# Patient Record
Sex: MALE | Race: WHITE | Employment: OTHER | URBAN - METROPOLITAN AREA
[De-identification: names, ages, dates, MRNs, and addresses within clinical notes are randomized per-mention and may not be internally consistent; named-entity substitution may affect disease eponyms.]

---

## 2020-03-30 ENCOUNTER — OFFICE VISIT (OUTPATIENT)
Dept: FAMILY MEDICINE CLINIC | Age: 55
End: 2020-03-30
Payer: MEDICARE

## 2020-03-30 VITALS
BODY MASS INDEX: 33.99 KG/M2 | TEMPERATURE: 97.7 F | OXYGEN SATURATION: 93 % | DIASTOLIC BLOOD PRESSURE: 72 MMHG | WEIGHT: 173.13 LBS | HEART RATE: 58 BPM | HEIGHT: 60 IN | SYSTOLIC BLOOD PRESSURE: 122 MMHG

## 2020-03-30 PROBLEM — I50.9 CHF (CONGESTIVE HEART FAILURE) (HCC): Status: ACTIVE | Noted: 2020-03-30

## 2020-03-30 PROBLEM — J44.9 COPD (CHRONIC OBSTRUCTIVE PULMONARY DISEASE) (HCC): Status: ACTIVE | Noted: 2020-03-30

## 2020-03-30 PROBLEM — E78.49 OTHER HYPERLIPIDEMIA: Status: ACTIVE | Noted: 2020-03-30

## 2020-03-30 PROBLEM — I10 HYPERTENSION: Status: ACTIVE | Noted: 2020-03-30

## 2020-03-30 LAB
A/G RATIO: 1.7 (ref 1.1–2.2)
ALBUMIN SERPL-MCNC: 4 G/DL (ref 3.4–5)
ALP BLD-CCNC: 36 U/L (ref 40–129)
ALT SERPL-CCNC: 21 U/L (ref 10–40)
ANION GAP SERPL CALCULATED.3IONS-SCNC: 14 MMOL/L (ref 3–16)
AST SERPL-CCNC: 14 U/L (ref 15–37)
BASOPHILS ABSOLUTE: 0.1 K/UL (ref 0–0.2)
BASOPHILS RELATIVE PERCENT: 0.4 %
BILIRUB SERPL-MCNC: <0.2 MG/DL (ref 0–1)
BUN BLDV-MCNC: 20 MG/DL (ref 7–20)
CALCIUM SERPL-MCNC: 10.2 MG/DL (ref 8.3–10.6)
CHLORIDE BLD-SCNC: 103 MMOL/L (ref 99–110)
CHOLESTEROL, FASTING: 110 MG/DL (ref 0–199)
CO2: 26 MMOL/L (ref 21–32)
CREAT SERPL-MCNC: 0.7 MG/DL (ref 0.9–1.3)
EOSINOPHILS ABSOLUTE: 0.1 K/UL (ref 0–0.6)
EOSINOPHILS RELATIVE PERCENT: 0.9 %
GFR AFRICAN AMERICAN: >60
GFR NON-AFRICAN AMERICAN: >60
GLOBULIN: 2.4 G/DL
GLUCOSE BLD-MCNC: 100 MG/DL (ref 70–99)
HCT VFR BLD CALC: 37.3 % (ref 40.5–52.5)
HDLC SERPL-MCNC: 50 MG/DL (ref 40–60)
HEMOGLOBIN: 11.8 G/DL (ref 13.5–17.5)
LDL CHOLESTEROL CALCULATED: 43 MG/DL
LYMPHOCYTES ABSOLUTE: 3.7 K/UL (ref 1–5.1)
LYMPHOCYTES RELATIVE PERCENT: 27.6 %
MCH RBC QN AUTO: 27.6 PG (ref 26–34)
MCHC RBC AUTO-ENTMCNC: 31.8 G/DL (ref 31–36)
MCV RBC AUTO: 87 FL (ref 80–100)
MONOCYTES ABSOLUTE: 0.8 K/UL (ref 0–1.3)
MONOCYTES RELATIVE PERCENT: 6.3 %
NEUTROPHILS ABSOLUTE: 8.8 K/UL (ref 1.7–7.7)
NEUTROPHILS RELATIVE PERCENT: 64.8 %
PDW BLD-RTO: 15.9 % (ref 12.4–15.4)
PLATELET # BLD: 428 K/UL (ref 135–450)
PMV BLD AUTO: 8.4 FL (ref 5–10.5)
POTASSIUM SERPL-SCNC: 4.2 MMOL/L (ref 3.5–5.1)
RBC # BLD: 4.28 M/UL (ref 4.2–5.9)
SODIUM BLD-SCNC: 143 MMOL/L (ref 136–145)
TOTAL PROTEIN: 6.4 G/DL (ref 6.4–8.2)
TRIGLYCERIDE, FASTING: 86 MG/DL (ref 0–150)
VLDLC SERPL CALC-MCNC: 17 MG/DL
WBC # BLD: 13.5 K/UL (ref 4–11)

## 2020-03-30 PROCEDURE — 36415 COLL VENOUS BLD VENIPUNCTURE: CPT | Performed by: NURSE PRACTITIONER

## 2020-03-30 PROCEDURE — 99204 OFFICE O/P NEW MOD 45 MIN: CPT | Performed by: NURSE PRACTITIONER

## 2020-03-30 RX ORDER — FENOFIBRIC ACID 135 MG/1
135 CAPSULE, DELAYED RELEASE ORAL DAILY
COMMUNITY
End: 2020-04-06 | Stop reason: SDUPTHER

## 2020-03-30 RX ORDER — CHLORAL HYDRATE 500 MG
1 CAPSULE ORAL DAILY
COMMUNITY
End: 2022-08-02 | Stop reason: ALTCHOICE

## 2020-03-30 RX ORDER — IPRATROPIUM BROMIDE AND ALBUTEROL SULFATE 2.5; .5 MG/3ML; MG/3ML
1 SOLUTION RESPIRATORY (INHALATION)
COMMUNITY
End: 2020-04-24 | Stop reason: SDUPTHER

## 2020-03-30 RX ORDER — BISOPROLOL FUMARATE 10 MG/1
10 TABLET ORAL DAILY
COMMUNITY
End: 2020-04-06 | Stop reason: SDUPTHER

## 2020-03-30 RX ORDER — CLOPIDOGREL BISULFATE 75 MG/1
75 TABLET ORAL DAILY
COMMUNITY
End: 2020-04-06 | Stop reason: SDUPTHER

## 2020-03-30 RX ORDER — ASPIRIN 81 MG/1
81 TABLET ORAL DAILY
Status: ON HOLD | COMMUNITY
End: 2021-09-30 | Stop reason: ALTCHOICE

## 2020-03-30 RX ORDER — GABAPENTIN 800 MG/1
800 TABLET ORAL 3 TIMES DAILY
COMMUNITY
End: 2020-04-06 | Stop reason: SDUPTHER

## 2020-03-30 RX ORDER — UMECLIDINIUM 62.5 UG/1
1 AEROSOL, POWDER ORAL DAILY
COMMUNITY
End: 2020-04-24 | Stop reason: SDUPTHER

## 2020-03-30 RX ORDER — VITAMIN B COMPLEX
1 TABLET ORAL DAILY
COMMUNITY

## 2020-03-30 RX ORDER — PIOGLITAZONE HCL AND METFORMIN HCL 500; 15 MG/1; MG/1
1 TABLET ORAL 2 TIMES DAILY WITH MEALS
COMMUNITY
End: 2020-08-10 | Stop reason: CLARIF

## 2020-03-30 RX ORDER — BUDESONIDE AND FORMOTEROL FUMARATE DIHYDRATE 80; 4.5 UG/1; UG/1
2 AEROSOL RESPIRATORY (INHALATION) 2 TIMES DAILY
COMMUNITY
End: 2020-04-24 | Stop reason: SDUPTHER

## 2020-03-30 RX ORDER — ATORVASTATIN CALCIUM 40 MG/1
40 TABLET, FILM COATED ORAL DAILY
COMMUNITY
End: 2020-04-06 | Stop reason: SDUPTHER

## 2020-03-30 RX ORDER — FUROSEMIDE 20 MG/1
20 TABLET ORAL DAILY
Status: ON HOLD | COMMUNITY
End: 2021-02-11 | Stop reason: SDUPTHER

## 2020-03-30 RX ORDER — LISINOPRIL 20 MG/1
20 TABLET ORAL DAILY
COMMUNITY
End: 2020-04-06 | Stop reason: SDUPTHER

## 2020-03-30 RX ORDER — SIMVASTATIN 20 MG
20 TABLET ORAL NIGHTLY
COMMUNITY
End: 2020-08-10

## 2020-03-30 RX ORDER — CHOLECALCIFEROL (VITAMIN D3) 1250 MCG
1 CAPSULE ORAL
COMMUNITY
End: 2020-04-06 | Stop reason: SDUPTHER

## 2020-03-30 RX ORDER — FLUTICASONE FUROATE AND VILANTEROL TRIFENATATE 100; 25 UG/1; UG/1
1 POWDER RESPIRATORY (INHALATION) DAILY
COMMUNITY
End: 2020-04-24 | Stop reason: SDUPTHER

## 2020-03-30 ASSESSMENT — PATIENT HEALTH QUESTIONNAIRE - PHQ9
SUM OF ALL RESPONSES TO PHQ9 QUESTIONS 1 & 2: 0
SUM OF ALL RESPONSES TO PHQ QUESTIONS 1-9: 0
2. FEELING DOWN, DEPRESSED OR HOPELESS: 0
SUM OF ALL RESPONSES TO PHQ QUESTIONS 1-9: 0
1. LITTLE INTEREST OR PLEASURE IN DOING THINGS: 0

## 2020-03-30 ASSESSMENT — ENCOUNTER SYMPTOMS
ABDOMINAL DISTENTION: 0
SHORTNESS OF BREATH: 1
SORE THROAT: 0
WHEEZING: 0
CHEST TIGHTNESS: 1
DIARRHEA: 0
RHINORRHEA: 0
NAUSEA: 0
COUGH: 0
VOMITING: 0
ABDOMINAL PAIN: 0

## 2020-03-30 NOTE — PATIENT INSTRUCTIONS
forms, many of them available over-the-counter:  ? Nicotine patches  ? Nicotine gum and lozenges  ? Nicotine inhaler  · Ask your doctor about bupropion (Wellbutrin) or varenicline (Chantix), which are prescription medicines. They do not contain nicotine. They help you by reducing withdrawal symptoms, such as stress and anxiety. · Some people find hypnosis, acupuncture, and massage helpful for ending the smoking habit. · Eat a healthy diet and get regular exercise. Having healthy habits will help your body move past its craving for nicotine. · Be prepared to keep trying. Most people are not successful the first few times they try to quit. Do not get mad at yourself if you smoke again. Make a list of things you learned and think about when you want to try again, such as next week, next month, or next year. Where can you learn more? Go to https://Munogenicspekarlieeweb.American Aerogel. org and sign in to your NeurAxon account. Enter G326 in the Verisante Technology box to learn more about \"Stopping Smoking: Care Instructions. \"     If you do not have an account, please click on the \"Sign Up Now\" link. Current as of: July 4, 2019Content Version: 12.4  © 2146-7703 Healthwise, Incorporated. Care instructions adapted under license by [facility] ([facility]). If you have questions about a medical condition or this instruction, always ask your healthcare professional. [place] [address] any warranty or liability for your use of this information.

## 2020-03-30 NOTE — PROGRESS NOTES
CHIEF COMPLAINT  Chief Complaint   Patient presents with    New Patient        HPI   Keya Granado is a 48 y.o. male who presents to the office establishing care with primary care provider. Patient was recently hospitalized for CHF exacerbation and pneumonia a few weeks ago. Patient reports that he seen his previous primary care physician 2 to 3 months ago prior to hospitalization. Patient reports overall feeling well now. Patient reports that he is taking his medications as prescribed. Patient reports that he was previously on hydrochlorothiazide which they changed it to furosemide while in the hospital.  Patient reports he is currently still on Zithromax and taking medications as prescribed with no new or worsening symptoms. Patient denies any dizziness or lightheadedness. Patient reports intermittent chest tightness with shortness of breath however reports that to chronic COPD/emphysema. Patient continues to smoke 1 pack/day. Patient reports approximately 1 year ago he was smoking 2-1/2 packs/day and has decreased. No abdominal pain or discomfort. No nausea, vomiting, or diarrhea. No numbness or tingling in extremities. Patient reports pain in hands and feet from time to time and takes gabapentin. Patient is diabetic but has not had a glucometer and testing supplies in the past few weeks due to recently moving and not able to find it. Patient's wife reports that his blood sugar was 90-1 20s prior to the move. Patient reports colonoscopy within the past year. Patient also reports seeing ophthalmology a few months ago but would like to have a referral due to moving. Patient does wear glasses. No fever. Patient reports chronic cough with nonproductive sputum. No dysuria, hematuria, urinary urgency, frequency, retention. No dark or tarry stools. No other complaints, modifying factors or associated symptoms. Nursing notes reviewed. No past medical history on file.   No past surgical normal. There is no distension. Palpations: Abdomen is soft. Tenderness: There is no abdominal tenderness. There is no guarding. Musculoskeletal: Normal range of motion. General: No swelling or deformity. Skin:     General: Skin is warm and dry. Capillary Refill: Capillary refill takes 2 to 3 seconds. Coloration: Skin is not pale. Findings: No bruising, lesion or rash. Neurological:      General: No focal deficit present. Mental Status: He is alert and oriented to person, place, and time. Gait: Gait normal.      Deep Tendon Reflexes: Reflexes normal.   Psychiatric:         Mood and Affect: Mood normal.         Behavior: Behavior normal.         ASSESSMENT/PLAN:   1. Essential hypertension  Controlled. Patient reports compliancy with medications. Patient denies any episodes of hypo-or hypertension. Patient reports chest tightness and shortness of breath on a daily basis but denies any new or worsening symptoms. Patient reports that he has that always due to COPD. Patient was recently hospitalized for CHF exacerbation and pneumonia. Patient reports that he is feeling well denies any new dyspnea since being discharged from hospital.  Patient instructed on DASH diet as well as being compliant with medications. Smoking cessation was discussed with patient. Patient noncompliant and does not wish to stop smoking.  - CBC Auto Differential  - COMPREHENSIVE METABOLIC PANEL    2. Other hyperlipidemia  Labs pending. Patient reports he is compliant with medications. Dietary control was discussed in detail with patient. Patient verbalizes and acknowledges.  - Lipid, Fasting    3. Chronic systolic congestive heart failure [facility])  Patient was recently hospitalized for acute CHF exacerbation. Patient reports that they changed his hydrochlorothiazide to furosemide and has been taking it as prescribed. Patient denies any urinary changes.   Patient denies any recent weight

## 2020-03-31 LAB
ESTIMATED AVERAGE GLUCOSE: 139.9 MG/DL
HBA1C MFR BLD: 6.5 %

## 2020-03-31 RX ORDER — BLOOD-GLUCOSE METER
1 KIT MISCELLANEOUS DAILY
Qty: 1 KIT | Refills: 0 | Status: SHIPPED | OUTPATIENT
Start: 2020-03-31 | End: 2020-03-31 | Stop reason: SDUPTHER

## 2020-03-31 RX ORDER — BLOOD-GLUCOSE METER
1 KIT MISCELLANEOUS DAILY
Qty: 1 KIT | Refills: 0 | Status: SHIPPED | OUTPATIENT
Start: 2020-03-31

## 2020-03-31 RX ORDER — GLUCOSAMINE HCL/CHONDROITIN SU 500-400 MG
CAPSULE ORAL
Qty: 300 STRIP | Refills: 0 | Status: SHIPPED | OUTPATIENT
Start: 2020-03-31 | End: 2020-03-31 | Stop reason: SDUPTHER

## 2020-03-31 RX ORDER — GLUCOSAMINE HCL/CHONDROITIN SU 500-400 MG
CAPSULE ORAL
Qty: 300 STRIP | Refills: 0 | Status: SHIPPED | OUTPATIENT
Start: 2020-03-31

## 2020-04-06 RX ORDER — LISINOPRIL 20 MG/1
20 TABLET ORAL DAILY
Qty: 30 TABLET | Refills: 1 | Status: SHIPPED | OUTPATIENT
Start: 2020-04-06 | End: 2020-06-24

## 2020-04-06 RX ORDER — PIOGLITAZONEHYDROCHLORIDE 15 MG/1
15 TABLET ORAL DAILY
Qty: 30 TABLET | Refills: 1 | Status: SHIPPED | OUTPATIENT
Start: 2020-04-06 | End: 2021-03-16 | Stop reason: SDUPTHER

## 2020-04-06 RX ORDER — CLOPIDOGREL BISULFATE 75 MG/1
75 TABLET ORAL DAILY
Qty: 30 TABLET | Refills: 1 | Status: ON HOLD | OUTPATIENT
Start: 2020-04-06 | End: 2021-09-17

## 2020-04-06 RX ORDER — FENOFIBRIC ACID 135 MG/1
135 CAPSULE, DELAYED RELEASE ORAL DAILY
Qty: 30 CAPSULE | Refills: 1 | Status: SHIPPED | OUTPATIENT
Start: 2020-04-06 | End: 2020-09-28

## 2020-04-06 RX ORDER — ATORVASTATIN CALCIUM 40 MG/1
40 TABLET, FILM COATED ORAL DAILY
Qty: 30 TABLET | Refills: 1 | Status: SHIPPED | OUTPATIENT
Start: 2020-04-06 | End: 2020-06-22

## 2020-04-06 RX ORDER — BISOPROLOL FUMARATE 10 MG/1
10 TABLET ORAL DAILY
Qty: 30 TABLET | Refills: 1 | Status: ON HOLD | OUTPATIENT
Start: 2020-04-06 | End: 2021-06-09 | Stop reason: SDUPTHER

## 2020-04-06 RX ORDER — CHOLECALCIFEROL (VITAMIN D3) 1250 MCG
1 CAPSULE ORAL
Qty: 2 CAPSULE | Refills: 1 | Status: SHIPPED | OUTPATIENT
Start: 2020-04-06 | End: 2022-05-12 | Stop reason: ALTCHOICE

## 2020-04-06 RX ORDER — GABAPENTIN 800 MG/1
800 TABLET ORAL 4 TIMES DAILY
Qty: 120 TABLET | Refills: 2 | Status: SHIPPED | OUTPATIENT
Start: 2020-04-06 | End: 2020-09-29 | Stop reason: SDUPTHER

## 2020-04-24 ENCOUNTER — TELEPHONE (OUTPATIENT)
Dept: FAMILY MEDICINE CLINIC | Age: 55
End: 2020-04-24

## 2020-04-24 RX ORDER — IPRATROPIUM BROMIDE AND ALBUTEROL SULFATE 2.5; .5 MG/3ML; MG/3ML
1 SOLUTION RESPIRATORY (INHALATION)
Qty: 360 ML | Refills: 1 | Status: SHIPPED | OUTPATIENT
Start: 2020-04-24 | End: 2020-06-24

## 2020-04-24 RX ORDER — FLUTICASONE FUROATE AND VILANTEROL TRIFENATATE 100; 25 UG/1; UG/1
1 POWDER RESPIRATORY (INHALATION) DAILY
Qty: 1 EACH | Refills: 1 | Status: SHIPPED | OUTPATIENT
Start: 2020-04-24 | End: 2020-07-29

## 2020-04-24 RX ORDER — BUDESONIDE AND FORMOTEROL FUMARATE DIHYDRATE 80; 4.5 UG/1; UG/1
2 AEROSOL RESPIRATORY (INHALATION) 2 TIMES DAILY
Qty: 1 INHALER | Refills: 1 | Status: SHIPPED | OUTPATIENT
Start: 2020-04-24 | End: 2020-08-31 | Stop reason: ALTCHOICE

## 2020-04-24 RX ORDER — UMECLIDINIUM 62.5 UG/1
1 AEROSOL, POWDER ORAL DAILY
Qty: 1 EACH | Refills: 1 | Status: SHIPPED | OUTPATIENT
Start: 2020-04-24 | End: 2020-07-29

## 2020-04-24 NOTE — TELEPHONE ENCOUNTER
Patient's wife notified - refill for all inhalers and nebulizer sent in per CNP approval    NOTE: Patient requested ProAir instead of the Ventoline because it worked better and insurance made him try the vontoline first this year.

## 2020-04-24 NOTE — TELEPHONE ENCOUNTER
Inhalers can be refilled however if patient is not feeling any better and recommendations were for patient to be on oxygen he would need to go back to the hospital for reevaluation and treatment. If he wants inhalers called and verbal order okay to reorder those.

## 2020-06-01 RX ORDER — ALBUTEROL SULFATE 90 UG/1
AEROSOL, METERED RESPIRATORY (INHALATION)
Qty: 1 INHALER | Refills: 3 | Status: SHIPPED | OUTPATIENT
Start: 2020-06-01 | End: 2021-08-27 | Stop reason: SDUPTHER

## 2020-06-22 RX ORDER — ATORVASTATIN CALCIUM 40 MG/1
TABLET, FILM COATED ORAL
Qty: 30 TABLET | Refills: 3 | Status: SHIPPED | OUTPATIENT
Start: 2020-06-22 | End: 2020-09-23

## 2020-06-24 RX ORDER — IPRATROPIUM BROMIDE AND ALBUTEROL SULFATE 2.5; .5 MG/3ML; MG/3ML
1 SOLUTION RESPIRATORY (INHALATION)
Qty: 360 ML | Refills: 1 | Status: SHIPPED | OUTPATIENT
Start: 2020-06-24 | End: 2020-10-13

## 2020-06-24 RX ORDER — LISINOPRIL 20 MG/1
TABLET ORAL
Qty: 30 TABLET | Refills: 1 | Status: SHIPPED | OUTPATIENT
Start: 2020-06-24 | End: 2020-07-24

## 2020-07-24 RX ORDER — LISINOPRIL 20 MG/1
TABLET ORAL
Qty: 30 TABLET | Refills: 1 | Status: SHIPPED | OUTPATIENT
Start: 2020-07-24 | End: 2020-08-24

## 2020-07-29 ENCOUNTER — TELEPHONE (OUTPATIENT)
Dept: FAMILY MEDICINE CLINIC | Age: 55
End: 2020-07-29

## 2020-07-29 RX ORDER — UMECLIDINIUM 62.5 UG/1
AEROSOL, POWDER ORAL
Qty: 30 EACH | Refills: 1 | Status: SHIPPED | OUTPATIENT
Start: 2020-07-29 | End: 2020-08-31 | Stop reason: SDUPTHER

## 2020-07-29 RX ORDER — FLUTICASONE FUROATE AND VILANTEROL TRIFENATATE 100; 25 UG/1; UG/1
POWDER RESPIRATORY (INHALATION)
Qty: 1 EACH | Refills: 2 | Status: SHIPPED | OUTPATIENT
Start: 2020-07-29 | End: 2021-06-02 | Stop reason: DRUGHIGH

## 2020-07-29 NOTE — TELEPHONE ENCOUNTER
----- Message from Catarino Number sent at 7/29/2020  9:23 AM EDT -----  Subject: Message to Provider    QUESTIONS  Information for Provider? Patients need a letter stating they need to keep   lights on do to patient being on oxygen. Patients electric bill shot up   when patient was place on it. please advise patients wife on this matter. ---------------------------------------------------------------------------  --------------  Keanu Must INFO  What is the best way for the office to contact you? OK to leave message on   voicemail  Preferred Call Back Phone Number? [phone]  ---------------------------------------------------------------------------  --------------  SCRIPT ANSWERS  Relationship to Patient? Other  Representative Name? Mrs. Sarah Gunn   Is the Representative on the appropriate HIPAA document in Epic?  Yes  Information for Provider? the fax number is [phone] Home energy   assistance program attention Jorge Rodriguez

## 2020-07-31 ENCOUNTER — TELEPHONE (OUTPATIENT)
Dept: FAMILY MEDICINE CLINIC | Age: 55
End: 2020-07-31

## 2020-07-31 NOTE — TELEPHONE ENCOUNTER
The patient's wife called and asked if a letter can be faxed to Home Energy assistance program Chauncy Claude fax # [phone]  The patient has oxygen in the home and  His bill is high since hes been on the oxygen.  Please advise rc

## 2020-08-10 ENCOUNTER — OFFICE VISIT (OUTPATIENT)
Dept: FAMILY MEDICINE CLINIC | Age: 55
End: 2020-08-10
Payer: MEDICARE

## 2020-08-10 VITALS
WEIGHT: 188.38 LBS | TEMPERATURE: 99.1 F | DIASTOLIC BLOOD PRESSURE: 65 MMHG | SYSTOLIC BLOOD PRESSURE: 91 MMHG | OXYGEN SATURATION: 87 % | BODY MASS INDEX: 37.1 KG/M2 | HEART RATE: 65 BPM

## 2020-08-10 LAB
CREATININE URINE POCT: 200
HBA1C MFR BLD: 7.2 %
MICROALBUMIN/CREAT 24H UR: 80 MG/G{CREAT}
MICROALBUMIN/CREAT UR-RTO: ABNORMAL

## 2020-08-10 PROCEDURE — 82044 UR ALBUMIN SEMIQUANTITATIVE: CPT | Performed by: NURSE PRACTITIONER

## 2020-08-10 PROCEDURE — 99214 OFFICE O/P EST MOD 30 MIN: CPT | Performed by: NURSE PRACTITIONER

## 2020-08-10 PROCEDURE — 83036 HEMOGLOBIN GLYCOSYLATED A1C: CPT | Performed by: NURSE PRACTITIONER

## 2020-08-10 ASSESSMENT — ENCOUNTER SYMPTOMS
RHINORRHEA: 0
DIARRHEA: 0
ABDOMINAL DISTENTION: 0
WHEEZING: 0
SHORTNESS OF BREATH: 1
NAUSEA: 0
CHEST TIGHTNESS: 0
COUGH: 0
VOMITING: 0
SORE THROAT: 0
ABDOMINAL PAIN: 0

## 2020-08-10 NOTE — PATIENT INSTRUCTIONS
a time. This is a lot to deal with, but keep at it. You will feel better. Follow-up care is a key part of your treatment and safety. Be sure to make and go to all appointments, and call your doctor if you are having problems. It's also a good idea to know your test results and keep a list of the medicines you take. How can you care for yourself at home? · Ask your family, friends, and coworkers for support. You have a better chance of quitting if you have help and support. · Join a support group, such as Nicotine Anonymous, for people who are trying to quit smoking. · Consider signing up for a smoking cessation program, such as the American Lung Association's Freedom from Smoking program.  · Get text messaging support. Go to the website at www.smokefree. gov to sign up for the  program.  · Set a quit date. Pick your date carefully so that it is not right in the middle of a big deadline or stressful time. Once you quit, do not even take a puff. Get rid of all ashtrays and lighters after your last cigarette. Clean your house and your clothes so that they do not smell of smoke. · Learn how to be a nonsmoker. Think about ways you can avoid those things that make you reach for a cigarette. ? Avoid situations that put you at greatest risk for smoking. For some people, it is hard to have a drink with friends without smoking. For others, they might skip a coffee break with coworkers who smoke. ? Change your daily routine. Take a different route to work or eat a meal in a different place. · Cut down on stress. Calm yourself or release tension by doing an activity you enjoy, such as reading a book, taking a hot bath, or gardening. · Talk to your doctor or pharmacist about nicotine replacement therapy, which replaces the nicotine in your body. You still get nicotine but you do not use tobacco. Nicotine replacement products help you slowly reduce the amount of nicotine you need.  These products come in several forms, many of them available over-the-counter:  ? Nicotine patches  ? Nicotine gum and lozenges  ? Nicotine inhaler  · Ask your doctor about bupropion (Wellbutrin) or varenicline (Chantix), which are prescription medicines. They do not contain nicotine. They help you by reducing withdrawal symptoms, such as stress and anxiety. · Some people find hypnosis, acupuncture, and massage helpful for ending the smoking habit. · Eat a healthy diet and get regular exercise. Having healthy habits will help your body move past its craving for nicotine. · Be prepared to keep trying. Most people are not successful the first few times they try to quit. Do not get mad at yourself if you smoke again. Make a list of things you learned and think about when you want to try again, such as next week, next month, or next year. Where can you learn more? Go to [email or web address] org and sign in to your [employer]share [employer] account. Enter J383 in the Orate box to learn more about \"Stopping Smoking: Care Instructions. \"     If you do not have an account, please click on the \"Sign Up Now\" link. Current as of: March 12, 2020               Content Version: 12.5  © 3905-1078 Healthwise, Incorporated. Care instructions adapted under license by [facility] ([facility]). If you have questions about a medical condition or this instruction, always ask your healthcare professional. [place] [address] any warranty or liability for your use of this information.

## 2020-08-10 NOTE — PROGRESS NOTES
CHIEF COMPLAINT  Chief Complaint   Patient presents with    Check-Up        HPI   Dougie Paredes is a 48 y.o. male who presents to the office check up. Patient denies any recent changes in medications. Patient reports taking medications as prescribed and no missed doses. No episodes of fatigue or unintentional weight lost.  No dizziness or lightheadedness. No chest pain or chest tightness. Patient reports shortness of breath upon exertion. No abdominal pain or discomfort. No nausea, vomiting, or diarrhea. No changes in bowel or bladder habits. Patient reports he wears oxygen at night. Patient does not have a pulmonologist.  Patient smokes 1 pack/day. Patient sees cardiology last appointment was in July. Patient reports that he does check his blood sugars twice a day his wife tracks it for him. Patient denies any knowledge of episodes of hyperglycemia or hypoglycemia. Patient reports that COPD is managed unless weather gets too hot that he does have to use his inhalers more often. Patient's wife concerned because his legs have progressively got weaker and he gets more short of breath and short distances. No fever or chills. No hemoptysis. No other complaints, modifying factors or associated symptoms. Nursing notes reviewed. No past medical history on file. No past surgical history on file.   Family History   Problem Relation Age of Onset    Asthma Mother     Cancer Mother     Hearing Loss Mother     Vision Loss Mother     High Blood Pressure Father     High Cholesterol Father     Vision Loss Father     Asthma Sister     Diabetes Sister     Vision Loss Sister     Asthma Brother     Arthritis Brother     High Blood Pressure Brother      Social History     Socioeconomic History    Marital status:      Spouse name: Not on file    Number of children: Not on file    Years of education: Not on file    Highest education level: Not on file   Occupational History    Not on file Social Needs    Financial resource strain: Not on file    Food insecurity     Worry: Not on file     Inability: Not on file    Transportation needs     Medical: Not on file     Non-medical: Not on file   Tobacco Use    Smoking status: Current Every Day Smoker    Smokeless tobacco: Never Used   Substance and Sexual Activity    Alcohol use:  Yes    Drug use: Never    Sexual activity: Not on file   Lifestyle    Physical activity     Days per week: Not on file     Minutes per session: Not on file    Stress: Not on file   Relationships    Social connections     Talks on phone: Not on file     Gets together: Not on file     Attends Jewish service: Not on file     Active member of club or organization: Not on file     Attends meetings of clubs or organizations: Not on file     Relationship status: Not on file    Intimate partner violence     Fear of current or ex partner: Not on file     Emotionally abused: Not on file     Physically abused: Not on file     Forced sexual activity: Not on file   Other Topics Concern    Not on file   Social History Narrative    Not on file     Current Outpatient Medications   Medication Sig Dispense Refill    BREO ELLIPTA 100-25 MCG/INH AEPB inhaler TAKE 1 PUFF BY MOUTH EVERY DAY 1 each 2    lisinopril (PRINIVIL;ZESTRIL) 20 MG tablet TAKE 1 TABLET BY MOUTH EVERY DAY 30 tablet 1    ipratropium-albuterol (DUONEB) 0.5-2.5 (3) MG/3ML SOLN nebulizer solution TAKE 3 MLS BY NEBULIZATION EVERY 4-6 HOURS AS NEEDED FOR SHORTNESS OF BREATH 360 mL 1    atorvastatin (LIPITOR) 40 MG tablet TAKE 1 TABLET BY MOUTH EVERY DAY 30 tablet 3    albuterol sulfate  (90 Base) MCG/ACT inhaler INHALE 2 PUFFS INTO THE LUNGS 2 TIMES DAILY 1 Inhaler 3    budesonide-formoterol (SYMBICORT) 80-4.5 MCG/ACT AERO Inhale 2 puffs into the lungs 2 times daily 1 Inhaler 1    Albuterol Sulfate, sensor, (PROAIR DIGIHALER) 108 (90 Base) MCG/ACT AEPB Inhale 2 puffs into the lungs 2 times daily Was given congestion, rhinorrhea and sore throat. Eyes: Negative for visual disturbance. Respiratory: Positive for shortness of breath. Negative for cough, chest tightness and wheezing. Cardiovascular: Negative for chest pain and leg swelling. Gastrointestinal: Negative for abdominal distention, abdominal pain, diarrhea, nausea and vomiting. Genitourinary: Negative for dysuria, frequency, hematuria and urgency. Musculoskeletal: Positive for gait problem. Negative for myalgias. Skin: Negative for rash. Neurological: Negative for dizziness, weakness, light-headedness, numbness and headaches. Psychiatric/Behavioral: Negative for self-injury, sleep disturbance and suicidal ideas. PHYSICAL EXAM  BP 91/65   Pulse 65   Temp 99.1 °F (37.3 °C) (Infrared)   Wt 188 lb 6 oz (85.4 kg)   SpO2 (!) 87% Comment: no sx  BMI 37.10 kg/m²   Physical Exam  Vitals signs reviewed. Constitutional:       General: He is not in acute distress. Appearance: Normal appearance. He is well-developed. He is not diaphoretic. HENT:      Head: Normocephalic and atraumatic. Right Ear: Tympanic membrane normal.      Left Ear: Tympanic membrane normal.      Nose: Nose normal.      Mouth/Throat:      Mouth: Mucous membranes are moist.      Pharynx: Oropharynx is clear. Eyes:      General:         Right eye: No discharge. Left eye: No discharge. Pupils: Pupils are equal, round, and reactive to light. Neck:      Musculoskeletal: Normal range of motion and neck supple. Vascular: No JVD. Cardiovascular:      Rate and Rhythm: Normal rate. Pulses: Normal pulses. Pulmonary:      Effort: Pulmonary effort is normal. No respiratory distress. Breath sounds: No stridor. Wheezing present. No rhonchi or rales. Chest:      Chest wall: No tenderness. Abdominal:      General: Bowel sounds are normal. There is no distension. Palpations: Abdomen is soft. Tenderness:  There is no abdominal tenderness. There is no guarding. Musculoskeletal: Normal range of motion. General: No swelling or deformity. Feet:      Comments: Monofilament test negative  Skin:     General: Skin is warm and dry. Capillary Refill: Capillary refill takes 2 to 3 seconds. Coloration: Skin is not pale. Findings: No bruising, lesion or rash. Neurological:      General: No focal deficit present. Mental Status: He is alert and oriented to person, place, and time. Motor: No weakness. Gait: Gait normal.   Psychiatric:         Mood and Affect: Mood normal.         Behavior: Behavior normal.          ASSESSMENT/PLAN:   1. Chronic systolic congestive heart failure (HCC)  Stable. Patient denies any recent weight gain. Patient denies weighing himself on a daily basis. Patient takes Lasix 20 mg daily. No adverse side effects or missed doses. Patient is noncompliant with diet and reports that he does eat a lot of salt on his food. Patient instructed to avoid excessive salt, processed foods and given information on diet restrictions. Patient encouraged to monitor weights daily and call if gaining 3 pounds over 1 to 2 days. Patient does see cardiology Dr. Janet Bergman. Patient had right and left heart cath at [facility] earlier this year prior to visit with myself in March. Patient encouraged to follow-up with cardiology as previously planned in October, sooner for any new or worsening symptoms. Patient verbalized acknowledges. 2. Other hyperlipidemia  Stable. Last lipid panel normal.  Patient compliant with atorvastatin daily. No adverse side effects or missed doses. Patient instructed to monitor dietary intake in decrease saturated fats and lose weight. Patient also takes omega-3. Continue current therapy and management follow-up in 6 months, sooner for new or worsening symptoms. 3. Essential hypertension  Controlled. Patient compliant with lisinopril daily.   Patient denies any episodes of dizziness, lightheadedness, chest pain or chest tightness. Patient encouraged to monitor blood pressure and call for any abnormal readings. Patient verbalized and acknowledges. Follow-up in 6 months, sooner for new or worsening symptoms. 4. Chronic obstructive pulmonary disease, unspecified COPD type ([facility] [address]  Stable. Patient was 87% on room air upon arrival however he did improve to 93% once he sat and rested. Patient reports that he does get short of breath upon exertion but denies any new or worsening symptoms. Patient does use Symbicort inhaler, pro-air, and Brio Ellipta, DuoNeb treatments. Patient reports that he does have to increase in his rescue inhaler when the weather is hot. I did recommend patient seeing pulmonology for further evaluation. Patient's wife reports that he does get more short of breath upon exertion and has to wear oxygen at night. I did recommend further pulmonary function test.  Patient's wife reports that she will call their insurance and verify excepted providers. We will follow-up in 3 to 6 months, sooner for new or worsening symptoms. 5. Type 2 diabetes mellitus without complication, without long-term current use of insulin (HCC)  Uncontrolled. Patient is not compliant with diet. Patient reports that he does check his blood sugar twice a day and his wife keep track of it but he is not sure of what the readings have been. Patient reports that he he has had his eyes examined 3 weeks ago at [facility]. Patient's wife was instructed to have reports sent here. Previous hemoglobin A1c was 6.5. Today's hemoglobin A1c is 7.2. Patient instructed that he must be more dietary compliance and work on losing weight for diabetes control. Patient compliant with Actos and metformin. No adverse side effects or missed doses.   Continue with current therapy and management and will follow-up in 3 to 6 months, sooner for new or worsening symptoms.  - POCT

## 2020-08-12 ENCOUNTER — TELEPHONE (OUTPATIENT)
Dept: FAMILY MEDICINE CLINIC | Age: 55
End: 2020-08-12

## 2020-08-12 NOTE — TELEPHONE ENCOUNTER
Spoke to patient's wife, she said she has already called and requested the records the colonoscopy was done by Dr Haile Corea and the eye exam (not ear) was done at [address] [place] [address] in [facility].   They are supposed to be faxing these to the office

## 2020-08-12 NOTE — TELEPHONE ENCOUNTER
----- Message from Nishi Mathews sent at 8/12/2020  8:55 AM EDT -----  Subject: Message to Provider    QUESTIONS  Information for Provider? Pt 's wife is calling because PCP is requesting   a copy of pt's colonoscopy & ear exam   doctor needs to request them from [facility] at the [facility]". Their   fax number is [phone]. Please advise  ---------------------------------------------------------------------------  --------------  CALL BACK INFO  What is the best way for the office to contact you? OK to leave message on   voicemail  Preferred Call Back Phone Number? [phone]  ---------------------------------------------------------------------------  --------------  SCRIPT ANSWERS  Relationship to Patient? Other  Representative Name? Sheron-wife  Is the Representative on the appropriate HIPAA document in Epic?  Yes

## 2020-08-24 RX ORDER — LISINOPRIL 20 MG/1
TABLET ORAL
Qty: 30 TABLET | Refills: 5 | Status: ON HOLD | OUTPATIENT
Start: 2020-08-24 | End: 2021-06-09 | Stop reason: HOSPADM

## 2020-08-31 RX ORDER — FLUTICASONE FUROATE AND VILANTEROL TRIFENATATE 100; 25 UG/1; UG/1
POWDER RESPIRATORY (INHALATION)
Qty: 1 EACH | Refills: 5 | Status: SHIPPED | OUTPATIENT
Start: 2020-08-31 | End: 2021-03-19

## 2020-08-31 RX ORDER — UMECLIDINIUM 62.5 UG/1
AEROSOL, POWDER ORAL
Qty: 30 EACH | Refills: 5 | Status: SHIPPED | OUTPATIENT
Start: 2020-08-31 | End: 2021-03-19

## 2020-08-31 NOTE — TELEPHONE ENCOUNTER
----- Message from e27 Farhad sent at 8/31/2020  9:57 AM EDT -----  Subject: Message to Provider    QUESTIONS  Information for Provider? Pt's wife is requesting medication refill   pt is almost out but medication is not listed under current medications. Also has updated list of medications for the office  ---------------------------------------------------------------------------  --------------  CALL BACK INFO  What is the best way for the office to contact you? OK to leave message on   voicemail  Preferred Call Back Phone Number? [phone]  ---------------------------------------------------------------------------  --------------  SCRIPT ANSWERS  Relationship to Patient? Other  Representative Name? Wife   Is the Representative on the appropriate HIPAA document in Epic?  Yes

## 2020-09-10 RX ORDER — GLIMEPIRIDE 2 MG/1
2 TABLET ORAL
Qty: 30 TABLET | Refills: 3 | Status: SHIPPED | OUTPATIENT
Start: 2020-09-10 | End: 2020-10-21

## 2020-09-10 NOTE — TELEPHONE ENCOUNTER
Okay please notify patient to start taking glimepiride 2 mg daily. Check blood sugars frequently and call for any abnormal readings. Patient needs to be compliant with dietary intake as well.

## 2020-09-10 NOTE — TELEPHONE ENCOUNTER
Wife said patient cannot take the Jardiance.   She said he tried it in the past and it makes him nauseated and she said it makes his sugar drop too low or sometimes makes it go too high

## 2020-09-10 NOTE — TELEPHONE ENCOUNTER
Cardiologist originally prescribed this. Go ahead and discontinue off patient's home med rec and send prescription to patient's pharmacy for Jardiance 10 mg daily. Patient needs a monitor blood sugars and call for abnormal readings.

## 2020-09-22 RX ORDER — ALBUTEROL SULFATE 90 UG/1
AEROSOL, METERED RESPIRATORY (INHALATION)
Qty: 6.7 INHALER | Refills: 3 | OUTPATIENT
Start: 2020-09-22

## 2020-09-23 RX ORDER — ATORVASTATIN CALCIUM 40 MG/1
TABLET, FILM COATED ORAL
Qty: 90 TABLET | Refills: 1 | Status: SHIPPED | OUTPATIENT
Start: 2020-09-23 | End: 2021-03-26

## 2020-09-28 RX ORDER — FENOFIBRIC ACID 135 MG/1
CAPSULE, DELAYED RELEASE ORAL
Qty: 30 CAPSULE | Refills: 5 | Status: SHIPPED | OUTPATIENT
Start: 2020-09-28 | End: 2021-03-11

## 2020-09-28 NOTE — TELEPHONE ENCOUNTER
E[facility] received a call from:    Name of Caller: Kings Harper    Relationship to patient:Spouse     Best contact number: [phone]    Reason for call: Wife is requesting a refill on the Gabapentin 800mg 4 times a day as needed for diabetic pain. Currently has 10-15 pills left.

## 2020-09-29 RX ORDER — GABAPENTIN 800 MG/1
800 TABLET ORAL 4 TIMES DAILY
Qty: 120 TABLET | Refills: 0 | OUTPATIENT
Start: 2020-09-29 | End: 2020-10-29

## 2020-09-29 RX ORDER — GABAPENTIN 800 MG/1
800 TABLET ORAL 4 TIMES DAILY
Qty: 120 TABLET | Refills: 2 | Status: SHIPPED | OUTPATIENT
Start: 2020-09-29 | End: 2020-12-28

## 2020-09-29 NOTE — TELEPHONE ENCOUNTER
I prescribed gabapentin 800 mg for patient to take 4 times a day for 30 days quantity 120 on 4/6/2020. Patient has since then been having Dr. Dexter Garrido prescribe medications/refills. Dr. Rosa Nam has prescribed the past 5 refills for the patient. Please verify with patient and or spouse if he is still seeing Dr. Rosa Nam if so they will need to send request to her.

## 2020-09-29 NOTE — TELEPHONE ENCOUNTER
Date of last refill of this med was 4-6-20, # of pills given 120 and # of refills given 2. Their next appointment is not scheduled, the last date patient was seen was 8-10-20. Does patient have medication agreement on file? No  Has drug screen been done in last 12 months if needed?  no

## 2020-10-13 RX ORDER — IPRATROPIUM BROMIDE AND ALBUTEROL SULFATE 2.5; .5 MG/3ML; MG/3ML
1 SOLUTION RESPIRATORY (INHALATION)
Qty: 360 ML | Refills: 1 | Status: SHIPPED | OUTPATIENT
Start: 2020-10-13 | End: 2021-08-27 | Stop reason: SDUPTHER

## 2020-10-13 NOTE — TELEPHONE ENCOUNTER
Future appt scheduled 0 scheduled-Return in about 6 months (around 2/10/2021            Last appt 08/10/2020      Last Written 06/24/2020    ipratropium-albuterol (DUONEB) 0.5-2.5 (3) MG/3ML SOLN nebulizer solution  360 mL  1 RF

## 2020-10-21 RX ORDER — GLIMEPIRIDE 2 MG/1
2 TABLET ORAL
Qty: 90 TABLET | Refills: 1 | Status: SHIPPED | OUTPATIENT
Start: 2020-10-21 | End: 2021-03-08

## 2020-10-26 ENCOUNTER — TELEPHONE (OUTPATIENT)
Dept: FAMILY MEDICINE CLINIC | Age: 55
End: 2020-10-26

## 2020-10-26 NOTE — TELEPHONE ENCOUNTER
Patient continues to take 800 mg 4 times a day correct? He has been on the same dose for quite some time with his previous provider. If he is currently taking 800 mg 4 times a day and it is not working I would recommend pain management.

## 2020-10-27 NOTE — TELEPHONE ENCOUNTER
Notified patient if Gabapentin is not helping   He needs to go to pain management. Patient agrees to go. Wife will call back on wed with a pain management doctor.

## 2020-11-04 ENCOUNTER — TELEPHONE (OUTPATIENT)
Dept: FAMILY MEDICINE CLINIC | Age: 55
End: 2020-11-04

## 2020-11-09 ENCOUNTER — HOSPITAL ENCOUNTER (OUTPATIENT)
Age: 55
Discharge: HOME | End: 2020-11-09
Payer: MEDICARE

## 2020-11-09 LAB
ALBUMIN SERPL-MCNC: 4.3 G/DL (ref 3.4–5)
ANION GAP SERPL CALCULATED.3IONS-SCNC: 9 MMOL/L (ref 3–16)
BUN BLDV-MCNC: 28 MG/DL (ref 7–20)
CALCIUM SERPL-MCNC: 10 MG/DL (ref 8.3–10.6)
CHLORIDE BLD-SCNC: 100 MMOL/L (ref 99–110)
CO2: 32 MMOL/L (ref 21–32)
CREAT SERPL-MCNC: 1.1 MG/DL (ref 0.9–1.3)
GFR AFRICAN AMERICAN: >60
GFR NON-AFRICAN AMERICAN: >60
GLUCOSE BLD-MCNC: 108 MG/DL (ref 70–99)
MAGNESIUM: 1.3 MG/DL (ref 1.8–2.4)
PHOSPHORUS: 3.5 MG/DL (ref 2.5–4.9)
POTASSIUM SERPL-SCNC: 4.5 MMOL/L (ref 3.5–5.1)
SODIUM BLD-SCNC: 141 MMOL/L (ref 136–145)

## 2020-11-09 PROCEDURE — 36415 COLL VENOUS BLD VENIPUNCTURE: CPT

## 2020-11-09 PROCEDURE — 83735 ASSAY OF MAGNESIUM: CPT

## 2020-11-09 PROCEDURE — 80069 RENAL FUNCTION PANEL: CPT

## 2020-12-15 ENCOUNTER — TELEPHONE (OUTPATIENT)
Dept: FAMILY MEDICINE CLINIC | Age: 55
End: 2020-12-15

## 2020-12-15 NOTE — TELEPHONE ENCOUNTER
Was this paperwork initially sent to his cardiologist and declined? Has patient had changes in disability/mobility recently? If so what specifically.

## 2020-12-15 NOTE — TELEPHONE ENCOUNTER
Patient states he needs some paper work filled out that he needs a downstairs apt. He is due to see you in feb,2021. He does not have a follow up appt.

## 2020-12-16 NOTE — TELEPHONE ENCOUNTER
The cardiologist that patient sees will be out until April and the other physicians in the practice do not fill out paper work unless it is their patient. His mobility has changed in that he gets very sob climbing stairs. He tried to use a CPAP but unable to tolerate due to being claustrophobic. I got Dr. Kelly Magana notes on patient's last 2 visits. They are on your desk to review. Let me know if they can bring the paper work in to get  A 1st floor apartment. Thank You.

## 2020-12-18 NOTE — TELEPHONE ENCOUNTER
Okay I would recommend patient obtaining his paperwork that states he is on disability then making an appointment with me to discuss this since I have not seen him for his neuropathy and reasons for needing change in living situations.

## 2020-12-28 RX ORDER — GABAPENTIN 800 MG/1
TABLET ORAL
Qty: 120 TABLET | Refills: 0 | Status: SHIPPED | OUTPATIENT
Start: 2020-12-28 | End: 2021-01-26

## 2020-12-28 NOTE — TELEPHONE ENCOUNTER
Bed: ED16A  Expected date: 4/12/19  Expected time: 6:05 PM  Means of arrival:   Comments:  44 F. Hallucinations   Patient's wife called and scheduled appt for him to discuss getting a note to have a downstairs appt. Patient wanted you to know that his disability is for mental reasons.

## 2020-12-28 NOTE — TELEPHONE ENCOUNTER
Date of last refill of this med was 9/29, # of pills given 120 and # of refills given 2. Their next appointment is 1/8, the last date patient was seen was 8/10. Does patient have medication agreement on file? No  Has drug screen been done in last 12 months if needed?  no

## 2021-01-08 ENCOUNTER — VIRTUAL VISIT (OUTPATIENT)
Dept: FAMILY MEDICINE CLINIC | Age: 56
End: 2021-01-08
Payer: MEDICARE

## 2021-01-08 DIAGNOSIS — E11.9 TYPE 2 DIABETES MELLITUS WITHOUT COMPLICATION, WITHOUT LONG-TERM CURRENT USE OF INSULIN (HCC): Primary | ICD-10-CM

## 2021-01-08 DIAGNOSIS — G62.9 NEUROPATHY: ICD-10-CM

## 2021-01-08 DIAGNOSIS — I10 ESSENTIAL HYPERTENSION: ICD-10-CM

## 2021-01-08 DIAGNOSIS — J44.9 CHRONIC OBSTRUCTIVE PULMONARY DISEASE, UNSPECIFIED COPD TYPE (HCC): ICD-10-CM

## 2021-01-08 PROCEDURE — G2012 BRIEF CHECK IN BY MD/QHP: HCPCS | Performed by: NURSE PRACTITIONER

## 2021-01-08 RX ORDER — DULOXETIN HYDROCHLORIDE 20 MG/1
20 CAPSULE, DELAYED RELEASE ORAL 2 TIMES DAILY
Status: ON HOLD | COMMUNITY
End: 2021-09-17

## 2021-01-08 ASSESSMENT — PATIENT HEALTH QUESTIONNAIRE - PHQ9
2. FEELING DOWN, DEPRESSED OR HOPELESS: 0
SUM OF ALL RESPONSES TO PHQ QUESTIONS 1-9: 0
1. LITTLE INTEREST OR PLEASURE IN DOING THINGS: 0
SUM OF ALL RESPONSES TO PHQ9 QUESTIONS 1 & 2: 0
SUM OF ALL RESPONSES TO PHQ QUESTIONS 1-9: 0

## 2021-01-08 NOTE — LETTER
[address]  [place]  Phone: [phone]  Fax: [phone]    LINK Dey CNP        January 8, 2021     Patient: Ene Rodriguez   YOB: 1965   Date of Visit: 1/8/2021       To Whom it May Concern:    Ene Rodriguez was seen in my clinic on 1/8/2021. Ene Rodriguez would benefit from a two bedroom, ground level apartment. Due to his chronic medical conditions. If you have any questions or concerns, please don't hesitate to call.     Sincerely,         LINK Dey CNP

## 2021-01-08 NOTE — PROGRESS NOTES
Elaine Garcia is a 50 y.o. male evaluated via telephone on 1/8/2021unable to to perform video virtual .      Consent:  He and/or health care decision maker is aware that that he may receive a bill for this telephone service, depending on his insurance coverage, and has provided verbal consent to proceed: NA - Consent obtained within past 12 months      Documentation:  I communicated with the patient and/or health care decision maker about obtaining a ground floor apartment due to worsening medical conditions. Patient has history of COPD, hypertension, hyperlipidemia, CHF, kidney disease, neuropathy. Patient reports that worsening neuropathy and COPD has made it difficult for him to ambulate and climb steps. Patient does report wearing oxygen 2 L as needed during the day and continuously at night. Patient does follow up with pulmonologist, nephrologist, and cardiologist on a regular basis. Patient reports taking medications as prescribed with no missed doses. No recent falls or injuries. Patient denies any dizziness or lightheadedness. No chest pain or chest tightness. No abdominal pain or discomfort. Patient reports eating and drinking appropriately with no unusual fatigue or unexplained weight loss. No changes in bowel or bladder habits. Patient lives with his wife who assist with his care. Details of this discussion including any medical advice provided:   1. Type 2 diabetes mellitus without complication without long-term use of insulin  Stable. Last A1c approximately 4 months ago 7.2. Patient reports being compliant with medications. Patient reports that he does have episodes of hyperglycemia and hypoglycemia depending on what he eats. Patient reports checking his blood sugar when he is symptomatic or becomes shaky. Patient currently on Metformin 1000 mg twice a day, glimepiride 2 mg daily, and Actos 15 mg daily.   Recommendations for patient to check blood sugar on a daily basis and call the office with abnormal readings. I did recommend rechecking A1c therefore patient will come and have labs drawn and follow-up with results. Patient's wife reports having his eyes examined 2020 at [place] which he received new glasses. Patient and wife both verbalized and acknowledged with plan of care at this time. Continue with current treatment management. Patient educated on dietary modifications and monitoring. Follow-up in 4 months, sooner for new or worsening symptoms. 2.  COPD  Stable. Managed by pulmonologist Niki Castro. Patient reports that he does wear nasal cannula oxygen 2 L during the day at times but continuously at night. Patient reports that his shortness of breath has worsened upon exertion which makes it difficult for him to climb steps to his apartment. Patient continues to smoke 1-1/2 packs/day of cigarettes. Patient educated on the risks associated with tobacco use and COPD. Patient also educated on use of cigarettes with oxygen. Patient reports using inhalers and breathing treatments as prescribed. I did recommend patient following up with pulmonologist for worsening COPD for reevaluation. Patient and wife both verbalized and acknowledged. 3.  Essential hypertension  Stable. Patient denies any episodes of chest pain or chest tightness. Patient reports shortness of breath upon exertion related to COPD. Patient denies any episodes of dizziness or lightheadedness. Patient is compliant with medications on a daily basis. Patient reports taking lisinopril 20 mg daily. Continue with current treatment management. Patient also follows up with cardiologist on a regular basis. 4.  Neuropathy  Uncontrolled. Patient reports that due to his neuropathy in his feet it makes it difficult for him to climb stairs to his apartment. Patient presents today via telephone encounter with his wife to request ground-floor apartment.   Patient reports that because of climbing the steps and is him unsteady on his feet which causes worsening shortness of breath as well related to COPD. Patient is type II diabetic and reports that blood sugars are stable at times. Patient reports that he is compliant with medications but feels that neuropathy is worsening. Patient does currently see pain management and has establish care with them recently. Patient currently on gabapentin for treatment of neuropathy. Therefore I did recommend patient to obtain ground-floor apartment to help with mobility. Patient and wife both verbalized and acknowledged with plan of care at this time. I affirm this is a Patient Initiated Episode with a Patient who has not had a related appointment within my department in the past 7 days or scheduled within the next 24 hours. Patient identification was verified at the start of the visit: Yes    Total Time: minutes: 5-10 minutes     Mr. Bret Coleman is being evaluated by a Virtual Visit (video visit) encounter to address concerns as mentioned above. A caregiver was present when appropriate. Due to this being a TeleHealth encounter (During NUV-21 public health emergency), evaluation of the following organ systems was limited: Vitals/Constitutional/EENT/Resp/CV/GI//MS/Neuro/Skin/Heme-Lymph-Imm. Pursuant to the emergency declaration under the [facility], [address] [place] [address] authority and the [employer] Act, this Virtual Visit was conducted with patient's (and/or legal guardian's) consent, to reduce the patient's risk of exposure to COVID-19 and provide necessary medical care. The patient (and/or legal guardian) has also been advised to contact this office for worsening conditions or problems, and seek emergency medical treatment and/or call 911 if deemed necessary.     Note: not billable if this call serves to triage the patient into an appointment for the relevant concern      Cain Moore

## 2021-01-26 RX ORDER — GABAPENTIN 800 MG/1
TABLET ORAL
Qty: 120 TABLET | Refills: 0 | Status: SHIPPED | OUTPATIENT
Start: 2021-01-26 | End: 2021-02-23

## 2021-02-07 ENCOUNTER — APPOINTMENT (OUTPATIENT)
Dept: CT IMAGING | Age: 56
DRG: 291 | End: 2021-02-07
Payer: MEDICARE

## 2021-02-07 ENCOUNTER — HOSPITAL ENCOUNTER (INPATIENT)
Age: 56
LOS: 4 days | Discharge: HOME | DRG: 291 | End: 2021-02-11
Attending: EMERGENCY MEDICINE | Admitting: INTERNAL MEDICINE
Payer: MEDICARE

## 2021-02-07 ENCOUNTER — APPOINTMENT (OUTPATIENT)
Dept: GENERAL RADIOLOGY | Age: 56
DRG: 291 | End: 2021-02-07
Payer: MEDICARE

## 2021-02-07 DIAGNOSIS — R09.02 HYPOXIA: ICD-10-CM

## 2021-02-07 DIAGNOSIS — I50.9 ACUTE ON CHRONIC CONGESTIVE HEART FAILURE, UNSPECIFIED HEART FAILURE TYPE (HCC): Primary | ICD-10-CM

## 2021-02-07 DIAGNOSIS — J44.9 CHRONIC OBSTRUCTIVE PULMONARY DISEASE, UNSPECIFIED COPD TYPE (HCC): ICD-10-CM

## 2021-02-07 DIAGNOSIS — J44.1 COPD EXACERBATION (HCC): ICD-10-CM

## 2021-02-07 PROBLEM — J96.22 ACUTE ON CHRONIC RESPIRATORY FAILURE WITH HYPOXIA AND HYPERCAPNIA (HCC): Status: ACTIVE | Noted: 2021-02-07

## 2021-02-07 PROBLEM — J96.21 ACUTE ON CHRONIC RESPIRATORY FAILURE WITH HYPOXIA AND HYPERCAPNIA (HCC): Status: ACTIVE | Noted: 2021-02-07

## 2021-02-07 LAB
A/G RATIO: 1.1 (ref 1.1–2.2)
A/G RATIO: 1.1 (ref 1.1–2.2)
ALBUMIN SERPL-MCNC: 4 G/DL (ref 3.4–5)
ALBUMIN SERPL-MCNC: 4.1 G/DL (ref 3.4–5)
ALP BLD-CCNC: 68 U/L (ref 40–129)
ALP BLD-CCNC: 76 U/L (ref 40–129)
ALT SERPL-CCNC: 30 U/L (ref 10–40)
ALT SERPL-CCNC: 33 U/L (ref 10–40)
ANION GAP SERPL CALCULATED.3IONS-SCNC: 10 MMOL/L (ref 3–16)
ANION GAP SERPL CALCULATED.3IONS-SCNC: 12 MMOL/L (ref 3–16)
AST SERPL-CCNC: 24 U/L (ref 15–37)
AST SERPL-CCNC: 37 U/L (ref 15–37)
BASE EXCESS ARTERIAL: 3 MMOL/L (ref -3–3)
BASE EXCESS ARTERIAL: 5.5 MMOL/L (ref -3–3)
BASE EXCESS VENOUS: 1.4 MMOL/L (ref -3–3)
BASOPHILS ABSOLUTE: 0 K/UL (ref 0–0.2)
BASOPHILS ABSOLUTE: 0.2 K/UL (ref 0–0.2)
BASOPHILS RELATIVE PERCENT: 0.4 %
BASOPHILS RELATIVE PERCENT: 1.5 %
BILIRUB SERPL-MCNC: 0.3 MG/DL (ref 0–1)
BILIRUB SERPL-MCNC: <0.2 MG/DL (ref 0–1)
BUN BLDV-MCNC: 30 MG/DL (ref 7–20)
BUN BLDV-MCNC: 31 MG/DL (ref 7–20)
CALCIUM SERPL-MCNC: 11 MG/DL (ref 8.3–10.6)
CALCIUM SERPL-MCNC: 11 MG/DL (ref 8.3–10.6)
CARBOXYHEMOGLOBIN ARTERIAL: 1.8 % (ref 0–1.5)
CARBOXYHEMOGLOBIN ARTERIAL: 2.2 % (ref 0–1.5)
CARBOXYHEMOGLOBIN: 7.6 % (ref 0–1.5)
CHLORIDE BLD-SCNC: 96 MMOL/L (ref 99–110)
CHLORIDE BLD-SCNC: 98 MMOL/L (ref 99–110)
CO2: 29 MMOL/L (ref 21–32)
CO2: 31 MMOL/L (ref 21–32)
CREAT SERPL-MCNC: 0.9 MG/DL (ref 0.9–1.3)
CREAT SERPL-MCNC: 1.2 MG/DL (ref 0.9–1.3)
EKG ATRIAL RATE: 80 BPM
EKG DIAGNOSIS: NORMAL
EKG P AXIS: 62 DEGREES
EKG P-R INTERVAL: 158 MS
EKG Q-T INTERVAL: 370 MS
EKG QRS DURATION: 90 MS
EKG QTC CALCULATION (BAZETT): 426 MS
EKG R AXIS: 144 DEGREES
EKG T AXIS: 67 DEGREES
EKG VENTRICULAR RATE: 80 BPM
EOSINOPHILS ABSOLUTE: 0.1 K/UL (ref 0–0.6)
EOSINOPHILS ABSOLUTE: 0.1 K/UL (ref 0–0.6)
EOSINOPHILS RELATIVE PERCENT: 0.4 %
EOSINOPHILS RELATIVE PERCENT: 0.5 %
GFR AFRICAN AMERICAN: >60
GFR AFRICAN AMERICAN: >60
GFR NON-AFRICAN AMERICAN: >60
GFR NON-AFRICAN AMERICAN: >60
GLOBULIN: 3.5 G/DL
GLOBULIN: 3.9 G/DL
GLUCOSE BLD-MCNC: 165 MG/DL (ref 70–99)
GLUCOSE BLD-MCNC: 186 MG/DL (ref 70–99)
GLUCOSE BLD-MCNC: 189 MG/DL (ref 70–99)
GLUCOSE BLD-MCNC: 205 MG/DL (ref 70–99)
GLUCOSE BLD-MCNC: 210 MG/DL (ref 70–99)
GLUCOSE BLD-MCNC: 236 MG/DL (ref 70–99)
GLUCOSE BLD-MCNC: 270 MG/DL (ref 70–99)
GLUCOSE BLD-MCNC: 281 MG/DL (ref 70–99)
HCO3 ARTERIAL: 34.4 MMOL/L (ref 21–29)
HCO3 ARTERIAL: 35.7 MMOL/L (ref 21–29)
HCO3 VENOUS: 29.6 MMOL/L (ref 23–29)
HCT VFR BLD CALC: 41.9 % (ref 40.5–52.5)
HCT VFR BLD CALC: 42.5 % (ref 40.5–52.5)
HEMOGLOBIN, ART, EXTENDED: 14 G/DL (ref 13.5–17.5)
HEMOGLOBIN, ART, EXTENDED: 14.2 G/DL (ref 13.5–17.5)
HEMOGLOBIN: 13.3 G/DL (ref 13.5–17.5)
HEMOGLOBIN: 13.4 G/DL (ref 13.5–17.5)
LACTIC ACID, SEPSIS: 0.7 MMOL/L (ref 0.4–1.9)
LYMPHOCYTES ABSOLUTE: 1.2 K/UL (ref 1–5.1)
LYMPHOCYTES ABSOLUTE: 2.4 K/UL (ref 1–5.1)
LYMPHOCYTES RELATIVE PERCENT: 16 %
LYMPHOCYTES RELATIVE PERCENT: 8.9 %
MCH RBC QN AUTO: 27.8 PG (ref 26–34)
MCH RBC QN AUTO: 28 PG (ref 26–34)
MCHC RBC AUTO-ENTMCNC: 31.6 G/DL (ref 31–36)
MCHC RBC AUTO-ENTMCNC: 31.7 G/DL (ref 31–36)
MCV RBC AUTO: 87.7 FL (ref 80–100)
MCV RBC AUTO: 88.6 FL (ref 80–100)
METHEMOGLOBIN ARTERIAL: 0.3 %
METHEMOGLOBIN ARTERIAL: 0.3 %
METHEMOGLOBIN VENOUS: 0.3 %
MONOCYTES ABSOLUTE: 0.3 K/UL (ref 0–1.3)
MONOCYTES ABSOLUTE: 0.8 K/UL (ref 0–1.3)
MONOCYTES RELATIVE PERCENT: 2.4 %
MONOCYTES RELATIVE PERCENT: 5.4 %
NEUTROPHILS ABSOLUTE: 11.4 K/UL (ref 1.7–7.7)
NEUTROPHILS ABSOLUTE: 12.1 K/UL (ref 1.7–7.7)
NEUTROPHILS RELATIVE PERCENT: 76.6 %
NEUTROPHILS RELATIVE PERCENT: 87.9 %
O2 CONTENT ARTERIAL: 18 ML/DL
O2 CONTENT ARTERIAL: 18 ML/DL
O2 CONTENT, VEN: 15 VOL %
O2 SAT, ARTERIAL: 89.1 %
O2 SAT, ARTERIAL: 92.2 %
O2 SAT, VEN: 78 %
O2 THERAPY: ABNORMAL
PCO2 ARTERIAL: 83.2 MMHG (ref 35–45)
PCO2 ARTERIAL: 91.6 MMHG (ref 35–45)
PCO2, VEN: 62.5 MMHG (ref 40–50)
PDW BLD-RTO: 19.2 % (ref 12.4–15.4)
PDW BLD-RTO: 19.4 % (ref 12.4–15.4)
PERFORMED ON: ABNORMAL
PH ARTERIAL: 7.19 (ref 7.35–7.45)
PH ARTERIAL: 7.25 (ref 7.35–7.45)
PH VENOUS: 7.29 (ref 7.35–7.45)
PLATELET # BLD: 302 K/UL (ref 135–450)
PLATELET # BLD: 354 K/UL (ref 135–450)
PLATELET SLIDE REVIEW: ADEQUATE
PMV BLD AUTO: 8.3 FL (ref 5–10.5)
PMV BLD AUTO: 8.7 FL (ref 5–10.5)
PO2 ARTERIAL: 66.9 MMHG (ref 75–108)
PO2 ARTERIAL: 80.1 MMHG (ref 75–108)
PO2, VEN: 47.7 MMHG (ref 25–40)
POTASSIUM REFLEX MAGNESIUM: 4.4 MMOL/L (ref 3.5–5.1)
POTASSIUM REFLEX MAGNESIUM: 5.1 MMOL/L (ref 3.5–5.1)
PRO-BNP: 2518 PG/ML (ref 0–124)
PROCALCITONIN: 0.14 NG/ML (ref 0–0.15)
RAPID INFLUENZA  B AGN: NEGATIVE
RAPID INFLUENZA A AGN: NEGATIVE
RBC # BLD: 4.78 M/UL (ref 4.2–5.9)
RBC # BLD: 4.79 M/UL (ref 4.2–5.9)
SARS-COV-2, NAAT: NOT DETECTED
SLIDE REVIEW: ABNORMAL
SODIUM BLD-SCNC: 137 MMOL/L (ref 136–145)
SODIUM BLD-SCNC: 139 MMOL/L (ref 136–145)
TCO2 ARTERIAL: 37.2 MMOL/L
TCO2 ARTERIAL: 38.3 MMOL/L
TCO2 CALC VENOUS: 32 MMOL/L
TOTAL PROTEIN: 7.5 G/DL (ref 6.4–8.2)
TOTAL PROTEIN: 8 G/DL (ref 6.4–8.2)
TROPONIN: <0.01 NG/ML
TSH REFLEX FT4: 0.7 UIU/ML (ref 0.27–4.2)
WBC # BLD: 13.8 K/UL (ref 4–11)
WBC # BLD: 14.9 K/UL (ref 4–11)

## 2021-02-07 PROCEDURE — 6370000000 HC RX 637 (ALT 250 FOR IP): Performed by: INTERNAL MEDICINE

## 2021-02-07 PROCEDURE — 96365 THER/PROPH/DIAG IV INF INIT: CPT

## 2021-02-07 PROCEDURE — 36415 COLL VENOUS BLD VENIPUNCTURE: CPT

## 2021-02-07 PROCEDURE — 2000000000 HC ICU R&B

## 2021-02-07 PROCEDURE — 87040 BLOOD CULTURE FOR BACTERIA: CPT

## 2021-02-07 PROCEDURE — 84145 PROCALCITONIN (PCT): CPT

## 2021-02-07 PROCEDURE — 6370000000 HC RX 637 (ALT 250 FOR IP): Performed by: EMERGENCY MEDICINE

## 2021-02-07 PROCEDURE — 94660 CPAP INITIATION&MGMT: CPT

## 2021-02-07 PROCEDURE — 84443 ASSAY THYROID STIM HORMONE: CPT

## 2021-02-07 PROCEDURE — 6360000002 HC RX W HCPCS: Performed by: INTERNAL MEDICINE

## 2021-02-07 PROCEDURE — 99291 CRITICAL CARE FIRST HOUR: CPT | Performed by: INTERNAL MEDICINE

## 2021-02-07 PROCEDURE — 85025 COMPLETE CBC W/AUTO DIFF WBC: CPT

## 2021-02-07 PROCEDURE — 96375 TX/PRO/DX INJ NEW DRUG ADDON: CPT

## 2021-02-07 PROCEDURE — 82803 BLOOD GASES ANY COMBINATION: CPT

## 2021-02-07 PROCEDURE — 2700000000 HC OXYGEN THERAPY PER DAY

## 2021-02-07 PROCEDURE — 83036 HEMOGLOBIN GLYCOSYLATED A1C: CPT

## 2021-02-07 PROCEDURE — 2580000003 HC RX 258: Performed by: INTERNAL MEDICINE

## 2021-02-07 PROCEDURE — 93010 ELECTROCARDIOGRAM REPORT: CPT | Performed by: INTERNAL MEDICINE

## 2021-02-07 PROCEDURE — 94640 AIRWAY INHALATION TREATMENT: CPT

## 2021-02-07 PROCEDURE — 36600 WITHDRAWAL OF ARTERIAL BLOOD: CPT

## 2021-02-07 PROCEDURE — 87186 SC STD MICRODIL/AGAR DIL: CPT

## 2021-02-07 PROCEDURE — 2580000003 HC RX 258: Performed by: EMERGENCY MEDICINE

## 2021-02-07 PROCEDURE — 93005 ELECTROCARDIOGRAM TRACING: CPT | Performed by: EMERGENCY MEDICINE

## 2021-02-07 PROCEDURE — 87804 INFLUENZA ASSAY W/OPTIC: CPT

## 2021-02-07 PROCEDURE — 80053 COMPREHEN METABOLIC PANEL: CPT

## 2021-02-07 PROCEDURE — 83880 ASSAY OF NATRIURETIC PEPTIDE: CPT

## 2021-02-07 PROCEDURE — 84484 ASSAY OF TROPONIN QUANT: CPT

## 2021-02-07 PROCEDURE — 99223 1ST HOSP IP/OBS HIGH 75: CPT | Performed by: INTERNAL MEDICINE

## 2021-02-07 PROCEDURE — 71045 X-RAY EXAM CHEST 1 VIEW: CPT

## 2021-02-07 PROCEDURE — 74176 CT ABD & PELVIS W/O CONTRAST: CPT

## 2021-02-07 PROCEDURE — U0002 COVID-19 LAB TEST NON-CDC: HCPCS

## 2021-02-07 PROCEDURE — 83605 ASSAY OF LACTIC ACID: CPT

## 2021-02-07 PROCEDURE — 51702 INSERT TEMP BLADDER CATH: CPT

## 2021-02-07 PROCEDURE — 99285 EMERGENCY DEPT VISIT HI MDM: CPT

## 2021-02-07 PROCEDURE — 6360000002 HC RX W HCPCS: Performed by: EMERGENCY MEDICINE

## 2021-02-07 RX ORDER — SODIUM CHLORIDE 0.9 % (FLUSH) 0.9 %
10 SYRINGE (ML) INJECTION EVERY 12 HOURS SCHEDULED
Status: DISCONTINUED | OUTPATIENT
Start: 2021-02-07 | End: 2021-02-11 | Stop reason: HOSPADM

## 2021-02-07 RX ORDER — DULOXETIN HYDROCHLORIDE 20 MG/1
20 CAPSULE, DELAYED RELEASE ORAL 2 TIMES DAILY
Status: DISCONTINUED | OUTPATIENT
Start: 2021-02-07 | End: 2021-02-11 | Stop reason: HOSPADM

## 2021-02-07 RX ORDER — DEXTROSE MONOHYDRATE 25 G/50ML
12.5 INJECTION, SOLUTION INTRAVENOUS PRN
Status: DISCONTINUED | OUTPATIENT
Start: 2021-02-07 | End: 2021-02-11 | Stop reason: HOSPADM

## 2021-02-07 RX ORDER — BUPRENORPHINE 5 UG/H
1 PATCH TRANSDERMAL WEEKLY
Status: DISCONTINUED | OUTPATIENT
Start: 2021-02-07 | End: 2021-02-11 | Stop reason: HOSPADM

## 2021-02-07 RX ORDER — ACETAMINOPHEN 325 MG/1
650 TABLET ORAL EVERY 6 HOURS PRN
Status: DISCONTINUED | OUTPATIENT
Start: 2021-02-07 | End: 2021-02-11 | Stop reason: HOSPADM

## 2021-02-07 RX ORDER — ONDANSETRON 2 MG/ML
4 INJECTION INTRAMUSCULAR; INTRAVENOUS EVERY 6 HOURS PRN
Status: DISCONTINUED | OUTPATIENT
Start: 2021-02-07 | End: 2021-02-11 | Stop reason: HOSPADM

## 2021-02-07 RX ORDER — FENOFIBRATE 54 MG/1
54 TABLET ORAL DAILY
Refills: 5 | Status: DISCONTINUED | OUTPATIENT
Start: 2021-02-07 | End: 2021-02-11 | Stop reason: HOSPADM

## 2021-02-07 RX ORDER — FUROSEMIDE 10 MG/ML
40 INJECTION INTRAMUSCULAR; INTRAVENOUS DAILY
Status: DISCONTINUED | OUTPATIENT
Start: 2021-02-07 | End: 2021-02-11

## 2021-02-07 RX ORDER — PREDNISONE 20 MG/1
40 TABLET ORAL
Status: DISCONTINUED | OUTPATIENT
Start: 2021-02-09 | End: 2021-02-11 | Stop reason: HOSPADM

## 2021-02-07 RX ORDER — BUDESONIDE AND FORMOTEROL FUMARATE DIHYDRATE 160; 4.5 UG/1; UG/1
2 AEROSOL RESPIRATORY (INHALATION) 2 TIMES DAILY
Refills: 5 | Status: DISCONTINUED | OUTPATIENT
Start: 2021-02-07 | End: 2021-02-11 | Stop reason: HOSPADM

## 2021-02-07 RX ORDER — BUPRENORPHINE 5 UG/H
PATCH TRANSDERMAL
Status: ON HOLD | COMMUNITY
Start: 2021-01-20 | End: 2021-06-09 | Stop reason: HOSPADM

## 2021-02-07 RX ORDER — BUPRENORPHINE 5 UG/H
1 PATCH TRANSDERMAL WEEKLY
Status: DISCONTINUED | OUTPATIENT
Start: 2021-02-12 | End: 2021-02-07 | Stop reason: DRUGHIGH

## 2021-02-07 RX ORDER — MONTELUKAST SODIUM 10 MG/1
10 TABLET ORAL NIGHTLY
Status: DISCONTINUED | OUTPATIENT
Start: 2021-02-07 | End: 2021-02-11 | Stop reason: HOSPADM

## 2021-02-07 RX ORDER — CLOPIDOGREL BISULFATE 75 MG/1
75 TABLET ORAL DAILY
Status: DISCONTINUED | OUTPATIENT
Start: 2021-02-07 | End: 2021-02-11 | Stop reason: HOSPADM

## 2021-02-07 RX ORDER — NICOTINE POLACRILEX 4 MG
15 LOZENGE BUCCAL PRN
Status: DISCONTINUED | OUTPATIENT
Start: 2021-02-07 | End: 2021-02-11 | Stop reason: HOSPADM

## 2021-02-07 RX ORDER — AZITHROMYCIN 250 MG/1
250 TABLET, FILM COATED ORAL DAILY
Status: COMPLETED | OUTPATIENT
Start: 2021-02-08 | End: 2021-02-11

## 2021-02-07 RX ORDER — ASPIRIN 81 MG/1
81 TABLET ORAL DAILY
Status: DISCONTINUED | OUTPATIENT
Start: 2021-02-07 | End: 2021-02-11 | Stop reason: HOSPADM

## 2021-02-07 RX ORDER — MONTELUKAST SODIUM 10 MG/1
TABLET ORAL
Status: ON HOLD | COMMUNITY
Start: 2021-01-11 | End: 2021-09-17

## 2021-02-07 RX ORDER — ERGOCALCIFEROL 1.25 MG/1
50000 CAPSULE ORAL
Status: DISCONTINUED | OUTPATIENT
Start: 2021-02-08 | End: 2021-02-11 | Stop reason: HOSPADM

## 2021-02-07 RX ORDER — ATORVASTATIN CALCIUM 40 MG/1
40 TABLET, FILM COATED ORAL DAILY
Status: DISCONTINUED | OUTPATIENT
Start: 2021-02-07 | End: 2021-02-11 | Stop reason: HOSPADM

## 2021-02-07 RX ORDER — SODIUM CHLORIDE 0.9 % (FLUSH) 0.9 %
10 SYRINGE (ML) INJECTION PRN
Status: DISCONTINUED | OUTPATIENT
Start: 2021-02-07 | End: 2021-02-11 | Stop reason: HOSPADM

## 2021-02-07 RX ORDER — PROMETHAZINE HYDROCHLORIDE 25 MG/1
12.5 TABLET ORAL EVERY 6 HOURS PRN
Status: DISCONTINUED | OUTPATIENT
Start: 2021-02-07 | End: 2021-02-11 | Stop reason: HOSPADM

## 2021-02-07 RX ORDER — POLYETHYLENE GLYCOL 3350 17 G/17G
17 POWDER, FOR SOLUTION ORAL DAILY PRN
Status: DISCONTINUED | OUTPATIENT
Start: 2021-02-07 | End: 2021-02-11 | Stop reason: HOSPADM

## 2021-02-07 RX ORDER — FUROSEMIDE 10 MG/ML
40 INJECTION INTRAMUSCULAR; INTRAVENOUS ONCE
Status: COMPLETED | OUTPATIENT
Start: 2021-02-07 | End: 2021-02-07

## 2021-02-07 RX ORDER — METHYLPREDNISOLONE SODIUM SUCCINATE 125 MG/2ML
60 INJECTION, POWDER, LYOPHILIZED, FOR SOLUTION INTRAMUSCULAR; INTRAVENOUS ONCE
Status: COMPLETED | OUTPATIENT
Start: 2021-02-07 | End: 2021-02-07

## 2021-02-07 RX ORDER — GABAPENTIN 400 MG/1
400 CAPSULE ORAL 3 TIMES DAILY
Status: DISCONTINUED | OUTPATIENT
Start: 2021-02-07 | End: 2021-02-11 | Stop reason: HOSPADM

## 2021-02-07 RX ORDER — ACETAMINOPHEN 650 MG/1
650 SUPPOSITORY RECTAL EVERY 6 HOURS PRN
Status: DISCONTINUED | OUTPATIENT
Start: 2021-02-07 | End: 2021-02-11 | Stop reason: HOSPADM

## 2021-02-07 RX ORDER — BUPRENORPHINE 5 UG/H
1 PATCH TRANSDERMAL WEEKLY
Status: DISCONTINUED | OUTPATIENT
Start: 2021-02-07 | End: 2021-02-07 | Stop reason: DRUGHIGH

## 2021-02-07 RX ORDER — METHYLPREDNISOLONE SODIUM SUCCINATE 40 MG/ML
40 INJECTION, POWDER, LYOPHILIZED, FOR SOLUTION INTRAMUSCULAR; INTRAVENOUS EVERY 12 HOURS
Status: COMPLETED | OUTPATIENT
Start: 2021-02-07 | End: 2021-02-08

## 2021-02-07 RX ORDER — IPRATROPIUM BROMIDE AND ALBUTEROL SULFATE 2.5; .5 MG/3ML; MG/3ML
1 SOLUTION RESPIRATORY (INHALATION) EVERY 4 HOURS
Status: DISCONTINUED | OUTPATIENT
Start: 2021-02-07 | End: 2021-02-08

## 2021-02-07 RX ORDER — NICOTINE 21 MG/24HR
1 PATCH, TRANSDERMAL 24 HOURS TRANSDERMAL DAILY
Status: DISCONTINUED | OUTPATIENT
Start: 2021-02-07 | End: 2021-02-11 | Stop reason: HOSPADM

## 2021-02-07 RX ORDER — ALBUTEROL SULFATE 90 UG/1
2 AEROSOL, METERED RESPIRATORY (INHALATION) EVERY 6 HOURS PRN
Status: DISCONTINUED | OUTPATIENT
Start: 2021-02-07 | End: 2021-02-11 | Stop reason: HOSPADM

## 2021-02-07 RX ORDER — IPRATROPIUM BROMIDE AND ALBUTEROL SULFATE 2.5; .5 MG/3ML; MG/3ML
1 SOLUTION RESPIRATORY (INHALATION)
Status: DISCONTINUED | OUTPATIENT
Start: 2021-02-07 | End: 2021-02-07

## 2021-02-07 RX ORDER — DEXTROSE MONOHYDRATE 50 MG/ML
100 INJECTION, SOLUTION INTRAVENOUS PRN
Status: DISCONTINUED | OUTPATIENT
Start: 2021-02-07 | End: 2021-02-11 | Stop reason: HOSPADM

## 2021-02-07 RX ORDER — GLIMEPIRIDE 2 MG/1
2 TABLET ORAL
Status: DISCONTINUED | OUTPATIENT
Start: 2021-02-07 | End: 2021-02-11 | Stop reason: HOSPADM

## 2021-02-07 RX ORDER — ALBUTEROL SULFATE 90 UG/1
2 AEROSOL, METERED RESPIRATORY (INHALATION)
Status: DISCONTINUED | OUTPATIENT
Start: 2021-02-07 | End: 2021-02-07

## 2021-02-07 RX ADMIN — MONTELUKAST SODIUM 10 MG: 10 TABLET, COATED ORAL at 20:39

## 2021-02-07 RX ADMIN — METHYLPREDNISOLONE SODIUM SUCCINATE 60 MG: 125 INJECTION, POWDER, FOR SOLUTION INTRAMUSCULAR; INTRAVENOUS at 00:43

## 2021-02-07 RX ADMIN — CEFTRIAXONE SODIUM 1000 MG: 1 INJECTION, POWDER, FOR SOLUTION INTRAMUSCULAR; INTRAVENOUS at 01:26

## 2021-02-07 RX ADMIN — GLIMEPIRIDE 2 MG: 2 TABLET ORAL at 09:44

## 2021-02-07 RX ADMIN — GABAPENTIN 400 MG: 400 CAPSULE ORAL at 20:39

## 2021-02-07 RX ADMIN — MUPIROCIN: 20 OINTMENT TOPICAL at 20:39

## 2021-02-07 RX ADMIN — DULOXETINE 20 MG: 20 CAPSULE, DELAYED RELEASE ORAL at 21:49

## 2021-02-07 RX ADMIN — IPRATROPIUM BROMIDE AND ALBUTEROL SULFATE 1 AMPULE: .5; 3 SOLUTION RESPIRATORY (INHALATION) at 19:45

## 2021-02-07 RX ADMIN — GABAPENTIN 400 MG: 400 CAPSULE ORAL at 14:42

## 2021-02-07 RX ADMIN — IPRATROPIUM BROMIDE AND ALBUTEROL SULFATE 1 AMPULE: .5; 3 SOLUTION RESPIRATORY (INHALATION) at 00:43

## 2021-02-07 RX ADMIN — METHYLPREDNISOLONE SODIUM SUCCINATE 40 MG: 40 INJECTION, POWDER, FOR SOLUTION INTRAMUSCULAR; INTRAVENOUS at 09:31

## 2021-02-07 RX ADMIN — ASPIRIN 81 MG: 81 TABLET, COATED ORAL at 09:34

## 2021-02-07 RX ADMIN — GABAPENTIN 400 MG: 400 CAPSULE ORAL at 09:35

## 2021-02-07 RX ADMIN — CLOPIDOGREL BISULFATE 75 MG: 75 TABLET ORAL at 09:35

## 2021-02-07 RX ADMIN — Medication 2 PUFF: at 08:17

## 2021-02-07 RX ADMIN — ENOXAPARIN SODIUM 40 MG: 40 INJECTION SUBCUTANEOUS at 09:30

## 2021-02-07 RX ADMIN — DULOXETINE 20 MG: 20 CAPSULE, DELAYED RELEASE ORAL at 09:35

## 2021-02-07 RX ADMIN — IPRATROPIUM BROMIDE AND ALBUTEROL SULFATE 1 AMPULE: .5; 3 SOLUTION RESPIRATORY (INHALATION) at 22:48

## 2021-02-07 RX ADMIN — IPRATROPIUM BROMIDE AND ALBUTEROL 1 PUFF: 20; 100 SPRAY, METERED RESPIRATORY (INHALATION) at 08:17

## 2021-02-07 RX ADMIN — Medication 10 ML: at 09:28

## 2021-02-07 RX ADMIN — FUROSEMIDE 40 MG: 10 INJECTION, SOLUTION INTRAMUSCULAR; INTRAVENOUS at 12:36

## 2021-02-07 RX ADMIN — INSULIN LISPRO 6 UNITS: 100 INJECTION, SOLUTION INTRAVENOUS; SUBCUTANEOUS at 12:13

## 2021-02-07 RX ADMIN — Medication 2 PUFF: at 19:45

## 2021-02-07 RX ADMIN — IPRATROPIUM BROMIDE AND ALBUTEROL 1 PUFF: 20; 100 SPRAY, METERED RESPIRATORY (INHALATION) at 11:15

## 2021-02-07 RX ADMIN — INSULIN LISPRO 4 UNITS: 100 INJECTION, SOLUTION INTRAVENOUS; SUBCUTANEOUS at 09:15

## 2021-02-07 RX ADMIN — DEXTROSE MONOHYDRATE 500 MG: 50 INJECTION, SOLUTION INTRAVENOUS at 01:26

## 2021-02-07 RX ADMIN — INSULIN LISPRO 4 UNITS: 100 INJECTION, SOLUTION INTRAVENOUS; SUBCUTANEOUS at 18:30

## 2021-02-07 RX ADMIN — METHYLPREDNISOLONE SODIUM SUCCINATE 40 MG: 40 INJECTION, POWDER, FOR SOLUTION INTRAMUSCULAR; INTRAVENOUS at 20:39

## 2021-02-07 RX ADMIN — IPRATROPIUM BROMIDE AND ALBUTEROL 1 PUFF: 20; 100 SPRAY, METERED RESPIRATORY (INHALATION) at 15:19

## 2021-02-07 RX ADMIN — ATORVASTATIN CALCIUM 40 MG: 40 TABLET, FILM COATED ORAL at 09:35

## 2021-02-07 RX ADMIN — Medication 10 ML: at 20:49

## 2021-02-07 RX ADMIN — FUROSEMIDE 40 MG: 10 INJECTION, SOLUTION INTRAMUSCULAR; INTRAVENOUS at 01:26

## 2021-02-07 RX ADMIN — FENOFIBRATE 54 MG: 54 TABLET ORAL at 09:35

## 2021-02-07 ASSESSMENT — ENCOUNTER SYMPTOMS
SHORTNESS OF BREATH: 1
VOMITING: 0
COUGH: 1
SORE THROAT: 0
DIARRHEA: 0
CONSTIPATION: 0
NAUSEA: 0
ABDOMINAL PAIN: 0
BACK PAIN: 0

## 2021-02-07 ASSESSMENT — PAIN SCALES - GENERAL
PAINLEVEL_OUTOF10: 0
PAINLEVEL_OUTOF10: 0

## 2021-02-07 NOTE — ED PROVIDER NOTES
[address] [facility]      Pt Name: Elaine Garcia  MRN: [id number]  [place] 1965  Date of evaluation: 2/7/2021  Provider: Zelda Robles MD    CHIEF COMPLAINT       Chief Complaint   Patient presents with    Shortness of Breath     pt states he was feeling sob and having side pain, ems states pt 80s on RA, 4 L low 80s         HISTORY OF PRESENT ILLNESS   (Location/Symptom, Timing/Onset, Context/Setting, Quality, Duration, Modifying Factors, Severity)  Note limiting factors. Elaine Garcia is a 50 y.o. male who presents to the emergency department     Patient is a 53-year-old male with a past medical history of COPD, CHF, hypertension who presents with shortness of breath. Patient reports that he has been feeling short of breath for the last 2 days. He states that he has oxygen that he wears at nighttime and as needed and has typically worn 3 L but recently bumped it up to 4 L. He reports that this has been helping and he does feel better after using his nebulizer treatments but he will quickly become short of breath again. He reports worsening shortness of breath on exertion and states that just after going to get something to drink by the time he returns to his chair he feels as though his breathing is very labored. He notes chills but denies fever. Reports body aches as well as some left side pain and felt similarly when he had pneumonia in the past so came in for evaluation this evening. Patient reports his typical chronic cough but denies any sputum production. Denies any lower extremity swelling. Patient states that this feels like his COPD more so than heart failure. The history is provided by the patient. Nursing Notes were reviewed. REVIEW OF SYSTEMS    (2-9 systems for level 4, 10 or more for level 5)     Review of Systems   Constitutional: Positive for chills. Negative for fever. HENT: Negative for congestion and sore throat.     Eyes: times daily    FENOFIBRIC ACID (FIBRICOR) 135 MG CPDR CAPSULE    TAKE 1 CAPSULE BY MOUTH EVERY DAY    FLUTICASONE-VILANTEROL (BREO ELLIPTA) 100-25 MCG/INH AEPB INHALER    TAKE 1 PUFF BY MOUTH EVERY DAY    FUROSEMIDE (LASIX) 20 MG TABLET    Take 20 mg by mouth daily    GABAPENTIN (NEURONTIN) 800 MG TABLET    TAKE 1 TABLET 4 TIMES DAILY    GLIMEPIRIDE (AMARYL) 2 MG TABLET    TAKE 1 TABLET BY MOUTH EVERY MORNING (BEFORE BREAKFAST) DISCONTINUE FARXIGA    GLUCOSE MONITORING KIT (FREESTYLE) MONITORING KIT    1 kit by Does not apply route daily Patient wants brand name Patient tests bid  E11.9    IPRATROPIUM-ALBUTEROL (DUONEB) 0.5-2.5 (3) MG/3ML SOLN NEBULIZER SOLUTION    TAKE 3 MLS BY NEBULIZATION EVERY 4-6 HOURS AS NEEDED FOR SHORTNESS OF BREATH    LISINOPRIL (PRINIVIL;ZESTRIL) 20 MG TABLET    TAKE 1 TABLET BY MOUTH EVERY DAY    MAGNESIUM 400 MG CAPS    Take 1 tablet by mouth daily    METFORMIN (GLUCOPHAGE) 1000 MG TABLET    Take 1,000 mg by mouth 2 times daily (with meals)    METFORMIN (GLUCOPHAGE) 1000 MG TABLET    Take 1 tablet by mouth 2 times daily (with meals)    MULTIPLE VITAMINS PO    Take by mouth    OMEGA-3 1000 MG CAPS    Take 1 capsule by mouth daily    PIOGLITAZONE (ACTOS) 15 MG TABLET    Take 1 tablet by mouth daily    UMECLIDINIUM BROMIDE (INCRUSE ELLIPTA) 62.5 MCG/INH AEPB    Take 1 puff by mouth every day       ALLERGIES     Patient has no known allergies.     FAMILY HISTORY       Family History   Problem Relation Age of Onset    Asthma Mother     Cancer Mother     Hearing Loss Mother     Vision Loss Mother     High Blood Pressure Father     High Cholesterol Father     Vision Loss Father     Asthma Sister     Diabetes Sister     Vision Loss Sister     Asthma Brother     Arthritis Brother     High Blood Pressure Brother           SOCIAL HISTORY       Social History     Socioeconomic History    Marital status:      Spouse name: None    Number of children: None    Years of education: None    Highest education level: None   Occupational History    None   Social Needs    Financial resource strain: None    Food insecurity     Worry: None     Inability: None    Transportation needs     Medical: None     Non-medical: None   Tobacco Use    Smoking status: Current Every Day Smoker     Packs/day: 2.00    Smokeless tobacco: Never Used   Substance and Sexual Activity    Alcohol use: Yes    Drug use: Never    Sexual activity: None   Lifestyle    Physical activity     Days per week: None     Minutes per session: None    Stress: None   Relationships    Social connections     Talks on phone: None     Gets together: None     Attends [employer] service: None     Active member of club or organization: None     Attends meetings of clubs or organizations: None     Relationship status: None    Intimate partner violence     Fear of current or ex partner: None     Emotionally abused: None     Physically abused: None     Forced sexual activity: None   Other Topics Concern    None   Social History Narrative    None       SCREENINGS    Nelda Coma Scale  Eye Opening: Spontaneous  Best Verbal Response: Oriented  Best Motor Response: Obeys commands  [place] Coma Scale Score: 15          PHYSICAL EXAM    (up to 7 for level 4, 8 or more for level 5)     ED Triage Vitals   BP Temp Temp src Pulse Resp SpO2 Height Weight   -- -- -- -- -- -- -- --       Physical Exam  Vitals signs and nursing note reviewed. Constitutional:       General: He is not in acute distress. Appearance: Normal appearance. HENT:      Head: Normocephalic and atraumatic. Nose: Nose normal. No congestion. Mouth/Throat:      Mouth: Mucous membranes are moist.   Eyes:      Conjunctiva/sclera: Conjunctivae normal.   Neck:      Musculoskeletal: Normal range of motion and neck supple. Cardiovascular:      Rate and Rhythm: Normal rate and regular rhythm. Pulses: Normal pulses. Heart sounds: Normal heart sounds.  No All other components within normal limits    Narrative:     Performed at:  [facility]. [facility]  [address] [place] [address]. [facility] [place]   Phone [phone]   BLOOD GAS, VENOUS - Abnormal; Notable for the following components:    pH, Jesus 7.293 (*)     pCO2, Jesus 62.5 (*)     pO2, Jesus 47.7 (*)     HCO3, Venous 29.6 (*)     Carboxyhemoglobin 7.6 (*)     All other components within normal limits    Narrative:     Performed at:  [facility]. [facility]  [address] [place] [address]. [place], [facility] [place]   Phone [phone]   CULTURE, BLOOD 1   CULTURE, BLOOD 2   TROPONIN    Narrative:     Performed at:  [facility]. [facility]  [address] [place] [address]. [place] [place]   Phone [phone]   LACTATE, SEPSIS    Narrative:     Performed at:  [facility]. [facility]  [address] [place] [address]. [place] [employer]   Phone [phone]   LACTATE, SEPSIS       All other labs were within normal range or not returned as of this dictation. EMERGENCY DEPARTMENT COURSE and DIFFERENTIAL DIAGNOSIS/MDM:   Vitals:    Vitals:    02/07/21 0018 02/07/21 0033   BP: (!) 101/58    Pulse: 85    Resp: 20    Temp: 98.3 °F (36.8 °C)    TempSrc: Oral    SpO2:  93%   Weight: 170 lb (77.1 kg)    Height: 4' 11\" (1.499 m)        Patient evaluated and previous record reviewed. Patient presents with shortness of breath worse over the last 2 days. Vital signs notable for oxygen saturation in the mid 90s on 4 to 5 L nasal cannula. Physical exam as documented above notable for rhonchi and wheezes throughout bilateral lung fields. Lab work obtained and notable for leukocytosis, lactic within normal limits, elevated BNP, negative troponin. pH 7.29 was slightly elevated PCO2 of 62.5. Chest x-ray concerning for pulmonary venous congestion, interstitial edema, and effusions.   Patient does report chronic cough with chills and feeling like he did when he had pneumonia. With elevated white count we will go ahead and cover him with Rocephin and azithromycin. Feel as though patient symptoms are more likely due to heart failure. On reevaluation after breathing treatment most of the rhonchi is cleared and there are crackles throughout bilateral lungs. IV Lasix is given. Patient persistently requiring 4 to 5 L oxygen. Feels though patient warrants admission at this time for further work-up and management. Patient is amenable with this plan. Patient admitted to hospitalist service. CONSULTS:  IP CONSULT TO HOSPITALIST    PROCEDURES:  Unless otherwise noted below, none     Procedures      FINAL IMPRESSION      1. Acute on chronic congestive heart failure, unspecified heart failure type ([facility] [address]    2. COPD exacerbation ([facility] [address]    3. Hypoxia          DISPOSITION/PLAN   DISPOSITION        PATIENT REFERRED TO:  No follow-up provider specified. DISCHARGE MEDICATIONS:  New Prescriptions    No medications on file     Controlled Substances Monitoring:     No flowsheet data found.     (Please note that portions of this note were completed with a voice recognition program.  Efforts were made to edit the dictations but occasionally words are mis-transcribed.)    Amrit Bautista MD (electronically signed)  Attending Emergency Physician           Angeles Smith MD  02/07/21 [id number]

## 2021-02-07 NOTE — PROGRESS NOTES
Patient admitted to room 317-1 from [facility] ED. Patient oriented to room, call light, bed rails, phone, lights and bathroom. Patient instructed about the schedule of the day including: vital sign frequency, lab draws, possible tests, frequency of MD and staff rounds, daily weights, I &O's and prescribed diet. bed alarm in place, patient aware of placement and reason. Telemetry box in place, patient aware of placement and reason. Bed locked, in lowest position, side rails up 2/4, call light within reach. Recliner Assessment  Patient is able to demonstrate the ability to move from a reclining position to an upright position within the recliner. 4 Eyes Skin Assessment     The patient is being assess for   Admission    I agree that 2 RN's have performed a thorough Head to Toe Skin Assessment on the patient. ALL assessment sites listed below have been assessed. Areas assessed by both nurses:   [x]   Head, Face, and Ears   [x]   Shoulders, Back, and Chest, Abdomen  [x]   Arms, Elbows, and Hands   [x]   Coccyx, Sacrum, and Ischium  [x]   Legs, Feet, and Heels         negative skin assessment     **SHARE this note so that the co-signing nurse is able to place an eSignature**    Co-signer eSignature: Electronically signed by Artie Santiago RN on 2/7/21 at 7:28 AM EST    Does the Patient have Skin Breakdown?   No          Scott Prevention initiated:  No   Wound Care Orders initiated:  No      [facility] nurse consulted for Pressure Injury (Stage 3,4, Unstageable, DTI, NWPT, Complex wounds)and New or Established Ostomies:  No      Primary Nurse eSignature: Electronically signed by Isabell Landon RN on 2/7/21 at 4:15 AM EST

## 2021-02-07 NOTE — PROGRESS NOTES
Spoke with wife on phone with patient. She is aware of situation earlier today and how he currently is doing. She is happy that he is feeling better than when he was at home. Up in bed eating breakfast, no issues noted.

## 2021-02-07 NOTE — PROGRESS NOTES
Patient admitted to room ICU 9 from PCU. Patient oriented to room, call light, bed rails, phone, lights and bathroom. Patient instructed about the schedule of the day including: vital sign frequency, lab draws, possible tests, frequency of MD and staff rounds, daily weights, I &O's and prescribed diet. Bed alarm in place, patient aware of placement and reason. Telemetry in place. Bed locked, in lowest position, side rails up 2/4, call light within reach. Recliner Assessment  Patient is not able to demonstrate the ability to move from a reclining position to an upright position within the recliner due to no recliner in the room. Parish Douglass

## 2021-02-07 NOTE — PROGRESS NOTES
Pulmonology consult called to Dr. Cory Jauregui on call. Spoke with Emily Anglin @[phone].     Ozzie MEZA/MT  02/07/2021

## 2021-02-07 NOTE — H&P
REVIEW OF SYSTEMS:   As noted in the HPI. All other systems reviewed and negative. PHYSICAL EXAM:    /74   Pulse 61   Temp 96.9 °F (36.1 °C) (Oral)   Resp 18   Ht 4' 11\" (1.499 m)   Wt 189 lb 3.2 oz (85.8 kg)   SpO2 92%   BMI 38.21 kg/m²     General appearance: No apparent distress appears stated age and cooperative. HEENT Normal cephalic, atraumatic without obvious deformity. Pupils equal, round, and reactive to light. Extra ocular muscles intact. Conjunctivae/corneas clear. Neck: Supple, No jugular venous distention/bruits. Trachea midline without thyromegaly or adenopathy with full range of motion. Lungs: diminished breath sounds. Heart: Regular rate and rhythm with Normal S1/S2 without murmurs, rubs or gallops, point of maximum impulse non-displaced  Abdomen: Soft, non-tender or non-distended without rigidity or guarding and positive bowel sounds all four quadrants. Extremities: No clubbing, cyanosis, or edema bilaterally. Full range of motion without deformity and normal gait intact. Skin: Skin color, texture, turgor normal.  No rashes or lesions. Neurologic: Alert and oriented X 3, neurovascularly intact with sensory/motor intact upper extremities/lower extremities, bilaterally. Cranial nerves: II-XII intact, grossly non-focal.  Mental status: Alert, oriented, thought content appropriate. Capillary Refill: Acceptable  < 3 seconds  Peripheral Pulses: +3 Easily felt, not easily obliterated with pressure      CBC   Recent Labs     02/07/21 0031 02/07/21 0419   WBC 14.9* 13.8*   HGB 13.3* 13.4*   HCT 41.9 42.5    354      RENAL  Recent Labs     02/07/21 0031 02/07/21 0419    137   K 4.4 5.1   CL 98* 96*   CO2 31 29   BUN 31* 30*   CREATININE 0.9 1.2     LFT'S  Recent Labs     02/07/21 0031 02/07/21 0419   AST 24 37   ALT 30 33   BILITOT 0.3 <0.2   ALKPHOS 68 76     COAG  No results for input(s): INR in the last 72 hours.   CARDIAC ENZYMES  Recent Labs 02/07/21  0031 02/07/21  0419 02/07/21  0855   TROPONINI <0.01 <0.01 <0.01       U/A:  No results found for: NITRITE, COLORU, WBCUA, RBCUA, MUCUS, BACTERIA, CLARITYU, SPECGRAV, LEUKOCYTESUR, BLOODU, GLUCOSEU, AMORPHOUS    ABG    Lab Results   Component Value Date    DUD9TNE 35.7 02/07/2021    BEART 5.5 02/07/2021    Z9TUCHTE 89.1 02/07/2021    PHART 7.251 02/07/2021    OJQ2BVQ 83.2 02/07/2021    PO2ART 66.9 02/07/2021    UTQ2IAA 38.3 02/07/2021           Active Hospital Problems    Diagnosis Date Noted    Acute on chronic respiratory failure with hypoxia and hypercapnia (HCC) [Q96.79, J96.22] 02/07/2021         PHYSICIANS CERTIFICATION:    I certify that Redell Saint is expected to be hospitalized for more than 2 midnights based on the following assessment and plan:      ASSESSMENT/PLAN:      COPD with AE  Symbicort. Soumedrol. Combivent  Singulair   Zithro/Rocephin. Pulm consult. - may need BiPAP      Acute on Chronic Mixed Hypox and Hypercapnic Resp Failure. 12l/min this am.   ABG concerning for CO2 retention. As above. Acute diastolic CHF. ECHO 2019 preserved EF. I suspect acute pulm edema triggered by hypoxia related to COPD. He does not appear grossly fluid overloaded  Lasix IV daily per cardiology recs - monitor renal function. Abd distentinio, constipation. No BM in over 5 days. Chronic on buprenorphine patch. Abd exam - distended, but non tender. Will get CT with PO contrast - start on bowel regimen if no obstruction. No emesis, no nausea. DMII -   ISS. CAD Hx of  PVD Hx of  ASA and plavix. On statin. DVT Prophylaxis: lovenox  Diet: DIET CARB CONTROL; Carb Control: 4 carb choices (60 gms)/meal; No Added Salt (3-4 GM); Daily Fluid Restriction: 1800 ml  Code Status: Full Code      Dispo - PCU. Prasad Gunn MD    Thank you L[facility] for the opportunity to be involved in this patient's care.  If you have any questions or concerns please feel free to contact me at [phone].

## 2021-02-07 NOTE — PROGRESS NOTES
Spoke with Dr. Princess Llanes with update. Plan to keep patient on bipap for a couple of hours. Patient may come off bipap to eat then go back on bipap at bedtime.

## 2021-02-07 NOTE — PROGRESS NOTES
When entering room pt was sleeping. Attempted to awake pt., he opened eyes but when he responded he did not make sense. While flushing periph IV to RAC he tensed up are and would not relax it even after attempted to direct him to do so. Eyes fixed. Pupils equal and reactive. He then closed eyes and was unarousable. Sternal rub done with no effect. Charge nurse made aware. VSS. FSBS 210. Clinical made aware, abg drawn. Simple mask removed, HFNC applied at 12L. Sp02 maintaining at 93%. He has become more alert. Answering some questions appropriately such as age and town he lives. Some confusion still noted. Will continue to monitor.

## 2021-02-07 NOTE — CONSULTS
Patient is being seen at the request of Dr. Rosa M Mckay for a consultation for COPD exacerbation    HISTORY OF PRESENT ILLNESS: This is a 44-year-old male with a history of COPD on 3 L home oxygen, CHF, PVD, CAD and hypertension who presented to the emergency department on 2/7/2021 with a several day history of increased severe shortness of breath, worse with exertion, associated with cough, similar to prior COPD exacerbations and pneumonia. He is feeling better after treatment with steroids and antibiotics in the hospital. He typically wears 3 L at home but required up to 12 L supplemental oxygen here. PAST MEDICAL HISTORY:  Past Medical History:   Diagnosis Date    CHF (congestive heart failure) (HCC)     [facility]     Diabetes mellitus [facility]     Hyperlipidemia     Hypertension      PAST SURGICAL HISTORY:  Past Surgical History:   Procedure Laterality Date    HERNIA REPAIR      TONSILLECTOMY         FAMILY HISTORY:  family history includes Arthritis in his brother; Asthma in his brother, mother, and sister; Cancer in his mother; Diabetes in his sister; Hearing Loss in his mother; High Blood Pressure in his brother and father; High Cholesterol in his father; Vision Loss in his father, mother, and sister. SOCIAL HISTORY:   reports that he has been smoking. He has been smoking about 2.00 packs per day.  He has never used smokeless tobacco.    Scheduled Meds:   nicotine  1 patch Transdermal Daily    [START ON 2/8/2021] cefTRIAXone (ROCEPHIN) IV  1,000 mg Intravenous Q24H    [START ON 2/8/2021] azithromycin  500 mg Intravenous Q24H    sodium chloride flush  10 mL Intravenous 2 times per day    enoxaparin  40 mg Subcutaneous Daily    methylPREDNISolone  40 mg Intravenous Q12H    Followed by   Kei Esparza ON 2/9/2021] predniSONE  40 mg Oral Daily with breakfast    insulin lispro  0-12 Units Subcutaneous TID WC    insulin lispro  0-6 Units Subcutaneous Nightly exposed extremities. Lymph: No cervical LAD. No supraclavicular LAD. M/S: No cyanosis. No joint deformity. No clubbing. Neuro: Awake. Alert. Moves all four extremities. Psych: Oriented x 3. No anxiety. LABS:  CBC:   Recent Labs     02/07/21 0031 02/07/21 0419   WBC 14.9* 13.8*   HGB 13.3* 13.4*   HCT 41.9 42.5   MCV 87.7 88.6    354     BMP:   Recent Labs     02/07/21 0031 02/07/21 0419    137   K 4.4 5.1   CL 98* 96*   CO2 31 29   BUN 31* 30*   CREATININE 0.9 1.2     LIVER PROFILE:   Recent Labs     02/07/21 0031 02/07/21 0419   AST 24 37   ALT 30 33   BILITOT 0.3 <0.2   ALKPHOS 68 76     PT/INR: No results for input(s): PROTIME, INR in the last 72 hours. APTT: No results for input(s): APTT in the last 72 hours. UA:No results for input(s): NITRITE, COLORU, PHUR, LABCAST, WBCUA, RBCUA, MUCUS, TRICHOMONAS, YEAST, BACTERIA, CLARITYU, SPECGRAV, LEUKOCYTESUR, UROBILINOGEN, BILIRUBINUR, BLOODU, GLUCOSEU, AMORPHOUS in the last 72 hours.     Invalid input(s): Garth Oark  Recent Labs     02/07/21  0855 02/07/21  1155   PHART 7.192* 7.251*   XRD5TEL 91.6* 83.2*   PO2ART 80.1 66.9*     Micro:  2/7/2021 SARS-CoV-2 NAAT negative    Imaging:  Chest imaging was reviewed by me and showed   2/7/2021 CXR bilateral interstitial edema, possible left effusion    ASSESSMENT:  · Acute on chronic hypoxemic and hypercapnic respiratory failure  · COPD with exacerbation  · Acute on chronic diastolic CHF  · Chronic pain on buprenorphine patch  · H/O CAD, PVD    PLAN:  · Transfer to ICU for bilevel non-invasive positive pressure ventilation for life threatening respiratory failure   IV solumedrol with plan to convert to oral prednisone with improvement  Inhaled bronchodilators   Ceftriaxone and azithromycin D#1, can be narrowed to p.o. azithromycin  Echo pending, cardiology following  Tobacco cessation    Total critical care time caring for this patient with life threatening, unstable organ failure, including direct patient contact, management of life support systems, review of data including imaging and labs, discussions with other team members and physicians is 31 minutes so far today, excluding procedures.

## 2021-02-07 NOTE — PROGRESS NOTES
RESPIRATORY THERAPY ASSESSMENT    Name:  Keena Villalta  Medical Record Number:  [id number]  Age: 50 y.o. Gender: male  : 1965  Today's Date:  2021  Room:  /0317-01    Assessment     Is the patient being admitted for a COPD or Asthma exacerbation? Yes   (If yes the patient will be seen every 4 hours for the first 24 hours and then reassessed)    Patient Admission Diagnosis      Allergies  No Known Allergies    Minimum Predicted Vital Capacity:     N/a          Actual Vital Capacity:      N/a              Pulmonary History:COPD and CHF/Pulmonary Edema  Home Oxygen Therapy:  3 liters/min via nasal cannula  Home Respiratory Therapy:Albuterol, Albuterol/Ipratropium Bromide HHN, Umeclidinium Bromide and Vilanterol/Fluticasone Furoate   Current Respiratory Therapy:  Combivent Q4, 4L Nasal Cannula           Respiratory Severity Index(RSI)   Patients with orders for inhalation medications, oxygen, or any therapeutic treatment modality will be placed on Respiratory Protocol. They will be assessed with the first treatment and at least every 72 hours thereafter. The following severity scale will be used to determine frequency of treatment intervention. Smoking History: Severe Exacerbation = 4    Social History  Social History     Tobacco Use    Smoking status: Current Every Day Smoker     Packs/day: 2.00    Smokeless tobacco: Never Used   Substance Use Topics    Alcohol use:  Yes    Drug use: Never       Recent Surgical History: None = 0  Past Surgical History  Past Surgical History:   Procedure Laterality Date    HERNIA REPAIR      TONSILLECTOMY         Level of Consciousness: Alert, Oriented, and Cooperative = 0    Level of Activity: Mostly sedentary, minimal walking = 2    Respiratory Pattern: Regular Pattern; RR 8-20 = 0    Breath Sounds: Absent bilaterally and/or with wheezes = 3    Sputum   ,  ,    Cough: Strong, spontaneous, non-productive = 0    Vital Signs   /72   Pulse 77   Temp 98.2 °F (36.8 °C) (Oral)   Resp 20   Ht 4' 11\" (1.499 m)   Wt 170 lb (77.1 kg)   SpO2 92%   BMI 34.34 kg/m²   SPO2 (COPD values may differ): Less than 86% on room air or greater than 92% on FiO2 greater than 50% = 4    Peak Flow (asthma only): not applicable = 0    RSI: 51-60 = Q6H or QID and Q4HPRN for dyspnea        Plan       Goals: medication delivery, mobilize retained secretions, volume expansion and improve oxygenation    Patient/caregiver was educated on the proper method of use for Respiratory Care Devices:  Yes      Level of patient/caregiver understanding able to:   ? Verbalize understanding   ? Demonstrate understanding       ? Teach back        ? Needs reinforcement       ? No available caregiver               ? Other:     Response to education:  Excellent     Is patient being placed on Home Treatment Regimen? No     Does the patient have everything they need prior to discharge? No     Comments: Reassess in 24 hours      Plan of Care: Q4 Combivent X 24 hours, reassess. 4L NC, wean as tolerated with goal to reach home O2 of 3L. Goal to wean medications to home regimen. Electronically signed by Rachael Guardado RCP on 2/7/2021 at 4:27 AM    Respiratory Protocol Guidelines     1. Assessment and treatment by Respiratory Therapy will be initiated for medication and therapeutic interventions upon initiation of aerosolized medication. 2. Physician will be contacted for respiratory rate (RR) greater than 35 breaths per minute. Therapy will be held for heart rate (HR) greater than 140 beats per minute, pending direction from physician. 3. Bronchodilators will be administered via Metered Dose Inhaler (MDI) with spacer when the following criteria are met:  a. Alert and cooperative     b. HR < 140 bpm  c. RR < 30 bpm                d. Can demonstrate a 2-3 second inspiratory hold  4.  Bronchodilators will be administered via Hand Held Nebulizer [facility]) to patients when ANY of the following criteria are met  a. Incognizant or uncooperative          b. Patients treated with HHN at Home        c. Unable to demonstrate proper use of MDI with spacer     d. RR > 30 bpm   5. Bronchodilators will be delivered via Metered Dose Inhaler (MDI), HHN, Aerogen to intubated patients on mechanical ventilation. 6. Inhalation medication orders will be delivered and/or substituted as outlined below. Aerosolized Medications Ordering and Administration Guidelines:    1. All Medications will be ordered by a physician, and their frequency and/or modality will be adjusted as defined by the patients Respiratory Severity Index (RSI) score. 2. If the patient does not have documented COPD, consider discontinuing anticholinergics when RSI is less than 9.  3. If the bronchospasm worsens (increased RSI), then the bronchodilator frequency can be increased to a maximum of every 4 hours. If greater than every 4 hours is required, the physician will be contacted. 4. If the bronchospasm improves, the frequency of the bronchodilator can be decreased, based on the patient's RSI, but not less than home treatment regimen frequency. 5. Bronchodilator(s) will be discontinued if patient has a RSI less than 9 and has received no scheduled or as needed treatment for 72  Hrs. Patients Ordered on a Mucolytic Agent:    1. Must always be administered with a bronchodilator. 2. Discontinue if patient experiences worsened bronchospasm, or secretions have lessened to the point that the patient is able to clear them with a cough. Anti-inflammatory and Combination Medications:    1. If the patient lacks prior history of lung disease, is not using inhaled anti-inflammatory medication at home, and lacks wheezing by examination or by history for at least 24 hours, contact physician for possible discontinuation.

## 2021-02-07 NOTE — FLOWSHEET NOTE
02/07/21 0358   Vital Signs   Temp 98.2 °F (36.8 °C)   Temp Source Oral   Pulse 77   Heart Rate Source Monitor   Resp 20   /72   BP Location Right upper arm   Patient Position Semi fowlers   Level of Consciousness Alert (0)   MEWS Score 1   Patient Currently in Pain No   Oxygen Therapy   SpO2 92 %   Pulse Oximeter Device Mode Continuous   O2 Device Simple Mask   Skin Protection for O2 Device No   O2 Flow Rate (L/min) 4 L/min   Pt. Resting in bed. Call light in reach. Shift assessment completed see flow sheet. Denies any needs at this time. Will continue to monitor.

## 2021-02-07 NOTE — CONSULTS
[facility]  [phone]        Reason for Consultation/Chief Complaint: \"I have been having shortness of breath and feeling sick. \"    History of Present Illness:  Elaine Garcia is a 50 y.o. patient who presented to the hospital with complaints of shortness of breath and feeling sick. He is poor historian  He has chronic obst pulm disease and is active 1.5 ppd smoker for last 40 yrs. According to him has some type of heart disease but does not know exactly what it is. He does not know his cardiologist name. He says he lives in [place] and usually goes to [facility].  He denies chest pain palpitations dizziness. He denies fever chills cough. I have reviewed notes by ED since H&P is pending. Past history of COPD CHF HBP DM Chronic pain   I have been asked to provide consultation regarding further management and testing. Past Medical History:   has a past medical history of CHF (congestive heart failure) ([facility] [address] COPD (chronic obstructive pulmonary disease) ([facility] [address] Diabetes mellitus ([facility] [address] Hyperlipidemia, and Hypertension. Surgical History:   has a past surgical history that includes Tonsillectomy and hernia repair. Social History:   reports that he has been smoking. He has been smoking about 2.00 packs per day. He has never used smokeless tobacco. He reports current alcohol use. He reports that he does not use drugs. Family History:  family history includes Arthritis in his brother; Asthma in his brother, mother, and sister; Cancer in his mother; Diabetes in his sister; Hearing Loss in his mother; High Blood Pressure in his brother and father; High Cholesterol in his father; Vision Loss in his father, mother, and sister. Home Medications:  Were reviewed and are listed in nursing record. and/or listed below  Prior to Admission medications    Medication Sig Start Date End Date Taking?  Authorizing Provider   buprenorphine ([address]) 5 MCG/HR PTWK  1/20/21  Yes bisoprolol (ZEBETA) 10 MG tablet Take 1 tablet by mouth daily 4/6/20  Yes LINK Lr CNP   clopidogrel (PLAVIX) 75 MG tablet Take 1 tablet by mouth daily 4/6/20  Yes LINK Lr CNP   pioglitazone (ACTOS) 15 MG tablet Take 1 tablet by mouth daily 4/6/20  Yes LINK Lr CNP   furosemide (LASIX) 20 MG tablet Take 20 mg by mouth daily   Yes Historical Provider, MD   aspirin 81 MG EC tablet Take 81 mg by mouth daily   Yes Historical Provider, MD   Cyanocobalamin 1000 MCG CAPS Take 1 tablet by mouth daily   Yes Historical Provider, MD   [place]-3 1000 MG CAPS Take 1 capsule by mouth daily   Yes Historical Provider, MD   Coenzyme Q10 (COQ10) 100 MG CAPS Take 1 capsule by mouth daily   Yes Historical Provider, MD   MULTIPLE VITAMINS PO Take by mouth   Yes Historical Provider, MD   blood glucose monitor strips Test 2 times a day & as needed for symptoms of irregular blood glucose. E11.9 3/31/20   LINK Lr CNP   glucose monitoring kit (FREESTYLE) monitoring kit 1 kit by Does not apply route daily Patient wants brand name Patient tests bid  E11.9 3/31/20   LINK Lr CNP   blood glucose monitor kit and supplies Test 2 times a day & as needed for symptoms of irregular blood glucose. 3/30/20   LINK Lr CNP   metFORMIN (GLUCOPHAGE) 1000 MG tablet Take 1,000 mg by mouth 2 times daily (with meals)    Historical Provider, MD   ALBUTEROL IN Inhale 2 puffs into the lungs every 4 hours as needed    Historical Provider, MD        Allergies:  Patient has no known allergies.      Review of Systems:   12 point ROS negative in all areas as listed below except as in [place]  Constitutional, EENT, Cardiovascular, pulmonary, GI, , Musculoskeletal, skin, neurological, hematological, endocrine, Psychiatric    Physical Examination:    Vitals:    02/07/21 0918   BP: 101/74   Pulse: 61   Resp: 16   Temp: 96.9 °F (36.1 °C)   SpO2: 91%    Weight: 189 lb 3.2 oz (85.8 kg)       Obese moderate resp distress  General Appearance:  Alert, cooperative, no distress, appears stated age   Head:  Normocephalic, without obvious abnormality, atraumatic   Eyes:  PERRL, conjunctiva/corneas clear       Nose: Nares normal, no drainage or sinus tenderness   Throat: Lips, mucosa, and tongue normal   Neck:  short neckSupple, symmetrical, trachea midline, no adenopathy, thyroid: not enlarged, symmetric, no tenderness/mass/nodules, no carotid bruit or JVD       Lungs:   Crackles  At bases  to auscultation bilaterally, respirations unlabored   Chest Wall:  No tenderness or deformity   Heart:  Regular rate and rhythm, S1, S2 normal, no murmur, rub or gallop   Abdomen:   firmnon-tender, bowel sounds active all four quadrants,  no masses, no organomegaly    Morbidly obese       Extremities: Extremities normal, atraumatic, no cyanosis or edema   Pulses: 1+ and symmetric   Skin: Skin color, texture, turgor normal, no rashes or lesions   Pysch: Normal mood and affect   Neurologic: Normal gross motor and sensory exam.         Labs  CBC:   Lab Results   Component Value Date    WBC 13.8 02/07/2021    RBC 4.79 02/07/2021    HGB 13.4 02/07/2021    HCT 42.5 02/07/2021    MCV 88.6 02/07/2021    RDW 19.2 02/07/2021     02/07/2021     CMP:    Lab Results   Component Value Date     02/07/2021    K 5.1 02/07/2021    CL 96 02/07/2021    CO2 29 02/07/2021    BUN 30 02/07/2021    CREATININE 1.2 02/07/2021    GFRAA >60 02/07/2021    AGRATIO 1.1 02/07/2021    LABGLOM >60 02/07/2021    GLUCOSE 205 02/07/2021    PROT 8.0 02/07/2021    CALCIUM 11.0 02/07/2021    BILITOT <0.2 02/07/2021    ALKPHOS 76 02/07/2021    AST 37 02/07/2021    ALT 33 02/07/2021     PT/INR:  No results found for: PTINR  Lab Results   Component Value Date    TROPONINI <0.01 02/07/2021     Troponin x2 <0.01  EKG:  I have reviewed EKG with the following interpretation:  Impression:      Final 02/07/2021 12:31 AM 14    Normal sinus rhythmRight axis deviationRight ventricular hypertrophyAbnormal ECGNo previous ECGs availableConfirmed by Delicia Jauregui MD, [address] ([id number]) on 2/7/2021 7:46:48 AM         chest xray 2.7.21   Impression   Perihilar pulmonary vasculature and edema.       Bibasilar airspace disease and effusions greatest on left.       Follow-up to assure resolution may be beneficial.             Assessment  Acute resp failure  Acute congestive heart failure type unspecified pending echo  COPD acute exacerbation    Patient Active Problem List   Diagnosis    Hypertension    Other hyperlipidemia    CHF (congestive heart failure) (HCC)    [facility]    Acute on chronic respiratory failure with hypoxia and hypercapnia ([facility] [address]     DM-2    Plan:    I had the opportunity to review the clinical symptoms and presentation of Anna Mccracken. I recommend that the patient undergo treatment of acute congestive heart failure  Will start lasix   Continue his pulmonary care  BP control  Echo doppler of heart  Check thyroid  Tobacco abstinence reinforced  Fluid and salt restriction   Weigh loss to reduce abdominal obesity. I will address the patient's cardiac risk factors and adjusted pharmacologic treatment as needed. In addition, I have reinforced the need for patient directed risk factor modification. Tobacco use was discussed with the patient and educated on the negative effects. I have asked the patient to not utilize these agents. Thank you for allowing to us to participate in the care or Anna Mccracken. Further evaluation will be based upon the patient's clinical course and testing results. All questions and concerns were addressed to the patient/family. Alternatives to my treatment were discussed. The note was completed using EMR. Every effort was made to ensure accuracy; however, inadvertent computerized transcription errors may be present.

## 2021-02-07 NOTE — PROGRESS NOTES
Wife called to inform this writer that they have previously moved. New address is [facility], [address] He has also had 2 stents placed in R leg. 1 stent placed to L leg. Also, has had 2 caths. Dr Alva Boyer is his primary cardiologist in [facility].  See pulmonologist at [facility].

## 2021-02-07 NOTE — PROGRESS NOTES
Pt A&O x4. States he feels much better. Talking and joking with staff. Denies any pain or discomfort. VSS. Will continue to monitor.

## 2021-02-07 NOTE — PROGRESS NOTES
Called by charge nurse to evaluate pt. Pt is laying in bed with eyes open. Pt no responding to verbal stimuli. Pupils are equal and reactive. Pt is on a simple mask at 6l. Call placed to respiratory and an abg drawn per myself and sent. Resp states when he made first rounds the mask was on 4l. I asked resp to place pt on hi-flow nasal cannula. Pt started talking to us. Conversation was confused as he talked about a shock treatment. Staff and I continued to speak with pt while we waited for gases to result and pt continued to clear. Pt oriented to self, place and able to give me his medical history. Updated Dr Danielle Marley and orders received.  Dilshad Avery BSN, RN

## 2021-02-07 NOTE — PROGRESS NOTES
4 Eyes Skin Assessment     The patient is being assess for   Transfer to New Unit  by Yaniv Pulliam    I agree that 2 RN's have performed a thorough Head to Toe Skin Assessment on the patient. ALL assessment sites listed below have been assessed. Areas assessed by both nurses:   [x]   Head, Face, and Ears   [x]   Shoulders, Back, and Chest, Abdomen  [x]   Arms, Elbows, and Hands   [x]   Coccyx, Sacrum, and Ischium  [x]   Legs, Feet, and Heels        -redness in bilateral groin folds  -dry skin    **SHARE this note so that the co-signing nurse is able to place an eSignature**    Co-signer eSignature: Electronically signed by Vincent Hendricks RN on 2/7/21 at 4:30 PM EST    Does the Patient have Skin Breakdown?   No          Scott Prevention initiated:  No   Wound Care Orders initiated:  No      [facility] nurse consulted for Pressure Injury (Stage 3,4, Unstageable, DTI, NWPT, Complex wounds)and New or Established Ostomies:  No      Primary Nurse eSignature: Electronically signed by Edward Mcknight RN on 2/7/21 at 4:23 PM EST

## 2021-02-08 PROBLEM — J44.1 COPD EXACERBATION (HCC): Status: ACTIVE | Noted: 2020-03-30

## 2021-02-08 LAB
BASE EXCESS ARTERIAL: 6.9 MMOL/L (ref -3–3)
CARBOXYHEMOGLOBIN ARTERIAL: 0.9 % (ref 0–1.5)
ESTIMATED AVERAGE GLUCOSE: 171.4 MG/DL
GLUCOSE BLD-MCNC: 227 MG/DL (ref 70–99)
GLUCOSE BLD-MCNC: 282 MG/DL (ref 70–99)
GLUCOSE BLD-MCNC: 291 MG/DL (ref 70–99)
GLUCOSE BLD-MCNC: 313 MG/DL (ref 70–99)
HBA1C MFR BLD: 7.6 %
HCO3 ARTERIAL: 35.6 MMOL/L (ref 21–29)
HEMOGLOBIN, ART, EXTENDED: 13.8 G/DL (ref 13.5–17.5)
LV EF: 55 %
LVEF MODALITY: NORMAL
METHEMOGLOBIN ARTERIAL: 0.3 %
O2 CONTENT ARTERIAL: 18 ML/DL
O2 SAT, ARTERIAL: 93.8 %
O2 THERAPY: ABNORMAL
PCO2 ARTERIAL: 70.7 MMHG (ref 35–45)
PERFORMED ON: ABNORMAL
PH ARTERIAL: 7.32 (ref 7.35–7.45)
PO2 ARTERIAL: 76.5 MMHG (ref 75–108)
TCO2 ARTERIAL: 37.8 MMOL/L

## 2021-02-08 PROCEDURE — 82803 BLOOD GASES ANY COMBINATION: CPT

## 2021-02-08 PROCEDURE — 6360000002 HC RX W HCPCS: Performed by: INTERNAL MEDICINE

## 2021-02-08 PROCEDURE — 6370000000 HC RX 637 (ALT 250 FOR IP): Performed by: INTERNAL MEDICINE

## 2021-02-08 PROCEDURE — 2580000003 HC RX 258: Performed by: INTERNAL MEDICINE

## 2021-02-08 PROCEDURE — 94640 AIRWAY INHALATION TREATMENT: CPT

## 2021-02-08 PROCEDURE — 94761 N-INVAS EAR/PLS OXIMETRY MLT: CPT

## 2021-02-08 PROCEDURE — 99291 CRITICAL CARE FIRST HOUR: CPT | Performed by: INTERNAL MEDICINE

## 2021-02-08 PROCEDURE — 2000000000 HC ICU R&B

## 2021-02-08 PROCEDURE — 94660 CPAP INITIATION&MGMT: CPT

## 2021-02-08 PROCEDURE — 99233 SBSQ HOSP IP/OBS HIGH 50: CPT | Performed by: INTERNAL MEDICINE

## 2021-02-08 PROCEDURE — 93306 TTE W/DOPPLER COMPLETE: CPT

## 2021-02-08 RX ORDER — SENNA AND DOCUSATE SODIUM 50; 8.6 MG/1; MG/1
2 TABLET, FILM COATED ORAL 2 TIMES DAILY
Status: DISCONTINUED | OUTPATIENT
Start: 2021-02-08 | End: 2021-02-11 | Stop reason: HOSPADM

## 2021-02-08 RX ORDER — INSULIN GLARGINE 100 [IU]/ML
10 INJECTION, SOLUTION SUBCUTANEOUS EVERY MORNING
Status: DISCONTINUED | OUTPATIENT
Start: 2021-02-08 | End: 2021-02-09

## 2021-02-08 RX ORDER — IPRATROPIUM BROMIDE AND ALBUTEROL SULFATE 2.5; .5 MG/3ML; MG/3ML
1 SOLUTION RESPIRATORY (INHALATION)
Status: DISCONTINUED | OUTPATIENT
Start: 2021-02-08 | End: 2021-02-11 | Stop reason: HOSPADM

## 2021-02-08 RX ORDER — POLYETHYLENE GLYCOL 3350 17 G/17G
17 POWDER, FOR SOLUTION ORAL DAILY PRN
Status: DISCONTINUED | OUTPATIENT
Start: 2021-02-08 | End: 2021-02-11 | Stop reason: HOSPADM

## 2021-02-08 RX ADMIN — METHYLPREDNISOLONE SODIUM SUCCINATE 40 MG: 40 INJECTION, POWDER, FOR SOLUTION INTRAMUSCULAR; INTRAVENOUS at 20:04

## 2021-02-08 RX ADMIN — Medication 10 ML: at 20:17

## 2021-02-08 RX ADMIN — ERGOCALCIFEROL 50000 UNITS: 1.25 CAPSULE ORAL at 09:46

## 2021-02-08 RX ADMIN — GABAPENTIN 400 MG: 400 CAPSULE ORAL at 13:36

## 2021-02-08 RX ADMIN — INSULIN LISPRO 6 UNITS: 100 INJECTION, SOLUTION INTRAVENOUS; SUBCUTANEOUS at 17:11

## 2021-02-08 RX ADMIN — FUROSEMIDE 40 MG: 10 INJECTION, SOLUTION INTRAMUSCULAR; INTRAVENOUS at 08:28

## 2021-02-08 RX ADMIN — GABAPENTIN 400 MG: 400 CAPSULE ORAL at 20:03

## 2021-02-08 RX ADMIN — AZITHROMYCIN 250 MG: 250 TABLET, FILM COATED ORAL at 08:26

## 2021-02-08 RX ADMIN — IPRATROPIUM BROMIDE AND ALBUTEROL SULFATE 1 AMPULE: .5; 3 SOLUTION RESPIRATORY (INHALATION) at 07:39

## 2021-02-08 RX ADMIN — Medication 10 ML: at 08:31

## 2021-02-08 RX ADMIN — INSULIN GLARGINE 10 UNITS: 100 INJECTION, SOLUTION SUBCUTANEOUS at 13:37

## 2021-02-08 RX ADMIN — IPRATROPIUM BROMIDE AND ALBUTEROL SULFATE 1 AMPULE: .5; 3 SOLUTION RESPIRATORY (INHALATION) at 19:40

## 2021-02-08 RX ADMIN — IPRATROPIUM BROMIDE AND ALBUTEROL SULFATE 1 AMPULE: .5; 3 SOLUTION RESPIRATORY (INHALATION) at 12:06

## 2021-02-08 RX ADMIN — MUPIROCIN: 20 OINTMENT TOPICAL at 20:04

## 2021-02-08 RX ADMIN — IPRATROPIUM BROMIDE AND ALBUTEROL SULFATE 1 AMPULE: .5; 3 SOLUTION RESPIRATORY (INHALATION) at 15:44

## 2021-02-08 RX ADMIN — MONTELUKAST SODIUM 10 MG: 10 TABLET, COATED ORAL at 20:03

## 2021-02-08 RX ADMIN — IPRATROPIUM BROMIDE AND ALBUTEROL SULFATE 1 AMPULE: .5; 3 SOLUTION RESPIRATORY (INHALATION) at 03:09

## 2021-02-08 RX ADMIN — ASPIRIN 81 MG: 81 TABLET, COATED ORAL at 08:25

## 2021-02-08 RX ADMIN — INSULIN LISPRO 8 UNITS: 100 INJECTION, SOLUTION INTRAVENOUS; SUBCUTANEOUS at 08:43

## 2021-02-08 RX ADMIN — ATORVASTATIN CALCIUM 40 MG: 40 TABLET, FILM COATED ORAL at 08:26

## 2021-02-08 RX ADMIN — ENOXAPARIN SODIUM 40 MG: 40 INJECTION SUBCUTANEOUS at 09:00

## 2021-02-08 RX ADMIN — METHYLPREDNISOLONE SODIUM SUCCINATE 40 MG: 40 INJECTION, POWDER, FOR SOLUTION INTRAMUSCULAR; INTRAVENOUS at 09:46

## 2021-02-08 RX ADMIN — DOCUSATE SODIUM 50 MG AND SENNOSIDES 8.6 MG 2 TABLET: 8.6; 5 TABLET, FILM COATED ORAL at 20:03

## 2021-02-08 RX ADMIN — INSULIN LISPRO 9 UNITS: 100 INJECTION, SOLUTION INTRAVENOUS; SUBCUTANEOUS at 11:51

## 2021-02-08 RX ADMIN — CLOPIDOGREL BISULFATE 75 MG: 75 TABLET ORAL at 08:25

## 2021-02-08 RX ADMIN — GABAPENTIN 400 MG: 400 CAPSULE ORAL at 08:25

## 2021-02-08 RX ADMIN — IPRATROPIUM BROMIDE AND ALBUTEROL SULFATE 1 AMPULE: .5; 3 SOLUTION RESPIRATORY (INHALATION) at 23:06

## 2021-02-08 RX ADMIN — Medication 2 PUFF: at 07:40

## 2021-02-08 RX ADMIN — DULOXETINE 20 MG: 20 CAPSULE, DELAYED RELEASE ORAL at 20:04

## 2021-02-08 RX ADMIN — DOCUSATE SODIUM 50 MG AND SENNOSIDES 8.6 MG 2 TABLET: 8.6; 5 TABLET, FILM COATED ORAL at 13:36

## 2021-02-08 RX ADMIN — FENOFIBRATE 54 MG: 54 TABLET ORAL at 08:27

## 2021-02-08 RX ADMIN — Medication 2 PUFF: at 19:41

## 2021-02-08 RX ADMIN — GLIMEPIRIDE 2 MG: 2 TABLET ORAL at 08:26

## 2021-02-08 RX ADMIN — DULOXETINE 20 MG: 20 CAPSULE, DELAYED RELEASE ORAL at 08:27

## 2021-02-08 ASSESSMENT — PAIN SCALES - GENERAL
PAINLEVEL_OUTOF10: 0
PAINLEVEL_OUTOF10: 0

## 2021-02-08 NOTE — FLOWSHEET NOTE
000     02/07/21 2001   Vitals   Temp 97.8 °F (36.6 °C)   Temp Source Axillary   Pulse 76   Heart Rate Source Monitor   Resp 16   /73   MAP (mmHg) 85   BP Location Left upper arm   BP Method Automatic   Patient Position High fowlers   Level of Consciousness Alert (0)   MEWS Score 1   Patient Currently in Pain No   Cardiac Rhythm NSR   Oxygen Therapy   SpO2 93 %   Pulse Oximeter Device Mode Continuous   Pulse Oximeter Device Location Left;Hand   O2 Device High flow nasal cannula   Skin Assessment Clean, dry, & intact   Skin Protection for O2 Device Yes   Pain Assessment   Pain Level 0   Patient Observation   Observations pt a&o, updating wife via cell phone, independent, oriented to call bell     Pt ABG resulted in a CO2 of 70, BC's came back preliminary positive for GPC  Pt was able to be woken up by RN with minimal stimulation, Had Bipap on all night, minimal leaks, No C/O pain / discomfort, independent, plan to perform ECG in am

## 2021-02-08 NOTE — PROGRESS NOTES
02/07/21 2253   NIV Type   Skin Assessment Clean, dry, & intact   Skin Protection for O2 Device Yes   Equipment Type V60   Mode Bilevel   Mask Type Full face mask   Mask Size Medium   Settings/Measurements   IPAP 12 cmH20   CPAP/EPAP 6 cmH2O   Rate Ordered 16   Resp 19   FiO2  60 %   Vt Exhaled 446 mL   Mask Leak (lpm) 12 lpm   Comfort Level Good   Using Accessory Muscles No   SpO2 95   Breath Sounds   Right Upper Lobe Diminished   Right Middle Lobe Diminished   Right Lower Lobe Diminished   Left Upper Lobe Diminished   Left Lower Lobe Diminished   Alarm Settings   Alarms On Y

## 2021-02-08 NOTE — PROGRESS NOTES
This RN spoke with wife Marco Germain and updated her on the care plan. Addressed all patient and family concerns at this time.      Jeri Stockton RN

## 2021-02-08 NOTE — FLOWSHEET NOTE
02/08/21 1600   Vital Signs   Pulse 72   Resp 20   /89   MAP (mmHg) 96   Oxygen Therapy   SpO2 94 %   O2 Device PAP (positive airway pressure)   Skin Assessment Clean, dry, & intact   Skin Protection for O2 Device Yes   O2 Flow Rate (L/min) 10 L/min   Pt placed back on BiPAP by RT. Pt tolerating well as shown above.      Lisandro Myers RN

## 2021-02-08 NOTE — PROGRESS NOTES
None  Cough: Strong, spontaneous, non-productive = 0    Vital Signs   /67   Pulse 60   Temp 97.8 °F (36.6 °C) (Axillary)   Resp 16   Ht 4' 11\" (1.499 m)   Wt 189 lb 3.2 oz (85.8 kg)   SpO2 95%   BMI 38.21 kg/m²   SPO2 (COPD values may differ): Less than 86% on room air or greater than 92% on FiO2 greater than 50% = 4    Peak Flow (asthma only): not applicable = 0    RSI: 84-83 = Q4WA (every four hours while awake) and Q4hrs PRN        Plan       Goals: medication delivery    Patient/caregiver was educated on the proper method of use for Respiratory Care Devices:  Yes      Level of patient/caregiver understanding able to:   ? Verbalize understanding   ? Demonstrate understanding       ? Teach back        ? Needs reinforcement       ? No available caregiver               ? Other:     Response to education:  Good     Is patient being placed on Home Treatment Regimen? No     Does the patient have everything they need prior to discharge? NA     Comments: Chart reviewed and patient assessed. Plan of Care: Duoneb Q4W/A,  Symbicort BID. Electronically signed by Pablito Haynes RCP on 2/8/2021 at 4:00 AM    Respiratory Protocol Guidelines     1. Assessment and treatment by Respiratory Therapy will be initiated for medication and therapeutic interventions upon initiation of aerosolized medication. 2. Physician will be contacted for respiratory rate (RR) greater than 35 breaths per minute. Therapy will be held for heart rate (HR) greater than 140 beats per minute, pending direction from physician. 3. Bronchodilators will be administered via Metered Dose Inhaler (MDI) with spacer when the following criteria are met:  a. Alert and cooperative     b. HR < 140 bpm  c. RR < 30 bpm                d. Can demonstrate a 2-3 second inspiratory hold  4. Bronchodilators will be administered via Hand Held Nebulizer [facility]) to patients when ANY of the following criteria are met  a.  Incognizant or uncooperative b. Patients treated with HHN at Home        c. Unable to demonstrate proper use of MDI with spacer     d. RR > 30 bpm   5. Bronchodilators will be delivered via Metered Dose Inhaler (MDI), HHN, Aerogen to intubated patients on mechanical ventilation. 6. Inhalation medication orders will be delivered and/or substituted as outlined below. Aerosolized Medications Ordering and Administration Guidelines:    1. All Medications will be ordered by a physician, and their frequency and/or modality will be adjusted as defined by the patients Respiratory Severity Index (RSI) score. 2. If the patient does not have documented COPD, consider discontinuing anticholinergics when RSI is less than 9.  3. If the bronchospasm worsens (increased RSI), then the bronchodilator frequency can be increased to a maximum of every 4 hours. If greater than every 4 hours is required, the physician will be contacted. 4. If the bronchospasm improves, the frequency of the bronchodilator can be decreased, based on the patient's RSI, but not less than home treatment regimen frequency. 5. Bronchodilator(s) will be discontinued if patient has a RSI less than 9 and has received no scheduled or as needed treatment for 72  Hrs. Patients Ordered on a Mucolytic Agent:    1. Must always be administered with a bronchodilator. 2. Discontinue if patient experiences worsened bronchospasm, or secretions have lessened to the point that the patient is able to clear them with a cough. Anti-inflammatory and Combination Medications:    1. If the patient lacks prior history of lung disease, is not using inhaled anti-inflammatory medication at home, and lacks wheezing by examination or by history for at least 24 hours, contact physician for possible discontinuation.

## 2021-02-08 NOTE — PROGRESS NOTES
Pulmonary & Critical Care Medicine ICU Progress Note    CC: COPD    Events of Last 24 hours: Invasive Lines: IV:     MV:  D     / / /FiO2 : 60 %  Recent Labs     02/07/21  1155 02/08/21  0555   PHART 7.251* 7.320*   OTQ1ZWP 83.2* 70.7*   PO2ART 66.9* 76.5       IV:   dextrose         Vitals:  /79   Pulse 61   Temp 97.8 °F (36.6 °C) (Axillary)   Resp 15   Ht 4' 11\" (1.499 m)   Wt 189 lb 3.2 oz (85.8 kg)   SpO2 92%   BMI 38.21 kg/m²   on 10lpm    Intake/Output Summary (Last 24 hours) at 2/8/2021 0836  Last data filed at 2/8/2021 8692  Gross per 24 hour   Intake 670 ml   Output 2100 ml   Net -1430 ml     EXAM:  Constitutional: ill appearing. notIn distress. Eyes: PERRL. No sclera icterus. ENT: Normal Nose. Normal Tongue. Neck:  Trachea is midline. No thyroid tenderness. Respiratory: No accessory muscle usage. decreased breath sounds. No wheezes. No rales. + Rhonchi. Cardiovascular: Normal S1S2. No murmur. No digit cyanosis. No digit clubbing. No LE edema. GI: Non-tender. Non-distended. Normal bowel sounds. No masses. No umbilical hernia. Skin: No rash on the exposed extremities. No Nodules on exposed extremities. Neuro: Alert. Follows commands. Moves all extremities. Psych: Alert. Oriented. No agitation, no anxiety.     Scheduled Meds:   ipratropium-albuterol  1 ampule Inhalation Q4H While awake    nicotine  1 patch Transdermal Daily    sodium chloride flush  10 mL Intravenous 2 times per day    enoxaparin  40 mg Subcutaneous Daily    methylPREDNISolone  40 mg Intravenous Q12H    Followed by   Shivani Quiros ON 2/9/2021] predniSONE  40 mg Oral Daily with breakfast    insulin lispro  0-12 Units Subcutaneous TID     insulin lispro  0-6 Units Subcutaneous Nightly    aspirin  81 mg Oral Daily    atorvastatin  40 mg Oral Daily    vitamin D  50,000 Units Oral Once per day on Mon Thu    clopidogrel  75 mg Oral Daily    DULoxetine  20 mg Oral BID    fenofibrate  54 mg Oral Daily    budesonide-formoterol  2 puff Inhalation BID    gabapentin  400 mg Oral TID    glimepiride  2 mg Oral QAM AC    montelukast  10 mg Oral Nightly    buprenorphine  1 patch Transdermal Weekly    furosemide  40 mg Intravenous Daily    azithromycin  250 mg Oral Daily    mupirocin   Nasal BID     PRN Meds:  albuterol sulfate HFA, glucose, dextrose, glucagon (rDNA), dextrose, sodium chloride flush, promethazine **OR** ondansetron, polyethylene glycol, acetaminophen **OR** acetaminophen    Results:  CBC:   Recent Labs     02/07/21 0031 02/07/21 0419   WBC 14.9* 13.8*   HGB 13.3* 13.4*   HCT 41.9 42.5   MCV 87.7 88.6    354     BMP:   Recent Labs     02/07/21 0031 02/07/21 0419    137   K 4.4 5.1   CL 98* 96*   CO2 31 29   BUN 31* 30*   CREATININE 0.9 1.2     LIVER PROFILE:   Recent Labs     02/07/21 0031 02/07/21 0419   AST 24 37   ALT 30 33   BILITOT 0.3 <0.2   ALKPHOS 68 76     Micro:  2/7/2021 SARS-CoV-2 NAAT negative  1/2 blood cx gram +     Imaging:  Chest imaging was reviewed by me and showed   2/7/2021 CXR bilateral interstitial edema, possible left effusion     ASSESSMENT:  · Acute on chronic hypoxemic and hypercapnic respiratory failure  · COPD with exacerbation (followed in [place])  · Acute on chronic diastolic CHF  · Chronic pain on buprenorphine patch  · H/O CAD, PVD  · Tobacco abuse     PLAN:  · Bilevel non-invasive positive pressure ventilation for life threatening respiratory failure. Ok for short breaks.   · IV solumedrol with plan to convert to oral prednisone with improvement  · Inhaled bronchodilators   · Azithromycin D#2  · Echo pending, lasix, cardiology following  · Tobacco cessation - pt states the patch is not helping and he would like to smoke  · Total critical care time caring for this patient with life threatening, unstable organ failure, including direct patient contact, management of life support systems, review of data including imaging and labs, discussions with other team members and physicians is 31 minutes so far today, excluding procedures.

## 2021-02-08 NOTE — PROGRESS NOTES
02/08/21 0305   NIV Type   Equipment Type v60   Mode Bilevel   Mask Type Full face mask   Mask Size Medium   Settings/Measurements   IPAP 12 cmH20   CPAP/EPAP 6 cmH2O   Rate Ordered 16   Resp 18   FiO2  60 %   Vt Exhaled 630 mL   Minute Volume 11.8 Liters   Mask Leak (lpm) 30 lpm   Comfort Level Good   Using Accessory Muscles No   SpO2 91   Patient Observation   Observations spo2 91% on 60% bipap

## 2021-02-08 NOTE — PROGRESS NOTES
Reassessment complete; see flow sheets. No changes with patient. Pt back on Bipap. SpO2 98%. HR 64. RR 19. Pt tolerating well. Pt okay to be off Bipap again around lunch time per order. Pt stable at this time.     Leslie Isaac RN

## 2021-02-08 NOTE — CARE COORDINATION
Case Management Assessment  Initial Evaluation      Patient Name: Ivy Willett  YOB: 1965  Diagnosis: Acute on chronic respiratory failure with hypoxia and [facility] [J96.21, J96.22]  Date / Time: 2/7/2021 12:14 AM    Admission status/Date:inpt  Chart Reviewed: Yes      Patient Interviewed: Yes   Family Interviewed:  No      Hospitalization in the last 30 days:  No      Health Care Decision Maker :     (CM - must 1st enter selection under Navigator - emergency contact- Health Care Decision Maker Relationship and pick relationship)   Who do you trust or have selected to make healthcare decisions for you      Met with: pt via TC  Interview conducted  (bedside/phone):    Current PCP:   Lasha Leon, APRN - CNP      Financial  Commercial  Precert required for SNF : Y, N          3 night stay required - Y, N    ADLS  Support Systems/Care Needs:    Transportation: family    Meal Preparation: self    Housing  Living Arrangements: home with spouse  Steps: 2-3  Intent for return to present living arrangements: Yes  Identified Issues: no    Home Care Information  Active with [address] : No Agency:(Services)     Passport/Waiver : No  :                      Phone Number:    Passport/Waiver Services: no          Durable Medical Equiptment   DME Provider: [facility]  Equipment:   Walker___Cane___RTS___ BSC___Shower Chair___Hospital Bed___W/C____Other________  02 at __1__Liter(s)---wears(frequency)___hs only____ HHN ___ CPAP___ BiPap___   N/A____      Home O2 Use :  Yes  , hs only  If No for home O2---if presently on O2 during hospitalization:  Yes  if yes CM to follow for potential DC O2 need  Informed of need for care provider to bring portable home O2 tank on day of discharge for nursing to connect prior to leaving:   Not Indicated  Verbalized agreement/Understanding:   Not Indicated    Community Service Affiliation  Dialysis:  No    · Agency:  · Location:  · Dialysis Schedule:  · Phone:   · Fax: Other Community Services: (ex:PT/OT,Mental Health,Wound Clinic, Cardio/Pul [address])    DISCHARGE PLAN: Explained Case Management role/services. Reviewed chart. Pt in ICU requiring BiPAP. Pt states IPTA. Pt active with [facility] for home O2, uses 1 liter hs only at baseline. Following for d/c needs.

## 2021-02-08 NOTE — PROGRESS NOTES
Admit: 2021    Name:  Michelle Anguiano  Room:  [facility]-  MRN:    [id number]    Critical Care Daily Progress Note for 2021     Interval History:     Transferred to ICU for worsening hypoxia and increasing somnolence. Blood gas showed elevated CO2 at 91.6.   Placed on BiPAP    Currently on 10 L   bipap all night and prn day    Scheduled Meds:   ipratropium-albuterol  1 ampule Inhalation Q4H While awake    insulin lispro  0-18 Units Subcutaneous TID WC    insulin lispro  0-9 Units Subcutaneous Nightly    insulin glargine  10 Units Subcutaneous QAM    sennosides-docusate sodium  2 tablet Oral BID    nicotine  1 patch Transdermal Daily    sodium chloride flush  10 mL Intravenous 2 times per day    enoxaparin  40 mg Subcutaneous Daily    methylPREDNISolone  40 mg Intravenous Q12H    Followed by   Javid Zaldivar ON 2021] predniSONE  40 mg Oral Daily with breakfast    aspirin  81 mg Oral Daily    atorvastatin  40 mg Oral Daily    vitamin D  50,000 Units Oral Once per day on u    clopidogrel  75 mg Oral Daily    DULoxetine  20 mg Oral BID    fenofibrate  54 mg Oral Daily    budesonide-formoterol  2 puff Inhalation BID    gabapentin  400 mg Oral TID    glimepiride  2 mg Oral QAM AC    montelukast  10 mg Oral Nightly    buprenorphine  1 patch Transdermal Weekly    furosemide  40 mg Intravenous Daily    azithromycin  250 mg Oral Daily    mupirocin   Nasal BID       Continuous Infusions:   dextrose         PRN Meds:  polyethylene glycol, albuterol sulfate HFA, glucose, dextrose, glucagon (rDNA), dextrose, sodium chloride flush, promethazine **OR** ondansetron, polyethylene glycol, acetaminophen **OR** acetaminophen                  Objective:     Temp  Av.5 °F (36.4 °C)  Min: 96.5 °F (35.8 °C)  Max: 97.9 °F (36.6 °C)  Pulse  Av.1  Min: 57  Max: 76  BP  Min: 88/51  Max: 139/99  SpO2  Av %  Min: 83 %  Max: 97 %  FiO2   Av %  Min: 60 %  Max: 60 %  Patient Vitals for the past 4 hrs: BP Temp Temp src Pulse Resp SpO2   02/08/21 1145 -- -- -- 64 16 97 %   02/08/21 1100 115/74 97.6 °F (36.4 °C) Axillary 67 16 97 %   02/08/21 1000 137/77 -- -- 67 14 96 %         Intake/Output Summary (Last 24 hours) at 2/8/2021 1300  Last data filed at 2/8/2021 1145  Gross per 24 hour   Intake 730 ml   Output 3550 ml   Net -2820 ml       Physical Exam:  General appearance: No apparent distress appears stated age and cooperative. HEENT Normal cephalic, atraumatic without obvious deformity. Pupils equal, round, and reactive to light. Extra ocular muscles intact. Conjunctivae/corneas clear. Neck: Supple, No jugular venous distention/bruits. Trachea midline without thyromegaly or adenopathy with full range of motion. Lungs: diminished breath sounds. Heart: Regular rate and rhythm with Normal S1/S2 without murmurs, rubs or gallops, point of maximum impulse non-displaced  Abdomen: Soft, non-tender or non-distended without rigidity or guarding and positive bowel sounds all four quadrants. Extremities: No clubbing, cyanosis, or edema bilaterally. Full range of motion without deformity and normal gait intact. Skin: Skin color, texture, turgor normal.  No rashes or lesions. Neurologic: Alert and oriented X 3, neurovascularly intact with sensory/motor intact upper extremities/lower extremities, bilaterally. Cranial nerves: II-XII intact, grossly non-focal.  Mental status: Alert, oriented, thought content appropriate. Lab Data:  CBC:   Recent Labs     02/07/21  0031 02/07/21 0419   WBC 14.9* 13.8*   RBC 4.78 4.79   HGB 13.3* 13.4*   HCT 41.9 42.5   MCV 87.7 88.6   RDW 19.4* 19.2*    354     BMP:   Recent Labs     02/07/21  0031 02/07/21 0419    137   K 4.4 5.1   CL 98* 96*   CO2 31 29   BUN 31* 30*   CREATININE 0.9 1.2     BNP: No results for input(s): BNP in the last 72 hours. PT/INR: No results for input(s): PROTIME, INR in the last 72 hours. APTT:No results for input(s):  APTT in the last 72 hours. CARDIAC ENZYMES:   Recent Labs     02/07/21  0031 02/07/21  0419 02/07/21  0855   TROPONINI <0.01 <0.01 <0.01     FASTING LIPID PANEL:  Lab Results   Component Value Date    HDL 50 03/30/2020     LIVER PROFILE:   Recent Labs     02/07/21  0031 02/07/21  0419   AST 24 37   ALT 30 33   BILITOT 0.3 <0.2   ALKPHOS 68 76      CT a/p no contrast   Moderate stool in colon.  No bowel obstruction.  There is diverticulosis.  No   diverticulitis   bibasilar airspace disease, suspicious for pneumonia     CXR   Perihilar pulmonary vasculature and edema. Bibasilar airspace disease and effusions greatest on left. Follow-up to assure resolution may be beneficial.   ECHO   LV systolic function is normal with EF estimated at 55%. No regional wall motion abnormalities. MIldly asynchronous septal motion. There is mild concentric left ventricular hypertrophy. Grade II diastolic dysfunction with elevated LV filling pressure. Mild mitral annular calcification. The left atrium is mildly dilated. Mild mitral regurgitation. Inadequate tricuspid regurgitation jet to estimate systolic artery pressure   (SPAP). Assessment & Plan:     Patient Active Problem List    Diagnosis Date Noted    Coronary artery disease involving native coronary artery of native heart without angina pectoris     Acute on chronic respiratory failure with hypoxia and hypercapnia ([address] 02/07/2021    Hypertension 03/30/2020    Other hyperlipidemia 03/30/2020    CHF (congestive heart failure) ([facility] 03/30/2020    COPD exacerbation ([facility] 03/30/2020     COPD with AE  Symbicort. Soumedrol. Combivent  Singulair   Zithro/Rocephin. Pulm consult. bipap all night, now on 10 L of O2       Acute on Chronic Mixed Hypox and Hypercapnic Resp Failure. 12l/min this am.   ABG concerning for CO2 retention. bipap all night, now on 10 L of O2       Acute diastolic CHF. ECHO 2019 preserved EF.    I suspect acute pulm edema triggered by hypoxia related to COPD. He does not appear grossly fluid overloaded  Lasix IV daily per cardiology recs - monitor renal function. ECHO normal EF,diastolic dysfunction      Abd distentiion, constipation. No BM in over 5 days. Chronic on buprenorphine patch. Abd exam - distended, but non tender. CT - moderate stool  No emesis, no nausea.       DMII - stable. ISS.     CAD Hx of  PVD Hx of  ASA and plavix. On statin.         DVT Prophylaxis: lovenox  Diet: DIET CARB CONTROL; Carb Control: 4 carb choices (60 gms)/meal; No Added Salt (3-4 GM);  Daily Fluid Restriction: 1800 ml  Code Status: Full Code        Dispo - ICU      [facility]

## 2021-02-08 NOTE — PROGRESS NOTES
EOS report to Zaida, [facility].

## 2021-02-08 NOTE — PROGRESS NOTES
Reassessment complete; see flow sheets. Pt currently on BiPAP SpO2 95% RR 15. HR 64. Pt tolerating well. Dinner received at this time. Will take Pt off to eat. Meds given per [facility] at this time.     Juliana Nash RN

## 2021-02-08 NOTE — PROGRESS NOTES
02/07/21 2154   NIV Type   Skin Assessment Clean, dry, & intact   Skin Protection for O2 Device Yes   Equipment Type V60   Mode Bilevel   Mask Type Full face mask   Mask Size Medium   Settings/Measurements   IPAP 12 cmH20   CPAP/EPAP 6 cmH2O   Rate Ordered 16   Resp 20   FiO2  60 %   Vt Exhaled 478 mL   Mask Leak (lpm) 23 lpm   Comfort Level Good   Using Accessory Muscles No   SpO2 98   Alarm Settings   Alarms On Y

## 2021-02-08 NOTE — PROGRESS NOTES
[address] Daily Progress Note      Admit Date:  2/7/2021    Subjective:  Mr. Aylin York is being seen today for f/u CHF undetermined type. Wore Bipap overnight and currently on 10L NC oxygen 92% (home regimen=3L). He feels better today and denies specific complaints. No issues per nursing.  Tele reviewed showing NSR 79bpm    Objective:   /79   Pulse 68   Temp 97.8 °F (36.6 °C) (Axillary)   Resp 20   Ht 4' 11\" (1.499 m)   Wt 189 lb 3.2 oz (85.8 kg)   SpO2 (!) 83%   BMI 38.21 kg/m²       Intake/Output Summary (Last 24 hours) at 2/8/2021 0719  Last data filed at 2/8/2021 8877  Gross per 24 hour   Intake 788 ml   Output 2100 ml   Net -1312 ml       TELEMETRY: Sinus 79bpm    Physical Exam:  General:  Awake, alert, NAD  Skin:  Warm and dry  Neck:  JVD none obvious  Chest:  +bibasilar crackles  Cardiovascular:  RRR S1S2  Abdomen:  Soft NT  Extremities:  Trace bilateral edema    Medications:    ipratropium-albuterol  1 ampule Inhalation Q4H While awake    nicotine  1 patch Transdermal Daily    sodium chloride flush  10 mL Intravenous 2 times per day    enoxaparin  40 mg Subcutaneous Daily    methylPREDNISolone  40 mg Intravenous Q12H    Followed by   Shelly Anthony ON 2/9/2021] predniSONE  40 mg Oral Daily with breakfast    insulin lispro  0-12 Units Subcutaneous TID WC    insulin lispro  0-6 Units Subcutaneous Nightly    aspirin  81 mg Oral Daily    atorvastatin  40 mg Oral Daily    vitamin D  50,000 Units Oral Once per day on Mon Thu    clopidogrel  75 mg Oral Daily    DULoxetine  20 mg Oral BID    fenofibrate  54 mg Oral Daily    budesonide-formoterol  2 puff Inhalation BID    gabapentin  400 mg Oral TID    glimepiride  2 mg Oral QAM AC    montelukast  10 mg Oral Nightly    buprenorphine  1 patch Transdermal Weekly    furosemide  40 mg Intravenous Daily    azithromycin  250 mg Oral Daily    mupirocin   Nasal BID      dextrose       albuterol sulfate HFA, glucose, dextrose, glucagon (rDNA), dextrose, sodium chloride flush, promethazine **OR** ondansetron, polyethylene glycol, acetaminophen **OR** acetaminophen    Lab Data:  CBC:   Recent Labs     02/07/21 0031 02/07/21 0419   WBC 14.9* 13.8*   HGB 13.3* 13.4*   HCT 41.9 42.5   MCV 87.7 88.6    354     BMP:   Recent Labs     02/07/21 0031 02/07/21 0419    137   K 4.4 5.1   CL 98* 96*   CO2 31 29   BUN 31* 30*   CREATININE 0.9 1.2     LIVER PROFILE:   Recent Labs     02/07/21 0031 02/07/21 0419   AST 24 37   ALT 30 33   BILITOT 0.3 <0.2   ALKPHOS 68 76     EKG 2/7/21  Normal sinus rhythmRight axis deviationRight ventricular hypertrophyAbnormal ECGNo previous ECGs availableConfirmed by Marcy Roy MD, [email or web address] ([id number]) on 2/7/2021 7:46:48 AM    CXR 2/7/21  FINDINGS: Cardiomegaly. Perihilar pulmonary vascular congestion. .  Increased interstitial opacities throughout both lungs suggestive of interstitial edema. Bibasilar airspace opacities and small pleural effusions. No pneumothorax. ECHO 11/19/19  LVH, LVDD, Nml EF    Assessment:    1. Acute CHF undetermined type:   -CXR with bibasilar ASD and pleural effusions > left (? PNA) and crackles on exam   -BNP elevated 2518   -continue IV lasix 40mg daily   -UOP 2.1L and net negative 1.3L   -watch renal fcn; today BUN/Cr=30/1.2    -ECHO 11/19/19 EF reported normal; LVH   -ECHO pending today    2. COPD exacerbation:   -On Bipap and higher NC O2 requirements (home regimen=3L NC)   -Abx azithromycin, inhalers, and IV steroids per ICU team    3.  Hx CAD and PVD:   -no specifics   -could not find any available records but mentioned in Dr. Lovely Ashley note ([facility] doc)   -prior saw [facility] Dr. Amparo Be   -continue baby asa qd, plavix 75mg qd, lipitor 40mg qd, tricor 54mg qd, nicoderm patch    Further recs pending ECHO testing and response to diuresis    Patient Active Problem List    Diagnosis Date Noted    Acute on chronic respiratory failure with hypoxia and hypercapnia ([facility] [address] 02/07/2021    Hypertension 03/30/2020    Other hyperlipidemia 03/30/2020    CHF (congestive heart failure) ([facility] 03/30/2020    COPD (chronic obstructive pulmonary disease) ([facility] 03/30/2020       Brandi Arvizu MD 2/8/2021 7:19 AM

## 2021-02-08 NOTE — PROGRESS NOTES
Pt assessment complete; see flow sheets. Echo completed this morning- Pt tolerated well. Dr. Tho Duval in with cardiology to see pt- goal to continue lasix this AM. Meds given per [facility]. Taken off Bipap this morning for breakfast per order. Pt SpO2 94% on 10 L HFNC. HR 77. RR 18. Pt currently resting in bed with the call light within reach. Pt denies any other care needs at this time. Pt stable at this time.     Billy Garcia RN

## 2021-02-08 NOTE — PROGRESS NOTES
02/08/21 0740   NIV Type   Skin Assessment Clean, dry, & intact   Skin Protection for O2 Device Yes   NIV Started/Stopped On   Equipment Type V60   Mode Bilevel   Mask Type Full face mask   Mask Size Medium   Settings/Measurements   IPAP 18 cmH20   CPAP/EPAP 6 cmH2O   Rate Ordered 16   Resp 18   FiO2  60 %   I Time/ I Time % 29 s   Vt Exhaled 559 mL   Minute Volume 9.7 Liters   Mask Leak (lpm) 18 lpm   Comfort Level Good   Using Accessory Muscles No   SpO2 95   Breath Sounds   Right Upper Lobe Diminished   Right Middle Lobe Diminished   Right Lower Lobe Diminished   Left Upper Lobe Diminished   Left Lower Lobe Diminished   Alarm Settings   Alarms On Y   Press Low Alarm 12 cmH2O   High Pressure Alarm 35 cmH2O   Delay Alarm 20 sec(s)   Resp Rate Low Alarm 14   High Respiratory Rate 40 br/min   Oxygen Therapy/Pulse Ox   O2 Therapy Oxygen   O2 Device PAP (positive airway pressure)   SpO2 95 %

## 2021-02-09 LAB
ALBUMIN SERPL-MCNC: 3.9 G/DL (ref 3.4–5)
ANION GAP SERPL CALCULATED.3IONS-SCNC: 7 MMOL/L (ref 3–16)
ANION GAP SERPL CALCULATED.3IONS-SCNC: 8 MMOL/L (ref 3–16)
BASE EXCESS ARTERIAL: 7.5 MMOL/L (ref -3–3)
BUN BLDV-MCNC: 39 MG/DL (ref 7–20)
BUN BLDV-MCNC: 40 MG/DL (ref 7–20)
CALCIUM SERPL-MCNC: 10.3 MG/DL (ref 8.3–10.6)
CALCIUM SERPL-MCNC: 10.4 MG/DL (ref 8.3–10.6)
CARBOXYHEMOGLOBIN ARTERIAL: 0.9 % (ref 0–1.5)
CHLORIDE BLD-SCNC: 96 MMOL/L (ref 99–110)
CHLORIDE BLD-SCNC: 99 MMOL/L (ref 99–110)
CO2: 34 MMOL/L (ref 21–32)
CO2: 38 MMOL/L (ref 21–32)
CREAT SERPL-MCNC: 0.9 MG/DL (ref 0.9–1.3)
CREAT SERPL-MCNC: 1.2 MG/DL (ref 0.9–1.3)
GFR AFRICAN AMERICAN: >60
GFR AFRICAN AMERICAN: >60
GFR NON-AFRICAN AMERICAN: >60
GFR NON-AFRICAN AMERICAN: >60
GLUCOSE BLD-MCNC: 231 MG/DL (ref 70–99)
GLUCOSE BLD-MCNC: 269 MG/DL (ref 70–99)
GLUCOSE BLD-MCNC: 291 MG/DL (ref 70–99)
GLUCOSE BLD-MCNC: 312 MG/DL (ref 70–99)
GLUCOSE BLD-MCNC: 315 MG/DL (ref 70–99)
GLUCOSE BLD-MCNC: 373 MG/DL (ref 70–99)
HCO3 ARTERIAL: 35.4 MMOL/L (ref 21–29)
HCT VFR BLD CALC: 42 % (ref 40.5–52.5)
HEMOGLOBIN, ART, EXTENDED: 14.5 G/DL (ref 13.5–17.5)
HEMOGLOBIN: 13.5 G/DL (ref 13.5–17.5)
MCH RBC QN AUTO: 27.8 PG (ref 26–34)
MCHC RBC AUTO-ENTMCNC: 32.1 G/DL (ref 31–36)
MCV RBC AUTO: 86.7 FL (ref 80–100)
METHEMOGLOBIN ARTERIAL: 0.3 %
O2 CONTENT ARTERIAL: 19 ML/DL
O2 SAT, ARTERIAL: 95.2 %
O2 THERAPY: ABNORMAL
PCO2 ARTERIAL: 63.9 MMHG (ref 35–45)
PDW BLD-RTO: 19.5 % (ref 12.4–15.4)
PERFORMED ON: ABNORMAL
PH ARTERIAL: 7.36 (ref 7.35–7.45)
PHOSPHORUS: 2.3 MG/DL (ref 2.5–4.9)
PLATELET # BLD: 334 K/UL (ref 135–450)
PMV BLD AUTO: 8.4 FL (ref 5–10.5)
PO2 ARTERIAL: 80.8 MMHG (ref 75–108)
POTASSIUM SERPL-SCNC: 4.6 MMOL/L (ref 3.5–5.1)
POTASSIUM SERPL-SCNC: 5.9 MMOL/L (ref 3.5–5.1)
RBC # BLD: 4.84 M/UL (ref 4.2–5.9)
SODIUM BLD-SCNC: 141 MMOL/L (ref 136–145)
SODIUM BLD-SCNC: 141 MMOL/L (ref 136–145)
TCO2 ARTERIAL: 37.3 MMOL/L
WBC # BLD: 11.8 K/UL (ref 4–11)

## 2021-02-09 PROCEDURE — 6360000002 HC RX W HCPCS: Performed by: INTERNAL MEDICINE

## 2021-02-09 PROCEDURE — 6370000000 HC RX 637 (ALT 250 FOR IP): Performed by: INTERNAL MEDICINE

## 2021-02-09 PROCEDURE — 36415 COLL VENOUS BLD VENIPUNCTURE: CPT

## 2021-02-09 PROCEDURE — 82803 BLOOD GASES ANY COMBINATION: CPT

## 2021-02-09 PROCEDURE — 2060000000 HC ICU INTERMEDIATE R&B

## 2021-02-09 PROCEDURE — 97530 THERAPEUTIC ACTIVITIES: CPT

## 2021-02-09 PROCEDURE — 94761 N-INVAS EAR/PLS OXIMETRY MLT: CPT

## 2021-02-09 PROCEDURE — 36600 WITHDRAWAL OF ARTERIAL BLOOD: CPT

## 2021-02-09 PROCEDURE — 99406 BEHAV CHNG SMOKING 3-10 MIN: CPT | Performed by: INTERNAL MEDICINE

## 2021-02-09 PROCEDURE — 85027 COMPLETE CBC AUTOMATED: CPT

## 2021-02-09 PROCEDURE — 97116 GAIT TRAINING THERAPY: CPT

## 2021-02-09 PROCEDURE — 2700000000 HC OXYGEN THERAPY PER DAY

## 2021-02-09 PROCEDURE — 94660 CPAP INITIATION&MGMT: CPT

## 2021-02-09 PROCEDURE — 97161 PT EVAL LOW COMPLEX 20 MIN: CPT

## 2021-02-09 PROCEDURE — 2580000003 HC RX 258: Performed by: INTERNAL MEDICINE

## 2021-02-09 PROCEDURE — 97535 SELF CARE MNGMENT TRAINING: CPT

## 2021-02-09 PROCEDURE — 80069 RENAL FUNCTION PANEL: CPT

## 2021-02-09 PROCEDURE — 99232 SBSQ HOSP IP/OBS MODERATE 35: CPT | Performed by: INTERNAL MEDICINE

## 2021-02-09 PROCEDURE — 97166 OT EVAL MOD COMPLEX 45 MIN: CPT

## 2021-02-09 PROCEDURE — 99233 SBSQ HOSP IP/OBS HIGH 50: CPT | Performed by: INTERNAL MEDICINE

## 2021-02-09 PROCEDURE — 94640 AIRWAY INHALATION TREATMENT: CPT

## 2021-02-09 RX ORDER — SODIUM POLYSTYRENE SULFONATE 15 G/60ML
15 SUSPENSION ORAL; RECTAL ONCE
Status: COMPLETED | OUTPATIENT
Start: 2021-02-09 | End: 2021-02-09

## 2021-02-09 RX ORDER — INSULIN GLARGINE 100 [IU]/ML
15 INJECTION, SOLUTION SUBCUTANEOUS EVERY MORNING
Status: DISCONTINUED | OUTPATIENT
Start: 2021-02-10 | End: 2021-02-11 | Stop reason: HOSPADM

## 2021-02-09 RX ORDER — SENNA AND DOCUSATE SODIUM 50; 8.6 MG/1; MG/1
2 TABLET, FILM COATED ORAL DAILY PRN
Status: DISCONTINUED | OUTPATIENT
Start: 2021-02-09 | End: 2021-02-11 | Stop reason: HOSPADM

## 2021-02-09 RX ORDER — INSULIN GLARGINE 100 [IU]/ML
5 INJECTION, SOLUTION SUBCUTANEOUS ONCE
Status: COMPLETED | OUTPATIENT
Start: 2021-02-09 | End: 2021-02-09

## 2021-02-09 RX ADMIN — IPRATROPIUM BROMIDE AND ALBUTEROL SULFATE 1 AMPULE: .5; 3 SOLUTION RESPIRATORY (INHALATION) at 15:46

## 2021-02-09 RX ADMIN — INSULIN LISPRO 6 UNITS: 100 INJECTION, SOLUTION INTRAVENOUS; SUBCUTANEOUS at 08:53

## 2021-02-09 RX ADMIN — AZITHROMYCIN 250 MG: 250 TABLET, FILM COATED ORAL at 09:04

## 2021-02-09 RX ADMIN — Medication 10 ML: at 21:02

## 2021-02-09 RX ADMIN — ASPIRIN 81 MG: 81 TABLET, COATED ORAL at 09:05

## 2021-02-09 RX ADMIN — DOCUSATE SODIUM 50 MG AND SENNOSIDES 8.6 MG 2 TABLET: 8.6; 5 TABLET, FILM COATED ORAL at 09:05

## 2021-02-09 RX ADMIN — PREDNISONE 40 MG: 20 TABLET ORAL at 09:12

## 2021-02-09 RX ADMIN — IPRATROPIUM BROMIDE AND ALBUTEROL SULFATE 1 AMPULE: .5; 3 SOLUTION RESPIRATORY (INHALATION) at 07:52

## 2021-02-09 RX ADMIN — DULOXETINE 20 MG: 20 CAPSULE, DELAYED RELEASE ORAL at 21:01

## 2021-02-09 RX ADMIN — ATORVASTATIN CALCIUM 40 MG: 40 TABLET, FILM COATED ORAL at 09:05

## 2021-02-09 RX ADMIN — FUROSEMIDE 40 MG: 10 INJECTION, SOLUTION INTRAMUSCULAR; INTRAVENOUS at 09:09

## 2021-02-09 RX ADMIN — IPRATROPIUM BROMIDE AND ALBUTEROL SULFATE 1 AMPULE: .5; 3 SOLUTION RESPIRATORY (INHALATION) at 18:50

## 2021-02-09 RX ADMIN — POLYETHYLENE GLYCOL (3350) 17 G: 17 POWDER, FOR SOLUTION ORAL at 11:01

## 2021-02-09 RX ADMIN — Medication 2 PUFF: at 18:50

## 2021-02-09 RX ADMIN — MUPIROCIN: 20 OINTMENT TOPICAL at 21:01

## 2021-02-09 RX ADMIN — IPRATROPIUM BROMIDE AND ALBUTEROL SULFATE 1 AMPULE: .5; 3 SOLUTION RESPIRATORY (INHALATION) at 11:28

## 2021-02-09 RX ADMIN — GABAPENTIN 400 MG: 400 CAPSULE ORAL at 14:50

## 2021-02-09 RX ADMIN — DOCUSATE SODIUM 50 MG AND SENNOSIDES 8.6 MG 2 TABLET: 8.6; 5 TABLET, FILM COATED ORAL at 21:01

## 2021-02-09 RX ADMIN — CLOPIDOGREL BISULFATE 75 MG: 75 TABLET ORAL at 09:05

## 2021-02-09 RX ADMIN — MONTELUKAST SODIUM 10 MG: 10 TABLET, COATED ORAL at 21:01

## 2021-02-09 RX ADMIN — INSULIN GLARGINE 10 UNITS: 100 INJECTION, SOLUTION SUBCUTANEOUS at 08:53

## 2021-02-09 RX ADMIN — INSULIN LISPRO 9 UNITS: 100 INJECTION, SOLUTION INTRAVENOUS; SUBCUTANEOUS at 11:09

## 2021-02-09 RX ADMIN — Medication 2 PUFF: at 07:52

## 2021-02-09 RX ADMIN — Medication 10 ML: at 09:09

## 2021-02-09 RX ADMIN — ENOXAPARIN SODIUM 40 MG: 40 INJECTION SUBCUTANEOUS at 09:06

## 2021-02-09 RX ADMIN — SODIUM POLYSTYRENE SULFONATE 15 G: 15 SUSPENSION ORAL; RECTAL at 09:08

## 2021-02-09 RX ADMIN — IPRATROPIUM BROMIDE AND ALBUTEROL SULFATE 1 AMPULE: .5; 3 SOLUTION RESPIRATORY (INHALATION) at 22:37

## 2021-02-09 RX ADMIN — GABAPENTIN 400 MG: 400 CAPSULE ORAL at 21:05

## 2021-02-09 RX ADMIN — GABAPENTIN 400 MG: 400 CAPSULE ORAL at 09:05

## 2021-02-09 RX ADMIN — FENOFIBRATE 54 MG: 54 TABLET ORAL at 09:06

## 2021-02-09 RX ADMIN — INSULIN GLARGINE 5 UNITS: 100 INJECTION, SOLUTION SUBCUTANEOUS at 10:10

## 2021-02-09 RX ADMIN — DULOXETINE 20 MG: 20 CAPSULE, DELAYED RELEASE ORAL at 09:07

## 2021-02-09 RX ADMIN — INSULIN LISPRO 12 UNITS: 100 INJECTION, SOLUTION INTRAVENOUS; SUBCUTANEOUS at 16:16

## 2021-02-09 RX ADMIN — GLIMEPIRIDE 2 MG: 2 TABLET ORAL at 09:03

## 2021-02-09 ASSESSMENT — PAIN - FUNCTIONAL ASSESSMENT: PAIN_FUNCTIONAL_ASSESSMENT: 0-10

## 2021-02-09 ASSESSMENT — PAIN SCALES - GENERAL: PAINLEVEL_OUTOF10: 0

## 2021-02-09 NOTE — PROGRESS NOTES
Inpatient Occupational Therapy  Evaluation and Treatment    Unit: ICU  Date:  2/9/2021  Patient Name:    Eneida Napier  Admitting diagnosis:  Acute on chronic respiratory failure with hypoxia and hypercapnia ([facility] [J96.21, J96.22]  Admit Date:  2/7/2021  Precautions/Restrictions/WB Status/ Lines/ Wounds/ Oxygen:   Bed/chair alarm, Lines -IV, Supplemental O2 (4 L) and Reinoso catheter, Telemetry and Continuous pulse oximetry  Treatment Time:  4251-9773  Treatment Number: 1   Timed code treatment minutes  35 minutes   Total Treatment minutes:  45  minutes    Patient Goals for Therapy:  \"  Go home  \"      Discharge Recommendations: Home assist as needed    DME needs for discharge: possible shower chair         Therapy recommendations for staff:   Assist of 1 with use of No AD for all transfers to/from [facility]  to/from chair    History of Present Illness: Per H&P 37-21   51-year-old male with a past medical history of COPD, CHF, hypertension who presents with shortness of breath. Patient reports that he has been feeling short of breath for the last 2 days. He states that he has oxygen that he wears at nighttime and as needed and has typically worn 3 L but recently bumped it up to 4 L. He reports that this has been helping and he does feel better after using his nebulizer treatments but he will quickly become short of breath again. He reports worsening shortness of breath on exertion and states that just after going to get something to drink by the time he returns to his chair he feels as though his breathing is very labored. He notes chills but denies fever. Reports body aches as well as some left side pain and felt similarly when he had pneumonia in the past so came in for evaluation this evening. Patient reports his typical chronic cough but denies any sputum production. Denies any lower extremity swelling. Patient states that this feels like his COPD more so than heart failure.   Home Health S4 Level Recommendation:  Level 1 Standard  AM-PAC Score: 21    Preadmission Environment    Pt. Lives with spouse    Steps: 2-3  Intent for return to present living arrangements: Yes  Pt. Lives with spouse  Home environment:    one story home  Steps to enter first floor: 1 steps to enter, small step  Steps to second floor: N/A  Bathroom: tub/shower unit and standard height commode  Equipment owned: home O2 (2-3L) PRN, pulse ox and recliner     Preadmission Status:  Pt. Able to drive: No - wife usually drives  Pt Fully independent with ADLs: Yes  Pt. Required assistance from family for: Independent PTA  Pt. independent for transfers and gait and walked with No Device  History of falls No  Pain   No  Location:   Rating: NA /10  Pain Medicine Status: No request mad   Cognition    A&O x4   Able to follow 2 step commands    Subjective  Patient lying supine in bed with no family present. Pt agreeable to this OT eval & tx. Upper Extremity ROM:    WFL    Upper Extremity Strength:    WFL    Upper Extremity Sensation    WFL    Upper Extremity Proprioception:  WFL    Coordination and Tone  WFL    Balance  Functional Sitting Balance:  WFL  Functional Standing Balance:Diminished    Bed mobility:    Supine to sit:   Supervision  Sit to supine:   Not Tested  Rolling:    Not Tested  Scooting in sitting:  Supervision  Scooting to head of bed:   Not Tested    Bridging:   Not Tested    Transfers:    Sit to stand:  CGA  Stand to sit:  CGA  Bed to chair:   [facility]  Standard toilet: Not Tested  Bed to [facility]:  Not Tested    Dressing:      UE:    Min A to don gown due to lines   LE:    Min A to don pull up over feet due to lines     Bathing:    UE:  Not Tested  LE:  Not Tested    Eating:   Not Tested    Toileting:  Not Tested    Activity Tolerance   Pt completed therapy session with decrease in Sp02 to 89%  Pt completed therapy session with Dizziness noted with standing; dizziness resolved quickly   Sitting at EOB:  BP: 141/116  93% SpO2  After bed to chair transfer:  SpO2: 92%  Pulse 84 BPM   BP: 131/84  After standing exercise:  SpO2: 89%, increased to 91% after ~1 min  Pulse 96 BPM     Positioning Needs:   Pt up in chair, alarm set, positioned in proper neutral alignment and pressure relief provided. Exercise / Activities Initiated:   N/A    Patient/Family Education:   Role of OT    Assessment of Deficits: Pt seen for Occupational therapy evaluation in acute care setting. Pt demonstrated decreased Activity tolerance, balance, ADLS . Pt functioning below baseline and will likely benefit from skilled occupational therapy services to maximize safety and independence. Goal(s) : To be met in 3 Visits:  1). Bed to toilet/BSC: Supervision    To be met in 5 Visits:  1). Supine to/from Sit:  Independent  2). Upper Body Bathing:   Independent  3). Lower Body Bathing:   CGA  4). Upper Body Dressing:  Independent  5). Lower Body Dressing:  CGA  6). Pt to demonstrate UE exs x 15 reps with minimal cues    Rehabilitation Potential:  Good for goals listed above. Strengths for achieving goals include: Pt cooperative  Barriers to achieving goals include:  Complexity of condition     Plan: To be seen 3-5 x/wk while in acute care setting for therapeutic exercises, bed mobility, transfers, dressing, bathing, family/patient education, ADL/IADL retraining, energy conservation training.      Dilia Castillo OTR/L [id number]          If patient discharges from this facility prior to next visit, this note will serve as the Discharge Summary

## 2021-02-09 NOTE — PROGRESS NOTES
02/08/21 1944   NIV Type   $NIV $Daily Charge   Skin Assessment Clean, dry, & intact   Skin Protection for O2 Device Yes   Equipment Type V60   Mode Bilevel   Mask Type Full face mask   Mask Size Medium   Settings/Measurements   IPAP 16 cmH20   CPAP/EPAP 6 cmH2O   Rate Ordered 16   Resp 26   FiO2  40 %   Vt Exhaled 602 mL   Mask Leak (lpm) 11 lpm   Comfort Level Good   Using Accessory Muscles No   SpO2 94   Breath Sounds   Right Upper Lobe Diminished   Right Middle Lobe Diminished   Right Lower Lobe Diminished   Left Upper Lobe Diminished   Left Lower Lobe Diminished   Patient Observation   Observations SPO2  94%  on 40%  FIO2  BIPAP.     Alarm Settings   Alarms On Y   Oxygen Therapy/Pulse Ox   SpO2 94 %

## 2021-02-09 NOTE — FLOWSHEET NOTE
02/09/21 0900   Vital Signs   Pulse 90   Resp 22   /68   MAP (mmHg) 85   Oxygen Therapy   SpO2 93 %   Pt resting in bed, vitals per doc flow. Assessment complete see doc flow. NSR 79 on ICU bedside monitor. SPO2 94% on 6L O2 via HFNC with CSPO2 monitoring. Pt A&O x 4. PERRL. PIV x 2 WDL. Reinoso secured draining clear yellow urine. Pt repositioned for comfort. Will continue to closely monitor.

## 2021-02-09 NOTE — PROGRESS NOTES
Admit: 2021    Name:  Ahmet Cabello  Room:  [facility]56  MRN:    [id number]    Critical Care Daily Progress Note for 2021     Interval History:     Transferred to ICU for worsening hypoxia and increasing somnolence. Blood gas showed elevated CO2 at 91.6.   Placed on BiPAP    Currently on 10 L   bipap all night and prn day      Now on 3 L of O2  bipap all night and prn day     Scheduled Meds:   [START ON 2/10/2021] insulin glargine  15 Units Subcutaneous QAM    ipratropium-albuterol  1 ampule Inhalation Q4H While awake    insulin lispro  0-18 Units Subcutaneous TID WC    insulin lispro  0-9 Units Subcutaneous Nightly    sennosides-docusate sodium  2 tablet Oral BID    nicotine  1 patch Transdermal Daily    sodium chloride flush  10 mL Intravenous 2 times per day    enoxaparin  40 mg Subcutaneous Daily    predniSONE  40 mg Oral Daily with breakfast    aspirin  81 mg Oral Daily    atorvastatin  40 mg Oral Daily    vitamin D  50,000 Units Oral Once per day on     clopidogrel  75 mg Oral Daily    DULoxetine  20 mg Oral BID    fenofibrate  54 mg Oral Daily    budesonide-formoterol  2 puff Inhalation BID    gabapentin  400 mg Oral TID    glimepiride  2 mg Oral QAM AC    montelukast  10 mg Oral Nightly    buprenorphine  1 patch Transdermal Weekly    furosemide  40 mg Intravenous Daily    azithromycin  250 mg Oral Daily    mupirocin   Nasal BID       Continuous Infusions:   dextrose         PRN Meds:  polyethylene glycol, albuterol sulfate HFA, glucose, dextrose, glucagon (rDNA), dextrose, sodium chloride flush, promethazine **OR** ondansetron, polyethylene glycol, acetaminophen **OR** acetaminophen                  Objective:     Temp  Av.5 °F (36.4 °C)  Min: 96.6 °F (35.9 °C)  Max: 98.3 °F (36.8 °C)  Pulse  Av.5  Min: 56  Max: 91  BP  Min: 95/60  Max: 151/135  SpO2  Av %  Min: 93 %  Max: 100 %  FiO2   Av.5 %  Min: 10 %  Max: 40 %  Patient Vitals for the past 4 hrs:   BP Temp Temp src Pulse Resp SpO2   02/09/21 0900 123/68 -- -- 90 22 93 %   02/09/21 0859 -- -- -- 91 -- --   02/09/21 0800 (!) 141/79 97.4 °F (36.3 °C) Oral 64 19 94 %   02/09/21 0754 -- -- -- -- 15 95 %   02/09/21 0746 -- -- -- -- 12 96 %   02/09/21 0700 116/76 -- -- 59 16 99 %         Intake/Output Summary (Last 24 hours) at 2/9/2021 1012  Last data filed at 2/9/2021 0900  Gross per 24 hour   Intake 670 ml   Output 3300 ml   Net -2630 ml       Physical Exam:  General appearance: No apparent distress appears stated age and cooperative. HEENT Normal cephalic, atraumatic without obvious deformity. Pupils equal, round, and reactive to light. Extra ocular muscles intact. Conjunctivae/corneas clear. Neck: Supple, No jugular venous distention/bruits. Trachea midline without thyromegaly or adenopathy with full range of motion. Lungs: diminished breath sounds. Heart: Regular rate and rhythm with Normal S1/S2 without murmurs, rubs or gallops, point of maximum impulse non-displaced  Abdomen: Soft, non-tender or non-distended without rigidity or guarding and positive bowel sounds all four quadrants. Extremities: No clubbing, cyanosis, or edema bilaterally. Full range of motion without deformity and normal gait intact. Skin: Skin color, texture, turgor normal.  No rashes or lesions. Neurologic: Alert and oriented X 3, neurovascularly intact with sensory/motor intact upper extremities/lower extremities, bilaterally. Cranial nerves: II-XII intact, grossly non-focal.  Mental status: Alert, oriented, thought content appropriate.   Lab Data:  CBC:   Recent Labs     02/07/21  0031 02/07/21  0419 02/09/21  0407   WBC 14.9* 13.8* 11.8*   RBC 4.78 4.79 4.84   HGB 13.3* 13.4* 13.5   HCT 41.9 42.5 42.0   MCV 87.7 88.6 86.7   RDW 19.4* 19.2* 19.5*    354 334     BMP:   Recent Labs     02/07/21  0031 02/07/21  0419 02/09/21  0408    137 141   K 4.4 5.1 5.9*   CL 98* 96* 99   CO2 31 29 34*   PHOS  --   -- 2.3*   BUN 31* 30* 40*   CREATININE 0.9 1.2 0.9     BNP: No results for input(s): BNP in the last 72 hours. PT/INR: No results for input(s): PROTIME, INR in the last 72 hours. APTT:No results for input(s): APTT in the last 72 hours. CARDIAC ENZYMES:   Recent Labs     02/07/21  0031 02/07/21  0419 02/07/21  0855   TROPONINI <0.01 <0.01 <0.01     FASTING LIPID PANEL:  Lab Results   Component Value Date    HDL 50 03/30/2020     LIVER PROFILE:   Recent Labs     02/07/21  0031 02/07/21  0419   AST 24 37   ALT 30 33   BILITOT 0.3 <0.2   ALKPHOS 68 76      CT a/p no contrast   Moderate stool in colon.  No bowel obstruction.  There is diverticulosis.  No   diverticulitis   bibasilar airspace disease, suspicious for pneumonia     CXR   Perihilar pulmonary vasculature and edema. Bibasilar airspace disease and effusions greatest on left. Follow-up to assure resolution may be beneficial.   ECHO   LV systolic function is normal with EF estimated at 55%. No regional wall motion abnormalities. MIldly asynchronous septal motion. There is mild concentric left ventricular hypertrophy. Grade II diastolic dysfunction with elevated LV filling pressure. Mild mitral annular calcification. The left atrium is mildly dilated. Mild mitral regurgitation. Inadequate tricuspid regurgitation jet to estimate systolic artery pressure   (SPAP). Assessment & Plan:     Patient Active Problem List    Diagnosis Date Noted    Coronary artery disease involving native coronary artery of native heart without angina pectoris     Acute on chronic respiratory failure with hypoxia and hypercapnia ([facility] 02/07/2021    Hypertension 03/30/2020    Other hyperlipidemia 03/30/2020    CHF (congestive heart failure) ([facility] 03/30/2020    COPD exacerbation ([facility] 03/30/2020     COPD with AE  Symbicort. Soumedrol. Combivent  Singulair   Zithro/Rocephin. Pulm consult.    bipap all night, now on 3 L of O2       Acute on Chronic Mixed Hypox and Hypercapnic Resp Failure. 12l/min this am.   ABG concerning for CO2 retention. bipap all night, now on 3 L of O2       Acute diastolic CHF. ECHO 2019 preserved EF. I suspect acute pulm edema triggered by hypoxia related to COPD. He does not appear grossly fluid overloaded  Lasix IV daily per cardiology recs - monitor renal function. ECHO normal EF,diastolic dysfunction      Abd distentiion, constipation. No BM in over 5 days. Chronic on buprenorphine patch. Abd exam - distended, but non tender. CT - moderate stool  No emesis, no nausea. miralax daily  Senna daily prn    Hyperkalemia  Kayexalate   Follow K levels       DMII - stable. ISS.     CAD Hx of  PVD Hx of  ASA and plavix. On statin.         DVT Prophylaxis: lovenox  Diet: DIET CARB CONTROL; Carb Control: 4 carb choices (60 gms)/meal; No Added Salt (3-4 GM);  Daily Fluid Restriction: 1800 ml  Code Status: Full Code        Transfer to PCU      Dale Potts

## 2021-02-09 NOTE — PROGRESS NOTES
[place]   Progress Note  Cardiology    CC: sob    HPI: improved but still w/ SOB. No chest pain    Past Medical History   has a past medical history of CHF (congestive heart failure) ([facility] COPD (chronic obstructive pulmonary disease) ([facility] Hyperlipidemia, and Hypertension. Past Surgical History   has a past surgical history that includes Tonsillectomy and hernia repair. Social History   reports that he has been smoking. He has been smoking about 2.00 packs per day. He has never used smokeless tobacco. He reports current alcohol use. He reports that he does not use drugs. Family History  family history includes Arthritis in his brother; Asthma in his brother, mother, and sister; Cancer in his mother; Diabetes in his sister; Hearing Loss in his mother; High Blood Pressure in his brother and father; High Cholesterol in his father; Vision Loss in his father, mother, and sister. Medications  Prior to Admission medications    Medication Sig Start Date End Date Taking?  Authorizing Provider   buprenorphine (BUPRENEX) 5 MCG/HR 2[facility]  1/20/21  Yes Historical Provider, MD   montelukast (SINGULAIR) 10 MG tablet  1/11/21  Yes Historical Provider, MD   gabapentin (NEURONTIN) 800 MG tablet TAKE 1 TABLET 4 TIMES DAILY 1/26/21 2/25/21 Yes LINK Bullock CNP   DULoxetine (CYMBALTA) 20 MG extended release capsule Take 20 mg by mouth 2 times daily   Yes Historical Provider, MD   metFORMIN (GLUCOPHAGE) 1000 MG tablet Take 1 tablet by mouth 2 times daily (with meals) 12/28/20  Yes Artis Simmonds, MD   glimepiride (AMARYL) 2 MG tablet TAKE 1 TABLET BY MOUTH EVERY MORNING (BEFORE BREAKFAST) DISCONTINUE FARXIGA 10/21/20  Yes LINK Bullock CNP   ipratropium-albuterol (DUONEB) 0.5-2.5 (3) MG/3ML SOLN nebulizer solution TAKE 3 MLS BY NEBULIZATION EVERY 4-6 HOURS AS NEEDED FOR SHORTNESS OF BREATH 10/13/20  Yes LINK Bullock CNP   fenofibric acid (FIBRICOR) 135 MG CPDR capsule TAKE 1 CAPSULE BY MOUTH EVERY DAY 9/28/20  Yes LINK Garrison CNP   atorvastatin (LIPITOR) 40 MG tablet TAKE 1 TABLET BY MOUTH EVERY DAY 9/23/20  Yes LINK Garrison CNP   fluticasone-vilanterol (BREO ELLIPTA) 100-25 MCG/INH AEPB inhaler TAKE 1 PUFF BY MOUTH EVERY DAY 8/31/20  Yes LINK Garrison CNP   Umeclidinium Bromide (INCRUSE ELLIPTA) 62.5 MCG/INH AEPB Take 1 puff by mouth every day 8/31/20  Yes LINK Garrison CNP   Albuterol Sulfate, sensor, (PROAIR DIGIHALER) 108 (90 Base) MCG/ACT AEPB Inhale 2 puffs into the lungs 2 times daily Was given 90mcg on 4/24/20 8/31/20  Yes LINK Garrison CNP   lisinopril (PRINIVIL;ZESTRIL) 20 MG tablet TAKE 1 TABLET BY MOUTH EVERY DAY 8/24/20  Yes LINK Garrison CNP   albuterol sulfate  (90 Base) MCG/ACT inhaler INHALE 2 PUFFS INTO THE LUNGS 2 TIMES DAILY 6/1/20  Yes LINK Garrison CNP   Cholecalciferol (VITAMIN D3) 1.25 MG (49144 UT) CAPS Take 1 capsule by mouth Twice a Week 4/6/20  Yes LINK Garrison CNP   Magnesium 400 MG CAPS Take 1 tablet by mouth daily 4/6/20  Yes LINK Garrison CNP   bisoprolol (ZEBETA) 10 MG tablet Take 1 tablet by mouth daily 4/6/20  Yes LINK Garrison CNP   clopidogrel (PLAVIX) 75 MG tablet Take 1 tablet by mouth daily 4/6/20  Yes LINK Garrison CNP   pioglitazone (ACTOS) 15 MG tablet Take 1 tablet by mouth daily 4/6/20  Yes LINK Garrison CNP   furosemide (LASIX) 20 MG tablet Take 20 mg by mouth daily   Yes Historical Provider, MD   aspirin 81 MG EC tablet Take 81 mg by mouth daily   Yes Historical Provider, MD   Cyanocobalamin 1000 MCG CAPS Take 1 tablet by mouth daily   Yes Historical Provider, MD   [place]-3 1000 MG CAPS Take 1 capsule by mouth daily   Yes Historical Provider, MD   Coenzyme Q10 (COQ10) 100 MG CAPS Take 1 capsule by mouth daily   Yes Historical Provider, MD   MULTIPLE VITAMINS PO Take by mouth   Yes Historical Provider, MD   blood glucose monitor Soft, non tender, normal bowel sounds                Extremities: No cyanosis, +edema   Pulses: 2+ and symmetric   Skin: Skin color, texture, turgor normal, no rashes    Pysch: Normal mood and affect   Neurologic: Normal gross motor and sensory exam.      Echo reviewed, EF 55%    Assessment:    Acute on chronic respiratory failure with hypoxia and hypercapnia - still requiring high O2  Coronary artery disease - stable w/o angina  Acute dCHF - improving w/ diuresis. Cr stable. BUN up due to steroids  HLD - labs reviewed.  Well controlled  COPD    Plan  Cont diuresis IV lasix  Monitor labs and weights daily  ASA, statin  Consider add BBlk when COPD stable    Cecily Goyal MD, 2/9/2021 9:34 AM

## 2021-02-09 NOTE — PROGRESS NOTES
Inpatient Physical Therapy Evaluation and Treatment    Unit: ICU  Date:  2/9/2021  Patient Name:    Shanice Mccall  Admitting diagnosis:  Acute on chronic respiratory failure with hypoxia and hypercapnia ([facility] [J96.21, J96.22]  Admit Date:  2/7/2021  Precautions/Restrictions/WB Status/ Lines/ Wounds/ Oxygen: Bed/chair alarm, Lines -IV, Supplemental O2 (4 L) and Reinoso catheter, Telemetry and Continuous pulse oximetry    Treatment Time:  8715-1509  Treatment Number:  1   Timed Code Treatment Minutes: 33 minutes  Total Treatment Minutes:  43  minutes    Patient Goals for Therapy: \" To eventually go back home \"          Discharge Recommendations: Home PRN assist   DME needs for discharge: Needs Met       Therapy recommendation for EMS Transport: can transport by wheelchair    Therapy recommendations for staff:   Assist of 1 with use of gait belt for all transfers and ambulation to/from [facility]  to/from chair    History of Present Illness:   From Dr. Arnaldo Doll MD on 2/7/21:  The patient is a 50 y.o. male w Hx of DMII, PVD s/p prior stenting (x2 R leg, x1 L leg), Hx of CAD, COPD, chronic hypox RF, CHF, who presents with SOB.      Pt is a very poor historian. He reports he feels well this morning - he is on 12l/min O2 via NC. He is on 3-4l/min at baseline.      He reports his wife thought his breathing was worse over the past few days and told him to go to the ED.      He denies chest pain per say - reports intermittent left lateral chest wall pain over the past few days. Not much cough or sputum. No pleurisy.      He is chronically on buprenorphine patch - reports for severe diabetic neuropathy.      His imaging (CXR) is more consistent with CHF.      His ABG is concerning for COPD with decompensated acute hypox and hypercapnic resp failure.         Home Health S4 Level Recommendation:  NA  AM-PAC Mobility Score    AM-PAC Inpatient Mobility Raw Score : 21        Preadmission Environment   Living Arrangements: home with spouse Steps: 2-3  Intent for return to present living arrangements: Yes  Identified Issues: no   Pt. Lives with spouse  Home environment:  one story home  Steps to enter first floor: 1 steps to enter, small step  Steps to second floor: N/A  Bathroom: tub/shower unit and standard height commode  Equipment owned: home O2 (2-3L) PRN, pulse ox and recliner    Preadmission Status:  Pt. Able to drive: No - wife usually drives  Pt Fully independent with ADLs: Yes  Pt. Required assistance from family for: Independent PTA  Pt. independent for transfers and gait and walked with No Device  History of falls No    Pain   No  Location:   Rating: NA /10  Pain Medicine Status: No request made    Cognition    A&O x4   Able to follow 2 step commands    Subjective  Patient lying supine in bed with no family present. Pt agreeable to this PT eval & tx. Upper Extremity ROM/Strength  Please see OT evaluation. Lower Extremity ROM / Strength   AROM WFL: Yes  ROM limitations:     Strength Assessment (measured on a 0-5 scale):  R LE   Quad   5   Ant Tib  5   Hamstring 5   Iliopsoas 5  L LE  Quad   5   Ant Tib  5   Hamstring 5   Iliopsoas 5    Lower Extremity Sensation    WNL    Lower Extremity Proprioception:   WNL    Coordination and Tone  WNL    Balance  Sitting:  Normal; Modified Independent  Comments: Pt maintained balance while sitting at edge of chair to don new gown and underwear. Standing: Good ; Supervision  Comments: Pt able to maintain balance while standing to pull up underwear.     Bed Mobility   Supine to Sit:    Modified Independent  Sit to Supine:   Not Tested  Rolling:   Modified Independent  Scooting in sitting: Modified Independent  Scooting in supine:  Not Tested    Transfer Training     Sit to stand:   Supervision  Stand to sit:   Supervision  Bed to Chair:   SBA with use of gait belt    Gait gait completed as indicated below  Distance:      6 ft  Deviations (firm surface/linoleum):  none  Assistive Device Used: gait belt  Level of Assist:    CGA  Comment: walking distance limited by length of pt's lines and tubes    Stair Training deferred, pt unsafe/ not appropriate to complete stairs at this time  # of Steps:   N/A  Level of Assist:  Not Tested  UE Support:  NA  Assistive Device:  N/A  Pattern:   N/A  Comments:     Activity Tolerance   Pt completed therapy session with Dizziness noted with standing; dizziness resolved quickly   Sitting at EOB:  BP: 141/116  93% SpO2  After bed to chair transfer:  SpO2: 92%  Pulse 84 BPM   BP: 131/84  After standing exercise:  SpO2: 89%, increased to 91% after ~1 min  Pulse 96 BPM    Positioning Needs   Pt up in chair, alarm set, positioned in proper neutral alignment and pressure relief provided. Call light provided and all needs within reach    Exercises Initiated  Rhonda deferred secondary to treatment focus on functional mobility  marching x 10 reps   Standing 2 x 1-2 min    Other  None. Patient/Family Education   Pt educated on role of inpatient PT, POC, importance of continued activity, DC recommendations, safety awareness, pacing activity and calling for assist with mobility. Assessment  Pt seen for Physical Therapy evaluation in acute care setting. Pt demonstrated decreased Activity tolerance as well as decreased independence with Ambulation and Transfers. Recommending Home PRN assist upon discharge as patient functioning close to baseline level    Goals : To be met in 3 visits:  1). Independent with LE Ex x 10 reps    To be met in 6 visits:  1). Supine to/from sit: Independent  2). Sit to/from stand: Independent  3). Bed to chair: Independent  4). Gait: Ambulate 50 ft.  with  Supervision and use of gait belt  5). Tolerate B LE exercises 3 sets of 10-15 reps  6).   Ascend/descend 1 steps with Supervision with use of no handrail and gait belt    Rehabilitation Potential: Good  Strengths for achieving goals include:   Pt motivated, PLOF, Family Support and Pt

## 2021-02-09 NOTE — FLOWSHEET NOTE
02/09/21 1100   Vital Signs   Temp 97.6 °F (36.4 °C)   Temp Source Axillary   Pulse 72   Resp 17   /81   MAP (mmHg) 90   Level of Consciousness Alert (0)   MEWS Score 1   Oxygen Therapy   SpO2 93 %   Pt reassessed, see doc flow. NSR 70 on ICU bedside monitor. SPO2 94% on 3L O2 via HFNC with CSPO2 monitoring. Pt A&O x 4. PERRL. PIV x 2 WDL. Reinoso secured draining clear yellow urine. Pt given prn miralax per MD order see MAR. Pt repositioned for comfort. Will continue to closely monitor.

## 2021-02-09 NOTE — PROGRESS NOTES
Dr. Kandi Albright @ bedside assessing pt for interdisciplinary rounds. All labs lines assessment POC,  No BM & K reviewed. Per MD pt to have 1400 BMP lantus to be increased to 15 units w/ one time dose 5 units, PT/OT, pt to use BIPAP prn during day and HS, ok to transfer to PCU. See new orders.

## 2021-02-09 NOTE — PROGRESS NOTES
Pulmonary & Critical Care Medicine ICU Progress Note    CC: COPD    Events of Last 24 hours:   O2 weaned to 4lpm  On Bipap for for most of the day yesterday    Vitals:  BP (!) 141/79   Pulse 64   Temp 97.4 °F (36.3 °C) (Oral)   Resp 19   Ht 4' 11\" (1.499 m)   Wt 189 lb 3.2 oz (85.8 kg)   SpO2 94%   BMI 38.21 kg/m²   on 4lpm  Constitutional:  No acute distress. Eyes: PERRL. Conjunctivae anicteric. ENT: Normal nose. Normal tongue. Neck:  Trachea is midline. No thyroid tenderness. Respiratory: No accessory muscle usage. Decreased breath sounds. No wheezes. No rales. No Rhonchi. Cardiovascular: Normal S1S2. No digit clubbing. No digit cyanosis. No LE edema. Psychiatric: No anxiety or Agitation. Alert and Oriented to person, place and time.     Scheduled Meds:   ipratropium-albuterol  1 ampule Inhalation Q4H While awake    insulin lispro  0-18 Units Subcutaneous TID WC    insulin lispro  0-9 Units Subcutaneous Nightly    insulin glargine  10 Units Subcutaneous QAM    sennosides-docusate sodium  2 tablet Oral BID    nicotine  1 patch Transdermal Daily    sodium chloride flush  10 mL Intravenous 2 times per day    enoxaparin  40 mg Subcutaneous Daily    predniSONE  40 mg Oral Daily with breakfast    aspirin  81 mg Oral Daily    atorvastatin  40 mg Oral Daily    vitamin D  50,000 Units Oral Once per day on Mon Thu    clopidogrel  75 mg Oral Daily    DULoxetine  20 mg Oral BID    fenofibrate  54 mg Oral Daily    budesonide-formoterol  2 puff Inhalation BID    gabapentin  400 mg Oral TID    glimepiride  2 mg Oral QAM AC    montelukast  10 mg Oral Nightly    buprenorphine  1 patch Transdermal Weekly    furosemide  40 mg Intravenous Daily    azithromycin  250 mg Oral Daily    mupirocin   Nasal BID     PRN Meds:  polyethylene glycol, albuterol sulfate HFA, glucose, dextrose, glucagon (rDNA), dextrose, sodium chloride flush, promethazine **OR** ondansetron, polyethylene glycol, acetaminophen **OR** acetaminophen    Results:  CBC:   Recent Labs     02/07/21  0031 02/07/21 0419 02/09/21  0407   WBC 14.9* 13.8* 11.8*   HGB 13.3* 13.4* 13.5   HCT 41.9 42.5 42.0   MCV 87.7 88.6 86.7    354 334     BMP:   Recent Labs     02/07/21 0031 02/07/21 0419 02/09/21  0408    137 141   K 4.4 5.1 5.9*   CL 98* 96* 99   CO2 31 29 34*   PHOS  --   --  2.3*   BUN 31* 30* 40*   CREATININE 0.9 1.2 0.9     LIVER PROFILE:   Recent Labs     02/07/21 0031 02/07/21 0419   AST 24 37   ALT 30 33   BILITOT 0.3 <0.2   ALKPHOS 68 76     Micro:  2/7/2021 SARS-CoV-2 NAAT negative  1/2 blood cx gram +     Imaging:  Chest imaging was reviewed by me and showed   2/7/2021 CXR bilateral interstitial edema, possible left effusion     ASSESSMENT:  · Acute on chronic hypoxemic and hypercapnic respiratory failure  · COPD with exacerbation (followed in [place])  · Acute on chronic diastolic CHF  · Chronic pain on buprenorphine patch  · H/O CAD, PVD  · Tobacco abuse     PLAN:  · Bilevel non-invasive positive pressure ventilation to PRN and QHS  · Repeat K after kayexalate  · IV solumedrol - change to pred taper tomorrow  · Inhaled bronchodilators   · Azithromycin D#3  · Echo pending, lasix, cardiology following  · We discussed smoking cessation for >3 minutes. We discussed the association of smoking with the patient's current symptoms and the risks of continued use and the options for achieving cessation. Nicotine patch in place. Pt would like to continue smoking.   · Inc Lantus to 15u  · Ok to transfer out of the ICU

## 2021-02-09 NOTE — PROGRESS NOTES
This note also relates to the following rows which could not be included:  SpO2 - Cannot attach notes to unvalidated device data       02/08/21 2310   NIV Type   Equipment Type V60   Mode Bilevel   Mask Type Full face mask   Mask Size Medium   Settings/Measurements   IPAP 16 cmH20   CPAP/EPAP 6 cmH2O   Rate Ordered 16   Resp 16   FiO2  40 %   Vt Exhaled 613 mL   Mask Leak (lpm) 31 lpm   Comfort Level Good   Using Accessory Muscles No   SpO2 96   Breath Sounds   Right Upper Lobe Diminished   Right Middle Lobe Diminished   Right Lower Lobe Diminished   Left Upper Lobe Diminished   Left Lower Lobe Diminished   Patient Observation   Observations SPO2  96%  on 40%  FIO2  BIPAP. Alarm Settings   Alarms On Y        02/08/21 2310   NIV Type   Equipment Type V60   Mode Bilevel   Mask Type Full face mask   Mask Size Medium   Settings/Measurements   IPAP 16 cmH20   CPAP/EPAP 6 cmH2O   Rate Ordered 16   Resp 16   FiO2  40 %   Vt Exhaled 613 mL   Mask Leak (lpm) 31 lpm   Comfort Level Good   Using Accessory Muscles No   SpO2 96   Breath Sounds   Right Upper Lobe Diminished   Right Middle Lobe Diminished   Right Lower Lobe Diminished   Left Upper Lobe Diminished   Left Lower Lobe Diminished   Patient Observation   Observations SPO2  96%  on 40%  FIO2  BIPAP. Alarm Settings   Alarms On Y        02/08/21 2310   NIV Type   Equipment Type V60   Mode Bilevel   Mask Type Full face mask   Mask Size Medium   Settings/Measurements   IPAP 16 cmH20   CPAP/EPAP 6 cmH2O   Rate Ordered 16   Resp 16   FiO2  40 %   Vt Exhaled 613 mL   Mask Leak (lpm) 31 lpm   Comfort Level Good   Using Accessory Muscles No   SpO2 96   Breath Sounds   Right Upper Lobe Diminished   Right Middle Lobe Diminished   Right Lower Lobe Diminished   Left Upper Lobe Diminished   Left Lower Lobe Diminished   Patient Observation   Observations SPO2  96%  on 40%  FIO2  BIPAP.     Alarm Settings   Alarms On Y 02/08/21 2310   NIV Type   Equipment Type V60   Mode Bilevel   Mask Type Full face mask   Mask Size Medium   Settings/Measurements   IPAP 16 cmH20   CPAP/EPAP 6 cmH2O   Rate Ordered 16   Resp 16   FiO2  40 %   Vt Exhaled 613 mL   Mask Leak (lpm) 31 lpm   Comfort Level Good   Using Accessory Muscles No   SpO2 96   Breath Sounds   Right Upper Lobe Diminished   Right Middle Lobe Diminished   Right Lower Lobe Diminished   Left Upper Lobe Diminished   Left Lower Lobe Diminished   Patient Observation   Observations SPO2  96%  on 40%  FIO2  BIPAP. Alarm Settings   Alarms On Y        02/08/21 2310   NIV Type   Equipment Type V60   Mode Bilevel   Mask Type Full face mask   Mask Size Medium   Settings/Measurements   IPAP 16 cmH20   CPAP/EPAP 6 cmH2O   Rate Ordered 16   Resp 16   FiO2  40 %   Vt Exhaled 613 mL   Mask Leak (lpm) 31 lpm   Comfort Level Good   Using Accessory Muscles No   SpO2 96   Breath Sounds   Right Upper Lobe Diminished   Right Middle Lobe Diminished   Right Lower Lobe Diminished   Left Upper Lobe Diminished   Left Lower Lobe Diminished   Patient Observation   Observations SPO2  96%  on 40%  FIO2  BIPAP.     Alarm Settings   Alarms On Y

## 2021-02-09 NOTE — PROGRESS NOTES
Pt sitting up in recliner, call light within reach. Patient is able to demonstrate the ability to move from a reclining position to an upright position within the recliner.

## 2021-02-09 NOTE — FLOWSHEET NOTE
02/08/21 1900   Vital Signs   Pulse 65   Resp 21   /67   MAP (mmHg) 81   Oxygen Therapy   SpO2 99 %   Bedside report and transfer of care given to [facility], [address] [place] [address]. Pt currently resting in bed with the call light within reach. On BiPAP vitals shown above. Pt tolerating well. Pt denies any other care needs at this time. Pt stable on transfer of care.     Ventura Clark RN

## 2021-02-09 NOTE — FLOWSHEET NOTE
Pt was able to be awoken easily this shift, Pt wore Bipap all night tonight and had adequate saturations, Pt did not complain of pain or discomfort, independent, pt did not have a BM this shift but did take sennakot for constipation, Pt NSR, afebrile, no ectopy, Pt has a brief amount of bloody sediment in vasquez container     02/08/21 2000   Vitals   Temp 98.3 °F (36.8 °C)   Temp Source Axillary   Pulse 63   Resp 17   /88   MAP (mmHg) 96   Oxygen Therapy   SpO2 95 %   Skin Assessment Clean, dry, & intact  (Heel and sacrum)   Pain Assessment   RASS Score 0   Pain Level 0   Patient Observation   Observations pt on bipap for night, saturating well       02/09/21 0400   Assessment   Charting Type Shift assessment   Neurological   Neuro (WDL) WDL   Level of Consciousness Alert (0)   HEENT   HEENT (WDL) X  (impaired vision)   Right Eye Impaired vision   Left Eye Impaired vision   Teeth Dentures upper   Respiratory   Respiratory (WDL) X  (pt sob, 80s on RA, 95 on 3L)   Respiratory Pattern Regular   Respiratory Depth Normal   Respiratory Quality/Effort Unlabored   Chest Assessment Chest expansion symmetrical   L Breath Sounds Diminished;Crackles   R Breath Sounds Diminished;Crackles   Breath Sounds   Right Upper Lobe Diminished   Right Middle Lobe Diminished   Right Lower Lobe Diminished   Left Upper Lobe Diminished   Cough/Sputum   Cough Non-productive   Cardiac   Cardiac (WDL) WDL   Cardiac Regularity Regular   Cardiac Rhythm NSR   Rhythm Interpretation   Pulse 63   Cardiac Monitor   Telemetry Monitor On Yes   Telemetry Audible Yes   Telemetry Alarms Set Yes   Telemetry Box Number 41   Gastrointestinal   Abdominal (WDL) X   Abdomen Inspection Rounded;Soft;Distended   Tenderness Soft;Nontender; No guarding   RUQ Bowel Sounds Active   LUQ Bowel Sounds Active   RLQ Bowel Sounds Active   LLQ Bowel Sounds Active   Peripheral Vascular   Peripheral Vascular (WDL) X   Edema Right lower extremity; Left lower extremity   RLE Edema +1;Pitting   LLE Edema +1;Pitting   Skin Color/Condition   Skin Color/Condition (WDL) WDL   Skin Integrity   Skin Integrity (WDL) WDL   Musculoskeletal   Musculoskeletal (WDL) X  (gen weakness r/t SOB)   Genitourinary   Genitourinary (WDL) X  (vasquez)   Flank Tenderness No   Suprapubic Tenderness No   Dysuria ZAC   Anus/Rectum   Anus/Rectum (WDL) WDL   Urethral Catheter Non-latex 16 fr   Placement Date/Time: 02/07/21 1645   Urethral Catheter Timeout: Procedure;Site/Side;Appropriate Equipment; Availability of Implant; Correct Position  Inserted by: Rachel Mccracken RN  Catheter Type: Non-latex  Tube Size (fr): 16 fr  Catheter Balloon Size: 10 ...    Urine Color Yellow   Urine Appearance Sediment  (slight hematuria)   Output (mL) 200 mL   Psychosocial   Psychosocial (WDL) WDL   Pain Assessment   RASS Score 0

## 2021-02-09 NOTE — PROGRESS NOTES
02/09/21 0249   NIV Type   Mode Bilevel   Mask Type Full face mask   Mask Size Medium   Settings/Measurements   IPAP 16 cmH20   CPAP/EPAP 6 cmH2O   Rate Ordered 16   Resp 16   FiO2  40 %   Vt Exhaled 745 mL   Comfort Level Good   SpO2 98

## 2021-02-09 NOTE — PROGRESS NOTES
4 Eyes Skin Assessment     The patient is being assess for   Transfer to New Unit    I agree that 2 RN's have performed a thorough Head to Toe Skin Assessment on the patient. ALL assessment sites listed below have been assessed. Areas assessed by both nurses:   [x]   Head, Face, and Ears   [x]   Shoulders, Back, and Chest, Abdomen  [x]   Arms, Elbows, and Hands   [x]   Coccyx, Sacrum, and Ischium  [x]   Legs, Feet, and Heels        WDL    **SHARE this note so that the co-signing nurse is able to place an eSignature**    Co-signer eSignature: Electronically signed by Billy Garcia RN on 2/9/21 at 4:32 PM EST    Does the Patient have Skin Breakdown?   No          Scott Prevention initiated:  No   Wound Care Orders initiated:  No      [facility] nurse consulted for Pressure Injury (Stage 3,4, Unstageable, DTI, NWPT, Complex wounds)and New or Established Ostomies:  NA      Primary Nurse eSignature: Electronically signed by Elpidio Aguilera RN on 2/9/21 at 3:40 PM EST

## 2021-02-09 NOTE — FLOWSHEET NOTE
02/09/21 1735   Vital Signs   Temp 97.3 °F (36.3 °C)   Temp Source Oral   Pulse 84   Heart Rate Source Monitor   Resp 17   /71   BP Location Left lower arm   Patient Position Semi fowlers   Level of Consciousness Alert (0)   MEWS Score 1   Patient Currently in Pain Denies   Oxygen Therapy   SpO2 94 %   O2 Device High flow nasal cannula   O2 Flow Rate (L/min) 4 L/min   Pt transferred to PCU. Oriented to room and call light system. Patient is not able to demonstrated the ability to move from a reclining position to an upright position within the recliner. however patient is alert, oriented and able to provide informed consent     4eyes assessment completed in ICU prior to transfer. Pt alert and oriented. Pt denies any other care needs at this time. Call light within reach.  Pt stable at this time    Jeri Stockton RN

## 2021-02-10 LAB
BASE EXCESS ARTERIAL: 8.9 MMOL/L (ref -3–3)
CARBOXYHEMOGLOBIN ARTERIAL: 0.8 % (ref 0–1.5)
GLUCOSE BLD-MCNC: 197 MG/DL (ref 70–99)
GLUCOSE BLD-MCNC: 205 MG/DL (ref 70–99)
GLUCOSE BLD-MCNC: 244 MG/DL (ref 70–99)
GLUCOSE BLD-MCNC: 291 MG/DL (ref 70–99)
HCO3 ARTERIAL: 36.1 MMOL/L (ref 21–29)
HEMOGLOBIN, ART, EXTENDED: 14.6 G/DL (ref 13.5–17.5)
METHEMOGLOBIN ARTERIAL: 0.3 %
O2 CONTENT ARTERIAL: 19 ML/DL
O2 SAT, ARTERIAL: 92.4 %
O2 THERAPY: ABNORMAL
PCO2 ARTERIAL: 60 MMHG (ref 35–45)
PERFORMED ON: ABNORMAL
PH ARTERIAL: 7.4 (ref 7.35–7.45)
PO2 ARTERIAL: 65.8 MMHG (ref 75–108)
TCO2 ARTERIAL: 37.9 MMOL/L

## 2021-02-10 PROCEDURE — 6370000000 HC RX 637 (ALT 250 FOR IP): Performed by: INTERNAL MEDICINE

## 2021-02-10 PROCEDURE — 2700000000 HC OXYGEN THERAPY PER DAY

## 2021-02-10 PROCEDURE — 94761 N-INVAS EAR/PLS OXIMETRY MLT: CPT

## 2021-02-10 PROCEDURE — 99233 SBSQ HOSP IP/OBS HIGH 50: CPT | Performed by: INTERNAL MEDICINE

## 2021-02-10 PROCEDURE — 94640 AIRWAY INHALATION TREATMENT: CPT

## 2021-02-10 PROCEDURE — 97116 GAIT TRAINING THERAPY: CPT

## 2021-02-10 PROCEDURE — 2580000003 HC RX 258: Performed by: INTERNAL MEDICINE

## 2021-02-10 PROCEDURE — 99232 SBSQ HOSP IP/OBS MODERATE 35: CPT | Performed by: INTERNAL MEDICINE

## 2021-02-10 PROCEDURE — 6360000002 HC RX W HCPCS: Performed by: INTERNAL MEDICINE

## 2021-02-10 PROCEDURE — 82803 BLOOD GASES ANY COMBINATION: CPT

## 2021-02-10 PROCEDURE — 2060000000 HC ICU INTERMEDIATE R&B

## 2021-02-10 PROCEDURE — 97530 THERAPEUTIC ACTIVITIES: CPT

## 2021-02-10 RX ADMIN — INSULIN GLARGINE 15 UNITS: 100 INJECTION, SOLUTION SUBCUTANEOUS at 08:42

## 2021-02-10 RX ADMIN — MONTELUKAST SODIUM 10 MG: 10 TABLET, COATED ORAL at 19:57

## 2021-02-10 RX ADMIN — DULOXETINE 20 MG: 20 CAPSULE, DELAYED RELEASE ORAL at 19:57

## 2021-02-10 RX ADMIN — GABAPENTIN 400 MG: 400 CAPSULE ORAL at 14:08

## 2021-02-10 RX ADMIN — FUROSEMIDE 40 MG: 10 INJECTION, SOLUTION INTRAMUSCULAR; INTRAVENOUS at 08:35

## 2021-02-10 RX ADMIN — Medication 2 PUFF: at 07:19

## 2021-02-10 RX ADMIN — Medication 10 ML: at 08:36

## 2021-02-10 RX ADMIN — MUPIROCIN: 20 OINTMENT TOPICAL at 19:57

## 2021-02-10 RX ADMIN — AZITHROMYCIN 250 MG: 250 TABLET, FILM COATED ORAL at 08:35

## 2021-02-10 RX ADMIN — IPRATROPIUM BROMIDE AND ALBUTEROL SULFATE 1 AMPULE: .5; 3 SOLUTION RESPIRATORY (INHALATION) at 19:56

## 2021-02-10 RX ADMIN — IPRATROPIUM BROMIDE AND ALBUTEROL SULFATE 1 AMPULE: .5; 3 SOLUTION RESPIRATORY (INHALATION) at 11:27

## 2021-02-10 RX ADMIN — Medication 10 ML: at 19:58

## 2021-02-10 RX ADMIN — IPRATROPIUM BROMIDE AND ALBUTEROL SULFATE 1 AMPULE: .5; 3 SOLUTION RESPIRATORY (INHALATION) at 15:47

## 2021-02-10 RX ADMIN — DULOXETINE 20 MG: 20 CAPSULE, DELAYED RELEASE ORAL at 08:35

## 2021-02-10 RX ADMIN — GABAPENTIN 400 MG: 400 CAPSULE ORAL at 08:35

## 2021-02-10 RX ADMIN — FENOFIBRATE 54 MG: 54 TABLET ORAL at 08:34

## 2021-02-10 RX ADMIN — DOCUSATE SODIUM 50 MG AND SENNOSIDES 8.6 MG 2 TABLET: 8.6; 5 TABLET, FILM COATED ORAL at 08:34

## 2021-02-10 RX ADMIN — ATORVASTATIN CALCIUM 40 MG: 40 TABLET, FILM COATED ORAL at 08:35

## 2021-02-10 RX ADMIN — INSULIN LISPRO 6 UNITS: 100 INJECTION, SOLUTION INTRAVENOUS; SUBCUTANEOUS at 12:05

## 2021-02-10 RX ADMIN — INSULIN LISPRO 6 UNITS: 100 INJECTION, SOLUTION INTRAVENOUS; SUBCUTANEOUS at 17:35

## 2021-02-10 RX ADMIN — Medication 2 PUFF: at 19:56

## 2021-02-10 RX ADMIN — CLOPIDOGREL BISULFATE 75 MG: 75 TABLET ORAL at 08:36

## 2021-02-10 RX ADMIN — ENOXAPARIN SODIUM 40 MG: 40 INJECTION SUBCUTANEOUS at 08:34

## 2021-02-10 RX ADMIN — GABAPENTIN 400 MG: 400 CAPSULE ORAL at 19:57

## 2021-02-10 RX ADMIN — DOCUSATE SODIUM 50 MG AND SENNOSIDES 8.6 MG 2 TABLET: 8.6; 5 TABLET, FILM COATED ORAL at 19:57

## 2021-02-10 RX ADMIN — IPRATROPIUM BROMIDE AND ALBUTEROL SULFATE 1 AMPULE: .5; 3 SOLUTION RESPIRATORY (INHALATION) at 07:19

## 2021-02-10 RX ADMIN — PREDNISONE 40 MG: 20 TABLET ORAL at 08:35

## 2021-02-10 RX ADMIN — GLIMEPIRIDE 2 MG: 2 TABLET ORAL at 08:36

## 2021-02-10 RX ADMIN — INSULIN LISPRO 3 UNITS: 100 INJECTION, SOLUTION INTRAVENOUS; SUBCUTANEOUS at 08:42

## 2021-02-10 RX ADMIN — MUPIROCIN: 20 OINTMENT TOPICAL at 08:41

## 2021-02-10 RX ADMIN — ASPIRIN 81 MG: 81 TABLET, COATED ORAL at 08:35

## 2021-02-10 NOTE — PROGRESS NOTES
Patient resting quietly in bed. No s/s of distress noted. Shift assessment complete, see flow sheet. Currently on 4L O2. Denies needs at this time. Call light in reach. Will monitor.

## 2021-02-10 NOTE — FLOWSHEET NOTE
02/09/21 1916   Vital Signs   Temp 98.7 °F (37.1 °C)   Temp Source Oral   Pulse 92   Heart Rate Source Monitor   Resp 18   BP (!) 91/56  (low b/p)   BP Location Left lower arm   Patient Position Semi fowlers   Level of Consciousness Alert (0)   MEWS Score 2   Oxygen Therapy   SpO2 93 %   O2 Device High flow nasal cannula   O2 Flow Rate (L/min) 4 L/min   Pt. Resting in bed. Call light in reach. Shift assessment completed see flow sheet. Denies any needs at this time. Will continue to monitor.

## 2021-02-10 NOTE — PROGRESS NOTES
[facility] Daily Progress Note      Admit Date:  2/7/2021    Subjective:  Mr. Sally Solis is being seen today for f/u diastolic CHF. He feel \"great\" today and denies any specific complaints.  Tele reviewed showing NSR 85bpm    Objective:   /84   Pulse 82   Temp 97.6 °F (36.4 °C) (Oral)   Resp 18   Ht 4' 11\" (1.499 m)   Wt 181 lb 3.2 oz (82.2 kg)   SpO2 90%   BMI 36.60 kg/m²       Intake/Output Summary (Last 24 hours) at 2/10/2021 6774  Last data filed at 2/10/2021 0559  Gross per 24 hour   Intake 790 ml   Output 2900 ml   Net -2110 ml       TELEMETRY: Sinus 85bpm    Physical Exam:  General:  Awake, alert, NAD  Skin:  Warm and dry  Neck:  JVD none obvious  Chest:  +soft expiratory wheezing and soft crackles bases  Cardiovascular:  RRR S1S2  Abdomen:  Soft NT  Extremities:  No edema    Medications:    insulin glargine  15 Units Subcutaneous QAM    ipratropium-albuterol  1 ampule Inhalation Q4H While awake    insulin lispro  0-18 Units Subcutaneous TID WC    insulin lispro  0-9 Units Subcutaneous Nightly    sennosides-docusate sodium  2 tablet Oral BID    nicotine  1 patch Transdermal Daily    sodium chloride flush  10 mL Intravenous 2 times per day    enoxaparin  40 mg Subcutaneous Daily    predniSONE  40 mg Oral Daily with breakfast    aspirin  81 mg Oral Daily    atorvastatin  40 mg Oral Daily    vitamin D  50,000 Units Oral Once per day on Mon Thu    clopidogrel  75 mg Oral Daily    DULoxetine  20 mg Oral BID    fenofibrate  54 mg Oral Daily    budesonide-formoterol  2 puff Inhalation BID    gabapentin  400 mg Oral TID    glimepiride  2 mg Oral QAM AC    montelukast  10 mg Oral Nightly    buprenorphine  1 patch Transdermal Weekly    furosemide  40 mg Intravenous Daily    azithromycin  250 mg Oral Daily    mupirocin   Nasal BID      dextrose       sennosides-docusate sodium, polyethylene glycol, albuterol sulfate HFA, glucose, dextrose, glucagon (rDNA), dextrose, sodium chloride flush, promethazine **OR** ondansetron, polyethylene glycol, acetaminophen **OR** acetaminophen    Lab Data:  CBC:   Recent Labs     02/09/21  0407   WBC 11.8*   HGB 13.5   HCT 42.0   MCV 86.7        BMP:   Recent Labs     02/09/21  0408 02/09/21  1427    141   K 5.9* 4.6   CL 99 96*   CO2 34* 38*   PHOS 2.3*  --    BUN 40* 39*   CREATININE 0.9 1.2     LIVER PROFILE:   No results for input(s): AST, ALT, LIPASE, BILIDIR, BILITOT, ALKPHOS in the last 72 hours. Invalid input(s): AMYLASE,  ALB  EKG 2/7/21  Normal sinus rhythmRight axis deviationRight ventricular hypertrophyAbnormal ECGNo previous ECGs availableConfirmed by LEIDY HERNANDEZ, [address] ([id number]) on 2/7/2021 7:46:48 AM    CXR 2/7/21  FINDINGS: Cardiomegaly. Perihilar pulmonary vascular congestion. .  Increased interstitial opacities throughout both lungs suggestive of interstitial edema. Bibasilar airspace opacities and small pleural effusions. No pneumothorax. ECHO 11/19/19  LVH, LVDD, Nml EF    ECHO 2/7/21 Summary   LV systolic function is normal with EF estimated at 55%. No regional wall motion abnormalities. MIldly asynchronous septal motion. There is mild concentric left ventricular hypertrophy. Grade II diastolic dysfunction with elevated LV filling pressure. Mild mitral annular calcification. The left atrium is mildly dilated. Mild mitral regurgitation. Inadequate tricuspid regurgitation jet to estimate systolic artery pressure (SPAP). Assessment:    1. Acute diastolic CHF:   -CXR with bibasilar ASD and pleural effusions > left (? PNA) and crackles on exam   -BNP elevated 2518   -UOP 2.9L and net negative 5.6L   -will switch to lasix 40mg po daily starting Thurs 2/11   -watch renal fcn; today BUN/Cr=39/1.2    -ECHO 11/19/19 EF reported normal; LVH    -Note ECHO from 2/7/21 showing EF=55%; mild LVH; grade II DD elevated filling pressures    2.  COPD exacerbation:   -On Bipap and higher NC O2 requirements (home regimen=3L NC)   -Abx azithromycin, inhalers, and IV steroids per ICU team    3. Hx CAD and PVD:   -no specifics   -could not find any available records but mentioned in Dr. Kinza Vazquez note ([facility] cards doc)   -prior saw Lexi Turcios   -continue baby asa qd, plavix 75mg qd, lipitor 40mg qd, tricor 54mg qd, nicoderm patch    OK for d/c home from cardiology when IM and pulm docs feel ready. He sees Dr. Kevin Turcios for cards as OP and I told him to make f/u appt within 2 weeks of d/c. He was on lasix 20mg qd prior and would d/c home on 40mg lasix daily. Signing off. Thanks.      Patient Active Problem List    Diagnosis Date Noted    Coronary artery disease involving native coronary artery of native heart without angina pectoris     Acute on chronic respiratory failure with hypoxia and hypercapnia ([address] 02/07/2021    Hypertension 03/30/2020    Other hyperlipidemia 03/30/2020    CHF (congestive heart failure) ([address] 03/30/2020    COPD exacerbation ([address] 03/30/2020       Shamar Walker MD 2/10/2021 7:23 AM

## 2021-02-10 NOTE — PROGRESS NOTES
Admit: 2021    [facility]  Rosendo Baird  [facility]  MRN:    [id number]     Daily Progress Note for 2/10/2021     Interval History:     Transferred to ICU for worsening hypoxia and increasing somnolence. Blood gas showed elevated CO2 at 91.6.   Placed on BiPAP    Currently on 10 L   bipap all night and prn day      Now on 3 L of O2  bipap all night and prn day     2/10   Now on 4 L of O2    Scheduled Meds:   insulin glargine  15 Units Subcutaneous QAM    ipratropium-albuterol  1 ampule Inhalation Q4H While awake    insulin lispro  0-18 Units Subcutaneous TID WC    insulin lispro  0-9 Units Subcutaneous Nightly    sennosides-docusate sodium  2 tablet Oral BID    nicotine  1 patch Transdermal Daily    sodium chloride flush  10 mL Intravenous 2 times per day    enoxaparin  40 mg Subcutaneous Daily    predniSONE  40 mg Oral Daily with breakfast    aspirin  81 mg Oral Daily    atorvastatin  40 mg Oral Daily    vitamin D  50,000 Units Oral Once per day on     clopidogrel  75 mg Oral Daily    DULoxetine  20 mg Oral BID    fenofibrate  54 mg Oral Daily    budesonide-formoterol  2 puff Inhalation BID    gabapentin  400 mg Oral TID    glimepiride  2 mg Oral QAM AC    montelukast  10 mg Oral Nightly    buprenorphine  1 patch Transdermal Weekly    furosemide  40 mg Intravenous Daily    azithromycin  250 mg Oral Daily    mupirocin   Nasal BID       Continuous Infusions:   dextrose         PRN Meds:  sennosides-docusate sodium, polyethylene glycol, albuterol sulfate HFA, glucose, dextrose, glucagon (rDNA), dextrose, sodium chloride flush, promethazine **OR** ondansetron, polyethylene glycol, acetaminophen **OR** acetaminophen                  Objective:     Temp  Av.7 °F (36.5 °C)  Min: 97.1 °F (36.2 °C)  Max: 98.7 °F (37.1 °C)  Pulse  Av.4  Min: 70  Max: 93  BP  Min: 91/56  Max: 137/84  SpO2  Av.5 %  Min: 90 %  Max: 94 %  Patient Vitals for the past 4 hrs:   BP Temp Temp src Pulse Resp SpO2   02/10/21 1128 -- -- -- -- 20 93 %   02/10/21 0831 128/74 97.1 °F (36.2 °C) Oral 84 18 91 %         Intake/Output Summary (Last 24 hours) at 2/10/2021 1134  Last data filed at 2/10/2021 0979  Gross per 24 hour   Intake 530 ml   Output 1500 ml   Net -970 ml       Physical Exam:  General appearance: No apparent distress appears stated age and cooperative. HEENT Normal cephalic, atraumatic without obvious deformity. Pupils equal, round, and reactive to light. Extra ocular muscles intact. Conjunctivae/corneas clear. Neck: Supple, No jugular venous distention/bruits. Trachea midline without thyromegaly or adenopathy with full range of motion. Lungs: diminished breath sounds. Heart: Regular rate and rhythm with Normal S1/S2 without murmurs, rubs or gallops, point of maximum impulse non-displaced  Abdomen: Soft, non-tender or non-distended without rigidity or guarding and positive bowel sounds all four quadrants. Extremities: No clubbing, cyanosis, or edema bilaterally. Full range of motion without deformity and normal gait intact. Skin: Skin color, texture, turgor normal.  No rashes or lesions. Neurologic: Alert and oriented X 3, neurovascularly intact with sensory/motor intact upper extremities/lower extremities, bilaterally. Cranial nerves: II-XII intact, grossly non-focal.  Mental status: Alert, oriented, thought content appropriate. Lab Data:  CBC:   Recent Labs     02/09/21  0407   WBC 11.8*   RBC 4.84   HGB 13.5   HCT 42.0   MCV 86.7   RDW 19.5*        BMP:   Recent Labs     02/09/21  0408 02/09/21  1427    141   K 5.9* 4.6   CL 99 96*   CO2 34* 38*   PHOS 2.3*  --    BUN 40* 39*   CREATININE 0.9 1.2     BNP: No results for input(s): BNP in the last 72 hours. PT/INR: No results for input(s): PROTIME, INR in the last 72 hours. APTT:No results for input(s): APTT in the last 72 hours.   CARDIAC ENZYMES:   No results for input(s): CKMB, CKMBINDEX, TROPONINI in the last cardiology recs - monitor renal function. ECHO normal EF,diastolic dysfunction   lasix 40 mg daily at dc       Abd distentiion, constipation. No BM in over 5 days. Chronic on buprenorphine patch. Abd exam - distended, but non tender. CT - moderate stool  No emesis, no nausea. miralax daily  Senna daily prn  Had BM    Hyperkalemia  Kayexalate   Follow K levels     Micrococcus and coag negative staph in blood cultures. Likely skin contamination.      DMII - stable. ISS.     CAD Hx of  PVD Hx of  ASA and plavix. On statin.         DVT Prophylaxis: lovenox  Diet: DIET CARB CONTROL; Carb Control: 4 carb choices (60 gms)/meal; No Added Salt (3-4 GM);   Code Status: Full Code     Rufino Whitley

## 2021-02-10 NOTE — PROGRESS NOTES
Pulmonary & Critical Care Medicine ICU Progress Note    CC: COPD    Events of Last 24 hours: feels better. + wheeze. Vitals:  /84   Pulse 82   Temp 97.6 °F (36.4 °C) (Oral)   Resp 18   Ht 4' 11\" (1.499 m)   Wt 181 lb 3.2 oz (82.2 kg)   SpO2 90%   BMI 36.60 kg/m²   on 4lpm  Constitutional:  No acute distress. Eyes: PERRL. Conjunctivae anicteric. ENT: Normal nose. Normal tongue. Neck:  Trachea is midline. No thyroid tenderness. Respiratory: No accessory muscle usage. Decreased breath sounds. + wheezes. + rales. No Rhonchi. Cardiovascular: Normal S1S2. No digit clubbing. No digit cyanosis. No LE edema. Psychiatric: No anxiety or Agitation. Alert and Oriented to person, place and time.     Scheduled Meds:   insulin glargine  15 Units Subcutaneous QAM    ipratropium-albuterol  1 ampule Inhalation Q4H While awake    insulin lispro  0-18 Units Subcutaneous TID WC    insulin lispro  0-9 Units Subcutaneous Nightly    sennosides-docusate sodium  2 tablet Oral BID    nicotine  1 patch Transdermal Daily    sodium chloride flush  10 mL Intravenous 2 times per day    enoxaparin  40 mg Subcutaneous Daily    predniSONE  40 mg Oral Daily with breakfast    aspirin  81 mg Oral Daily    atorvastatin  40 mg Oral Daily    vitamin D  50,000 Units Oral Once per day on Mon Thu    clopidogrel  75 mg Oral Daily    DULoxetine  20 mg Oral BID    fenofibrate  54 mg Oral Daily    budesonide-formoterol  2 puff Inhalation BID    gabapentin  400 mg Oral TID    glimepiride  2 mg Oral QAM AC    montelukast  10 mg Oral Nightly    buprenorphine  1 patch Transdermal Weekly    furosemide  40 mg Intravenous Daily    azithromycin  250 mg Oral Daily    mupirocin   Nasal BID     PRN Meds:  sennosides-docusate sodium, polyethylene glycol, albuterol sulfate HFA, glucose, dextrose, glucagon (rDNA), dextrose, sodium chloride flush, promethazine **OR** ondansetron, polyethylene glycol, acetaminophen **OR** acetaminophen    Results:  CBC:   Recent Labs     02/09/21  0407   WBC 11.8*   HGB 13.5   HCT 42.0   MCV 86.7        BMP:   Recent Labs     02/09/21  0408 02/09/21  1427    141   K 5.9* 4.6   CL 99 96*   CO2 34* 38*   PHOS 2.3*  --    BUN 40* 39*   CREATININE 0.9 1.2     LIVER PROFILE:   No results for input(s): AST, ALT, LIPASE, BILIDIR, BILITOT, ALKPHOS in the last 72 hours. Invalid input(s):   AMYLASE,  ALB  Micro:  2/7/2021 SARS-CoV-2 NAAT negative  1/2 blood cx gram +     Imaging:  Chest imaging was reviewed by me and showed   2/7/2021 CXR bilateral interstitial edema, possible left effusion     ASSESSMENT:  · Acute on chronic hypoxemic and hypercapnic respiratory failure  · COPD with exacerbation (followed in [place])  · Acute on chronic diastolic CHF  · Chronic pain on buprenorphine patch  · H/O CAD, PVD  · Tobacco abuse     PLAN:  · Bilevel non-invasive positive pressure ventilation to PRN and QHS  · Pred taper   · Inhaled bronchodilators   · Azithromycin D#4  · Cardiology following  · Tobacco cessation

## 2021-02-10 NOTE — FLOWSHEET NOTE
Pt asleep at the time,  offered prayer at bedside.  will follow up at a later time.        02/10/21 3460   Encounter Summary   Services provided to: Patient   Referral/Consult From: Rounding   Continue Visiting   (2/10 pt asleep at the time)   Length of Encounter 15 minutes

## 2021-02-10 NOTE — PROGRESS NOTES
Inpatient Physical Therapy Daily Treatment Note    Unit: PCU  Date:  2/10/2021  Patient Name:    Rei Arenas  Admitting diagnosis:  Acute on chronic respiratory failure with hypoxia and hypercapnia ([facility] [address] [J96.21, J96.22]  Admit Date:  2/7/2021  Precautions/Restrictions:  Lines -Supplemental O2 (4L) and Reinoso catheter, Telemetry and Continuous pulse oximetry      Discharge Recommendations: Home PRN assist   DME needs for discharge: Needs Met       Therapy recommendation for EMS Transport: can transport by wheelchair    Therapy recommendations for staff:   Stand by assist with use of No AD for all transfers and ambulation to/from bathroom  within room    History of Present Illness: From Dr. Kevin Pryor MD on 2/7/21:  The patient is a 52 y. o. male w Hx of DMII, PVD s/p prior stenting (x2 R leg, x1 L leg), Hx of CAD, COPD, chronic hypox RF, CHF, who presents with SOB. Pt is a very poor historian. He reports he feels well this morning - he is on 12l/min O2 via NC. He is on 3-4l/min at baseline. He reports his wife thought his breathing was worse over the past few days and told him to go to the ED. He denies chest pain per say - reports intermittent left lateral chest wall pain over the past few days. Not much cough or sputum. No pleurisy. He is chronically on buprenorphine patch - reports for severe diabetic neuropathy. His imaging (CXR) is more consistent with CHF. His ABG is concerning for COPD with decompensated acute hypox and hypercapnic resp failure. Home Health S4 Level Recommendation: NA  AM-PAC Mobility Score   AM-PAC Inpatient Mobility Raw Score : 21       Treatment Time:  13:29-14:01  Treatment number: 2  Timed Code Treatment Minutes: 32 minutes  Total Treatment Minutes:  32  minutes    Cognition    A&O Person, Place and Time   Able to follow 2 step commands    Subjective  Patient lying supine in bed with no family present   Pt agreeable to this PT tx.      Pain   No    Bed Mobility   Supine to Sit:    Independent  Sit to Supine:   Not Tested  Rolling:   Independent  Scooting:   Independent (sitting)    Transfer Training     Sit to stand:   Independent from bed, chair, and low commode  Stand to sit: Independent to low commode and chair (2 trials)  Bed to Chair:   Not Tested with use of N/A    Gait Training gait completed as indicated below  Distance:      25 + 75 ft  Deviations (firm surface/linoleum):  none  Assistive Device Used:    gait belt  Level of Assist:    Supervision  Comment: intermittent cues for O2 line     Stair Training deferred, pt unsafe/not appropriate to complete stairs at this time    Therapeutic Exercise all completed bilaterally unless indicated  Ankle Pumps x 15 reps  LAQ x 15 reps  marching x 15 reps   Sit<>stand with arms crossed x5 reps, SBA    Balance  Sitting:  Good ; Independent  Comments:     Standing: Good ; Supervision  Comments: no sway noted    Patient Education      Role of PT, POC, Discharge recommendations, DC recommendations, safety awareness, transfer techniques, pacing activity, HEP and calling for assist with mobility. Positioning Needs       Pt up in chair, no alarm needed, positioned in proper neutral alignment and pressure relief provided. Call light provided and all needs within reach    Activity Tolerance   Pt completed therapy session with SOB noted with end of ambulation trial.  Supine (at rest) SpO2: 92% 4L  HR: 86 bpm  BP: 118/73  After ambulation SpO2: 91% on 4L  HR: 114 bpm    Other  Pt requested coffee, noted to be on fluid restriction. RN notified. Assessment :  Patient tolerated treatment session very well. Pt was able to ambulate household distances while maintaining SpO2 on 4L. This is increased O2 from pt's baseline of no O2 during the day. Will continue to progress ambulation and wean O2 at next session.    Recommending Home PRN assist upon discharge as patient functioning close to baseline level and would benefit from continued therapy services    Goals (all goals ongoing unless otherwise indicated)  To be met in 3 visits:  1). Independent with LE Ex x 10 reps     To be met in 6 visits:  1). Supine to/from sit: Independent  2). Sit to/from stand: Independent  3). Bed to chair: Independent  4). Gait: Ambulate 50 ft.  with  Supervision and use of gait belt  5). Tolerate B LE exercises 3 sets of 10-15 reps  6). Ascend/descend 1 steps with Supervision with use of no handrail and gait belt    Plan   Continue with plan of care. Signature: Denton Suero, PT, DPT #[id number]      If patient discharges from this facility prior to next visit, this note will serve as the Discharge Summary.

## 2021-02-10 NOTE — PLAN OF CARE
Problem: Falls - Risk of:  Goal: Will remain free from falls  Description: Will remain free from falls  2/9/2021 2355 by Blossom Kitchen RN  Outcome: Ongoing  2/9/2021 1041 by Shawn Sutherland RN  Outcome: Ongoing  Goal: Absence of physical injury  Description: Absence of physical injury  2/9/2021 2355 by Blossom Kitchen RN  Outcome: Ongoing  2/9/2021 1041 by Shawn Sutherland RN  Outcome: Ongoing     Problem: OXYGENATION/RESPIRATORY FUNCTION  Goal: Patient will maintain patent airway  2/9/2021 2355 by Blossom Kitchen RN  Outcome: Ongoing  2/9/2021 1041 by Shawn Sutherland RN  Outcome: Ongoing  Note: SPO2 WDL on 3L O2 via NC  Goal: Patient will achieve/maintain normal respiratory rate/effort  Description: Respiratory rate and effort will be within normal limits for the patient  2/9/2021 2355 by Blossom Kitchen RN  Outcome: Ongoing  2/9/2021 1041 by Shawn Sutherland RN  Outcome: Ongoing  Note: Resp e/e unlabored     Problem: HEMODYNAMIC STATUS  Goal: Patient has stable vital signs and fluid balance  2/9/2021 2355 by Blossom Kitchen RN  Outcome: Ongoing  2/9/2021 1041 by Shawn Sutherland RN  Outcome: Ongoing  Note: LUIS Reinoso strict I&O  Daily labs repeat BMP @ 1400      Problem: FLUID AND ELECTROLYTE IMBALANCE  Goal: Fluid and electrolyte balance are achieved/maintained  2/9/2021 2355 by Blossom Kitchen RN  Outcome: Ongoing  2/9/2021 1041 by Shawn Sutherland RN  Outcome: Ongoing  Note: K 5.9 pt received kayexalate, repeat BMP @ 1400     Problem: ACTIVITY INTOLERANCE/IMPAIRED MOBILITY  Goal: Mobility/activity is maintained at optimum level for patient  2/9/2021 2355 by Blossom Kitchen RN  Outcome: Ongoing  2/9/2021 1041 by Shawn Sutherland RN  Outcome: Ongoing  Note: Q 2 and prn turn  PT/OT consulted

## 2021-02-11 ENCOUNTER — TELEPHONE (OUTPATIENT)
Dept: INTERNAL MEDICINE CLINIC | Age: 56
End: 2021-02-11

## 2021-02-11 VITALS
SYSTOLIC BLOOD PRESSURE: 124 MMHG | TEMPERATURE: 97 F | BODY MASS INDEX: 35.51 KG/M2 | OXYGEN SATURATION: 95 % | HEART RATE: 82 BPM | WEIGHT: 180.9 LBS | RESPIRATION RATE: 18 BRPM | DIASTOLIC BLOOD PRESSURE: 85 MMHG | HEIGHT: 60 IN

## 2021-02-11 LAB
ANION GAP SERPL CALCULATED.3IONS-SCNC: 9 MMOL/L (ref 3–16)
BASE EXCESS ARTERIAL: 6 MMOL/L (ref -3–3)
BUN BLDV-MCNC: 35 MG/DL (ref 7–20)
CALCIUM SERPL-MCNC: 9.7 MG/DL (ref 8.3–10.6)
CARBOXYHEMOGLOBIN ARTERIAL: 1 % (ref 0–1.5)
CHLORIDE BLD-SCNC: 99 MMOL/L (ref 99–110)
CO2: 33 MMOL/L (ref 21–32)
CREAT SERPL-MCNC: 0.9 MG/DL (ref 0.9–1.3)
GFR AFRICAN AMERICAN: >60
GFR NON-AFRICAN AMERICAN: >60
GLUCOSE BLD-MCNC: 131 MG/DL (ref 70–99)
GLUCOSE BLD-MCNC: 160 MG/DL (ref 70–99)
GLUCOSE BLD-MCNC: 219 MG/DL (ref 70–99)
HCO3 ARTERIAL: 33.6 MMOL/L (ref 21–29)
HEMOGLOBIN, ART, EXTENDED: 15 G/DL (ref 13.5–17.5)
METHEMOGLOBIN ARTERIAL: 0.3 %
O2 CONTENT ARTERIAL: 21 ML/DL
O2 SAT, ARTERIAL: 98 %
O2 THERAPY: ABNORMAL
PCO2 ARTERIAL: 61 MMHG (ref 35–45)
PERFORMED ON: ABNORMAL
PERFORMED ON: ABNORMAL
PH ARTERIAL: 7.36 (ref 7.35–7.45)
PO2 ARTERIAL: 116.7 MMHG (ref 75–108)
POTASSIUM SERPL-SCNC: 4.2 MMOL/L (ref 3.5–5.1)
SODIUM BLD-SCNC: 141 MMOL/L (ref 136–145)
TCO2 ARTERIAL: 35.5 MMOL/L

## 2021-02-11 PROCEDURE — 6360000002 HC RX W HCPCS: Performed by: INTERNAL MEDICINE

## 2021-02-11 PROCEDURE — 2580000003 HC RX 258: Performed by: INTERNAL MEDICINE

## 2021-02-11 PROCEDURE — 94640 AIRWAY INHALATION TREATMENT: CPT

## 2021-02-11 PROCEDURE — 6370000000 HC RX 637 (ALT 250 FOR IP): Performed by: INTERNAL MEDICINE

## 2021-02-11 PROCEDURE — 99233 SBSQ HOSP IP/OBS HIGH 50: CPT | Performed by: INTERNAL MEDICINE

## 2021-02-11 PROCEDURE — 99239 HOSP IP/OBS DSCHRG MGMT >30: CPT | Performed by: INTERNAL MEDICINE

## 2021-02-11 PROCEDURE — 82803 BLOOD GASES ANY COMBINATION: CPT

## 2021-02-11 PROCEDURE — 97535 SELF CARE MNGMENT TRAINING: CPT

## 2021-02-11 PROCEDURE — 36415 COLL VENOUS BLD VENIPUNCTURE: CPT

## 2021-02-11 PROCEDURE — 97530 THERAPEUTIC ACTIVITIES: CPT

## 2021-02-11 PROCEDURE — 80048 BASIC METABOLIC PNL TOTAL CA: CPT

## 2021-02-11 RX ORDER — PREDNISONE 20 MG/1
TABLET ORAL
Qty: 9 TABLET | Refills: 0 | Status: SHIPPED | OUTPATIENT
Start: 2021-02-11 | End: 2021-05-24 | Stop reason: ALTCHOICE

## 2021-02-11 RX ORDER — FUROSEMIDE 40 MG/1
40 TABLET ORAL DAILY
Status: DISCONTINUED | OUTPATIENT
Start: 2021-02-11 | End: 2021-02-11 | Stop reason: HOSPADM

## 2021-02-11 RX ORDER — FUROSEMIDE 40 MG/1
40 TABLET ORAL DAILY
Qty: 30 TABLET | Refills: 0 | Status: SHIPPED | OUTPATIENT
Start: 2021-02-11 | End: 2021-05-24 | Stop reason: SDUPTHER

## 2021-02-11 RX ORDER — AZITHROMYCIN 250 MG/1
250 TABLET, FILM COATED ORAL SEE ADMIN INSTRUCTIONS
Qty: 3 TABLET | Refills: 0 | Status: SHIPPED | OUTPATIENT
Start: 2021-02-11 | End: 2021-02-14

## 2021-02-11 RX ADMIN — Medication 10 ML: at 08:30

## 2021-02-11 RX ADMIN — GABAPENTIN 400 MG: 400 CAPSULE ORAL at 08:29

## 2021-02-11 RX ADMIN — PREDNISONE 40 MG: 20 TABLET ORAL at 08:29

## 2021-02-11 RX ADMIN — DOCUSATE SODIUM 50 MG AND SENNOSIDES 8.6 MG 2 TABLET: 8.6; 5 TABLET, FILM COATED ORAL at 08:29

## 2021-02-11 RX ADMIN — DULOXETINE 20 MG: 20 CAPSULE, DELAYED RELEASE ORAL at 08:29

## 2021-02-11 RX ADMIN — ASPIRIN 81 MG: 81 TABLET, COATED ORAL at 08:29

## 2021-02-11 RX ADMIN — CLOPIDOGREL BISULFATE 75 MG: 75 TABLET ORAL at 08:29

## 2021-02-11 RX ADMIN — IPRATROPIUM BROMIDE AND ALBUTEROL SULFATE 1 AMPULE: .5; 3 SOLUTION RESPIRATORY (INHALATION) at 07:21

## 2021-02-11 RX ADMIN — FENOFIBRATE 54 MG: 54 TABLET ORAL at 08:29

## 2021-02-11 RX ADMIN — GLIMEPIRIDE 2 MG: 2 TABLET ORAL at 08:29

## 2021-02-11 RX ADMIN — Medication 2 PUFF: at 07:21

## 2021-02-11 RX ADMIN — INSULIN LISPRO 6 UNITS: 100 INJECTION, SOLUTION INTRAVENOUS; SUBCUTANEOUS at 11:51

## 2021-02-11 RX ADMIN — AZITHROMYCIN 250 MG: 250 TABLET, FILM COATED ORAL at 08:29

## 2021-02-11 RX ADMIN — ENOXAPARIN SODIUM 40 MG: 40 INJECTION SUBCUTANEOUS at 08:29

## 2021-02-11 RX ADMIN — ERGOCALCIFEROL 50000 UNITS: 1.25 CAPSULE ORAL at 08:29

## 2021-02-11 RX ADMIN — IPRATROPIUM BROMIDE AND ALBUTEROL SULFATE 1 AMPULE: .5; 3 SOLUTION RESPIRATORY (INHALATION) at 11:28

## 2021-02-11 RX ADMIN — INSULIN GLARGINE 15 UNITS: 100 INJECTION, SOLUTION SUBCUTANEOUS at 08:28

## 2021-02-11 RX ADMIN — MUPIROCIN: 20 OINTMENT TOPICAL at 08:29

## 2021-02-11 RX ADMIN — IPRATROPIUM BROMIDE AND ALBUTEROL SULFATE 1 AMPULE: .5; 3 SOLUTION RESPIRATORY (INHALATION) at 00:00

## 2021-02-11 RX ADMIN — ATORVASTATIN CALCIUM 40 MG: 40 TABLET, FILM COATED ORAL at 08:29

## 2021-02-11 RX ADMIN — FUROSEMIDE 40 MG: 40 TABLET ORAL at 08:29

## 2021-02-11 NOTE — FLOWSHEET NOTE
02/10/21 1945   Vital Signs   Temp 98 °F (36.7 °C)   Temp Source Oral   Pulse 88   Heart Rate Source Monitor   Resp 18   /75   Level of Consciousness Alert (0)   MEWS Score 1   Oxygen Therapy   SpO2 94 %   O2 Device High flow nasal cannula   O2 Flow Rate (L/min) 4 L/min   Pt. Resting in bed. Call light in reach. Shift assessment completed see flow sheet. Denies any needs at this time. Will continue to monitor.

## 2021-02-11 NOTE — PROGRESS NOTES
Patient educated on discharge instructions as well as new medications use, dosage, administration and possible side effects. Patient verified knowledge. IV removed without difficulty and dry dressing in place. Telemetry monitor removed and returned to [address].  Awaiting ride home

## 2021-02-11 NOTE — CARE COORDINATION
DISCHARGE ORDER  Date/Time 2021 1:53 PM  Completed by: Julio Nascimento, Case Management    Patient Name: Kellen Schaeffer      : 1965  Admitting Diagnosis: Acute on chronic respiratory failure with hypoxia and hypercapnia ([facility] [address] [H26.49, J96.22]      Admit order Date and Status:2021 inpt  (verify MD's last order for status of admission)      Noted discharge order. If applicable PT/OT recommendation at Discharge: Home with assist as needed   DME recommendation by PT/OT:Shower Chair  Confirmed discharge plan: Yes  with whom___pt____________  If pt confirmed DC plan does family need to be contacted by CM No if yes who______  Discharge Plan: Chart reviewed. Met with pt at bedside. Pt is returning home with spouse who will provide / assistance. Pt declines HHC at this time. Pt is active with [employer] for home O2. TC to [employer] and spoke with Azael Mariee who states pt is active for 4L continuous. Pt currently on 3L. Spouse is aware to bring portable tank. No further dc needs voiced or identified. Reviewed chart. Role of discharge planner explained and patient verbalized understanding. Discharge order is noted. Has Home O2 in place on admit:  Yes  Informed of need to bring portable home O2 tank on day of discharge for nursing to connect prior to leaving:   Yes  Verbalized agreement/Understanding:   Yes    Pt is being d/c'd to home today. Pt's O2 sats are 95% on 3L. Discharge timeout done with [facility]. All discharge needs and concerns addressed.

## 2021-02-11 NOTE — PROGRESS NOTES
Pulmonary & Critical Care Medicine ICU Progress Note    CC: COPD    Events of Last 24 hours: feels close to baseline and did not use bipap      Vitals:  /85   Pulse 82   Temp 97 °F (36.1 °C) (Oral)   Resp 18   Ht 4' 11\" (1.499 m)   Wt 180 lb 14.4 oz (82.1 kg)   SpO2 95%   BMI 36.54 kg/m²   on 3lpm  Constitutional:  No acute distress. Eyes: PERRL. Conjunctivae anicteric. ENT: Normal nose. Normal tongue. Neck:  Trachea is midline. No thyroid tenderness. Respiratory: No accessory muscle usage. Decreased breath sounds. + wheezes. + rales. No Rhonchi. Cardiovascular: Normal S1S2. No digit clubbing. No digit cyanosis. No LE edema. Psychiatric: No anxiety or Agitation. Alert and Oriented to person, place and time.     Scheduled Meds:   furosemide  40 mg Oral Daily    insulin glargine  15 Units Subcutaneous QAM    ipratropium-albuterol  1 ampule Inhalation Q4H While awake    insulin lispro  0-18 Units Subcutaneous TID WC    insulin lispro  0-9 Units Subcutaneous Nightly    sennosides-docusate sodium  2 tablet Oral BID    nicotine  1 patch Transdermal Daily    sodium chloride flush  10 mL Intravenous 2 times per day    enoxaparin  40 mg Subcutaneous Daily    predniSONE  40 mg Oral Daily with breakfast    aspirin  81 mg Oral Daily    atorvastatin  40 mg Oral Daily    vitamin D  50,000 Units Oral Once per day on Mon Thu    clopidogrel  75 mg Oral Daily    DULoxetine  20 mg Oral BID    fenofibrate  54 mg Oral Daily    budesonide-formoterol  2 puff Inhalation BID    gabapentin  400 mg Oral TID    glimepiride  2 mg Oral QAM AC    montelukast  10 mg Oral Nightly    buprenorphine  1 patch Transdermal Weekly    mupirocin   Nasal BID     PRN Meds:  sennosides-docusate sodium, polyethylene glycol, albuterol sulfate HFA, glucose, dextrose, glucagon (rDNA), dextrose, sodium chloride flush, promethazine **OR** ondansetron, polyethylene glycol, acetaminophen **OR** acetaminophen    Results:  CBC:   Recent Labs     02/09/21  0407   WBC 11.8*   HGB 13.5   HCT 42.0   MCV 86.7        BMP:   Recent Labs     02/09/21  0408 02/09/21  1427 02/11/21  0431    141 141   K 5.9* 4.6 4.2   CL 99 96* 99   CO2 34* 38* 33*   PHOS 2.3*  --   --    BUN 40* 39* 35*   CREATININE 0.9 1.2 0.9     LIVER PROFILE:   No results for input(s): AST, ALT, LIPASE, BILIDIR, BILITOT, ALKPHOS in the last 72 hours. Invalid input(s):   AMYLASE,  ALB  Micro:  2/7/2021 SARS-CoV-2 NAAT negative  1/2 blood cx gram +     Imaging:  Chest imaging was reviewed by me and showed   2/7/2021 CXR bilateral interstitial edema, possible left effusion     ASSESSMENT:  · Acute on chronic hypoxemic and hypercapnic respiratory failure  · COPD with exacerbation (followed in [place])  · Acute on chronic diastolic CHF  · Chronic pain on buprenorphine patch  · H/O CAD, PVD  · Tobacco abuse     PLAN:  · Bilevel non-invasive positive pressure ventilation to PRN and QHS  · Pred taper   · Inhaled bronchodilators   · Azithromycin D#5  · Cardiology following  · Tobacco cessation  · DC planning

## 2021-02-11 NOTE — PROGRESS NOTES
Mobility:   Supine to Sit:  Not Tested  Sit to Supine:  Not Tested  Rolling:           Not Tested  Scooting:        Independent    Transfer Training:   Sit to stand:   Independent  Stand to sit: Independent  Bed to Chair:  Independent  Bed to [facility]:   Not Tested  Standard toilet:   Not Tested    Activity Tolerance   Pt completed therapy session with no adverse symptoms   SITING EDGE OF BED SPO2 94 HR 78   sPO2 94 hr 105 after stand at the sink for 10 min   ADL Training:   Upper body dressing: Not Tested  Upper body bathing:  Independent standing at the sink   Lower body dressing:  Independent  Lower body bathing:  Not Tested  Toileting:   Not Tested  Grooming/Hygiene:  Independent- using shampoo cap and standing at the sink for 1- mins     Therapeutic Exercise:   N/A    Patient Education:   Energy conservation techniques- pt given written handout for energy conservation and the pt instructed to use a shower seat initially     Positioning Needs:   Pt up in chair, no alarm needed, positioned in proper neutral alignment and pressure relief provided. Family Present:  No    Assessment: pt was IND for sit to stand and to ambulate to the bathroom with managing  the 02 cord safely. The pts Sp02 was above 90% during OT. Pt stood at the sink for 10 mins to perform ADLs with good balance. The pt was instructed in energy conservation and given a written hand out. Pt was recommended to use a shower chair in the shower. GOALS  To be met in 3 Visits:  1). Bed to toilet/BSC: Supervision     To be met in 5 Visits:  1). Supine to/from Sit:             Independent  2). Upper Body Bathing:         Independent- goal met 2-11-21   3). Lower Body Bathing:         CGA  4). Upper Body Dressing:       Independent  5). Lower Body Dressing:       CGA  6).  Pt to demonstrate UE exs x 15 reps with minimal cues      Plan: cont with MURPHY Cuevas OTR/L [id number]      If patient discharges from this facility prior to next visit, this note will serve as the Discharge Summary

## 2021-02-11 NOTE — DISCHARGE SUMMARY
Name:  Awa Foster  Room:  /0413-82  MRN:    [id number]    Discharge Summary      This discharge summary is in conjunction with a complete physical exam done on the day of discharge. Discharging Physician: Dr. Yany Vizcaino: 2/7/2021  Discharge:   2/11/2021     HPI taken from admission H&P:    The patient is a 50 y.o. male w Hx of DMII, PVD s/p prior stenting (x2 R leg, x1 L leg), Hx of CAD, COPD, chronic hypox RF, CHF, who presents with SOB.      Pt is a very poor historian. He reports he feels well this morning - he is on 12l/min O2 via NC. He is on 3-4l/min at baseline.      He reports his wife thought his breathing was worse over the past few days and told him to go to the ED.      He denies chest pain per say - reports intermittent left lateral chest wall pain over the past few days. Not much cough or sputum. No pleurisy.      He is chronically on buprenorphine patch - reports for severe diabetic neuropathy.         His imaging (CXR) is more consistent with CHF.      His ABG is concerning for COPD with decompensated acute hypox and hypercapnic resp failure. Diagnoses this Admission and Hospital Course     COPD with AE  -Solumedrol-> prednisone   -Zithromax to complete 5 days on discharge   -Pulm consulted- appreciate recs.       Acute on Chronic Mixed Hypox and Hypercapnic Resp Failure. Was on 12 L    ABG concerning for CO2 retention. bipap all night, now on 3 L of O2       Acute diastolic CHF. ECHO 2019 preserved EF. I suspect acute pulm edema triggered by hypoxia related to COPD. He does not appear grossly fluid overloaded  Lasix IV daily per cardiology recs - monitored renal function. ECHO normal EF,diastolic dysfunction   lasix 40 mg daily at dc       Abd distentiion, constipation. No BM in over 5 days. Chronic on buprenorphine patch. Abd exam - distended, but non tender. CT - moderate stool  No emesis, no nausea.    miralax daily  Senna daily prn  Had BM before discharge      Hyperkalemia  Kayexalate   Follow K levels      Micrococcus and coag negative staph in blood cultures. Likely skin contamination.      DMII - stable. - home regimen on discharge       CAD Hx of  PVD Hx of  ASA and plavix. On statin.        Procedures (Please Review Full Report for Details)  None     Consults    Cardiology  Pulmonology     Physical Exam at Discharge:    /85   Pulse 82   Temp 97 °F (36.1 °C) (Oral)   Resp 18   Ht 4' 11\" (1.499 m)   Wt 180 lb 14.4 oz (82.1 kg)   SpO2 95%   BMI 36.54 kg/m²     General appearance: No apparent distress appears stated age and cooperative. HEENT Normal cephalic, atraumatic without obvious deformity.  Pupils equal, round, and reactive to light.  Extra ocular muscles intact.  Conjunctivae/corneas clear. Neck: Supple, No jugular venous distention/bruits.  Trachea midline without thyromegaly or adenopathy with full range of motion. Lungs: diminished breath sounds.   Heart: Regular rate and rhythm with Normal S1/S2 without murmurs, rubs or gallops, point of maximum impulse non-displaced  Abdomen: Soft, non-tender or non-distended without rigidity or guarding and positive bowel sounds all four quadrants. Extremities: No clubbing, cyanosis, or edema bilaterally.  Full range of motion without deformity and normal gait intact. Skin: Skin color, texture, turgor normal.  No rashes or lesions. Neurologic: Alert and oriented X 3, neurovascularly intact with sensory/motor intact upper extremities/lower extremities, bilaterally.  Cranial nerves: II-XII intact, grossly non-focal.  Mental status: Alert, oriented, thought content appropriate.     CBC:   Recent Labs     02/09/21  0407   WBC 11.8*   HGB 13.5   HCT 42.0   MCV 86.7        BMP:   Recent Labs     02/09/21  0408 02/09/21  1427 02/11/21  0431    141 141   K 5.9* 4.6 4.2   CL 99 96* 99   CO2 34* 38* 33*   PHOS 2.3*  --   --    BUN 40* 39* 35*   CREATININE 0.9 1.2 0.9     LIVER PROFILE: No results for input(s): AST, ALT, LIPASE, BILIDIR, BILITOT, ALKPHOS in the last 72 hours. Invalid input(s): AMYLASE,  ALB  PT/INR: No results for input(s): PROTIME, INR in the last 72 hours. APTT: No results for input(s): APTT in the last 72 hours. UA:No results for input(s): NITRITE, COLORU, PHUR, LABCAST, WBCUA, RBCUA, MUCUS, TRICHOMONAS, YEAST, BACTERIA, CLARITYU, SPECGRAV, LEUKOCYTESUR, UROBILINOGEN, BILIRUBINUR, BLOODU, GLUCOSEU, AMORPHOUS in the last 72 hours. Invalid input(s): Dorsie Kobs        CULTURES  Rapid flu: negative   Blood: Coag neg staph- suspect contaminant     RADIOLOGY  CT ABDOMEN PELVIS WO CONTRAST Additional Contrast? Oral   Final Result   Moderate stool in colon. No bowel obstruction. There is diverticulosis. No   diverticulitis      Bibasilar airspace disease, suspicious for pneumonia         XR CHEST PORTABLE   Final Result   Perihilar pulmonary vasculature and edema. Bibasilar airspace disease and effusions greatest on left. Follow-up to assure resolution may be beneficial.           Echo 3/5/51   LV systolic function is normal with EF estimated at 55%. No regional wall motion abnormalities. MIldly asynchronous septal motion. There is mild concentric left ventricular hypertrophy. Grade II diastolic dysfunction with elevated LV filling pressure. Mild mitral annular calcification. The left atrium is mildly dilated. Mild mitral regurgitation. Inadequate tricuspid regurgitation jet to estimate systolic artery pressure   (SPAP).     Discharge Medications     Medication List      START taking these medications    azithromycin 250 MG tablet  Commonly known as: ZITHROMAX  Take 1 tablet by mouth See Admin Instructions for 3 days 500mg on day 1 followed by 250mg on days 2 - 5     predniSONE 20 MG tablet  Commonly known as: DELTASONE  1 1/2 qd- 3 days, 1 qd- 3 days, 1/2 qd- 3 days  Notes to patient: Take 1.5 tablet on 2/12, 2/13, 2/14  Take 1 tablet on 2/15, 2/16, 2/17  Take 0.5 tablet on 2/18, 2/19, 2/20        CHANGE how you take these medications    furosemide 40 MG tablet  Commonly known as: LASIX  Take 1 tablet by mouth daily  What changed:   · medication strength  · how much to take     metFORMIN 1000 MG tablet  Commonly known as: GLUCOPHAGE  What changed: Another medication with the same name was removed. Continue taking this medication, and follow the directions you see here. CONTINUE taking these medications    ALBUTEROL IN     * albuterol sulfate  (90 Base) MCG/ACT inhaler  INHALE 2 PUFFS INTO THE LUNGS 2 TIMES DAILY     * ProAir Digihaler 108 (90 Base) MCG/ACT Aepb  Generic drug: Albuterol Sulfate (sensor)  Inhale 2 puffs into the lungs 2 times daily Was given 90mcg on 4/24/20     aspirin 81 MG EC tablet     atorvastatin 40 MG tablet  Commonly known as: LIPITOR  TAKE 1 TABLET BY MOUTH EVERY DAY     bisoprolol 10 MG tablet  Commonly known as: ZEBETA  Take 1 tablet by mouth daily     * blood glucose monitor kit and supplies  Test 2 times a day & as needed for symptoms of irregular blood glucose. * glucose monitoring kit monitoring kit  1 kit by Does not apply route daily Patient wants brand name Patient tests bid  E11.9     blood glucose test strips  Test 2 times a day & as needed for symptoms of irregular blood glucose.  E11.9     Breo Ellipta 100-25 MCG/INH Aepb inhaler  Generic drug: fluticasone-vilanterol  TAKE 1 PUFF BY MOUTH EVERY DAY     buprenorphine 5 MCG/HR Ptwk  Commonly known as: BUPRENEX     clopidogrel 75 MG tablet  Commonly known as: PLAVIX  Take 1 tablet by mouth daily     CoQ10 100 MG Caps     Cyanocobalamin 1000 MCG Caps     DULoxetine 20 MG extended release capsule  Commonly known as: CYMBALTA     fenofibric acid 135 MG Cpdr capsule  Commonly known as: FIBRICOR  TAKE 1 CAPSULE BY MOUTH EVERY DAY     gabapentin 800 MG tablet  Commonly known as: NEURONTIN  TAKE 1 TABLET 4 TIMES DAILY     glimepiride 2 MG tablet  Commonly

## 2021-02-11 NOTE — PROGRESS NOTES
[address] to have prescriptions transferred from [facility] per patient request.

## 2021-02-11 NOTE — PROGRESS NOTES
Patient resting quietly in bed. No s/s of distress noted. Shift assessment complete, see flow sheet. Remains on 4L O2. Denies needs at this time. Call light in reach. Will monitor.

## 2021-02-11 NOTE — PROGRESS NOTES
Admit: 2021    Name:  Oj Daly  Room:  [place] [id number]  MRN:    [id number]     Daily Progress Note for 2021     Interval History:     Transferred to ICU for worsening hypoxia and increasing somnolence. Blood gas showed elevated CO2 at 91.6.   Placed on BiPAP    Currently on 10 L   bipap all night and prn day      Now on 3 L of O2  bipap all night and prn day        Now on 3 L of O2   no bipap last night     Patient wants to go home     Scheduled Meds:   furosemide  40 mg Oral Daily    insulin glargine  15 Units Subcutaneous QAM    ipratropium-albuterol  1 ampule Inhalation Q4H While awake    insulin lispro  0-18 Units Subcutaneous TID WC    insulin lispro  0-9 Units Subcutaneous Nightly    sennosides-docusate sodium  2 tablet Oral BID    nicotine  1 patch Transdermal Daily    sodium chloride flush  10 mL Intravenous 2 times per day    enoxaparin  40 mg Subcutaneous Daily    predniSONE  40 mg Oral Daily with breakfast    aspirin  81 mg Oral Daily    atorvastatin  40 mg Oral Daily    vitamin D  50,000 Units Oral Once per day on u    clopidogrel  75 mg Oral Daily    DULoxetine  20 mg Oral BID    fenofibrate  54 mg Oral Daily    budesonide-formoterol  2 puff Inhalation BID    gabapentin  400 mg Oral TID    glimepiride  2 mg Oral QAM AC    montelukast  10 mg Oral Nightly    buprenorphine  1 patch Transdermal Weekly    mupirocin   Nasal BID       Continuous Infusions:   dextrose         PRN Meds:  sennosides-docusate sodium, polyethylene glycol, albuterol sulfate HFA, glucose, dextrose, glucagon (rDNA), dextrose, sodium chloride flush, promethazine **OR** ondansetron, polyethylene glycol, acetaminophen **OR** acetaminophen                  Objective:     Temp  Av.4 °F (36.3 °C)  Min: 96.7 °F (35.9 °C)  Max: 98 °F (36.7 °C)  Pulse  Av.7  Min: 71  Max: 95  BP  Min: 109/75  Max: 156/80  SpO2  Av.1 %  Min: 94 %  Max: 96 %  Patient Vitals for the past 4 hrs: Pulse Resp SpO2   02/11/21 1128 82 18 95 %         Intake/Output Summary (Last 24 hours) at 2/11/2021 1257  Last data filed at 2/11/2021 1024  Gross per 24 hour   Intake 840 ml   Output 1400 ml   Net -560 ml       Physical Exam:  General appearance: No apparent distress appears stated age and cooperative. HEENT Normal cephalic, atraumatic without obvious deformity. Pupils equal, round, and reactive to light. Extra ocular muscles intact. Conjunctivae/corneas clear. Neck: Supple, No jugular venous distention/bruits. Trachea midline without thyromegaly or adenopathy with full range of motion. Lungs: diminished breath sounds. Heart: Regular rate and rhythm with Normal S1/S2 without murmurs, rubs or gallops, point of maximum impulse non-displaced  Abdomen: Soft, non-tender or non-distended without rigidity or guarding and positive bowel sounds all four quadrants. Extremities: No clubbing, cyanosis, or edema bilaterally. Full range of motion without deformity and normal gait intact. Skin: Skin color, texture, turgor normal.  No rashes or lesions. Neurologic: Alert and oriented X 3, neurovascularly intact with sensory/motor intact upper extremities/lower extremities, bilaterally. Cranial nerves: II-XII intact, grossly non-focal.  Mental status: Alert, oriented, thought content appropriate. Lab Data:  CBC:   Recent Labs     02/09/21  0407   WBC 11.8*   RBC 4.84   HGB 13.5   HCT 42.0   MCV 86.7   RDW 19.5*        BMP:   Recent Labs     02/09/21  0408 02/09/21  1427 02/11/21  0431    141 141   K 5.9* 4.6 4.2   CL 99 96* 99   CO2 34* 38* 33*   PHOS 2.3*  --   --    BUN 40* 39* 35*   CREATININE 0.9 1.2 0.9     BNP: No results for input(s): BNP in the last 72 hours. PT/INR: No results for input(s): PROTIME, INR in the last 72 hours. APTT:No results for input(s): APTT in the last 72 hours. CARDIAC ENZYMES:   No results for input(s): CKMB, CKMBINDEX, TROPONINI in the last 72 hours.     Invalid input(s): CKTOTAL;3  FASTING LIPID PANEL:  Lab Results   Component Value Date    HDL 50 03/30/2020     LIVER PROFILE:   No results for input(s): AST, ALT, ALB, BILIDIR, BILITOT, ALKPHOS in the last 72 hours. CT a/p no contrast   Moderate stool in colon.  No bowel obstruction. Laura [place] is diverticulosis.  No   diverticulitis   bibasilar airspace disease, suspicious for pneumonia     CXR   Perihilar pulmonary vasculature and edema. Bibasilar airspace disease and effusions greatest on left. Follow-up to assure resolution may be beneficial.   ECHO   LV systolic function is normal with EF estimated at 55%. No regional wall motion abnormalities. MIldly asynchronous septal motion. There is mild concentric left ventricular hypertrophy. Grade II diastolic dysfunction with elevated LV filling pressure. Mild mitral annular calcification. The left atrium is mildly dilated. Mild mitral regurgitation. Inadequate tricuspid regurgitation jet to estimate systolic artery pressure   (SPAP). Assessment & Plan:     Patient Active Problem List    Diagnosis Date Noted    Coronary artery disease involving native coronary artery of native heart without angina pectoris     Acute on chronic respiratory failure with hypoxia and hypercapnia ([address] 02/07/2021    Hypertension 03/30/2020    Other hyperlipidemia 03/30/2020    CHF (congestive heart failure) ([facility] [address] 03/30/2020    COPD exacerbation ([facility] [address] 03/30/2020     COPD with AE  Symbicort. Soumedrol. Combivent  Singulair   Zithro/Rocephin. rocephin d/c ed   Pulm consult. bipap all night, now on 3 L of O2       Acute on Chronic Mixed Hypox and Hypercapnic Resp Failure. Was on 12 L   ABG concerning for CO2 retention. bipap all night, now on 3 L of O2       Acute diastolic CHF. ECHO 2019 preserved EF. I suspect acute pulm edema triggered by hypoxia related to COPD.    He does not appear grossly fluid overloaded  Lasix IV daily per cardiology recs - monitor renal function. ECHO normal EF,diastolic dysfunction   lasix 40 mg daily at dc       Abd distentiion, constipation. No BM in over 5 days. Chronic on buprenorphine patch. Abd exam - distended, but non tender. CT - moderate stool  No emesis, no nausea. miralax daily  Senna daily prn  Had BM    Hyperkalemia  Kayexalate   Follow K levels     Micrococcus and coag negative staph in blood cultures. Likely skin contamination.      DMII - stable. ISS.     CAD Hx of  PVD Hx of  ASA and plavix. On statin.         DVT Prophylaxis: lovenox  Diet: DIET CARB CONTROL; Carb Control: 4 carb choices (60 gms)/meal; No Added Salt (3-4 GM);   Code Status: Full Code     Haley Roberson

## 2021-02-12 ENCOUNTER — TELEPHONE (OUTPATIENT)
Dept: FAMILY MEDICINE CLINIC | Age: 56
End: 2021-02-12

## 2021-02-12 LAB
BLOOD CULTURE, ROUTINE: ABNORMAL
BLOOD CULTURE, ROUTINE: ABNORMAL
CULTURE, BLOOD 2: ABNORMAL
CULTURE, BLOOD 2: ABNORMAL
ORGANISM: ABNORMAL

## 2021-02-12 NOTE — TELEPHONE ENCOUNTER
[facility] Trans[facility] Initial Follow Up Call    Outreach made within 2 business days of discharge: Yes    Patient: Johnathan Lynne Patient : 1965   MRN: <[id number]>  Reason for Admission: There are no discharge diagnoses documented for the most recent discharge. Discharge Date: 21       Spoke with: Left voicemail for patient to call office with questions, concerns and to schedule HFU. Discharge department[facility]facility: [facility]    Scheduled appointment with PCP within 7-14 days    Follow Up  No future appointments.     Marcella Dumont

## 2021-02-23 RX ORDER — GABAPENTIN 800 MG/1
TABLET ORAL
Qty: 120 TABLET | Refills: 0 | Status: SHIPPED | OUTPATIENT
Start: 2021-02-23 | End: 2021-03-26

## 2021-03-08 RX ORDER — GLIMEPIRIDE 2 MG/1
TABLET ORAL
Qty: 90 TABLET | Refills: 1 | Status: SHIPPED | OUTPATIENT
Start: 2021-03-08 | End: 2021-08-24

## 2021-03-08 NOTE — TELEPHONE ENCOUNTER
Refilled medication per verbal order from provider.   Future appt is due 05/2021  Last appt 01/08/2021

## 2021-03-11 RX ORDER — FENOFIBRIC ACID 135 MG/1
CAPSULE, DELAYED RELEASE ORAL
Qty: 30 CAPSULE | Refills: 5 | Status: SHIPPED | OUTPATIENT
Start: 2021-03-11 | End: 2021-11-08

## 2021-03-16 RX ORDER — PIOGLITAZONEHYDROCHLORIDE 15 MG/1
15 TABLET ORAL DAILY
Qty: 30 TABLET | Refills: 1 | Status: SHIPPED | OUTPATIENT
Start: 2021-03-16 | End: 2021-05-21

## 2021-03-19 RX ORDER — UMECLIDINIUM 62.5 UG/1
AEROSOL, POWDER ORAL
Qty: 30 EACH | Refills: 3 | Status: SHIPPED | OUTPATIENT
Start: 2021-03-19 | End: 2021-08-27 | Stop reason: SDUPTHER

## 2021-03-19 RX ORDER — FLUTICASONE FUROATE AND VILANTEROL TRIFENATATE 100; 25 UG/1; UG/1
POWDER RESPIRATORY (INHALATION)
Qty: 1 EACH | Refills: 3 | Status: SHIPPED | OUTPATIENT
Start: 2021-03-19 | End: 2021-06-02 | Stop reason: DRUGHIGH

## 2021-03-19 RX ORDER — ALBUTEROL SULFATE 90 UG/1
2 AEROSOL, METERED RESPIRATORY (INHALATION) EVERY 6 HOURS PRN
Qty: 1 INHALER | Refills: 5 | Status: SHIPPED | OUTPATIENT
Start: 2021-03-19 | End: 2021-08-24

## 2021-03-26 RX ORDER — GABAPENTIN 800 MG/1
TABLET ORAL
Qty: 120 TABLET | Refills: 0 | Status: SHIPPED | OUTPATIENT
Start: 2021-03-26 | End: 2021-04-27

## 2021-03-26 RX ORDER — ATORVASTATIN CALCIUM 40 MG/1
TABLET, FILM COATED ORAL
Qty: 90 TABLET | Refills: 1 | Status: ON HOLD | OUTPATIENT
Start: 2021-03-26 | End: 2021-09-29

## 2021-03-26 NOTE — TELEPHONE ENCOUNTER
Date of last refill of this med was 2/23, # of pills given 120 and # of refills given 0. Their next appointment is 5/11, the last date patient was seen was 1/8. Does patient have medication agreement on file? No  Has drug screen been done in last 12 months if needed?  no

## 2021-03-26 NOTE — TELEPHONE ENCOUNTER
Controlled Substance Monitoring:    Acute and Chronic Pain Monitoring:   RX Monitoring 3/26/2021   Periodic Controlled Substance Monitoring No signs of potential drug abuse or diversion identified.

## 2021-04-27 RX ORDER — GABAPENTIN 800 MG/1
TABLET ORAL
Qty: 120 TABLET | Refills: 0 | Status: SHIPPED | OUTPATIENT
Start: 2021-04-27 | End: 2021-05-24 | Stop reason: SDUPTHER

## 2021-04-27 NOTE — TELEPHONE ENCOUNTER
Date of last refill of this med was 03/26/2021, # of pills given 120 and # of refills given 0. Their next appointment is 05/11/2021, the last date patient was seen was 01/08/2021. Does patient have medication agreement on file? No  Has drug screen been done in last 12 months if needed?  no

## 2021-05-11 ENCOUNTER — TELEPHONE (OUTPATIENT)
Dept: FAMILY MEDICINE CLINIC | Age: 56
End: 2021-05-11

## 2021-05-11 NOTE — TELEPHONE ENCOUNTER
Patient no show for appointment on 5/11/21.  Message was left for the patient and spoke with wife regarding no show and she will have him call to reschedule

## 2021-05-18 RX ORDER — PIOGLITAZONEHYDROCHLORIDE 15 MG/1
TABLET ORAL
Qty: 30 TABLET | Refills: 1 | OUTPATIENT
Start: 2021-05-18

## 2021-05-19 RX ORDER — GABAPENTIN 800 MG/1
TABLET ORAL
Qty: 120 TABLET | Refills: 0 | OUTPATIENT
Start: 2021-05-19 | End: 2021-06-18

## 2021-05-21 RX ORDER — PIOGLITAZONEHYDROCHLORIDE 15 MG/1
TABLET ORAL
Qty: 30 TABLET | Refills: 0 | Status: SHIPPED | OUTPATIENT
Start: 2021-05-21 | End: 2021-06-11

## 2021-05-24 ENCOUNTER — OFFICE VISIT (OUTPATIENT)
Dept: FAMILY MEDICINE CLINIC | Age: 56
End: 2021-05-24
Payer: MEDICARE

## 2021-05-24 VITALS
OXYGEN SATURATION: 90 % | DIASTOLIC BLOOD PRESSURE: 68 MMHG | SYSTOLIC BLOOD PRESSURE: 107 MMHG | WEIGHT: 184.38 LBS | BODY MASS INDEX: 37.24 KG/M2 | HEART RATE: 69 BPM | TEMPERATURE: 98 F

## 2021-05-24 DIAGNOSIS — E78.49 OTHER HYPERLIPIDEMIA: ICD-10-CM

## 2021-05-24 DIAGNOSIS — I25.10 CORONARY ARTERY DISEASE INVOLVING NATIVE CORONARY ARTERY OF NATIVE HEART WITHOUT ANGINA PECTORIS: ICD-10-CM

## 2021-05-24 DIAGNOSIS — I10 ESSENTIAL HYPERTENSION: Primary | ICD-10-CM

## 2021-05-24 DIAGNOSIS — Z53.20 COLON CANCER SCREENING DECLINED: ICD-10-CM

## 2021-05-24 DIAGNOSIS — G62.9 NEUROPATHY: ICD-10-CM

## 2021-05-24 DIAGNOSIS — E11.9 TYPE 2 DIABETES MELLITUS WITHOUT COMPLICATION, WITHOUT LONG-TERM CURRENT USE OF INSULIN (HCC): ICD-10-CM

## 2021-05-24 DIAGNOSIS — J44.9 CHRONIC OBSTRUCTIVE PULMONARY DISEASE, UNSPECIFIED COPD TYPE (HCC): ICD-10-CM

## 2021-05-24 DIAGNOSIS — I50.22 CHRONIC SYSTOLIC CONGESTIVE HEART FAILURE (HCC): ICD-10-CM

## 2021-05-24 DIAGNOSIS — Z72.0 TOBACCO USE: ICD-10-CM

## 2021-05-24 PROCEDURE — G8926 SPIRO NO PERF OR DOC: HCPCS | Performed by: NURSE PRACTITIONER

## 2021-05-24 PROCEDURE — G8417 CALC BMI ABV UP PARAM F/U: HCPCS | Performed by: NURSE PRACTITIONER

## 2021-05-24 PROCEDURE — 3023F SPIROM DOC REV: CPT | Performed by: NURSE PRACTITIONER

## 2021-05-24 PROCEDURE — 4004F PT TOBACCO SCREEN RCVD TLK: CPT | Performed by: NURSE PRACTITIONER

## 2021-05-24 PROCEDURE — 99214 OFFICE O/P EST MOD 30 MIN: CPT | Performed by: NURSE PRACTITIONER

## 2021-05-24 PROCEDURE — 2022F DILAT RTA XM EVC RTNOPTHY: CPT | Performed by: NURSE PRACTITIONER

## 2021-05-24 PROCEDURE — 3017F COLORECTAL CA SCREEN DOC REV: CPT | Performed by: NURSE PRACTITIONER

## 2021-05-24 PROCEDURE — 36415 COLL VENOUS BLD VENIPUNCTURE: CPT | Performed by: NURSE PRACTITIONER

## 2021-05-24 PROCEDURE — 3051F HG A1C>EQUAL 7.0%<8.0%: CPT | Performed by: NURSE PRACTITIONER

## 2021-05-24 PROCEDURE — G8427 DOCREV CUR MEDS BY ELIG CLIN: HCPCS | Performed by: NURSE PRACTITIONER

## 2021-05-24 RX ORDER — FUROSEMIDE 40 MG/1
40 TABLET ORAL DAILY
Qty: 30 TABLET | Refills: 3 | Status: ON HOLD | OUTPATIENT
Start: 2021-05-24 | End: 2021-09-30 | Stop reason: ALTCHOICE

## 2021-05-24 RX ORDER — GABAPENTIN 800 MG/1
TABLET ORAL
Qty: 120 TABLET | Refills: 0 | Status: SHIPPED | OUTPATIENT
Start: 2021-05-24 | End: 2021-10-05 | Stop reason: SDUPTHER

## 2021-05-24 RX ORDER — HYDROCODONE BITARTRATE AND ACETAMINOPHEN 5; 325 MG/1; MG/1
1 TABLET ORAL EVERY 8 HOURS PRN
COMMUNITY
Start: 2021-05-11 | End: 2021-10-14 | Stop reason: ALTCHOICE

## 2021-05-24 ASSESSMENT — ENCOUNTER SYMPTOMS
VOMITING: 0
SHORTNESS OF BREATH: 0
WHEEZING: 0
NAUSEA: 0
COUGH: 0
ABDOMINAL PAIN: 0
RHINORRHEA: 0
DIARRHEA: 0
SORE THROAT: 0

## 2021-05-24 NOTE — PATIENT INSTRUCTIONS
Please read the healthy family handout that you were given and share it with your family. Please compare this printed medication list with your medications at home to be sure they are the same. If you have any medications that are different please contact us immediately at [phone]. Also review your allergies that we have listed, these may also include medications that you have not been able to tolerate, make sure everything listed is correct. If you have any allergies that are different please contact us immediately at [phone]. Patient Education        Stopping Smoking: Care Instructions  Your Care Instructions     Cigarette smokers crave the nicotine in cigarettes. Giving it up is much harder than simply changing a habit. Your body has to stop craving the nicotine. It is hard to quit, but you can do it. There are many tools that people use to quit smoking. You may find that combining tools works best for you. There are several steps to quitting. First you get ready to quit. Then you get support to help you. After that, you learn new skills and behaviors to become a nonsmoker. For many people, a necessary step is getting and using medicine. Your doctor will help you set up the plan that best meets your needs. You may want to attend a smoking cessation program to help you quit smoking. When you choose a program, look for one that has proven success. Ask your doctor for ideas. You will greatly increase your chances of success if you take medicine as well as get counseling or join a cessation program.  Some of the changes you feel when you first quit tobacco are uncomfortable. Your body will miss the nicotine at first, and you may feel short-tempered and grumpy. You may have trouble sleeping or concentrating. Medicine can help you deal with these symptoms. You may struggle with changing your smoking habits and rituals. The last step is the tricky one:  Be prepared for the smoking urge to continue for a time. This is a lot to deal with, but keep at it. You will feel better. Follow-up care is a key part of your treatment and safety. Be sure to make and go to all appointments, and call your doctor if you are having problems. It's also a good idea to know your test results and keep a list of the medicines you take. How can you care for yourself at home? · Ask your family, friends, and coworkers for support. You have a better chance of quitting if you have help and support. · Join a support group, such as Nicotine Anonymous, for people who are trying to quit smoking. · Consider signing up for a smoking cessation program, such as the American Lung Association's Freedom from Smoking program.  · Get text messaging support. Go to the website at www.smokefree. gov to sign up for the [facility] program.  · Set a quit date. Pick your date carefully so that it is not right in the middle of a big deadline or stressful time. Once you quit, do not even take a puff. Get rid of all ashtrays and lighters after your last cigarette. Clean your house and your clothes so that they do not smell of smoke. · Learn how to be a nonsmoker. Think about ways you can avoid those things that make you reach for a cigarette. ? Avoid situations that put you at greatest risk for smoking. For some people, it is hard to have a drink with friends without smoking. For others, they might skip a coffee break with coworkers who smoke. ? Change your daily routine. Take a different route to work or eat a meal in a different place. · Cut down on stress. Calm yourself or release tension by doing an activity you enjoy, such as reading a book, taking a hot bath, or gardening. · Talk to your doctor or pharmacist about nicotine replacement therapy, which replaces the nicotine in your body. You still get nicotine but you do not use tobacco. Nicotine replacement products help you slowly reduce the amount of nicotine you need.  These products come in several forms, many of them available over-the-counter:  ? Nicotine patches  ? Nicotine gum and lozenges  ? Nicotine inhaler  · Ask your doctor about bupropion (Wellbutrin) or varenicline (Chantix), which are prescription medicines. They do not contain nicotine. They help you by reducing withdrawal symptoms, such as stress and anxiety. · Some people find hypnosis, acupuncture, and massage helpful for ending the smoking habit. · Eat a healthy diet and get regular exercise. Having healthy habits will help your body move past its craving for nicotine. · Be prepared to keep trying. Most people are not successful the first few times they try to quit. Do not get mad at yourself if you smoke again. Make a list of things you learned and think about when you want to try again, such as next week, next month, or next year. Where can you learn more? Go to https://CereScanpekarlieeweb.AREVS. org and sign in to your [employer] account. Enter U805 in the Globial box to learn more about \"Stopping Smoking: Care Instructions. \"     If you do not have an account, please click on the \"Sign Up Now\" link. Current as of: March 12, 2020               Content Version: 12.8  © 7807-2725 [employer]. Care instructions adapted under license by [employer]. If you have questions about a medical condition or this instruction, always ask your healthcare professional. [address]  any warranty or liability for your use of this information.

## 2021-05-24 NOTE — PROGRESS NOTES
CHIEF COMPLAINT  Chief Complaint   Patient presents with    Check-Up     HTN        HPI   Shey Srivastava is a 50 y.o. male who presents to the office for checkup. Patient reports doing well. Patient denies any unusual fatigue or unexplained weight loss. No episodes of dizziness, lightheadedness, chest pain or shortness of breath. No abdominal pain or discomfort. No nausea, vomiting, diarrhea. No dark or tarry stools. Patient reports that he occasionally checks his blood sugar. Patient reports that he does take his medications as prescribed. Patient follows up with cardiology on a regular basis. Patient reports that he is also started seeing pain management. Patient continues to smoke 1 pack/day. No other complaints, modifying factors or associated symptoms. Nursing notes reviewed. Past Medical History:   Diagnosis Date    CHF (congestive heart failure) (HCC)     [facility] [address]     Diabetes mellitus ([facility] [address]     Hyperlipidemia     Hypertension      Past Surgical History:   Procedure Laterality Date    HERNIA REPAIR      TONSILLECTOMY       Family History   Problem Relation Age of Onset    Asthma Mother     Cancer Mother     Hearing Loss Mother     Vision Loss Mother     High Blood Pressure Father     High Cholesterol Father     Vision Loss Father     Asthma Sister     Diabetes Sister     Vision Loss Sister     Asthma Brother     Arthritis Brother     High Blood Pressure Brother      Social History     Socioeconomic History    Marital status:      Spouse name: Not on file    Number of children: Not on file    Years of education: Not on file    Highest education level: Not on file   Occupational History    Not on file   Tobacco Use    Smoking status: Current Every Day Smoker     Packs/day: 2.00    Smokeless tobacco: Never Used   Vaping Use    Vaping Use: Never used   Substance and Sexual Activity    Alcohol use:  Yes    Drug use: (AMARYL) 2 MG tablet TAKE 1 TABLET EACH MORNING BEFORE BREAKFAST. DISCONTINUE FARXIGA 90 tablet 1    buprenorphine (BUPRENEX) 5 MCG/HR PTWK       montelukast (SINGULAIR) 10 MG tablet       DULoxetine (CYMBALTA) 20 MG extended release capsule Take 20 mg by mouth 2 times daily      ipratropium-albuterol (DUONEB) 0.5-2.5 (3) MG/3ML SOLN nebulizer solution TAKE 3 MLS BY NEBULIZATION EVERY 4-6 HOURS AS NEEDED FOR SHORTNESS OF BREATH 360 mL 1    lisinopril (PRINIVIL;ZESTRIL) 20 MG tablet TAKE 1 TABLET BY MOUTH EVERY DAY 30 tablet 5    albuterol sulfate  (90 Base) MCG/ACT inhaler INHALE 2 PUFFS INTO THE LUNGS 2 TIMES DAILY 1 Inhaler 3    Cholecalciferol (VITAMIN D3) 1.25 MG (96811 UT) CAPS Take 1 capsule by mouth Twice a Week 2 capsule 1    Magnesium 400 MG CAPS Take 1 tablet by mouth daily 30 capsule 1    bisoprolol (ZEBETA) 10 MG tablet Take 1 tablet by mouth daily 30 tablet 1    clopidogrel (PLAVIX) 75 MG tablet Take 1 tablet by mouth daily 30 tablet 1    blood glucose monitor strips Test 2 times a day & as needed for symptoms of irregular blood glucose. E11.9 300 strip 0    glucose monitoring kit (FREESTYLE) monitoring kit 1 kit by Does not apply route daily Patient wants brand name Patient tests bid  E11.9 1 kit 0    blood glucose monitor kit and supplies Test 2 times a day & as needed for symptoms of irregular blood glucose. 1 kit 0    aspirin 81 MG EC tablet Take 81 mg by mouth daily      Cyanocobalamin 1000 MCG CAPS Take 1 tablet by mouth daily      Omega-3 1000 MG CAPS Take 1 capsule by mouth daily      Coenzyme Q10 (COQ10) 100 MG CAPS Take 1 capsule by mouth daily      ALBUTEROL IN Inhale 2 puffs into the lungs every 4 hours as needed      MULTIPLE VITAMINS PO Take by mouth      HYDROcodone-acetaminophen (NORCO) 5-325 MG per tablet        No current facility-administered medications for this visit.      No Known Allergies    REVIEW OF SYSTEMS  Review of Systems   Constitutional: Negative for activity change, appetite change, chills and fever. HENT: Negative for congestion, rhinorrhea and sore throat. Eyes: Negative for visual disturbance. Respiratory: Negative for cough, shortness of breath and wheezing. Cardiovascular: Negative for chest pain and leg swelling. Gastrointestinal: Negative for abdominal pain, diarrhea, nausea and vomiting. Genitourinary: Negative for dysuria, frequency, hematuria and urgency. Musculoskeletal: Positive for arthralgias. Negative for gait problem and myalgias. Skin: Negative for rash. Neurological: Negative for dizziness, weakness, light-headedness, numbness and headaches. Psychiatric/Behavioral: Negative for sleep disturbance. PHYSICAL EXAM  /68   Pulse 69   Temp 98 °F (36.7 °C) (Oral)   Wt 184 lb 6 oz (83.6 kg)   SpO2 90%   BMI 37.24 kg/m²   Physical Exam  Vitals reviewed. Constitutional:       General: He is not in acute distress. Appearance: Normal appearance. He is well-developed. He is not diaphoretic. HENT:      Head: Normocephalic and atraumatic. Right Ear: Tympanic membrane normal.      Left Ear: Tympanic membrane normal.      Nose: Nose normal.      Mouth/Throat:      Mouth: Mucous membranes are moist.      Pharynx: Oropharynx is clear. No oropharyngeal exudate or posterior oropharyngeal erythema. Eyes:      General:         Right eye: No discharge. Left eye: No discharge. Pupils: Pupils are equal, round, and reactive to light. Neck:      Vascular: No JVD. Cardiovascular:      Rate and Rhythm: Normal rate and regular rhythm. Pulses: Normal pulses. Pulmonary:      Effort: Pulmonary effort is normal. No respiratory distress. Breath sounds: No stridor. Wheezing present. No rhonchi or rales. Chest:      Chest wall: No tenderness. Abdominal:      General: Bowel sounds are normal. There is no distension. Palpations: Abdomen is soft. Tenderness:  There is no abdominal tenderness. There is no guarding. Musculoskeletal:         General: No swelling or deformity. Normal range of motion. Cervical back: Normal range of motion and neck supple. Skin:     General: Skin is warm and dry. Capillary Refill: Capillary refill takes less than 2 seconds. Coloration: Skin is not pale. Findings: No bruising, lesion or rash. Neurological:      General: No focal deficit present. Mental Status: He is alert and oriented to person, place, and time. Psychiatric:         Mood and Affect: Mood normal.         Behavior: Behavior normal.        ASSESSMENT/PLAN:   1. Essential hypertension  Stable. Patient compliant with lisinopril 20 mg daily and bisoprolol 10 mg daily. No episodes of dizziness, lightheadedness, chest pain or shortness of breath. With current treatment management follow-up in 6 months, sooner for new or worsening symptoms. - furosemide (LASIX) 40 MG tablet; Take 1 tablet by mouth daily  Dispense: 30 tablet; Refill: 3  - COMPREHENSIVE METABOLIC PANEL  - CBC Auto Differential    2. Other hyperlipidemia  Stable. Patient compliant with Lipitor 40 mg daily. Continue with current treatment management follow-up in 6 months, sooner for new or worsening symptoms. 3. Chronic systolic congestive heart failure (HCC)  Stable. Managed by cardiology. Compliant with Lasix. Patient denies any swelling/edema or worsening shortness of breath. Continue with current treatment management follow-up in 6 months, sooner for new or worsening symptoms. 4. Coronary artery disease involving native coronary artery of native heart without angina pectoris  Stable. Managed by cardiology. Patient follows up with them every 6 months. Last visit was in February. Continue with current treatment management follow-up in 6 months, sooner for new or worsening symptoms.  - Lipid, Fasting    5. Tobacco use  Tobacco cessation discussed in detail with patient.   Patient reports that he has cut back but continues smoke 1 pack/day. Patient verbalized and acknowledges the importance of smoking cessation. 6. Type 2 diabetes mellitus without complication, without long-term current use of insulin ([facility])  Stable. Previous A1c 7.6. Patient denies any episodes of hyperglycemia or hypoglycemia. Patient reports that his wife checks his blood sugar on occasion. Patient currently on Metformin 1000 mg twice daily, glimepiride 2 mg daily, and Actos 15 mg daily. Encouraged to monitor dietary intake, check blood sugars on a daily basis, call and schedule diabetic eye exam.  Patient verbalized and acknowledges follow-up in 4 months, sooner for new or worsening symptoms.  - HEMOGLOBIN A1C  - [facility] - Bib Schroeder MD, Ophthalmology, [facility]    7. Chronic obstructive pulmonary disease, unspecified COPD type ([facility] [address]  Stable. Patient continues to smoke 1 pack/day. Patient counseled on the importance of smoking cessation. Patient does follow-up with pulmonologist.  Patient reports he has cut back on tobacco use from 2-1/2 packs to 1 pack. Patient reports since his COPD exacerbation in February he has been doing better. Continue using prescribed inhalers and nebulizer treatments as prescribed and following up with pulmonologist.  Follow-up in 6 months, sooner for new or worsening symptoms. 8. Neuropathy  Stable. Patient reports that he has been taking the gabapentin as prescribed. Patient reports that his symptoms have improved since seeing pain management and placed on Norco.  Patient reports that he is feeling better. Continue with current treatment management follow-up in 6 months, sooner for new or worsening symptoms.  - gabapentin (NEURONTIN) 800 MG tablet; TAKE 1 TABLET 4 TIMES DAILY  Dispense: 120 tablet; Refill: 0    9. Colon cancer screening declined  Patient aware that he is past due for colonoscopy/colon cancer screening.   Patient denies any abdominal pain, constipation, dark tarry stools or rectal bleeding. Patient declines testing at this time. The note was completed using Dragon voice recognition transcription. Every effort was made to ensure accuracy; however, inadvertent  transcription errors may be present despite my best efforts to edit errors.     LINK Drake - CNP

## 2021-05-25 LAB
A/G RATIO: 1.7 (ref 1.1–2.2)
ALBUMIN SERPL-MCNC: 4.5 G/DL (ref 3.4–5)
ALP BLD-CCNC: 49 U/L (ref 40–129)
ALT SERPL-CCNC: 15 U/L (ref 10–40)
ANION GAP SERPL CALCULATED.3IONS-SCNC: 13 MMOL/L (ref 3–16)
AST SERPL-CCNC: 22 U/L (ref 15–37)
BASOPHILS ABSOLUTE: 0.1 K/UL (ref 0–0.2)
BASOPHILS RELATIVE PERCENT: 0.9 %
BILIRUB SERPL-MCNC: <0.2 MG/DL (ref 0–1)
BUN BLDV-MCNC: 23 MG/DL (ref 7–20)
CALCIUM SERPL-MCNC: 10.5 MG/DL (ref 8.3–10.6)
CHLORIDE BLD-SCNC: 103 MMOL/L (ref 99–110)
CHOLESTEROL, FASTING: 188 MG/DL (ref 0–199)
CO2: 28 MMOL/L (ref 21–32)
CREAT SERPL-MCNC: 0.9 MG/DL (ref 0.9–1.3)
EOSINOPHILS ABSOLUTE: 0.1 K/UL (ref 0–0.6)
EOSINOPHILS RELATIVE PERCENT: 0.8 %
ESTIMATED AVERAGE GLUCOSE: 162.8 MG/DL
GFR AFRICAN AMERICAN: >60
GFR NON-AFRICAN AMERICAN: >60
GLOBULIN: 2.7 G/DL
GLUCOSE BLD-MCNC: 103 MG/DL (ref 70–99)
HBA1C MFR BLD: 7.3 %
HCT VFR BLD CALC: 46.9 % (ref 40.5–52.5)
HDLC SERPL-MCNC: 35 MG/DL (ref 40–60)
HEMOGLOBIN: 15.2 G/DL (ref 13.5–17.5)
LDL CHOLESTEROL CALCULATED: 110 MG/DL
LYMPHOCYTES ABSOLUTE: 2.6 K/UL (ref 1–5.1)
LYMPHOCYTES RELATIVE PERCENT: 30.4 %
MCH RBC QN AUTO: 29.3 PG (ref 26–34)
MCHC RBC AUTO-ENTMCNC: 32.4 G/DL (ref 31–36)
MCV RBC AUTO: 90.5 FL (ref 80–100)
MONOCYTES ABSOLUTE: 0.7 K/UL (ref 0–1.3)
MONOCYTES RELATIVE PERCENT: 8.2 %
NEUTROPHILS ABSOLUTE: 5.1 K/UL (ref 1.7–7.7)
NEUTROPHILS RELATIVE PERCENT: 59.7 %
PDW BLD-RTO: 18.4 % (ref 12.4–15.4)
PLATELET # BLD: 287 K/UL (ref 135–450)
PMV BLD AUTO: 9.3 FL (ref 5–10.5)
POTASSIUM SERPL-SCNC: 5.3 MMOL/L (ref 3.5–5.1)
RBC # BLD: 5.18 M/UL (ref 4.2–5.9)
SODIUM BLD-SCNC: 144 MMOL/L (ref 136–145)
TOTAL PROTEIN: 7.2 G/DL (ref 6.4–8.2)
TRIGLYCERIDE, FASTING: 214 MG/DL (ref 0–150)
VLDLC SERPL CALC-MCNC: 43 MG/DL
WBC # BLD: 8.5 K/UL (ref 4–11)

## 2021-06-01 ENCOUNTER — NURSE ONLY (OUTPATIENT)
Dept: FAMILY MEDICINE CLINIC | Age: 56
End: 2021-06-01
Payer: MEDICARE

## 2021-06-01 DIAGNOSIS — E87.5 SERUM POTASSIUM ELEVATED: Primary | ICD-10-CM

## 2021-06-01 PROCEDURE — 36415 COLL VENOUS BLD VENIPUNCTURE: CPT | Performed by: NURSE PRACTITIONER

## 2021-06-02 LAB
ANION GAP SERPL CALCULATED.3IONS-SCNC: 12 MMOL/L (ref 3–16)
BUN BLDV-MCNC: 27 MG/DL (ref 7–20)
CALCIUM SERPL-MCNC: 9.4 MG/DL (ref 8.3–10.6)
CHLORIDE BLD-SCNC: 99 MMOL/L (ref 99–110)
CO2: 29 MMOL/L (ref 21–32)
CREAT SERPL-MCNC: 1 MG/DL (ref 0.9–1.3)
GFR AFRICAN AMERICAN: >60
GFR NON-AFRICAN AMERICAN: >60
GLUCOSE BLD-MCNC: 104 MG/DL (ref 70–99)
POTASSIUM SERPL-SCNC: 5.5 MMOL/L (ref 3.5–5.1)
SODIUM BLD-SCNC: 140 MMOL/L (ref 136–145)

## 2021-06-02 RX ORDER — FLUTICASONE FUROATE AND VILANTEROL 200; 25 UG/1; UG/1
1 POWDER RESPIRATORY (INHALATION) DAILY
Qty: 1 EACH | Refills: 0
Start: 2021-06-02 | End: 2022-05-06

## 2021-06-03 ENCOUNTER — NURSE ONLY (OUTPATIENT)
Dept: FAMILY MEDICINE CLINIC | Age: 56
End: 2021-06-03
Payer: MEDICARE

## 2021-06-03 DIAGNOSIS — E87.5 HYPERKALEMIA: Primary | ICD-10-CM

## 2021-06-03 PROCEDURE — 36415 COLL VENOUS BLD VENIPUNCTURE: CPT | Performed by: NURSE PRACTITIONER

## 2021-06-03 NOTE — PROGRESS NOTES
Garret Lopez blood out  rt ac space  with 23 gauge Butterfly needle, without incident, applied pressure to draw site and applied bandaid, liam 1 tube.

## 2021-06-04 LAB — POTASSIUM SERPL-SCNC: 5.2 MMOL/L (ref 3.5–5.1)

## 2021-06-07 ENCOUNTER — APPOINTMENT (OUTPATIENT)
Dept: GENERAL RADIOLOGY | Age: 56
DRG: 291 | End: 2021-06-07
Payer: MEDICARE

## 2021-06-07 ENCOUNTER — HOSPITAL ENCOUNTER (INPATIENT)
Age: 56
LOS: 2 days | Discharge: HOME | DRG: 291 | End: 2021-06-09
Attending: EMERGENCY MEDICINE | Admitting: INTERNAL MEDICINE
Payer: MEDICARE

## 2021-06-07 DIAGNOSIS — J96.02 ACUTE RESPIRATORY FAILURE WITH HYPOXIA AND HYPERCAPNIA (HCC): Primary | ICD-10-CM

## 2021-06-07 DIAGNOSIS — J96.01 ACUTE RESPIRATORY FAILURE WITH HYPOXIA AND HYPERCAPNIA (HCC): Primary | ICD-10-CM

## 2021-06-07 DIAGNOSIS — J44.1 COPD EXACERBATION (HCC): ICD-10-CM

## 2021-06-07 DIAGNOSIS — I50.22 CHRONIC SYSTOLIC CONGESTIVE HEART FAILURE (HCC): ICD-10-CM

## 2021-06-07 LAB
A/G RATIO: 1.4 (ref 1.1–2.2)
ALBUMIN SERPL-MCNC: 4.4 G/DL (ref 3.4–5)
ALP BLD-CCNC: 54 U/L (ref 40–129)
ALT SERPL-CCNC: 15 U/L (ref 10–40)
ANION GAP SERPL CALCULATED.3IONS-SCNC: 8 MMOL/L (ref 3–16)
AST SERPL-CCNC: 18 U/L (ref 15–37)
BASE EXCESS VENOUS: 3 MMOL/L (ref -3–3)
BASE EXCESS VENOUS: 3.1 MMOL/L (ref -3–3)
BASOPHILS ABSOLUTE: 0.2 K/UL (ref 0–0.2)
BASOPHILS RELATIVE PERCENT: 1.5 %
BILIRUB SERPL-MCNC: <0.2 MG/DL (ref 0–1)
BUN BLDV-MCNC: 33 MG/DL (ref 7–20)
CALCIUM SERPL-MCNC: 9.9 MG/DL (ref 8.3–10.6)
CARBOXYHEMOGLOBIN: 12.1 % (ref 0–1.5)
CARBOXYHEMOGLOBIN: 4.5 % (ref 0–1.5)
CHLORIDE BLD-SCNC: 105 MMOL/L (ref 99–110)
CO2: 32 MMOL/L (ref 21–32)
CREAT SERPL-MCNC: 1.1 MG/DL (ref 0.9–1.3)
EKG ATRIAL RATE: 74 BPM
EKG DIAGNOSIS: NORMAL
EKG P AXIS: 31 DEGREES
EKG P-R INTERVAL: 158 MS
EKG Q-T INTERVAL: 390 MS
EKG QRS DURATION: 90 MS
EKG QTC CALCULATION (BAZETT): 432 MS
EKG R AXIS: 156 DEGREES
EKG T AXIS: 57 DEGREES
EKG VENTRICULAR RATE: 74 BPM
EOSINOPHILS ABSOLUTE: 0.1 K/UL (ref 0–0.6)
EOSINOPHILS RELATIVE PERCENT: 0.7 %
GFR AFRICAN AMERICAN: >60
GFR NON-AFRICAN AMERICAN: >60
GLOBULIN: 3.1 G/DL
GLUCOSE BLD-MCNC: 107 MG/DL (ref 70–99)
GLUCOSE BLD-MCNC: 117 MG/DL (ref 70–99)
GLUCOSE BLD-MCNC: 178 MG/DL (ref 70–99)
GLUCOSE BLD-MCNC: 184 MG/DL (ref 70–99)
HCO3 VENOUS: 31 MMOL/L (ref 23–29)
HCO3 VENOUS: 32.5 MMOL/L (ref 23–29)
HCT VFR BLD CALC: 42.8 % (ref 40.5–52.5)
HEMOGLOBIN: 13.5 G/DL (ref 13.5–17.5)
INFLUENZA A: NOT DETECTED
INFLUENZA B: NOT DETECTED
LACTIC ACID, SEPSIS: 0.9 MMOL/L (ref 0.4–1.9)
LYMPHOCYTES ABSOLUTE: 2.6 K/UL (ref 1–5.1)
LYMPHOCYTES RELATIVE PERCENT: 22.8 %
MCH RBC QN AUTO: 29.2 PG (ref 26–34)
MCHC RBC AUTO-ENTMCNC: 31.7 G/DL (ref 31–36)
MCV RBC AUTO: 92.2 FL (ref 80–100)
METHEMOGLOBIN VENOUS: 0.3 %
METHEMOGLOBIN VENOUS: 0.3 %
MONOCYTES ABSOLUTE: 0.7 K/UL (ref 0–1.3)
MONOCYTES RELATIVE PERCENT: 5.8 %
NEUTROPHILS ABSOLUTE: 7.9 K/UL (ref 1.7–7.7)
NEUTROPHILS RELATIVE PERCENT: 69.2 %
O2 CONTENT, VEN: 10 VOL %
O2 CONTENT, VEN: 18 VOL %
O2 SAT, VEN: 40 %
O2 SAT, VEN: 87 %
O2 THERAPY: ABNORMAL
O2 THERAPY: ABNORMAL
PCO2, VEN: 61.6 MMHG (ref 40–50)
PCO2, VEN: 74 MMHG (ref 40–50)
PDW BLD-RTO: 18.6 % (ref 12.4–15.4)
PERFORMED ON: ABNORMAL
PH VENOUS: 7.26 (ref 7.35–7.45)
PH VENOUS: 7.32 (ref 7.35–7.45)
PLATELET # BLD: 280 K/UL (ref 135–450)
PMV BLD AUTO: 8 FL (ref 5–10.5)
PO2, VEN: 27.1 MMHG (ref 25–40)
PO2, VEN: 58.3 MMHG (ref 25–40)
POTASSIUM SERPL-SCNC: 5.2 MMOL/L (ref 3.5–5.1)
PRO-BNP: 1847 PG/ML (ref 0–124)
RBC # BLD: 4.64 M/UL (ref 4.2–5.9)
SARS-COV-2 RNA, RT PCR: NOT DETECTED
SARS-COV-2, NAAT: NOT DETECTED
SODIUM BLD-SCNC: 145 MMOL/L (ref 136–145)
TCO2 CALC VENOUS: 33 MMOL/L
TCO2 CALC VENOUS: 35 MMOL/L
TOTAL PROTEIN: 7.5 G/DL (ref 6.4–8.2)
TROPONIN: <0.01 NG/ML
WBC # BLD: 11.5 K/UL (ref 4–11)

## 2021-06-07 PROCEDURE — 71045 X-RAY EXAM CHEST 1 VIEW: CPT

## 2021-06-07 PROCEDURE — 85025 COMPLETE CBC W/AUTO DIFF WBC: CPT

## 2021-06-07 PROCEDURE — 82803 BLOOD GASES ANY COMBINATION: CPT

## 2021-06-07 PROCEDURE — 99285 EMERGENCY DEPT VISIT HI MDM: CPT

## 2021-06-07 PROCEDURE — 84484 ASSAY OF TROPONIN QUANT: CPT

## 2021-06-07 PROCEDURE — 93010 ELECTROCARDIOGRAM REPORT: CPT | Performed by: INTERNAL MEDICINE

## 2021-06-07 PROCEDURE — 80053 COMPREHEN METABOLIC PANEL: CPT

## 2021-06-07 PROCEDURE — 94660 CPAP INITIATION&MGMT: CPT

## 2021-06-07 PROCEDURE — 99291 CRITICAL CARE FIRST HOUR: CPT | Performed by: INTERNAL MEDICINE

## 2021-06-07 PROCEDURE — 6360000002 HC RX W HCPCS: Performed by: PHYSICIAN ASSISTANT

## 2021-06-07 PROCEDURE — 2580000003 HC RX 258: Performed by: PHYSICIAN ASSISTANT

## 2021-06-07 PROCEDURE — 87635 SARS-COV-2 COVID-19 AMP PRB: CPT

## 2021-06-07 PROCEDURE — 94640 AIRWAY INHALATION TREATMENT: CPT

## 2021-06-07 PROCEDURE — 83880 ASSAY OF NATRIURETIC PEPTIDE: CPT

## 2021-06-07 PROCEDURE — 6360000002 HC RX W HCPCS: Performed by: EMERGENCY MEDICINE

## 2021-06-07 PROCEDURE — 87636 SARSCOV2 & INF A&B AMP PRB: CPT

## 2021-06-07 PROCEDURE — 6370000000 HC RX 637 (ALT 250 FOR IP): Performed by: PHYSICIAN ASSISTANT

## 2021-06-07 PROCEDURE — 2000000000 HC ICU R&B

## 2021-06-07 PROCEDURE — 96374 THER/PROPH/DIAG INJ IV PUSH: CPT

## 2021-06-07 PROCEDURE — 99223 1ST HOSP IP/OBS HIGH 75: CPT | Performed by: INTERNAL MEDICINE

## 2021-06-07 PROCEDURE — 83605 ASSAY OF LACTIC ACID: CPT

## 2021-06-07 PROCEDURE — 94761 N-INVAS EAR/PLS OXIMETRY MLT: CPT

## 2021-06-07 PROCEDURE — 93005 ELECTROCARDIOGRAM TRACING: CPT | Performed by: EMERGENCY MEDICINE

## 2021-06-07 PROCEDURE — 96375 TX/PRO/DX INJ NEW DRUG ADDON: CPT

## 2021-06-07 PROCEDURE — 36415 COLL VENOUS BLD VENIPUNCTURE: CPT

## 2021-06-07 PROCEDURE — 6370000000 HC RX 637 (ALT 250 FOR IP): Performed by: EMERGENCY MEDICINE

## 2021-06-07 PROCEDURE — 2700000000 HC OXYGEN THERAPY PER DAY

## 2021-06-07 PROCEDURE — 6370000000 HC RX 637 (ALT 250 FOR IP): Performed by: INTERNAL MEDICINE

## 2021-06-07 PROCEDURE — 83036 HEMOGLOBIN GLYCOSYLATED A1C: CPT

## 2021-06-07 RX ORDER — IPRATROPIUM BROMIDE AND ALBUTEROL SULFATE 2.5; .5 MG/3ML; MG/3ML
1 SOLUTION RESPIRATORY (INHALATION)
Status: DISCONTINUED | OUTPATIENT
Start: 2021-06-07 | End: 2021-06-09 | Stop reason: HOSPADM

## 2021-06-07 RX ORDER — POLYETHYLENE GLYCOL 3350 17 G/17G
17 POWDER, FOR SOLUTION ORAL DAILY PRN
Status: DISCONTINUED | OUTPATIENT
Start: 2021-06-07 | End: 2021-06-09 | Stop reason: HOSPADM

## 2021-06-07 RX ORDER — ONDANSETRON 4 MG/1
4 TABLET, ORALLY DISINTEGRATING ORAL EVERY 8 HOURS PRN
Status: DISCONTINUED | OUTPATIENT
Start: 2021-06-07 | End: 2021-06-09 | Stop reason: HOSPADM

## 2021-06-07 RX ORDER — NICOTINE 21 MG/24HR
1 PATCH, TRANSDERMAL 24 HOURS TRANSDERMAL DAILY
Status: DISCONTINUED | OUTPATIENT
Start: 2021-06-08 | End: 2021-06-09 | Stop reason: HOSPADM

## 2021-06-07 RX ORDER — BUDESONIDE AND FORMOTEROL FUMARATE DIHYDRATE 160; 4.5 UG/1; UG/1
2 AEROSOL RESPIRATORY (INHALATION) 2 TIMES DAILY
Status: DISCONTINUED | OUTPATIENT
Start: 2021-06-07 | End: 2021-06-09 | Stop reason: HOSPADM

## 2021-06-07 RX ORDER — DOXYCYCLINE HYCLATE 100 MG
100 TABLET ORAL EVERY 12 HOURS SCHEDULED
Status: DISCONTINUED | OUTPATIENT
Start: 2021-06-07 | End: 2021-06-09 | Stop reason: HOSPADM

## 2021-06-07 RX ORDER — IPRATROPIUM BROMIDE AND ALBUTEROL SULFATE 2.5; .5 MG/3ML; MG/3ML
1 SOLUTION RESPIRATORY (INHALATION) ONCE
Status: COMPLETED | OUTPATIENT
Start: 2021-06-07 | End: 2021-06-07

## 2021-06-07 RX ORDER — NICOTINE POLACRILEX 4 MG
15 LOZENGE BUCCAL PRN
Status: DISCONTINUED | OUTPATIENT
Start: 2021-06-07 | End: 2021-06-09 | Stop reason: HOSPADM

## 2021-06-07 RX ORDER — METHYLPREDNISOLONE SODIUM SUCCINATE 40 MG/ML
40 INJECTION, POWDER, LYOPHILIZED, FOR SOLUTION INTRAMUSCULAR; INTRAVENOUS EVERY 12 HOURS
Status: DISCONTINUED | OUTPATIENT
Start: 2021-06-07 | End: 2021-06-07

## 2021-06-07 RX ORDER — CLOPIDOGREL BISULFATE 75 MG/1
75 TABLET ORAL DAILY
Status: DISCONTINUED | OUTPATIENT
Start: 2021-06-07 | End: 2021-06-09 | Stop reason: HOSPADM

## 2021-06-07 RX ORDER — ATORVASTATIN CALCIUM 40 MG/1
40 TABLET, FILM COATED ORAL DAILY
Status: DISCONTINUED | OUTPATIENT
Start: 2021-06-07 | End: 2021-06-09 | Stop reason: HOSPADM

## 2021-06-07 RX ORDER — LISINOPRIL 20 MG/1
20 TABLET ORAL DAILY
Status: DISCONTINUED | OUTPATIENT
Start: 2021-06-08 | End: 2021-06-08

## 2021-06-07 RX ORDER — ACETAMINOPHEN 325 MG/1
650 TABLET ORAL EVERY 6 HOURS PRN
Status: DISCONTINUED | OUTPATIENT
Start: 2021-06-07 | End: 2021-06-09 | Stop reason: HOSPADM

## 2021-06-07 RX ORDER — METHYLPREDNISOLONE SODIUM SUCCINATE 40 MG/ML
40 INJECTION, POWDER, LYOPHILIZED, FOR SOLUTION INTRAMUSCULAR; INTRAVENOUS EVERY 12 HOURS
Status: DISCONTINUED | OUTPATIENT
Start: 2021-06-07 | End: 2021-06-08

## 2021-06-07 RX ORDER — PREDNISONE 20 MG/1
40 TABLET ORAL DAILY
Status: DISCONTINUED | OUTPATIENT
Start: 2021-06-09 | End: 2021-06-08

## 2021-06-07 RX ORDER — SODIUM CHLORIDE 0.9 % (FLUSH) 0.9 %
5-40 SYRINGE (ML) INJECTION EVERY 12 HOURS SCHEDULED
Status: DISCONTINUED | OUTPATIENT
Start: 2021-06-07 | End: 2021-06-09 | Stop reason: HOSPADM

## 2021-06-07 RX ORDER — ASPIRIN 81 MG/1
81 TABLET ORAL DAILY
Status: DISCONTINUED | OUTPATIENT
Start: 2021-06-07 | End: 2021-06-09 | Stop reason: HOSPADM

## 2021-06-07 RX ORDER — MONTELUKAST SODIUM 10 MG/1
10 TABLET ORAL NIGHTLY
Status: DISCONTINUED | OUTPATIENT
Start: 2021-06-07 | End: 2021-06-09 | Stop reason: HOSPADM

## 2021-06-07 RX ORDER — FUROSEMIDE 10 MG/ML
20 INJECTION INTRAMUSCULAR; INTRAVENOUS ONCE
Status: COMPLETED | OUTPATIENT
Start: 2021-06-07 | End: 2021-06-07

## 2021-06-07 RX ORDER — SODIUM CHLORIDE 0.9 % (FLUSH) 0.9 %
5-40 SYRINGE (ML) INJECTION PRN
Status: DISCONTINUED | OUTPATIENT
Start: 2021-06-07 | End: 2021-06-09 | Stop reason: HOSPADM

## 2021-06-07 RX ORDER — IPRATROPIUM BROMIDE AND ALBUTEROL SULFATE 2.5; .5 MG/3ML; MG/3ML
1 SOLUTION RESPIRATORY (INHALATION)
Status: DISCONTINUED | OUTPATIENT
Start: 2021-06-07 | End: 2021-06-07

## 2021-06-07 RX ORDER — DULOXETIN HYDROCHLORIDE 20 MG/1
20 CAPSULE, DELAYED RELEASE ORAL 2 TIMES DAILY
Status: DISCONTINUED | OUTPATIENT
Start: 2021-06-07 | End: 2021-06-09 | Stop reason: HOSPADM

## 2021-06-07 RX ORDER — FENOFIBRATE 160 MG/1
160 TABLET ORAL DAILY
Status: DISCONTINUED | OUTPATIENT
Start: 2021-06-07 | End: 2021-06-09 | Stop reason: HOSPADM

## 2021-06-07 RX ORDER — METHYLPREDNISOLONE SODIUM SUCCINATE 125 MG/2ML
125 INJECTION, POWDER, LYOPHILIZED, FOR SOLUTION INTRAMUSCULAR; INTRAVENOUS ONCE
Status: COMPLETED | OUTPATIENT
Start: 2021-06-07 | End: 2021-06-07

## 2021-06-07 RX ORDER — ACETAMINOPHEN 650 MG/1
650 SUPPOSITORY RECTAL EVERY 6 HOURS PRN
Status: DISCONTINUED | OUTPATIENT
Start: 2021-06-07 | End: 2021-06-09 | Stop reason: HOSPADM

## 2021-06-07 RX ORDER — SODIUM CHLORIDE 9 MG/ML
25 INJECTION, SOLUTION INTRAVENOUS PRN
Status: DISCONTINUED | OUTPATIENT
Start: 2021-06-07 | End: 2021-06-09 | Stop reason: HOSPADM

## 2021-06-07 RX ORDER — METOPROLOL SUCCINATE 50 MG/1
100 TABLET, EXTENDED RELEASE ORAL DAILY
Status: DISCONTINUED | OUTPATIENT
Start: 2021-06-08 | End: 2021-06-09 | Stop reason: HOSPADM

## 2021-06-07 RX ORDER — DEXTROSE MONOHYDRATE 25 G/50ML
12.5 INJECTION, SOLUTION INTRAVENOUS PRN
Status: DISCONTINUED | OUTPATIENT
Start: 2021-06-07 | End: 2021-06-09 | Stop reason: HOSPADM

## 2021-06-07 RX ORDER — GABAPENTIN 400 MG/1
800 CAPSULE ORAL 4 TIMES DAILY
Status: DISCONTINUED | OUTPATIENT
Start: 2021-06-07 | End: 2021-06-09 | Stop reason: HOSPADM

## 2021-06-07 RX ORDER — DEXTROSE MONOHYDRATE 50 MG/ML
100 INJECTION, SOLUTION INTRAVENOUS PRN
Status: DISCONTINUED | OUTPATIENT
Start: 2021-06-07 | End: 2021-06-09 | Stop reason: HOSPADM

## 2021-06-07 RX ORDER — ONDANSETRON 2 MG/ML
4 INJECTION INTRAMUSCULAR; INTRAVENOUS EVERY 6 HOURS PRN
Status: DISCONTINUED | OUTPATIENT
Start: 2021-06-07 | End: 2021-06-09 | Stop reason: HOSPADM

## 2021-06-07 RX ORDER — FUROSEMIDE 10 MG/ML
40 INJECTION INTRAMUSCULAR; INTRAVENOUS DAILY
Status: DISCONTINUED | OUTPATIENT
Start: 2021-06-08 | End: 2021-06-09 | Stop reason: HOSPADM

## 2021-06-07 RX ADMIN — FENOFIBRATE 160 MG: 160 TABLET ORAL at 16:48

## 2021-06-07 RX ADMIN — DEXTROSE MONOHYDRATE 500 MG: 50 INJECTION, SOLUTION INTRAVENOUS at 17:05

## 2021-06-07 RX ADMIN — IPRATROPIUM BROMIDE AND ALBUTEROL SULFATE 1 AMPULE: .5; 3 SOLUTION RESPIRATORY (INHALATION) at 22:51

## 2021-06-07 RX ADMIN — GABAPENTIN 800 MG: 400 CAPSULE ORAL at 16:49

## 2021-06-07 RX ADMIN — Medication 2 PUFF: at 18:58

## 2021-06-07 RX ADMIN — ENOXAPARIN SODIUM 40 MG: 40 INJECTION SUBCUTANEOUS at 16:48

## 2021-06-07 RX ADMIN — METHYLPREDNISOLONE SODIUM SUCCINATE 40 MG: 40 INJECTION, POWDER, FOR SOLUTION INTRAMUSCULAR; INTRAVENOUS at 16:49

## 2021-06-07 RX ADMIN — METHYLPREDNISOLONE SODIUM SUCCINATE 125 MG: 125 INJECTION, POWDER, FOR SOLUTION INTRAMUSCULAR; INTRAVENOUS at 12:43

## 2021-06-07 RX ADMIN — INSULIN LISPRO 1 UNITS: 100 INJECTION, SOLUTION INTRAVENOUS; SUBCUTANEOUS at 20:40

## 2021-06-07 RX ADMIN — MONTELUKAST SODIUM 10 MG: 10 TABLET, COATED ORAL at 21:07

## 2021-06-07 RX ADMIN — DOXYCYCLINE HYCLATE 100 MG: 100 TABLET, COATED ORAL at 21:08

## 2021-06-07 RX ADMIN — CLOPIDOGREL BISULFATE 75 MG: 75 TABLET ORAL at 16:48

## 2021-06-07 RX ADMIN — IPRATROPIUM BROMIDE AND ALBUTEROL SULFATE 1 AMPULE: .5; 3 SOLUTION RESPIRATORY (INHALATION) at 18:58

## 2021-06-07 RX ADMIN — GABAPENTIN 800 MG: 400 CAPSULE ORAL at 21:06

## 2021-06-07 RX ADMIN — FUROSEMIDE 20 MG: 10 INJECTION, SOLUTION INTRAMUSCULAR; INTRAVENOUS at 13:20

## 2021-06-07 RX ADMIN — Medication 10 ML: at 21:02

## 2021-06-07 RX ADMIN — DULOXETINE 20 MG: 20 CAPSULE, DELAYED RELEASE ORAL at 21:05

## 2021-06-07 RX ADMIN — IPRATROPIUM BROMIDE AND ALBUTEROL SULFATE 1 AMPULE: .5; 2.5 SOLUTION RESPIRATORY (INHALATION) at 12:43

## 2021-06-07 RX ADMIN — ATORVASTATIN CALCIUM 40 MG: 40 TABLET, FILM COATED ORAL at 16:48

## 2021-06-07 ASSESSMENT — PAIN SCALES - GENERAL
PAINLEVEL_OUTOF10: 0

## 2021-06-07 NOTE — CONSULTS
Patient is being seen at the request of Blake Gill for a consultation for  Acute on chronic respiratory failure with hypoxemia and hypercapnia      HISTORY OF PRESENT ILLNESS: The patient is a 53-year-old man with a past medical history of tobacco abuse, COPD, CHF and diabetes mellitus who presented to [facility] emergency department with complaints of worsening shortness of breath over the last 2 days. Reportedly, when EMS arrived to the patient's residence he was found to be cyanotic with an oxygen saturation in the 60s. He was placed on supplemental oxygen and given inhaled bronchodilators during transport. After arrival in the ER he was found to be hypercapnic and hypoxemic. He was placed on noninvasive positive pressure ventilation and transferred to [facility] ICU for further care. Currently he is feeling somewhat short of breath at rest.  He has a mildly productive cough. He denies any hemoptysis. He smokes approximate 1 pack/day for 40 years. PAST MEDICAL HISTORY:  Past Medical History:   Diagnosis Date    CHF (congestive heart failure) (HCC)     [facility]) [facility] [address]     Diabetes mellitus ([facility] [address]     Hyperlipidemia     Hypertension      PAST SURGICAL HISTORY:  Past Surgical History:   Procedure Laterality Date    HERNIA REPAIR      TONSILLECTOMY         FAMILY HISTORY:  family history includes Arthritis in his brother; Asthma in his brother, mother, and sister; Cancer in his mother; Diabetes in his sister; Hearing Loss in his mother; High Blood Pressure in his brother and father; High Cholesterol in his father; Vision Loss in his father, mother, and sister. SOCIAL HISTORY:   reports that he has been smoking. He has been smoking about 1.00 pack per day. He has never used smokeless tobacco.    Scheduled Meds:    Continuous Infusions:    PRN Meds:      ALLERGIES:  Patient has No Known Allergies.     REVIEW OF SYSTEMS:  Constitutional: Negative for fever  HENT: Negative for sore throat  Eyes: Negative for redness   Respiratory: +  for dyspnea, cough  Cardiovascular: Negative for chest pain  Gastrointestinal: Negative for vomiting, diarrhea   Genitourinary: Negative for hematuria   Musculoskeletal: Negative for arthralgias   Skin: Negative for rash  Neurological: Negative for syncope  Hematological: Negative for adenopathy  Psychiatric/Behavorial: Negative for anxiety    PHYSICAL EXAM:  Blood pressure 101/65, pulse 68, temperature 98.2 °F (36.8 °C), temperature source Oral, resp. rate 18, SpO2 97 %.' on bipap->5L NC  Gen:mild distress. Normocephalic, atraumatic. Eyes: PERRL. No sclera icterus. No conjunctival injection. ENT: No discharge. Pharynx clear. Neck: Trachea midline. No obvious mass. Resp: + accessory muscle use. No crackles. few wheezes. No rhonchi. No dullness on percussion. CV: Regular rate. Regular rhythm. No murmur or rub. No edema. GI: Non-tender. Non-distended. No hernia. Skin: Warm and dry. No nodule on exposed extremities. M/S: No cyanosis. No joint deformity. No clubbing. Neuro: Awake. Alert. Moves all four extremities. Conversant in short sentences. LABS:  CBC:   Recent Labs     06/07/21  1220   WBC 11.5*   HGB 13.5   HCT 42.8   MCV 92.2        BMP:   Recent Labs     06/07/21  1220      K 5.2*      CO2 32   BUN 33*   CREATININE 1.1     LIVER PROFILE:   Recent Labs     06/07/21  1220   AST 18   ALT 15   BILITOT <0.2   ALKPHOS 54     PT/INR: No results for input(s): PROTIME, INR in the last 72 hours. APTT: No results for input(s): APTT in the last 72 hours. UA:No results for input(s): NITRITE, COLORU, PHUR, LABCAST, WBCUA, RBCUA, MUCUS, TRICHOMONAS, YEAST, BACTERIA, CLARITYU, SPECGRAV, LEUKOCYTESUR, UROBILINOGEN, BILIRUBINUR, BLOODU, GLUCOSEU, AMORPHOUS in the last 72 hours. Invalid input(s): KETONESU  No results for input(s): PHART, EMK5CRG, PO2ART in the last 72 hours.     Chest imaging was reviewed by me and showed:    CXR 6/7/21: Bilateral hazy interstitial airspace opacities, indistinct pulmonary vasculature. No pneumothorax. Echo 2/21: Ejection fraction 85%, grade 2 diastolic dysfunction    ProBNP 1847 down from 2518 2/21    ASSESSMENT:  · Acute on chronic respiratory failure with hypoxemia and hypercapnia  · Possible atypical pneumonia-rule out COVID-19  · Tobacco abuse  · Probable COPD with AE    PLAN:  · Non-invasive positive pressure ventilation per my orders. The ventilator was adjusted by me at the bedside for unstable, life threatening respiratory failure. Wean oxygen as tolerated. IV steroids today, with plan to switch to oral prednisone 40 mg daily with improvement, then taper   Inhaled bronchodilators   Antibiotics (doxycycline)  Droplet plus precautions pending covid testing results  · Follow abg/vbg  · Smoking cessation counseling  · ICU care- bactroban, lovenox    Patient is critically ill with acute respiratory failure. We will adjust NIPPV as above. Critical care time spent reviewing labs/films, examining patient, collaborating with other physicians but excluding procedures for life threatening organ failure is 31 minutes.

## 2021-06-07 NOTE — PLAN OF CARE
Problem: Infection:  Goal: Will remain free from infection  Description: Will remain free from infection  Outcome: Ongoing     Problem: Safety:  Goal: Free from accidental physical injury  Description: Free from accidental physical injury  Outcome: Ongoing  Goal: Free from intentional harm  Description: Free from intentional harm  Outcome: Ongoing     Problem: Safety:  Goal: Free from intentional harm  Description: Free from intentional harm  Outcome: Ongoing     Problem: Daily Care:  Goal: Daily care needs are met  Description: Daily care needs are met  Outcome: Ongoing     Problem: Pain:  Goal: Patient's pain/discomfort is manageable  Description: Patient's pain/discomfort is manageable  Outcome: Ongoing     Problem: Skin Integrity:  Goal: Skin integrity will stabilize  Description: Skin integrity will stabilize  Outcome: Ongoing

## 2021-06-07 NOTE — PROGRESS NOTES
06/07/21 1904   NIV Type   $NIV $Daily Charge   NIV Started/Stopped On   Equipment Type v60   Mode Bilevel   Mask Type Full face mask   Mask Size Medium   Settings/Measurements   IPAP 15 cmH20   CPAP/EPAP 5 cmH2O   Rate Ordered 16   Resp 18   FiO2  45 %   Vt Exhaled 829 mL   Minute Volume 13.4 Liters   Mask Leak (lpm) 27 lpm   Comfort Level Good   Using Accessory Muscles No   SpO2 93   Alarm Settings   Alarms On Y   Press Low Alarm 5 cmH2O   High Pressure Alarm 30 cmH2O   Delay Alarm 20 sec(s)   Resp Rate Low Alarm 12   High Respiratory Rate 40 br/min   Oxygen Therapy/Pulse Ox   SpO2 98 %

## 2021-06-07 NOTE — PROGRESS NOTES
06/07/21 1859   Oxygen Therapy/Pulse Ox   O2 Therapy Oxygen   $Oxygen $Daily Charge   O2 Device PAP (positive airway pressure)   FiO2  50 %  (decreased to 45)   Resp 16   SpO2 96 %

## 2021-06-07 NOTE — ED NOTES
Pt report called to EDU [facility] at this time. VS as noted. Questions/concerns addressed at this time. Informed of approx 30 min eta for pickup time for ambulance transport.       Laura Wyman RN  06/07/21 0346

## 2021-06-07 NOTE — PLAN OF CARE
Admit from [facility] to ICU  Acute on chronic resp failure with hypoxia and hypercarbia  COPD AE  Acute diastolic CHF

## 2021-06-07 NOTE — PROGRESS NOTES
RESPIRATORY THERAPY ASSESSMENT    Name:  Bessy Leong  Medical Record Number:  [id number]  Age: 50 y.o. Gender: male  : 1965  Today's Date:  2021  Room:  [address] [place] [address]-    Assessment     Is the patient being admitted for a COPD or Asthma exacerbation? No   (If yes the patient will be seen every 4 hours for the first 24 hours and then reassessed)    Patient Admission Diagnosis      Allergies  No Known Allergies    Minimum Predicted Vital Capacity:               Actual Vital Capacity:                    Pulmonary History:COPD  Home Oxygen Therapy:  2 liters/min via nasal cannula @ night  Home Respiratory Therapy:Albuterol PRN  Current Respiratory Therapy:  Duoneb q4 [place]          Respiratory Severity Index(RSI)   Patients with orders for inhalation medications, oxygen, or any therapeutic treatment modality will be placed on Respiratory Protocol. They will be assessed with the first treatment and at least every 72 hours thereafter. The following severity scale will be used to determine frequency of treatment intervention. Smoking History: Severe Exacerbation = 4    Social History  Social History     Tobacco Use    Smoking status: Current Every Day Smoker     Packs/day: 1.00    Smokeless tobacco: Never Used   Vaping Use    Vaping Use: Never used   Substance Use Topics    Alcohol use:  Yes    Drug use: Never       Recent Surgical History: None = 0  Past Surgical History  Past Surgical History:   Procedure Laterality Date    HERNIA REPAIR      TONSILLECTOMY         Level of Consciousness: Alert, Oriented, and Cooperative = 0    Level of Activity: Walking unassisted = 0    Respiratory Pattern: Regular Pattern; RR 8-20 = 0    Breath Sounds: Diminished unilaterally = 1    Sputum   ,  ,    Cough: Strong, spontaneous, non-productive = 0    Vital Signs   /65   Pulse 67   Temp 97.6 °F (36.4 °C) (Axillary)   Resp 18   SpO2 93%   SPO2 (COPD values may differ): 86-87% on room air or greater than 92% on FiO2 35- 50% = 3    Peak Flow (asthma only): not applicable = 0    RSI: 9-35 = TID (three times daily) and Q4hr PRN for dyspnea        Plan       Goals: medication delivery, mobilize retained secretions, volume expansion and improve oxygenation    Patient/caregiver was educated on the proper method of use for Respiratory Care Devices:  Yes      Level of patient/caregiver understanding able to:   ? Verbalize understanding   ? Demonstrate understanding       ? Teach back        ? Needs reinforcement       ? No available caregiver               ? Other:     Response to education:  Good     Is patient being placed on Home Treatment Regimen? No     Does the patient have everything they need prior to discharge? NA     Comments: Pt. Assessed, chart reviewed    Plan of Care: Duoneb Q4 [place] and Bipap    Electronically signed by Lloyd Wu RCP on 6/7/2021 at 4:25 PM    Respiratory Protocol Guidelines     1. Assessment and treatment by Respiratory Therapy will be initiated for medication and therapeutic interventions upon initiation of aerosolized medication. 2. Physician will be contacted for respiratory rate (RR) greater than 35 breaths per minute. Therapy will be held for heart rate (HR) greater than 140 beats per minute, pending direction from physician. 3. Bronchodilators will be administered via Metered Dose Inhaler (MDI) with spacer when the following criteria are met:  a. Alert and cooperative     b. HR < 140 bpm  c. RR < 30 bpm                d. Can demonstrate a 2-3 second inspiratory hold  4. Bronchodilators will be administered via Hand Held Nebulizer [facility]) to patients when ANY of the following criteria are met  a. Incognizant or uncooperative          b. Patients treated with HHN at Home        c. Unable to demonstrate proper use of MDI with spacer     d. RR > 30 bpm   5.  Bronchodilators will be delivered via Metered Dose Inhaler (MDI), HHN, Aerogen to intubated patients on mechanical ventilation. 6. Inhalation medication orders will be delivered and/or substituted as outlined below. Aerosolized Medications Ordering and Administration Guidelines:    1. All Medications will be ordered by a physician, and their frequency and/or modality will be adjusted as defined by the patients Respiratory Severity Index (RSI) score. 2. If the patient does not have documented COPD, consider discontinuing anticholinergics when RSI is less than 9.  3. If the bronchospasm worsens (increased RSI), then the bronchodilator frequency can be increased to a maximum of every 4 hours. If greater than every 4 hours is required, the physician will be contacted. 4. If the bronchospasm improves, the frequency of the bronchodilator can be decreased, based on the patient's RSI, but not less than home treatment regimen frequency. 5. Bronchodilator(s) will be discontinued if patient has a RSI less than 9 and has received no scheduled or as needed treatment for 72  Hrs. Patients Ordered on a Mucolytic Agent:    1. Must always be administered with a bronchodilator. 2. Discontinue if patient experiences worsened bronchospasm, or secretions have lessened to the point that the patient is able to clear them with a cough. Anti-inflammatory and Combination Medications:    1. If the patient lacks prior history of lung disease, is not using inhaled anti-inflammatory medication at home, and lacks wheezing by examination or by history for at least 24 hours, contact physician for possible discontinuation.

## 2021-06-07 NOTE — ED NOTES
Call received from the patient's wife; updated on patient's diagnosis and admission to [facility]. Denies additional questions at this time.      Maricarmen iJn RN  06/07/21 5464

## 2021-06-07 NOTE — ED PROVIDER NOTES
CHIEF COMPLAINT  Shortness of Breath (Arrived via EMS, SOB worsening over the past 2 days, denies CP. Increased demand of home O2/Neb Txs. Pt was 63% on EMS initial arrival, with cyanosis noted around his mouth, placed on 6lpm and 1 duo neb during transport, sat improved to 95%. )      HISTORY OF PRESENT ILLNESS  Cole Jauregui is a 50 y.o. male with a history of CHF; COPD; diabetes; hypertension who presents to the ED complaining of shortness of breath. Patient had recently been admitted for COPD exacerbation and CHF. He reports his symptoms have been gradually worsening over the past couple of days. This despite taking his home medications using his typical oxygen settings. No other complaints, modifying factors or associated symptoms. I have reviewed the following from the nursing documentation. Past Medical History:   Diagnosis Date    CHF (congestive heart failure) (HCC)     COPD [facility]ronic obstructive pulmonary disease) [facility] [address]     Diabetes mellitus ([facility] [address]     Hyperlipidemia     Hypertension      Past Surgical History:   Procedure Laterality Date    HERNIA REPAIR      TONSILLECTOMY       Family History   Problem Relation Age of Onset    Asthma Mother     Cancer Mother     Hearing Loss Mother     Vision Loss Mother     High Blood Pressure Father     High Cholesterol Father     Vision Loss Father     Asthma Sister     Diabetes Sister     Vision Loss Sister     Asthma Brother     Arthritis Brother     High Blood Pressure Brother      Social History     Socioeconomic History    Marital status:      Spouse name: Not on file    Number of children: Not on file    Years of education: Not on file    Highest education level: Not on file   Occupational History    Not on file   Tobacco Use    Smoking status: Current Every Day Smoker     Packs/day: 1.00    Smokeless tobacco: Never Used   Vaping Use    Vaping Use: Never used   Substance and Sexual Activity    Alcohol use:  Yes    Drug  budesonide-formoterol (SYMBICORT) 160-4.5 MCG/ACT inhaler 2 puff  2 puff Inhalation BID Doristine Patricia, PA-C   2 puff at 06/08/21 9063    gabapentin (NEURONTIN) capsule 800 mg  800 mg Oral 4x Daily Doristine Patricia, PA-C   800 mg at 06/08/21 1612    montelukast (SINGULAIR) tablet 10 mg  10 mg Oral Nightly Doristine Patricia, PA-C   10 mg at 06/07/21 2107    sodium chloride flush 0.9 % injection 5-40 mL  5-40 mL Intravenous 2 times per day Doristine Patricia, PA-C   10 mL at 06/08/21 0804    sodium chloride flush 0.9 % injection 5-40 mL  5-40 mL Intravenous PRN Doristine Patricia, PA-C        0.9 % sodium chloride infusion  25 mL Intravenous PRN Doristine Patricia, PA-C        ondansetron (ZOFRAN-ODT) disintegrating tablet 4 mg  4 mg Oral Q8H PRN Doryumiko Patricia, PA-C        Or    ondansetron (ZOFRAN) injection 4 mg  4 mg Intravenous Q6H PRN Doristine Patricia, PA-C        polyethylene glycol (GLYCOLAX) packet 17 g  17 g Oral Daily PRN Doryumiko Patricia, PA-C        enoxaparin (LOVENOX) injection 40 mg  40 mg Subcutaneous Daily Doristine Patricia, PA-C   40 mg at 06/08/21 5336    acetaminophen (TYLENOL) tablet 650 mg  650 mg Oral Q6H PRN Doryumiko Patricia, PA-C        Or    acetaminophen (TYLENOL) suppository 650 mg  650 mg Rectal Q6H PRN Doryumiko Patricia, PA-C        glucose (GLUTOSE) 40 % oral gel 15 g  15 g Oral PRN Doristine Patricia, PA-C        dextrose 50 % IV solution  12.5 g Intravenous PRN Doristine Patricia, PA-C        glucagon (rDNA) injection 1 mg  1 mg Intramuscular PRN Doristine Patricia, PA-C        dextrose 5 % solution  100 mL/hr Intravenous PRN Doristine Patricia, PA-C        insulin lispro (HUMALOG) injection vial 0-6 Units  0-6 Units Subcutaneous Q4H Doryumiko Patricia, PA-C   1 Units at 06/08/21 1227    azithromycin (ZITHROMAX) 500 mg in D5W 250ml addavial  500 mg Intravenous Q24H Piero Gomez PA-C 250 mL/hr at 06/08/21 1610 500 mg at 06/08/21 1610    furosemide (LASIX) injection 40 mg  40 mg Intravenous Daily Brittni Powers PA-C   40 mg at 06/08/21 0802    mupirocin (BACTROBAN) 2 % nasal ointment   Each Nostril BID Oswaldo Puri MD   Given at 06/08/21 0805    doxycycline hyclate (VIBRA-TABS) tablet 100 mg  100 mg Oral 2 times per day Oswaldo Puri MD   100 mg at 06/08/21 0803    albuterol-ipratropium (COMBIVENT RESPIMAT)  MCG/ACT inhaler 1 puff  1 puff Inhalation Q4H PRN Annetta Chavis MD        ipratropium-albuterol (DUONEB) nebulizer solution 1 ampule  1 ampule Inhalation Q4H WA Annetta Chavis MD   1 ampule at 06/08/21 1534    nicotine ([facility]) 14 MG/24HR 1 patch  1 patch Transdermal Daily Annetta Chavis MD   1 patch at 06/08/21 0803     No Known Allergies    REVIEW OF SYSTEMS  10 systems reviewed, pertinent positives per HPI otherwise noted to be negative. PHYSICAL EXAM  BP (!) 98/54   Pulse 77   Temp 97.8 °F (36.6 °C) (Oral)   Resp 14   Ht 4' 11\" (1.499 m)   Wt 188 lb 7.9 oz (85.5 kg)   SpO2 94%   BMI 38.07 kg/m²   GENERAL APPEARANCE: Awake and alert. Cooperative. No acute distress. HEAD: Normocephalic. Atraumatic. EYES: PERRL. EOM's grossly intact. Test of Skew  ENT: Mucous membranes are moist.   NECK: Supple, trachea midline. HEART: RRR. Normal S1S2, no rubs, gallops, or murmurs noted  LUNGS: Respirations labored with coarse breath sounds and fair air entry;. + scattered wheezes,no  rales, or rhonchi. Speaking comfortably in full sentences. ABDOMEN: Soft. Non-distended. Non-tender. No guarding or rebound. Normal bowel sounds. EXTREMITIES: No peripheral edema. MAEE. No acute deformities. SKIN: Warm and dry. No acute rashes. NEUROLOGICAL: Alert and oriented X 3. CN II-XII intact. No gross facial drooping. Strength 5/5, sensation intact. Normal coordination. No pronator drift. No truncal instability; Gait normal.   PSYCHIATRIC: Normal mood and affect. LABS  I have reviewed all labs for this visit.    Results for orders placed Carboxyhemoglobin 12.1 (H) 0.0 - 1.5 %    MetHgb, Jesus 0.3 <1.5 %    TC02 (Calc), Jesus 35 Not Established mmol/L    O2 Content, Jesus 10 Not Established VOL %    O2 Therapy Unknown    Lactate, Sepsis   Result Value Ref Range    Lactic Acid, Sepsis 0.9 0.4 - 1.9 mmol/L   Troponin   Result Value Ref Range    Troponin <0.01 <0.01 ng/mL   Brain Natriuretic Peptide   Result Value Ref Range    Pro-BNP 1,847 (H) 0 - 124 pg/mL   Basic Metabolic Panel w/ Reflex to MG   Result Value Ref Range    Sodium 142 136 - 145 mmol/L    Potassium reflex Magnesium 5.5 (H) 3.5 - 5.1 mmol/L    Chloride 105 99 - 110 mmol/L    CO2 29 21 - 32 mmol/L    Anion Gap 8 3 - 16    Glucose 151 (H) 70 - 99 mg/dL    BUN 40 (H) 7 - 20 mg/dL    CREATININE 1.0 0.9 - 1.3 mg/dL    GFR Non-African American >60 >60    GFR African American >60 >60    Calcium 9.2 8.3 - 10.6 mg/dL   CBC auto differential   Result Value Ref Range    WBC 11.7 (H) 4.0 - 11.0 K/uL    RBC 4.61 4.20 - 5.90 M/uL    Hemoglobin 13.6 13.5 - 17.5 g/dL    Hematocrit 42.4 40.5 - 52.5 %    MCV 92.0 80.0 - 100.0 fL    MCH 29.5 26.0 - 34.0 pg    MCHC 32.0 31.0 - 36.0 g/dL    RDW 18.7 (H) 12.4 - 15.4 %    Platelets 140 214 - 015 K/uL    MPV 8.4 5.0 - 10.5 fL    Neutrophils % 85.8 %    Lymphocytes % 11.3 %    Monocytes % 2.0 %    Eosinophils % 0.0 %    Basophils % 0.9 %    Neutrophils Absolute 10.1 (H) 1.7 - 7.7 K/uL    Lymphocytes Absolute 1.3 1.0 - 5.1 K/uL    Monocytes Absolute 0.2 0.0 - 1.3 K/uL    Eosinophils Absolute 0.0 0.0 - 0.6 K/uL    Basophils Absolute 0.1 0.0 - 0.2 K/uL   Blood gas, venous   Result Value Ref Range    pH, Jesus 7.320 (L) 7.350 - 7.450    pCO2, Jesus 61.6 (H) 40.0 - 50.0 mmHg    pO2, Jesus 58.3 (H) 25 - 40 mmHg    HCO3, Venous 31.0 (H) 23.0 - 29.0 mmol/L    Base Excess, Jesus 3.1 (H) -3.0 - 3.0 mmol/L    O2 Sat, Jesus 87 Not Established %    Carboxyhemoglobin 4.5 (H) 0.0 - 1.5 %    MetHgb, Jesus 0.3 <1.5 %    TC02 (Calc), Jesus 33 Not Established mmol/L    O2 Content, Jesus 18 Not RADIOLOGIST. XR CHEST PORTABLE   Final Result   Vascular congestion and mild interstitial edema, nearly identical to the   comparison. Rechecks: Physical assessment performed. Symptoms improved with treatment in the ED while on BiPAP    ED COURSE/MDM  Patient seen and evaluated. Old records reviewed. Labs and imaging reviewed and results discussed with patient. Patient was given DuoNeb breathing treatments; lasix ,steroids BiPAP in the ED with good symptomatic relief. Patient was reassessed as noted above. Patient was found to have acute respiratory failure with hypoxia and hypercapnia and was accepted as a transfer to [facility]. Plan of care discussed with patient and he is in in agreement with plan. Patient was given scripts for the following medications. I counseled patient how to take these medications. Current Discharge Medication List          CLINICAL IMPRESSION  1. Acute respiratory failure with hypoxia and hypercapnia (HCC)    2. Chronic systolic congestive heart failure (HCC)        Blood pressure (!) 98/54, pulse 77, temperature 97.8 °F (36.6 °C), temperature source Oral, resp. rate 14, height 4' 11\" (1.499 m), weight 188 lb 7.9 oz (85.5 kg), SpO2 94 %. DISPOSITION  Gino Beckham was transferred in stable condition.        Cely Lehman MD  06/08/21 8599

## 2021-06-07 NOTE — H&P
Hospital Medicine History & Physical      PCP: LIKN Gamez - CNP    Date of Admission: 6/7/2021    Date of Service: Pt seen/examined on 6/7/2021     Chief Complaint:    Chief Complaint   Patient presents with    Shortness of Breath     Arrived via EMS, SOB worsening over the past 2 days, denies CP. Increased demand of home O2/Neb Txs. Pt was 63% on EMS initial arrival, with cyanosis noted around his mouth, placed on 6lpm and 1 duo neb during transport, sat improved to 95%. History Of Present Illness: The patient is a 50 y.o. male with COPD, chronic hypoxic respiratory failure on home oxygen,  CHF, DM type 2, hypertension, and hyperlipidemia who presents to [facility] with c/o shortness of breath. Patient experiencing worsening shortness of breath over the last 2 days. He has required more oxygen at home. He continues to smoke tobacco.  He has not had his Covid vaccine. Upon EMS arrival, patient had circumoral cyanosis. His O2 sats were in the 60s;  he was placed on 6 L O2  ; subsequently placed on BiPAP on arrival to the ER    Labs with BNP 1,847, and mild leukocytosis. ABG with pH 7.2, PCO2 74, carboxyhemoglobin 12.1. Patient placed on BiPAP. Admitted to ICU. Pulmonology consulted. Patient admitted to the ICU on BiPAP. He feels much better now. Awake alert and oriented. Rule out Covid. Keep in Droplet plus precautions. Covid testing pending. Patient complains of a mild cough, not bringing up any sputum. No fevers or chills.    Continues to smoke tobacco-1 pack/day for 40 years    Past Medical History:        Diagnosis Date    CHF (congestive heart failure) (HCC)     [facility] [address]     Diabetes mellitus ([facility] [address]     Hyperlipidemia     Hypertension        Past Surgical History:        Procedure Laterality Date    HERNIA REPAIR      TONSILLECTOMY         Medications Prior to Admission:    Prior to Admission medications sulfate  (90 Base) MCG/ACT inhaler INHALE 2 PUFFS INTO THE LUNGS 2 TIMES DAILY 6/1/20   LINK Garza CNP   Cholecalciferol (VITAMIN D3) 1.25 MG (53944 UT) CAPS Take 1 capsule by mouth Twice a Week 4/6/20   LINK Garza - CNP   Magnesium 400 MG CAPS Take 1 tablet by mouth daily 4/6/20   LINK Garza - CNP   bisoprolol (ZEBETA) 10 MG tablet Take 1 tablet by mouth daily 4/6/20   LINK Garza - CNP   clopidogrel (PLAVIX) 75 MG tablet Take 1 tablet by mouth daily 4/6/20   LINK Garza CNP   blood glucose monitor strips Test 2 times a day & as needed for symptoms of irregular blood glucose. E11.9 3/31/20   LINK Garza CNP   glucose monitoring kit (FREESTYLE) monitoring kit 1 kit by Does not apply route daily Patient wants brand name Patient tests bid  E11.9 3/31/20   LINK Garza CNP   blood glucose monitor kit and supplies Test 2 times a day & as needed for symptoms of irregular blood glucose. 3/30/20   LINK Garza CNP   aspirin 81 MG EC tablet Take 81 mg by mouth daily    Historical Provider, MD   Cyanocobalamin 1000 MCG CAPS Take 1 tablet by mouth daily    Historical Provider, MD   [place]-3 1000 MG CAPS Take 1 capsule by mouth daily    Historical Provider, MD   Coenzyme Q10 (COQ10) 100 MG CAPS Take 1 capsule by mouth daily    Historical Provider, MD   ALBUTEROL IN Inhale 2 puffs into the lungs every 4 hours as needed    Historical Provider, MD   MULTIPLE VITAMINS PO Take by mouth    Historical Provider, MD       Allergies:  Patient has no known allergies. Social History:  The patient currently lives at home    TOBACCO:   reports that he has been smoking. He has been smoking about 1.00 pack per day. He has never used smokeless tobacco.  ETOH:   reports current alcohol use.       Family History:   Positive as follows:        Problem Relation Age of Onset    Asthma Mother     Cancer Mother     Hearing Loss Mother     Vision Loss Mother     High Blood Pressure Father     High Cholesterol Father     Vision Loss Father     Asthma Sister     Diabetes Sister     Vision Loss Sister     Asthma Brother     Arthritis Brother     High Blood Pressure Brother        REVIEW OF SYSTEMS:     Constitutional: Negative for fever   HENT: Negative for sore throat   Eyes: Negative for redness   Respiratory:   + for dyspnea, cough   Cardiovascular: Negative for chest pain   Gastrointestinal: Negative for vomiting, diarrhea   Genitourinary: Negative for hematuria   Musculoskeletal: Negative for arthralgias   Skin: Negative for rash   Neurological: Negative for syncope   Hematological: Negative for adenopathy   Psychiatric/Behavorial: Negative for anxiety    PHYSICAL EXAM:    /65   Pulse 68   Temp 98.2 °F (36.8 °C) (Oral)   Resp 18   SpO2 97%   Gen: No distress. Alert. Patient has BiPAP mask on, he is awake, alert and well-oriented  Eyes: PERRL. No sclera icterus. No conjunctival injection. ENT: No discharge. Pharynx clear. Neck: No JVD. No Carotid Bruit. Trachea midline. Resp: No accessory muscle use. Diminished breath sounds with mild rhonchi . CV: Regular rate. Regular rhythm. No murmur. No rub. No edema. GI: Non-tender. Non-distended. No masses. No organomegaly. Normal bowel sounds. No hernia. Skin: Warm and dry. No nodule on exposed extremities. No rash on exposed extremities. M/S: No cyanosis. No joint deformity. No clubbing. Neuro: Awake. Grossly nonfocal    Psych: Oriented x 3. No anxiety or agitation.      CBC:   Recent Labs     06/07/21  1220   WBC 11.5*   HGB 13.5   HCT 42.8   MCV 92.2        BMP:   Recent Labs     06/07/21  1220      K 5.2*      CO2 32   BUN 33*   CREATININE 1.1     LIVER PROFILE:   Recent Labs     06/07/21  1220   AST 18   ALT 15   BILITOT <0.2   ALKPHOS 54         CARDIAC ENZYMES  Recent Labs     06/07/21  1220   TROPONINI <0.01       ABG    Lab Results   Component Value Date    LQP8QAW 33.6 02/11/2021    BEART 6.0 02/11/2021    F6MKSKFC 98.0 02/11/2021    PHART 7.359 02/11/2021    SXA7WGS 61.0 02/11/2021    PO2ART 116.7 02/11/2021    VKL2CZU 35.5 02/11/2021       CULTURES  COVID/Influenza: pending    EKG:  I have reviewed the EKG with the following interpretation:   N/A    RADIOLOGY  XR CHEST PORTABLE   Final Result   Vascular congestion and mild interstitial edema, nearly identical to the   comparison. Echo 2/07/2021   Summary   LV systolic function is normal with EF estimated at 55%. No regional wall motion abnormalities. MIldly asynchronous septal motion. There is mild concentric left ventricular hypertrophy. Grade II diastolic dysfunction with elevated LV filling pressure. Mild mitral annular calcification. The left atrium is mildly dilated. Mild mitral regurgitation. Inadequate tricuspid regurgitation jet to estimate systolic artery pressure   (SPAP). Active Problems:    Acute respiratory failure with hypoxia and hypercarbia (HCC)  Resolved Problems:    * No resolved hospital problems. *        ASSESSMENT/PLAN:  Acute on chronic hypoxic/hypercapnic respiratory failure  - patient presented with worsening shortness of breath.    - due to COPD exacerbation   - pH 7.2, pCO2 74  - patient placed on BiPAP. Admitted to ICU. Pulmonology consulted. -Rule out Covid, rapid Covid testing has been sent out and currently pending    COPD exacerbation  - Treat with IV Solu-medrol, Zithromax D#1  - pulmonology consulted  - Duedison scheduled. - continue Singulair, Symbicort    Acute on chronic diastolic CHF  Ischemic cardiomyopathy  H/o Systolic CHF, recovered EF.   - IV Lasix 40 mg daily  - daily weights; I&O's. - on Lisinopril. CAD  - continue aspirin, Plavix, fenofibrate, Atorvastatin, Toprol-XL    Leukocytosis   - likely related to above. Monitor CBC    DM type 2  Neuropathy   - monitor glucose. - HgbA1C pending  - SSI coverage. Continue Gabapentin.      Hypertension  - BP borderline  - on Lisinopril, Toprol-XL    Hyperlipidemia  - on Fenofibrate, Atorvastatin    Obesity  - There is no height or weight on file to calculate BMI.   - Recommended weight loss    Tobacco Dependence  -Recommended cessation  - nicotine patch ordered     DVT Prophylaxis: Lovenox  Diet: NPO  Code Status: Full Code         Lavon Gonzalez MD    6/7/2021

## 2021-06-08 LAB
ANION GAP SERPL CALCULATED.3IONS-SCNC: 8 MMOL/L (ref 3–16)
BASOPHILS ABSOLUTE: 0.1 K/UL (ref 0–0.2)
BASOPHILS RELATIVE PERCENT: 0.9 %
BUN BLDV-MCNC: 40 MG/DL (ref 7–20)
CALCIUM SERPL-MCNC: 9.2 MG/DL (ref 8.3–10.6)
CHLORIDE BLD-SCNC: 105 MMOL/L (ref 99–110)
CO2: 29 MMOL/L (ref 21–32)
CREAT SERPL-MCNC: 1 MG/DL (ref 0.9–1.3)
EOSINOPHILS ABSOLUTE: 0 K/UL (ref 0–0.6)
EOSINOPHILS RELATIVE PERCENT: 0 %
GFR AFRICAN AMERICAN: >60
GFR NON-AFRICAN AMERICAN: >60
GLUCOSE BLD-MCNC: 135 MG/DL (ref 70–99)
GLUCOSE BLD-MCNC: 140 MG/DL (ref 70–99)
GLUCOSE BLD-MCNC: 150 MG/DL (ref 70–99)
GLUCOSE BLD-MCNC: 151 MG/DL (ref 70–99)
GLUCOSE BLD-MCNC: 153 MG/DL (ref 70–99)
GLUCOSE BLD-MCNC: 188 MG/DL (ref 70–99)
GLUCOSE BLD-MCNC: 84 MG/DL (ref 70–99)
HCT VFR BLD CALC: 42.4 % (ref 40.5–52.5)
HEMOGLOBIN: 13.6 G/DL (ref 13.5–17.5)
LYMPHOCYTES ABSOLUTE: 1.3 K/UL (ref 1–5.1)
LYMPHOCYTES RELATIVE PERCENT: 11.3 %
MCH RBC QN AUTO: 29.5 PG (ref 26–34)
MCHC RBC AUTO-ENTMCNC: 32 G/DL (ref 31–36)
MCV RBC AUTO: 92 FL (ref 80–100)
MONOCYTES ABSOLUTE: 0.2 K/UL (ref 0–1.3)
MONOCYTES RELATIVE PERCENT: 2 %
NEUTROPHILS ABSOLUTE: 10.1 K/UL (ref 1.7–7.7)
NEUTROPHILS RELATIVE PERCENT: 85.8 %
PDW BLD-RTO: 18.7 % (ref 12.4–15.4)
PERFORMED ON: ABNORMAL
PERFORMED ON: NORMAL
PLATELET # BLD: 274 K/UL (ref 135–450)
PMV BLD AUTO: 8.4 FL (ref 5–10.5)
POTASSIUM REFLEX MAGNESIUM: 5.5 MMOL/L (ref 3.5–5.1)
RBC # BLD: 4.61 M/UL (ref 4.2–5.9)
SODIUM BLD-SCNC: 142 MMOL/L (ref 136–145)
WBC # BLD: 11.7 K/UL (ref 4–11)

## 2021-06-08 PROCEDURE — 6370000000 HC RX 637 (ALT 250 FOR IP): Performed by: INTERNAL MEDICINE

## 2021-06-08 PROCEDURE — 36415 COLL VENOUS BLD VENIPUNCTURE: CPT

## 2021-06-08 PROCEDURE — 2700000000 HC OXYGEN THERAPY PER DAY

## 2021-06-08 PROCEDURE — 2580000003 HC RX 258: Performed by: INTERNAL MEDICINE

## 2021-06-08 PROCEDURE — 80048 BASIC METABOLIC PNL TOTAL CA: CPT

## 2021-06-08 PROCEDURE — 6370000000 HC RX 637 (ALT 250 FOR IP): Performed by: PHYSICIAN ASSISTANT

## 2021-06-08 PROCEDURE — 6360000002 HC RX W HCPCS: Performed by: PHYSICIAN ASSISTANT

## 2021-06-08 PROCEDURE — 94660 CPAP INITIATION&MGMT: CPT

## 2021-06-08 PROCEDURE — 1200000000 HC SEMI PRIVATE

## 2021-06-08 PROCEDURE — 94640 AIRWAY INHALATION TREATMENT: CPT

## 2021-06-08 PROCEDURE — 99233 SBSQ HOSP IP/OBS HIGH 50: CPT | Performed by: INTERNAL MEDICINE

## 2021-06-08 PROCEDURE — 2580000003 HC RX 258: Performed by: PHYSICIAN ASSISTANT

## 2021-06-08 PROCEDURE — 85025 COMPLETE CBC W/AUTO DIFF WBC: CPT

## 2021-06-08 PROCEDURE — 94761 N-INVAS EAR/PLS OXIMETRY MLT: CPT

## 2021-06-08 RX ORDER — 0.9 % SODIUM CHLORIDE 0.9 %
500 INTRAVENOUS SOLUTION INTRAVENOUS ONCE
Status: COMPLETED | OUTPATIENT
Start: 2021-06-08 | End: 2021-06-08

## 2021-06-08 RX ORDER — PREDNISONE 20 MG/1
40 TABLET ORAL DAILY
Status: DISCONTINUED | OUTPATIENT
Start: 2021-06-09 | End: 2021-06-09 | Stop reason: HOSPADM

## 2021-06-08 RX ADMIN — IPRATROPIUM BROMIDE AND ALBUTEROL SULFATE 1 AMPULE: .5; 3 SOLUTION RESPIRATORY (INHALATION) at 18:40

## 2021-06-08 RX ADMIN — DEXTROSE MONOHYDRATE 500 MG: 50 INJECTION, SOLUTION INTRAVENOUS at 16:10

## 2021-06-08 RX ADMIN — FUROSEMIDE 40 MG: 10 INJECTION, SOLUTION INTRAMUSCULAR; INTRAVENOUS at 08:02

## 2021-06-08 RX ADMIN — ENOXAPARIN SODIUM 40 MG: 40 INJECTION SUBCUTANEOUS at 08:03

## 2021-06-08 RX ADMIN — GABAPENTIN 800 MG: 400 CAPSULE ORAL at 08:04

## 2021-06-08 RX ADMIN — ATORVASTATIN CALCIUM 40 MG: 40 TABLET, FILM COATED ORAL at 08:03

## 2021-06-08 RX ADMIN — DOXYCYCLINE HYCLATE 100 MG: 100 TABLET, COATED ORAL at 20:39

## 2021-06-08 RX ADMIN — MUPIROCIN: 20 OINTMENT TOPICAL at 20:40

## 2021-06-08 RX ADMIN — DULOXETINE 20 MG: 20 CAPSULE, DELAYED RELEASE ORAL at 08:31

## 2021-06-08 RX ADMIN — INSULIN LISPRO 1 UNITS: 100 INJECTION, SOLUTION INTRAVENOUS; SUBCUTANEOUS at 00:22

## 2021-06-08 RX ADMIN — Medication 2 PUFF: at 07:18

## 2021-06-08 RX ADMIN — CLOPIDOGREL BISULFATE 75 MG: 75 TABLET ORAL at 08:03

## 2021-06-08 RX ADMIN — INSULIN LISPRO 1 UNITS: 100 INJECTION, SOLUTION INTRAVENOUS; SUBCUTANEOUS at 04:26

## 2021-06-08 RX ADMIN — INSULIN LISPRO 1 UNITS: 100 INJECTION, SOLUTION INTRAVENOUS; SUBCUTANEOUS at 17:18

## 2021-06-08 RX ADMIN — Medication 2 PUFF: at 18:40

## 2021-06-08 RX ADMIN — ASPIRIN 81 MG: 81 TABLET, FILM COATED ORAL at 08:03

## 2021-06-08 RX ADMIN — IPRATROPIUM BROMIDE AND ALBUTEROL SULFATE 1 AMPULE: .5; 3 SOLUTION RESPIRATORY (INHALATION) at 23:07

## 2021-06-08 RX ADMIN — Medication 10 ML: at 20:40

## 2021-06-08 RX ADMIN — Medication 10 ML: at 08:04

## 2021-06-08 RX ADMIN — SODIUM CHLORIDE 500 ML: 9 INJECTION, SOLUTION INTRAVENOUS at 06:21

## 2021-06-08 RX ADMIN — GABAPENTIN 800 MG: 400 CAPSULE ORAL at 20:39

## 2021-06-08 RX ADMIN — IPRATROPIUM BROMIDE AND ALBUTEROL SULFATE 1 AMPULE: .5; 3 SOLUTION RESPIRATORY (INHALATION) at 15:34

## 2021-06-08 RX ADMIN — GABAPENTIN 800 MG: 400 CAPSULE ORAL at 16:12

## 2021-06-08 RX ADMIN — METHYLPREDNISOLONE SODIUM SUCCINATE 40 MG: 40 INJECTION, POWDER, FOR SOLUTION INTRAMUSCULAR; INTRAVENOUS at 05:17

## 2021-06-08 RX ADMIN — IPRATROPIUM BROMIDE AND ALBUTEROL SULFATE 1 AMPULE: .5; 3 SOLUTION RESPIRATORY (INHALATION) at 07:18

## 2021-06-08 RX ADMIN — DULOXETINE 20 MG: 20 CAPSULE, DELAYED RELEASE ORAL at 20:40

## 2021-06-08 RX ADMIN — INSULIN LISPRO 1 UNITS: 100 INJECTION, SOLUTION INTRAVENOUS; SUBCUTANEOUS at 12:27

## 2021-06-08 RX ADMIN — FENOFIBRATE 160 MG: 160 TABLET ORAL at 08:03

## 2021-06-08 RX ADMIN — DOXYCYCLINE HYCLATE 100 MG: 100 TABLET, COATED ORAL at 08:03

## 2021-06-08 RX ADMIN — GABAPENTIN 800 MG: 400 CAPSULE ORAL at 13:19

## 2021-06-08 RX ADMIN — IPRATROPIUM BROMIDE AND ALBUTEROL SULFATE 1 AMPULE: .5; 3 SOLUTION RESPIRATORY (INHALATION) at 10:44

## 2021-06-08 RX ADMIN — MONTELUKAST SODIUM 10 MG: 10 TABLET, COATED ORAL at 20:40

## 2021-06-08 ASSESSMENT — PAIN SCALES - GENERAL
PAINLEVEL_OUTOF10: 0

## 2021-06-08 NOTE — PROGRESS NOTES
B/p 104/59 (74). Heart rate in the 50's. Patient arouses with no c/o. Patient state I'm just tired. Will continue to monitor.

## 2021-06-08 NOTE — PROGRESS NOTES
Shift assessment done and documented. Patient on bipap 45 % oxygen 91 % at present. Lungs are diminished. Patient is very drowsy but arouses with stimulation. Oriented to place,self,but not time. PIV's x-2 flushed with good blood return. Patient incontinent of small amount of urine. Complete bath given,gown changed and top sheet changed. Call light in reach,and bed alarm activated. Will continue to monitor.

## 2021-06-08 NOTE — PROGRESS NOTES
No changes noted in assessment. Patient more awake b/p stable,however heart rate remains in the 50's SB. Patient continues to tolerate bipap. No c/o at this time. Will continue to monitor.

## 2021-06-08 NOTE — PROGRESS NOTES
06/07/21 5773   NIV Type   NIV Started/Stopped On   Equipment Type v60   Mode Bilevel   Mask Type Full face mask   Mask Size Medium   Settings/Measurements   IPAP 15 cmH20   CPAP/EPAP 5 cmH2O   Rate Ordered 16   Resp 18   FiO2  45 %   Vt Exhaled 609 mL   Minute Volume 10.5 Liters   Mask Leak (lpm) 11 lpm   Comfort Level Good   Using Accessory Muscles No   SpO2 92   Oxygen Therapy/Pulse Ox   SpO2 92 %

## 2021-06-08 NOTE — PROGRESS NOTES
Pulmonary Progress Note    CC: Acute on chronic respiratory failure with hypoxemia and hypercapnia      Subjective: Patient wore bipap overnight. He is more alert this am.         Intake/Output Summary (Last 24 hours) at 6/8/2021 0726  Last data filed at 6/8/2021 0163  Gross per 24 hour   Intake 260 ml   Output 1400 ml   Net -1140 ml       Exam:   BP (!) 138/100   Pulse 60   Temp 97.3 °F (36.3 °C) (Axillary)   Resp 17   Ht 4' 11\" (1.499 m)   Wt 188 lb 7.9 oz (85.5 kg)   SpO2 97%   BMI 38.07 kg/m²  on bipap 45%-> 5L  Gen:No distress. Normocephalic, atraumatic. Eyes: PERRL. No sclera icterus. No conjunctival injection. ENT: No discharge. Pharynx clear. Neck: Trachea midline. No obvious mass. Resp: No accessory muscle use. No crackles. few wheezes. No rhonchi. No dullness on percussion. CV: Regular rate. Regular rhythm. No murmur or rub. No edema. GI: Non-tender. Non-distended. No hernia. Skin: Warm and dry. No nodule on exposed extremities. M/S: No cyanosis. No joint deformity. No clubbing. Neuro: Awake. Alert. Moves all four extremities. Conversant in short sentences.       Scheduled Meds:   aspirin  81 mg Oral Daily    atorvastatin  40 mg Oral Daily    metoprolol succinate  100 mg Oral Daily    clopidogrel  75 mg Oral Daily    DULoxetine  20 mg Oral BID    fenofibrate  160 mg Oral Daily    budesonide-formoterol  2 puff Inhalation BID    gabapentin  800 mg Oral 4x Daily    lisinopril  20 mg Oral Daily    montelukast  10 mg Oral Nightly    sodium chloride flush  5-40 mL Intravenous 2 times per day    enoxaparin  40 mg Subcutaneous Daily    insulin lispro  0-6 Units Subcutaneous Q4H    azithromycin  500 mg Intravenous Q24H    methylPREDNISolone  40 mg Intravenous Q12H    Followed by   Kandice Vásquez ON 6/9/2021] predniSONE  40 mg Oral Daily    furosemide  40 mg Intravenous Daily    mupirocin   Each Nostril BID    doxycycline hyclate  100 mg Oral 2 times per day    ipratropium-albuterol  1 ampule Inhalation Q4H WA    nicotine  1 patch Transdermal Daily     Continuous Infusions:   sodium chloride      dextrose       PRN Meds:  sodium chloride flush, sodium chloride, ondansetron **OR** ondansetron, polyethylene glycol, acetaminophen **OR** acetaminophen, glucose, dextrose, glucagon (rDNA), dextrose, albuterol-ipratropium    Labs:  CBC:   Recent Labs     06/07/21  1220 06/08/21  0421   WBC 11.5* 11.7*   HGB 13.5 13.6   HCT 42.8 42.4   MCV 92.2 92.0    274     BMP:   Recent Labs     06/07/21  1220 06/08/21  0421    142   K 5.2* 5.5*    105   CO2 32 29   BUN 33* 40*   CREATININE 1.1 1.0     LIVER PROFILE:   Recent Labs     06/07/21  1220   AST 18   ALT 15   BILITOT <0.2   ALKPHOS 54     PT/INR: No results for input(s): PROTIME, INR in the last 72 hours. APTT: No results for input(s): APTT in the last 72 hours. UA:No results for input(s): NITRITE, COLORU, PHUR, LABCAST, WBCUA, RBCUA, MUCUS, TRICHOMONAS, YEAST, BACTERIA, CLARITYU, SPECGRAV, LEUKOCYTESUR, UROBILINOGEN, BILIRUBINUR, BLOODU, GLUCOSEU, AMORPHOUS in the last 72 hours. Invalid input(s): KETONESU  No results for input(s): PHART, CRC9TOF, PO2ART in the last 72 hours. Cultures:   sars Cov2- neg       Films:  CXR 6/7/21: Bilateral hazy interstitial airspace opacities, indistinct pulmonary vasculature. No pneumothorax.     Echo 2/21: Ejection fraction 01%, grade 2 diastolic dysfunction     ProBNP 1847 down from 2518 2/21     ASSESSMENT:  · Acute on chronic respiratory failure with hypoxemia and hypercapnia  · Possible atypical pneumonia-rule out COVID-19  · Tobacco abuse  · Probable COPD with AE     PLAN:  · Non-invasive positive pressure ventilation prn. Wean oxygen as tolerated.    · switch to oral prednisone 40 mg daily with improvement, then taper   · Inhaled bronchodilators   · Antibiotics (D2 doxycycline)  · Nicotine patch  · Smoking cessation counseling  · ICU care- bactroban, lovenox  · Ok to leave ICU from my perspective.

## 2021-06-08 NOTE — PROGRESS NOTES
Perfect served Dr. Sami Hurtado regarding b/p in the 80's and map > 65. Patient heart rate in the 50's. Patient is arouses with stimulation. Patient states feels better and tolerating bipap.

## 2021-06-08 NOTE — PROGRESS NOTES
IM Progress Note    Admit Date:  6/7/2021     Admitted with acute on chronic respiratory failure COPD exacerbation and CHF. Required BiPAP    Subjective:  Mr. Shira Evangelista is doing much better today ; he is off of BiPAP. oxygen saturation stable on 3 L    Objective:   BP (!) 98/54   Pulse 77   Temp 97.8 °F (36.6 °C) (Oral)   Resp 14   Ht 4' 11\" (1.499 m)   Wt 188 lb 7.9 oz (85.5 kg)   SpO2 94%   BMI 38.07 kg/m²       Intake/Output Summary (Last 24 hours) at 6/8/2021 1656  Last data filed at 6/8/2021 1100  Gross per 24 hour   Intake 768.41 ml   Output 2150 ml   Net -1381.59 ml         Physical Exam:  General:  Awake, alert, NAD  Skin:  Warm and dry  Neck:  JVD absent. Neck supple  Chest:   No wheezes, rales or rhonchi. Diminished breath sounds  Cardiovascular:  RRR ,S1S2 normal  Abdomen:  Soft, non tender, non distended, BS +  Extremities:  No edema. Intact peripheral pulses.  Brisk cap refill, < 2 secs  Neuro: non focal      Medications:   Scheduled Meds:   [START ON 6/9/2021] predniSONE  40 mg Oral Daily    mupirocin   Nasal BID    aspirin  81 mg Oral Daily    atorvastatin  40 mg Oral Daily    metoprolol succinate  100 mg Oral Daily    clopidogrel  75 mg Oral Daily    DULoxetine  20 mg Oral BID    fenofibrate  160 mg Oral Daily    budesonide-formoterol  2 puff Inhalation BID    gabapentin  800 mg Oral 4x Daily    montelukast  10 mg Oral Nightly    sodium chloride flush  5-40 mL Intravenous 2 times per day    enoxaparin  40 mg Subcutaneous Daily    insulin lispro  0-6 Units Subcutaneous Q4H    azithromycin  500 mg Intravenous Q24H    furosemide  40 mg Intravenous Daily    mupirocin   Each Nostril BID    doxycycline hyclate  100 mg Oral 2 times per day    ipratropium-albuterol  1 ampule Inhalation Q4H WA    nicotine  1 patch Transdermal Daily       Continuous Infusions:   sodium chloride      dextrose         Data:  CBC:   Recent Labs     06/07/21  1220 06/08/21  0421   WBC 11.5* 11.7* RBC 4.64 4.61   HGB 13.5 13.6   HCT 42.8 42.4   MCV 92.2 92.0   RDW 18.6* 18.7*    274     BMP:   Recent Labs     06/07/21  1220 06/08/21  0421    142   K 5.2* 5.5*    105   CO2 32 29   BUN 33* 40*   CREATININE 1.1 1.0     BNP: No results for input(s): BNP in the last 72 hours. PT/INR: No results for input(s): PROTIME, INR in the last 72 hours. APTT: No results for input(s): APTT in the last 72 hours. CARDIAC ENZYMES:   Recent Labs     06/07/21  1220   TROPONINI <0.01     FASTING LIPID PANEL:  Lab Results   Component Value Date    HDL 35 (L) 05/24/2021     LIVER PROFILE:   Recent Labs     06/07/21  1220   AST 18   ALT 15   BILITOT <0.2   ALKPHOS 54           CULTURES  COVID/Influenza: Not detected        RADIOLOGY  XR CHEST PORTABLE   Final Result   Vascular congestion and mild interstitial edema, nearly identical to the   comparison.              Echo 2/07/2021   Summary   LV systolic function is normal with EF estimated at 55%.   No regional wall motion abnormalities.  MIldly asynchronous septal motion.   There is mild concentric left ventricular hypertrophy.   Grade II diastolic dysfunction with elevated LV filling pressure.   Mild mitral annular calcification.   The left atrium is mildly dilated.   Mild mitral regurgitation.   Inadequate tricuspid regurgitation jet to estimate systolic artery pressure   (SPAP). Assessment:  Active Problems:    Acute respiratory failure with hypoxia and hypercarbia (HCC)    Acute on chronic systolic CHF (congestive heart failure) (HCC)    Type 2 diabetes mellitus without complication ([facility] [address]  Resolved Problems:    * No resolved hospital problems. *      Plan:    Acute on chronic hypoxic/hypercapnic respiratory failure  - patient presented with worsening shortness of breath.    - due to COPD exacerbation   - pH 7.2, pCO2 74  - patient placed on BiPAP. Admitted to ICU. Pulmonology consulted. -Ruled out Covid. Off of BiPAP today .   O2 sat stable on 3 L    COPD exacerbation  Acute bronchitis/atypical pneumonia-likely gram-positive infection  -Seen by pulmonology   -on IV Solu-medrol -> switch to oral prednisone  -Continue Zithromax D#2; doxycyclineD #2 ;  Duonebs scheduled; Singulair; Symbicort     Acute on chronic diastolic CHF  Ischemic cardiomyopathy  H/o Systolic CHF, recovered EF.   -Continue IV Lasix 40 mg daily  - daily weights; I&O's. - on Lisinopril. CAD  - continue aspirin, Plavix, fenofibrate, Atorvastatin, Toprol-XL     Leukocytosis   - likely related to above. Monitor CBC     DM type 2  Neuropathy   - monitor glucose. - HgbA1C pending  - SSI coverage. Continue Gabapentin.      Hypertension  - BP borderline  - on Lisinopril, Toprol-XL     Hyperlipidemia  - on Fenofibrate, Atorvastatin     Obesity  - There is no height or weight on file to calculate BMI. - Recommended weight loss     Tobacco Dependence  -Recommended cessation  - nicotine patch ordered      DVT Prophylaxis: Lovenox   ADULT DIET;  Regular; 3 carb choices (45 gm/meal)  Code Status: Full Code        Transfer out of ICU today      Ruperto Ramirez MD, MD 6/8/2021 4:56 PM

## 2021-06-08 NOTE — CARE COORDINATION
Case Management Assessment  Initial Evaluation      Patient Name: Cole Jauregui  YOB: 1965  Diagnosis: Acute respiratory failure with hypoxia and hypercarbia ([facility] [J96.01, J96.02]  Date / Time: 6/7/2021 12:16 PM    Admission status/Date:inpt  Chart Reviewed: Yes      Patient Interviewed: Yes   Family Interviewed:  No      Hospitalization in the last 30 days:  No      Health Care Decision Maker :     (CM - must 1st enter selection under Navigator - emergency contact- Health Care Decision Maker Relationship and pick relationship)   Who do you trust or have selected to make healthcare decisions for you      Met with: pt at bedside  Interview conducted  (bedside/phone):    Current PCP:   LINK Melgar - [facility]      Financial  Commercial  Precert required for SNF : Y, N          3 night stay required - Y, N    ADLS  Support Systems/Care Needs: Family Members  Transportation: family    Meal Preparation: spouse    Housing  Living Arrangements: home with spouse  Steps: one  Intent for return to present living arrangements: Yes  Identified Issues: no    Home Care Information  Active with [address] : No Agency:(Services)  Type of Home Care Services: None  Passport/Waiver : No  :                      Phone Number:    Passport/Waiver Services: no          Durable Medical Equiptment   DME Provider: [facility]  Equipment:   Walker___Cane___RTS___ BSC___Shower Chair___Hospital Bed___W/C____Other________  02 at _2___Liter(s)---wears(frequency)___continuous____ HHN ___ CPAP___ BiPap___   N/A____      Home O2 Use :  Yes    If No for home O2---if presently on O2 during hospitalization:  Yes  if yes CM to follow for potential DC O2 need  Informed of need for care provider to bring portable home O2 tank on day of discharge for nursing to connect prior to leaving:   Yes  Verbalized agreement/Understanding:   Yes    Community Service Affiliation  Dialysis:  No    · Agency:  · Location:  · Dialysis Schedule:  · Phone:   · Fax: Other Community Services: (ex:PT/OT,Mental Health,Wound Clinic, Cardio/Pul [address])    DISCHARGE PLAN: Explained Case Management role/services. Reviewed chart. Met with the pt. Pt from home with spouse and plan return. Pt active with [facility] for home O2, uses 2 liters continuous at baseline. Following.

## 2021-06-08 NOTE — PROGRESS NOTES
06/08/21 0241   NIV Type   NIV Started/Stopped On   Equipment Type v60   Mode Bilevel   Mask Type Full face mask   Mask Size Medium   Settings/Measurements   IPAP 15 cmH20   CPAP/EPAP 5 cmH2O   Rate Ordered 16   Resp 18   FiO2  45 %   Vt Exhaled 674 mL   Minute Volume 13.2 Liters   Mask Leak (lpm) 25 lpm   Comfort Level Good   Using Accessory Muscles No   SpO2 92   Oxygen Therapy/Pulse Ox   SpO2 90 %

## 2021-06-09 VITALS
BODY MASS INDEX: 36.28 KG/M2 | HEART RATE: 66 BPM | TEMPERATURE: 98 F | DIASTOLIC BLOOD PRESSURE: 64 MMHG | HEIGHT: 60 IN | WEIGHT: 184.8 LBS | OXYGEN SATURATION: 92 % | SYSTOLIC BLOOD PRESSURE: 102 MMHG | RESPIRATION RATE: 16 BRPM

## 2021-06-09 LAB
ANION GAP SERPL CALCULATED.3IONS-SCNC: 7 MMOL/L (ref 3–16)
BASOPHILS ABSOLUTE: 0.1 K/UL (ref 0–0.2)
BASOPHILS RELATIVE PERCENT: 0.4 %
BUN BLDV-MCNC: 43 MG/DL (ref 7–20)
CALCIUM SERPL-MCNC: 9.9 MG/DL (ref 8.3–10.6)
CHLORIDE BLD-SCNC: 96 MMOL/L (ref 99–110)
CO2: 32 MMOL/L (ref 21–32)
CREAT SERPL-MCNC: 1 MG/DL (ref 0.9–1.3)
EOSINOPHILS ABSOLUTE: 0 K/UL (ref 0–0.6)
EOSINOPHILS RELATIVE PERCENT: 0.2 %
GFR AFRICAN AMERICAN: >60
GFR NON-AFRICAN AMERICAN: >60
GLUCOSE BLD-MCNC: 116 MG/DL (ref 70–99)
GLUCOSE BLD-MCNC: 136 MG/DL (ref 70–99)
GLUCOSE BLD-MCNC: 143 MG/DL (ref 70–99)
GLUCOSE BLD-MCNC: 162 MG/DL (ref 70–99)
GLUCOSE BLD-MCNC: 181 MG/DL (ref 70–99)
HCT VFR BLD CALC: 44.8 % (ref 40.5–52.5)
HEMOGLOBIN: 14.6 G/DL (ref 13.5–17.5)
LYMPHOCYTES ABSOLUTE: 1.2 K/UL (ref 1–5.1)
LYMPHOCYTES RELATIVE PERCENT: 7.8 %
MCH RBC QN AUTO: 29.5 PG (ref 26–34)
MCHC RBC AUTO-ENTMCNC: 32.5 G/DL (ref 31–36)
MCV RBC AUTO: 90.9 FL (ref 80–100)
MONOCYTES ABSOLUTE: 0.4 K/UL (ref 0–1.3)
MONOCYTES RELATIVE PERCENT: 2.8 %
NEUTROPHILS ABSOLUTE: 13.2 K/UL (ref 1.7–7.7)
NEUTROPHILS RELATIVE PERCENT: 88.8 %
PDW BLD-RTO: 18.3 % (ref 12.4–15.4)
PERFORMED ON: ABNORMAL
PLATELET # BLD: 292 K/UL (ref 135–450)
PMV BLD AUTO: 7.9 FL (ref 5–10.5)
POTASSIUM REFLEX MAGNESIUM: 5.2 MMOL/L (ref 3.5–5.1)
RBC # BLD: 4.93 M/UL (ref 4.2–5.9)
SODIUM BLD-SCNC: 135 MMOL/L (ref 136–145)
WBC # BLD: 14.9 K/UL (ref 4–11)

## 2021-06-09 PROCEDURE — 36415 COLL VENOUS BLD VENIPUNCTURE: CPT

## 2021-06-09 PROCEDURE — 6360000002 HC RX W HCPCS: Performed by: INTERNAL MEDICINE

## 2021-06-09 PROCEDURE — 80048 BASIC METABOLIC PNL TOTAL CA: CPT

## 2021-06-09 PROCEDURE — 2580000003 HC RX 258: Performed by: PHYSICIAN ASSISTANT

## 2021-06-09 PROCEDURE — 6370000000 HC RX 637 (ALT 250 FOR IP): Performed by: INTERNAL MEDICINE

## 2021-06-09 PROCEDURE — 99232 SBSQ HOSP IP/OBS MODERATE 35: CPT | Performed by: INTERNAL MEDICINE

## 2021-06-09 PROCEDURE — 85025 COMPLETE CBC W/AUTO DIFF WBC: CPT

## 2021-06-09 PROCEDURE — 99238 HOSP IP/OBS DSCHRG MGMT 30/<: CPT | Performed by: INTERNAL MEDICINE

## 2021-06-09 PROCEDURE — 2580000003 HC RX 258: Performed by: INTERNAL MEDICINE

## 2021-06-09 PROCEDURE — 94640 AIRWAY INHALATION TREATMENT: CPT

## 2021-06-09 PROCEDURE — 94761 N-INVAS EAR/PLS OXIMETRY MLT: CPT

## 2021-06-09 PROCEDURE — 2700000000 HC OXYGEN THERAPY PER DAY

## 2021-06-09 RX ORDER — BISOPROLOL FUMARATE 5 MG/1
5 TABLET ORAL DAILY
Qty: 30 TABLET | Refills: 0 | Status: SHIPPED | OUTPATIENT
Start: 2021-06-09 | End: 2021-06-28

## 2021-06-09 RX ORDER — PREDNISONE 10 MG/1
TABLET ORAL
Qty: 30 TABLET | Refills: 0 | Status: ON HOLD | OUTPATIENT
Start: 2021-06-09 | End: 2021-07-13 | Stop reason: HOSPADM

## 2021-06-09 RX ORDER — DOXYCYCLINE HYCLATE 100 MG
100 TABLET ORAL EVERY 12 HOURS SCHEDULED
Qty: 8 TABLET | Refills: 0 | Status: SHIPPED | OUTPATIENT
Start: 2021-06-09 | End: 2021-06-13

## 2021-06-09 RX ORDER — 0.9 % SODIUM CHLORIDE 0.9 %
500 INTRAVENOUS SOLUTION INTRAVENOUS ONCE
Status: COMPLETED | OUTPATIENT
Start: 2021-06-09 | End: 2021-06-09

## 2021-06-09 RX ORDER — AZITHROMYCIN 250 MG/1
500 TABLET, FILM COATED ORAL DAILY
Qty: 4 TABLET | Refills: 0 | Status: SHIPPED | OUTPATIENT
Start: 2021-06-09 | End: 2021-06-09 | Stop reason: HOSPADM

## 2021-06-09 RX ADMIN — ENOXAPARIN SODIUM 40 MG: 40 INJECTION SUBCUTANEOUS at 08:43

## 2021-06-09 RX ADMIN — IPRATROPIUM BROMIDE AND ALBUTEROL SULFATE 1 AMPULE: .5; 3 SOLUTION RESPIRATORY (INHALATION) at 16:01

## 2021-06-09 RX ADMIN — ATORVASTATIN CALCIUM 40 MG: 40 TABLET, FILM COATED ORAL at 08:44

## 2021-06-09 RX ADMIN — Medication 10 ML: at 08:51

## 2021-06-09 RX ADMIN — FENOFIBRATE 160 MG: 160 TABLET ORAL at 08:42

## 2021-06-09 RX ADMIN — ASPIRIN 81 MG: 81 TABLET, FILM COATED ORAL at 08:43

## 2021-06-09 RX ADMIN — PREDNISONE 40 MG: 20 TABLET ORAL at 08:43

## 2021-06-09 RX ADMIN — DULOXETINE 20 MG: 20 CAPSULE, DELAYED RELEASE ORAL at 08:43

## 2021-06-09 RX ADMIN — Medication 2 PUFF: at 08:12

## 2021-06-09 RX ADMIN — INSULIN LISPRO 1 UNITS: 100 INJECTION, SOLUTION INTRAVENOUS; SUBCUTANEOUS at 12:00

## 2021-06-09 RX ADMIN — IPRATROPIUM BROMIDE AND ALBUTEROL SULFATE 1 AMPULE: .5; 3 SOLUTION RESPIRATORY (INHALATION) at 12:10

## 2021-06-09 RX ADMIN — SODIUM CHLORIDE 500 ML: 9 INJECTION, SOLUTION INTRAVENOUS at 14:27

## 2021-06-09 RX ADMIN — MUPIROCIN: 20 OINTMENT TOPICAL at 08:48

## 2021-06-09 RX ADMIN — CLOPIDOGREL BISULFATE 75 MG: 75 TABLET ORAL at 08:43

## 2021-06-09 RX ADMIN — FUROSEMIDE 40 MG: 10 INJECTION, SOLUTION INTRAMUSCULAR; INTRAVENOUS at 08:42

## 2021-06-09 RX ADMIN — GABAPENTIN 800 MG: 400 CAPSULE ORAL at 11:59

## 2021-06-09 RX ADMIN — METOPROLOL SUCCINATE 100 MG: 50 TABLET, EXTENDED RELEASE ORAL at 08:42

## 2021-06-09 RX ADMIN — DOXYCYCLINE HYCLATE 100 MG: 100 TABLET, COATED ORAL at 08:43

## 2021-06-09 RX ADMIN — IPRATROPIUM BROMIDE AND ALBUTEROL SULFATE 1 AMPULE: .5; 3 SOLUTION RESPIRATORY (INHALATION) at 08:12

## 2021-06-09 RX ADMIN — GABAPENTIN 800 MG: 400 CAPSULE ORAL at 08:42

## 2021-06-09 ASSESSMENT — PAIN SCALES - GENERAL
PAINLEVEL_OUTOF10: 0

## 2021-06-09 NOTE — PROGRESS NOTES
Sp02 89 on 2.5L of O2 at rest.   Sp02 86 on 2.5L of O2 with exertion. Sp02 92 on 3.5L of 02 per NC with ambulation.

## 2021-06-09 NOTE — PROGRESS NOTES
 dextrose       PRN Meds:  sodium chloride flush, sodium chloride, ondansetron **OR** ondansetron, polyethylene glycol, acetaminophen **OR** acetaminophen, glucose, dextrose, glucagon (rDNA), dextrose, albuterol-ipratropium    Labs:  CBC:   Recent Labs     06/07/21  1220 06/08/21  0421   WBC 11.5* 11.7*   HGB 13.5 13.6   HCT 42.8 42.4   MCV 92.2 92.0    274     BMP:   Recent Labs     06/07/21  1220 06/08/21  0421    142   K 5.2* 5.5*    105   CO2 32 29   BUN 33* 40*   CREATININE 1.1 1.0     LIVER PROFILE:   Recent Labs     06/07/21  1220   AST 18   ALT 15   BILITOT <0.2   ALKPHOS 54     PT/INR: No results for input(s): PROTIME, INR in the last 72 hours. APTT: No results for input(s): APTT in the last 72 hours. UA:No results for input(s): NITRITE, COLORU, PHUR, LABCAST, WBCUA, RBCUA, MUCUS, TRICHOMONAS, YEAST, BACTERIA, CLARITYU, SPECGRAV, LEUKOCYTESUR, UROBILINOGEN, BILIRUBINUR, BLOODU, GLUCOSEU, AMORPHOUS in the last 72 hours. Invalid input(s): KETONESU  No results for input(s): PHART, YID8LAP, PO2ART in the last 72 hours. Cultures:   sars Cov2- neg       Films:  CXR 6/7/21: Bilateral hazy interstitial airspace opacities, indistinct pulmonary vasculature. No pneumothorax.     Echo 2/21: Ejection fraction 49%, grade 2 diastolic dysfunction     ProBNP 1847 down from 2518 2/21     ASSESSMENT:  · Acute on chronic respiratory failure with hypoxemia and hypercapnia  · Possible atypical pneumonia  · Tobacco abuse  · Probable COPD with AE     PLAN:  ·  Wean oxygen as tolerated. · oral prednisone 40 mg daily, then taper   · Inhaled bronchodilators   · Antibiotics (D3 doxycycline)  · Nicotine patch  · Smoking cessation counseling  · D/c ok from my perspective if patient does ok with ambulation.

## 2021-06-09 NOTE — DISCHARGE INSTR - COC
Continuity of Care Form    Patient Name: Unruly Bruno   :  1965  MRN:  [id number]    Admit date:  2021  Discharge date:  2021    Code Status Order: Full Code   Advance Directives:     Admitting Physician:  Maxine Esquivel MD  PCP: LINK Chen CNP    Discharging Nurse: Good Padilla Unit/Room#: 0224/0224-01  Discharging Unit Phone Number: [phone]    Emergency Contact:   Extended Emergency Contact Information  Primary Emergency Contact: Chante Motta [facility] Phone: [phone]  Relation: Spouse    Past Surgical History:  Past Surgical History:   Procedure Laterality Date    HERNIA REPAIR      TONSILLECTOMY         Immunization History: There is no immunization history on file for this patient.     Active Problems:  Patient Active Problem List   Diagnosis Code    Hypertension I10    Other hyperlipidemia E78.49    CHF (congestive heart failure) ([facility]) I50.9    COPD exacerbation ([facility]) J44.1    Acute on chronic respiratory failure with hypoxia and hypercapnia ([facility]) J96.21, J96.22    Coronary artery disease involving native coronary artery of native heart without angina pectoris I25.10    Acute respiratory failure with hypoxia and hypercapnia ([facility]) J96.01, J96.02    Acute on chronic systolic CHF (congestive heart failure) ([facility]) I50.23    Type 2 diabetes mellitus without complication ([facility]) A17.9    Tobacco use Z72.0       Isolation/Infection:   Isolation          No Isolation        Patient Infection Status     Infection Onset Added Last Indicated Last Indicated By Review Planned Expiration Resolved Resolved By    None active    Resolved    COVID-19 Rule Out 21 COVID-19 & Influenza Combo (Ordered)   21 Rule-Out Test Resulted    COVID-19 Rule Out 21 COVID-19, Rapid (Ordered)   21 Rule-Out Test Resulted    COVID-19 Rule Out 21 COVID-19 (Ordered)   21 Rule-Out Test Resulted Nurse Assessment:  Last Vital Signs: /64   Pulse 66   Temp 98 °F (36.7 °C) (Oral)   Resp 16   Ht 4' 11\" (1.499 m)   Wt 184 lb 12.8 oz (83.8 kg)   SpO2 92%   BMI 37.33 kg/m²     Last documented pain score (0-10 scale): Pain Level: 0  Last Weight:   Wt Readings from Last 1 Encounters:   06/09/21 184 lb 12.8 oz (83.8 kg)     Mental Status:  oriented and alert    IV Access:  - None    Nursing Mobility/ADLs:  Walking   Independent  Transfer  Independent  Bathing  Independent  Dressing  Independent  Toileting  Independent  Feeding  Independent  Med Admin  Independent  Med Delivery   whole    Wound Care Documentation and Therapy:        Elimination:  Continence:   · Bowel: Yes  · Bladder: Yes  Urinary Catheter: None   Colostomy/Ileostomy/Ileal Conduit: No       Date of Last BM: 6/9/2021    Intake/Output Summary (Last 24 hours) at 6/9/2021 1659  Last data filed at 6/9/2021 1617  Gross per 24 hour   Intake 200 ml   Output 2075 ml   Net -1875 ml     I/O last 3 completed shifts: In: 200 [P.O.:200]  Out: 7691 [Urine:1575]    Safety Concerns:     None    Impairments/Disabilities:      None    Nutrition Therapy:  Current Nutrition Therapy:   - Oral Diet:  General    Routes of Feeding: Oral  Liquids: Thin Liquids  Daily Fluid Restriction: no  Last Modified Barium Swallow with Video (Video Swallowing Test): not done    Treatments at the Time of Hospital Discharge:   Respiratory Treatments: inhalers  Oxygen Therapy:  is on oxygen at 2.5 L/min per nasal cannula. Ventilator:    - No ventilator support    Rehab Therapies:   Weight Bearing Status/Restrictions: No weight bearing restirctions  Other Medical Equipment (for information only, NOT a DME order):     Other Treatments:     Patient's personal belongings (please select all that are sent with patient):  None    RN SIGNATURE:  Electronically signed by Antoine Hinson RN on 6/9/21 at 5:23 PM EDT    CASE MANAGEMENT/SOCIAL WORK SECTION    Inpatient Status Date:

## 2021-06-09 NOTE — CARE COORDINATION
INTERDISCIPLINARY PLAN OF CARE CONFERENCE    Date/Time: 6/9/2021 4:08 PM  Completed by: Devin Naranjo RN, Case Management      Patient Name:  Susan Mejia  YOB: 1965  Admitting Diagnosis: Acute respiratory failure with hypoxia and hypercarbia ([facility] [address] [J96.01, J96.02]     Admit Date/Time:  6/7/2021 12:16 PM    Chart reviewed. Interdisciplinary team contacted or reviewed plan related to patient progress and discharge plans. Disciplines included Case Management, Nursing, and Dietitian. Current Status: Stable  PT/OT recommendation for discharge plan of care: N/A    Expected D/C Disposition:  Home  Confirmed plan with patient  Yes   Met with: patient at bedside  Discharge Plan Comments:  Reviewed chart and met with pt at bedside. Plan remains for home . Will follow for poss HHC at dc. Noted pt is dc to home. No dc needs identified. Home O2 in place on admit: Yes  Pt informed of need to bring portable home O2 tank on day of discharge for nursing to connect prior to leaving:  Yes  Verbalized agreement/Understanding:   Yes

## 2021-06-09 NOTE — FLOWSHEET NOTE
06/08/21 2015   Vital Signs   Temp 98.5 °F (36.9 °C)   Temp Source Oral   Pulse 82   Heart Rate Source Monitor   Resp 18   /64   BP Location Left upper arm   Patient Position Sitting   Level of Consciousness Alert (0)   MEWS Score 2   Patient Currently in Pain Denies   Pain Assessment   Pain Assessment 0-10   Pain Level 0   Patient's Stated Pain Goal No pain   Oxygen Therapy   SpO2 92 %   Pulse Oximeter Device Mode Intermittent   Pulse Oximeter Device Location Finger   O2 Device Nasal cannula   O2 Flow Rate (L/min) 3 L/min   Transfer assessment completed see flow sheet. Vitals per above. Patient awake, alert and oriented x 4 sitting on side of bed. No pain reported at time of assessment. Requested fresh water and a snack. Blood sugar 84. Night medication given per MAR. Bed in lowest position for patient safety. Urinal at bedside. Bed alarm on. Call light in reach.

## 2021-06-10 ENCOUNTER — FOLLOWUP TELEPHONE ENCOUNTER (OUTPATIENT)
Dept: MEDSURG UNIT | Age: 56
End: 2021-06-10

## 2021-06-10 ENCOUNTER — TELEPHONE (OUTPATIENT)
Dept: FAMILY MEDICINE CLINIC | Age: 56
End: 2021-06-10

## 2021-06-10 NOTE — TELEPHONE ENCOUNTER
[facility] Trans[facility] Initial Follow Up Call    Outreach made within 2 business days of discharge: Yes    Patient: Reinaldo Rosa Patient : 1965   MRN: <[id number]>  Reason for Admission: There are no discharge diagnoses documented for the most recent discharge.   Discharge Date: 21       Spoke with: Reddy has reached out for follow up call and patient also has HFU appointment scheduled for PCP     Discharge department/facility: [place]    Scheduled appointment with PCP within 7-14 days    Follow Up  Future Appointments   Date Time Provider Soumya Renteria   2021  2:40 PM LINK Su - CNP [place] [address]

## 2021-06-11 LAB
ESTIMATED AVERAGE GLUCOSE: 157.1 MG/DL
HBA1C MFR BLD: 7.1 %

## 2021-06-11 RX ORDER — PIOGLITAZONEHYDROCHLORIDE 15 MG/1
TABLET ORAL
Qty: 30 TABLET | Refills: 0 | Status: SHIPPED | OUTPATIENT
Start: 2021-06-11 | End: 2021-06-28

## 2021-06-11 NOTE — TELEPHONE ENCOUNTER
Future appt scheduled 06/16/2021                Last appt 05/24/2021      Last Written 05/21/2021    pioglitazone (ACTOS) 15 MG tablet  #30  0 RF

## 2021-06-14 NOTE — DISCHARGE SUMMARY
Name:  Regulo Sheikh  Room:  0950/6584-22  MRN:    [id number]    Discharge Summary      This discharge summary is in conjunction with a complete physical exam done on the day of discharge. Discharging Physician: Dr. Steva Riedel: 6/7/2021  Discharge:  6/9/2021    HPI taken from admission H&P:      The patient is a 50 y.o. male with COPD, chronic hypoxic respiratory failure on home oxygen,  CHF, DM type 2, hypertension, and hyperlipidemia who presents to [facility] with c/o shortness of breath. Patient experiencing worsening shortness of breath over the last 2 days. He has required more oxygen at home. He continues to smoke tobacco.  He has not had his Covid vaccine. Upon EMS arrival, patient had circumoral cyanosis. His O2 sats were in the 60s;  he was placed on 6 L O2  ; subsequently placed on BiPAP on arrival to the ER     Labs with BNP 1,847, and mild leukocytosis. ABG with pH 7.2, PCO2 74, carboxyhemoglobin 12.1. Patient placed on BiPAP. Admitted to ICU. Pulmonology consulted.       Patient admitted to the ICU on BiPAP. He feels much better now. Awake alert and oriented. Rule out Covid. Keep in Droplet plus precautions. Covid testing pending.     Patient complains of a mild cough, not bringing up any sputum. No fevers or chills. Continues to smoke tobacco-1 pack/day for 40 years    Diagnoses this Admission and Hospital Course     Acute on chronic hypoxic/hypercapnic respiratory failure  - patient presented with worsening shortness of breath.    - due to COPD exacerbation   - pH 7.2, pCO2 74  - patient placed on BiPAP.  Admitted to ICU.  Pulmonology consulted-->off of BiPAP and transferred to   -Ruled out Covid.   - O2 sat stable on 3 L (home O2)    COPD exacerbation  Acute bronchitis/atypical pneumonia-likely gram-positive infection  -Seen by pulmonology   -on IV Solu-medrol -> switch to oral prednisone  -Continue Zithromax D#3; doxycyclineD #3 ;  Duonebs scheduled; Singulair; Symbicort  - D/c home on steroids and Abx      Acute on chronic diastolic CHF  Ischemic cardiomyopathy  H/o Systolic CHF, recovered EF.   -Continue IV Lasix 40 mg daily  - daily weights; I&O's, net fluid deficit: 2.6L  - on Lisinopril but held for hyperkalemia-->D/c lisinopril on discharge      Hyperkalemia  - K: 5.2 > 5.5  - EKG: with peaked T waves on admission   - Has been treated with albuterol for repsiratory reasons and Lasix for CHF  - Takes Lisinopril at home, HOLD-->discontinue on discharge     CAD  - continue aspirin, Plavix, fenofibrate, Atorvastatin, Toprol-XL     Leukocytosis   - likely related to above  - Stable      DM type 2  Neuropathy   - monitor glucose. - HgbA1C pending  - SSI coverage. Continue Gabapentin.      Hypertension  - No with hypotension, likely 2/2 over diuresis-->IVF bolus and BP improved   - BP borderline  - on Lisinopril, Toprol-XL  - Holding ACE with hyperkalemia  - Stop ACE on discharge and cut BB dose in half      Hyperlipidemia  - on Fenofibrate, Atorvastatin     Obesity  - There is no height or weight on file to calculate BMI. - Recommended weight loss     Tobacco Dependence  -Recommended cessation  - nicotine patch ordered     Procedures (Please Review Full Report for Details)  None     Consults    Pulmonology     Physical Exam at Discharge:    /64   Pulse 66   Temp 98 °F (36.7 °C) (Oral)   Resp 16   Ht 4' 11\" (1.499 m)   Wt 184 lb 12.8 oz (83.8 kg)   SpO2 92%   BMI 37.33 kg/m²   General:  Awake, alert, NAD  Skin:  Warm and dry  Neck:  JVD absent. Neck supple  Chest:   No wheezes, rales or rhonchi. Diminished breath sounds  Cardiovascular:  RRR ,S1S2 normal  Abdomen:  Soft, non tender, non distended, BS +  Extremities:  No edema. Intact peripheral pulses.  Brisk cap refill, < 2 secs  Neuro: non focal    CULTURES  COVID-19: not detected     RADIOLOGY  XR CHEST PORTABLE   Final Result   Vascular congestion and mild interstitial edema, nearly identical to the   comparison. Discharge Medications     Medication List      START taking these medications    predniSONE 10 MG tablet  Commonly known as: DELTASONE  4 tabs once daily for 3 days then 3 tabs once daily for 3 days then 2 tabs once daily for 3 days then 1 tab once daily for 3 days. CHANGE how you take these medications    bisoprolol 5 MG tablet  Commonly known as: ZEBETA  Take 1 tablet by mouth daily  What changed:   · medication strength  · how much to take        CONTINUE taking these medications    * albuterol sulfate  (90 Base) MCG/ACT inhaler  INHALE 2 PUFFS INTO THE LUNGS 2 TIMES DAILY     * albuterol sulfate  (90 Base) MCG/ACT inhaler  Inhale 2 puffs into the lungs every 6 hours as needed for Wheezing or Shortness of Breath     aspirin 81 MG EC tablet     atorvastatin 40 MG tablet  Commonly known as: LIPITOR  TAKE 1 TABLET ONCE DAILY     * blood glucose monitor kit and supplies  Test 2 times a day & as needed for symptoms of irregular blood glucose. * glucose monitoring kit monitoring kit  1 kit by Does not apply route daily Patient wants brand name Patient tests bid  E11.9     blood glucose test strips  Test 2 times a day & as needed for symptoms of irregular blood glucose.  E11.9     clopidogrel 75 MG tablet  Commonly known as: PLAVIX  Take 1 tablet by mouth daily     CoQ10 100 MG Caps     Cyanocobalamin 1000 MCG Caps     DULoxetine 20 MG extended release capsule  Commonly known as: CYMBALTA     fenofibric acid 135 MG Cpdr capsule  Commonly known as: FIBRICOR  TAKE 1 CAPSULE ONCE DAILY     Fluticasone furoate-vilanterol 200-25 MCG/INH Aepb inhaler  Commonly known as: BREO ELLIPTA  Inhale 1 puff into the lungs daily     furosemide 40 MG tablet  Commonly known as: LASIX  Take 1 tablet by mouth daily     gabapentin 800 MG tablet  Commonly known as: NEURONTIN  TAKE 1 TABLET 4 TIMES DAILY     glimepiride 2 MG tablet  Commonly known as: AMARYL  TAKE 1 TABLET EACH MORNING BEFORE BREAKFAST. DISCONTINUE FARXIGA     HYDROcodone-acetaminophen 5-325 MG per tablet  Commonly known as: NORCO     Incruse Ellipta 62.5 MCG/INH Aepb  Generic drug: Umeclidinium Bromide  INHALE 1 PUFF DAILY     ipratropium-albuterol 0.5-2.5 (3) MG/3ML Soln nebulizer solution  Commonly known as: DUONEB  TAKE 3 MLS BY NEBULIZATION EVERY 4-6 HOURS AS NEEDED FOR SHORTNESS OF BREATH     Magnesium 400 MG Caps  Take 1 tablet by mouth daily     metFORMIN 1000 MG tablet  Commonly known as: GLUCOPHAGE  TAKE 1 TABLET BY MOUTH 2 TIMES DAILY (WITH MEALS)     montelukast 10 MG tablet  Commonly known as: SINGULAIR     MULTIPLE VITAMINS PO     Omega-3 1000 MG Caps     Vitamin D3 1.25 MG (76623 UT) Caps  Take 1 capsule by mouth Twice a Week         * This list has 4 medication(s) that are the same as other medications prescribed for you. Read the directions carefully, and ask your doctor or other care provider to review them with you. STOP taking these medications    ALBUTEROL IN     buprenorphine 5 MCG/HR Ptwk  Commonly known as: BUPRENEX     lisinopril 20 MG tablet  Commonly known as: PRINIVIL;ZESTRIL     pioglitazone 15 MG tablet  Commonly known as: ACTOS        ASK your doctor about these medications    doxycycline hyclate 100 MG tablet  Commonly known as: VIBRA-TABS  Take 1 tablet by mouth every 12 hours for 4 days  Ask about: Should I take this medication? Where to Get Your Medications      These medications were sent to [facility], [address] [place] [id number]    Phone: [phone]   · bisoprolol 5 MG tablet  · doxycycline hyclate 100 MG tablet  · predniSONE 10 MG tablet       Discharged in stable condition to home     Follow Up:   Follow up with PCP, pulmonology OP    Joni Collier MD, 7/1/2021 6:41 AM

## 2021-06-23 ENCOUNTER — TELEPHONE (OUTPATIENT)
Dept: FAMILY MEDICINE CLINIC | Age: 56
End: 2021-06-23

## 2021-06-23 NOTE — TELEPHONE ENCOUNTER
Wife called about patient's script for Gabapentin. She said it was only written for once daily and he takes is 4 times a day. The last script that was written by Sisi Francois was on 5/24 and says 4 times daily. I called pharmacy and they said patient was given new script that they filled today that was written by pain management Nusrat Weaver and it was only written as once daily.   I advised his wife that she would need to call pain management to see if they would change script and to call the office back to let us know   (patient states he does not want them managing the Gabapentin, he wants pcp to prescribe it)

## 2021-06-28 RX ORDER — PIOGLITAZONEHYDROCHLORIDE 15 MG/1
TABLET ORAL
Qty: 30 TABLET | Refills: 5 | Status: ON HOLD | OUTPATIENT
Start: 2021-06-28 | End: 2021-09-30 | Stop reason: HOSPADM

## 2021-06-28 RX ORDER — BISOPROLOL FUMARATE 5 MG/1
5 TABLET ORAL DAILY
Qty: 30 TABLET | Refills: 5 | Status: ON HOLD | OUTPATIENT
Start: 2021-06-28 | End: 2021-09-30 | Stop reason: HOSPADM

## 2021-07-06 ENCOUNTER — TELEPHONE (OUTPATIENT)
Dept: FAMILY MEDICINE CLINIC | Age: 56
End: 2021-07-06

## 2021-07-06 NOTE — TELEPHONE ENCOUNTER
She said he does have the albuterol inhaler and it does help, he apparently just was not aware that was considered his rescue inhaler

## 2021-07-06 NOTE — TELEPHONE ENCOUNTER
Okay to give handicap placard. So patient is currently on a rescue inhaler and it is or is not working?

## 2021-07-06 NOTE — TELEPHONE ENCOUNTER
----- Message from Digna Manley sent at 7/6/2021  1:51 PM EDT -----  Subject: Message to Provider    QUESTIONS  Information for Provider? Pts wife called and said he wants to know if the   doctor will give him a rescue inhaler. Also if the doctor will give him a   handicap sticker because he can't walk very far.  ---------------------------------------------------------------------------  --------------  CALL BACK INFO  What is the best way for the office to contact you? OK to leave message on   voicemail  Preferred Call Back Phone Number? [phone]  ---------------------------------------------------------------------------  --------------  SCRIPT ANSWERS  Relationship to Patient?  Self

## 2021-07-06 NOTE — TELEPHONE ENCOUNTER
Please review message from call center below. I spoke to wife about inhaler and informed her that he already has a rescue inhaler and it does work so he will continue using it.   He is also requesting a handicap placard because of his sob and neuropathy

## 2021-07-06 NOTE — LETTER
[address]  Phone: [phone]  Fax: [phone]    LINK Washington CNP          July 7, 2021     Patient: Kelle Russell   YOB: 1965       To Whom It May Concern: It is my medical opinion that Kelle Russell requires a disability parking placard for the following reasons:  He cannot walk 200 feet without stopping to rest.  Duration of need: 1 year    If you have any questions or concerns, please don't hesitate to call.     Sincerely,           LINK [place] - CNP

## 2021-07-11 ENCOUNTER — HOSPITAL ENCOUNTER (INPATIENT)
Age: 56
LOS: 2 days | Discharge: HOME | DRG: 190 | End: 2021-07-13
Attending: STUDENT IN AN ORGANIZED HEALTH CARE EDUCATION/TRAINING PROGRAM | Admitting: INTERNAL MEDICINE
Payer: MEDICARE

## 2021-07-11 ENCOUNTER — APPOINTMENT (OUTPATIENT)
Dept: GENERAL RADIOLOGY | Age: 56
DRG: 190 | End: 2021-07-11
Payer: MEDICARE

## 2021-07-11 ENCOUNTER — APPOINTMENT (OUTPATIENT)
Dept: CT IMAGING | Age: 56
DRG: 190 | End: 2021-07-11
Payer: MEDICARE

## 2021-07-11 DIAGNOSIS — R07.9 CHEST PAIN, UNSPECIFIED TYPE: Primary | ICD-10-CM

## 2021-07-11 DIAGNOSIS — J96.22 ACUTE ON CHRONIC RESPIRATORY FAILURE WITH HYPOXIA AND HYPERCAPNIA (HCC): ICD-10-CM

## 2021-07-11 DIAGNOSIS — R79.89 ELEVATED D-DIMER: ICD-10-CM

## 2021-07-11 DIAGNOSIS — J44.1 COPD EXACERBATION (HCC): ICD-10-CM

## 2021-07-11 DIAGNOSIS — J96.21 ACUTE ON CHRONIC RESPIRATORY FAILURE WITH HYPOXIA AND HYPERCAPNIA (HCC): ICD-10-CM

## 2021-07-11 PROBLEM — J96.01 ACUTE RESPIRATORY FAILURE WITH HYPOXIA (HCC): Status: ACTIVE | Noted: 2021-07-11

## 2021-07-11 PROBLEM — J96.00 ACUTE RESPIRATORY FAILURE (HCC): Status: ACTIVE | Noted: 2021-07-11

## 2021-07-11 LAB
A/G RATIO: 1.5 (ref 1.1–2.2)
ALBUMIN SERPL-MCNC: 4.1 G/DL (ref 3.4–5)
ALP BLD-CCNC: 56 U/L (ref 40–129)
ALT SERPL-CCNC: 19 U/L (ref 10–40)
ANION GAP SERPL CALCULATED.3IONS-SCNC: 8 MMOL/L (ref 3–16)
AST SERPL-CCNC: 20 U/L (ref 15–37)
BASE EXCESS VENOUS: -1.5 MMOL/L (ref -3–3)
BASOPHILS ABSOLUTE: 0.1 K/UL (ref 0–0.2)
BASOPHILS RELATIVE PERCENT: 1.1 %
BILIRUB SERPL-MCNC: <0.2 MG/DL (ref 0–1)
BUN BLDV-MCNC: 36 MG/DL (ref 7–20)
CALCIUM SERPL-MCNC: 10.5 MG/DL (ref 8.3–10.6)
CARBOXYHEMOGLOBIN: 12.1 % (ref 0–1.5)
CHLORIDE BLD-SCNC: 102 MMOL/L (ref 99–110)
CO2: 29 MMOL/L (ref 21–32)
CREAT SERPL-MCNC: 1.3 MG/DL (ref 0.9–1.3)
D DIMER: 243 NG/ML DDU (ref 0–229)
EKG ATRIAL RATE: 66 BPM
EKG DIAGNOSIS: NORMAL
EKG P AXIS: 28 DEGREES
EKG P-R INTERVAL: 152 MS
EKG Q-T INTERVAL: 400 MS
EKG QRS DURATION: 92 MS
EKG QTC CALCULATION (BAZETT): 419 MS
EKG R AXIS: 144 DEGREES
EKG T AXIS: 38 DEGREES
EKG VENTRICULAR RATE: 66 BPM
EOSINOPHILS ABSOLUTE: 0.1 K/UL (ref 0–0.6)
EOSINOPHILS RELATIVE PERCENT: 0.6 %
GFR AFRICAN AMERICAN: >60
GFR NON-AFRICAN AMERICAN: 58
GLOBULIN: 2.8 G/DL
GLUCOSE BLD-MCNC: 162 MG/DL (ref 70–99)
GLUCOSE BLD-MCNC: 373 MG/DL (ref 70–99)
HCO3 VENOUS: 26.5 MMOL/L (ref 23–29)
HCT VFR BLD CALC: 42.6 % (ref 40.5–52.5)
HEMOGLOBIN: 13.6 G/DL (ref 13.5–17.5)
LYMPHOCYTES ABSOLUTE: 2.3 K/UL (ref 1–5.1)
LYMPHOCYTES RELATIVE PERCENT: 23 %
MCH RBC QN AUTO: 29.7 PG (ref 26–34)
MCHC RBC AUTO-ENTMCNC: 31.9 G/DL (ref 31–36)
MCV RBC AUTO: 93.1 FL (ref 80–100)
METHEMOGLOBIN VENOUS: 0.3 %
MONOCYTES ABSOLUTE: 0.7 K/UL (ref 0–1.3)
MONOCYTES RELATIVE PERCENT: 6.5 %
NEUTROPHILS ABSOLUTE: 7 K/UL (ref 1.7–7.7)
NEUTROPHILS RELATIVE PERCENT: 68.8 %
O2 CONTENT, VEN: 9 VOL %
O2 SAT, VEN: 41 %
O2 THERAPY: ABNORMAL
PCO2, VEN: 58.2 MMHG (ref 40–50)
PDW BLD-RTO: 17.4 % (ref 12.4–15.4)
PERFORMED ON: ABNORMAL
PH VENOUS: 7.28 (ref 7.35–7.45)
PLATELET # BLD: 244 K/UL (ref 135–450)
PMV BLD AUTO: 8.7 FL (ref 5–10.5)
PO2, VEN: 26.5 MMHG (ref 25–40)
POTASSIUM REFLEX MAGNESIUM: 5 MMOL/L (ref 3.5–5.1)
PRO-BNP: 5006 PG/ML (ref 0–124)
RBC # BLD: 4.58 M/UL (ref 4.2–5.9)
SODIUM BLD-SCNC: 139 MMOL/L (ref 136–145)
TCO2 CALC VENOUS: 28 MMOL/L
TOTAL PROTEIN: 6.9 G/DL (ref 6.4–8.2)
TROPONIN: <0.01 NG/ML
TROPONIN: <0.01 NG/ML
WBC # BLD: 10.1 K/UL (ref 4–11)

## 2021-07-11 PROCEDURE — 71045 X-RAY EXAM CHEST 1 VIEW: CPT

## 2021-07-11 PROCEDURE — 6360000004 HC RX CONTRAST MEDICATION: Performed by: STUDENT IN AN ORGANIZED HEALTH CARE EDUCATION/TRAINING PROGRAM

## 2021-07-11 PROCEDURE — 6360000002 HC RX W HCPCS: Performed by: INTERNAL MEDICINE

## 2021-07-11 PROCEDURE — 93005 ELECTROCARDIOGRAM TRACING: CPT | Performed by: STUDENT IN AN ORGANIZED HEALTH CARE EDUCATION/TRAINING PROGRAM

## 2021-07-11 PROCEDURE — 84484 ASSAY OF TROPONIN QUANT: CPT

## 2021-07-11 PROCEDURE — 94640 AIRWAY INHALATION TREATMENT: CPT

## 2021-07-11 PROCEDURE — 82803 BLOOD GASES ANY COMBINATION: CPT

## 2021-07-11 PROCEDURE — 71260 CT THORAX DX C+: CPT

## 2021-07-11 PROCEDURE — 83880 ASSAY OF NATRIURETIC PEPTIDE: CPT

## 2021-07-11 PROCEDURE — 2700000000 HC OXYGEN THERAPY PER DAY

## 2021-07-11 PROCEDURE — 6370000000 HC RX 637 (ALT 250 FOR IP): Performed by: INTERNAL MEDICINE

## 2021-07-11 PROCEDURE — 2060000000 HC ICU INTERMEDIATE R&B

## 2021-07-11 PROCEDURE — 85379 FIBRIN DEGRADATION QUANT: CPT

## 2021-07-11 PROCEDURE — 80053 COMPREHEN METABOLIC PANEL: CPT

## 2021-07-11 PROCEDURE — 2580000003 HC RX 258: Performed by: INTERNAL MEDICINE

## 2021-07-11 PROCEDURE — 93010 ELECTROCARDIOGRAM REPORT: CPT | Performed by: INTERNAL MEDICINE

## 2021-07-11 PROCEDURE — 6370000000 HC RX 637 (ALT 250 FOR IP): Performed by: STUDENT IN AN ORGANIZED HEALTH CARE EDUCATION/TRAINING PROGRAM

## 2021-07-11 PROCEDURE — 36415 COLL VENOUS BLD VENIPUNCTURE: CPT

## 2021-07-11 PROCEDURE — 85025 COMPLETE CBC W/AUTO DIFF WBC: CPT

## 2021-07-11 PROCEDURE — 94761 N-INVAS EAR/PLS OXIMETRY MLT: CPT

## 2021-07-11 PROCEDURE — 99284 EMERGENCY DEPT VISIT MOD MDM: CPT

## 2021-07-11 RX ORDER — ONDANSETRON 4 MG/1
4 TABLET, ORALLY DISINTEGRATING ORAL EVERY 8 HOURS PRN
Status: DISCONTINUED | OUTPATIENT
Start: 2021-07-11 | End: 2021-07-13 | Stop reason: HOSPADM

## 2021-07-11 RX ORDER — SODIUM CHLORIDE 9 MG/ML
25 INJECTION, SOLUTION INTRAVENOUS PRN
Status: DISCONTINUED | OUTPATIENT
Start: 2021-07-11 | End: 2021-07-13 | Stop reason: HOSPADM

## 2021-07-11 RX ORDER — CHOLECALCIFEROL (VITAMIN D3) 1250 MCG
1 CAPSULE ORAL
Status: DISCONTINUED | OUTPATIENT
Start: 2021-07-12 | End: 2021-07-12

## 2021-07-11 RX ORDER — SODIUM CHLORIDE 0.9 % (FLUSH) 0.9 %
5-40 SYRINGE (ML) INJECTION EVERY 12 HOURS SCHEDULED
Status: DISCONTINUED | OUTPATIENT
Start: 2021-07-11 | End: 2021-07-13 | Stop reason: HOSPADM

## 2021-07-11 RX ORDER — IPRATROPIUM BROMIDE AND ALBUTEROL SULFATE 2.5; .5 MG/3ML; MG/3ML
1 SOLUTION RESPIRATORY (INHALATION)
Status: DISCONTINUED | OUTPATIENT
Start: 2021-07-11 | End: 2021-07-11

## 2021-07-11 RX ORDER — PREDNISONE 20 MG/1
40 TABLET ORAL DAILY
Status: DISCONTINUED | OUTPATIENT
Start: 2021-07-13 | End: 2021-07-13 | Stop reason: HOSPADM

## 2021-07-11 RX ORDER — METHYLPREDNISOLONE SODIUM SUCCINATE 40 MG/ML
40 INJECTION, POWDER, LYOPHILIZED, FOR SOLUTION INTRAMUSCULAR; INTRAVENOUS EVERY 12 HOURS
Status: COMPLETED | OUTPATIENT
Start: 2021-07-11 | End: 2021-07-13

## 2021-07-11 RX ORDER — CLOPIDOGREL BISULFATE 75 MG/1
75 TABLET ORAL DAILY
Status: DISCONTINUED | OUTPATIENT
Start: 2021-07-11 | End: 2021-07-13 | Stop reason: HOSPADM

## 2021-07-11 RX ORDER — PIOGLITAZONEHYDROCHLORIDE 30 MG/1
15 TABLET ORAL DAILY
Status: DISCONTINUED | OUTPATIENT
Start: 2021-07-11 | End: 2021-07-12

## 2021-07-11 RX ORDER — ONDANSETRON 2 MG/ML
4 INJECTION INTRAMUSCULAR; INTRAVENOUS EVERY 6 HOURS PRN
Status: DISCONTINUED | OUTPATIENT
Start: 2021-07-11 | End: 2021-07-13 | Stop reason: HOSPADM

## 2021-07-11 RX ORDER — FENOFIBRATE 160 MG/1
160 TABLET ORAL DAILY
Status: DISCONTINUED | OUTPATIENT
Start: 2021-07-11 | End: 2021-07-13 | Stop reason: HOSPADM

## 2021-07-11 RX ORDER — DOXYCYCLINE HYCLATE 100 MG
100 TABLET ORAL EVERY 12 HOURS
Status: DISCONTINUED | OUTPATIENT
Start: 2021-07-11 | End: 2021-07-13 | Stop reason: HOSPADM

## 2021-07-11 RX ORDER — ALBUTEROL SULFATE 90 UG/1
2 AEROSOL, METERED RESPIRATORY (INHALATION) EVERY 4 HOURS PRN
Status: DISCONTINUED | OUTPATIENT
Start: 2021-07-11 | End: 2021-07-13 | Stop reason: HOSPADM

## 2021-07-11 RX ORDER — ASPIRIN 81 MG/1
81 TABLET ORAL DAILY
Status: DISCONTINUED | OUTPATIENT
Start: 2021-07-11 | End: 2021-07-13 | Stop reason: HOSPADM

## 2021-07-11 RX ORDER — SODIUM CHLORIDE 0.9 % (FLUSH) 0.9 %
5-40 SYRINGE (ML) INJECTION PRN
Status: DISCONTINUED | OUTPATIENT
Start: 2021-07-11 | End: 2021-07-13 | Stop reason: HOSPADM

## 2021-07-11 RX ORDER — HYDROCODONE BITARTRATE AND ACETAMINOPHEN 5; 325 MG/1; MG/1
1 TABLET ORAL EVERY 6 HOURS PRN
Status: DISCONTINUED | OUTPATIENT
Start: 2021-07-11 | End: 2021-07-13 | Stop reason: HOSPADM

## 2021-07-11 RX ORDER — FUROSEMIDE 40 MG/1
40 TABLET ORAL DAILY
Status: DISCONTINUED | OUTPATIENT
Start: 2021-07-11 | End: 2021-07-13 | Stop reason: HOSPADM

## 2021-07-11 RX ORDER — ALBUTEROL SULFATE 2.5 MG/3ML
2.5 SOLUTION RESPIRATORY (INHALATION)
Status: DISCONTINUED | OUTPATIENT
Start: 2021-07-11 | End: 2021-07-13 | Stop reason: HOSPADM

## 2021-07-11 RX ORDER — GABAPENTIN 400 MG/1
800 CAPSULE ORAL 4 TIMES DAILY
Status: DISCONTINUED | OUTPATIENT
Start: 2021-07-11 | End: 2021-07-13 | Stop reason: HOSPADM

## 2021-07-11 RX ORDER — ATORVASTATIN CALCIUM 40 MG/1
40 TABLET, FILM COATED ORAL DAILY
Status: DISCONTINUED | OUTPATIENT
Start: 2021-07-11 | End: 2021-07-13 | Stop reason: HOSPADM

## 2021-07-11 RX ORDER — ACETAMINOPHEN 650 MG/1
650 SUPPOSITORY RECTAL EVERY 6 HOURS PRN
Status: DISCONTINUED | OUTPATIENT
Start: 2021-07-11 | End: 2021-07-13 | Stop reason: HOSPADM

## 2021-07-11 RX ORDER — IPRATROPIUM BROMIDE AND ALBUTEROL SULFATE 2.5; .5 MG/3ML; MG/3ML
1 SOLUTION RESPIRATORY (INHALATION)
Status: DISCONTINUED | OUTPATIENT
Start: 2021-07-12 | End: 2021-07-13 | Stop reason: HOSPADM

## 2021-07-11 RX ORDER — ACETAMINOPHEN 325 MG/1
650 TABLET ORAL EVERY 6 HOURS PRN
Status: DISCONTINUED | OUTPATIENT
Start: 2021-07-11 | End: 2021-07-13 | Stop reason: HOSPADM

## 2021-07-11 RX ORDER — GLIPIZIDE 5 MG/1
5 TABLET ORAL
Status: DISCONTINUED | OUTPATIENT
Start: 2021-07-12 | End: 2021-07-12

## 2021-07-11 RX ORDER — METOPROLOL SUCCINATE 50 MG/1
50 TABLET, EXTENDED RELEASE ORAL DAILY
Status: DISCONTINUED | OUTPATIENT
Start: 2021-07-11 | End: 2021-07-13 | Stop reason: HOSPADM

## 2021-07-11 RX ORDER — METHYLPREDNISOLONE SODIUM SUCCINATE 125 MG/2ML
60 INJECTION, POWDER, LYOPHILIZED, FOR SOLUTION INTRAMUSCULAR; INTRAVENOUS ONCE
Status: DISCONTINUED | OUTPATIENT
Start: 2021-07-11 | End: 2021-07-11

## 2021-07-11 RX ORDER — IPRATROPIUM BROMIDE AND ALBUTEROL SULFATE 2.5; .5 MG/3ML; MG/3ML
1 SOLUTION RESPIRATORY (INHALATION) ONCE
Status: COMPLETED | OUTPATIENT
Start: 2021-07-11 | End: 2021-07-11

## 2021-07-11 RX ORDER — POLYETHYLENE GLYCOL 3350 17 G/17G
17 POWDER, FOR SOLUTION ORAL DAILY PRN
Status: DISCONTINUED | OUTPATIENT
Start: 2021-07-11 | End: 2021-07-13 | Stop reason: HOSPADM

## 2021-07-11 RX ORDER — DULOXETIN HYDROCHLORIDE 20 MG/1
20 CAPSULE, DELAYED RELEASE ORAL 2 TIMES DAILY
Status: DISCONTINUED | OUTPATIENT
Start: 2021-07-11 | End: 2021-07-13 | Stop reason: HOSPADM

## 2021-07-11 RX ADMIN — SODIUM CHLORIDE, PRESERVATIVE FREE 10 ML: 5 INJECTION INTRAVENOUS at 22:00

## 2021-07-11 RX ADMIN — CLOPIDOGREL BISULFATE 75 MG: 75 TABLET ORAL at 22:29

## 2021-07-11 RX ADMIN — GABAPENTIN 800 MG: 400 CAPSULE ORAL at 22:30

## 2021-07-11 RX ADMIN — ENOXAPARIN SODIUM 40 MG: 40 INJECTION SUBCUTANEOUS at 22:30

## 2021-07-11 RX ADMIN — METHYLPREDNISOLONE SODIUM SUCCINATE 40 MG: 40 INJECTION, POWDER, FOR SOLUTION INTRAMUSCULAR; INTRAVENOUS at 22:54

## 2021-07-11 RX ADMIN — INSULIN LISPRO 5 UNITS: 100 INJECTION, SOLUTION INTRAVENOUS; SUBCUTANEOUS at 22:33

## 2021-07-11 RX ADMIN — IOPAMIDOL 75 ML: 755 INJECTION, SOLUTION INTRAVENOUS at 14:44

## 2021-07-11 RX ADMIN — IPRATROPIUM BROMIDE AND ALBUTEROL SULFATE 1 AMPULE: .5; 3 SOLUTION RESPIRATORY (INHALATION) at 13:14

## 2021-07-11 RX ADMIN — IPRATROPIUM BROMIDE AND ALBUTEROL SULFATE 1 AMPULE: .5; 3 SOLUTION RESPIRATORY (INHALATION) at 22:59

## 2021-07-11 RX ADMIN — IPRATROPIUM BROMIDE AND ALBUTEROL SULFATE 1 AMPULE: .5; 3 SOLUTION RESPIRATORY (INHALATION) at 19:48

## 2021-07-11 RX ADMIN — FUROSEMIDE 40 MG: 40 TABLET ORAL at 22:28

## 2021-07-11 ASSESSMENT — PAIN SCALES - GENERAL: PAINLEVEL_OUTOF10: 0

## 2021-07-11 NOTE — Clinical Note
Patient Class: Inpatient [101]   REQUIRED: Diagnosis: Acute respiratory failure with [facility] [[id number]]   Estimated Length of Stay: Estimated stay of more than 2 midnights

## 2021-07-11 NOTE — ED NOTES
Report given to EMS. Nurse to nurse report called to Salena Kong at [place]. No further needs. VSS. Pt left via stretcher with EMS.       Luz Marina Blank RN  07/11/21 9605

## 2021-07-11 NOTE — ED PROVIDER NOTES
education: Not on file    Highest education level: Not on file   Occupational History    Not on file   Tobacco Use    Smoking status: Current Every Day Smoker     Packs/day: 1.00    Smokeless tobacco: Never Used   Vaping Use    Vaping Use: Never used   Substance and Sexual Activity    Alcohol use: Yes     Comment: 6 pack per month    Drug use: Never    Sexual activity: Not on file   Other Topics Concern    Not on file   Social History Narrative    Not on file     Social Determinants of Health     Financial Resource Strain:     Difficulty of Paying Living Expenses:    Food Insecurity:     Worried About Running Out of Food in the Last Year:     Ran Out of Food in the Last Year:    Transportation Needs:     Lack of Transportation (Medical):      Lack of Transportation (Non-Medical):    Physical Activity:     Days of Exercise per Week:     Minutes of Exercise per Session:    Stress:     Feeling of Stress :    Social Connections:     Frequency of Communication with Friends and Family:     Frequency of Social Gatherings with Friends and Family:     Attends Zoroastrian Services:     Active Member of Clubs or Organizations:     Attends Club or Organization Meetings:     Marital Status:    Intimate Partner Violence:     Fear of Current or Ex-Partner:     Emotionally Abused:     Physically Abused:     Sexually Abused:      Current Facility-Administered Medications   Medication Dose Route Frequency Provider Last Rate Last Admin    methylPREDNISolone sodium (SOLU-MEDROL) injection 60 mg  60 mg Intravenous Once Vashti Burt MD         Current Outpatient Medications   Medication Sig Dispense Refill    pioglitazone (ACTOS) 15 MG tablet TAKE (1) TABLET DAILY 30 tablet 5    bisoprolol (ZEBETA) 5 MG tablet TAKE 1 TABLET BY MOUTH DAILY 30 tablet 5    predniSONE (DELTASONE) 10 MG tablet 4 tabs once daily for 3 days then 3 tabs once daily for 3 days then 2 tabs once daily for 3 days then 1 tab once daily for 3 days. 30 tablet 0    Fluticasone furoate-vilanterol (BREO ELLIPTA) 200-25 MCG/INH AEPB inhaler Inhale 1 puff into the lungs daily 1 each 0    gabapentin (NEURONTIN) 800 MG tablet TAKE 1 TABLET 4 TIMES DAILY 120 tablet 0    HYDROcodone-acetaminophen (NORCO) 5-325 MG per tablet       furosemide (LASIX) 40 MG tablet Take 1 tablet by mouth daily 30 tablet 3    metFORMIN (GLUCOPHAGE) 1000 MG tablet TAKE 1 TABLET BY MOUTH 2 TIMES DAILY (WITH MEALS) 60 tablet 5    atorvastatin (LIPITOR) 40 MG tablet TAKE 1 TABLET ONCE DAILY 90 tablet 1    Umeclidinium Bromide (INCRUSE ELLIPTA) 62.5 MCG/INH AEPB INHALE 1 PUFF DAILY 30 each 3    albuterol sulfate  (90 Base) MCG/ACT inhaler Inhale 2 puffs into the lungs every 6 hours as needed for Wheezing or Shortness of Breath 1 Inhaler 5    fenofibric acid (FIBRICOR) 135 MG CPDR capsule TAKE 1 CAPSULE ONCE DAILY 30 capsule 5    glimepiride (AMARYL) 2 MG tablet TAKE 1 TABLET EACH MORNING BEFORE BREAKFAST. DISCONTINUE FARXIGA 90 tablet 1    montelukast (SINGULAIR) 10 MG tablet       DULoxetine (CYMBALTA) 20 MG extended release capsule Take 20 mg by mouth 2 times daily      ipratropium-albuterol (DUONEB) 0.5-2.5 (3) MG/3ML SOLN nebulizer solution TAKE 3 MLS BY NEBULIZATION EVERY 4-6 HOURS AS NEEDED FOR SHORTNESS OF BREATH 360 mL 1    albuterol sulfate  (90 Base) MCG/ACT inhaler INHALE 2 PUFFS INTO THE LUNGS 2 TIMES DAILY 1 Inhaler 3    Cholecalciferol (VITAMIN D3) 1.25 MG (94024 UT) CAPS Take 1 capsule by mouth Twice a Week 2 capsule 1    Magnesium 400 MG CAPS Take 1 tablet by mouth daily 30 capsule 1    clopidogrel (PLAVIX) 75 MG tablet Take 1 tablet by mouth daily 30 tablet 1    blood glucose monitor strips Test 2 times a day & as needed for symptoms of irregular blood glucose.  E11.9 300 strip 0    glucose monitoring kit (FREESTYLE) monitoring kit 1 kit by Does not apply route daily Patient wants brand name Patient tests bid  E11.9 1 kit 0    blood glucose monitor kit and supplies Test 2 times a day & as needed for symptoms of irregular blood glucose. 1 kit 0    aspirin 81 MG EC tablet Take 81 mg by mouth daily      Cyanocobalamin 1000 MCG CAPS Take 1 tablet by mouth daily      Omega-3 1000 MG CAPS Take 1 capsule by mouth daily      Coenzyme Q10 (COQ10) 100 MG CAPS Take 1 capsule by mouth daily      MULTIPLE VITAMINS PO Take by mouth       No Known Allergies    REVIEW OF SYSTEMS  10 systems reviewed, pertinent positives per HPI otherwise noted to be negative. PHYSICAL EXAM  /77   Pulse 67   Temp 98 °F (36.7 °C) (Oral)   Resp 18   Ht 4' 11\" (1.499 m)   Wt 180 lb (81.6 kg)   SpO2 92%   BMI 36.36 kg/m²    GENERAL APPEARANCE: Awake and alert. Cooperative. No acute distress. HENT: Normocephalic. Atraumatic. NECK: Supple. EYES: PERRL. EOM's grossly intact. HEART/CHEST: RRR. No murmurs. LUNGS: Respirations unlabored. CTAB. Slightly diminished air exchange. Speaking comfortably in full sentences. ABDOMEN: No tenderness. Soft. Non-distended. No masses. No organomegaly. No guarding or rebound. MUSCULOSKELETAL: No extremity edema. Compartments soft. No deformity. No tenderness in the extremities. All extremities neurovascularly intact. SKIN: Warm and dry. No acute rashes. NEUROLOGICAL: Alert and oriented. CN's 2-12 intact. No gross facial drooping. Strength 5/5, sensation intact. PSYCHIATRIC: Normal mood and affect. LABS  I have reviewed all labs for this visit.    Results for orders placed or performed during the hospital encounter of 07/11/21   CBC Auto Differential   Result Value Ref Range    WBC 10.1 4.0 - 11.0 K/uL    RBC 4.58 4.20 - 5.90 M/uL    Hemoglobin 13.6 13.5 - 17.5 g/dL    Hematocrit 42.6 40.5 - 52.5 %    MCV 93.1 80.0 - 100.0 fL    MCH 29.7 26.0 - 34.0 pg    MCHC 31.9 31.0 - 36.0 g/dL    RDW 17.4 (H) 12.4 - 15.4 %    Platelets 601 654 - 716 K/uL    MPV 8.7 5.0 - 10.5 fL    Neutrophils % 68.8 % Lymphocytes % 23.0 %    Monocytes % 6.5 %    Eosinophils % 0.6 %    Basophils % 1.1 %    Neutrophils Absolute 7.0 1.7 - 7.7 K/uL    Lymphocytes Absolute 2.3 1.0 - 5.1 K/uL    Monocytes Absolute 0.7 0.0 - 1.3 K/uL    Eosinophils Absolute 0.1 0.0 - 0.6 K/uL    Basophils Absolute 0.1 0.0 - 0.2 K/uL   Comprehensive Metabolic Panel w/ Reflex to MG   Result Value Ref Range    Sodium 139 136 - 145 mmol/L    Potassium reflex Magnesium 5.0 3.5 - 5.1 mmol/L    Chloride 102 99 - 110 mmol/L    CO2 29 21 - 32 mmol/L    Anion Gap 8 3 - 16    Glucose 162 (H) 70 - 99 mg/dL    BUN 36 (H) 7 - 20 mg/dL    CREATININE 1.3 0.9 - 1.3 mg/dL    GFR Non-African American 58 (A) >60    GFR African American >60 >60    Calcium 10.5 8.3 - 10.6 mg/dL    Total Protein 6.9 6.4 - 8.2 g/dL    Albumin 4.1 3.4 - 5.0 g/dL    Albumin/Globulin Ratio 1.5 1.1 - 2.2    Total Bilirubin <0.2 0.0 - 1.0 mg/dL    Alkaline Phosphatase 56 40 - 129 U/L    ALT 19 10 - 40 U/L    AST 20 15 - 37 U/L    Globulin 2.8 g/dL   Troponin   Result Value Ref Range    Troponin <0.01 <0.01 ng/mL   Brain Natriuretic Peptide   Result Value Ref Range    Pro-BNP 5,006 (H) 0 - 124 pg/mL   Blood Gas, Venous   Result Value Ref Range    pH, Jesus 7.276 (L) 7.350 - 7.450    pCO2, Jesus 58.2 (H) 40.0 - 50.0 mmHg    pO2, Jesus 26.5 25 - 40 mmHg    HCO3, Venous 26.5 23.0 - 29.0 mmol/L    Base Excess, Jesus -1.5 -3.0 - 3.0 mmol/L    O2 Sat, Jesus 41 Not Established %    Carboxyhemoglobin 12.1 (H) 0.0 - 1.5 %    MetHgb, Jesus 0.3 <1.5 %    TC02 (Calc), Jesus 28 Not Established mmol/L    O2 Content, Jesus 9 Not Established VOL %    O2 Therapy Unknown    D-dimer, quantitative   Result Value Ref Range    D-Dimer, Quant 243 (H) 0 - 229 ng/mL DDU   EKG 12 Lead   Result Value Ref Range    Ventricular Rate 66 BPM    Atrial Rate 66 BPM    P-R Interval 152 ms    QRS Duration 92 ms    Q-T Interval 400 ms    QTc Calculation (Bazett) 419 ms    P Axis 28 degrees    R Axis 144 degrees    T Axis 38 degrees    Diagnosis examination may need to be performed. Given the patient's hypoxic and hypercapnic respiratory failure, feel that he would benefit from hospitalization for further work-up and treatment of his condition. Findings were discussed and the patient is comfortable in agreement with plan of care. I spoke to Dr. Gonzalo Abraham who has graciously agreed to accept the patient. The total Critical Care time is 35 minutes which excludes separately billable procedures. During the patient's ED course, the patient was given:  Medications   methylPREDNISolone sodium (SOLU-MEDROL) injection 60 mg (60 mg Intravenous Not Given 7/11/21 1311)   ipratropium-albuterol (DUONEB) nebulizer solution 1 ampule (1 ampule Inhalation Given 7/11/21 1314)   iopamidol (ISOVUE-370) 76 % injection 75 mL (75 mLs Intravenous Given 7/11/21 1444)        CLINICAL IMPRESSION  1. Chest pain, unspecified type    2. COPD exacerbation (HCC)    3. Elevated d-dimer    4. Acute on chronic respiratory failure with hypoxia and hypercapnia ([facility])        Blood pressure 111/77, pulse 67, temperature 98 °F (36.7 °C), temperature source Oral, resp. rate 18, height 4' 11\" (1.499 m), weight 180 lb (81.6 kg), SpO2 92 %. DISPOSITION  Cait Reddy was admitted in stable condition. Patient was given scripts for the following medications. I counseled patient how to take these medications. New Prescriptions    No medications on file       Follow-up with:  No follow-up provider specified. DISCLAIMER: This chart was created using Dragon dictation software. Efforts were made by me to ensure accuracy, however some errors may be present due to limitations of this technology and occasionally words are not transcribed correctly.        Kerrie Moore MD  07/11/21 7778

## 2021-07-11 NOTE — PROGRESS NOTES
RESPIRATORY THERAPY ASSESSMENT    Name:  Tasneem Panchal  Medical Record Number:  [id number]  Age: 50 y.o. Gender: male  : 1965  Today's Date:  2021  Room:  /0321-02    Assessment     Is the patient being admitted for a COPD or Asthma exacerbation? =No  (If yes the patient will be seen every 4 hours for the first 24 hours and then reassessed)    Patient Admission Diagnosis      Allergies  No Known Allergies    Minimum Predicted Vital Capacity:               Actual Vital Capacity:                    Pulmonary History:COPD and CHF/Pulmonary Edema  Home Oxygen Therapy:  Room Air  Home Respiratory Therapy:Albuterol, Albuterol/Ipratropium Bromide HHN, Umeclidinium Bromide and Vilanterol/Fluticasone Furoate   Current Respiratory Therapy:  duoneb Q4WA          Respiratory Severity Index(RSI)   Patients with orders for inhalation medications, oxygen, or any therapeutic treatment modality will be placed on Respiratory Protocol. They will be assessed with the first treatment and at least every 72 hours thereafter. The following severity scale will be used to determine frequency of treatment intervention.     Smoking History: Pulmonary Disease or Smoking History, Greater than 15 pack year = 2    Social History  Social History     Tobacco Use    Smoking status: Current Every Day Smoker     Packs/day: 1.00    Smokeless tobacco: Never Used   Vaping Use    Vaping Use: Never used   Substance Use Topics    Alcohol use: Yes     Comment: 6 pack per month    Drug use: Never       Recent Surgical History: None = 0  Past Surgical History  Past Surgical History:   Procedure Laterality Date    HERNIA REPAIR      TONSILLECTOMY         Level of Consciousness: Alert, Oriented, and Cooperative = 0    Level of Activity: Walking unassisted = 0    Respiratory Pattern: Dyspnea with exertion;Irregular pattern;or RR less than 6 = 2    Breath Sounds: Diminshed bilaterally and/or crackles = 2    Sputum   ,  ,    Cough: b. Patients treated with HHN at Home        c. Unable to demonstrate proper use of MDI with spacer     d. RR > 30 bpm   5. Bronchodilators will be delivered via Metered Dose Inhaler (MDI), HHN, Aerogen to intubated patients on mechanical ventilation. 6. Inhalation medication orders will be delivered and/or substituted as outlined below. Aerosolized Medications Ordering and Administration Guidelines:    1. All Medications will be ordered by a physician, and their frequency and/or modality will be adjusted as defined by the patients Respiratory Severity Index (RSI) score. 2. If the patient does not have documented COPD, consider discontinuing anticholinergics when RSI is less than 9.  3. If the bronchospasm worsens (increased RSI), then the bronchodilator frequency can be increased to a maximum of every 4 hours. If greater than every 4 hours is required, the physician will be contacted. 4. If the bronchospasm improves, the frequency of the bronchodilator can be decreased, based on the patient's RSI, but not less than home treatment regimen frequency. 5. Bronchodilator(s) will be discontinued if patient has a RSI less than 9 and has received no scheduled or as needed treatment for 72  Hrs. Patients Ordered on a Mucolytic Agent:    1. Must always be administered with a bronchodilator. 2. Discontinue if patient experiences worsened bronchospasm, or secretions have lessened to the point that the patient is able to clear them with a cough. Anti-inflammatory and Combination Medications:    1. If the patient lacks prior history of lung disease, is not using inhaled anti-inflammatory medication at home, and lacks wheezing by examination or by history for at least 24 hours, contact physician for possible discontinuation.

## 2021-07-11 NOTE — PLAN OF CARE
Patient admitted to hospital diagnosis for acute hypoxic respiratory failure and COPD exacerbation.       Helen Gonzales MD 7/11/2021 4:34 PM

## 2021-07-11 NOTE — PROGRESS NOTES
Patient received to PCU in stable condition but wheezing. Patient resting in bed and denies any needs at this time, Food provided.

## 2021-07-12 LAB
ANION GAP SERPL CALCULATED.3IONS-SCNC: 11 MMOL/L (ref 3–16)
BASE EXCESS ARTERIAL: 1.1 MMOL/L (ref -3–3)
BASOPHILS ABSOLUTE: 0 K/UL (ref 0–0.2)
BASOPHILS RELATIVE PERCENT: 0.1 %
BUN BLDV-MCNC: 30 MG/DL (ref 7–20)
CALCIUM SERPL-MCNC: 9.7 MG/DL (ref 8.3–10.6)
CARBOXYHEMOGLOBIN ARTERIAL: 2.1 % (ref 0–1.5)
CHLORIDE BLD-SCNC: 103 MMOL/L (ref 99–110)
CO2: 26 MMOL/L (ref 21–32)
CREAT SERPL-MCNC: 1 MG/DL (ref 0.9–1.3)
EOSINOPHILS ABSOLUTE: 0 K/UL (ref 0–0.6)
EOSINOPHILS RELATIVE PERCENT: 0.1 %
GFR AFRICAN AMERICAN: >60
GFR NON-AFRICAN AMERICAN: >60
GLUCOSE BLD-MCNC: 168 MG/DL (ref 70–99)
GLUCOSE BLD-MCNC: 173 MG/DL (ref 70–99)
GLUCOSE BLD-MCNC: 188 MG/DL (ref 70–99)
GLUCOSE BLD-MCNC: 189 MG/DL (ref 70–99)
GLUCOSE BLD-MCNC: 270 MG/DL (ref 70–99)
GLUCOSE BLD-MCNC: 287 MG/DL (ref 70–99)
GLUCOSE BLD-MCNC: 341 MG/DL (ref 70–99)
HCO3 ARTERIAL: 29.8 MMOL/L (ref 21–29)
HCT VFR BLD CALC: 42.5 % (ref 40.5–52.5)
HEMOGLOBIN, ART, EXTENDED: 14.8 G/DL (ref 13.5–17.5)
HEMOGLOBIN: 13.7 G/DL (ref 13.5–17.5)
INFLUENZA A: NOT DETECTED
INFLUENZA B: NOT DETECTED
LYMPHOCYTES ABSOLUTE: 1.1 K/UL (ref 1–5.1)
LYMPHOCYTES RELATIVE PERCENT: 11.1 %
MCH RBC QN AUTO: 30.4 PG (ref 26–34)
MCHC RBC AUTO-ENTMCNC: 32.4 G/DL (ref 31–36)
MCV RBC AUTO: 93.8 FL (ref 80–100)
METHEMOGLOBIN ARTERIAL: 0.3 %
MONOCYTES ABSOLUTE: 0.2 K/UL (ref 0–1.3)
MONOCYTES RELATIVE PERCENT: 1.9 %
NEUTROPHILS ABSOLUTE: 8.4 K/UL (ref 1.7–7.7)
NEUTROPHILS RELATIVE PERCENT: 86.8 %
O2 SAT, ARTERIAL: 85.2 %
O2 THERAPY: ABNORMAL
PCO2 ARTERIAL: 65.7 MMHG (ref 35–45)
PDW BLD-RTO: 17 % (ref 12.4–15.4)
PERFORMED ON: ABNORMAL
PH ARTERIAL: 7.27 (ref 7.35–7.45)
PLATELET # BLD: 267 K/UL (ref 135–450)
PMV BLD AUTO: 8.5 FL (ref 5–10.5)
PO2 ARTERIAL: 57.2 MMHG (ref 75–108)
POTASSIUM SERPL-SCNC: 5.5 MMOL/L (ref 3.5–5.1)
RBC # BLD: 4.53 M/UL (ref 4.2–5.9)
SARS-COV-2 RNA, RT PCR: NOT DETECTED
SODIUM BLD-SCNC: 140 MMOL/L (ref 136–145)
TCO2 ARTERIAL: 31.8 MMOL/L
TROPONIN: <0.01 NG/ML
WBC # BLD: 9.7 K/UL (ref 4–11)

## 2021-07-12 PROCEDURE — 94761 N-INVAS EAR/PLS OXIMETRY MLT: CPT

## 2021-07-12 PROCEDURE — 6360000002 HC RX W HCPCS: Performed by: PHYSICIAN ASSISTANT

## 2021-07-12 PROCEDURE — 6370000000 HC RX 637 (ALT 250 FOR IP): Performed by: INTERNAL MEDICINE

## 2021-07-12 PROCEDURE — 84132 ASSAY OF SERUM POTASSIUM: CPT

## 2021-07-12 PROCEDURE — 2060000000 HC ICU INTERMEDIATE R&B

## 2021-07-12 PROCEDURE — 82803 BLOOD GASES ANY COMBINATION: CPT

## 2021-07-12 PROCEDURE — 2700000000 HC OXYGEN THERAPY PER DAY

## 2021-07-12 PROCEDURE — 6360000002 HC RX W HCPCS: Performed by: INTERNAL MEDICINE

## 2021-07-12 PROCEDURE — 2580000003 HC RX 258: Performed by: INTERNAL MEDICINE

## 2021-07-12 PROCEDURE — 99223 1ST HOSP IP/OBS HIGH 75: CPT | Performed by: INTERNAL MEDICINE

## 2021-07-12 PROCEDURE — 87636 SARSCOV2 & INF A&B AMP PRB: CPT

## 2021-07-12 PROCEDURE — 84484 ASSAY OF TROPONIN QUANT: CPT

## 2021-07-12 PROCEDURE — 36415 COLL VENOUS BLD VENIPUNCTURE: CPT

## 2021-07-12 PROCEDURE — 80048 BASIC METABOLIC PNL TOTAL CA: CPT

## 2021-07-12 PROCEDURE — 94640 AIRWAY INHALATION TREATMENT: CPT

## 2021-07-12 PROCEDURE — 85025 COMPLETE CBC W/AUTO DIFF WBC: CPT

## 2021-07-12 RX ORDER — FUROSEMIDE 10 MG/ML
40 INJECTION INTRAMUSCULAR; INTRAVENOUS ONCE
Status: COMPLETED | OUTPATIENT
Start: 2021-07-12 | End: 2021-07-12

## 2021-07-12 RX ORDER — ERGOCALCIFEROL 1.25 MG/1
50000 CAPSULE ORAL WEEKLY
Status: DISCONTINUED | OUTPATIENT
Start: 2021-07-13 | End: 2021-07-13 | Stop reason: HOSPADM

## 2021-07-12 RX ADMIN — GABAPENTIN 800 MG: 400 CAPSULE ORAL at 20:43

## 2021-07-12 RX ADMIN — METHYLPREDNISOLONE SODIUM SUCCINATE 40 MG: 40 INJECTION, POWDER, FOR SOLUTION INTRAMUSCULAR; INTRAVENOUS at 20:43

## 2021-07-12 RX ADMIN — ATORVASTATIN CALCIUM 40 MG: 40 TABLET, FILM COATED ORAL at 07:51

## 2021-07-12 RX ADMIN — IPRATROPIUM BROMIDE AND ALBUTEROL SULFATE 1 AMPULE: .5; 3 SOLUTION RESPIRATORY (INHALATION) at 07:59

## 2021-07-12 RX ADMIN — METOPROLOL SUCCINATE 50 MG: 50 TABLET, EXTENDED RELEASE ORAL at 07:53

## 2021-07-12 RX ADMIN — GABAPENTIN 800 MG: 400 CAPSULE ORAL at 07:52

## 2021-07-12 RX ADMIN — DULOXETINE 20 MG: 20 CAPSULE, DELAYED RELEASE ORAL at 07:51

## 2021-07-12 RX ADMIN — FUROSEMIDE 40 MG: 10 INJECTION, SOLUTION INTRAVENOUS at 10:03

## 2021-07-12 RX ADMIN — SODIUM CHLORIDE, PRESERVATIVE FREE 10 ML: 5 INJECTION INTRAVENOUS at 20:43

## 2021-07-12 RX ADMIN — DOXYCYCLINE HYCLATE 100 MG: 100 TABLET, COATED ORAL at 20:43

## 2021-07-12 RX ADMIN — PIOGLITAZONE 15 MG: 30 TABLET ORAL at 07:52

## 2021-07-12 RX ADMIN — INSULIN LISPRO 3 UNITS: 100 INJECTION, SOLUTION INTRAVENOUS; SUBCUTANEOUS at 20:44

## 2021-07-12 RX ADMIN — FUROSEMIDE 40 MG: 40 TABLET ORAL at 07:51

## 2021-07-12 RX ADMIN — METHYLPREDNISOLONE SODIUM SUCCINATE 40 MG: 40 INJECTION, POWDER, FOR SOLUTION INTRAMUSCULAR; INTRAVENOUS at 07:51

## 2021-07-12 RX ADMIN — IPRATROPIUM BROMIDE AND ALBUTEROL SULFATE 1 AMPULE: .5; 3 SOLUTION RESPIRATORY (INHALATION) at 23:21

## 2021-07-12 RX ADMIN — CLOPIDOGREL BISULFATE 75 MG: 75 TABLET ORAL at 07:52

## 2021-07-12 RX ADMIN — DULOXETINE 20 MG: 20 CAPSULE, DELAYED RELEASE ORAL at 20:43

## 2021-07-12 RX ADMIN — SODIUM CHLORIDE, PRESERVATIVE FREE 10 ML: 5 INJECTION INTRAVENOUS at 09:13

## 2021-07-12 RX ADMIN — DOXYCYCLINE HYCLATE 100 MG: 100 TABLET, COATED ORAL at 07:52

## 2021-07-12 RX ADMIN — GLIPIZIDE 5 MG: 5 TABLET ORAL at 07:51

## 2021-07-12 RX ADMIN — METFORMIN HYDROCHLORIDE 1000 MG: 500 TABLET ORAL at 07:52

## 2021-07-12 RX ADMIN — IPRATROPIUM BROMIDE AND ALBUTEROL SULFATE 1 AMPULE: .5; 3 SOLUTION RESPIRATORY (INHALATION) at 15:29

## 2021-07-12 RX ADMIN — ENOXAPARIN SODIUM 40 MG: 40 INJECTION SUBCUTANEOUS at 07:52

## 2021-07-12 RX ADMIN — FENOFIBRATE 160 MG: 160 TABLET ORAL at 07:52

## 2021-07-12 RX ADMIN — GABAPENTIN 800 MG: 400 CAPSULE ORAL at 13:34

## 2021-07-12 RX ADMIN — GABAPENTIN 800 MG: 400 CAPSULE ORAL at 16:26

## 2021-07-12 RX ADMIN — IPRATROPIUM BROMIDE AND ALBUTEROL SULFATE 1 AMPULE: .5; 3 SOLUTION RESPIRATORY (INHALATION) at 20:03

## 2021-07-12 NOTE — CARE COORDINATION
Case Management Assessment  Initial Evaluation      Patient Name: Flynn Reaves  YOB: 1965  Diagnosis: Acute respiratory failure with hypoxia ([facility] [J96.01]  Acute respiratory failure ([facility] [J96.00]  Date / Time: 7/11/2021 12:09 PM    Admission status/Date:inpt  Chart Reviewed: Yes      Patient Interviewed: Yes   Family Interviewed:  No      Hospitalization in the last 30 days:  No      Health Care Decision Maker :   Primary Decision Maker: Kyle Rider - Spouse - [phone]    (CM - must 1st enter selection under Navigator - emergency contact- Health Care Decision Maker Relationship and pick relationship)   Who do you trust or have selected to make healthcare decisions for you      Met with: pt  Interview conducted  (bedside/phone):    Current PCP:   LINK Dunn CNP      Financial  Commercial  Precert required for SNF : Y, N          3 night stay required - Y, [employer]  Support Systems/Care Needs: Family Members  Transportation: family    Meal Preparation: spouse    Housing  Living Arrangements: home with spouse  Steps: one  Intent for return to present living arrangements: Yes  Identified Issues: no    Home Care Information  Active with [address] : No Agency:(Services)     Passport/Waiver : No  :                      Phone Number:    Passport/Waiver Services: no          Durable Medical Equiptment   DME Provider: Carlyn  Equipment:   Walker___Cane___RTS___ BSC___Shower Chair___Hospital Bed___W/C____Other________  02 at _2_Liter(s)---wears(frequency)___cont____ HHN ___ CPAP___ BiPap___   N/A____      Home O2 Use :  Yes    If No for home O2---if presently on O2 during hospitalization:  Yes  if yes CM to follow for potential DC O2 need  Informed of need for care provider to bring portable home O2 tank on day of discharge for nursing to connect prior to leaving:   Yes  Verbalized agreement/Understanding:   Yes    Community Service Affiliation  Dialysis:  No

## 2021-07-12 NOTE — CONSULTS
Patient is being seen at the request of Dr. Yareli Huang for a consultation for Acute on chronic respiratory failure with hypoxemia and hypercapnia      HISTORY OF PRESENT ILLNESS: The patient is a 79-year-old man with a past medical history of COPD, ongoing tobacco abuse, CHF and diabetes who presented to [facility] with 2 days of worsening shortness of breath and cough. Patient states he had been in his usual state of health smoking approximately 1 pack/day. Approximately 2 days ago he began to have increasing cough and shortness of breath. He had difficulty performing his usual activities of daily living. Upon arrival to the ER he was found to have increased hypoxemia. At baseline he wears 2 to 3 L of supplemental oxygen at home. He has been requiring 6 L of submental oxygen in the hospital.  Early this morning he was noted to be somewhat more lethargic and an arterial blood gas was obtained. He had slightly worsening hypercarbia and respiratory acidosis. He declined use of noninvasive positive pressure ventilation. A COVID-19 and influenza test was negative. PAST MEDICAL HISTORY:  Past Medical History:   Diagnosis Date    CHF (congestive heart failure) (HCC)     [facility] disease[facility]     Diabetes mellitus ([facility]     Hyperlipidemia     Hypertension      PAST SURGICAL HISTORY:  Past Surgical History:   Procedure Laterality Date    HERNIA REPAIR      TONSILLECTOMY         FAMILY HISTORY:  family history includes Arthritis in his brother; Asthma in his brother, mother, and sister; Cancer in his mother; Diabetes in his sister; Hearing Loss in his mother; High Blood Pressure in his brother and father; High Cholesterol in his father; Vision Loss in his father, mother, and sister. SOCIAL HISTORY:   reports that he has been smoking. He has been smoking about 1.00 pack per day.  He has never used smokeless tobacco.    Scheduled Meds:   aspirin  81 mg Oral Daily  atorvastatin  40 mg Oral Daily    metoprolol succinate  50 mg Oral Daily    Vitamin D3  1 capsule Oral Once per day on Mon Thu    clopidogrel  75 mg Oral Daily    DULoxetine  20 mg Oral BID    fenofibrate  160 mg Oral Daily    furosemide  40 mg Oral Daily    gabapentin  800 mg Oral 4x Daily    glipiZIDE  5 mg Oral QAM AC    metFORMIN  1,000 mg Oral BID WC    pioglitazone  15 mg Oral Daily    sodium chloride flush  5-40 mL Intravenous 2 times per day    enoxaparin  40 mg Subcutaneous Daily    insulin lispro  0-12 Units Subcutaneous TID WC    insulin lispro  0-6 Units Subcutaneous Nightly    methylPREDNISolone  40 mg Intravenous Q12H    Followed by   Hans Henderson ON 7/13/2021] predniSONE  40 mg Oral Daily    doxycycline hyclate  100 mg Oral Q12H    ipratropium-albuterol  1 ampule Inhalation Q4H While awake     Continuous Infusions:   sodium chloride       PRN Meds:  albuterol sulfate HFA, HYDROcodone-acetaminophen, sodium chloride flush, sodium chloride, ondansetron **OR** ondansetron, polyethylene glycol, acetaminophen **OR** acetaminophen, albuterol    ALLERGIES:  Patient has No Known Allergies. REVIEW OF SYSTEMS:  Constitutional: Negative for fever  HENT: Negative for sore throat  Eyes: Negative for redness   Respiratory: + for dyspnea, + cough  Cardiovascular: Negative for chest pain  Gastrointestinal: Negative for vomiting, diarrhea   Genitourinary: Negative for hematuria   Musculoskeletal: Negative for arthralgias   Skin: Negative for rash  Neurological: Negative for syncope  Hematological: Negative for adenopathy  Psychiatric/Behavorial: Negative for anxiety    PHYSICAL EXAM:  Blood pressure 110/70, pulse 67, temperature 98 °F (36.7 °C), temperature source Oral, resp. rate 18, height 4' 11\" (1.499 m), weight 180 lb (81.6 kg), SpO2 93 %.' on 6L NC  Gen: Mild distress. Increased BMI. Eyes: PERRL. No sclera icterus. No conjunctival injection. ENT: No discharge. Pharynx clear.    Neck: Trachea midline. No obvious mass. Resp: No accessory muscle use. No crackles. Diffuse bilateral wheezes. No rhonchi. No dullness on percussion. CV: Regular rate. Regular rhythm. No murmur or rub. No edema. Peripheral pulses are 2+. Capillary refill is less than 3 seconds. GI: Non-tender. Non-distended. No hernia. Skin: Warm and dry. No nodule on exposed extremities. M/S: No cyanosis. No joint deformity. No clubbing. Neuro: Awake. Alert. Moves all four extremities. Psych: Oriented x 3. No anxiety. LABS:  CBC:   Recent Labs     07/11/21  1218 07/12/21  0611   WBC 10.1 9.7   HGB 13.6 13.7   HCT 42.6 42.5   MCV 93.1 93.8    267     BMP:   Recent Labs     07/11/21  1218      K 5.0      CO2 29   BUN 36*   CREATININE 1.3     LIVER PROFILE:   Recent Labs     07/11/21  1218   AST 20   ALT 19   BILITOT <0.2   ALKPHOS 56     PT/INR: No results for input(s): PROTIME, INR in the last 72 hours. APTT: No results for input(s): APTT in the last 72 hours. UA:No results for input(s): NITRITE, COLORU, PHUR, LABCAST, WBCUA, RBCUA, MUCUS, TRICHOMONAS, YEAST, BACTERIA, CLARITYU, SPECGRAV, LEUKOCYTESUR, UROBILINOGEN, BILIRUBINUR, BLOODU, GLUCOSEU, AMORPHOUS in the last 72 hours. Invalid input(s): Dalton Ortega  Recent Labs     07/12/21  0630   PHART 7.274*   CDR2TNH 65.7*   PO2ART 57.2*       Chest imaging was reviewed by me and showed :    CTA 7/11: Pulmonary Arteries: Pulmonary arteries are adequately opacified for  evaluation.  No evidence of intraluminal filling defect to suggest pulmonary  embolism.  Main pulmonary artery is normal in caliber.     Mediastinum: Multiple mildly enlarged lymph nodes throughout the mediastinum.   Mild left hilar adenopathy.  Subcarinal and bilateral hilar calcified  granulomas.  Mild cardiomegaly.  Mild prominence of the bilateral epicardial  fat pads.  Normal caliber thoracic aorta with mild atherosclerosis along the  arch.     Lungs/pleura: Bilateral mid to lower lung field atelectasis most pronounced  at the bases more pronounced on the right.  No other focal consolidation or  pulmonary edema.  No evidence of pleural effusion or pneumothorax. ASSESSMENT:   Acute on chronic respiratory failure with hypoxemia and hypercapnia- baseline O2 use is 2-3L   · COPD with AE  · Bibasilar ASD on chest CT - favor atelectasis over pneumonia  · Mildly enlarged mediastinal LAD- radiology recommends consider repeat CT in 3-6 months    PLAN:   IV steroids today, with plan to switch to oral prednisone 40 mg daily with improvement, then taper   Inhaled bronchodilators   Antibiotics (doxycycline )  Supplemental oxygen to maintain SaO2 >92%; wean as tolerated  Non-invasive positive pressure ventilation, if needed and patient willing  Counseled regarding smoking cessation. Consider outpatient CT f/u with PCP for mediastinal LAD. Thank you for the consult.

## 2021-07-12 NOTE — PROGRESS NOTES
Pt was very hard to wake up and once woke up he was unsure were he was and had some confusion but within a few minutes was able to re-acclimate and answer all the question asked; vss. Pt remains on HFNC 6L and sating 98%.

## 2021-07-12 NOTE — PROGRESS NOTES
Report from [facility] [address] and care transferred. Pt. Very upset and not wanting care at this time. Sitting on side of the bed with high flow NC and telemetry monitor on.

## 2021-07-12 NOTE — PROGRESS NOTES
Lab present in pt room; RN to room to check on pt; pt was showing signs of confusion but alert. Pt was not able to answer question and did become somewhat verbally aggressive d/t this RN to get a blood gas. Team members were able to help this RN get the ABG safely. Pt continued to be upset using profanity. Dr. Keyla Graham updated.

## 2021-07-12 NOTE — PROGRESS NOTES
4 Eyes Skin Assessment     The patient is being assess for   Admission    I agree that 2 RN's have performed a thorough Head to Toe Skin Assessment on the patient. ALL assessment sites listed below have been assessed. Areas assessed for pressure by both nurses:   [x]   Head, Face, and Ears   [x]   Shoulders, Back, and Chest, Abdomen  [x]   Arms, Elbows, and Hands   [x]   Coccyx, Sacrum, and Ischium  [x]   Legs, Feet, and Heels        Skin Assessed Under all Medical Devices by both nurses:  O2 device tubing                      **SHARE this note so that the co-signing nurse is able to place an eSignature**    Co-signer eSignature: Electronically signed by Belkis Pastrana RN on 7/12/21 at 12:16 AM EDT    Does the Patient have Skin Breakdown related to pressure?   No            Scott Prevention initiated:  Yes   Wound Care Orders initiated:  No      WOC nurse consulted for Pressure Injury (Stage 3,4, Unstageable, DTI, NWPT, Complex wounds)and New or Established Ostomies:  No      Primary Nurse eSignature: Electronically signed by Yoni Lopez RN on 7/12/21 at 12:15 AM EDT

## 2021-07-12 NOTE — ACP (ADVANCE CARE PLANNING)
Advance Care Planning   Healthcare Decision Maker:    Primary Decision Maker: Hilton Abdul - [facility] - [phone]    Click here to complete Healthcare Decision Makers including selection of the Healthcare Decision Maker Relationship (ie \"Primary\").

## 2021-07-12 NOTE — H&P
Hospital Medicine History & Physical      PCP: Cheryle Rear, APRN - CNP    Date of Admission: 7/11/2021    Date of Service: Pt seen/examined on 7/12/2021    Chief Complaint:    Chief Complaint   Patient presents with    Chest Pain     x3 days. midsternal pain that radiates under his L arm    Shortness of Breath     x3 dyas. wears 2-3 L at home. Currently on 4L. History Of Present Illness: The patient is a 50 y.o. male with CHF, COPD, DM, HTN, HLD who presented to [facility] ED with complaint of CP and SOB. Patient reports that he woke up yesterday and felt more SOB than his baseline. He started with a non-productive cough and chest pain with coughing only. He denies any fevers or chills. Does not believe he has been wheezing more than normal. He has not noticed increased swelling or weight gain at home. Reports this is how he felt last admission with COPD and CHF AE. He completed his course of steroids and reports compliance with other medications at home as well. He is still smoking ~ 1 1/2 ppd. He denies any active CP and reports he is feeling well this AM.     Past Medical History:        Diagnosis Date    [facility]est[facility] failure) [facility]     COPD [facility]chronic obstructive pulmonary disease) [facility]     Diabetes mellitus ([facility]     Hyperlipidemia     Hypertension        Past Surgical History:        Procedure Laterality Date    HERNIA REPAIR      TONSILLECTOMY         Medications Prior to Admission:    Prior to Admission medications    Medication Sig Start Date End Date Taking? Authorizing Provider   pioglitazone (ACTOS) 15 MG tablet TAKE (1) TABLET DAILY 6/28/21   Cheryle Rear, APRN - CNP   bisoprolol (ZEBETA) 5 MG tablet TAKE 1 TABLET BY MOUTH DAILY 6/28/21 7/28/21  Cheryle Rear, APRN - CNP   predniSONE (DELTASONE) 10 MG tablet 4 tabs once daily for 3 days then 3 tabs once daily for 3 days then 2 tabs once daily for 3 days then 1 tab once daily for 3 days.  6/9/21   MANUEL Rucker   Fluticasone furoate-vilanterol (BREO ELLIPTA) 200-25 MCG/INH AEPB inhaler Inhale 1 puff into the lungs daily 6/2/21   LINK Jimenez CNP   gabapentin (NEURONTIN) 800 MG tablet TAKE 1 TABLET 4 TIMES DAILY 5/24/21 6/24/21  LINK Jimenez CNP   HYDROcodone-acetaminophen ([address]) 5-325 MG per tablet  5/11/21   Historical Provider, MD   furosemide (LASIX) 40 MG tablet Take 1 tablet by mouth daily 5/24/21   LINK Jimenez CNP   metFORMIN (GLUCOPHAGE) 1000 MG tablet TAKE 1 TABLET BY MOUTH 2 TIMES DAILY (WITH MEALS) 5/4/21   LINK Jimenez CNP   atorvastatin (LIPITOR) 40 MG tablet TAKE 1 TABLET ONCE DAILY 3/26/21   LINK Jimenez CNP   Umeclidinium Bromide (INCRUSE ELLIPTA) 62.5 MCG/INH AEPB INHALE 1 PUFF DAILY 3/19/21   LINK Jimenez CNP   albuterol sulfate  (90 Base) MCG/ACT inhaler Inhale 2 puffs into the lungs every 6 hours as needed for Wheezing or Shortness of Breath 3/19/21   LINK Jimenez CNP   fenofibric acid (FIBRICOR) 135 MG CPDR capsule TAKE 1 CAPSULE ONCE DAILY 3/11/21   LINK Jimenez CNP   glimepiride (AMARYL) 2 MG tablet TAKE 1 TABLET EACH MORNING BEFORE BREAKFAST.  DISCONTINUE FARXIGA 3/8/21   LINK Jimenez CNP   montelukast (SINGULAIR) 10 MG tablet  1/11/21   Historical Provider, MD   DULoxetine (CYMBALTA) 20 MG extended release capsule Take 20 mg by mouth 2 times daily    Historical Provider, MD   ipratropium-albuterol (DUONEB) 0.5-2.5 (3) MG/3ML SOLN nebulizer solution TAKE 3 MLS BY NEBULIZATION EVERY 4-6 HOURS AS NEEDED FOR SHORTNESS OF BREATH 10/13/20   LINK Jimenez CNP   albuterol sulfate  (90 Base) MCG/ACT inhaler INHALE 2 PUFFS INTO THE LUNGS 2 TIMES DAILY 6/1/20   LINK Jimenez CNP   Cholecalciferol (VITAMIN D3) 1.25 MG (36477 UT) CAPS Take 1 capsule by mouth Twice a Week 4/6/20   LINK Jimenez CNP   Magnesium 400 MG CAPS Take 1 tablet by mouth daily 4/6/20   LINK Jimenez CNP   clopidogrel (PLAVIX) 75 MG tablet Take 1 tablet by mouth daily 4/6/20   LINK Colunga CNP   blood glucose monitor strips Test 2 times a day & as needed for symptoms of irregular blood glucose. E11.9 3/31/20   LINK Colunga CNP   glucose monitoring kit (FREESTYLE) monitoring kit 1 kit by Does not apply route daily Patient wants brand name Patient tests bid  E11.9 3/31/20   LINK Colunga CNP   blood glucose monitor kit and supplies Test 2 times a day & as needed for symptoms of irregular blood glucose. 3/30/20   LINK Colunga CNP   aspirin 81 MG EC tablet Take 81 mg by mouth daily    Historical Provider, MD   Cyanocobalamin 1000 MCG CAPS Take 1 tablet by mouth daily    Historical Provider, MD   [place]-3 1000 MG CAPS Take 1 capsule by mouth daily    Historical Provider, MD   Coenzyme Q10 (COQ10) 100 MG CAPS Take 1 capsule by mouth daily    Historical Provider, MD   MULTIPLE VITAMINS PO Take by mouth    Historical Provider, MD       Allergies:  Patient has no known allergies. Social History:  The patient currently lives at home     TOBACCO:   reports that he has been smoking. He has been smoking about 1.00 pack per day. He has never used smokeless tobacco.  ETOH:   reports current alcohol use.       Family History:   Positive as follows:        Problem Relation Age of Onset    Asthma Mother     Cancer Mother     Hearing Loss Mother     Vision Loss Mother     High Blood Pressure Father     High Cholesterol Father     Vision Loss Father     Asthma Sister     Diabetes Sister     Vision Loss Sister     Asthma Brother     Arthritis Brother     High Blood Pressure Brother        REVIEW OF SYSTEMS:     Constitutional: Negative for fever   HENT: Negative for sore throat   Eyes: Negative for redness   Respiratory: + cough, + SOB  Cardiovascular: + chest pain with coughing   Gastrointestinal: Negative for vomiting, diarrhea   Genitourinary: Negative for hematuria   Musculoskeletal: Negative for arthralgias   Skin: Negative for rash   Neurological: Negative for syncope   Hematological: Negative for adenopathy   Psychiatric/Behavorial: Negative for anxiety    PHYSICAL EXAM:    /70   Pulse 67   Temp 98 °F (36.7 °C) (Oral)   Resp 18   Ht 4' 11\" (1.499 m)   Wt 180 lb (81.6 kg)   SpO2 93%   BMI 36.36 kg/m²     Gen: Obese male, No distress. Alert. Eyes: No conjunctival injection. ENT: No discharge. Pharynx clear. Neck:  Trachea midline. Resp: No accessory muscle use. + crackles. No wheezes. No rhonchi. On 6L NC O2  CV: Regular rate. Regular rhythm. No murmur. No rub. No edema. Capillary Refill: Brisk,< 3 seconds   Peripheral Pulses: +2 palpable, equal bilaterally   GI: Non-tender. Non-distended. . Normal bowel sounds. Skin: Warm and dry. M/S: No cyanosis. No joint deformity. No clubbing. Neuro: Awake. Grossly nonfocal    Psych: Oriented x 3. No anxiety or agitation. CBC:   Recent Labs     07/11/21  1218 07/12/21  0611   WBC 10.1 9.7   HGB 13.6 13.7   HCT 42.6 42.5   MCV 93.1 93.8    267     BMP:   Recent Labs     07/11/21  1218      K 5.0      CO2 29   BUN 36*   CREATININE 1.3     LIVER PROFILE:   Recent Labs     07/11/21  1218   AST 20   ALT 19   BILITOT <0.2   ALKPHOS 56     CARDIAC ENZYMES  Recent Labs     07/11/21  1218 07/11/21  1941 07/12/21  0131   TROPONINI <0.01 <0.01 <0.01     CULTURES  COVID-19: not detected   Influenza: Not detected   Resp Cx: ordered     EKG:  I have reviewed the EKG with the following interpretation:   Normal sinus rhythm  Right axis deviation  Incomplete right bundle branch block  Abnormal ECG    RADIOLOGY  CT CHEST PULMONARY EMBOLISM W CONTRAST   Final Result   1. No evidence of pulmonary embolism. 2. Bilateral mid to lower lung field atelectasis most pronounced at the bases   more pronounced on the right.    3. Multiple mildly enlarged lymph nodes throughout the mediastinum and left   hilar adenopathy which may be reactive in nature though neoplastic etiology   not definitively excluded. Consider follow-up examination in 3-6 months to   reassess. 4. Mild cardiomegaly. XR CHEST PORTABLE   Final Result   CHF with mild interstitial pulmonary edema. Pertinent previous results reviewed   Echo 2/7/21  Summary   LV systolic function is normal with EF estimated at 55%. No regional wall motion abnormalities. MIldly asynchronous septal motion. There is mild concentric left ventricular hypertrophy. Grade II diastolic dysfunction with elevated LV filling pressure. Mild mitral annular calcification. The left atrium is mildly dilated. Mild mitral regurgitation. Inadequate tricuspid regurgitation jet to estimate systolic artery pressure      I Lisa Sierra MD have [facility] and personally interviewed and examined patient, reviewed the data (labs and imaging) and after discussion with my PA formulated the plan. Agree with note with the following edits. HPI:     Patient is a 63-year-old white male who comes emergency room with complaints of having shortness of breath and chest pain for the last 2 or 3 days. Patient is a poor historian. Patient has a past medical history of nonobstructive CAD, COPD, chronic respiratory failure, continued tobacco abuse-smokes 1.5 packs of cigarettes a day, diabetes, hypertension, hyperlipidemia who came to the ER with complaints of shortness of breath and some sharp nonradiating chest pain under the left chest for 2 to 3 days. Had a cough but no hemoptysis. No fever or chills. The pain is constant. Troponin and EKG in the ED was normal.  D-dimer was slightly elevated. CTPA was done. It showed no evidence of PE. There is some interstitial edema noted. Was read as CHF versus pneumonia. This morning the patient feels much improved. Breathing is better. Patient's BNP was elevated. Patient is admitted to hospital.  There is no chest pain.   Serial troponins have been negative. There is no fever. I reviewed the patient's Past Medical History, Past Surgical History, Medications, and Allergies. Physical exam:    /70   Pulse 67   Temp 98 °F (36.7 °C) (Oral)   Resp 18   Ht 4' 11\" (1.499 m)   Wt 180 lb (81.6 kg)   SpO2 93%   BMI 36.36 kg/m²     Gen: No distress. Alert. Eyes: PERRL. No sclera icterus. No conjunctival injection. ENT: No discharge. Pharynx clear. Neck: Trachea midline. Normal thyroid. Resp: No accessory muscle use. + crackles. Mild wheezes. No rhonchi. No dullness on percussion. CV: Regular rate. Regular rhythm. No murmur or rub. No edema. GI: Non-tender. Non-distended. No masses. No organomegaly. Normal bowel sounds. No hernia. ASSESSMENT/PLAN:  Acute on chronic hypoxic/hypercapnic respiratory failure  - patient presented with worsening shortness of breath  - 2/2 COPD exacerbation and mild CHF AE  - pH 7.2, pCO2 65.7  - Pulmonology consulted  - Ruled out Covid  - O2  6LNC, normally on 3L NC O2 at home   - Wean supplemental O2 as tolerated     COPD AE  -consult pulmonology   -on IV Solu-medrol, doxycycline   - Duonebs scheduled     Mild Acute on Chronic diastolic CHF  Ischemic cardiomyopathy  H/o Systolic CHF, recovered EF.   - Takes 40 mg PO Lasix daily   - Last Echo in Feb with EF 55% and grade II DD  - Strict I&O's, daily weights   - CXR with pulmonary edema but CT without significant findings, BNP elevated at 5K  - Net fluid +0.2L  - Does have crackles in lungs, Will trial IV Lasix 40 mg x 1 and monitor      CAD  - continue aspirin, Plavix, fenofibrate, Atorvastatin, Toprol-XL  - EKG without acute concerning changes     DM type 2  Neuropathy   - monitor glucose. - HgbA1C June 7 7.1   - Suspect hyperglycemia with steroids   - SSI coverage.  Continue Gabapentin.  - Holding home oral Rx      Hypertension  - BP is stable   - Continue home medications      Hyperlipidemia  - on Fenofibrate, Atorvastatin    Abnormal CT Chest   - LAD present, will need repeat scans in 3-6 months for monitoring     Morbid Obesity  - Body mass index is 36.36 kg/m². - Complicating assessment and treatment. Placing patient at risk for multiple co-morbidities as well as early death and contributing to the patient's presentation.   - Counseled on weight loss. DVT Prophylaxis: Lovenox   Diet: ADULT DIET;  Regular; 5 carb choices (75 gm/meal)  Code Status: Full Code    Jose Henderson PA-C 7/12/2021         Carl Villegas MD 7/12/2021 10:14 AM

## 2021-07-13 VITALS
WEIGHT: 190.5 LBS | RESPIRATION RATE: 18 BRPM | HEART RATE: 76 BPM | SYSTOLIC BLOOD PRESSURE: 111 MMHG | HEIGHT: 60 IN | TEMPERATURE: 97.5 F | BODY MASS INDEX: 37.4 KG/M2 | OXYGEN SATURATION: 92 % | DIASTOLIC BLOOD PRESSURE: 66 MMHG

## 2021-07-13 LAB
ANION GAP SERPL CALCULATED.3IONS-SCNC: 5 MMOL/L (ref 3–16)
BUN BLDV-MCNC: 50 MG/DL (ref 7–20)
CALCIUM SERPL-MCNC: 9.8 MG/DL (ref 8.3–10.6)
CHLORIDE BLD-SCNC: 102 MMOL/L (ref 99–110)
CO2: 32 MMOL/L (ref 21–32)
CREAT SERPL-MCNC: 1 MG/DL (ref 0.9–1.3)
GFR AFRICAN AMERICAN: >60
GFR NON-AFRICAN AMERICAN: >60
GLUCOSE BLD-MCNC: 220 MG/DL (ref 70–99)
GLUCOSE BLD-MCNC: 235 MG/DL (ref 70–99)
GLUCOSE BLD-MCNC: 272 MG/DL (ref 70–99)
GLUCOSE BLD-MCNC: 322 MG/DL (ref 70–99)
PERFORMED ON: ABNORMAL
POTASSIUM REFLEX MAGNESIUM: 5.9 MMOL/L (ref 3.5–5.1)
SODIUM BLD-SCNC: 139 MMOL/L (ref 136–145)

## 2021-07-13 PROCEDURE — 80048 BASIC METABOLIC PNL TOTAL CA: CPT

## 2021-07-13 PROCEDURE — 6360000002 HC RX W HCPCS: Performed by: PHYSICIAN ASSISTANT

## 2021-07-13 PROCEDURE — 2580000003 HC RX 258: Performed by: INTERNAL MEDICINE

## 2021-07-13 PROCEDURE — 6370000000 HC RX 637 (ALT 250 FOR IP): Performed by: INTERNAL MEDICINE

## 2021-07-13 PROCEDURE — 99238 HOSP IP/OBS DSCHRG MGMT 30/<: CPT | Performed by: INTERNAL MEDICINE

## 2021-07-13 PROCEDURE — 36415 COLL VENOUS BLD VENIPUNCTURE: CPT

## 2021-07-13 PROCEDURE — 6360000002 HC RX W HCPCS: Performed by: INTERNAL MEDICINE

## 2021-07-13 PROCEDURE — 94640 AIRWAY INHALATION TREATMENT: CPT

## 2021-07-13 PROCEDURE — 99232 SBSQ HOSP IP/OBS MODERATE 35: CPT | Performed by: INTERNAL MEDICINE

## 2021-07-13 PROCEDURE — 84132 ASSAY OF SERUM POTASSIUM: CPT

## 2021-07-13 RX ORDER — FUROSEMIDE 10 MG/ML
40 INJECTION INTRAMUSCULAR; INTRAVENOUS 2 TIMES DAILY
Status: DISCONTINUED | OUTPATIENT
Start: 2021-07-13 | End: 2021-07-13

## 2021-07-13 RX ORDER — FUROSEMIDE 10 MG/ML
40 INJECTION INTRAMUSCULAR; INTRAVENOUS ONCE
Status: COMPLETED | OUTPATIENT
Start: 2021-07-13 | End: 2021-07-13

## 2021-07-13 RX ORDER — PREDNISONE 10 MG/1
TABLET ORAL
Qty: 30 TABLET | Refills: 0 | Status: SHIPPED | OUTPATIENT
Start: 2021-07-13 | End: 2021-08-27 | Stop reason: ALTCHOICE

## 2021-07-13 RX ORDER — DOXYCYCLINE HYCLATE 100 MG
100 TABLET ORAL EVERY 12 HOURS
Qty: 10 TABLET | Refills: 0 | Status: SHIPPED | OUTPATIENT
Start: 2021-07-13 | End: 2021-07-18

## 2021-07-13 RX ADMIN — ASPIRIN 81 MG: 81 TABLET, COATED ORAL at 08:54

## 2021-07-13 RX ADMIN — CLOPIDOGREL BISULFATE 75 MG: 75 TABLET ORAL at 08:54

## 2021-07-13 RX ADMIN — GABAPENTIN 800 MG: 400 CAPSULE ORAL at 16:32

## 2021-07-13 RX ADMIN — METOPROLOL SUCCINATE 50 MG: 50 TABLET, EXTENDED RELEASE ORAL at 08:55

## 2021-07-13 RX ADMIN — IPRATROPIUM BROMIDE AND ALBUTEROL SULFATE 1 AMPULE: .5; 3 SOLUTION RESPIRATORY (INHALATION) at 15:37

## 2021-07-13 RX ADMIN — GABAPENTIN 800 MG: 400 CAPSULE ORAL at 08:54

## 2021-07-13 RX ADMIN — ERGOCALCIFEROL 50000 UNITS: 1.25 CAPSULE ORAL at 08:54

## 2021-07-13 RX ADMIN — FUROSEMIDE 40 MG: 10 INJECTION, SOLUTION INTRAVENOUS at 08:54

## 2021-07-13 RX ADMIN — ENOXAPARIN SODIUM 40 MG: 40 INJECTION SUBCUTANEOUS at 08:54

## 2021-07-13 RX ADMIN — DULOXETINE 20 MG: 20 CAPSULE, DELAYED RELEASE ORAL at 08:54

## 2021-07-13 RX ADMIN — IPRATROPIUM BROMIDE AND ALBUTEROL SULFATE 1 AMPULE: .5; 3 SOLUTION RESPIRATORY (INHALATION) at 07:35

## 2021-07-13 RX ADMIN — ATORVASTATIN CALCIUM 40 MG: 40 TABLET, FILM COATED ORAL at 08:54

## 2021-07-13 RX ADMIN — IPRATROPIUM BROMIDE AND ALBUTEROL SULFATE 1 AMPULE: .5; 3 SOLUTION RESPIRATORY (INHALATION) at 11:12

## 2021-07-13 RX ADMIN — SODIUM CHLORIDE, PRESERVATIVE FREE 10 ML: 5 INJECTION INTRAVENOUS at 08:58

## 2021-07-13 RX ADMIN — FENOFIBRATE 160 MG: 160 TABLET ORAL at 08:54

## 2021-07-13 RX ADMIN — DOXYCYCLINE HYCLATE 100 MG: 100 TABLET, COATED ORAL at 06:58

## 2021-07-13 RX ADMIN — METHYLPREDNISOLONE SODIUM SUCCINATE 40 MG: 40 INJECTION, POWDER, FOR SOLUTION INTRAMUSCULAR; INTRAVENOUS at 06:58

## 2021-07-13 RX ADMIN — GABAPENTIN 800 MG: 400 CAPSULE ORAL at 13:32

## 2021-07-13 NOTE — PROGRESS NOTES
Patient educated on discharge instructions as well as new medications use, dosage, administration and possible side effects. Patient verified knowledge. IV removed without difficulty and dry dressing in place. Telemetry monitor removed and returned to [facility] [place]. Pt left facility in stable condition to Home with all of their personal belongings.

## 2021-07-13 NOTE — PROGRESS NOTES
Shift assessment completed, see flow sheet. Pt alert and oriented x4. No c/o pain and appears to be in no distress. In bed with call light in reach.

## 2021-07-13 NOTE — PROGRESS NOTES
Pulmonary Progress Note    CC: Acute on chronic respiratory failure with hypoxemia and hypercapnia      Subjective:   Patient feels better and wants to go home. Intake/Output Summary (Last 24 hours) at 7/13/2021 0757  Last data filed at 7/13/2021 0748  Gross per 24 hour   Intake 1140 ml   Output 1000 ml   Net 140 ml       Exam:   /65   Pulse 62   Temp 97 °F (36.1 °C) (Oral)   Resp 16   Ht 4' 11\" (1.499 m)   Wt 190 lb 8 oz (86.4 kg)   SpO2 95%   BMI 38.48 kg/m²  on 5-> 3L NC  Gen: Mild distress. Increased BMI. Eyes: PERRL. No sclera icterus. No conjunctival injection. ENT: No discharge. Pharynx clear. Neck: Trachea midline. No obvious mass. Resp: No accessory muscle use. No crackles. Few scattered wheezes. No rhonchi. No dullness on percussion. CV: Regular rate. Regular rhythm. No murmur or rub. No edema. GI: Non-tender. Non-distended. No hernia. Skin: Warm and dry. No nodule on exposed extremities. M/S: No cyanosis. No joint deformity. No clubbing. Neuro: Awake. Alert. Moves all four extremities. Psych: Oriented x 3. No anxiety.      Scheduled Meds:   furosemide  40 mg Intravenous BID    vitamin D  50,000 Units Oral Weekly    aspirin  81 mg Oral Daily    atorvastatin  40 mg Oral Daily    metoprolol succinate  50 mg Oral Daily    clopidogrel  75 mg Oral Daily    DULoxetine  20 mg Oral BID    fenofibrate  160 mg Oral Daily    [Held by provider] furosemide  40 mg Oral Daily    gabapentin  800 mg Oral 4x Daily    sodium chloride flush  5-40 mL Intravenous 2 times per day    enoxaparin  40 mg Subcutaneous Daily    insulin lispro  0-12 Units Subcutaneous TID WC    insulin lispro  0-6 Units Subcutaneous Nightly    predniSONE  40 mg Oral Daily    doxycycline hyclate  100 mg Oral Q12H    ipratropium-albuterol  1 ampule Inhalation Q4H While awake     Continuous Infusions:   sodium chloride       PRN Meds:  albuterol sulfate HFA, HYDROcodone-acetaminophen, sodium chloride favor atelectasis over pneumonia  · Mildly enlarged mediastinal LAD- radiology recommends consider repeat CT in 3-6 months     PLAN:  ·  Switch to oral prednisone 40 mg daily, then taper   · Inhaled bronchodilators   · Antibiotics (D2 doxycycline )  · Supplemental oxygen to maintain SaO2 >92%; wean as tolerated  · Counseled regarding smoking cessation. · Consider outpatient CT f/u with PCP for mediastinal LAD.

## 2021-07-13 NOTE — CARE COORDINATION
DISCHARGE ORDER  Date/Time 2021 4:29 PM  Completed by: Zeina Armendariz RN, Case Management    Patient Name: Blake Sandifer      : 1965  Admitting Diagnosis: Acute respiratory failure with hypoxia ([address] [J96.01]  Acute respiratory failure ([address] [J96.00]      Admit order Date and Status:inpt  (verify MD's last order for status of admission)      Noted discharge order. Discharge Plan: Reviewed chart. Pt declines hhc at this time. Pt active with [facility] for home O2. Portable tank provided to the pt. No other d/c needs voiced or identified. Reviewed chart. Role of discharge planner explained and patient verbalized understanding. Discharge order is noted. Has Home O2 in place on admit:  Yes  Informed of need to bring portable home O2 tank on day of discharge for nursing to connect prior to leaving:   Yes  Verbalized agreement/Understanding:   Yes  Pt is being d/c'd to home today. Pt's O2 sats are 92% on 3 liters. Discharge timeout done with [facility]. All discharge needs and concerns addressed.

## 2021-07-13 NOTE — FLOWSHEET NOTE
07/13/21 0838   Vital Signs   Temp 97.5 °F (36.4 °C)   Temp Source Oral   Pulse 76   Heart Rate Source Monitor   Resp 18   /66   BP Location Right upper arm   Level of Consciousness Alert (0)   MEWS Score 1   Patient Currently in Pain Denies   Oxygen Therapy   SpO2 95 %   Pulse Oximeter Device Mode Continuous   Pulse Oximeter Device Location Left;Finger   O2 Device High flow nasal cannula   O2 Flow Rate (L/min) 4 L/min     AM assessment complete. Pt a&o x4. Denies any pain. States he feels like his breathing has improved since admitting to hospital. Call light and bedside table within reach. Will continue to monitor.

## 2021-07-13 NOTE — DISCHARGE INSTR - COC
Continuity of Care Form    Patient Name: Ramy Rosen   :  1965  MRN:  [id number]    Admit date:  2021  Discharge date:  2021    Code Status Order: Full Code   Advance Directives:     Admitting Physician:  Arnav Parish MD  PCP: Jermaine Ash APRN - CNP    Discharging Nurse: Ayo Giordano Unit/Room#: /1079-50  Discharging Unit Phone Number: [phone]    Emergency Contact:   Extended Emergency Contact Information  Primary Emergency Contact: Declan Guerrero  Address: [address] [place] [address]  [address] Phone: [phone]  Relation: Spouse    Past Surgical History:  Past Surgical History:   Procedure Laterality Date    HERNIA REPAIR      TONSILLECTOMY         Immunization History: There is no immunization history on file for this patient.     Active Problems:  Patient Active Problem List   Diagnosis Code    Hypertension I10    Other hyperlipidemia E78.49    CHF (congestive heart failure) ([facility]) I50.9    COPD exacerbation ([facility]) J44.1    Acute on chronic respiratory failure with hypoxia and hypercapnia ([facility]) J96.21, J96.22    Coronary artery disease involving native coronary artery of native heart without angina pectoris I25.10    Acute respiratory failure with hypoxia and hypercapnia ([facility]) J96.01, J96.02    Acute on chronic systolic CHF (congestive heart failure) ([facility]) I50.23    Type 2 diabetes mellitus without complication ([facility]) L72.6    Tobacco use Z72.0    Acute respiratory failure with hypoxia ([facility]) J96.01    Acute respiratory failure ([facility]) J96.00    Chest pain R07.9    Elevated d-dimer R79.89       Isolation/Infection:   Isolation          No Isolation        Patient Infection Status     Infection Onset Added Last Indicated Last Indicated By Review Planned Expiration Resolved Resolved By    None active    Resolved    COVID-19 Rule Out 21 COVID-19 & Influenza Combo (Ordered)   07/12/21 Rule-Out Test Resulted    COVID-19 Rule Out 06/07/21 06/07/21 06/07/21 COVID-19 & Influenza Combo (Ordered)   06/07/21 Rule-Out Test Resulted    COVID-19 Rule Out 06/07/21 06/07/21 06/07/21 COVID-19, Rapid (Ordered)   06/07/21 Rule-Out Test Resulted    COVID-19 Rule Out 02/07/21 02/07/21 02/07/21 COVID-19 (Ordered)   02/07/21 Rule-Out Test Resulted          Nurse Assessment:  Last Vital Signs: /66   Pulse 76   Temp 97.5 °F (36.4 °C) (Oral)   Resp 18   Ht 4' 11\" (1.499 m)   Wt 190 lb 8 oz (86.4 kg)   SpO2 92%   BMI 38.48 kg/m²     Last documented pain score (0-10 scale): Pain Level: 0  Last Weight:   Wt Readings from Last 1 Encounters:   07/13/21 190 lb 8 oz (86.4 kg)     Mental Status:  oriented, alert, coherent and logical    IV Access:  - None    Nursing Mobility/ADLs:  Walking   Independent  Transfer  Independent  Bathing  Independent  Dressing  Independent  Toileting  Independent  Feeding  Independent  Med Admin  Assisted  Med Delivery   whole    Wound Care Documentation and Therapy:        Elimination:  Continence:   · Bowel: Yes  · Bladder: Yes  Urinary Catheter: None   Colostomy/Ileostomy/Ileal Conduit: No       Date of Last BM: 7/13/2021    Intake/Output Summary (Last 24 hours) at 7/13/2021 1613  Last data filed at 7/13/2021 1544  Gross per 24 hour   Intake 1740 ml   Output 1120 ml   Net 620 ml     I/O last 3 completed shifts: In: 5809 [P.O.:1740]  Out: 1000 [Urine:1000]    Safety Concerns:     None    Impairments/Disabilities:      Vision    Nutrition Therapy:  Current Nutrition Therapy:   - Oral Diet:  Carb Control 5 carbs/meal (2000kcals/day)    Routes of Feeding: Oral  Liquids: Thin Liquids  Daily Fluid Restriction: no  Last Modified Barium Swallow with Video (Video Swallowing Test): not done    Treatments at the Time of Hospital Discharge:   Respiratory Treatments: as ordered  Oxygen Therapy:  is on oxygen at 3 L/min per nasal cannula.   Ventilator:    - BiPAP ,   only when sleeping    Rehab Therapies:   Weight Bearing Status/Restrictions: No weight bearing restirctions  Other Medical Equipment (for information only, NOT a DME order): Other Treatments: as ordered    Patient's personal belongings (please select all that are sent with patient):  Glasses    RN SIGNATURE:  Electronically signed by Eliel Johnston RN on 7/13/21 at [address] [facility] PM EDT    CASE MANAGEMENT/SOCIAL WORK SECTION    Inpatient Status Date: ***    Readmission Risk Assessment Score:  Readmission Risk              Risk of Unplanned Readmission:  22           Discharging to Facility/ Agency   · Name:   · Address:  · Phone:  · Fax:    Dialysis Facility (if applicable)   · Name:  · Address:  · Dialysis Schedule:  · Phone:  · Fax:    / signature: {Esignature:[id number]}    PHYSICIAN SECTION    Prognosis: {Prognosis:1871971618}    Condition at Discharge: [facility] Patient Condition:354800181}    Rehab Potential (if transferring to Rehab): {Prognosis:0600433853}    Recommended Labs or Other Treatments After Discharge: ***    Physician Certification: I certify the above information and transfer of Manda Mckeonr  is necessary for the continuing treatment of the diagnosis listed and that he requires {Admit to Appropriate Level of Care:26145} for {GREATER/LESS:520807622} 30 days.      Update Admission H&P: {CHP DME Changes in BIPJT:049378798}    PHYSICIAN SIGNATURE:  {Esignature:930007689}

## 2021-07-14 NOTE — DISCHARGE SUMMARY
Name:  Clara Alberto  Room:  /3369-69  MRN:    [id number]    Discharge Summary      This discharge summary is in conjunction with a complete physical exam done on the day of discharge. Attending Physician:  [facility], MD     Discharging Physician: [facility], MD       Admit: 7/11/2021  Discharge:  7/13/2021    Diagnoses this Admission    Principal Problem:    Acute on chronic respiratory failure with hypoxia and hypercapnia (HCC)  Active Problems:    Hypertension    Other hyperlipidemia    COPD exacerbation ([address]    Coronary artery disease involving native coronary artery of native heart without angina pectoris    Type 2 diabetes mellitus without complication (HCC)    Tobacco use    Acute respiratory failure with hypoxia (HCC)    Acute respiratory failure (HCC)    Chest pain    Elevated d-dimer  Resolved Problems:    * No resolved hospital problems. *          Procedures (Please Review Full Report for Details)  None    Consults    Pulmonologist    HPI:      Patient is a 81-year-old white male who comes emergency room with complaints of having shortness of breath and chest pain for the last 2 or 3 days. Patient is a poor historian. Patient has a past medical history of nonobstructive CAD, COPD, chronic respiratory failure, continued tobacco abuse-smokes 1.5 packs of cigarettes a day, diabetes, hypertension, hyperlipidemia who came to the ER with complaints of shortness of breath and some sharp nonradiating chest pain under the left chest for 2 to 3 days. Had a cough but no hemoptysis. No fever or chills. The pain is constant. Troponin and EKG in the ED was normal.  D-dimer was slightly elevated. CTPA was done. It showed no evidence of PE. There is some interstitial edema noted. Was read as CHF versus pneumonia.     This morning the patient feels much improved. Breathing is better.     Patient's BNP was elevated. Patient is admitted to hospital.  There is no chest pain. Serial troponins have been negative. There is no fever.       Physical Exam at Discharge:  /66   Pulse 76   Temp 97.5 °F (36.4 °C) (Oral)   Resp 18   Ht 4' 11\" (1.499 m)   Wt 190 lb 8 oz (86.4 kg)   SpO2 92%   BMI 38.48 kg/m²   Gen: Obese male, No distress. Alert. Eyes: No conjunctival injection. ENT: No discharge. Pharynx clear. Neck:  Trachea midline. Resp: No accessory muscle use. + crackles. No wheezes. No rhonchi. On 6L NC O2  CV: Regular rate. Regular rhythm. No murmur. No rub. No edema. Capillary Refill: Brisk,< 3 seconds   Peripheral Pulses: +2 palpable, equal bilaterally   GI: Non-tender. Non-distended. . Normal bowel sounds. Skin: Warm and dry. M/S: No cyanosis. No joint deformity. No clubbing. Neuro: Awake. Grossly nonfocal    Psych: Oriented x 3. No anxiety or agitation. Hospital Course    Acute on chronic hypoxic/hypercapnic respiratory failure  - patient presented with worsening shortness of breath  - 2/2 COPD exacerbation and mild CHF AE  - pH 7.2, pCO2 65.7  - Pulmonology consulted  - Ruled out Covid  - O2  6LNC, normally on 3L NC O2 at home   - Wean supplemental O2 as tolerated -Down to 4 L at discharge. Feeling much better. COPD AE  -consult pulmonology   -on IV Solu-medrol, doxycycline   - Duonebs scheduled  -Discharged on p.o. prednisone and doxy.     Mild Acute on Chronic diastolic CHF  Ischemic cardiomyopathy  H/o Systolic CHF, recovered EF.   - Takes 40 mg PO Lasix daily   - Last Echo in Feb with EF 55% and grade II DD  - Strict I&O's, daily weights   - CXR with pulmonary edema but CT without significant findings, BNP elevated at 5K  - Net fluid +0.2L  - Does have crackles in lungs, Will trial IV Lasix 40 mg x 1 and monitor   -Much improved. Resume 40 mg of Lasix at discharge.     CAD  - continue aspirin, Plavix, fenofibrate, Atorvastatin, Toprol-XL  - EKG without acute concerning changes      DM type 2  Neuropathy   - monitor glucose.    - HgbA1C June 7 7.1   - Suspect hyperglycemia with steroids   - SSI coverage. Continue Gabapentin.  - Holding home oral Rx -resume home medication at discharge.     Hypertension  - BP is stable   - Continue home medications      Hyperlipidemia  - on Fenofibrate, Atorvastatin     Abnormal CT Chest   - LAD present, will need repeat scans in 3-6 months for monitoring      Morbid Obesity  - Body mass index is 36.36 kg/m². - Complicating assessment and treatment. Placing patient at risk for multiple co-morbidities as well as early death and contributing to the patient's presentation.   - Counseled on weight loss. CBC:   Recent Labs     21  0611   WBC 9.7   HGB 13.7   HCT 42.5   MCV 93.8        BMP:   Recent Labs     21  0127 21  0611 21  0800 21  0913   NA  --  140 139  --    K 5.5*  --   --  5.9*   CL  --  103 102  --    CO2  --  26 32  --    BUN  --  30* 50*  --    CREATININE  --  1.0 1.0  --      Results for Chapincito Saldañagar (MRN [id number]) as of 2021 15:45   Ref.  Range 2021 12:18 2021 06:30   Hemoglobin, Art, Extended Latest Ref Range: 13.5 - 17.5 g/dL  14.8   pH, Arterial Latest Ref Range: 7.350 - 7.450   7.274 (L)   pCO2, Arterial Latest Ref Range: 35.0 - 45.0 mmHg  65.7 (H)   pO2, Arterial Latest Ref Range: 75.0 - 108.0 mmHg  57.2 (L)   HCO3, Arterial Latest Ref Range: 21.0 - 29.0 mmol/L  29.8 (H)   TCO2 (calc), Art Latest Ref Range: Not Established mmol/L  31.8   Base Excess, Arterial Latest Ref Range: -3.0 - 3.0 mmol/L  1.1   O2 Sat, Arterial Latest Ref Range: >92 %  85.2 (L)   Methemoglobin, Arterial Latest Ref Range: <1.5 %  0.3   Carboxyhgb, Arterial Latest Ref Range: 0.0 - 1.5 %  2.1 (H)   pH, Jesus Latest Ref Range: 7.350 - 7.450  7.276 (L)    pCO2, Jesus Latest Ref Range: 40.0 - 50.0 mmHg 58.2 (H)    pO2, Jesus Latest Ref Range: 25 - 40 mmHg 26.5    HCO3, Venous Latest Ref Range: 23.0 - 29.0 mmol/L 26.5    TC02 (Calc), Jesus Latest Ref Range: Not Established mmol/L 28 Base Excess, Jesus Latest Ref Range: -3.0 - 3.0 mmol/L -1.5    O2 Content, Jesus Latest Ref Range: Not Established VOL % 9    MetHgb, Jesus Latest Ref Range: <1.5 % 0.3    O2 Sat, Jesus Latest Ref Range: Not Established % 41    Results for Tonya Chen (MRN [id number]) as of 7/14/2021 15:45   Ref. Range 7/12/2021 02:37   INFLUENZA A Latest Ref Range: NOT DETECTED  NOT DETECTED   INFLUENZA B Latest Ref Range: NOT DETECTED  NOT DETECTED   SARS-CoV-2 RNA, RT PCR Latest Ref Range: NOT DETECTED  NOT DETECTED       CT CHEST PULMONARY EMBOLISM W CONTRAST   Final Result   1. No evidence of pulmonary embolism. 2. Bilateral mid to lower lung field atelectasis most pronounced at the bases   more pronounced on the right. 3. Multiple mildly enlarged lymph nodes throughout the mediastinum and left   hilar adenopathy which may be reactive in nature though neoplastic etiology   not definitively excluded. Consider follow-up examination in 3-6 months to   reassess. 4. Mild cardiomegaly. XR CHEST PORTABLE   Final Result   CHF with mild interstitial pulmonary edema. Pertinent previous results reviewed   Echo 2/7/21  Summary   LV systolic function is normal with EF estimated at 55%.   No regional wall motion abnormalities.    MIldly asynchronous septal motion.   There is mild concentric left ventricular hypertrophy.   Grade II diastolic dysfunction with elevated LV filling pressure.   Mild mitral annular calcification.   The left atrium is mildly dilated.   Mild mitral regurgitation.   Inadequate tricuspid regurgitation jet to estimate systolic artery pressure  Discharge Medications     Medication List      START taking these medications    doxycycline hyclate 100 MG tablet  Commonly known as: VIBRA-TABS  Take 1 tablet by mouth every 12 hours for 5 days        CHANGE how you take these medications    predniSONE 10 MG tablet  Commonly known as: DELTASONE  4 tabs for 3 days 3 tabs for 3 days 2 tabs for 3 days 1 tabs for 3 days  What changed: additional instructions        CONTINUE taking these medications    * albuterol sulfate  (90 Base) MCG/ACT inhaler  INHALE 2 PUFFS INTO THE LUNGS 2 TIMES DAILY     * albuterol sulfate  (90 Base) MCG/ACT inhaler  Inhale 2 puffs into the lungs every 6 hours as needed for Wheezing or Shortness of Breath     aspirin 81 MG EC tablet     atorvastatin 40 MG tablet  Commonly known as: LIPITOR  TAKE 1 TABLET ONCE DAILY     bisoprolol 5 MG tablet  Commonly known as: ZEBETA  TAKE 1 TABLET BY MOUTH DAILY     * blood glucose monitor kit and supplies  Test 2 times a day & as needed for symptoms of irregular blood glucose. * glucose monitoring kit  1 kit by Does not apply route daily Patient wants brand name Patient tests bid  E11.9     blood glucose test strips  Test 2 times a day & as needed for symptoms of irregular blood glucose. E11.9     clopidogrel 75 MG tablet  Commonly known as: PLAVIX  Take 1 tablet by mouth daily     CoQ10 100 MG Caps     Cyanocobalamin 1000 MCG Caps     DULoxetine 20 MG extended release capsule  Commonly known as: CYMBALTA     fenofibric acid 135 MG Cpdr capsule  Commonly known as: FIBRICOR  TAKE 1 CAPSULE ONCE DAILY     Fluticasone furoate-vilanterol 200-25 MCG/INH Aepb inhaler  Commonly known as: BREO ELLIPTA  Inhale 1 puff into the lungs daily     furosemide 40 MG tablet  Commonly known as: LASIX  Take 1 tablet by mouth daily     gabapentin 800 MG tablet  Commonly known as: NEURONTIN  TAKE 1 TABLET 4 TIMES DAILY     glimepiride 2 MG tablet  Commonly known as: AMARYL  TAKE 1 TABLET EACH MORNING BEFORE BREAKFAST.  DISCONTINUE FARXIGA     HYDROcodone-acetaminophen 5-325 MG per tablet  Commonly known as: NORCO     Incruse Ellipta 62.5 MCG/INH Aepb  Generic drug: Umeclidinium Bromide  INHALE 1 PUFF DAILY     ipratropium-albuterol 0.5-2.5 (3) MG/3ML Soln nebulizer solution  Commonly known as: DUONEB  TAKE 3 MLS BY NEBULIZATION EVERY 4-6 HOURS AS NEEDED FOR

## 2021-07-15 ENCOUNTER — TELEPHONE (OUTPATIENT)
Dept: FAMILY MEDICINE CLINIC | Age: 56
End: 2021-07-15

## 2021-07-15 NOTE — TELEPHONE ENCOUNTER
[facility] Transitions Initial Follow Up Call    Outreach made within 2 business days of discharge: Yes    Patient: Tasneem Panchal Patient : 1965   MRN: <[id number]>  Reason for Admission: There are no discharge diagnoses documented for the most recent discharge. Discharge Date: 21       Spoke with: Left voicemail for patient to call office with questions, concerns and to schedule HFU. Discharge department/facility: [facility]    Scheduled appointment with PCP within 7-14 days    Follow Up  No future appointments.     Geraldine Villasenor, [address] [facility] Nusrat Banda

## 2021-08-10 NOTE — TELEPHONE ENCOUNTER
Future appt scheduled 0 appt scheduled- Return in about 6 months (around 11/24/2021),                Last appt 05/24/2021      Last Written 08/24/2020    lisinopril (PRINIVIL;ZESTRIL) 20 MG tablet  #30  5 RF       Discontinued by: MANUEL Olivier on 6/9/2021 16:25   Reason: Stop Taking at Discharge       [phone] [facility] Phone) --Called and spoke to pt who stated he is still taking this medication, and his wife is going to call back to schedule the follow up appt.

## 2021-08-11 RX ORDER — LISINOPRIL 20 MG/1
TABLET ORAL
Qty: 30 TABLET | Refills: 3 | OUTPATIENT
Start: 2021-08-11

## 2021-08-24 RX ORDER — ALBUTEROL SULFATE 90 UG/1
2 AEROSOL, METERED RESPIRATORY (INHALATION) EVERY 6 HOURS PRN
Qty: 8.5 G | Refills: 5 | Status: SHIPPED | OUTPATIENT
Start: 2021-08-24 | End: 2022-03-08

## 2021-08-24 RX ORDER — GLIMEPIRIDE 2 MG/1
TABLET ORAL
Qty: 90 TABLET | Refills: 1 | Status: ON HOLD | OUTPATIENT
Start: 2021-08-24 | End: 2021-09-30 | Stop reason: HOSPADM

## 2021-08-24 RX ORDER — LISINOPRIL 20 MG/1
TABLET ORAL
Qty: 30 TABLET | Refills: 3 | Status: ON HOLD | OUTPATIENT
Start: 2021-08-24 | End: 2021-09-30 | Stop reason: HOSPADM

## 2021-08-27 ENCOUNTER — OFFICE VISIT (OUTPATIENT)
Dept: FAMILY MEDICINE CLINIC | Age: 56
End: 2021-08-27
Payer: MEDICARE

## 2021-08-27 VITALS
BODY MASS INDEX: 38.63 KG/M2 | OXYGEN SATURATION: 87 % | WEIGHT: 191.25 LBS | SYSTOLIC BLOOD PRESSURE: 118 MMHG | HEART RATE: 67 BPM | DIASTOLIC BLOOD PRESSURE: 65 MMHG | TEMPERATURE: 98.1 F

## 2021-08-27 DIAGNOSIS — I10 ESSENTIAL HYPERTENSION: ICD-10-CM

## 2021-08-27 DIAGNOSIS — E78.49 OTHER HYPERLIPIDEMIA: ICD-10-CM

## 2021-08-27 DIAGNOSIS — Z72.0 TOBACCO USE: ICD-10-CM

## 2021-08-27 DIAGNOSIS — I50.22 CHRONIC SYSTOLIC CONGESTIVE HEART FAILURE (HCC): ICD-10-CM

## 2021-08-27 DIAGNOSIS — E11.9 TYPE 2 DIABETES MELLITUS WITHOUT COMPLICATION, WITH LONG-TERM CURRENT USE OF INSULIN (HCC): Primary | ICD-10-CM

## 2021-08-27 DIAGNOSIS — J96.21 ACUTE ON CHRONIC RESPIRATORY FAILURE WITH HYPOXIA AND HYPERCAPNIA (HCC): ICD-10-CM

## 2021-08-27 DIAGNOSIS — Z79.4 TYPE 2 DIABETES MELLITUS WITHOUT COMPLICATION, WITH LONG-TERM CURRENT USE OF INSULIN (HCC): Primary | ICD-10-CM

## 2021-08-27 DIAGNOSIS — J96.22 ACUTE ON CHRONIC RESPIRATORY FAILURE WITH HYPOXIA AND HYPERCAPNIA (HCC): ICD-10-CM

## 2021-08-27 DIAGNOSIS — Z12.11 COLON CANCER SCREENING: ICD-10-CM

## 2021-08-27 PROBLEM — J96.02 ACUTE RESPIRATORY FAILURE WITH HYPOXIA AND HYPERCAPNIA (HCC): Status: RESOLVED | Noted: 2021-06-07 | Resolved: 2021-08-27

## 2021-08-27 PROBLEM — J96.01 ACUTE RESPIRATORY FAILURE WITH HYPOXIA (HCC): Status: RESOLVED | Noted: 2021-07-11 | Resolved: 2021-08-27

## 2021-08-27 PROBLEM — J96.01 ACUTE RESPIRATORY FAILURE WITH HYPOXIA AND HYPERCAPNIA (HCC): Status: RESOLVED | Noted: 2021-06-07 | Resolved: 2021-08-27

## 2021-08-27 PROBLEM — J96.00 ACUTE RESPIRATORY FAILURE (HCC): Status: RESOLVED | Noted: 2021-07-11 | Resolved: 2021-08-27

## 2021-08-27 LAB
CREATININE URINE POCT: 100
MICROALBUMIN/CREAT 24H UR: 80 MG/G{CREAT}
MICROALBUMIN/CREAT UR-RTO: ABNORMAL

## 2021-08-27 PROCEDURE — 82044 UR ALBUMIN SEMIQUANTITATIVE: CPT | Performed by: NURSE PRACTITIONER

## 2021-08-27 PROCEDURE — 4004F PT TOBACCO SCREEN RCVD TLK: CPT | Performed by: NURSE PRACTITIONER

## 2021-08-27 PROCEDURE — G8427 DOCREV CUR MEDS BY ELIG CLIN: HCPCS | Performed by: NURSE PRACTITIONER

## 2021-08-27 PROCEDURE — 3051F HG A1C>EQUAL 7.0%<8.0%: CPT | Performed by: NURSE PRACTITIONER

## 2021-08-27 PROCEDURE — 36415 COLL VENOUS BLD VENIPUNCTURE: CPT | Performed by: NURSE PRACTITIONER

## 2021-08-27 PROCEDURE — 3017F COLORECTAL CA SCREEN DOC REV: CPT | Performed by: NURSE PRACTITIONER

## 2021-08-27 PROCEDURE — 2022F DILAT RTA XM EVC RTNOPTHY: CPT | Performed by: NURSE PRACTITIONER

## 2021-08-27 PROCEDURE — G8417 CALC BMI ABV UP PARAM F/U: HCPCS | Performed by: NURSE PRACTITIONER

## 2021-08-27 PROCEDURE — 99214 OFFICE O/P EST MOD 30 MIN: CPT | Performed by: NURSE PRACTITIONER

## 2021-08-27 RX ORDER — IPRATROPIUM BROMIDE AND ALBUTEROL SULFATE 2.5; .5 MG/3ML; MG/3ML
1 SOLUTION RESPIRATORY (INHALATION)
Qty: 360 ML | Refills: 1 | Status: SHIPPED | OUTPATIENT
Start: 2021-08-27 | End: 2022-08-02 | Stop reason: SDUPTHER

## 2021-08-27 RX ORDER — UMECLIDINIUM 62.5 UG/1
AEROSOL, POWDER ORAL
Qty: 30 EACH | Refills: 3 | Status: SHIPPED | OUTPATIENT
Start: 2021-08-27 | End: 2022-01-03

## 2021-08-27 ASSESSMENT — ENCOUNTER SYMPTOMS
ABDOMINAL PAIN: 0
COUGH: 0
VOMITING: 0
NAUSEA: 0
DIARRHEA: 0
SORE THROAT: 0
WHEEZING: 0
SHORTNESS OF BREATH: 0
RHINORRHEA: 0

## 2021-08-27 NOTE — PATIENT INSTRUCTIONS
Please read the healthy family handout that you were given and share it with your family. Please compare this printed medication list with your medications at home to be sure they are the same. If you have any medications that are different please contact us immediately at [phone]. Also review your allergies that we have listed, these may also include medications that you have not been able to tolerate, make sure everything listed is correct. If you have any allergies that are different please contact us immediately at [phone].

## 2021-08-27 NOTE — PROGRESS NOTES
CHIEF COMPLAINT  Chief Complaint   Patient presents with    Check-Up     HTN        HPI   Kallie Nieto is a 50 y.o. male who presents to the office for checkup. Patient reports doing well. No recent exacerbations of shortness of breath or chest tightness or pain. Patient reports that he does have oxygen at home that he will wear if needed. No abdominal pain or discomfort. No nausea, vomiting, diarrhea. No dark or tarry stools. Patient reports checking his blood sugar 2-3 times a day. Patient reports taking his medications as prescribed. Patient continues to smoke 1 pack/day. No other complaints, modifying factors or associated symptoms. Nursing notes reviewed.    Past Medical History:   Diagnosis Date    Acute on chronic systolic CHF [facility]congestive heart failure) ([facility])     Acute respiratory failure ([facility] 7/11/2021    Acute respiratory failure with hypoxia ([facility] 7/11/2021    Acute respiratory failure with hypoxia and hypercapnia ([facility]) 6/7/2021    CHF [facility]congestive heart failure) ([facility]     COPD (chronic obstructive pulmonary disease) ([facility])     Diabetes mellitus ([facility]     Hyperlipidemia     Hypertension      Past Surgical History:   Procedure Laterality Date    HERNIA REPAIR      TONSILLECTOMY       Family History   Problem Relation Age of Onset    Asthma Mother     Cancer Mother     Hearing Loss Mother     Vision Loss Mother     High Blood Pressure Father     High Cholesterol Father     Vision Loss Father     Asthma Sister     Diabetes Sister     Vision Loss Sister     Asthma Brother     Arthritis Brother     High Blood Pressure Brother      Social History     Socioeconomic History    Marital status:      Spouse name: Not on file    Number of children: Not on file    Years of education: Not on file    Highest education level: Not on file   Occupational History    Not on file   Tobacco Use    Smoking status: Current Every Day Smoker     Packs/day: 1.00    Smokeless tobacco: Never Used   Vaping Use    Vaping Use: Never used   Substance and Sexual Activity    Alcohol use: Yes     Comment: 6 pack per month    Drug use: Never    Sexual activity: Not on file   Other Topics Concern    Not on file   Social History Narrative    Not on file     Social Determinants of Health     Financial Resource Strain:     Difficulty of Paying Living Expenses:    Food Insecurity:     Worried About Running Out of Food in the Last Year:     [address] in the Last Year:    Transportation Needs:     Lack of Transportation (Medical):  Lack of Transportation (Non-Medical):    Physical Activity:     Days of Exercise per Week:     Minutes of Exercise per Session:    Stress:     Feeling of Stress :    Social Connections:     Frequency of Communication with Friends and Family:     Frequency of Social Gatherings with Friends and Family:     Attends Druze Services:     Active Member of Clubs or Organizations:     Attends Club or Organization Meetings:     Marital Status:    Intimate Partner Violence:     Fear of Current or Ex-Partner:     Emotionally Abused:     Physically Abused:     Sexually Abused:      Current Outpatient Medications   Medication Sig Dispense Refill    glimepiride (AMARYL) 2 MG tablet TAKE 1 TABLET EACH MORNING BEFORE BREAKFAST.  DISCONTINUE FARXIGA 90 tablet 1    lisinopril (PRINIVIL;ZESTRIL) 20 MG tablet TAKE 1 TABLET ONCE DAILY 30 tablet 3    albuterol sulfate  (90 Base) MCG/ACT inhaler INHALE 2 PUFFS INTO THE LUNGS EVERY 6 HOURS AS NEEDED FOR WHEEZING OR SHORTNESS OF BREATH 8.5 g 5    pioglitazone (ACTOS) 15 MG tablet TAKE (1) TABLET DAILY 30 tablet 5    Fluticasone furoate-vilanterol (BREO ELLIPTA) 200-25 MCG/INH AEPB inhaler Inhale 1 puff into the lungs daily 1 each 0    HYDROcodone-acetaminophen (NORCO) 5-325 MG per tablet       furosemide (LASIX) 40 MG tablet Take 1 tablet by mouth daily 30 tablet 3    metFORMIN (GLUCOPHAGE) 1000 MG tablet TAKE 1 TABLET BY MOUTH 2 TIMES DAILY (WITH MEALS) 60 tablet 5    atorvastatin (LIPITOR) 40 MG tablet TAKE 1 TABLET ONCE DAILY 90 tablet 1    Umeclidinium Bromide (INCRUSE ELLIPTA) 62.5 MCG/INH AEPB INHALE 1 PUFF DAILY 30 each 3    fenofibric acid (FIBRICOR) 135 MG CPDR capsule TAKE 1 CAPSULE ONCE DAILY 30 capsule 5    montelukast (SINGULAIR) 10 MG tablet       DULoxetine (CYMBALTA) 20 MG extended release capsule Take 20 mg by mouth 2 times daily      ipratropium-albuterol (DUONEB) 0.5-2.5 (3) MG/3ML SOLN nebulizer solution TAKE 3 MLS BY NEBULIZATION EVERY 4-6 HOURS AS NEEDED FOR SHORTNESS OF BREATH 360 mL 1    albuterol sulfate  (90 Base) MCG/ACT inhaler INHALE 2 PUFFS INTO THE LUNGS 2 TIMES DAILY 1 Inhaler 3    Cholecalciferol (VITAMIN D3) 1.25 MG (51003 UT) CAPS Take 1 capsule by mouth Twice a Week 2 capsule 1    Magnesium 400 MG CAPS Take 1 tablet by mouth daily 30 capsule 1    clopidogrel (PLAVIX) 75 MG tablet Take 1 tablet by mouth daily 30 tablet 1    blood glucose monitor strips Test 2 times a day & as needed for symptoms of irregular blood glucose. E11.9 300 strip 0    glucose monitoring kit (FREESTYLE) monitoring kit 1 kit by Does not apply route daily Patient wants brand name Patient tests bid  E11.9 1 kit 0    blood glucose monitor kit and supplies Test 2 times a day & as needed for symptoms of irregular blood glucose. 1 kit 0    aspirin 81 MG EC tablet Take 81 mg by mouth daily      Cyanocobalamin 1000 MCG CAPS Take 1 tablet by mouth daily      Omega-3 1000 MG CAPS Take 1 capsule by mouth daily      Coenzyme Q10 (COQ10) 100 MG CAPS Take 1 capsule by mouth daily      MULTIPLE VITAMINS PO Take by mouth      bisoprolol (ZEBETA) 5 MG tablet TAKE 1 TABLET BY MOUTH DAILY 30 tablet 5    gabapentin (NEURONTIN) 800 MG tablet TAKE 1 TABLET 4 TIMES DAILY 120 tablet 0     No current facility-administered medications for this visit.      No Known Allergies    REVIEW OF SYSTEMS  Review of Systems   Constitutional: Negative for activity change, appetite change, chills and fever. HENT: Negative for congestion, rhinorrhea and sore throat. Eyes: Negative for visual disturbance. Respiratory: Negative for cough, shortness of breath and wheezing. Cardiovascular: Negative for chest pain and leg swelling. Gastrointestinal: Negative for abdominal pain, diarrhea, nausea and vomiting. Genitourinary: Negative for dysuria, frequency, hematuria and urgency. Musculoskeletal: Positive for arthralgias. Negative for gait problem and myalgias. Skin: Negative for rash. Neurological: Negative for dizziness, weakness, light-headedness, numbness and headaches. Psychiatric/Behavioral: Negative for sleep disturbance. PHYSICAL EXAM  /65   Pulse 67   Temp 98.1 °F (36.7 °C) (Oral)   Wt 191 lb 4 oz (86.8 kg)   SpO2 (!) 87%   BMI 38.63 kg/m²   Physical Exam  Vitals reviewed. Constitutional:       General: He is not in acute distress. Appearance: Normal appearance. He is well-developed. He is not diaphoretic. HENT:      Head: Normocephalic and atraumatic. Right Ear: Tympanic membrane normal.      Left Ear: Tympanic membrane normal.      Nose: Nose normal.      Mouth/Throat:      Mouth: Mucous membranes are moist.      Pharynx: Oropharynx is clear. No oropharyngeal exudate or posterior oropharyngeal erythema. Eyes:      General:         Right eye: No discharge. Left eye: No discharge. Pupils: Pupils are equal, round, and reactive to light. Neck:      Vascular: No JVD. Cardiovascular:      Rate and Rhythm: Normal rate and regular rhythm. Pulses: Normal pulses. Pulmonary:      Effort: Pulmonary effort is normal. No respiratory distress. Breath sounds: No stridor. Wheezing present. No rhonchi or rales. Chest:      Chest wall: No tenderness. Abdominal:      General: Bowel sounds are normal. There is no distension. Palpations: Abdomen is soft. Tenderness: There is no abdominal tenderness. There is no guarding. Musculoskeletal:         General: No swelling or deformity. Normal range of motion. Cervical back: Normal range of motion and neck supple. Feet:      Comments: Monofilament test negative. Skin:     General: Skin is warm and dry. Capillary Refill: Capillary refill takes less than 2 seconds. Coloration: Skin is not pale. Findings: No bruising, lesion or rash. Neurological:      General: No focal deficit present. Mental Status: He is alert and oriented to person, place, and time. Psychiatric:         Mood and Affect: Mood normal.         Behavior: Behavior normal.        ASSESSMENT/PLAN:   1. Type 2 diabetes mellitus without complication, with long-term current use of insulin (HCC)  Stable. Previous A1c 7.1. Patient reports that he does check his blood sugar 2-3 times a day but left the readings at home. Patient denies any episodes of hyperglycemia or hypoglycemia. Patient currently on Metformin 1000 mg twice daily, glimepiride 2 mg daily, and Actos 15 mg daily. Encouraged to monitor dietary intake, check blood sugars on a daily basis. Patient reports having eye exam performed at [facility]. Will attempt to obtain records. Patient verbalized and acknowledges follow-up in 4 months, sooner for new or worsening symptoms.  - HM DIABETES FOOT EXAM  - POCT microalbumin  - COMPREHENSIVE METABOLIC PANEL    2. Chronic systolic congestive heart failure (HCC)  Stable. Managed by cardiology. Compliant with Lasix. Patient denies any swelling/edema or worsening shortness of breath. Continue with current treatment management follow-up in 6 months, sooner for new or worsening symptoms. 3. Other hyperlipidemia  Stable. Patient compliant with Lipitor 40 mg and tricor 135mg daily. Continue with current treatment management follow-up in 6 months, sooner for new or worsening symptoms.     4. Essential hypertension  Stable. Patient compliant with lisinopril 20 mg daily and bisoprolol 5 mg daily. No episodes of dizziness, lightheadedness, chest pain or shortness of breath. With current treatment management follow-up in 6 months, sooner for new or worsening symptoms. 5. Tobacco use  Smoking cessation discussed in detail with patient. Patient declines. Patient aware of risks associated with tobacco use. 6. Acute on chronic respiratory failure with hypoxia and hypercapnia (HCC)  Stable. No new or worsening shortness of breath upon exertion. Patient continues smoke daily. Refill on Incruse Ellipta and DuoNeb's sent to patient's pharmacy. Patient does have home oxygen to use as needed. Patient is not wearing it at today's visit. No acute distress noted. Continue with current treatment management following up with pulmonology as recommended. 7. Colon cancer screening  Preventative screening discussed in detail with patient. Patient declines colonoscopy therefore fit test orders were placed and patient given kit. Patient given instructions on returning testing kit for evaluation.  - POCT Fecal Immunochemical Test (FIT); Future         The note was completed using Dragon voice recognition transcription. Every effort was made to ensure accuracy; however, inadvertent  transcription errors may be present despite my best efforts to edit errors.     Maci Doss, LINK - CNP

## 2021-08-28 LAB
A/G RATIO: 1.6 (ref 1.1–2.2)
ALBUMIN SERPL-MCNC: 4.2 G/DL (ref 3.4–5)
ALP BLD-CCNC: 45 U/L (ref 40–129)
ALT SERPL-CCNC: 22 U/L (ref 10–40)
ANION GAP SERPL CALCULATED.3IONS-SCNC: 14 MMOL/L (ref 3–16)
AST SERPL-CCNC: 19 U/L (ref 15–37)
BILIRUB SERPL-MCNC: <0.2 MG/DL (ref 0–1)
BUN BLDV-MCNC: 34 MG/DL (ref 7–20)
CALCIUM SERPL-MCNC: 10.3 MG/DL (ref 8.3–10.6)
CHLORIDE BLD-SCNC: 103 MMOL/L (ref 99–110)
CO2: 24 MMOL/L (ref 21–32)
CREAT SERPL-MCNC: 1.2 MG/DL (ref 0.9–1.3)
GFR AFRICAN AMERICAN: >60
GFR NON-AFRICAN AMERICAN: >60
GLOBULIN: 2.7 G/DL
GLUCOSE BLD-MCNC: 119 MG/DL (ref 70–99)
POTASSIUM SERPL-SCNC: 5.9 MMOL/L (ref 3.5–5.1)
SODIUM BLD-SCNC: 141 MMOL/L (ref 136–145)
TOTAL PROTEIN: 6.9 G/DL (ref 6.4–8.2)

## 2021-09-16 ENCOUNTER — APPOINTMENT (OUTPATIENT)
Dept: GENERAL RADIOLOGY | Age: 56
End: 2021-09-16
Payer: MEDICARE

## 2021-09-16 ENCOUNTER — HOSPITAL ENCOUNTER (EMERGENCY)
Age: 56
Discharge: ANOTHER HOSPITAL | End: 2021-09-16
Attending: STUDENT IN AN ORGANIZED HEALTH CARE EDUCATION/TRAINING PROGRAM
Payer: MEDICARE

## 2021-09-16 ENCOUNTER — APPOINTMENT (OUTPATIENT)
Dept: CT IMAGING | Age: 56
End: 2021-09-16
Payer: MEDICARE

## 2021-09-16 ENCOUNTER — HOSPITAL ENCOUNTER (INPATIENT)
Age: 56
LOS: 15 days | Discharge: HOME | DRG: 189 | End: 2021-10-01
Attending: INTERNAL MEDICINE | Admitting: INTERNAL MEDICINE
Payer: MEDICARE

## 2021-09-16 VITALS
HEART RATE: 83 BPM | HEIGHT: 60 IN | SYSTOLIC BLOOD PRESSURE: 108 MMHG | WEIGHT: 190 LBS | RESPIRATION RATE: 16 BRPM | DIASTOLIC BLOOD PRESSURE: 56 MMHG | OXYGEN SATURATION: 93 % | BODY MASS INDEX: 37.3 KG/M2 | TEMPERATURE: 98.1 F

## 2021-09-16 DIAGNOSIS — R06.89 DYSPNEA AND RESPIRATORY ABNORMALITIES: Primary | ICD-10-CM

## 2021-09-16 DIAGNOSIS — R09.02 HYPOXIA: ICD-10-CM

## 2021-09-16 DIAGNOSIS — R06.00 DYSPNEA AND RESPIRATORY ABNORMALITIES: Primary | ICD-10-CM

## 2021-09-16 LAB
A/G RATIO: 1.2 (ref 1.1–2.2)
ALBUMIN SERPL-MCNC: 4 G/DL (ref 3.4–5)
ALP BLD-CCNC: 58 U/L (ref 40–129)
ALT SERPL-CCNC: 19 U/L (ref 10–40)
ANION GAP SERPL CALCULATED.3IONS-SCNC: 12 MMOL/L (ref 3–16)
AST SERPL-CCNC: 20 U/L (ref 15–37)
BASOPHILS ABSOLUTE: 0.1 K/UL (ref 0–0.2)
BASOPHILS RELATIVE PERCENT: 1.1 %
BILIRUB SERPL-MCNC: 0.3 MG/DL (ref 0–1)
BUN BLDV-MCNC: 23 MG/DL (ref 7–20)
CALCIUM SERPL-MCNC: 9.9 MG/DL (ref 8.3–10.6)
CHLORIDE BLD-SCNC: 101 MMOL/L (ref 99–110)
CO2: 33 MMOL/L (ref 21–32)
CREAT SERPL-MCNC: 0.7 MG/DL (ref 0.9–1.3)
D DIMER: 292 NG/ML DDU (ref 0–229)
EOSINOPHILS ABSOLUTE: 0.1 K/UL (ref 0–0.6)
EOSINOPHILS RELATIVE PERCENT: 1.1 %
GFR AFRICAN AMERICAN: >60
GFR NON-AFRICAN AMERICAN: >60
GLOBULIN: 3.4 G/DL
GLUCOSE BLD-MCNC: 155 MG/DL (ref 70–99)
HCT VFR BLD CALC: 48.5 % (ref 40.5–52.5)
HEMOGLOBIN: 15.2 G/DL (ref 13.5–17.5)
INR BLD: 1.13 (ref 0.88–1.12)
LACTIC ACID: 0.7 MMOL/L (ref 0.4–2)
LYMPHOCYTES ABSOLUTE: 1.5 K/UL (ref 1–5.1)
LYMPHOCYTES RELATIVE PERCENT: 16.9 %
MCH RBC QN AUTO: 29.2 PG (ref 26–34)
MCHC RBC AUTO-ENTMCNC: 31.4 G/DL (ref 31–36)
MCV RBC AUTO: 93 FL (ref 80–100)
MONOCYTES ABSOLUTE: 0.5 K/UL (ref 0–1.3)
MONOCYTES RELATIVE PERCENT: 5.4 %
NEUTROPHILS ABSOLUTE: 6.8 K/UL (ref 1.7–7.7)
NEUTROPHILS RELATIVE PERCENT: 75.5 %
PDW BLD-RTO: 17.7 % (ref 12.4–15.4)
PLATELET # BLD: 288 K/UL (ref 135–450)
PMV BLD AUTO: 8.1 FL (ref 5–10.5)
POTASSIUM SERPL-SCNC: 4.5 MMOL/L (ref 3.5–5.1)
PRO-BNP: 3129 PG/ML (ref 0–124)
PROTHROMBIN TIME: 12.9 SEC (ref 9.9–12.7)
RBC # BLD: 5.22 M/UL (ref 4.2–5.9)
SODIUM BLD-SCNC: 146 MMOL/L (ref 136–145)
TOTAL PROTEIN: 7.4 G/DL (ref 6.4–8.2)
TROPONIN: <0.01 NG/ML
WBC # BLD: 9.1 K/UL (ref 4–11)

## 2021-09-16 PROCEDURE — 96374 THER/PROPH/DIAG INJ IV PUSH: CPT

## 2021-09-16 PROCEDURE — 96375 TX/PRO/DX INJ NEW DRUG ADDON: CPT

## 2021-09-16 PROCEDURE — 85379 FIBRIN DEGRADATION QUANT: CPT

## 2021-09-16 PROCEDURE — 93005 ELECTROCARDIOGRAM TRACING: CPT | Performed by: EMERGENCY MEDICINE

## 2021-09-16 PROCEDURE — 84484 ASSAY OF TROPONIN QUANT: CPT

## 2021-09-16 PROCEDURE — 87040 BLOOD CULTURE FOR BACTERIA: CPT

## 2021-09-16 PROCEDURE — 36415 COLL VENOUS BLD VENIPUNCTURE: CPT

## 2021-09-16 PROCEDURE — 80053 COMPREHEN METABOLIC PANEL: CPT

## 2021-09-16 PROCEDURE — 2060000000 HC ICU INTERMEDIATE R&B

## 2021-09-16 PROCEDURE — 85025 COMPLETE CBC W/AUTO DIFF WBC: CPT

## 2021-09-16 PROCEDURE — 83880 ASSAY OF NATRIURETIC PEPTIDE: CPT

## 2021-09-16 PROCEDURE — 83605 ASSAY OF LACTIC ACID: CPT

## 2021-09-16 PROCEDURE — U0003 INFECTIOUS AGENT DETECTION BY NUCLEIC ACID (DNA OR RNA); SEVERE ACUTE RESPIRATORY SYNDROME CORONAVIRUS 2 (SARS-COV-2) (CORONAVIRUS DISEASE [COVID-19]), AMPLIFIED PROBE TECHNIQUE, MAKING USE OF HIGH THROUGHPUT TECHNOLOGIES AS DESCRIBED BY CMS-2020-01-R: HCPCS

## 2021-09-16 PROCEDURE — 6370000000 HC RX 637 (ALT 250 FOR IP): Performed by: STUDENT IN AN ORGANIZED HEALTH CARE EDUCATION/TRAINING PROGRAM

## 2021-09-16 PROCEDURE — 71045 X-RAY EXAM CHEST 1 VIEW: CPT

## 2021-09-16 PROCEDURE — U0005 INFEC AGEN DETEC AMPLI PROBE: HCPCS

## 2021-09-16 PROCEDURE — 6360000002 HC RX W HCPCS: Performed by: STUDENT IN AN ORGANIZED HEALTH CARE EDUCATION/TRAINING PROGRAM

## 2021-09-16 PROCEDURE — 85610 PROTHROMBIN TIME: CPT

## 2021-09-16 PROCEDURE — 99285 EMERGENCY DEPT VISIT HI MDM: CPT

## 2021-09-16 RX ORDER — MONTELUKAST SODIUM 10 MG/1
10 TABLET ORAL NIGHTLY
Status: DISCONTINUED | OUTPATIENT
Start: 2021-09-17 | End: 2021-10-01 | Stop reason: HOSPADM

## 2021-09-16 RX ORDER — BUDESONIDE AND FORMOTEROL FUMARATE DIHYDRATE 80; 4.5 UG/1; UG/1
2 AEROSOL RESPIRATORY (INHALATION) 2 TIMES DAILY
Status: DISCONTINUED | OUTPATIENT
Start: 2021-09-17 | End: 2021-10-01 | Stop reason: HOSPADM

## 2021-09-16 RX ORDER — ATORVASTATIN CALCIUM 40 MG/1
40 TABLET, FILM COATED ORAL DAILY
Status: DISCONTINUED | OUTPATIENT
Start: 2021-09-17 | End: 2021-09-17

## 2021-09-16 RX ORDER — ACETAMINOPHEN 325 MG/1
650 TABLET ORAL EVERY 6 HOURS PRN
Status: DISCONTINUED | OUTPATIENT
Start: 2021-09-16 | End: 2021-10-01 | Stop reason: HOSPADM

## 2021-09-16 RX ORDER — DEXAMETHASONE SODIUM PHOSPHATE 4 MG/ML
10 INJECTION, SOLUTION INTRA-ARTICULAR; INTRALESIONAL; INTRAMUSCULAR; INTRAVENOUS; SOFT TISSUE ONCE
Status: COMPLETED | OUTPATIENT
Start: 2021-09-16 | End: 2021-09-17

## 2021-09-16 RX ORDER — SODIUM CHLORIDE 9 MG/ML
25 INJECTION, SOLUTION INTRAVENOUS PRN
Status: DISCONTINUED | OUTPATIENT
Start: 2021-09-16 | End: 2021-10-01 | Stop reason: HOSPADM

## 2021-09-16 RX ORDER — ONDANSETRON 2 MG/ML
4 INJECTION INTRAMUSCULAR; INTRAVENOUS EVERY 6 HOURS PRN
Status: DISCONTINUED | OUTPATIENT
Start: 2021-09-16 | End: 2021-10-01 | Stop reason: HOSPADM

## 2021-09-16 RX ORDER — DEXTROSE MONOHYDRATE 50 MG/ML
100 INJECTION, SOLUTION INTRAVENOUS PRN
Status: DISCONTINUED | OUTPATIENT
Start: 2021-09-16 | End: 2021-10-01 | Stop reason: HOSPADM

## 2021-09-16 RX ORDER — INSULIN LISPRO 100 [IU]/ML
0-3 INJECTION, SOLUTION INTRAVENOUS; SUBCUTANEOUS NIGHTLY
Status: DISCONTINUED | OUTPATIENT
Start: 2021-09-17 | End: 2021-09-17

## 2021-09-16 RX ORDER — FUROSEMIDE 10 MG/ML
40 INJECTION INTRAMUSCULAR; INTRAVENOUS ONCE
Status: COMPLETED | OUTPATIENT
Start: 2021-09-16 | End: 2021-09-16

## 2021-09-16 RX ORDER — POLYETHYLENE GLYCOL 3350 17 G/17G
17 POWDER, FOR SOLUTION ORAL DAILY PRN
Status: DISCONTINUED | OUTPATIENT
Start: 2021-09-16 | End: 2021-10-01 | Stop reason: HOSPADM

## 2021-09-16 RX ORDER — DULOXETIN HYDROCHLORIDE 20 MG/1
20 CAPSULE, DELAYED RELEASE ORAL 2 TIMES DAILY
Status: DISCONTINUED | OUTPATIENT
Start: 2021-09-17 | End: 2021-10-01 | Stop reason: HOSPADM

## 2021-09-16 RX ORDER — INSULIN LISPRO 100 [IU]/ML
0-6 INJECTION, SOLUTION INTRAVENOUS; SUBCUTANEOUS
Status: DISCONTINUED | OUTPATIENT
Start: 2021-09-17 | End: 2021-09-17

## 2021-09-16 RX ORDER — DEXTROSE MONOHYDRATE 25 G/50ML
12.5 INJECTION, SOLUTION INTRAVENOUS PRN
Status: DISCONTINUED | OUTPATIENT
Start: 2021-09-16 | End: 2021-10-01 | Stop reason: HOSPADM

## 2021-09-16 RX ORDER — SODIUM CHLORIDE 0.9 % (FLUSH) 0.9 %
5-40 SYRINGE (ML) INJECTION PRN
Status: DISCONTINUED | OUTPATIENT
Start: 2021-09-16 | End: 2021-10-01 | Stop reason: HOSPADM

## 2021-09-16 RX ORDER — ASPIRIN 81 MG/1
81 TABLET ORAL DAILY
Status: DISCONTINUED | OUTPATIENT
Start: 2021-09-17 | End: 2021-10-01 | Stop reason: HOSPADM

## 2021-09-16 RX ORDER — DEXAMETHASONE SODIUM PHOSPHATE 10 MG/ML
10 INJECTION, SOLUTION INTRAMUSCULAR; INTRAVENOUS ONCE
Status: COMPLETED | OUTPATIENT
Start: 2021-09-16 | End: 2021-09-16

## 2021-09-16 RX ORDER — ACETAMINOPHEN 650 MG/1
650 SUPPOSITORY RECTAL EVERY 6 HOURS PRN
Status: DISCONTINUED | OUTPATIENT
Start: 2021-09-16 | End: 2021-10-01 | Stop reason: HOSPADM

## 2021-09-16 RX ORDER — SODIUM CHLORIDE 0.9 % (FLUSH) 0.9 %
5-40 SYRINGE (ML) INJECTION EVERY 12 HOURS SCHEDULED
Status: DISCONTINUED | OUTPATIENT
Start: 2021-09-17 | End: 2021-10-01 | Stop reason: HOSPADM

## 2021-09-16 RX ORDER — IPRATROPIUM BROMIDE AND ALBUTEROL SULFATE 2.5; .5 MG/3ML; MG/3ML
3 SOLUTION RESPIRATORY (INHALATION) ONCE
Status: COMPLETED | OUTPATIENT
Start: 2021-09-16 | End: 2021-09-16

## 2021-09-16 RX ORDER — IPRATROPIUM BROMIDE AND ALBUTEROL SULFATE 2.5; .5 MG/3ML; MG/3ML
1 SOLUTION RESPIRATORY (INHALATION)
Status: DISCONTINUED | OUTPATIENT
Start: 2021-09-17 | End: 2021-09-17

## 2021-09-16 RX ORDER — ONDANSETRON 4 MG/1
4 TABLET, ORALLY DISINTEGRATING ORAL EVERY 8 HOURS PRN
Status: DISCONTINUED | OUTPATIENT
Start: 2021-09-16 | End: 2021-10-01 | Stop reason: HOSPADM

## 2021-09-16 RX ORDER — NICOTINE POLACRILEX 4 MG
15 LOZENGE BUCCAL PRN
Status: DISCONTINUED | OUTPATIENT
Start: 2021-09-16 | End: 2021-10-01 | Stop reason: HOSPADM

## 2021-09-16 RX ORDER — CLOPIDOGREL BISULFATE 75 MG/1
75 TABLET ORAL DAILY
Status: DISCONTINUED | OUTPATIENT
Start: 2021-09-17 | End: 2021-09-17

## 2021-09-16 RX ADMIN — FUROSEMIDE 40 MG: 10 INJECTION, SOLUTION INTRAMUSCULAR; INTRAVENOUS at 19:30

## 2021-09-16 RX ADMIN — DEXAMETHASONE SODIUM PHOSPHATE 10 MG: 10 INJECTION, SOLUTION INTRAMUSCULAR; INTRAVENOUS at 19:30

## 2021-09-16 RX ADMIN — IPRATROPIUM BROMIDE AND ALBUTEROL SULFATE 3 AMPULE: .5; 3 SOLUTION RESPIRATORY (INHALATION) at 19:30

## 2021-09-16 ASSESSMENT — PAIN SCALES - GENERAL: PAINLEVEL_OUTOF10: 0

## 2021-09-16 NOTE — ED PROVIDER NOTES
Primary Care Physician: LINK Bowens CNP   Attending Physician: No att. providers found     History   Chief Complaint   Patient presents with    Shortness of Breath     C/o's worsening SOB x approx 2 wks        HPI   Norris Carver is a 50 y.o. male with history of heart failure, COPD on 2 L of oxygen at home, diabetes, hypertension, hyperlipidemia who presents this evening complaining of shortness of breath that started 2 weeks ago and has gotten worse forcing him to seek care this evening. Stated that he was hoping that his symptoms were improved and he got worse. Per reports his oxygen sats were in the 76s. Patient was placed on a nonrebreather and is now improved. Denies any headaches visual change change in the color of his stool. No dysuria or abdominal pain.     Past Medical History:   Diagnosis Date    Acute respiratory failure ([facility] 07/11/2021    [facility]est[facility]     combined    COPD (chronic obstructive pulmonary disease) ([facility])     Diabetes mellitus ([facility]     Hyperlipidemia     Hypertension     Oxygen dependent         Past Surgical History:   Procedure Laterality Date    HERNIA REPAIR      TONSILLECTOMY          Family History   Problem Relation Age of Onset    Asthma Mother     Cancer Mother     Hearing Loss Mother     Vision Loss Mother     High Blood Pressure Father     High Cholesterol Father     Vision Loss Father     Asthma Sister     Diabetes Sister     Vision Loss Sister     Asthma Brother     Arthritis Brother     High Blood Pressure Brother         Social History     Socioeconomic History    Marital status:      Spouse name: None    Number of children: None    Years of education: None    Highest education level: None   Occupational History    None   Tobacco Use    Smoking status: Current Every Day Smoker     Packs/day: 1.00    Smokeless tobacco: Never Used   Vaping Use    Vaping Use: Never used   Substance and Sexual Activity  Alcohol use: Yes     Comment: 6 pack per month    Drug use: Never    Sexual activity: None   Other Topics Concern    None   Social History Narrative    None     Social Determinants of Health     Financial Resource Strain:     Difficulty of Paying Living Expenses:    Food Insecurity:     Worried About Running Out of Food in the Last Year:     [address] in the Last Year:    Transportation Needs:     Lack of Transportation (Medical):  Lack of Transportation (Non-Medical):    Physical Activity:     Days of Exercise per Week:     Minutes of Exercise per Session:    Stress:     Feeling of Stress :    Social Connections:     Frequency of Communication with Friends and Family:     Frequency of Social Gatherings with Friends and Family:     Attends Mandaen Services:     Active Member of Clubs or Organizations:     Attends Club or Organization Meetings:     Marital Status:    Intimate Partner Violence:     Fear of Current or Ex-Partner:     Emotionally Abused:     Physically Abused:     Sexually Abused:         Review of Systems   10 total systems reviewed and found to be negative unless otherwise noted in HPI     Physical Exam   BP (!) 108/56   Pulse 83   Temp 98.1 °F (36.7 °C) (Oral)   Resp 16   Ht 4' 11.5\" (1.511 m)   Wt 190 lb (86.2 kg)   SpO2 93%   BMI 37.73 kg/m²      CONSTITUTIONAL: ill appearing, in no acute distress   HEAD: atraumatic, normocephalic   EYES: PERRL, No injection, discharge or scleral icterus. ENT: Moist mucous membranes. NECK: Normal ROM, NO LAD   CARDIOVASCULAR: Regular rate and rhythm. No murmurs or gallop. PULMONARY/CHEST: In respiratory distress requiring oxygen. ABDOMEN: Soft, Non-distended and non-tender, without guarding or rebound. SKIN: Acyanotic, warm, dry   MUSCULOSKELETAL: No swelling, tenderness or deformity   NEUROLOGICAL: Awake and oriented x 3. Pulses intact. Grossly nonfocal   Nursing note and vitals reviewed.      ED Course & Medical Decision Making   Medications   furosemide (LASIX) injection 40 mg (40 mg IntraVENous Given 9/16/21 1930)   ipratropium-albuterol (DUONEB) nebulizer solution 3 ampule (3 ampules Inhalation Given 9/16/21 1930)   dexamethasone (PF) (DECADRON) injection 10 mg (10 mg IntraVENous Given 9/16/21 1930)      Labs Reviewed   CBC WITH AUTO DIFFERENTIAL - Abnormal; Notable for the following components:       Result Value    RDW 17.7 (*)     All other components within normal limits    Narrative:     Performed at:  [facility]. [facility]  [address] [place] [address]. [address]   Phone [phone]   COMPREHENSIVE METABOLIC PANEL - Abnormal; Notable for the following components:    Sodium 146 (*)     CO2 33 (*)     Glucose 155 (*)     BUN 23 (*)     CREATININE 0.7 (*)     All other components within normal limits    Narrative:     Performed at:  [facility]. [facility]  [address] [place] [address]. [address]   Phone ( - Abnormal; Notable for the following components:    Protime 12.9 (*)     INR 1.13 (*)     All other components within normal limits    Narrative:     Performed at:  [facility]. [facility]  [address] [place] [address]. [place] [address] [address]    Phone [phone] - Abnormal; Notable for the following components:    Pro-BNP 3,129 (*)     All other components within normal limits    Narrative:     Performed at:  [facility]. [facility]  [address] [place] [address]. [facility] [employer]    Phone [phone]   D-DIMER, QUANTITATIVE - Abnormal; Notable for the following components:    D-Dimer, Quant 292 (*)     All other components within normal limits    Narrative:     Performed at:  [facility]. [facility]  [address] [place] [address].  [facility] [place]   Phone [phone]   CULTURE, BLOOD 1   CULTURE, BLOOD 2   TROPONIN    Narrative:     Performed at:  [facility]. [facility]  [address] [facility],  [facility] [address]. [facility], [address]   Phone [phone]   LACTIC ACID, PLASMA    Narrative:     Performed at:  [facility]. [facility]  [address] [facility],  [facility] [address]. [facility], [address]   Phone [phone]    Narrative:     Performed at:  [address] [place] [address] Laboratory  [address] [place], [place] [address]   Phone [phone]   COVID-19      XR CHEST PORTABLE   Final Result   Stable cardiomegaly and mild central pulmonary congestion which is more   prominent. Hazy bibasilar atelectasis or small infiltrates and associated small right   pleural effusion which is more prominent. XR CHEST PORTABLE    Result Date: 9/16/2021  EXAMINATION: ONE XRAY VIEW OF THE CHEST 9/16/2021 6:01 pm COMPARISON: 07/11/2021 HISTORY: ORDERING SYSTEM PROVIDED HISTORY: PAIN TECHNOLOGIST PROVIDED HISTORY: Reason for exam:->PAIN Reason for Exam: sob x 2 weeks Acuity: Acute Type of Exam: Initial FINDINGS: The heart is mildly enlarged and unchanged. The pulmonary vessels are engorged centrally and more prominent. There are some hazy bibasilar opacities with a small right pleural effusion which is more prominent. Stable cardiomegaly and mild central pulmonary congestion which is more prominent. Hazy bibasilar atelectasis or small infiltrates and associated small right pleural effusion which is more prominent. EKG INTERPRETATION:  EKG by my preliminary interpretation shows sinus rhythm with rate of 74, normal axis, normal intervals, with no ST changes indicative of ischemia at this time.     PROCEDURES:   Procedures    ASSESSMENT AND PLAN:  Ramy Rosen is a 50 y.o. male with history of heart failure, COPD on 2 L of oxygen at home, diabetes, hypertension, hyperlipidemia who presents this evening complaining of shortness of breath that started 2 weeks ago and has gotten worse forcing him to seek care this evening. Stated that he was hoping that his symptoms were improved and he got worse. Per reports his oxygen sats were in the 76s. Patient was placed on a nonrebreather and is now improved. On exam patient was in respiratory distress requiring oxygen. However he was nontoxic. I did obtain labs significant for elevated proBNP but the rest of the exam labs unremarkable. His D-dimer was mildly elevated if age-adjusted was normal however the hospital recommended a CT PE which was obtained and showed no PE. Nonetheless because patient was hypoxic he was given breathing treatments steroids and Lasix 40 findings/A. fib congestive heart failure. He was transferred to [facility] for evaluation and treatment under the medicine service. ClINICAL IMPRESSION:  1. Dyspnea and respiratory abnormalities    2. Hypoxia        DISPOSITION    Admit to the hospitalist at [facility]  . -Findings and recommendations explained to patient. He expressed understanding and agreed with the plan.   Ada Conti MD (electronically signed)  9/17/2021  _________________________________________________________________________________________  Amount and/or Complexity of Data Reviewed:  Clinical lab tests: ordered and reviewed   Tests in the radiology section of CPT®: ordered and reviewed   Tests in the medicine section of CPT®: ordered and reviewed   Decide to obtain previous medical records or to obtain history from someone other than the patient  Obtain history from someone other than the patient no  Review and summarize past medical records:yes  I looked up the patient in our electronic medical record:yes  Discuss the patient with other providers:yes  Independent visualization of images, tracings, or specimens:yes  Risk of Complications, Morbidity, and/or Mortality:Moderate  Presenting problems: moderate Diagnostic procedures: moderate yes Management options: moderate     Critical Care Attestation   Total critical care time: 35 minutes minimum   Critical care time does not include separately billable procedures and treating other patients. Critical care was necessary to treat or prevent imminent or life-threatening deterioration of the following conditions: Heart failure and COPD exacerbation with with hypoxemia. Patient provided with supplemental oxygen and treated urgently in the ED with steroids and breathing treatments. Case discussed with consultants.       _________________________________________________________________________________________  This record is transcribed utilizing voice recognition technology. There are inherent limitations in this technology. In addition, there may be limitations in editing of this report. If there are any discrepancies, please contact me directly.         Teresa Paz MD  09/17/21 2153       Luis Goyal MD  09/17/21 2158

## 2021-09-16 NOTE — ED NOTES
Ayesha Francisco from the transfer center is checking the availability of a PCU bed at [facility] and will be paging the hospitalist.      Sunni Listen  09/16/21 1951

## 2021-09-16 NOTE — ED NOTES
Patient came in by squad on oxygen via non re breather mask at 15 lpm. RN attempted nasal cannula at 6 lpm and the patient SpO2 dropped to 77%. Patent placed on venturi mask at 50% oxygen saturation increased to 84%. Patient placed on simple mask at 10 and was able to maintain 92-94% on 2 lpm.  Patient dropped during portable chest x ray to 84%. Patient required the oxygen to be increased to 15 lpm.  Once SpO2 was back up to 97% I was able to reduce the oxygen to 5 lpm, where patient is maintaining 92%.       Royce Mittal  09/16/21 Amber Mittal  09/16/21 0027

## 2021-09-17 ENCOUNTER — APPOINTMENT (OUTPATIENT)
Dept: CT IMAGING | Age: 56
DRG: 189 | End: 2021-09-17
Attending: INTERNAL MEDICINE
Payer: MEDICARE

## 2021-09-17 ENCOUNTER — TELEPHONE (OUTPATIENT)
Dept: FAMILY MEDICINE CLINIC | Age: 56
End: 2021-09-17

## 2021-09-17 LAB
ALBUMIN SERPL-MCNC: 4.1 G/DL (ref 3.4–5)
ALP BLD-CCNC: 59 U/L (ref 40–129)
ALT SERPL-CCNC: 17 U/L (ref 10–40)
AMMONIA: 45 UMOL/L (ref 16–60)
ANION GAP SERPL CALCULATED.3IONS-SCNC: 12 MMOL/L (ref 3–16)
AST SERPL-CCNC: 17 U/L (ref 15–37)
BASE EXCESS ARTERIAL: 8.6 MMOL/L (ref -3–3)
BASE EXCESS ARTERIAL: 9 (ref -3–3)
BASOPHILS ABSOLUTE: 0 K/UL (ref 0–0.2)
BASOPHILS RELATIVE PERCENT: 0.2 %
BILIRUB SERPL-MCNC: 0.3 MG/DL (ref 0–1)
BILIRUBIN DIRECT: <0.2 MG/DL (ref 0–0.3)
BILIRUBIN URINE: NEGATIVE
BILIRUBIN, INDIRECT: NORMAL MG/DL (ref 0–1)
BLOOD, URINE: NEGATIVE
BUN BLDV-MCNC: 22 MG/DL (ref 7–20)
C-REACTIVE PROTEIN: 16.2 MG/L (ref 0–5.1)
CALCIUM SERPL-MCNC: 10 MG/DL (ref 8.3–10.6)
CARBOXYHEMOGLOBIN ARTERIAL: 2.4 % (ref 0–1.5)
CHLORIDE BLD-SCNC: 101 MMOL/L (ref 99–110)
CHOLESTEROL, TOTAL: 163 MG/DL (ref 0–199)
CLARITY: CLEAR
CO2: 32 MMOL/L (ref 21–32)
COLOR: YELLOW
CREAT SERPL-MCNC: 0.8 MG/DL (ref 0.9–1.3)
EKG ATRIAL RATE: 74 BPM
EKG DIAGNOSIS: NORMAL
EKG P AXIS: 31 DEGREES
EKG P-R INTERVAL: 156 MS
EKG Q-T INTERVAL: 408 MS
EKG QRS DURATION: 88 MS
EKG QTC CALCULATION (BAZETT): 452 MS
EKG R AXIS: 176 DEGREES
EKG T AXIS: 53 DEGREES
EKG VENTRICULAR RATE: 74 BPM
EOSINOPHILS ABSOLUTE: 0 K/UL (ref 0–0.6)
EOSINOPHILS RELATIVE PERCENT: 0.1 %
EPITHELIAL CELLS, UA: NORMAL /HPF (ref 0–5)
ETHANOL: NORMAL MG/DL (ref 0–0.08)
FERRITIN: 40.3 NG/ML (ref 30–400)
GFR AFRICAN AMERICAN: >60
GFR NON-AFRICAN AMERICAN: >60
GLUCOSE BLD-MCNC: 168 MG/DL (ref 70–99)
GLUCOSE BLD-MCNC: 169 MG/DL (ref 70–99)
GLUCOSE BLD-MCNC: 177 MG/DL (ref 70–99)
GLUCOSE BLD-MCNC: 184 MG/DL (ref 70–99)
GLUCOSE BLD-MCNC: 193 MG/DL (ref 70–99)
GLUCOSE BLD-MCNC: 194 MG/DL (ref 70–99)
GLUCOSE BLD-MCNC: 220 MG/DL (ref 70–99)
GLUCOSE BLD-MCNC: 222 MG/DL (ref 70–99)
GLUCOSE URINE: NEGATIVE MG/DL
HCO3 ARTERIAL: 33.1 MMOL/L (ref 21–29)
HCO3 ARTERIAL: 37 MMOL/L (ref 21–29)
HCT VFR BLD CALC: 46.6 % (ref 40.5–52.5)
HDLC SERPL-MCNC: 31 MG/DL (ref 40–60)
HEMOGLOBIN, ART, EXTENDED: 15.1 G/DL
HEMOGLOBIN: 15.4 G/DL (ref 13.5–17.5)
KETONES, URINE: ABNORMAL MG/DL
LACTATE: 0.75 MMOL/L (ref 0.4–2)
LDL CHOLESTEROL CALCULATED: 104 MG/DL
LEUKOCYTE ESTERASE, URINE: NEGATIVE
LYMPHOCYTES ABSOLUTE: 0.6 K/UL (ref 1–5.1)
LYMPHOCYTES RELATIVE PERCENT: 6.9 %
MAGNESIUM: 1.4 MG/DL (ref 1.8–2.4)
MCH RBC QN AUTO: 30 PG (ref 26–34)
MCHC RBC AUTO-ENTMCNC: 33 G/DL (ref 31–36)
MCV RBC AUTO: 90.9 FL (ref 80–100)
METHEMOGLOBIN ARTERIAL: 0.1 % (ref 0–1.4)
MICROSCOPIC EXAMINATION: YES
MONOCYTES ABSOLUTE: 0 K/UL (ref 0–1.3)
MONOCYTES RELATIVE PERCENT: 0.6 %
NEUTROPHILS ABSOLUTE: 7.8 K/UL (ref 1.7–7.7)
NEUTROPHILS RELATIVE PERCENT: 92.2 %
NITRITE, URINE: NEGATIVE
O2 SAT, ARTERIAL: 91 % (ref 93–100)
O2 SAT, ARTERIAL: 93 % (ref 93–100)
PCO2 ARTERIAL: 51.2 MM HG (ref 35–45)
PCO2 ARTERIAL: 67.9 MMHG (ref 35–45)
PDW BLD-RTO: 17.3 % (ref 12.4–15.4)
PERFORMED ON: ABNORMAL
PH ARTERIAL: 7.35 (ref 7.35–7.45)
PH ARTERIAL: 7.42 (ref 7.35–7.45)
PH UA: 7 (ref 5–8)
PLATELET # BLD: 299 K/UL (ref 135–450)
PMV BLD AUTO: 8.6 FL (ref 5–10.5)
PO2 ARTERIAL: 65.4 MMHG (ref 75–108)
PO2 ARTERIAL: 66 MM HG (ref 75–108)
POC SAMPLE TYPE: ABNORMAL
POTASSIUM SERPL-SCNC: 4.7 MMOL/L (ref 3.5–5.1)
PROCALCITONIN: 0.09 NG/ML (ref 0–0.15)
PROTEIN UA: 30 MG/DL
RBC # BLD: 5.12 M/UL (ref 4.2–5.9)
RBC UA: NORMAL /HPF (ref 0–4)
SARS-COV-2: NOT DETECTED
SODIUM BLD-SCNC: 145 MMOL/L (ref 136–145)
SPECIFIC GRAVITY UA: 1.01 (ref 1–1.03)
TCO2 ARTERIAL: 35 MMOL/L
TCO2 ARTERIAL: 39 MMOL/L
TOTAL PROTEIN: 8.2 G/DL (ref 6.4–8.2)
TRIGL SERPL-MCNC: 141 MG/DL (ref 0–150)
TROPONIN: <0.01 NG/ML
TSH SERPL DL<=0.05 MIU/L-ACNC: 0.67 UIU/ML (ref 0.27–4.2)
URINE REFLEX TO CULTURE: ABNORMAL
URINE TYPE: ABNORMAL
UROBILINOGEN, URINE: 0.2 E.U./DL
VLDLC SERPL CALC-MCNC: 28 MG/DL
WBC # BLD: 8.5 K/UL (ref 4–11)
WBC UA: NORMAL /HPF (ref 0–5)

## 2021-09-17 PROCEDURE — 94660 CPAP INITIATION&MGMT: CPT

## 2021-09-17 PROCEDURE — 6360000004 HC RX CONTRAST MEDICATION: Performed by: STUDENT IN AN ORGANIZED HEALTH CARE EDUCATION/TRAINING PROGRAM

## 2021-09-17 PROCEDURE — 80076 HEPATIC FUNCTION PANEL: CPT

## 2021-09-17 PROCEDURE — 82140 ASSAY OF AMMONIA: CPT

## 2021-09-17 PROCEDURE — 6370000000 HC RX 637 (ALT 250 FOR IP): Performed by: STUDENT IN AN ORGANIZED HEALTH CARE EDUCATION/TRAINING PROGRAM

## 2021-09-17 PROCEDURE — 6370000000 HC RX 637 (ALT 250 FOR IP): Performed by: INTERNAL MEDICINE

## 2021-09-17 PROCEDURE — 70450 CT HEAD/BRAIN W/O DYE: CPT

## 2021-09-17 PROCEDURE — 80048 BASIC METABOLIC PNL TOTAL CA: CPT

## 2021-09-17 PROCEDURE — 6360000002 HC RX W HCPCS

## 2021-09-17 PROCEDURE — 84145 PROCALCITONIN (PCT): CPT

## 2021-09-17 PROCEDURE — 36415 COLL VENOUS BLD VENIPUNCTURE: CPT

## 2021-09-17 PROCEDURE — 82077 ASSAY SPEC XCP UR&BREATH IA: CPT

## 2021-09-17 PROCEDURE — 83735 ASSAY OF MAGNESIUM: CPT

## 2021-09-17 PROCEDURE — 86140 C-REACTIVE PROTEIN: CPT

## 2021-09-17 PROCEDURE — 84484 ASSAY OF TROPONIN QUANT: CPT

## 2021-09-17 PROCEDURE — 2580000003 HC RX 258

## 2021-09-17 PROCEDURE — 2060000000 HC ICU INTERMEDIATE R&B

## 2021-09-17 PROCEDURE — 2500000003 HC RX 250 WO HCPCS: Performed by: STUDENT IN AN ORGANIZED HEALTH CARE EDUCATION/TRAINING PROGRAM

## 2021-09-17 PROCEDURE — 2700000000 HC OXYGEN THERAPY PER DAY

## 2021-09-17 PROCEDURE — 94640 AIRWAY INHALATION TREATMENT: CPT

## 2021-09-17 PROCEDURE — 36600 WITHDRAWAL OF ARTERIAL BLOOD: CPT

## 2021-09-17 PROCEDURE — 94761 N-INVAS EAR/PLS OXIMETRY MLT: CPT

## 2021-09-17 PROCEDURE — 80061 LIPID PANEL: CPT

## 2021-09-17 PROCEDURE — 82803 BLOOD GASES ANY COMBINATION: CPT

## 2021-09-17 PROCEDURE — 82947 ASSAY GLUCOSE BLOOD QUANT: CPT

## 2021-09-17 PROCEDURE — 82728 ASSAY OF FERRITIN: CPT

## 2021-09-17 PROCEDURE — 0202U NFCT DS 22 TRGT SARS-COV-2: CPT

## 2021-09-17 PROCEDURE — 81001 URINALYSIS AUTO W/SCOPE: CPT

## 2021-09-17 PROCEDURE — 6360000002 HC RX W HCPCS: Performed by: STUDENT IN AN ORGANIZED HEALTH CARE EDUCATION/TRAINING PROGRAM

## 2021-09-17 PROCEDURE — 2580000003 HC RX 258: Performed by: STUDENT IN AN ORGANIZED HEALTH CARE EDUCATION/TRAINING PROGRAM

## 2021-09-17 PROCEDURE — 93010 ELECTROCARDIOGRAM REPORT: CPT | Performed by: INTERNAL MEDICINE

## 2021-09-17 PROCEDURE — 85025 COMPLETE CBC W/AUTO DIFF WBC: CPT

## 2021-09-17 PROCEDURE — 71260 CT THORAX DX C+: CPT

## 2021-09-17 PROCEDURE — 84443 ASSAY THYROID STIM HORMONE: CPT

## 2021-09-17 PROCEDURE — 70498 CT ANGIOGRAPHY NECK: CPT

## 2021-09-17 PROCEDURE — 83605 ASSAY OF LACTIC ACID: CPT

## 2021-09-17 RX ORDER — INSULIN LISPRO 100 [IU]/ML
0-12 INJECTION, SOLUTION INTRAVENOUS; SUBCUTANEOUS
Status: DISCONTINUED | OUTPATIENT
Start: 2021-09-17 | End: 2021-09-21

## 2021-09-17 RX ORDER — ASPIRIN 81 MG/1
81 TABLET, CHEWABLE ORAL DAILY
Status: DISCONTINUED | OUTPATIENT
Start: 2021-09-17 | End: 2021-09-17 | Stop reason: SDUPTHER

## 2021-09-17 RX ORDER — INSULIN LISPRO 100 [IU]/ML
0-6 INJECTION, SOLUTION INTRAVENOUS; SUBCUTANEOUS NIGHTLY
Status: DISCONTINUED | OUTPATIENT
Start: 2021-09-17 | End: 2021-09-21

## 2021-09-17 RX ORDER — NICOTINE 21 MG/24HR
1 PATCH, TRANSDERMAL 24 HOURS TRANSDERMAL DAILY
Status: DISCONTINUED | OUTPATIENT
Start: 2021-09-17 | End: 2021-10-01 | Stop reason: HOSPADM

## 2021-09-17 RX ORDER — LISINOPRIL 20 MG/1
20 TABLET ORAL DAILY
Status: DISCONTINUED | OUTPATIENT
Start: 2021-09-17 | End: 2021-10-01

## 2021-09-17 RX ORDER — MAGNESIUM SULFATE IN WATER 40 MG/ML
4000 INJECTION, SOLUTION INTRAVENOUS ONCE
Status: COMPLETED | OUTPATIENT
Start: 2021-09-17 | End: 2021-09-17

## 2021-09-17 RX ORDER — FUROSEMIDE 10 MG/ML
40 INJECTION INTRAMUSCULAR; INTRAVENOUS 2 TIMES DAILY
Status: DISCONTINUED | OUTPATIENT
Start: 2021-09-17 | End: 2021-09-18

## 2021-09-17 RX ORDER — CLOPIDOGREL BISULFATE 75 MG/1
300 TABLET ORAL ONCE
Status: COMPLETED | OUTPATIENT
Start: 2021-09-17 | End: 2021-09-17

## 2021-09-17 RX ORDER — PREDNISONE 20 MG/1
40 TABLET ORAL DAILY
Status: DISCONTINUED | OUTPATIENT
Start: 2021-09-17 | End: 2021-09-21

## 2021-09-17 RX ORDER — GABAPENTIN 400 MG/1
800 CAPSULE ORAL 4 TIMES DAILY
Status: DISCONTINUED | OUTPATIENT
Start: 2021-09-17 | End: 2021-10-01 | Stop reason: HOSPADM

## 2021-09-17 RX ORDER — IPRATROPIUM BROMIDE AND ALBUTEROL SULFATE 2.5; .5 MG/3ML; MG/3ML
1 SOLUTION RESPIRATORY (INHALATION) EVERY 4 HOURS PRN
Status: DISCONTINUED | OUTPATIENT
Start: 2021-09-17 | End: 2021-09-17

## 2021-09-17 RX ORDER — GABAPENTIN 800 MG/1
800 TABLET ORAL DAILY
Status: DISCONTINUED | OUTPATIENT
Start: 2021-09-17 | End: 2021-09-17

## 2021-09-17 RX ORDER — ATORVASTATIN CALCIUM 80 MG/1
80 TABLET, FILM COATED ORAL DAILY
Status: DISCONTINUED | OUTPATIENT
Start: 2021-09-18 | End: 2021-10-01 | Stop reason: HOSPADM

## 2021-09-17 RX ORDER — ATORVASTATIN CALCIUM 40 MG/1
40 TABLET, FILM COATED ORAL ONCE
Status: COMPLETED | OUTPATIENT
Start: 2021-09-17 | End: 2021-09-17

## 2021-09-17 RX ADMIN — INSULIN LISPRO 4 UNITS: 100 INJECTION, SOLUTION INTRAVENOUS; SUBCUTANEOUS at 14:29

## 2021-09-17 RX ADMIN — ATORVASTATIN CALCIUM 40 MG: 40 TABLET, FILM COATED ORAL at 11:05

## 2021-09-17 RX ADMIN — ENOXAPARIN SODIUM 40 MG: 40 INJECTION SUBCUTANEOUS at 11:04

## 2021-09-17 RX ADMIN — GABAPENTIN 800 MG: 400 CAPSULE ORAL at 14:27

## 2021-09-17 RX ADMIN — THIAMINE HYDROCHLORIDE: 100 INJECTION, SOLUTION INTRAMUSCULAR; INTRAVENOUS at 05:33

## 2021-09-17 RX ADMIN — FUROSEMIDE 40 MG: 10 INJECTION, SOLUTION INTRAMUSCULAR; INTRAVENOUS at 11:04

## 2021-09-17 RX ADMIN — DEXAMETHASONE SODIUM PHOSPHATE 10 MG: 4 INJECTION, SOLUTION INTRAMUSCULAR; INTRAVENOUS at 00:58

## 2021-09-17 RX ADMIN — MONTELUKAST 10 MG: 10 TABLET, FILM COATED ORAL at 21:28

## 2021-09-17 RX ADMIN — IOPAMIDOL 80 ML: 755 INJECTION, SOLUTION INTRAVENOUS at 02:31

## 2021-09-17 RX ADMIN — MAGNESIUM SULFATE HEPTAHYDRATE 4000 MG: 4 INJECTION, SOLUTION INTRAVENOUS at 06:37

## 2021-09-17 RX ADMIN — ATORVASTATIN CALCIUM 40 MG: 40 TABLET, FILM COATED ORAL at 21:28

## 2021-09-17 RX ADMIN — Medication 10 ML: at 08:05

## 2021-09-17 RX ADMIN — LISINOPRIL 20 MG: 20 TABLET ORAL at 11:06

## 2021-09-17 RX ADMIN — IOPAMIDOL 80 ML: 755 INJECTION, SOLUTION INTRAVENOUS at 19:17

## 2021-09-17 RX ADMIN — CLOPIDOGREL BISULFATE 300 MG: 75 TABLET ORAL at 21:27

## 2021-09-17 RX ADMIN — Medication 10 ML: at 21:33

## 2021-09-17 RX ADMIN — SODIUM CHLORIDE 25 ML: 9 INJECTION, SOLUTION INTRAVENOUS at 06:36

## 2021-09-17 RX ADMIN — ASPIRIN 81 MG: 81 TABLET, COATED ORAL at 11:04

## 2021-09-17 RX ADMIN — DULOXETINE 20 MG: 20 CAPSULE, DELAYED RELEASE ORAL at 21:28

## 2021-09-17 RX ADMIN — FUROSEMIDE 5 MG/HR: 10 INJECTION, SOLUTION INTRAMUSCULAR; INTRAVENOUS at 02:58

## 2021-09-17 RX ADMIN — IPRATROPIUM BROMIDE AND ALBUTEROL 1 PUFF: 20; 100 SPRAY, METERED RESPIRATORY (INHALATION) at 04:59

## 2021-09-17 RX ADMIN — DULOXETINE 20 MG: 20 CAPSULE, DELAYED RELEASE ORAL at 11:06

## 2021-09-17 RX ADMIN — INSULIN LISPRO 1 UNITS: 100 INJECTION, SOLUTION INTRAVENOUS; SUBCUTANEOUS at 21:33

## 2021-09-17 RX ADMIN — PREDNISONE 40 MG: 20 TABLET ORAL at 11:05

## 2021-09-17 RX ADMIN — INSULIN LISPRO 2 UNITS: 100 INJECTION, SOLUTION INTRAVENOUS; SUBCUTANEOUS at 09:52

## 2021-09-17 RX ADMIN — INSULIN LISPRO 1 UNITS: 100 INJECTION, SOLUTION INTRAVENOUS; SUBCUTANEOUS at 02:58

## 2021-09-17 ASSESSMENT — ENCOUNTER SYMPTOMS
SORE THROAT: 0
COUGH: 0
WHEEZING: 1
ABDOMINAL PAIN: 0
APNEA: 0
STRIDOR: 0
TROUBLE SWALLOWING: 0
SHORTNESS OF BREATH: 1
NAUSEA: 0
ABDOMINAL DISTENTION: 0
CONSTIPATION: 0
DIARRHEA: 0
CHEST TIGHTNESS: 0
COLOR CHANGE: 0
BLOOD IN STOOL: 0
VOMITING: 0

## 2021-09-17 ASSESSMENT — PAIN SCALES - GENERAL
PAINLEVEL_OUTOF10: 0

## 2021-09-17 NOTE — PROGRESS NOTES
Spoke with patient's wife, Pooja Caldwellchinyere Bryan. Updated on patient status and plan of care. Lakeshia states the patient has not drank alcohol in approx 2 months, says patient is usually confused and disoriented for 5-10 minutes when he first wakes up, after that period he is back to baseline of alert and oriented.  Wife has to work from [address] ([employer] and would like day shift updates after 3pm.

## 2021-09-17 NOTE — TELEPHONE ENCOUNTER
Dr. Damaris Freeman from [facility] requesting to talk to you, she said you may need to leave her message as she doesn't get good reception in the hospital [phone]    She is wanting know about his plavix you had been prescribing and she sees a note from cardiologist in Feb to stop Plavix and she wanted to know if there is a problem if she stops Plavix or is there any other reason he should be on it? Also she wanted to know is he usually confused or can he hold conversations and answer questions about his medical care.

## 2021-09-17 NOTE — RT PROTOCOL NOTE
RT Inhaler-Nebulizer Bronchodilator Protocol Note    There is a bronchodilator order in the chart from a provider indicating to follow the RT Bronchodilator Protocol and there is an Initiate RT Bronchodilator Protocol order as well (see protocol at bottom of note). The findings from the last RT Protocol Assessment were as follows:  Smoking: >15 Pack years  Surgical Status: No surgery  Xray: Multiple lobe infiltrates/atelectasis/pleural effusion/edema  Respiratory Pattern: RR 12-20  Mental Status: Alert and Oriented  Breath Sounds: Diminished and/or crackles  Cough: Strong, spontaneous, non-productive  Activity Level: Mostly sedentary, minimal walking  Oxygen Requirement: 41% or 6LNC - 60%  Indication for Bronchodilator Therapy: Decreased or absent breath sounds, On home bronchodilators, Reversable obstructive defect on PFTs responsive to bronchodilators  Bronchodilator Assessment Score: 6    Aerosolized bronchodilator medication orders have been revised according to the RT Bronchodilator Protocol. RT Inhaler-Nebulizer Bronchodilator Protocol:    Respiratory Therapist to perform RT Therapy Protocol Assessment then follow the protocol. No Indications - adjust the frequency to every 6 hours PRN wheezing or bronchospasm, if no treatments needed after 48 hours then discontinue using Per Protocol order mode. If indication present, adjust the RT bronchodilator orders based on the Bronchodilator Assessment Score as follows:    0-6 - enter or revise RT bronchodilator order to Albuterol Inhaler order with frequency of every 2 hours PRN for wheezing or increased work of breathing using Per Protocol order mode. If Albuterol Inhaler not tolerated or not effective, then discontinue the Albuterol Inhaler order and enter Albuterol Nebulizer order with same frequency and PRN reasons. Repeat RT Therapy Protocol Assessment as needed.     7-10 - discontinue any other Inpatient aerosolized bronchodilator medication

## 2021-09-17 NOTE — TELEPHONE ENCOUNTER
Attempted to call back. Message left. If physician returns call okay to discontinue Plavix and notify them that he is forgetful and does have difficulty remembering his medications and care in general occasionally.

## 2021-09-17 NOTE — PLAN OF CARE
Problem: Falls - Risk of:  Goal: Will remain free from falls  Description: Will remain free from falls  Outcome: Ongoing   No falls this shift, bed in lowest position, bed alarm on, non skid socks on, call light within reach, hourly checks, safety maintained, will continue to monitor. Problem: Gas Exchange - Impaired  Goal: Absence of hypoxia  Outcome: Ongoing   Patient remains on 10L NC and bipap at night, oxygen saturations remain in the 90s.    Problem: Isolation Precautions - Risk of Spread of Infection  Goal: Prevent transmission of infection  Outcome: Ongoing   Continues in isolation due to covid test pending

## 2021-09-17 NOTE — H&P
Internal Medicine  PGY 1  History & Physical      CC: SOB    History Obtained From:  patient, electronic medical record    HISTORY OF PRESENT ILLNESS:  Alex Bermeo is a 50 y.o. male with history of HFpEF (2/2021 ECHO showed grade 2 diastolic dysfunction, 74% EF, LV hypertrophy), COPD on 2 L of oxygen at home, diabetes, hypertension, hyperlipidemia who presents with two weeks of worsening SOB. He reports his home oxygen saturations was in the 70's. He is not vaccinated for COVID and he reports that he was likely exposed to [employer] before his SOB began. At that time he also experiences 2-3 days of generalized body aches that have since resolved. Denies fevers and chills. Patient has been hospitalized previously for COPD and CHF exacerbations, and he reports this SOB feels more like a CHF exacerbation to him. He reports feeling slightly fluid overloaded though does not know his dry weight. He repeatedly takes off his HFNC in the room and desats down to the 60's, but quickly comes back up on 10L. Even with a pulse ox at 60 he denies SOB    Upon admission, sodium 146, glucose 155, Pro-BNP 3129 (5000 on 7/2021 and 1847 in 6/2021), negative troponin, D-dimer 292. Chest XR showed a R pleural effusion and hazy bibasilar atelectasis vs. Small infiltrates. It showed stable cardiomegaly and mild pulmonary congestion. He reports complete resolution of SOB after receiving IV lasix dose, however continues to desat without supplemental oxygen.     Past Medical History:        Diagnosis Date    Acute on chronic systolic CHF (congestive heart failure) (HCC)     Acute respiratory failure ([facility] [address] 7/11/2021    Acute respiratory failure with hypoxia ([facility] [address] 7/11/2021    Acute respiratory failure with hypoxia and hypercapnia ([facility]) 6/7/2021    CHF (congestive heart failure) (HCC)     COPD (chronic obstructive pulmonary disease) (HCC)     Diabetes mellitus (HCC)     Hyperlipidemia     Hypertension    ·     Past Surgical History: Procedure Laterality Date    HERNIA REPAIR      TONSILLECTOMY     ·     Medications Priorto Admission:    · Medications Prior to Admission: Umeclidinium Bromide (INCRUSE ELLIPTA) 62.5 MCG/INH AEPB, INHALE 1 PUFF DAILY  · ipratropium-albuterol (DUONEB) 0.5-2.5 (3) MG/3ML SOLN nebulizer solution, Take 3 mLs by nebulization every 4-6 hours as needed for Shortness of Breath  · glimepiride (AMARYL) 2 MG tablet, TAKE 1 TABLET EACH MORNING BEFORE BREAKFAST. DISCONTINUE FARXIGA  · lisinopril (PRINIVIL;ZESTRIL) 20 MG tablet, TAKE 1 TABLET ONCE DAILY  · albuterol sulfate  (90 Base) MCG/ACT inhaler, INHALE 2 PUFFS INTO THE LUNGS EVERY 6 HOURS AS NEEDED FOR WHEEZING OR SHORTNESS OF BREATH  · pioglitazone (ACTOS) 15 MG tablet, TAKE (1) TABLET DAILY  · bisoprolol (ZEBETA) 5 MG tablet, TAKE 1 TABLET BY MOUTH DAILY  · Fluticasone furoate-vilanterol (BREO ELLIPTA) 200-25 MCG/INH AEPB inhaler, Inhale 1 puff into the lungs daily  · gabapentin (NEURONTIN) 800 MG tablet, TAKE 1 TABLET 4 TIMES DAILY  · HYDROcodone-acetaminophen (NORCO) 5-325 MG per tablet,   · furosemide (LASIX) 40 MG tablet, Take 1 tablet by mouth daily  · metFORMIN (GLUCOPHAGE) 1000 MG tablet, TAKE 1 TABLET BY MOUTH 2 TIMES DAILY (WITH MEALS)  · atorvastatin (LIPITOR) 40 MG tablet, TAKE 1 TABLET ONCE DAILY  · fenofibric acid (FIBRICOR) 135 MG CPDR capsule, TAKE 1 CAPSULE ONCE DAILY  · montelukast (SINGULAIR) 10 MG tablet,   · DULoxetine (CYMBALTA) 20 MG extended release capsule, Take 20 mg by mouth 2 times daily  · Cholecalciferol (VITAMIN D3) 1.25 MG (11942 UT) CAPS, Take 1 capsule by mouth Twice a Week  · Magnesium 400 MG CAPS, Take 1 tablet by mouth daily  · clopidogrel (PLAVIX) 75 MG tablet, Take 1 tablet by mouth daily  · blood glucose monitor strips, Test 2 times a day & as needed for symptoms of irregular blood glucose.  E11.9  · glucose monitoring kit (FREESTYLE) monitoring kit, 1 kit by Does not apply route daily Patient wants brand name Patient tests bid  E11.9  · blood glucose monitor kit and supplies, Test 2 times a day & as needed for symptoms of irregular blood glucose. · aspirin 81 MG EC tablet, Take 81 mg by mouth daily  · Cyanocobalamin 1000 MCG CAPS, Take 1 tablet by mouth daily  · Omega-3 1000 MG CAPS, Take 1 capsule by mouth daily  · Coenzyme Q10 (COQ10) 100 MG CAPS, Take 1 capsule by mouth daily  · MULTIPLE VITAMINS PO, Take by mouth    Allergies:  Patient has no known allergies. Social History:   · TOBACCO:   reports that he has been smoking. He has been smoking about 1.00 pack per day. He has never used smokeless tobacco. Cut back from 2.5 packs a day to 1 pack per day since May 2021. Started smoking when he was 10years old  · ETOH:   reports current alcohol use. Less than one drink a day  · DRUGS : No reported drug use  · Patient currently lives alone  ·   Family History:       Problem Relation Age of Onset    Asthma Mother     Cancer Mother     Hearing Loss Mother     Vision Loss Mother     High Blood Pressure Father     High Cholesterol Father     Vision Loss Father     Asthma Sister     Diabetes Sister     Vision Loss Sister     Asthma Brother     Arthritis Brother     High Blood Pressure Brother    ·     Review of Systems   Constitutional: Positive for activity change and fatigue. Negative for chills, diaphoresis and fever. HENT: Negative for sneezing, sore throat and trouble swallowing. Eyes: Negative for visual disturbance. Respiratory: Positive for shortness of breath and wheezing. Negative for apnea, cough, chest tightness and stridor. Cardiovascular: Negative for chest pain, palpitations and leg swelling. Gastrointestinal: Negative for abdominal distention, abdominal pain, blood in stool, constipation, diarrhea, nausea and vomiting. Genitourinary: Negative for difficulty urinating. Musculoskeletal: Negative for joint swelling. Skin: Negative for color change and rash.    Neurological: Negative for dizziness, syncope, weakness, light-headedness, numbness and headaches. Psychiatric/Behavioral: Negative for agitation, behavioral problems and confusion. Endorses generalized body aches for 2-3 days at onset of SOB, but not present on admission    ROS: A 10 point review of systems was conducted, significant findings as noted in HPI. Physical Exam  Constitutional:       General: He is not in acute distress. Appearance: He is obese. HENT:      Head: Normocephalic. Right Ear: External ear normal.      Left Ear: External ear normal.      Nose: Nose normal.      Mouth/Throat:      Mouth: Mucous membranes are moist.      Pharynx: Oropharynx is clear. No oropharyngeal exudate or posterior oropharyngeal erythema. Eyes:      Conjunctiva/sclera: Conjunctivae normal.      Pupils: Pupils are equal, round, and reactive to light. Cardiovascular:      Rate and Rhythm: Normal rate and regular rhythm. Pulses: Normal pulses. Heart sounds: Normal heart sounds. No murmur heard. No gallop. Pulmonary:      Effort: Pulmonary effort is normal. No respiratory distress. Breath sounds: No stridor. Wheezing and rales present. No rhonchi. Chest:      Chest wall: No tenderness. Abdominal:      General: Bowel sounds are normal. There is no distension. Palpations: Abdomen is soft. Tenderness: There is no abdominal tenderness. There is no guarding or rebound. Musculoskeletal:      Right lower leg: Edema present. Left lower leg: Edema present. Comments: Mild 1+ pitting edema in b/l LE   Skin:     General: Skin is warm. Coloration: Skin is not jaundiced. Findings: No bruising or lesion. Neurological:      Mental Status: He is alert and oriented to person, place, and time. Cranial Nerves: No cranial nerve deficit. Motor: No weakness.    Psychiatric:      Comments: Patient has a positive mood       Physical exam:       Vitals:    09/16/21 2214   Pulse: 86   Resp: 17   Temp: 97.1 °F (36.2 °C)   SpO2: 92%       DATA:    Labs:  CBC:   Recent Labs     09/16/21  1745   WBC 9.1   HGB 15.2   HCT 48.5          BMP:   Recent Labs     09/16/21  1745   *   K 4.5      CO2 33*   BUN 23*   CREATININE 0.7*   GLUCOSE 155*     LFT's:   Recent Labs     09/16/21  1745   AST 20   ALT 19   BILITOT 0.3   ALKPHOS 58     Troponin:   Recent Labs     09/16/21  1745   TROPONINI <0.01     BNP:No results for input(s): BNP in the last 72 hours. ABGs: No results for input(s): PHART, QPP5OSO, PO2ART in the last 72 hours. INR:   Recent Labs     09/16/21 1745   INR 1.13*       U/A:No results for input(s): NITRITE, COLORU, PHUR, LABCAST, WBCUA, RBCUA, MUCUS, TRICHOMONAS, YEAST, BACTERIA, CLARITYU, SPECGRAV, LEUKOCYTESUR, UROBILINOGEN, BILIRUBINUR, BLOODU, GLUCOSEU, AMORPHOUS in the last 72 hours. Invalid input(s): Marcell Sewell    No orders to display           ASSESSMENT AND PLAN:  Chris Gottlieb is a 50 y.o. male with history of HFpEF (2/2021 ECHO showed grade 2 diastolic dysfunction, 67% EF, LV hypertrophy), COPD on 2 L of oxygen at home, diabetes, hypertension, hyperlipidemia who presents with two weeks of worsening SOB. Acute on Chronic Congestive Heart Failure with preserved EF  BNP elevated to 3129, trop normal, mild b/l LE edema, and crackles in lung bases. Prior echo in 2/2021 showed grade 2 diastolic dysfunction, 41% EF, LV hypertrophy. Prior hospitalizations for CHF exacerbations. Reportedly compliant with home Lasix. Says SOB resolved with dose of IV Lasix upon admission.  - Started lasix gtt, monitor BP and BMP  - CTPA ordered  - PT/OT  - Continue home aspirin and Plavix  - Requiring 10L HFNC up from 2L NC at home  - Ordered lipid panel, magnesium, BNP (every third day)  - Consult to HF nurse    Acute Hypoxic Respiratory Failure  Requiring 10L HFNC up from 2L NC at home. Repeatedly takes off his supplemental oxygen and desats down to 60's but continues to deny SOB.   - Ordered an ABG to assess if patient is hypercapnic  - Patient may need BiPAP if very hypercapnic,   - COVID 19 tests pending  - Ferritin and CRP pending  - 10L HFNC, supplement and wean as needed  - Urinalysis to rule out source of possible sepsis given hypotension (however SBP of 100's appears to be patient's baseline)    COPD Exacerbation  Patient has some wheezing on PE and increased oxygen requirements, although he does not report increased productive cough. - Continue home inhalers and Singulair  - Received 10 mg Decadron in outside ED, gave 10 mg Decadron upon arrival. If COVID + can du five fay course of 20 mg followed by a five day course of 10 mg. If patient's illness is determined to be more likely due to a COPD exacerbation can start azithromycin and prednisone course    T2DM  On oral medications at home, no home insulin  - Started MDSSI with hypoglycemia protocol and POC glucose    HLD  - Continue home Lipitor    Tobacco Dependence  Been smoking since he was [de-identified] years old. Reports on average smoking 2.5 packs a day until a few months ago when he decreased to 1.5  - Started nicotine patch    Will discuss with attending physician Dr. Chet Lucero.     Code Status: Full code  FEN: Cardiac diet  PPX: Lovenox  DISPO: Kassidy Malone MD  9/16/2021,  10:58 PM

## 2021-09-17 NOTE — ED NOTES
Dr. Zeina Mark is speaking with Dr. Solange Alvarez from [place].       Stella Rutledge  09/16/21 2005

## 2021-09-17 NOTE — PROGRESS NOTES
Entered patients room, patient in bed asleep. Woke patient to obtain vitals and assessment, patient disoriented. Sat up in the bed, attempted to get out of the bed, took oxygen off. Redirected patient after several attempts, patient laid back down in bed. Unable to tell me his name or where he was, repeating \" what's wrong\" when asked any questions. Oxygen 91% on 10L NC, difficulty obtaining BP d/t fidgeting. Charge nurse obtain manual /60. Rapid called at 0400 d/t AMS.

## 2021-09-17 NOTE — DISCHARGE SUMMARY
INTERNAL MEDICINE DEPARTMENT AT [address] [place] [address]  DISCHARGE SUMMARY    Patient ID: Sergio Minor                                             Discharge Date: 9/30/2021   Patient's PCP: [facility] - [facility]                                          Discharge Physician: Alonzo Ellison MD   Admit Date: 9/16/2021   Admitting Physician: Neo Goel MD    PROBLEMS DURING HOSPITALIZATION:  Present on Admission:   COPD exacerbation ([address]   Organizing pneumonia ([address]   Severe hypoxemia   Tobacco use   CAD in native artery   Mixed hyperlipidemia      DISCHARGE DIAGNOSES:  Acute on chronic hypercapnic hypoxic respiratory failure  CHF  COPD  DM2  HTN  HLD  TIA      HPI: Caryle Breath a 50 y. o. male with history of HFpEF (2/2021 ECHO showed grade 2 diastolic dysfunction, 50% EF, LV hypertrophy), COPD on 2 L of oxygen at home, diabetes, hypertension, hyperlipidemia who presents with two weeks of worsening SOB. He reports his home oxygen saturations was in the 70's. He is not vaccinated for COVID and he reports that he was likely exposed to [employer] before his SOB began. At that time he also experiences 2-3 days of generalized body aches that have since resolved. Denies fevers and chills. Patient has been hospitalized previously for COPD and CHF exacerbations, and he reports this SOB feels more like a CHF exacerbation to him. He reports feeling slightly fluid overloaded though does not know his dry weight. He repeatedly takes off his HFNC in the room and desats down to the 60's, but quickly comes back up on 10L. Even with a pulse ox at 60 he denies SOB     Upon admission, sodium 146, glucose 155, Pro-BNP 3129 -> 77938 (5000 on 7/2021 and 1847 in 6/2021), negative troponin, D-dimer 292. Chest XR showed a R pleural effusion and hazy bibasilar atelectasis vs. Small infiltrates. It showed stable cardiomegaly and mild pulmonary congestion.       The following issues were addressed during hospitalization:    Acute on chronic hypercapnic hypoxic respiratory failure  Had increasing oxygen requirements and had to go up to 40L on vapo therm. Was given appropriate treatment for COPD, CHF exacerbation but had little improvement. Pt started on Solumedrol 125mg x 3 days. Autoimmune markers were unremarkable. He was on lasix drip for 5 days. Also continued on prednisone. Got weaned from 40 L Vapotherm to 3L Nasal cannula which is home baseline. Repeated Chest CT revealed almost complete resolution of the airspace disease in the lung bases with minimal residual consolidation in the right lower lobe and post infectious scarring in the left lung base. COPD  Pt received Decadron x 2 days, Symbicort and Singulair. Oxygen requirements kept on increasing. Was on high dose Solumedrol x 3 days then Prednisone that was tapered. CHF  He had diuresis with limited improvement. Physical exam not significant for being volume overloaded. Pro-BNP on admission 3129 improved to 160. TIA  On Day 1 of hospitalization, rapid response was called due to pt being found confused with NIHSS 4. Code stroke was called. CT head with no evidence of large vessel occlusion. Started on ASA and plavix. Diabetes type 2  Pt had increasing glucose requirements during hospital stay likely due to corticosteroids. Maintained on HDSSI, nightly lantus and lispro. LILLIAN  Creatinine increased to 1.1 from 0.8. Lisinopril was held. Wit PO fluid intake, pt's creatinine improving    On the date of discharge, the patient reported feeling stable. The patient was found to not be in any acute distress, with vital signs within normal limits, and no new abnormalities on physical examination. Further, the patient expressed appropriate understanding of, and agreement with, the discharge recommendations, medications, and plan.     Physical Exam:  BP 98/76   Pulse 103   Temp 98.1 °F (36.7 °C) (Oral)   Resp 20   Ht 4' 11\" (1.499 m)   Wt 173 lb 6.4 oz (78.7 kg)   SpO2 92% BMI 35.02 kg/m²   Gen: Male adult in nil acute respiratory distress. AOx4  CV: Heart sounds normal. RRR. No murmurs  Pulm: Air entry equal bilaterally. No crepes/ wheezing  Abd: Soft, no distension. No tenderness. No organomegaly. Neuro: Pt alert and oriented x 4. No acute deficits. CN grossly intact. Consults: Neurology, Pulmonology, Cardiology  Significant Diagnostic Studies:  chest x-ray, CT head, CTPA, CT Chest, MRI, EKG, ECHO  Treatments: respiratory therapy: O2  Disposition: home  Discharged Condition: Stable  Follow Up: Primary Care Physician in one week    DISCHARGE MEDICATION:     Medication List      START taking these medications    glipiZIDE 5 MG tablet  Commonly known as: GLUCOTROL  Take 1 tablet by mouth 2 times daily     insulin glargine 100 UNIT/ML injection pen  Commonly known as: LANTUS;BASAGLAR  Inject 28 Units into the skin 2 times daily     [employer] Pen Satanta 29G 29G X 12MM Misc  Generic drug: Insulin Pen Needle  1 each by Does not apply route daily     nicotine 14 MG/24HR  Commonly known as: NICODERM CQ  Place 1 patch onto the skin daily  Start taking on: October 1, 2021     pantoprazole 40 MG tablet  Commonly known as: PROTONIX  Take 1 tablet by mouth every morning (before breakfast)  Start taking on: October 1, 2021     predniSONE 10 MG tablet  Commonly known as: DELTASONE  Take 3 tablets by mouth daily for 5 days, THEN 2 tablets daily for 5 days, THEN 1 tablet daily for 5 days. Take 40 mg by mouth for 3 days, then 30 mg for 3 days, then 20 mg for 3 days then 10 mg..  Start taking on: September 30, 2021        CHANGE how you take these medications    atorvastatin 40 MG tablet  Commonly known as: LIPITOR  TAKE (1) TABLET DAILY  What changed: See the new instructions.         CONTINUE taking these medications    albuterol sulfate  (90 Base) MCG/ACT inhaler  INHALE 2 PUFFS INTO THE LUNGS EVERY 6 HOURS AS NEEDED FOR WHEEZING OR SHORTNESS OF BREATH     aspirin 81 MG EC tablet     * blood glucose monitor kit and supplies  Test 2 times a day & as needed for symptoms of irregular blood glucose. * glucose monitoring kit  1 kit by Does not apply route daily Patient wants brand name Patient tests bid  E11.9     blood glucose test strips  Test 2 times a day & as needed for symptoms of irregular blood glucose. E11.9     CoQ10 100 MG Caps     Cyanocobalamin 1000 MCG Caps     fenofibric acid 135 MG Cpdr capsule  Commonly known as: FIBRICOR  TAKE 1 CAPSULE ONCE DAILY     Fluticasone furoate-vilanterol 200-25 MCG/INH Aepb inhaler  Commonly known as: BREO ELLIPTA  Inhale 1 puff into the lungs daily     furosemide 40 MG tablet  Commonly known as: LASIX  Take 1 tablet by mouth daily     gabapentin 800 MG tablet  Commonly known as: NEURONTIN  TAKE 1 TABLET 4 TIMES DAILY     HYDROcodone-acetaminophen 5-325 MG per tablet  Commonly known as: NORCO     Incruse Ellipta 62.5 MCG/INH Aepb  Generic drug: Umeclidinium Bromide  INHALE 1 PUFF DAILY     ipratropium-albuterol 0.5-2.5 (3) MG/3ML Soln nebulizer solution  Commonly known as: DUONEB  Take 3 mLs by nebulization every 4-6 hours as needed for Shortness of Breath     Magnesium 400 MG Caps  Take 1 tablet by mouth daily     metFORMIN 1000 MG tablet  Commonly known as: GLUCOPHAGE  TAKE 1 TABLET BY MOUTH 2 TIMES DAILY (WITH MEALS)     MULTIPLE VITAMINS PO     Omega-3 1000 MG Caps     Vitamin D3 1.25 MG (73696 UT) Caps  Take 1 capsule by mouth Twice a Week         * This list has 2 medication(s) that are the same as other medications prescribed for you. Read the directions carefully, and ask your doctor or other care provider to review them with you.             STOP taking these medications    bisoprolol 5 MG tablet  Commonly known as: ZEBETA     glimepiride 2 MG tablet  Commonly known as: AMARYL     lisinopril 20 MG tablet  Commonly known as: PRINIVIL;ZESTRIL     pioglitazone 15 MG tablet  Commonly known as: ACTOS           Where to Get Your Medications      [facility]  [facility] [address]    Phone: [phone]   · atorvastatin 40 MG tablet  · glipiZIDE 5 MG tablet  · insulin glargine 100 UNIT/ML injection pen  · [employer] Pen Vincent 29G 29G X 12MM Misc  · nicotine 14 MG/24HR  · pantoprazole 40 MG tablet  · predniSONE 10 MG tablet       Activity: activity as tolerated  Diet: diabetic diet, Cardiac diet  Wound Care: none needed  Discharge instructions:  1. Please keep follow up with Pulmonologist, Dr Hugo Krause in 2 weeks. 2. Please take prednisone as prescribed. 3. Please follow up with your primary care physician 1 week after discharge. Time Spent on discharge is more than 30 minutes    Signed:  Myla Mcelroy MD   Internal Medicine Resident  9/30/2021    Patient seen and examined, labs and imaging reviewed, agree with assessment and plan as outlined above. patients condition continues to improve doing well, asked patient to monitor side effects of medications which i've discussed at length, recurrence of symptoms or new symptoms including but not limited to chest pain, shortness of breath, nausea, vomiting, fevers or chills and seek immediate medical attention or call 911. Greater than 30 minutes spent on case on day of discharge. Again today I offered covid 19 vaccine he states he will think about it I discussed he has a high risk of death as a result of richard covid 19, risks benefits explained at length. Discussed case with nurse, discussed compliance with bipap at length with patient. He states he will use it.      Maryellen Jackson MD FACP

## 2021-09-17 NOTE — ED NOTES
Patient's SpO2 began to drop while on hand held breathing treatment. Upon entry to the room noted patient was texting on his phone and his nebulizer was laying in his lap empty. While off of oxygen during this event patient SpO2 dropped to 67%. Nebulizer restarted via mask nebulizer. SpO2 increased to 90%. RN notified.       Mik De Anda  09/16/21 2005

## 2021-09-17 NOTE — CONSULTS
Clinical Pharmacy Progress Note  Medication History     Admit Date: 9/16/2021    Asked to verify home medications by Dr. Amisha Nava. List of of current medications patient is taking is complete. Home Medication list in EPIC updated to reflect changes noted below. Source of information: Pharmacy fill history    Changes made to medication list:   Medications removed (no longer taking):  · Clopidogrel (last filled 12/28/20)  · Duloxetine (last filled 3/17/21 30 day)  · Montelukast  (last filled 1/11/21 30 day)    Medications added:   · NA    Medication doses / instructions adjusted:   · Hydrocodone-acetaminophen adjusted to 1 tab Q8h prn    Unable to confirm:  · If patient is taking lisinopril. Picked up from pharmacy on 8/24/21 for a 30 day supply but notes in care everywhere state that patient was told to discontinue and was still taking  · Aspirin, Vitamin D, CoQ10, cyanocobalamin, magnesium, mulitvitamin. Or Omega 3 capsules. There was no fill history on record with [facility] or fills at his pharmacy. Other notes:   · Was unable to get ahold of patient throughout the day and unable to go in room due to [facility] rule out status   · Tried to call wife at [phone] but it said number has been disconnected     Thanks for consulting pharmacy! Please call with questions [address]   Pharmacy Resident   9/17/2021 4:08 PM

## 2021-09-17 NOTE — PROGRESS NOTES
Progress Note    Admit Date: 9/16/2021  Day: 1  Diet: ADULT DIET; Regular; Low Sodium (2 gm); 1800 ml    CC: Chest pain, shortness of breath    Interval history: No acute events. Patient resting comfortably on BiPAP this morning. Transition to high flow. Appears mildly confused, but is able to say we are in a hospital, year, and full name. He is somewhat unclear about why he came in saying that he had some chest pain, and when prompted confirms shortness of breath that has been increasing for the last week or so. No fevers or chills. He says he is adherent to his home medications, oxygen, and CPAP. He says he is \"able to walk a good distance and climb a lot of stairs\" but appears quite deconditioned. HPI: Serina Kelley a 50 y. o. male with history of HFpEF (2/2021 ECHO showed grade 2 diastolic dysfunction, 22% EF, LV hypertrophy), COPD on 2 L of oxygen at home, diabetes, hypertension, hyperlipidemia who presents with two weeks of worsening SOB. He reports his home oxygen saturations was in the 70's. He is not vaccinated for COVID and he reports that he was likely exposed to [employer] before his SOB began. At that time he also experiences 2-3 days of generalized body aches that have since resolved. Denies fevers and chills. Patient has been hospitalized previously for COPD and CHF exacerbations, and he reports this SOB feels more like a CHF exacerbation to him. He reports feeling slightly fluid overloaded though does not know his dry weight. He repeatedly takes off his HFNC in the room and desats down to the 60's, but quickly comes back up on 10L. Even with a pulse ox at 60 he denies SOB     Upon admission, sodium 146, glucose 155, Pro-BNP 3129 (5000 on 7/2021 and 1847 in 6/2021), negative troponin, D-dimer 292. Chest XR showed a R pleural effusion and hazy bibasilar atelectasis vs. Small infiltrates. It showed stable cardiomegaly and mild pulmonary congestion.  He reports complete resolution of SOB after receiving IV lasix dose, however continues to desat without supplemental oxygen.     Medications:     Scheduled Meds:   nicotine  1 patch TransDERmal Daily    insulin lispro  0-12 Units SubCUTAneous TID WC    insulin lispro  0-6 Units SubCUTAneous Nightly    folic acid, thiamine, multi-vitamin with vitamin K infusion   IntraVENous Daily    magnesium sulfate  4,000 mg IntraVENous Once    enoxaparin  40 mg SubCUTAneous BID    furosemide  40 mg IntraVENous BID    lisinopril  20 mg Oral Daily    gabapentin  800 mg Oral 4x Daily    aspirin  81 mg Oral Daily    atorvastatin  40 mg Oral Daily    DULoxetine  20 mg Oral BID    budesonide-formoterol  2 puff Inhalation BID    montelukast  10 mg Oral Nightly    sodium chloride flush  5-40 mL IntraVENous 2 times per day     Continuous Infusions:   dextrose      sodium chloride 25 mL (09/17/21 0636)     PRN Meds:ipratropium-albuterol, glucose, dextrose, glucagon (rDNA), dextrose, sodium chloride flush, sodium chloride, ondansetron **OR** ondansetron, polyethylene glycol, acetaminophen **OR** acetaminophen    Objective:   Vitals:   T-max:  Patient Vitals for the past 8 hrs:   BP Temp Temp src Pulse Resp SpO2   09/17/21 0826 138/63 98 °F (36.7 °C) Oral 69 20 91 %   09/17/21 0739 -- -- -- -- 22 --   09/17/21 0703 -- -- -- 85 -- --   09/17/21 0500 -- -- -- -- 19 --   09/17/21 0420 130/60 98.1 °F (36.7 °C) Oral 80 19 90 %   09/17/21 0400 -- -- -- 83 -- --   09/17/21 0300 -- -- -- -- -- 92 %       Intake/Output Summary (Last 24 hours) at 9/17/2021 0990  Last data filed at 9/17/2021 2807  Gross per 24 hour   Intake 211.98 ml   Output 300 ml   Net -88.02 ml       Review of Systems  Gen: Denies fevers or chills  CV: No chest pain, palpitations  Pulm: Says his shortness of breath has improved since admission, is still on 10 L high flow sats in 90s  Abd: Denies abdominal pain, nausea, vomiting  Neuro: Denies headache, paresthesias    Physical Exam  Gen: Lying in bed, appears comfortable cooperative with exam.  CV: Regular rate rhythm, could not appreciate murmur. Exam limited by body habitus and ambient noise  Pulm: Wheezes present on bilateral lung fields. No crackles. Breath sounds mildly delayed diminished in bases, but not overtly so. Abd: Soft nontender bowel sounds present  Neuro: CN II through XII grossly intact, moving all 4 extremities, no focal deficits. LABS:    CBC:   Recent Labs     09/16/21 1745 09/17/21 0427   WBC 9.1 8.5   HGB 15.2 15.4   HCT 48.5 46.6    299   MCV 93.0 90.9     Renal:    Recent Labs     09/16/21 1745 09/17/21 0427   * 145   K 4.5 4.7    101   CO2 33* 32   BUN 23* 22*   CREATININE 0.7* 0.8*   GLUCOSE 155* 184*   CALCIUM 9.9 10.0   MG  --  1.40*   ANIONGAP 12 12     Hepatic:   Recent Labs     09/16/21 1745 09/17/21 0427   AST 20 17   ALT 19 17   BILITOT 0.3 0.3   BILIDIR  --  <0.2   PROT 7.4 8.2   LABALBU 4.0 4.1   ALKPHOS 58 59     Troponin:   Recent Labs     09/16/21 1745 09/17/21  0017   TROPONINI <0.01 <0.01     BNP: No results for input(s): BNP in the last 72 hours. Lipids: No results for input(s): CHOL, HDL in the last 72 hours. Invalid input(s): LDLCALCU, TRIGLYCERIDE  ABGs:    Recent Labs     09/17/21  0044   PHART 7.347*   OWB1KKQ 67.9*   PO2ART 65.4*   TBG3VHP 37*   BEART 8.6*   [id number] 91*   [id number] 39       INR:   Recent Labs     09/16/21 1745   INR 1.13*     Lactate: No results for input(s): LACTATE in the last 72 hours. Cultures:  -----------------------------------------------------------------  RAD:   CT CHEST PULMONARY EMBOLISM W CONTRAST   Final Result   1. No definite pulmonary embolus identified. 2.  Mild atherosclerotic changes of the aorta. No aortic aneurysm or    dissection. 3.  Patchy densities in the lower lobes and right middle lobe may    represent atelectasis versus infectious/inflammatory process. 4.  Dependent atelectasis bilaterally. Atelectasis in the lingula.    5.  Trace right pleural effusion. 6.  Nonspecific similar mildly prominent mediastinal and hilar lymph    nodes. Assessment/Plan:     #Acute hypoxic and hypercapnic respiratory failure  Is usually on 2 L at home. He is requiring 10 L high flow nasal cannula. ABG 7.34/67.9/65.4/37. CT with trace pleural effusion, and bilateral lower lobe and right middle lobe atelectasis versus infectious/inflammatory process. Suspect this is more likely to be viral considering pro-Noble of 0.09. Patient not vaccinated, reports exposures.  -Follow-up Covid test  -Viral respiratory panel if Covid negative  - Pulm consult if O2 requirements don't improve   - continue home Symbicort  -Continue home Singulair  -DuoNebs as needed    #COPD-low suspicion for exacerbation  Patient without cough, but with significant wheezing on exam across all lung fields. ABG hypercapnic but this is consistent at his baseline PCO2 60 previous labs. -Patient received 2 doses Decadron 10  -Prednisone 40 to finish 5 days total steroids. #CHF/EF 55% in February-low suspicion for exacerbation  Patient not overtly volume overloaded, could not appreciate JVD but this is complicated by body habitus. No pitting edema on lower extremities, no pleural effusion on imaging, consistent with exam.  -Stop Lasix gtt. -Transition to Lasix 40 IV twice daily     #CAD/PVD  Patient on long-term DAPT. Per cardiologist note Dr. Ariane Melendez in February, this was supposed to be stopped. However appears to be continued by outpatient PCP. Pharmacy will follow up with med rec.  -Stop Plavix  -Continue aspirin    #Diabetes type 2-A1c 7.1 in June  Sugars have been elevated on admission is 184 today.    -MDSSI     #HTN  - continue home meds    #HLD  - Continue home meds    #Neuropathy  - continue home gabapentin    #Smoking cessation  Patient with significant smoking history 2.5 PPD since 10years old  - education and patch    Code Status: Full code  FEN: Low-sodium diet  PPX: Lovenox  DISPO: pending    Jeannette Eastman MD, PGY-1  09/17/21  9:45 AM    This patient will be staffed and discussed with Dr. Liana Pineda MD.

## 2021-09-17 NOTE — CONSULTS
Nutrition Note     RD did not conduct direct, in-person nutrition evaluation in efforts to reduce exposure and use of PPE for high risk persons, PUI persons, patients who have tested positive for Covid-19 or those awaiting respiratory panel results. EMR was screened for nutrition risk factors, as defined per nutrition standards of care. RD consult for CHF edu; pt currently in covid r/o. Direct edu will be held pending negative covid test results. RD to follow per Nutrition SOC. The patient will still be monitored for a change in status and re-entery into nutrition care at a later date. Consult dietitian if nutrition interventions essential to patient care is needed.      Torrey Palomares RD, LD:    [place]: [phone]  Office:  [phone]

## 2021-09-17 NOTE — PROGRESS NOTES
Pt alert and oriented x4. Unable to  BP with machine, manual BP obtained 104/58. Nasal cannula placed on patient put on 8L with sats in the 80s turned up to 10L with oxygen sats in the 90s. Currently 93%.

## 2021-09-18 LAB
ANION GAP SERPL CALCULATED.3IONS-SCNC: 8 MMOL/L (ref 3–16)
BASOPHILS ABSOLUTE: 0 K/UL (ref 0–0.2)
BASOPHILS RELATIVE PERCENT: 0.1 %
BUN BLDV-MCNC: 24 MG/DL (ref 7–20)
CALCIUM SERPL-MCNC: 9.8 MG/DL (ref 8.3–10.6)
CHLORIDE BLD-SCNC: 103 MMOL/L (ref 99–110)
CO2: 35 MMOL/L (ref 21–32)
CREAT SERPL-MCNC: 0.7 MG/DL (ref 0.9–1.3)
EOSINOPHILS ABSOLUTE: 0 K/UL (ref 0–0.6)
EOSINOPHILS RELATIVE PERCENT: 0.2 %
FOLATE: 19.48 NG/ML (ref 4.78–24.2)
GFR AFRICAN AMERICAN: >60
GFR NON-AFRICAN AMERICAN: >60
GLUCOSE BLD-MCNC: 112 MG/DL (ref 70–99)
GLUCOSE BLD-MCNC: 173 MG/DL (ref 70–99)
GLUCOSE BLD-MCNC: 174 MG/DL (ref 70–99)
GLUCOSE BLD-MCNC: 237 MG/DL (ref 70–99)
GLUCOSE BLD-MCNC: 264 MG/DL (ref 70–99)
HCT VFR BLD CALC: 46.8 % (ref 40.5–52.5)
HEMOGLOBIN: 15 G/DL (ref 13.5–17.5)
LV EF: 58 %
LVEF MODALITY: NORMAL
LYMPHOCYTES ABSOLUTE: 1.6 K/UL (ref 1–5.1)
LYMPHOCYTES RELATIVE PERCENT: 15 %
MAGNESIUM: 2.1 MG/DL (ref 1.8–2.4)
MCH RBC QN AUTO: 29.3 PG (ref 26–34)
MCHC RBC AUTO-ENTMCNC: 32 G/DL (ref 31–36)
MCV RBC AUTO: 91.7 FL (ref 80–100)
MONOCYTES ABSOLUTE: 0.9 K/UL (ref 0–1.3)
MONOCYTES RELATIVE PERCENT: 8.1 %
NEUTROPHILS ABSOLUTE: 8 K/UL (ref 1.7–7.7)
NEUTROPHILS RELATIVE PERCENT: 76.6 %
PDW BLD-RTO: 18 % (ref 12.4–15.4)
PERFORMED ON: ABNORMAL
PLATELET # BLD: 316 K/UL (ref 135–450)
PMV BLD AUTO: 8.6 FL (ref 5–10.5)
POTASSIUM SERPL-SCNC: 4.2 MMOL/L (ref 3.5–5.1)
RBC # BLD: 5.1 M/UL (ref 4.2–5.9)
REPORT: NORMAL
RESPIRATORY PANEL PCR: NORMAL
SODIUM BLD-SCNC: 146 MMOL/L (ref 136–145)
VITAMIN B-12: 523 PG/ML (ref 211–911)
WBC # BLD: 10.5 K/UL (ref 4–11)

## 2021-09-18 PROCEDURE — 82746 ASSAY OF FOLIC ACID SERUM: CPT

## 2021-09-18 PROCEDURE — 83735 ASSAY OF MAGNESIUM: CPT

## 2021-09-18 PROCEDURE — 6370000000 HC RX 637 (ALT 250 FOR IP): Performed by: NURSE PRACTITIONER

## 2021-09-18 PROCEDURE — 2580000003 HC RX 258: Performed by: STUDENT IN AN ORGANIZED HEALTH CARE EDUCATION/TRAINING PROGRAM

## 2021-09-18 PROCEDURE — 6370000000 HC RX 637 (ALT 250 FOR IP): Performed by: STUDENT IN AN ORGANIZED HEALTH CARE EDUCATION/TRAINING PROGRAM

## 2021-09-18 PROCEDURE — 82607 VITAMIN B-12: CPT

## 2021-09-18 PROCEDURE — C8929 TTE W OR WO FOL WCON,DOPPLER: HCPCS

## 2021-09-18 PROCEDURE — 2700000000 HC OXYGEN THERAPY PER DAY

## 2021-09-18 PROCEDURE — 80048 BASIC METABOLIC PNL TOTAL CA: CPT

## 2021-09-18 PROCEDURE — 2060000000 HC ICU INTERMEDIATE R&B

## 2021-09-18 PROCEDURE — 85025 COMPLETE CBC W/AUTO DIFF WBC: CPT

## 2021-09-18 PROCEDURE — 6370000000 HC RX 637 (ALT 250 FOR IP): Performed by: INTERNAL MEDICINE

## 2021-09-18 PROCEDURE — 36415 COLL VENOUS BLD VENIPUNCTURE: CPT

## 2021-09-18 PROCEDURE — 84425 ASSAY OF VITAMIN B-1: CPT

## 2021-09-18 PROCEDURE — 6360000004 HC RX CONTRAST MEDICATION: Performed by: NURSE PRACTITIONER

## 2021-09-18 PROCEDURE — APPNB45 APP NON BILLABLE 31-45 MINUTES: Performed by: NURSE PRACTITIONER

## 2021-09-18 PROCEDURE — 87449 NOS EACH ORGANISM AG IA: CPT

## 2021-09-18 PROCEDURE — 94761 N-INVAS EAR/PLS OXIMETRY MLT: CPT

## 2021-09-18 PROCEDURE — 94640 AIRWAY INHALATION TREATMENT: CPT

## 2021-09-18 RX ORDER — CLOPIDOGREL BISULFATE 75 MG/1
75 TABLET ORAL DAILY
Status: DISCONTINUED | OUTPATIENT
Start: 2021-09-18 | End: 2021-10-01 | Stop reason: HOSPADM

## 2021-09-18 RX ORDER — FUROSEMIDE 40 MG/1
40 TABLET ORAL DAILY
Status: DISCONTINUED | OUTPATIENT
Start: 2021-09-18 | End: 2021-09-21

## 2021-09-18 RX ADMIN — GABAPENTIN 800 MG: 400 CAPSULE ORAL at 18:08

## 2021-09-18 RX ADMIN — PREDNISONE 40 MG: 20 TABLET ORAL at 10:37

## 2021-09-18 RX ADMIN — GABAPENTIN 800 MG: 400 CAPSULE ORAL at 13:39

## 2021-09-18 RX ADMIN — GABAPENTIN 800 MG: 400 CAPSULE ORAL at 23:51

## 2021-09-18 RX ADMIN — DULOXETINE 20 MG: 20 CAPSULE, DELAYED RELEASE ORAL at 20:50

## 2021-09-18 RX ADMIN — IPRATROPIUM BROMIDE AND ALBUTEROL 1 PUFF: 20; 100 SPRAY, METERED RESPIRATORY (INHALATION) at 20:16

## 2021-09-18 RX ADMIN — MONTELUKAST 10 MG: 10 TABLET, FILM COATED ORAL at 20:50

## 2021-09-18 RX ADMIN — BUDESONIDE AND FORMOTEROL FUMARATE DIHYDRATE 2 PUFF: 80; 4.5 AEROSOL RESPIRATORY (INHALATION) at 07:50

## 2021-09-18 RX ADMIN — INSULIN LISPRO 2 UNITS: 100 INJECTION, SOLUTION INTRAVENOUS; SUBCUTANEOUS at 13:11

## 2021-09-18 RX ADMIN — INSULIN LISPRO 6 UNITS: 100 INJECTION, SOLUTION INTRAVENOUS; SUBCUTANEOUS at 18:08

## 2021-09-18 RX ADMIN — CLOPIDOGREL BISULFATE 75 MG: 75 TABLET ORAL at 11:16

## 2021-09-18 RX ADMIN — DULOXETINE 20 MG: 20 CAPSULE, DELAYED RELEASE ORAL at 10:37

## 2021-09-18 RX ADMIN — FUROSEMIDE 40 MG: 40 TABLET ORAL at 10:38

## 2021-09-18 RX ADMIN — PERFLUTREN 1.65 MG: 6.52 INJECTION, SUSPENSION INTRAVENOUS at 12:00

## 2021-09-18 RX ADMIN — ATORVASTATIN CALCIUM 80 MG: 80 TABLET, FILM COATED ORAL at 10:37

## 2021-09-18 RX ADMIN — Medication 10 ML: at 20:50

## 2021-09-18 RX ADMIN — INSULIN LISPRO 2 UNITS: 100 INJECTION, SOLUTION INTRAVENOUS; SUBCUTANEOUS at 20:53

## 2021-09-18 RX ADMIN — GABAPENTIN 800 MG: 400 CAPSULE ORAL at 10:37

## 2021-09-18 RX ADMIN — ASPIRIN 81 MG: 81 TABLET, COATED ORAL at 10:38

## 2021-09-18 RX ADMIN — BUDESONIDE AND FORMOTEROL FUMARATE DIHYDRATE 2 PUFF: 80; 4.5 AEROSOL RESPIRATORY (INHALATION) at 20:16

## 2021-09-18 ASSESSMENT — PAIN SCALES - GENERAL
PAINLEVEL_OUTOF10: 0

## 2021-09-18 NOTE — SIGNIFICANT EVENT
Rapid response called 18:40 as pt was found confused not responding to questions. His last well known was 17:30. Pt was awake, but with apparent expressive aphasia. He wasn't able to answer most questions. BP was found in the 160's, HR 80's, sating 90% on 10L (unchanged from earlier this AM). Pt was noted to have NIHSS 4. Code stroke called 18:41. CT head/CTA head/neck W/Wo contrast ordered STAT. Called stroke team immediately. Pt was taken to CT. On return to his room, his speech was noted to be improved as he was more conversing. Arranged video call with stroke team neurologist who performed full neurological assessment with the help of ICU team. Pt was found to have no acute hemorrhage on CT head but had evidence of vertebral occlusion in the CTA which doesn't explain his transient deficits. See stroke team note for more details. Per their evaluation, tpA risks outweigh benefits and pt symptoms have almost resolved. Recommended neurology consult (ordered), neuro checks q1h until 10pm (end of tpa window) and then neuro checks q2h after that, dual antiplatelets loading dose Plavix 300 and . Pt received ASA 81 earlier today, so Plavix loading dose ordered in addition to another dose of ASA 81. Given pt history of COPD, tremors noted on exam, and confusion reported earlier, ABG was obtained to rule out hypercapnia which showed pH 7.418/pCO2 51.2/pO2 66. Discussed plan with EDU.      Jordyn Sandoval MD  PGY-3

## 2021-09-18 NOTE — CONSULTS
inhaler, Inhale 1 puff into the lungs daily  gabapentin (NEURONTIN) 800 MG tablet, TAKE 1 TABLET 4 TIMES DAILY  HYDROcodone-acetaminophen (NORCO) 5-325 MG per tablet, Take 1 tablet by mouth every 8 hours as needed for Pain. furosemide (LASIX) 40 MG tablet, Take 1 tablet by mouth daily  metFORMIN (GLUCOPHAGE) 1000 MG tablet, TAKE 1 TABLET BY MOUTH 2 TIMES DAILY (WITH MEALS)  atorvastatin (LIPITOR) 40 MG tablet, TAKE 1 TABLET ONCE DAILY  fenofibric acid (FIBRICOR) 135 MG CPDR capsule, TAKE 1 CAPSULE ONCE DAILY  lisinopril (PRINIVIL;ZESTRIL) 20 MG tablet, TAKE 1 TABLET ONCE DAILY  [DISCONTINUED] montelukast (SINGULAIR) 10 MG tablet,   [DISCONTINUED] DULoxetine (CYMBALTA) 20 MG extended release capsule, Take 20 mg by mouth 2 times daily  Cholecalciferol (VITAMIN D3) 1.25 MG (62168 UT) CAPS, Take 1 capsule by mouth Twice a Week  Magnesium 400 MG CAPS, Take 1 tablet by mouth daily  [DISCONTINUED] clopidogrel (PLAVIX) 75 MG tablet, Take 1 tablet by mouth daily  blood glucose monitor strips, Test 2 times a day & as needed for symptoms of irregular blood glucose. E11.9  glucose monitoring kit (FREESTYLE) monitoring kit, 1 kit by Does not apply route daily Patient wants brand name Patient tests bid  E11.9  blood glucose monitor kit and supplies, Test 2 times a day & as needed for symptoms of irregular blood glucose.   aspirin 81 MG EC tablet, Take 81 mg by mouth daily  Cyanocobalamin 1000 MCG CAPS, Take 1 tablet by mouth daily  Omega-3 1000 MG CAPS, Take 1 capsule by mouth daily  Coenzyme Q10 (COQ10) 100 MG CAPS, Take 1 capsule by mouth daily  MULTIPLE VITAMINS PO, Take by mouth    CURRENT MEDICATIONS:    Current Facility-Administered Medications:     nicotine (NICODERM CQ) 14 MG/24HR 1 patch, 1 patch, TransDERmal, Daily, Natasha Plata MD, 1 patch at 09/17/21 1104    insulin lispro (1 Unit Dial) 0-12 Units, 0-12 Units, SubCUTAneous, TID WC, Benton Kellogg MD, 4 Units at 09/17/21 1429    insulin lispro (1 Unit Dial) 0-6 Units, 0-6 Units, SubCUTAneous, Nightly, Jaqueline Johnson MD, 1 Units at 09/17/21 2133    [Held by provider] enoxaparin (LOVENOX) injection 40 mg, 40 mg, SubCUTAneous, BID, Lilia Varma MD, 40 mg at 09/17/21 1104    [Held by provider] furosemide (LASIX) injection 40 mg, 40 mg, IntraVENous, BID, Lilia Varma MD, 40 mg at 09/17/21 1104    [Held by provider] lisinopril (PRINIVIL;ZESTRIL) tablet 20 mg, 20 mg, Oral, Daily, Lilia Varma MD, 20 mg at 09/17/21 1106    gabapentin (NEURONTIN) capsule 800 mg, 800 mg, Oral, 4x Daily, Lilia Varma MD, 800 mg at 09/17/21 1427    predniSONE (DELTASONE) tablet 40 mg, 40 mg, Oral, Daily, Lilia Varma MD, 40 mg at 09/17/21 1105    albuterol-ipratropium (COMBIVENT RESPIMAT)  MCG/ACT inhaler 1 puff, 1 puff, Inhalation, Q4H PRN, Andre Gama MD    atorvastatin (LIPITOR) tablet 80 mg, 80 mg, Oral, Daily, Fouzia Abreu MD    perflutren lipid microspheres (DEFINITY) injection 1.65 mg, 1.5 mL, IntraVENous, ONCE PRN, Fouzia Abreu MD    aspirin EC tablet 81 mg, 81 mg, Oral, Daily, Lee Colón MD, 81 mg at 09/17/21 1104    DULoxetine (CYMBALTA) extended release capsule 20 mg, 20 mg, Oral, BID, Lee Colón MD, 20 mg at 09/17/21 2128    glucose (GLUTOSE) 40 % oral gel 15 g, 15 g, Oral, PRN, Lee Colón MD    dextrose 50 % IV solution, 12.5 g, IntraVENous, PRN, Lee Colón MD    glucagon (rDNA) injection 1 mg, 1 mg, IntraMUSCular, PRN, Lee Colón MD    dextrose 5 % solution, 100 mL/hr, IntraVENous, PRN, Lee Colón MD    budesonide-formoterol (SYMBICORT) 80-4.5 MCG/ACT inhaler 2 puff, 2 puff, Inhalation, BID, Lee Colón MD    montelukast (SINGULAIR) tablet 10 mg, 10 mg, Oral, Nightly, Lee Colón MD, 10 mg at 09/17/21 2128    sodium chloride flush 0.9 % injection 5-40 mL, 5-40 mL, IntraVENous, 2 times per day, Lee Colón MD, 10 mL at 09/17/21 2133    sodium chloride flush 0.9 % injection 5-40 mL, 5-40 mL, IntraVENous, PRN, Lee Colón MD    0.9 % sodium chloride infusion, 25 mL, IntraVENous, PRN, Harley Hastings MD, Stopped at 09/17/21 0948    ondansetron (ZOFRAN-ODT) disintegrating tablet 4 mg, 4 mg, Oral, Q8H PRN **OR** ondansetron (ZOFRAN) injection 4 mg, 4 mg, IntraVENous, Q6H PRN, Harley Hastings MD    polyethylene glycol [facility]) packet 17 g, 17 g, Oral, Daily PRN, Harley Hastings MD    acetaminophen (TYLENOL) tablet 650 mg, 650 mg, Oral, Q6H PRN **OR** acetaminophen (TYLENOL) suppository 650 mg, 650 mg, Rectal, Q6H PRN, Harley Hastings MD      ROS:   Constitutional- No weight loss or fevers   Eyes- No diplopia. No photophobia. Ears/nose/throat- No dysphagia. No Dysarthria   Cardiovascular- No palpitations. No chest pain   Respiratory- No dyspnea. No Cough +shortness of breath  Gastrointestinal- No Abdominal pain. No Vomiting. Genitourinary- No incontinence. No urinary retention   Musculoskeletal- No myalgia. No arthralgia   Skin- No rash. No easy bruising. Psychiatric- No depression. No anxiety   Endocrine- No diabetes. No thyroid issues. Hematologic- No bleeding difficulty. No fatigue   Neurologic- No weakness. No Headache. Constitutional  /70   Pulse 68   Temp 97.8 °F (36.6 °C) (Oral)   Resp 20   Ht 4' 11\" (1.499 m)   Wt 188 lb 7.9 oz (85.5 kg)   SpO2 98%   BMI 38.07 kg/m²     General Alert, no distress, well-nourished  Cardiovascular: Rate regular.  No murmurs  Respiratory: Shortness of breath     Neurologic  Mental status:   orientation to person, place, time, situation   Attention intact as able to attend well to the exam     Language fluent in conversation   Comprehension intact; follows simple commands    Cranial nerves:   CN2: Visual Fields full w/o extinction on confrontational testing  CN 3,4,6: pupils equal and reactive to light, extraocular muscles intact,  CN5: facial sensation symmetric   CN7:face symmetric bilaterally   CN8: hearing symmetric to voice  CN9: palate elevated symmetrically  CN11: trap full strength on shoulder shrug  CN12: tongue midline with protrusion  Motor Exam:   R  L    Deltoid 5 5   Biceps 5 5   Triceps 5 5   Interossei 5 5      R  L    Hip flexion  5 5   Knee flexion  5 5   Knee extension  5 5   Ankle dorsiflexion  5 5   Ankle plantar flexion  5 5       Deep tendon reflexes:    R  L    Biceps  2 2   Brachioradialis  2 2   Patellar  2 2   Toes down down     Sensory: light touch intact and symmetric in all 4 extremities. Gait: deferred due to shortness of breath      Images:  CT head wo contrast:   No acute intracranial abnormality . CTA head and neck:   1. Atherosclerotic disease at bilateral carotid bifurcations. Exact percentage of stenosis by NASCET criteria is difficult to comment upon due to artifacts. 2. 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 Moderately attenuated mid vertebral artery from upper C7  to C4 5 level. Distal to its left vertebral artery is patent without significant stenosis. Labs:    Lipid Panel:  Results for Diamante Barrera (MRN [id number]) as of 9/18/2021 07:20   Ref. Range 9/17/2021 04:27   Cholesterol, Total Latest Ref Range: 0 - 199 mg/dL 163   HDL Cholesterol Latest Ref Range: 40 - 60 mg/dL 31 (L)   LDL Calculated Latest Ref Range: <100 mg/dL 104 (H)   Triglycerides Latest Ref Range: 0 - 150 mg/dL 141   VLDL Cholesterol Calculated Latest Ref Range: Not Established mg/dL 28     Hemoglobin A1c, 6/7/2021: 7.1    Assessment: Mr. Keri Anthony is a 50 y.o. male with history of HFpEF (2/2021 ECHO showed grade 2 diastolic dysfunction, 50% EF, LV hypertrophy), COPD on 2 L of oxygen at home, diabetes, hypertension, hyperlipidemia who presents with two weeks of worsening SOB. Transient episode of expressive aphasia consistent with TIA. Although he doesn't endorse being aphasic overnight, he was assessed by nursing and the resident staff and I do believe his episode was due to more than just shortness of breath. ABCD2 score: 2.    Per the validation study, 0-3 points: Low Risk  2-Day Stroke Risk: 1.0%  7-Day Stroke Risk: 1.2%  90-Day Stroke Risk: 3.1%    Given his TIA localizes to a large vessel, would complete the stroke workup and modify his stroke risk factors. His CTA showed an occluded left vertebral artery, which is likely non-contributory, but he does have a lot of atherosclerotic disease throughout.        Plan:  -Obtain MRI of the brain wo contrast to eval for stroke  -Echocardiogram with bubble  -Nursing bedside swallow before PO   -ASA 81mg PO daily, Plavix 75mg PO daily (already received load of 300mg x1) for 21 days followed by monotherapy with plavix  -Atorvastatin 80mg PO QHS  -SCDs for DVT prophylaxis  -PT/OT: eval and treat, PMR consult if rehab appropriate   -ST: eval and treat and dysphagia screen  -Q4H neuro checks  -Q4H vital signs  -Recheck hemoglobin A1C, diabetic education consult  -Telemetry         LINK Gunn-CNP  Neurology & Neurocritical Care   Neurology Line: [phone]  Perfect[employer]erve: [facility] NPs

## 2021-09-18 NOTE — PROGRESS NOTES
Reviewed MRI questionnaire with pt's wife Lakeshia. Update also provided.  All questions answered at this time

## 2021-09-18 NOTE — PLAN OF CARE
Telemedicine Consult Note   Stroke Team                Patient Name: Reggie Chappell (22 y.o. male)  MRN: [id number]  : 1965  Admission Date: 2021   Current Date: 21    STROKE TIMELINE     Stroke team contact: 18:54  Symptom discovery: 18:50  History obtained from: Dr. Charlotte Clarke and patient    TPA bolus time: NA  DTN (minutes): NA    Groin puncture time: NA  ED arrival to groin puncture time: NA      Chief Complaint     Word finding difficulty    History of Present Illness   Reggie Chappell is a 50 y.o. male presenting with word-finding difficulty. Briefly, pt has been admitted for acute on chronic hypercapnic hypoxic respiratory failure and is currently on supplemental O2. Pt was LKW at 17:30. At around 18:50, pt was found to have difficulty speaking. Initially had difficulty getting any words out, and later had difficulty finding words. He had not other focal weakness, and no reported vertigo symptomatology. Upon returning from CT, speech was starting to improve. On my evaluation via telestroke, pt reported that he felt that his speech had returned to baseline. He endorses no symptoms at the time of my evaluation over telestroke.        Past Medical History:   Diagnosis Date    Acute respiratory failure ([facility] [address] 2021    [facility]est[facility])     combined    COPD (chronic obstructive pulmonary disease) (HCC)     Diabetes mellitus ([facility] [address]     Hyperlipidemia     Hypertension     Oxygen dependent       Past Surgical History:   Procedure Laterality Date    HERNIA REPAIR      TONSILLECTOMY       Social History     Socioeconomic History    Marital status:      Spouse name: Not on file    Number of children: Not on file    Years of education: Not on file    Highest education level: Not on file   Occupational History    Not on file   Tobacco Use    Smoking status: Current Every Day Smoker     Packs/day: 1.00    Smokeless tobacco: Never Used   Vaping Use    Vaping Use: Never used   Substance and Sexual Activity    Alcohol use: Yes     Comment: 6 pack per month    Drug use: Never    Sexual activity: Not on file   Other Topics Concern    Not on file   Social History Narrative    Not on file     Social Determinants of Health     Financial Resource Strain:     Difficulty of Paying Living Expenses:    Food Insecurity:     Worried About Running Out of Food in the Last Year:     [address] in the Last Year:    Transportation Needs:     Lack of Transportation (Medical):  Lack of Transportation (Non-Medical):    Physical Activity:     Days of Exercise per Week:     Minutes of Exercise per Session:    Stress:     Feeling of Stress :    Social Connections:     Frequency of Communication with Friends and Family:     Frequency of Social Gatherings with Friends and Family:     Attends [employer] Services:     Active Member of Clubs or Organizations:     Attends Club or Organization Meetings:     Marital Status:    Intimate Partner Violence:     Fear of Current or Ex-Partner:     Emotionally Abused:     Physically Abused:     Sexually Abused:      Family History   Problem Relation Age of Onset    Asthma Mother     Cancer Mother     Hearing Loss Mother     Vision Loss Mother     High Blood Pressure Father     High Cholesterol Father     Vision Loss Father     Asthma Sister     Diabetes Sister     Vision Loss Sister     Asthma Brother     Arthritis Brother     High Blood Pressure Brother      No current facility-administered medications on file prior to encounter.      Current Outpatient Medications on File Prior to Encounter   Medication Sig Dispense Refill    Umeclidinium Bromide (INCRUSE ELLIPTA) 62.5 MCG/INH AEPB INHALE 1 PUFF DAILY 30 each 3    ipratropium-albuterol (DUONEB) 0.5-2.5 (3) MG/3ML SOLN nebulizer solution Take 3 mLs by nebulization every 4-6 hours as needed for Shortness of Breath 360 mL 1    glimepiride (AMARYL) 2 MG tablet TAKE 1 TABLET EACH MORNING BEFORE BREAKFAST. DISCONTINUE FARXIGA 90 tablet 1    albuterol sulfate  (90 Base) MCG/ACT inhaler INHALE 2 PUFFS INTO THE LUNGS EVERY 6 HOURS AS NEEDED FOR WHEEZING OR SHORTNESS OF BREATH 8.5 g 5    pioglitazone (ACTOS) 15 MG tablet TAKE (1) TABLET DAILY 30 tablet 5    bisoprolol (ZEBETA) 5 MG tablet TAKE 1 TABLET BY MOUTH DAILY 30 tablet 5    Fluticasone furoate-vilanterol (BREO ELLIPTA) 200-25 MCG/INH AEPB inhaler Inhale 1 puff into the lungs daily 1 each 0    gabapentin (NEURONTIN) 800 MG tablet TAKE 1 TABLET 4 TIMES DAILY 120 tablet 0    HYDROcodone-acetaminophen (NORCO) 5-325 MG per tablet Take 1 tablet by mouth every 8 hours as needed for Pain.  furosemide (LASIX) 40 MG tablet Take 1 tablet by mouth daily 30 tablet 3    metFORMIN (GLUCOPHAGE) 1000 MG tablet TAKE 1 TABLET BY MOUTH 2 TIMES DAILY (WITH MEALS) 60 tablet 5    atorvastatin (LIPITOR) 40 MG tablet TAKE 1 TABLET ONCE DAILY 90 tablet 1    fenofibric acid (FIBRICOR) 135 MG CPDR capsule TAKE 1 CAPSULE ONCE DAILY 30 capsule 5    lisinopril (PRINIVIL;ZESTRIL) 20 MG tablet TAKE 1 TABLET ONCE DAILY 30 tablet 3    Cholecalciferol (VITAMIN D3) 1.25 MG (32092 UT) CAPS Take 1 capsule by mouth Twice a Week 2 capsule 1    Magnesium 400 MG CAPS Take 1 tablet by mouth daily 30 capsule 1    blood glucose monitor strips Test 2 times a day & as needed for symptoms of irregular blood glucose. E11.9 300 strip 0    glucose monitoring kit (FREESTYLE) monitoring kit 1 kit by Does not apply route daily Patient wants brand name Patient tests bid  E11.9 1 kit 0    blood glucose monitor kit and supplies Test 2 times a day & as needed for symptoms of irregular blood glucose.  1 kit 0    aspirin 81 MG EC tablet Take 81 mg by mouth daily      Cyanocobalamin 1000 MCG CAPS Take 1 tablet by mouth daily      Omega-3 1000 MG CAPS Take 1 capsule by mouth daily      Coenzyme Q10 (COQ10) 100 MG CAPS Take 1 capsule by mouth daily      MULTIPLE VITAMINS PO Take by mouth           Physical Exam     Vitals:    21 1722   BP:    Pulse:    Resp: 20   Temp:    SpO2: 93%              NIH Stroke Scale    Time Performed: 19:50      1a  Level of consciousness: 0 - alert; keenly responsive   1b. LOC questions:  0 - answers both questions correctly   1c. LOC commands: 0=Performs both tasks correctly   2. Best Gaze: 0=normal   3. Visual: 0=No visual loss   4. Facial Palsy: 0=Normal symmetric movement   5a. Motor left arm: 0=No drift, limb holds 90 (or 45) degrees for full 10 seconds   5b. Motor right arm: 0=No drift, limb holds 90 (or 45) degrees for full 10 seconds   6a. motor left le=No drift, limb holds 90 (or 45) degrees for full 10 seconds   6b  Motor right le=No drift, limb holds 90 (or 45) degrees for full 10 seconds   7. Limb Ataxia: 0=Absent   8. Sensory: 0=Normal; no sensory loss   9. Best Language:  0 - no aphasia, normal   10. Dysarthria: 0=Normal   11. Extinction and Inattention: 0=No abnormality         Total:   0     Other exam findings of note: did not have his reading glasses, but was able to appropriately describe objects within the range of his baseline uncorrected visual acuity. His speech was fluent. Labs:  CBC:   Recent Labs     21   WBC 8.5   HGB 15.4        BMP:    Recent Labs     21      K 4.7      CO2 32   BUN 22*   CREATININE 0.8*   GLUCOSE 184*     Ca/Mg/Phos:   Recent Labs     21   CALCIUM 10.0   MG 1.40*     Troponin:   Recent Labs     21  0017   TROPONINI <0.01     BNP: No results for input(s): BNP in the last 72 hours. Lipids:   Recent Labs     21   CHOL 163   TRIG 141   HDL 31*   LDLCALC 104*   LABVLDL 28     ABGs:   Recent Labs     21  0044   PHART 7.347*   PO2ART 65.4*   XFN8PVK 67.9*     PT/INR:   Recent Labs     21  1745   PROTIME 12.9*   INR 1.13*     APTT: No results for input(s):  APTT in the last 72 hours. HgA1C: Invalid input(s): HGBA1C    Radiology (my read):   CT HEAD WO CONTRAST    Result Date: 9/17/2021  No acute intracranial abnormality . CTA HEAD NECK W CONTRAST    Result Date: 9/17/2021  Study limited by motion artifact and poor contrast bolus. 1. Atherosclerotic disease at bilateral carotid bifurcations. Exact percentage of stenosis by NASCET criteria is difficult to comment upon due to artifacts. 2. 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 Moderately attenuated mid vertebral artery from upper C7  to C4 5 level. Distal to its left vertebral artery is patent without significant stenosis. PROCEDURE: CT ANGIOGRAPHY HEAD WITH/WITHOUT CONTRAST INDICATION: aphasia, ams COMPARISON: none TECHNIQUE: Axial CT imaging obtained through the head prior to and following administration of IV contrast. Axial images, multiplanar reformatted images, and maximum intensity projection images were reviewed for CT angiographic technique. IV contrast: mL Omnipaque 350. FINDINGS: ANTERIOR CIRCULATION: The intracranial internal carotid arteries, anterior cerebral arteries, and middle cerebral arteries are patent. There are calcified plaques with mild to moderate narrowing of bilateral ICA cavernous/supraclinoid segments. . No evidence for aneurysm or arteriovenous malformation. POSTERIOR CIRCULATION: The bilateral vertebral arteries, basilar artery and posterior cerebral arteries are patent. There are calcified and noncalcified plaques with mild narrowing of left vertebral artery intradural segment. Right vertebral intradural segment is severely attenuated, likely hyperplastic. No evidence for aneurysm or arteriovenous malformation. INTRACRANIAL VENOUS SYSTEM: No evidence for intracranial venous thrombosis. IMPRESSION: No evidence of intracranial large vessel occlusion. 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  Calcified plaques with mild to moderate narrowing of bilateral ICA cavernous/supraclinoid segments. Assessment/Recommendations/Plan   Reggie Chappell is a 50 y.o. with speech changes. DDx for presentation includes TIA, given description of symptoms as well as current resolution. At this point, I felt that the risks of tPA outweighed any potential benefit of thrombolysis, given that his symptoms have resolved and he has no deficits on NIHSS testing. I discussed this with the patient, and he agrees. It would be reasonable to consider dual antiplatelet therapy, with an aspirin and plavix load, given his vascular risk factors and vascular disease seen on CTA, as well as frequent neuro checks. Regarding the CTA, vertebral occlusion noted, with reconstituted flow. At this point, does not have symptoms in this vascular distribution, and with reconstituted flow, is not a candidate for EVT at this time. tPA given: No  If DTN >60 minutes, reason: NA    EVT:No  Transfer: No     Patient enrolled in clinical trials: No  Clinical trial NA  Please do not change any trial related orders without speaking with the research coordinator first.  The number to contact is [phone]. Additional Recommendations:   -NPO until passing bedside dysphagia screen or formal swallow  -Local Neurology consult  -PT/OT/ST     For a change in neuro exam or questions, call the  Stroke Team at [phone].      MD Shailesh   Stroke Team  09/17/21 8:26 PM    Telemedicine Time: 21 minutes

## 2021-09-18 NOTE — PROGRESS NOTES
Progress Note    Admit Date: 9/16/2021  Day: 2  Diet: ADULT DIET; Regular; Low Sodium (2 gm); 1800 ml    CC: Chest pain, shortness of breath    Interval history:   Had rapid response called overnight for concern of expressive aphasia. Code stroke was also called. CT head, CTA showed no occlusion of large vessels but dose have occluded left vertebral artery at its origin. Neurology was then consulted. The patient is back to baseline this morning. Reports that he did not recall the details of what happened but denies any acute complaints today. No significant expressive aphasia noted during exam. Denies any headache, CP, SOB, N/V, vision changes, weakness, or sensation changes. Answers questions appropriately. HPI: Marly Villar a 50 y. o. male with history of HFpEF (2/2021 ECHO showed grade 2 diastolic dysfunction, 88% EF, LV hypertrophy), COPD on 2 L of oxygen at home, diabetes, hypertension, hyperlipidemia who presents with two weeks of worsening SOB. He reports his home oxygen saturations was in the 70's. He is not vaccinated for COVID and he reports that he was likely exposed to [employer] before his SOB began. At that time he also experiences 2-3 days of generalized body aches that have since resolved. Denies fevers and chills. Patient has been hospitalized previously for COPD and CHF exacerbations, and he reports this SOB feels more like a CHF exacerbation to him. He reports feeling slightly fluid overloaded though does not know his dry weight. He repeatedly takes off his HFNC in the room and desats down to the 60's, but quickly comes back up on 10L. Even with a pulse ox at 60 he denies SOB.    Upon admission, sodium 146, glucose 155, Pro-BNP 3129 (5000 on 7/2021 and 1847 in 6/2021), negative troponin, D-dimer 292. Chest XR showed a R pleural effusion and hazy bibasilar atelectasis vs. Small infiltrates. It showed stable cardiomegaly and mild pulmonary congestion.  He reports complete resolution of SOB after receiving IV lasix dose, however continues to desat without supplemental oxygen.     Medications:     Scheduled Meds:   furosemide  40 mg Oral Daily    nicotine  1 patch TransDERmal Daily    insulin lispro  0-12 Units SubCUTAneous TID WC    insulin lispro  0-6 Units SubCUTAneous Nightly    [Held by provider] enoxaparin  40 mg SubCUTAneous BID    [Held by provider] lisinopril  20 mg Oral Daily    gabapentin  800 mg Oral 4x Daily    predniSONE  40 mg Oral Daily    atorvastatin  80 mg Oral Daily    aspirin  81 mg Oral Daily    DULoxetine  20 mg Oral BID    budesonide-formoterol  2 puff Inhalation BID    montelukast  10 mg Oral Nightly    sodium chloride flush  5-40 mL IntraVENous 2 times per day     Continuous Infusions:   dextrose      sodium chloride Stopped (09/17/21 0948)     PRN Meds:perflutren lipid microspheres, albuterol-ipratropium, glucose, dextrose, glucagon (rDNA), dextrose, sodium chloride flush, sodium chloride, ondansetron **OR** ondansetron, polyethylene glycol, acetaminophen **OR** acetaminophen    Objective:   Vitals:   T-max:  Patient Vitals for the past 8 hrs:   BP Temp Temp src Pulse Resp SpO2 Weight   09/18/21 0758 121/64 -- Oral 64 18 92 % --   09/18/21 0751 -- -- -- -- 20 92 % --   09/18/21 0634 -- -- -- -- -- -- 188 lb 7.9 oz (85.5 kg)   09/18/21 0551 -- -- -- -- -- 98 % --   09/18/21 0525 123/70 97.8 °F (36.6 °C) Oral -- 20 92 % --   09/18/21 0512 -- -- -- -- 22 94 % --   09/18/21 0200 -- -- -- -- 24 94 % --   09/18/21 0036 -- -- -- -- 25 92 % --       Intake/Output Summary (Last 24 hours) at 9/18/2021 0831  Last data filed at 9/17/2021 2344  Gross per 24 hour   Intake --   Output 1650 ml   Net -1650 ml       Review of Systems  Gen: Denies fevers or chills  CV: No chest pain, palpitations  Pulm: Says his shortness of breath has improved since admission, is still on 10 L high flow sats in 90s  Abd: Denies abdominal pain, nausea, vomiting  Neuro: Denies headache, paresthesias    Physical Exam  Gen: Obese, slow but answers questions appropriately. Lying in bed, appears comfortable cooperative with exam.  CV: Regular rate rhythm, could not appreciate murmur. Exam limited by body habitus and ambient noise  Pulm:Expiratory wheezes present on bilateral lung fields. No crackles. Breath sounds mildly delayed diminished in bases, but not overtly so. Abd: Soft nontender bowel sounds present  Neuro: CN II through XII grossly intact, moving all 4 extremities, no focal deficits. LABS:    CBC:   Recent Labs     09/16/21 1745 09/17/21 0427 09/18/21 0519   WBC 9.1 8.5 10.5   HGB 15.2 15.4 15.0   HCT 48.5 46.6 46.8    299 316   MCV 93.0 90.9 91.7     Renal:    Recent Labs     09/16/21 1745 09/17/21 0427 09/18/21 0519   * 145 146*   K 4.5 4.7 4.2    101 103   CO2 33* 32 35*   BUN 23* 22* 24*   CREATININE 0.7* 0.8* 0.7*   GLUCOSE 155* 184* 173*   CALCIUM 9.9 10.0 9.8   MG  --  1.40* 2.10   ANIONGAP 12 12 8     Hepatic:   Recent Labs     09/16/21 1745 09/17/21 0427   AST 20 17   ALT 19 17   BILITOT 0.3 0.3   BILIDIR  --  <0.2   PROT 7.4 8.2   LABALBU 4.0 4.1   ALKPHOS 58 59     Troponin:   Recent Labs     09/16/21 1745 09/17/21  0017   TROPONINI <0.01 <0.01     BNP: No results for input(s): BNP in the last 72 hours. Lipids:   Recent Labs     09/17/21 0427   CHOL 163   HDL 31*     ABGs:    Recent Labs     09/17/21  0044 09/17/21 2034   PHART 7.347* 7.418   HDF1TZF 67.9* 51.2*   PO2ART 65.4* 66.0*   CZQ2SMO 37* 33.1*   BEART 8.6* 9*   E4JNBMOM 91* 93   SWB3SWX 39 35       INR:   Recent Labs     09/16/21 1745   INR 1.13*     Lactate:   Recent Labs     09/17/21 2034   LACTATE 0.75     Cultures:  -----------------------------------------------------------------  RAD:   CTA HEAD NECK W CONTRAST   Final Result      Study limited by motion artifact and poor contrast bolus. 1. Atherosclerotic disease at bilateral carotid bifurcations. Exact percentage of stenosis by NASCET criteria is difficult to comment upon due to artifacts. 2. 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 Moderately attenuated mid vertebral artery from upper C7  to C4 5 level. Distal to its left vertebral artery is patent without significant stenosis. PROCEDURE: CT ANGIOGRAPHY HEAD WITH/WITHOUT CONTRAST      INDICATION: aphasia, ams      COMPARISON: none      TECHNIQUE: Axial CT imaging obtained through the head prior to and following administration of IV contrast. Axial images, multiplanar reformatted images, and maximum intensity projection images were reviewed for CT angiographic technique. IV contrast: mL Omnipaque 350. FINDINGS:      ANTERIOR CIRCULATION: The intracranial internal carotid arteries, anterior cerebral arteries, and middle cerebral arteries are patent. There are calcified plaques with mild to moderate narrowing of bilateral ICA cavernous/supraclinoid segments. . No    evidence for aneurysm or arteriovenous malformation. POSTERIOR CIRCULATION: The bilateral vertebral arteries, basilar artery and posterior cerebral arteries are patent. There are calcified and noncalcified plaques with mild narrowing of left vertebral artery intradural segment. Right vertebral intradural    segment is severely attenuated, likely hyperplastic. No evidence for aneurysm or arteriovenous malformation. INTRACRANIAL VENOUS SYSTEM: No evidence for intracranial venous thrombosis. IMPRESSION:      No evidence of intracranial large vessel occlusion. 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 Calcified plaques with mild to moderate narrowing of bilateral ICA cavernous/supraclinoid segments. CT HEAD WO CONTRAST   Final Result      No acute intracranial abnormality . CT CHEST PULMONARY EMBOLISM W CONTRAST   Final Result   1.   No definite pulmonary embolus identified. 2.  Mild atherosclerotic changes of the aorta. No aortic aneurysm or    dissection. 3.  Patchy densities in the lower lobes and right middle lobe may    represent atelectasis versus infectious/inflammatory process. 4.  Dependent atelectasis bilaterally. Atelectasis in the lingula. 5.  Trace right pleural effusion. 6.  Nonspecific similar mildly prominent mediastinal and hilar lymph    nodes. MRI BRAIN WO CONTRAST    (Results Pending)       Assessment/Plan:     #TIA r/o  Brief episode of expressive aphasia. Rapid response called 9/17. CT head, CTA head and neck no evidence of intracranial large vessel occlusion. L vertebral artery occluded. - Neurology on board, appreciate recommendation   - Will follow up on MRI brain, Echo  - Continue ASA/Plavix   - Check folate, Vitamin B12, and B1    #Acute hypoxic and hypercapnic respiratory failure  Is usually on 2 L at home. He is requiring 10 L high flow nasal cannula. ABG 7.34/67.9/65.4/37. CT with trace pleural effusion, and bilateral lower lobe and right middle lobe atelectasis versus infectious/inflammatory process. Suspect this is more likely to be viral considering pro-Noble of 0.09. Patient not vaccinated, reports exposures. On 2 L of oxygen at baseline. CHF exacerbation vs COPD exacerbation. Suspected due to viral. Unclear if patient is compliant with his medications and inhalers. - COVID 19 was negative, removed isolation.   - Viral respiratory panel was not revealing.   - continue home Symbicort  - Continue home Singulair  - DuoNebs as needed  - Intermittent neb Q4H   - Still on 10 L of oxygen, wean down for O2 Sat > 88% given history of COPD    #COPD-low suspicion for exacerbation  Patient without cough, but with significant wheezing on exam across all lung fields. ABG hypercapnic but this is consistent at his baseline PCO2 60 previous labs.   - Does have some wheezing today, nebs treatment added   - Patient received 2 doses Decadron 10  - Will continue with Prednisone 40 to finish 7 days total steroids. #CHF/EF 55% in February-low suspicion for exacerbation  Patient not overtly volume overloaded, could not appreciate JVD but this is complicated by body habitus. No pitting edema on lower extremities, no pleural effusion on imaging, consistent with exam.  - Was briefly on Lasix gtt, transitioned to Lasix 49 IV BID  - UOP over past 24 hours 1650 mL  - Lasix IV was temporarily held during rapid response  - Restart patient back to his home Lasix 40 mg daily     #CAD/PVD  Patient on long-term DAPT. Per cardiologist note Dr. Antoine Lozano in February, this was supposed to be stopped. However appears to be continued by outpatient PCP. Pharmacy will follow up with med rec. - Plavix was initially stopped due to home medication reconciliation. However, restarted 9/17 overnight due to concern of TIA   -ASA     #Diabetes type 2-A1c 7.1 in June  Sugars have been elevated on admission is 184 today. -MDSSI     #HTN  - continue home meds    #HLD  - Continue home meds    #Neuropathy  - continue home gabapentin    #Smoking cessation  Patient with significant smoking history 2.5 PPD since 10years old  - education and patch    Code Status: Full code  FEN: ADULT DIET; Regular;  Low Sodium (2 gm); 1800 ml  PPX: Lovenox  DISPO: pending    Bridget rKueger MD, PGY-3  09/18/21  8:31 AM    This patient will be staffed and discussed with Dr. Sarah Hazel MD.

## 2021-09-19 ENCOUNTER — APPOINTMENT (OUTPATIENT)
Dept: CT IMAGING | Age: 56
DRG: 189 | End: 2021-09-19
Attending: INTERNAL MEDICINE
Payer: MEDICARE

## 2021-09-19 ENCOUNTER — APPOINTMENT (OUTPATIENT)
Dept: MRI IMAGING | Age: 56
DRG: 189 | End: 2021-09-19
Attending: INTERNAL MEDICINE
Payer: MEDICARE

## 2021-09-19 LAB
ANION GAP SERPL CALCULATED.3IONS-SCNC: 7 MMOL/L (ref 3–16)
BASOPHILS ABSOLUTE: 0.1 K/UL (ref 0–0.2)
BASOPHILS RELATIVE PERCENT: 0.5 %
BUN BLDV-MCNC: 31 MG/DL (ref 7–20)
CALCIUM SERPL-MCNC: 9.9 MG/DL (ref 8.3–10.6)
CHLORIDE BLD-SCNC: 102 MMOL/L (ref 99–110)
CO2: 35 MMOL/L (ref 21–32)
CREAT SERPL-MCNC: 0.9 MG/DL (ref 0.9–1.3)
EOSINOPHILS ABSOLUTE: 0 K/UL (ref 0–0.6)
EOSINOPHILS RELATIVE PERCENT: 0.2 %
GFR AFRICAN AMERICAN: >60
GFR NON-AFRICAN AMERICAN: >60
GLUCOSE BLD-MCNC: 123 MG/DL (ref 70–99)
GLUCOSE BLD-MCNC: 141 MG/DL (ref 70–99)
GLUCOSE BLD-MCNC: 260 MG/DL (ref 70–99)
GLUCOSE BLD-MCNC: 282 MG/DL (ref 70–99)
GLUCOSE BLD-MCNC: 340 MG/DL (ref 70–99)
HCT VFR BLD CALC: 47.1 % (ref 40.5–52.5)
HEMOGLOBIN: 15 G/DL (ref 13.5–17.5)
LYMPHOCYTES ABSOLUTE: 2 K/UL (ref 1–5.1)
LYMPHOCYTES RELATIVE PERCENT: 20 %
MAGNESIUM: 1.8 MG/DL (ref 1.8–2.4)
MCH RBC QN AUTO: 29.4 PG (ref 26–34)
MCHC RBC AUTO-ENTMCNC: 31.8 G/DL (ref 31–36)
MCV RBC AUTO: 92.3 FL (ref 80–100)
MONOCYTES ABSOLUTE: 0.7 K/UL (ref 0–1.3)
MONOCYTES RELATIVE PERCENT: 6.9 %
NEUTROPHILS ABSOLUTE: 7.1 K/UL (ref 1.7–7.7)
NEUTROPHILS RELATIVE PERCENT: 72.4 %
PDW BLD-RTO: 18.1 % (ref 12.4–15.4)
PERFORMED ON: ABNORMAL
PLATELET # BLD: 287 K/UL (ref 135–450)
PMV BLD AUTO: 8.3 FL (ref 5–10.5)
POTASSIUM SERPL-SCNC: 4.6 MMOL/L (ref 3.5–5.1)
PRO-BNP: 371 PG/ML (ref 0–124)
RBC # BLD: 5.1 M/UL (ref 4.2–5.9)
SODIUM BLD-SCNC: 144 MMOL/L (ref 136–145)
WBC # BLD: 9.8 K/UL (ref 4–11)

## 2021-09-19 PROCEDURE — 6370000000 HC RX 637 (ALT 250 FOR IP): Performed by: NURSE PRACTITIONER

## 2021-09-19 PROCEDURE — 2580000003 HC RX 258: Performed by: STUDENT IN AN ORGANIZED HEALTH CARE EDUCATION/TRAINING PROGRAM

## 2021-09-19 PROCEDURE — 6370000000 HC RX 637 (ALT 250 FOR IP): Performed by: STUDENT IN AN ORGANIZED HEALTH CARE EDUCATION/TRAINING PROGRAM

## 2021-09-19 PROCEDURE — 2060000000 HC ICU INTERMEDIATE R&B

## 2021-09-19 PROCEDURE — 94640 AIRWAY INHALATION TREATMENT: CPT

## 2021-09-19 PROCEDURE — 80048 BASIC METABOLIC PNL TOTAL CA: CPT

## 2021-09-19 PROCEDURE — 6360000002 HC RX W HCPCS: Performed by: INTERNAL MEDICINE

## 2021-09-19 PROCEDURE — 83880 ASSAY OF NATRIURETIC PEPTIDE: CPT

## 2021-09-19 PROCEDURE — 36415 COLL VENOUS BLD VENIPUNCTURE: CPT

## 2021-09-19 PROCEDURE — 2700000000 HC OXYGEN THERAPY PER DAY

## 2021-09-19 PROCEDURE — 6370000000 HC RX 637 (ALT 250 FOR IP): Performed by: INTERNAL MEDICINE

## 2021-09-19 PROCEDURE — 70551 MRI BRAIN STEM W/O DYE: CPT

## 2021-09-19 PROCEDURE — 83735 ASSAY OF MAGNESIUM: CPT

## 2021-09-19 PROCEDURE — 85025 COMPLETE CBC W/AUTO DIFF WBC: CPT

## 2021-09-19 PROCEDURE — 99223 1ST HOSP IP/OBS HIGH 75: CPT | Performed by: INTERNAL MEDICINE

## 2021-09-19 PROCEDURE — 94761 N-INVAS EAR/PLS OXIMETRY MLT: CPT

## 2021-09-19 PROCEDURE — 2580000003 HC RX 258: Performed by: INTERNAL MEDICINE

## 2021-09-19 PROCEDURE — 71250 CT THORAX DX C-: CPT

## 2021-09-19 RX ORDER — TRAZODONE HYDROCHLORIDE 50 MG/1
25 TABLET ORAL ONCE
Status: COMPLETED | OUTPATIENT
Start: 2021-09-19 | End: 2021-09-19

## 2021-09-19 RX ADMIN — MONTELUKAST 10 MG: 10 TABLET, FILM COATED ORAL at 22:34

## 2021-09-19 RX ADMIN — CLOPIDOGREL BISULFATE 75 MG: 75 TABLET ORAL at 08:58

## 2021-09-19 RX ADMIN — Medication 10 ML: at 22:34

## 2021-09-19 RX ADMIN — GABAPENTIN 800 MG: 400 CAPSULE ORAL at 11:46

## 2021-09-19 RX ADMIN — TRAZODONE HYDROCHLORIDE 25 MG: 50 TABLET ORAL at 22:35

## 2021-09-19 RX ADMIN — SALINE NASAL SPRAY 1 SPRAY: 1.5 SOLUTION NASAL at 17:17

## 2021-09-19 RX ADMIN — ASPIRIN 81 MG: 81 TABLET, COATED ORAL at 09:03

## 2021-09-19 RX ADMIN — BUDESONIDE AND FORMOTEROL FUMARATE DIHYDRATE 2 PUFF: 80; 4.5 AEROSOL RESPIRATORY (INHALATION) at 22:50

## 2021-09-19 RX ADMIN — INSULIN LISPRO 3 UNITS: 100 INJECTION, SOLUTION INTRAVENOUS; SUBCUTANEOUS at 22:37

## 2021-09-19 RX ADMIN — GABAPENTIN 800 MG: 400 CAPSULE ORAL at 22:35

## 2021-09-19 RX ADMIN — DULOXETINE 20 MG: 20 CAPSULE, DELAYED RELEASE ORAL at 22:35

## 2021-09-19 RX ADMIN — GABAPENTIN 800 MG: 400 CAPSULE ORAL at 08:57

## 2021-09-19 RX ADMIN — BUDESONIDE AND FORMOTEROL FUMARATE DIHYDRATE 2 PUFF: 80; 4.5 AEROSOL RESPIRATORY (INHALATION) at 07:55

## 2021-09-19 RX ADMIN — FUROSEMIDE 40 MG: 40 TABLET ORAL at 08:59

## 2021-09-19 RX ADMIN — INSULIN LISPRO 6 UNITS: 100 INJECTION, SOLUTION INTRAVENOUS; SUBCUTANEOUS at 11:46

## 2021-09-19 RX ADMIN — GABAPENTIN 800 MG: 400 CAPSULE ORAL at 16:09

## 2021-09-19 RX ADMIN — INSULIN LISPRO 8 UNITS: 100 INJECTION, SOLUTION INTRAVENOUS; SUBCUTANEOUS at 17:14

## 2021-09-19 RX ADMIN — PIPERACILLIN AND TAZOBACTAM 3375 MG: 3; .375 INJECTION, POWDER, LYOPHILIZED, FOR SOLUTION INTRAVENOUS at 16:13

## 2021-09-19 RX ADMIN — DULOXETINE 20 MG: 20 CAPSULE, DELAYED RELEASE ORAL at 08:58

## 2021-09-19 RX ADMIN — Medication 10 ML: at 08:59

## 2021-09-19 RX ADMIN — PREDNISONE 40 MG: 20 TABLET ORAL at 08:58

## 2021-09-19 RX ADMIN — ATORVASTATIN CALCIUM 80 MG: 80 TABLET, FILM COATED ORAL at 08:58

## 2021-09-19 RX ADMIN — PIPERACILLIN AND TAZOBACTAM 3375 MG: 3; .375 INJECTION, POWDER, LYOPHILIZED, FOR SOLUTION INTRAVENOUS at 09:03

## 2021-09-19 RX ADMIN — SODIUM CHLORIDE 25 ML: 9 INJECTION, SOLUTION INTRAVENOUS at 09:02

## 2021-09-19 ASSESSMENT — PAIN SCALES - GENERAL
PAINLEVEL_OUTOF10: 0

## 2021-09-19 NOTE — CONSULTS
weakness  NEUROLOGICAL:  negative for headaches, slurred speech, unilateral weakness  PSYCHIATRIC/BEHAVIORAL: negative for hallucinations, behavioral problems, confusion and agitation. Objective:   PHYSICAL EXAM:      VITALS:  /70   Pulse 68   Temp 98.1 °F (36.7 °C) (Oral)   Resp 20 Comment: Simultaneous filing. User may not have seen previous data. Ht 4' 11\" (1.499 m)   Wt 197 lb 8.5 oz (89.6 kg)   SpO2 92%   BMI 39.90 kg/m²   24HR INTAKE/OUTPUT:      Intake/Output Summary (Last 24 hours) at 2021 1826  Last data filed at 2021 1542  Gross per 24 hour   Intake 100 ml   Output 1100 ml   Net -1000 ml     CURRENT PULSE OXIMETRY:  SpO2: 92 %  24HR PULSE OXIMETRY RANGE:  SpO2  Av.8 %  Min: 85 %  Max: 94 % on 15 L    CONSTITUTIONAL:  awake, alert, cooperative, no distress at rest but increased work of breathing when he sits up. Obese and appears older than stated age  NECK:  Supple, symmetrical, trachea midline, no adenopathy, thyroid symmetric, not enlarged and no tenderness, skin normal  LUNGS: Mild increased work of breathing with bibasilar crackles. Trace accessory muscle use  CARDIOVASCULAR:  normal S1 and S2, no edema and no JVD  ABDOMEN:  normal bowel sounds, non-distended and no masses palpated, and no tenderness to palpation. No hepatospleenomegaly  LYMPHADENOPATHY:  no axillary or supraclavicular adenopathy. No cervical adnenopathy  PSYCHIATRIC: Oriented to person place and time. No obvious depression or anxiety. MUSCULOSKELETAL: No obvious misalignment or effusion of the joints. No clubbing, cyanosis of the digits. SKIN:  normal skin color, texture, turgor and no redness, warmth, or swelling.  No palpable nodules    DATA:    Old records have been reviewed  CBC with Differential:    Lab Results   Component Value Date    WBC 9.8 2021    RBC 5.10 2021    HGB 15.0 2021    HCT 47.1 2021     2021    MCV 92.3 2021    MCH 29.4 2021 MCHC 31.8 09/19/2021    RDW 18.1 09/19/2021    LYMPHOPCT 20.0 09/19/2021    MONOPCT 6.9 09/19/2021    BASOPCT 0.5 09/19/2021    MONOSABS 0.7 09/19/2021    LYMPHSABS 2.0 09/19/2021    EOSABS 0.0 09/19/2021    BASOSABS 0.1 09/19/2021     BMP:    Lab Results   Component Value Date     09/19/2021    K 4.6 09/19/2021    K 5.9 07/13/2021     09/19/2021    CO2 35 09/19/2021    BUN 31 09/19/2021    CREATININE 0.9 09/19/2021    CALCIUM 9.9 09/19/2021    GFRAA >60 09/19/2021    LABGLOM >60 09/19/2021    GLUCOSE 141 09/19/2021     Hepatic Function Panel:    Lab Results   Component Value Date    ALKPHOS 59 09/17/2021    ALT 17 09/17/2021    AST 17 09/17/2021    PROT 8.2 09/17/2021    BILITOT 0.3 09/17/2021    BILIDIR <0.2 09/17/2021    IBILI see below 09/17/2021     ABG:    Lab Results   Component Value Date    PKK9GQZ 33.1 09/17/2021    BEART 9 09/17/2021    Q4PPHQCB 93 09/17/2021    PHART 7.418 09/17/2021    BTC1HEU 51.2 09/17/2021    PO2ART 66.0 09/17/2021    EDM8XNS 35 09/17/2021       Cultures:   Blood Culture:    Sputum Culture:        Radiology Review:  All pertinent images / reports were reviewed as a part of this visit. imaging reveals the following:  MRI BRAIN WO CONTRAST   Final Result      1. No acute intracranial abnormality. No evidence of acute infarct. 2. Minimal nonspecific white matter signal abnormality on FLAIR imaging suggesting minimal chronic small vessel ischemic disease and/or minimal age-related degenerative change. CTA HEAD NECK W CONTRAST   Final Result      Study limited by motion artifact and poor contrast bolus. 1. Atherosclerotic disease at bilateral carotid bifurcations. Exact percentage of stenosis by NASCET criteria is difficult to comment upon due to artifacts. 2. 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 Moderately attenuated mid vertebral artery from upper C7  to C4 5 level.  Distal to its left vertebral artery is patent without significant stenosis. PROCEDURE: CT ANGIOGRAPHY HEAD WITH/WITHOUT CONTRAST      INDICATION: aphasia, ams      COMPARISON: none      TECHNIQUE: Axial CT imaging obtained through the head prior to and following administration of IV contrast. Axial images, multiplanar reformatted images, and maximum intensity projection images were reviewed for CT angiographic technique. IV contrast: mL Omnipaque 350. FINDINGS:      ANTERIOR CIRCULATION: The intracranial internal carotid arteries, anterior cerebral arteries, and middle cerebral arteries are patent. There are calcified plaques with mild to moderate narrowing of bilateral ICA cavernous/supraclinoid segments. . No    evidence for aneurysm or arteriovenous malformation. POSTERIOR CIRCULATION: The bilateral vertebral arteries, basilar artery and posterior cerebral arteries are patent. There are calcified and noncalcified plaques with mild narrowing of left vertebral artery intradural segment. Right vertebral intradural    segment is severely attenuated, likely hyperplastic. No evidence for aneurysm or arteriovenous malformation. INTRACRANIAL VENOUS SYSTEM: No evidence for intracranial venous thrombosis. IMPRESSION:      No evidence of intracranial large vessel occlusion. 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 Calcified plaques with mild to moderate narrowing of bilateral ICA cavernous/supraclinoid segments. CT HEAD WO CONTRAST   Final Result      No acute intracranial abnormality . CT CHEST PULMONARY EMBOLISM W CONTRAST   Final Result   1. No definite pulmonary embolus identified. 2.  Mild atherosclerotic changes of the aorta. No aortic aneurysm or    dissection. 3.  Patchy densities in the lower lobes and right middle lobe may    represent atelectasis versus infectious/inflammatory process. 4.  Dependent atelectasis bilaterally. Atelectasis in the lingula. 5.  Trace right pleural effusion. 6.  Nonspecific similar mildly prominent mediastinal and hilar lymph    nodes. Other diagnostic test:       Assessment/Plan   50 y.o. male with acute on chronic hypoxemic respiratory failure. Patient is on appropriate therapy for a COPD exacerbation, however he does not have any wheezing and he denies feeling short of breath unless he moves. His exam seems more consistent with either a viral infection or interstitial lung disease as he has crackles at the bases. Volume overload also would seem like a possibility except that his echo was not consistent with that and he has no peripheral edema. In reviewing previous CT scans he has similar consolidation on CT now as he did in July of this year but the consolidated areas appear to have progressed. He had an abdominal CT scan that has similar consolidative patterns in February. Is unclear if this is all atelectasis related to his body habitus or if there is some underlying inflammatory lung problem. I think it would be worthwhile to get repeat imaging with him in the prone and supine positions to see if these areas improve with the change in position or if they are persistent and progressive issues. I will have a better idea of the differential diagnosis when I see the different images.          Danielle Sutton MD

## 2021-09-19 NOTE — PROGRESS NOTES
4 Eyes Admission Assessment      I agree as the admission nurse that 2 RN's have performed a thorough Head to Toe Skin Assessment on the patient. ALL assessment sites listed below have been assessed on admission.                   Areas assessed by both nurses:   [x]? Head, Face, and Ears   [x]? Shoulders, Back, and Chest  [x]? Arms, Elbows, and Hands   [x]? Coccyx, Sacrum, and Ischium  [x]? Legs, Feet, and Heels                        Does the Patient have Skin Breakdown? Yes        Scott Prevention initiated:  No   Wound Care Orders initiated:  No      [facility] nurse consulted for Pressure Injury (Stage 3,4, Unstageable, DTI, NWPT, and Complex wounds) or Scott score 18 or lower: No       Excoriation noted to groin area.      EDU Montoya RN

## 2021-09-19 NOTE — PROGRESS NOTES
Progress Note    Admit Date: 9/16/2021  Day: 3  Diet: ADULT DIET; Regular; Low Sodium (2 gm); 1800 ml    CC: Chest pain, shortness of breath    Interval history:   No acute events overnight. This morning he reports his shortness of breath is at baseline. However he is requiring 15 L of oxygen up from 10. He is alert and oriented, mentation appears to be at baseline. Denies headaches, vision changes. BNP has improved from 3000's is down to 371. HPI: Emelyn Gan a 50 y. o. male with history of HFpEF (2/2021 ECHO showed grade 2 diastolic dysfunction, 12% EF, LV hypertrophy), COPD on 2 L of oxygen at home, diabetes, hypertension, hyperlipidemia who presents with two weeks of worsening SOB. He reports his home oxygen saturations was in the 70's. He is not vaccinated for COVID and he reports that he was likely exposed to [employer] before his SOB began. At that time he also experiences 2-3 days of generalized body aches that have since resolved. Denies fevers and chills. Patient has been hospitalized previously for COPD and CHF exacerbations, and he reports this SOB feels more like a CHF exacerbation to him. He reports feeling slightly fluid overloaded though does not know his dry weight. He repeatedly takes off his HFNC in the room and desats down to the 60's, but quickly comes back up on 10L. Even with a pulse ox at 60 he denies SOB.    Upon admission, sodium 146, glucose 155, Pro-BNP 3129 (5000 on 7/2021 and 1847 in 6/2021), negative troponin, D-dimer 292. Chest XR showed a R pleural effusion and hazy bibasilar atelectasis vs. Small infiltrates. It showed stable cardiomegaly and mild pulmonary congestion. He reports complete resolution of SOB after receiving IV lasix dose, however continues to desat without supplemental oxygen.     Medications:     Scheduled Meds:   furosemide  40 mg Oral Daily    clopidogrel  75 mg Oral Daily    nicotine  1 patch TransDERmal Daily    insulin lispro  0-12 Units SubCUTAneous 8. 5 10.5 9.8   HGB 15.4 15.0 15.0   HCT 46.6 46.8 47.1    316 287   MCV 90.9 91.7 92.3     Renal:    Recent Labs     09/17/21 0427 09/18/21  0519 09/19/21 0429    146* 144   K 4.7 4.2 4.6    103 102   CO2 32 35* 35*   BUN 22* 24* 31*   CREATININE 0.8* 0.7* 0.9   GLUCOSE 184* 173* 141*   CALCIUM 10.0 9.8 9.9   MG 1.40* 2.10 1.80   ANIONGAP 12 8 7     Hepatic:   Recent Labs     09/16/21 1745 09/17/21 0427   AST 20 17   ALT 19 17   BILITOT 0.3 0.3   BILIDIR  --  <0.2   PROT 7.4 8.2   LABALBU 4.0 4.1   ALKPHOS 58 59     Troponin:   Recent Labs     09/16/21 1745 09/17/21  0017   TROPONINI <0.01 <0.01     BNP: No results for input(s): BNP in the last 72 hours. Lipids:   Recent Labs     09/17/21 0427   CHOL 163   HDL 31*     ABGs:    Recent Labs     09/17/21  0044 09/17/21 2034   PHART 7.347* 7.418   [id number] 67.9* 51.2*   PO2ART 65.4* 66.0*   [id number] 37* 33.1*   BEART 8.6* 9*   [id number] 91* 93   [id number] 39 35       INR:   Recent Labs     09/16/21 1745   INR 1.13*     Lactate:   Recent Labs     09/17/21 2034   LACTATE 0.75     Cultures:  -----------------------------------------------------------------  RAD:   CTA HEAD NECK W CONTRAST   Final Result      Study limited by motion artifact and poor contrast bolus. 1. Atherosclerotic disease at bilateral carotid bifurcations. Exact percentage of stenosis by NASCET criteria is difficult to comment upon due to artifacts. 2. 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 Moderately attenuated mid vertebral artery from upper C7  to C4 5 level. Distal to its left vertebral artery is patent without significant stenosis.             PROCEDURE: CT ANGIOGRAPHY HEAD WITH/WITHOUT CONTRAST      INDICATION: aphasia, ams      COMPARISON: none      TECHNIQUE: Axial CT imaging obtained through the head prior to and following administration of IV contrast. Axial images, multiplanar reformatted images, and maximum intensity projection images were reviewed for CT angiographic technique. IV contrast: mL Omnipaque 350. FINDINGS:      ANTERIOR CIRCULATION: The intracranial internal carotid arteries, anterior cerebral arteries, and middle cerebral arteries are patent. There are calcified plaques with mild to moderate narrowing of bilateral ICA cavernous/supraclinoid segments. . No    evidence for aneurysm or arteriovenous malformation. POSTERIOR CIRCULATION: The bilateral vertebral arteries, basilar artery and posterior cerebral arteries are patent. There are calcified and noncalcified plaques with mild narrowing of left vertebral artery intradural segment. Right vertebral intradural    segment is severely attenuated, likely hyperplastic. No evidence for aneurysm or arteriovenous malformation. INTRACRANIAL VENOUS SYSTEM: No evidence for intracranial venous thrombosis. IMPRESSION:      No evidence of intracranial large vessel occlusion. 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 Calcified plaques with mild to moderate narrowing of bilateral ICA cavernous/supraclinoid segments. CT HEAD WO CONTRAST   Final Result      No acute intracranial abnormality . CT CHEST PULMONARY EMBOLISM W CONTRAST   Final Result   1. No definite pulmonary embolus identified. 2.  Mild atherosclerotic changes of the aorta. No aortic aneurysm or    dissection. 3.  Patchy densities in the lower lobes and right middle lobe may    represent atelectasis versus infectious/inflammatory process. 4.  Dependent atelectasis bilaterally. Atelectasis in the lingula. 5.  Trace right pleural effusion. 6.  Nonspecific similar mildly prominent mediastinal and hilar lymph    nodes. MRI BRAIN WO CONTRAST    (Results Pending)       Assessment/Plan:     #TIA r/o   Brief episode of expressive aphasia. Rapid response called 9/17.    CT head, CTA head and neck no evidence of intracranial large vessel occlusion. L vertebral artery occluded. - Neurology on board, appreciate recommendation. Echo normal.  - Will follow up on MRI brain  - Continue ASA, Plavix  - Check folate, Vitamin B12, and B1  -Pulmonology consult placed  - zosyn    #Acute hypoxic and hypercapnic respiratory failure  Is usually on 2 L at home. He is requiring*15 L high flow nasal cannula. ABG 7.34/67.9/65.4/37 on admit. CT with trace pleural effusion, and bilateral lower lobe and right middle lobe atelectasis versus infectious/inflammatory process. Suspect this is more likely to be viral considering pro-Noble of 0.09. Patient not vaccinated, reports exposures. CHF exacerbation vs COPD exacerbation. Viral respiratory panel was not revealing.   - continue home Symbicort  - Continue home Singulair  - DuoNebs as needed  - Intermittent neb Q4H   - Still on 15 L of high flow oxygen, wean down for O2 Sat > 88% given history of COPD    #COPD-low suspicion for exacerbation  Patient without cough, but with significant wheezing on exam across all lung fields. ABG hypercapnic but this is consistent at his baseline PCO2 60 previous labs. - Patient received 2 doses Decadron 10  - Will continue with Prednisone 40 to finish 7 days total steroids. (9/17-)    #CHF/EF 55% in February-low suspicion for exacerbation  Patient not overtly volume overloaded, could not appreciate JVD but this is complicated by body habitus. No pitting edema on lower extremities, no pleural effusion on imaging, consistent with exam.  - UOP over past 24 hours 1650 mL  - Lasix IV was temporarily held during rapid response  - Restart patient back to his home Lasix 40 mg daily     #CAD/PVD  Patient on long-term DAPT. Per cardiologist note Dr. Anna Sullivan in February, this was supposed to be stopped. - Plavix was initially stopped due to home medication reconciliation.  However, restarted 9/17 overnight due to concern of TIA   - ASA     #Diabetes type 2-A1c 7.1 in June  Sugars have been elevated on admission is 184 today. -MDSSI     #HTN  - continue home meds    #HLD  - Continue home meds    #Neuropathy  - continue home gabapentin    #Smoking cessation  Patient with significant smoking history 2.5 PPD since 10years old  - education and patch    Code Status: Full code  FEN: ADULT DIET; Regular;  Low Sodium (2 gm); 1800 ml  PPX: Lovenox  DISPO: pending    Varinder Mcgee MD, PGY-1  09/19/21  7:48 AM    This patient will be staffed and discussed with Dr. Sandy Villalta MD.

## 2021-09-19 NOTE — PROGRESS NOTES
Pt arrive to room 5523 at this time, pt is awake in bed and denies any needs at this time, vitals are stable, I will continue to follow throughout the shift.   Molly Atkins RN

## 2021-09-20 LAB
ANION GAP SERPL CALCULATED.3IONS-SCNC: 10 MMOL/L (ref 3–16)
BASOPHILS ABSOLUTE: 0 K/UL (ref 0–0.2)
BASOPHILS RELATIVE PERCENT: 0.5 %
BUN BLDV-MCNC: 25 MG/DL (ref 7–20)
C-REACTIVE PROTEIN: 7.1 MG/L (ref 0–5.1)
CALCIUM SERPL-MCNC: 9.8 MG/DL (ref 8.3–10.6)
CHLORIDE BLD-SCNC: 99 MMOL/L (ref 99–110)
CO2: 33 MMOL/L (ref 21–32)
CREAT SERPL-MCNC: 0.8 MG/DL (ref 0.9–1.3)
EOSINOPHILS ABSOLUTE: 0 K/UL (ref 0–0.6)
EOSINOPHILS RELATIVE PERCENT: 0.5 %
GFR AFRICAN AMERICAN: >60
GFR NON-AFRICAN AMERICAN: >60
GLUCOSE BLD-MCNC: 129 MG/DL (ref 70–99)
GLUCOSE BLD-MCNC: 149 MG/DL (ref 70–99)
GLUCOSE BLD-MCNC: 151 MG/DL (ref 70–99)
GLUCOSE BLD-MCNC: 170 MG/DL (ref 70–99)
GLUCOSE BLD-MCNC: 202 MG/DL (ref 70–99)
GLUCOSE BLD-MCNC: 248 MG/DL (ref 70–99)
GLUCOSE BLD-MCNC: 283 MG/DL (ref 70–99)
HCT VFR BLD CALC: 48.6 % (ref 40.5–52.5)
HEMOGLOBIN: 15.5 G/DL (ref 13.5–17.5)
L. PNEUMOPHILA SEROGP 1 UR AG: NORMAL
LYMPHOCYTES ABSOLUTE: 1.9 K/UL (ref 1–5.1)
LYMPHOCYTES RELATIVE PERCENT: 19.4 %
MAGNESIUM: 1.6 MG/DL (ref 1.8–2.4)
MCH RBC QN AUTO: 29.2 PG (ref 26–34)
MCHC RBC AUTO-ENTMCNC: 32 G/DL (ref 31–36)
MCV RBC AUTO: 91.4 FL (ref 80–100)
MONOCYTES ABSOLUTE: 0.8 K/UL (ref 0–1.3)
MONOCYTES RELATIVE PERCENT: 8.2 %
NEUTROPHILS ABSOLUTE: 6.9 K/UL (ref 1.7–7.7)
NEUTROPHILS RELATIVE PERCENT: 71.4 %
PDW BLD-RTO: 17.2 % (ref 12.4–15.4)
PERFORMED ON: ABNORMAL
PLATELET # BLD: 276 K/UL (ref 135–450)
PMV BLD AUTO: 8.2 FL (ref 5–10.5)
POTASSIUM SERPL-SCNC: 4 MMOL/L (ref 3.5–5.1)
PROCALCITONIN: 0.06 NG/ML (ref 0–0.15)
RBC # BLD: 5.31 M/UL (ref 4.2–5.9)
SEDIMENTATION RATE, ERYTHROCYTE: 22 MM/HR (ref 0–20)
SODIUM BLD-SCNC: 142 MMOL/L (ref 136–145)
STREP PNEUMONIAE ANTIGEN, URINE: NORMAL
WBC # BLD: 9.6 K/UL (ref 4–11)

## 2021-09-20 PROCEDURE — 2580000003 HC RX 258: Performed by: INTERNAL MEDICINE

## 2021-09-20 PROCEDURE — 84145 PROCALCITONIN (PCT): CPT

## 2021-09-20 PROCEDURE — 6360000002 HC RX W HCPCS: Performed by: STUDENT IN AN ORGANIZED HEALTH CARE EDUCATION/TRAINING PROGRAM

## 2021-09-20 PROCEDURE — 6370000000 HC RX 637 (ALT 250 FOR IP): Performed by: STUDENT IN AN ORGANIZED HEALTH CARE EDUCATION/TRAINING PROGRAM

## 2021-09-20 PROCEDURE — 85652 RBC SED RATE AUTOMATED: CPT

## 2021-09-20 PROCEDURE — 86225 DNA ANTIBODY NATIVE: CPT

## 2021-09-20 PROCEDURE — 83735 ASSAY OF MAGNESIUM: CPT

## 2021-09-20 PROCEDURE — 80048 BASIC METABOLIC PNL TOTAL CA: CPT

## 2021-09-20 PROCEDURE — 86140 C-REACTIVE PROTEIN: CPT

## 2021-09-20 PROCEDURE — 2700000000 HC OXYGEN THERAPY PER DAY

## 2021-09-20 PROCEDURE — 99232 SBSQ HOSP IP/OBS MODERATE 35: CPT | Performed by: NURSE PRACTITIONER

## 2021-09-20 PROCEDURE — 6360000002 HC RX W HCPCS: Performed by: INTERNAL MEDICINE

## 2021-09-20 PROCEDURE — 94761 N-INVAS EAR/PLS OXIMETRY MLT: CPT

## 2021-09-20 PROCEDURE — 94640 AIRWAY INHALATION TREATMENT: CPT

## 2021-09-20 PROCEDURE — 99233 SBSQ HOSP IP/OBS HIGH 50: CPT | Performed by: INTERNAL MEDICINE

## 2021-09-20 PROCEDURE — 6370000000 HC RX 637 (ALT 250 FOR IP): Performed by: NURSE PRACTITIONER

## 2021-09-20 PROCEDURE — 36415 COLL VENOUS BLD VENIPUNCTURE: CPT

## 2021-09-20 PROCEDURE — 2580000003 HC RX 258: Performed by: STUDENT IN AN ORGANIZED HEALTH CARE EDUCATION/TRAINING PROGRAM

## 2021-09-20 PROCEDURE — 85025 COMPLETE CBC W/AUTO DIFF WBC: CPT

## 2021-09-20 PROCEDURE — 86038 ANTINUCLEAR ANTIBODIES: CPT

## 2021-09-20 PROCEDURE — 2060000000 HC ICU INTERMEDIATE R&B

## 2021-09-20 RX ADMIN — INSULIN LISPRO 2 UNITS: 100 INJECTION, SOLUTION INTRAVENOUS; SUBCUTANEOUS at 20:49

## 2021-09-20 RX ADMIN — INSULIN LISPRO 6 UNITS: 100 INJECTION, SOLUTION INTRAVENOUS; SUBCUTANEOUS at 17:01

## 2021-09-20 RX ADMIN — PREDNISONE 40 MG: 20 TABLET ORAL at 09:03

## 2021-09-20 RX ADMIN — BUDESONIDE AND FORMOTEROL FUMARATE DIHYDRATE 2 PUFF: 80; 4.5 AEROSOL RESPIRATORY (INHALATION) at 10:22

## 2021-09-20 RX ADMIN — GABAPENTIN 800 MG: 400 CAPSULE ORAL at 17:08

## 2021-09-20 RX ADMIN — DULOXETINE 20 MG: 20 CAPSULE, DELAYED RELEASE ORAL at 09:03

## 2021-09-20 RX ADMIN — DULOXETINE 20 MG: 20 CAPSULE, DELAYED RELEASE ORAL at 20:48

## 2021-09-20 RX ADMIN — FUROSEMIDE 40 MG: 40 TABLET ORAL at 09:06

## 2021-09-20 RX ADMIN — BUDESONIDE AND FORMOTEROL FUMARATE DIHYDRATE 2 PUFF: 80; 4.5 AEROSOL RESPIRATORY (INHALATION) at 21:27

## 2021-09-20 RX ADMIN — SODIUM CHLORIDE 25 ML: 9 INJECTION, SOLUTION INTRAVENOUS at 09:07

## 2021-09-20 RX ADMIN — GABAPENTIN 800 MG: 400 CAPSULE ORAL at 09:02

## 2021-09-20 RX ADMIN — Medication 10 ML: at 09:05

## 2021-09-20 RX ADMIN — GABAPENTIN 800 MG: 400 CAPSULE ORAL at 20:48

## 2021-09-20 RX ADMIN — ENOXAPARIN SODIUM 40 MG: 40 INJECTION SUBCUTANEOUS at 20:48

## 2021-09-20 RX ADMIN — CLOPIDOGREL BISULFATE 75 MG: 75 TABLET ORAL at 09:03

## 2021-09-20 RX ADMIN — INSULIN LISPRO 4 UNITS: 100 INJECTION, SOLUTION INTRAVENOUS; SUBCUTANEOUS at 12:45

## 2021-09-20 RX ADMIN — GABAPENTIN 800 MG: 400 CAPSULE ORAL at 14:29

## 2021-09-20 RX ADMIN — ASPIRIN 81 MG: 81 TABLET, COATED ORAL at 09:03

## 2021-09-20 RX ADMIN — PIPERACILLIN AND TAZOBACTAM 3375 MG: 3; .375 INJECTION, POWDER, LYOPHILIZED, FOR SOLUTION INTRAVENOUS at 09:15

## 2021-09-20 RX ADMIN — ATORVASTATIN CALCIUM 80 MG: 80 TABLET, FILM COATED ORAL at 09:03

## 2021-09-20 RX ADMIN — PIPERACILLIN AND TAZOBACTAM 3375 MG: 3; .375 INJECTION, POWDER, LYOPHILIZED, FOR SOLUTION INTRAVENOUS at 00:20

## 2021-09-20 RX ADMIN — MONTELUKAST 10 MG: 10 TABLET, FILM COATED ORAL at 20:48

## 2021-09-20 ASSESSMENT — PAIN SCALES - GENERAL
PAINLEVEL_OUTOF10: 0
PAINLEVEL_OUTOF10: 0

## 2021-09-20 NOTE — PROGRESS NOTES
Pulmonary Followup Note    Indication for visit: Acute hypoxemia    Subjective:  Patient on 15 L of high flow nasal cannula with SPO2 92%. Denies any SOB. Reports he feels good. Respiratory panel PCR: Negative, Legionella: Not detected. Blood Cx: NGTD   Procalcitonin low at 0.06. Afebrile     furosemide  40 mg Oral Daily    clopidogrel  75 mg Oral Daily    nicotine  1 patch TransDERmal Daily    insulin lispro  0-12 Units SubCUTAneous TID WC    insulin lispro  0-6 Units SubCUTAneous Nightly    enoxaparin  40 mg SubCUTAneous BID    lisinopril  20 mg Oral Daily    gabapentin  800 mg Oral 4x Daily    predniSONE  40 mg Oral Daily    atorvastatin  80 mg Oral Daily    aspirin  81 mg Oral Daily    DULoxetine  20 mg Oral BID    budesonide-formoterol  2 puff Inhalation BID    montelukast  10 mg Oral Nightly    sodium chloride flush  5-40 mL IntraVENous 2 times per day       Continuous Infusions:   dextrose      sodium chloride 25 mL (09/20/21 0907)       ROS:    PHYSICAL EXAMINATION:  /60   Pulse 73   Temp 97.5 °F (36.4 °C) (Oral)   Resp 20   Ht 4' 11\" (1.499 m)   Wt 189 lb 6 oz (85.9 kg)   SpO2 92%   BMI 38.25 kg/m²     Gen: Elderly male, obese, in high fowlers position in nil acute distress. On 15L o2 via HFNC  No accessory resp muscles  Eyes: PERRL, EOMI, normal conjunctivae  Ears, Nose, Mouth and Throat: Hearing is normal. Oropharynx is normal  Neck: No lymphadenopathy  Respiratory: + mild Diffuse wheezing bilaterally. Right lung base rales. No rhonchi  CV: RRR without M/R/R  Abd: +BS, soft, NT/ND  Musculoskeletal: No cyanosis, clubbing, or edema.   Skin: No rashes, ulcers, or subcutaneous nodules  Psychiatric: Alert and oriented to time place and person    DATA  CBC:   Recent Labs     09/18/21  0519 09/19/21  0429 09/20/21  0604   WBC 10.5 9.8 9.6   HGB 15.0 15.0 15.5   HCT 46.8 47.1 48.6   MCV 91.7 92.3 91.4    287 276     BMP:   Recent Labs 09/18/21  0519 09/19/21  0429 09/20/21  0604   * 144 142   K 4.2 4.6 4.0    102 99   CO2 35* 35* 33*   BUN 24* 31* 25*   CREATININE 0.7* 0.9 0.8*     Recent Labs     09/17/21 2034   PHART 7.418   [id number] 51.2*   PO2ART 66.0*     LIVER PROFILE: No results for input(s): AST, ALT, LIPASE, BILIDIR, BILITOT, ALKPHOS in the last 72 hours. Invalid input(s): AMYLASE,  ALB    Radiology Review:  Pertinent images / reports were reviewed as a part of this visit. CT chest High res:   Impression      Slow progression of bilateral lower lobe alveolar consolidation with air bronchograms raises concern for chronic process such as interstitial pneumonia, eosinophilic pneumonia, and organizing pneumonia. Neoplastic etiology is considered much less likely. ASSESSMENT/PLAN:  Mariam Littlejohn is a 50 y.o. male with shortness of breath    Acute on chronic hypoxemic hypercapnic respiratory failure  Patient has a history of COPD and is usually on 2 L oxygen at home. Now requiring 15 L high flow nasal cannula. SPO2: 95%  Covid PCR negative. Respiratory panel PCR, Legionella: Not detected  Procalcitonin low at 0.06. CXR on admission: Hazy bibasilar atelectasis or small infiltrates and associated small right pleural effusion which is more prominent. ABG on admission: pH: 7.41/pCO2: 51.2/pO2: 66 - Pt usually hypercapnic per review of past ABGs  -Continue supplemental oxygen and wean per tolerated with oxygen goal of 88% and above. COPD  There is mild wheezing per physical exam.    -DuoNebs as needed  -Symbicort inhaler twice daily  - Prednisone daily     Rule out Pneumonia  High res CT chest revealing Slow progression of bilateral lower lobe alveolar consolidation with air bronchograms raises concern for chronic process such as interstitial pneumonia,  Despite increase in oxygen requirements beyond baseline of 2L, there is a low suspicion for PNA as pt is not febrile, no new cough, no productive sputum.  No leukocytosis. Covid PCR negative. Respiratory panel PCR, Legionella: Not detected and Blood cultures negative  Procalcitonin low at 0.06. HFpEF  EF: 55-60% 0/0366 (Grade 2 diastolic dysfunction)  Pt does not appear fluid overloaded. Nil pedal edema noted. Unable to appreciate JVD. No rales on physical exam.   Wt: 189 lbs which  is 8lbs less than yesterday of 197 lbs however still 2 + from admission weight. Query if this is due to adequate diuresis with lasix over past days.   - Lasix 40mg PO daily   Creatinine stable: 0.8    This patient was staffed with MD Mitzy Alanis MD  Internal medicine resident  PGY 1  11:05 AM    Patient seen, examined and discussed with the resident and I agree with the assessment and plan. Patient still requiring 15 L high flow and still denies any shortness of breath. Still has crackles at the bases and has not improved with diuresis. I did the prone and supine CT scan him he does indeed have significant scarring in the bases of his lungs. This could explain some of his oxygen requirements at home and progressive symptoms, however I do not think it fully explains why he now needs to be on 15 L of oxygen when he was reportedly satting well on just a couple prior. I agree with the differential and the radiology read of possible chronic eosinophilic pneumoniia, organizing pneumonia and less likely malignancy. Cryptogenic organizing pneumonia, hypersensitivity pneumonitis or mixed connective tissue disease related pulmonary scarring are on the differential as well. However the treatment for these would be steroids which she is already on. The difference may be dose related. We discussed doing a bronchoscopy or percutaneous biopsy. He is not enthusiastic about that idea and he is also on Plavix so I would not be able to do it in any timely manner.   I am going to send off autoimmune studies such as ABDI and SSA and SSB antibodies as well as general inflammatory markers to assess for mixed connective tissue disease or inflammation. They may be blunted by steroids so when he is off steroids they may need to be repeated. If he does not improve over the coming days we will have to discuss planning for bronchoscopy with biopsies after the Plavix is held or give a larger bolus of steroids for several days to see if his hypoxia improves.     Bridgett Durham MD

## 2021-09-20 NOTE — CARE COORDINATION
Cm following. From home, hx COPD has home O2 with Carlyn; Plan to dc home with wife. Currently on 15L hiflo. Will continue to follow.

## 2021-09-20 NOTE — PLAN OF CARE
Problem: Falls - Risk of:  Goal: Will remain free from falls  Description: Will remain free from falls  9/20/2021 1201 by Lyndsay Green RN  Outcome: Ongoing     Problem: Falls - Risk of:  Goal: Absence of physical injury  Description: Absence of physical injury  9/20/2021 1201 by Lyndsay Green RN  Outcome: Ongoing     Problem: Airway Clearance - Ineffective  Goal: Achieve or maintain patent airway  9/20/2021 1201 by Lyndsay Green RN  Outcome: Ongoing     Problem: Gas Exchange - Impaired  Goal: Absence of hypoxia  9/20/2021 1201 by Lyndsay Green RN  Outcome: Ongoing     Problem: Gas Exchange - Impaired:  Goal: Levels of oxygenation will improve  Description: Levels of oxygenation will improve  9/20/2021 1201 by Lyndsay Green RN  Outcome: Ongoing

## 2021-09-20 NOTE — PROGRESS NOTES
Neurology Progress Note    Interval History:    No significant issues overnight. Past Medical History:   Diagnosis Date    Acute respiratory failure ([facility] 07/11/2021    CHF [facility]congestive heart failure) ([facility])     combined    COPD (chronic obstructive pulmonary disease) ([facility])     Diabetes mellitus ([facility]     Hyperlipidemia     Hypertension     Oxygen dependent      Past Surgical History:   Procedure Laterality Date    HERNIA REPAIR      TONSILLECTOMY         HOME MEDICATIONS:  Medications Prior to Admission: Umeclidinium Bromide (INCRUSE ELLIPTA) 62.5 MCG/INH AEPB, INHALE 1 PUFF DAILY  ipratropium-albuterol (DUONEB) 0.5-2.5 (3) MG/3ML SOLN nebulizer solution, Take 3 mLs by nebulization every 4-6 hours as needed for Shortness of Breath  glimepiride (AMARYL) 2 MG tablet, TAKE 1 TABLET EACH MORNING BEFORE BREAKFAST. DISCONTINUE FARXIGA  albuterol sulfate  (90 Base) MCG/ACT inhaler, INHALE 2 PUFFS INTO THE LUNGS EVERY 6 HOURS AS NEEDED FOR WHEEZING OR SHORTNESS OF BREATH  pioglitazone (ACTOS) 15 MG tablet, TAKE (1) TABLET DAILY  bisoprolol (ZEBETA) 5 MG tablet, TAKE 1 TABLET BY MOUTH DAILY  Fluticasone furoate-vilanterol (BREO ELLIPTA) 200-25 MCG/INH AEPB inhaler, Inhale 1 puff into the lungs daily  gabapentin (NEURONTIN) 800 MG tablet, TAKE 1 TABLET 4 TIMES DAILY  HYDROcodone-acetaminophen (NORCO) 5-325 MG per tablet, Take 1 tablet by mouth every 8 hours as needed for Pain.    furosemide (LASIX) 40 MG tablet, Take 1 tablet by mouth daily  metFORMIN (GLUCOPHAGE) 1000 MG tablet, TAKE 1 TABLET BY MOUTH 2 TIMES DAILY (WITH MEALS)  atorvastatin (LIPITOR) 40 MG tablet, TAKE 1 TABLET ONCE DAILY  fenofibric acid (FIBRICOR) 135 MG CPDR capsule, TAKE 1 CAPSULE ONCE DAILY  lisinopril (PRINIVIL;ZESTRIL) 20 MG tablet, TAKE 1 TABLET ONCE DAILY  [DISCONTINUED] montelukast (SINGULAIR) 10 MG tablet,   [DISCONTINUED] DULoxetine (CYMBALTA) 20 MG extended release capsule, Take 20 mg by mouth 2 times daily  Cholecalciferol (VITAMIN D3) 1.25 MG (01347 UT) CAPS, Take 1 capsule by mouth Twice a Week  Magnesium 400 MG CAPS, Take 1 tablet by mouth daily  [DISCONTINUED] clopidogrel (PLAVIX) 75 MG tablet, Take 1 tablet by mouth daily  blood glucose monitor strips, Test 2 times a day & as needed for symptoms of irregular blood glucose. E11.9  glucose monitoring kit (FREESTYLE) monitoring kit, 1 kit by Does not apply route daily Patient wants brand name Patient tests bid  E11.9  blood glucose monitor kit and supplies, Test 2 times a day & as needed for symptoms of irregular blood glucose.   aspirin 81 MG EC tablet, Take 81 mg by mouth daily  Cyanocobalamin 1000 MCG CAPS, Take 1 tablet by mouth daily  Omega-3 1000 MG CAPS, Take 1 capsule by mouth daily  Coenzyme Q10 (COQ10) 100 MG CAPS, Take 1 capsule by mouth daily  MULTIPLE VITAMINS PO, Take by mouth    CURRENT MEDICATIONS:    Current Facility-Administered Medications:     piperacillin-tazobactam (ZOSYN) 3,375 mg in dextrose 5 % 50 mL IVPB extended infusion (mini-bag), 3,375 mg, IntraVENous, Q8H, Yordy Grove MD, Last Rate: 12.5 mL/hr at 09/20/21 0915, 3,375 mg at 09/20/21 0915    sodium chloride (OCEAN, BABY AYR) 0.65 % nasal spray 1 spray, 1 spray, Each Nostril, PRN, Yordy Grove MD, 1 spray at 09/19/21 1717    perflutren lipid microspheres (DEFINITY) injection 1.65 mg, 1.5 mL, IntraVENous, ONCE PRN, LINK Tsai - CNP    furosemide (LASIX) tablet 40 mg, 40 mg, Oral, Daily, Magnolia Claire MD, 40 mg at 09/20/21 0906    clopidogrel (PLAVIX) tablet 75 mg, 75 mg, Oral, Daily, LINK Tsai CNP, 75 mg at 09/20/21 0903    nicotine (NICODERM CQ) 14 MG/24HR 1 patch, 1 patch, TransDERmal, Daily, Lakeisha Marrero MD, 1 patch at 09/20/21 0916    insulin lispro (1 Unit Dial) 0-12 Units, 0-12 Units, SubCUTAneous, TID WC, Chip Dyer MD, 8 Units at 09/19/21 1714    insulin lispro (1 Unit Dial) 0-6 Units, 0-6 Units, SubCUTAneous, Nightly, Chip Dyer MD, 3 Units at 09/19/21 2237    [Held by provider] enoxaparin (LOVENOX) injection 40 mg, 40 mg, SubCUTAneous, BID, Mikal Gagnon MD, 40 mg at 09/17/21 1104    [Held by provider] lisinopril (PRINIVIL;ZESTRIL) tablet 20 mg, 20 mg, Oral, Daily, Mikal Gagnon MD, 20 mg at 09/17/21 1106    gabapentin (NEURONTIN) capsule 800 mg, 800 mg, Oral, 4x Daily, Mikal Gagnon MD, 800 mg at 09/20/21 0902    predniSONE (DELTASONE) tablet 40 mg, 40 mg, Oral, Daily, Mikal Gagnon MD, 40 mg at 09/20/21 0903    albuterol-ipratropium (COMBIVENT RESPIMAT)  MCG/ACT inhaler 1 puff, 1 puff, Inhalation, Q4H PRN, Lianne Painting MD, 1 puff at 09/18/21 2016    atorvastatin (LIPITOR) tablet 80 mg, 80 mg, Oral, Daily, Hans Ogden MD, 80 mg at 09/20/21 0903    aspirin EC tablet 81 mg, 81 mg, Oral, Daily, Roz Brian MD, 81 mg at 09/20/21 0903    DULoxetine (CYMBALTA) extended release capsule 20 mg, 20 mg, Oral, BID, Roz Brian MD, 20 mg at 09/20/21 0903    glucose (GLUTOSE) 40 % oral gel 15 g, 15 g, Oral, PRN, Roz Brian MD    dextrose 50 % IV solution, 12.5 g, IntraVENous, PRN, Roz Brian MD    glucagon (rDNA) injection 1 mg, 1 mg, IntraMUSCular, PRN, Roz Brian MD    dextrose 5 % solution, 100 mL/hr, IntraVENous, PRN, Roz Brian MD    budesonide-formoterol (SYMBICORT) 80-4.5 MCG/ACT inhaler 2 puff, 2 puff, Inhalation, BID, Roz Brian MD, 2 puff at 09/19/21 2250    montelukast (SINGULAIR) tablet 10 mg, 10 mg, Oral, Nightly, Roz Brian MD, 10 mg at 09/19/21 2234    sodium chloride flush 0.9 % injection 5-40 mL, 5-40 mL, IntraVENous, 2 times per day, Roz Brian MD, 10 mL at 09/20/21 0905    sodium chloride flush 0.9 % injection 5-40 mL, 5-40 mL, IntraVENous, PRN, Roz Brian MD    0.9 % sodium chloride infusion, 25 mL, IntraVENous, PRN, Roz Brian MD, Last Rate: 100 mL/hr at 09/20/21 0907, 25 mL at 09/20/21 0907    ondansetron (ZOFRAN-ODT) disintegrating tablet 4 mg, 4 mg, Oral, Q8H PRN **OR** ondansetron (ZOFRAN) injection 4 mg, 4 mg, IntraVENous, Q6H PRN, Carlos Mace MD    polyethylene glycol [facility]) packet 17 g, 17 g, Oral, Daily PRN, Carlos Mace MD    acetaminophen (TYLENOL) tablet 650 mg, 650 mg, Oral, Q6H PRN **OR** acetaminophen (TYLENOL) suppository 650 mg, 650 mg, Rectal, Q6H PRN, Carlos Mace MD      Constitutional  /60   Pulse 73   Temp 97.5 °F (36.4 °C) (Oral)   Resp 20   Ht 4' 11\" (1.499 m)   Wt 189 lb 6 oz (85.9 kg)   SpO2 93%   BMI 38.25 kg/m²     General Alert, no distress, well-nourished  Cardiovascular: Rate regular. No murmurs  Respiratory: Shortness of breath     Neurologic  Mental status:   orientation to person, place, time, situation   Attention intact as able to attend well to the exam     Language fluent in conversation   Comprehension intact; follows simple commands    Cranial nerves:   CN2: Visual Fields full w/o extinction on confrontational testing  CN 3,4,6: pupils equal and reactive to light, extraocular muscles intact,  CN5: facial sensation symmetric   CN7:face symmetric bilaterally   CN8: hearing symmetric to voice  CN9: palate elevated symmetrically  CN11: trap full strength on shoulder shrug  CN12: tongue midline with protrusion  Motor Exam:   R  L    Deltoid 5 5   Biceps 5 5   Triceps 5 5   Interossei 5 5      R  L    Hip flexion  5 5   Knee flexion  5 5   Knee extension  5 5   Ankle dorsiflexion  5 5   Ankle plantar flexion  5 5       Deep tendon reflexes:    R  L    Biceps  2 2   Brachioradialis  2 2   Patellar  2 2   Toes down down     Sensory: light touch intact and symmetric in all 4 extremities. Gait: deferred due to shortness of breath      Images:  MRI brain wo contrast:  1. No acute intracranial abnormality. No evidence of acute infarct. 2. Minimal nonspecific white matter signal abnormality on FLAIR imaging suggesting minimal chronic small vessel ischemic disease and/or minimal age-related degenerative change.        CT head wo contrast:   No acute

## 2021-09-20 NOTE — PROGRESS NOTES
Patient A&Ox4. VSS. O2 saturation 87-91% on 15 L high flow NC, patient tolerating well, medical resident and respiratory notified. Patient denies pain or nausea, tolerating diet well. Patient voiding adequately per urinal. Patient instructed to call out for needs. Fall precautions in place.

## 2021-09-20 NOTE — PROGRESS NOTES
Pt refuses BIPAP; he states \"I don't use it at home and I don't need it. \" I attempted to explain the importance/benefits of using BIPAP but he continues to refuse.

## 2021-09-20 NOTE — PROGRESS NOTES
Progress Note    Admit Date: 9/16/2021  Day: 4  Diet: ADULT DIET; Regular; Low Sodium (2 gm); 1800 ml    CC: Chest pain, shortness of breath    Interval history:   No acute events overnight. This morning patient says his shortness of breath is improved but he is still requiring 15 L of high flow nasal cannula to maintain O2 sats. Mentation is at baseline. Very pleasant. He is not having any headaches or vision changes. Legionella and strep pneumo negative    HPI: Edgar Herrera a 50 y. o. male with history of HFpEF (2/2021 ECHO showed grade 2 diastolic dysfunction, 91% EF, LV hypertrophy), COPD on 2 L of oxygen at home, diabetes, hypertension, hyperlipidemia who presents with two weeks of worsening SOB. He reports his home oxygen saturations was in the 70's. He is not vaccinated for COVID and he reports that he was likely exposed to [employer] before his SOB began. At that time he also experiences 2-3 days of generalized body aches that have since resolved. Denies fevers and chills. Patient has been hospitalized previously for COPD and CHF exacerbations, and he reports this SOB feels more like a CHF exacerbation to him. He reports feeling slightly fluid overloaded though does not know his dry weight. He repeatedly takes off his HFNC in the room and desats down to the 60's, but quickly comes back up on 10L. Even with a pulse ox at 60 he denies SOB.    Upon admission, sodium 146, glucose 155, Pro-BNP 3129 (5000 on 7/2021 and 1847 in 6/2021), negative troponin, D-dimer 292. Chest XR showed a R pleural effusion and hazy bibasilar atelectasis vs. Small infiltrates. It showed stable cardiomegaly and mild pulmonary congestion. He reports complete resolution of SOB after receiving IV lasix dose, however continues to desat without supplemental oxygen.     Medications:     Scheduled Meds:   piperacillin-tazobactam  3,375 mg IntraVENous Q8H    furosemide  40 mg Oral Daily    clopidogrel  75 mg Oral Daily    nicotine  1 patch TransDERmal Daily    insulin lispro  0-12 Units SubCUTAneous TID WC    insulin lispro  0-6 Units SubCUTAneous Nightly    [Held by provider] enoxaparin  40 mg SubCUTAneous BID    [Held by provider] lisinopril  20 mg Oral Daily    gabapentin  800 mg Oral 4x Daily    predniSONE  40 mg Oral Daily    atorvastatin  80 mg Oral Daily    aspirin  81 mg Oral Daily    DULoxetine  20 mg Oral BID    budesonide-formoterol  2 puff Inhalation BID    montelukast  10 mg Oral Nightly    sodium chloride flush  5-40 mL IntraVENous 2 times per day     Continuous Infusions:   dextrose      sodium chloride 25 mL (09/20/21 0907)     PRN Meds:sodium chloride, albuterol-ipratropium, glucose, dextrose, glucagon (rDNA), dextrose, sodium chloride flush, sodium chloride, ondansetron **OR** ondansetron, polyethylene glycol, acetaminophen **OR** acetaminophen    Objective:   Vitals:   T-max:  Patient Vitals for the past 8 hrs:   BP Temp Temp src Pulse Resp SpO2 Weight   09/20/21 0906 129/60 -- -- -- -- -- --   09/20/21 0710 (!) 117/55 97.5 °F (36.4 °C) Oral 73 20 -- --   09/20/21 0654 -- -- -- -- 20 93 % --   09/20/21 0549 -- -- -- -- -- -- 189 lb 6 oz (85.9 kg)   09/20/21 0543 (!) 141/66 97.5 °F (36.4 °C) Oral 63 20 94 % --   09/20/21 0304 -- -- -- -- 20 93 % --       Intake/Output Summary (Last 24 hours) at 9/20/2021 1006  Last data filed at 9/20/2021 9561  Gross per 24 hour   Intake --   Output 1575 ml   Net -1575 ml       Review of Systems  Gen: Denies fevers or chills  CV: No chest pain, palpitations  Pulm: On 15 L high flow, says SOB is improved  Abd: Denies abdominal pain, nausea, vomiting  Neuro: Denies headache, paresthesias    Physical Exam  Gen: Obese, answering questions appropriately this morning  Lying in bed, appears comfortable cooperative with exam.  CV: Regular rate rhythm, could not appreciate murmur.   Exam limited by body habitus and ambient noise  Pulm: wheeze present, could hear breath sounds bilaterally  Abd: Soft nontender bowel sounds present  Neuro: CN II through XII grossly intact, moving all 4 extremities, no focal deficits. LABS:    CBC:   Recent Labs     09/18/21  0519 09/19/21 0429 09/20/21  0604   WBC 10.5 9.8 9.6   HGB 15.0 15.0 15.5   HCT 46.8 47.1 48.6    287 276   MCV 91.7 92.3 91.4     Renal:    Recent Labs     09/18/21 0519 09/19/21 0429 09/20/21  0604   * 144 142   K 4.2 4.6 4.0    102 99   CO2 35* 35* 33*   BUN 24* 31* 25*   CREATININE 0.7* 0.9 0.8*   GLUCOSE 173* 141* 151*   CALCIUM 9.8 9.9 9.8   MG 2.10 1.80 1.60*   ANIONGAP 8 7 10     Hepatic:   No results for input(s): AST, ALT, BILITOT, BILIDIR, PROT, LABALBU, ALKPHOS in the last 72 hours. Troponin:   No results for input(s): TROPONINI in the last 72 hours. BNP: No results for input(s): BNP in the last 72 hours. Lipids:   No results for input(s): CHOL, HDL in the last 72 hours. Invalid input(s): LDLCALCU, TRIGLYCERIDE  ABGs:    Recent Labs     09/17/21 2034   PHART 7.418   [id number] 51.2*   PO2ART 66.0*   WGF8XLF 33.1*   BEART 9*   [id number] 93   [id number] 35       INR:   No results for input(s): INR in the last 72 hours. Lactate:   Recent Labs     09/17/21 2034   LACTATE 0.75     Cultures:  -----------------------------------------------------------------  RAD:   CT CHEST HIGH RESOLUTION   Final Result      Slow progression of bilateral lower lobe alveolar consolidation with air bronchograms raises concern for chronic process such as interstitial pneumonia, eosinophilic pneumonia, and organizing pneumonia. Neoplastic etiology is considered much less likely. MRI BRAIN WO CONTRAST   Final Result      1. No acute intracranial abnormality. No evidence of acute infarct. 2. Minimal nonspecific white matter signal abnormality on FLAIR imaging suggesting minimal chronic small vessel ischemic disease and/or minimal age-related degenerative change.          CTA HEAD NECK W CONTRAST   Final Result Study limited by motion artifact and poor contrast bolus. 1. Atherosclerotic disease at bilateral carotid bifurcations. Exact percentage of stenosis by NASCET criteria is difficult to comment upon due to artifacts. 2. 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 Moderately attenuated mid vertebral artery from upper C7  to C4 5 level. Distal to its left vertebral artery is patent without significant stenosis. PROCEDURE: CT ANGIOGRAPHY HEAD WITH/WITHOUT CONTRAST      INDICATION: aphasia, ams      COMPARISON: none      TECHNIQUE: Axial CT imaging obtained through the head prior to and following administration of IV contrast. Axial images, multiplanar reformatted images, and maximum intensity projection images were reviewed for CT angiographic technique. IV contrast: mL Omnipaque 350. FINDINGS:      ANTERIOR CIRCULATION: The intracranial internal carotid arteries, anterior cerebral arteries, and middle cerebral arteries are patent. There are calcified plaques with mild to moderate narrowing of bilateral ICA cavernous/supraclinoid segments. . No    evidence for aneurysm or arteriovenous malformation. POSTERIOR CIRCULATION: The bilateral vertebral arteries, basilar artery and posterior cerebral arteries are patent. There are calcified and noncalcified plaques with mild narrowing of left vertebral artery intradural segment. Right vertebral intradural    segment is severely attenuated, likely hyperplastic. No evidence for aneurysm or arteriovenous malformation. INTRACRANIAL VENOUS SYSTEM: No evidence for intracranial venous thrombosis. IMPRESSION:      No evidence of intracranial large vessel occlusion. 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 Calcified plaques with mild to moderate narrowing of bilateral ICA cavernous/supraclinoid segments.       CT HEAD WO CONTRAST Final Result      No acute intracranial abnormality . CT CHEST PULMONARY EMBOLISM W CONTRAST   Final Result   1. No definite pulmonary embolus identified. 2.  Mild atherosclerotic changes of the aorta. No aortic aneurysm or    dissection. 3.  Patchy densities in the lower lobes and right middle lobe may    represent atelectasis versus infectious/inflammatory process. 4.  Dependent atelectasis bilaterally. Atelectasis in the lingula. 5.  Trace right pleural effusion. 6.  Nonspecific similar mildly prominent mediastinal and hilar lymph    nodes. Assessment/Plan:     #TIA r/o   Brief episode of expressive aphasia. Rapid response called 9/17. CT head, CTA head and neck no evidence of intracranial large vessel occlusion. L vertebral artery occluded. Echo normal.  MRI brain without acute process, small vessel ischemic disease present. Folate normal, B12 normal  - Neurology on board, appreciate recommendation.    - Continue ASA, Plavix  - Check thiamine  -Pulmonology consult placed  - zosyn day 2 (9/19-) - concern for aspiration     #Acute hypoxic and hypercapnic respiratory failure  Is usually on 2 L at home. He is requiring 15 L high flow nasal cannula. ABG 7.34/67.9/65.4/37 on admit. CT with trace pleural effusion, and bilateral lower lobe and right middle lobe atelectasis versus infectious/inflammatory process. Suspect this is more likely to be viral considering pro-Noble of 0.09. Covid negative. CHF exacerbation vs COPD exacerbation. Viral respiratory panel was not revealing. Patient still refusing BiPAP at night. - continue home Symbicort  - Continue home Singulair  - DuoNebs as needed  - Intermittent neb Q4H   - Still on 15 L of high flow oxygen, wean down for O2 Sat > 88% given history of COPD  -Pulmonology consulted appreciate recs suspect element of ILD. Repeat imaging obtained.     #COPD-low suspicion for exacerbation  Patient without cough, but with significant wheezing on exam across all lung fields. ABG hypercapnic but this is consistent at his baseline PCO2 60 previous labs. - Patient received 2 doses Decadron 10  - Will continue with Prednisone 40 to finish 7 days total steroids. (9/17-)    #CHF/EF 55% in February-low suspicion for exacerbation  Patient not overtly volume overloaded, could not appreciate JVD but this is complicated by body habitus. No pitting edema on lower extremities, no pleural effusion on imaging, consistent with exam.  - UOP over past 24 hours 1650 mL  - Lasix IV was temporarily held during rapid response  - Restart patient back to his home Lasix 40 mg daily     #CAD/PVD  Patient on long-term DAPT. Per cardiologist note Dr. Estiven Mosher in February, this was supposed to be stopped. - Plavix was initially stopped due to home medication reconciliation. However, restarted 9/17 overnight due to concern of TIA   - ASA     #Diabetes type 2-A1c 7.1 in June  Sugars have been elevated on admission. -MDSSI     #HTN  - continue home meds    #HLD  - Continue home meds    #Neuropathy  - continue home gabapentin    #Smoking cessation  Patient with significant smoking history 2.5 PPD since 10years old  - education and patch    Code Status: Full code  FEN: ADULT DIET; Regular; Low Sodium (2 gm); 1800 ml  PPX: Lovenox  DISPO: pending    Dawson Quarles MD, PGY-1  09/20/21  10:06 AM    This patient will be staffed and discussed with Dr. Latesha Urbano MD.    Patient seen and examined, labs and imaging studies reviewed, agree with assessment and plan as outlined above. Continue with current care and plan. Discussed case with patients nurse, discussed case with care team, discussed plan. Lungs are largely clear. Discussed with wife on facetime.      Latesha Urbano MD [id number]

## 2021-09-20 NOTE — PLAN OF CARE
Problem: Falls - Risk of:  Goal: Will remain free from falls  Description: Will remain free from falls  Outcome: Ongoing     Problem: Airway Clearance - Ineffective  Goal: Achieve or maintain patent airway  Outcome: Ongoing     Problem: Gas Exchange - Impaired  Goal: Absence of hypoxia  Outcome: Ongoing  Goal: Promote optimal lung function  Outcome: Ongoing     Problem: Breathing Pattern - Ineffective  Goal: Ability to achieve and maintain a regular respiratory rate  Outcome: Ongoing

## 2021-09-21 ENCOUNTER — TELEPHONE (OUTPATIENT)
Dept: FAMILY MEDICINE CLINIC | Age: 56
End: 2021-09-21

## 2021-09-21 LAB
ANION GAP SERPL CALCULATED.3IONS-SCNC: 10 MMOL/L (ref 3–16)
ANTI-DSDNA IGG: 2 IU/ML (ref 0–9)
ANTI-NUCLEAR ANTIBODY (ANA): NEGATIVE
BASOPHILS ABSOLUTE: 0 K/UL (ref 0–0.2)
BASOPHILS RELATIVE PERCENT: 0.4 %
BLOOD CULTURE, ROUTINE: NORMAL
BUN BLDV-MCNC: 24 MG/DL (ref 7–20)
CALCIUM SERPL-MCNC: 10 MG/DL (ref 8.3–10.6)
CHLORIDE BLD-SCNC: 96 MMOL/L (ref 99–110)
CO2: 33 MMOL/L (ref 21–32)
CREAT SERPL-MCNC: 0.7 MG/DL (ref 0.9–1.3)
CULTURE, BLOOD 2: NORMAL
EOSINOPHILS ABSOLUTE: 0 K/UL (ref 0–0.6)
EOSINOPHILS RELATIVE PERCENT: 0.5 %
GFR AFRICAN AMERICAN: >60
GFR NON-AFRICAN AMERICAN: >60
GLUCOSE BLD-MCNC: 133 MG/DL (ref 70–99)
GLUCOSE BLD-MCNC: 158 MG/DL (ref 70–99)
GLUCOSE BLD-MCNC: 179 MG/DL (ref 70–99)
GLUCOSE BLD-MCNC: 384 MG/DL (ref 70–99)
HCT VFR BLD CALC: 49.9 % (ref 40.5–52.5)
HEMOGLOBIN: 16.1 G/DL (ref 13.5–17.5)
LYMPHOCYTES ABSOLUTE: 2 K/UL (ref 1–5.1)
LYMPHOCYTES RELATIVE PERCENT: 21.5 %
MAGNESIUM: 1.6 MG/DL (ref 1.8–2.4)
MCH RBC QN AUTO: 29.1 PG (ref 26–34)
MCHC RBC AUTO-ENTMCNC: 32.3 G/DL (ref 31–36)
MCV RBC AUTO: 90.3 FL (ref 80–100)
MONOCYTES ABSOLUTE: 0.6 K/UL (ref 0–1.3)
MONOCYTES RELATIVE PERCENT: 6.4 %
NEUTROPHILS ABSOLUTE: 6.5 K/UL (ref 1.7–7.7)
NEUTROPHILS RELATIVE PERCENT: 71.2 %
PDW BLD-RTO: 17.5 % (ref 12.4–15.4)
PERFORMED ON: ABNORMAL
PLATELET # BLD: 299 K/UL (ref 135–450)
PMV BLD AUTO: 8.4 FL (ref 5–10.5)
POTASSIUM SERPL-SCNC: 4.1 MMOL/L (ref 3.5–5.1)
RBC # BLD: 5.53 M/UL (ref 4.2–5.9)
SODIUM BLD-SCNC: 139 MMOL/L (ref 136–145)
WBC # BLD: 9.1 K/UL (ref 4–11)

## 2021-09-21 PROCEDURE — 94640 AIRWAY INHALATION TREATMENT: CPT

## 2021-09-21 PROCEDURE — 83036 HEMOGLOBIN GLYCOSYLATED A1C: CPT

## 2021-09-21 PROCEDURE — 6370000000 HC RX 637 (ALT 250 FOR IP): Performed by: NURSE PRACTITIONER

## 2021-09-21 PROCEDURE — 6360000002 HC RX W HCPCS: Performed by: STUDENT IN AN ORGANIZED HEALTH CARE EDUCATION/TRAINING PROGRAM

## 2021-09-21 PROCEDURE — 2700000000 HC OXYGEN THERAPY PER DAY

## 2021-09-21 PROCEDURE — 6370000000 HC RX 637 (ALT 250 FOR IP): Performed by: STUDENT IN AN ORGANIZED HEALTH CARE EDUCATION/TRAINING PROGRAM

## 2021-09-21 PROCEDURE — 80048 BASIC METABOLIC PNL TOTAL CA: CPT

## 2021-09-21 PROCEDURE — 94761 N-INVAS EAR/PLS OXIMETRY MLT: CPT

## 2021-09-21 PROCEDURE — 2580000003 HC RX 258: Performed by: STUDENT IN AN ORGANIZED HEALTH CARE EDUCATION/TRAINING PROGRAM

## 2021-09-21 PROCEDURE — 36415 COLL VENOUS BLD VENIPUNCTURE: CPT

## 2021-09-21 PROCEDURE — 85025 COMPLETE CBC W/AUTO DIFF WBC: CPT

## 2021-09-21 PROCEDURE — 6360000002 HC RX W HCPCS: Performed by: INTERNAL MEDICINE

## 2021-09-21 PROCEDURE — 99233 SBSQ HOSP IP/OBS HIGH 50: CPT | Performed by: INTERNAL MEDICINE

## 2021-09-21 PROCEDURE — 2060000000 HC ICU INTERMEDIATE R&B

## 2021-09-21 PROCEDURE — 83735 ASSAY OF MAGNESIUM: CPT

## 2021-09-21 RX ORDER — FUROSEMIDE 10 MG/ML
40 INJECTION INTRAMUSCULAR; INTRAVENOUS ONCE
Status: COMPLETED | OUTPATIENT
Start: 2021-09-21 | End: 2021-09-21

## 2021-09-21 RX ORDER — METHYLPREDNISOLONE SODIUM SUCCINATE 125 MG/2ML
125 INJECTION, POWDER, LYOPHILIZED, FOR SOLUTION INTRAMUSCULAR; INTRAVENOUS EVERY 6 HOURS
Status: COMPLETED | OUTPATIENT
Start: 2021-09-21 | End: 2021-09-24

## 2021-09-21 RX ORDER — INSULIN LISPRO 100 [IU]/ML
0-9 INJECTION, SOLUTION INTRAVENOUS; SUBCUTANEOUS NIGHTLY
Status: DISCONTINUED | OUTPATIENT
Start: 2021-09-21 | End: 2021-10-01 | Stop reason: HOSPADM

## 2021-09-21 RX ORDER — INSULIN LISPRO 100 [IU]/ML
0-18 INJECTION, SOLUTION INTRAVENOUS; SUBCUTANEOUS
Status: DISCONTINUED | OUTPATIENT
Start: 2021-09-21 | End: 2021-10-01 | Stop reason: HOSPADM

## 2021-09-21 RX ADMIN — BUDESONIDE AND FORMOTEROL FUMARATE DIHYDRATE 2 PUFF: 80; 4.5 AEROSOL RESPIRATORY (INHALATION) at 09:10

## 2021-09-21 RX ADMIN — ASPIRIN 81 MG: 81 TABLET, COATED ORAL at 09:18

## 2021-09-21 RX ADMIN — ATORVASTATIN CALCIUM 80 MG: 80 TABLET, FILM COATED ORAL at 09:18

## 2021-09-21 RX ADMIN — LISINOPRIL 20 MG: 20 TABLET ORAL at 09:18

## 2021-09-21 RX ADMIN — INSULIN LISPRO 7 UNITS: 100 INJECTION, SOLUTION INTRAVENOUS; SUBCUTANEOUS at 21:25

## 2021-09-21 RX ADMIN — GABAPENTIN 800 MG: 400 CAPSULE ORAL at 12:08

## 2021-09-21 RX ADMIN — GABAPENTIN 800 MG: 400 CAPSULE ORAL at 09:18

## 2021-09-21 RX ADMIN — DULOXETINE 20 MG: 20 CAPSULE, DELAYED RELEASE ORAL at 21:26

## 2021-09-21 RX ADMIN — PREDNISONE 40 MG: 20 TABLET ORAL at 09:17

## 2021-09-21 RX ADMIN — METHYLPREDNISOLONE SODIUM SUCCINATE 125 MG: 125 INJECTION, POWDER, FOR SOLUTION INTRAMUSCULAR; INTRAVENOUS at 21:25

## 2021-09-21 RX ADMIN — CLOPIDOGREL BISULFATE 75 MG: 75 TABLET ORAL at 09:18

## 2021-09-21 RX ADMIN — DULOXETINE 20 MG: 20 CAPSULE, DELAYED RELEASE ORAL at 09:19

## 2021-09-21 RX ADMIN — Medication 10 ML: at 21:26

## 2021-09-21 RX ADMIN — ENOXAPARIN SODIUM 40 MG: 40 INJECTION SUBCUTANEOUS at 21:26

## 2021-09-21 RX ADMIN — GABAPENTIN 800 MG: 400 CAPSULE ORAL at 21:26

## 2021-09-21 RX ADMIN — ENOXAPARIN SODIUM 40 MG: 40 INJECTION SUBCUTANEOUS at 09:18

## 2021-09-21 RX ADMIN — GABAPENTIN 800 MG: 400 CAPSULE ORAL at 17:11

## 2021-09-21 RX ADMIN — MONTELUKAST 10 MG: 10 TABLET, FILM COATED ORAL at 21:26

## 2021-09-21 RX ADMIN — BUDESONIDE AND FORMOTEROL FUMARATE DIHYDRATE 2 PUFF: 80; 4.5 AEROSOL RESPIRATORY (INHALATION) at 20:53

## 2021-09-21 RX ADMIN — Medication 10 ML: at 09:19

## 2021-09-21 RX ADMIN — FUROSEMIDE 40 MG: 10 INJECTION, SOLUTION INTRAMUSCULAR; INTRAVENOUS at 09:33

## 2021-09-21 RX ADMIN — INSULIN LISPRO 3 UNITS: 100 INJECTION, SOLUTION INTRAVENOUS; SUBCUTANEOUS at 12:09

## 2021-09-21 RX ADMIN — METHYLPREDNISOLONE SODIUM SUCCINATE 125 MG: 125 INJECTION, POWDER, FOR SOLUTION INTRAMUSCULAR; INTRAVENOUS at 17:10

## 2021-09-21 ASSESSMENT — ENCOUNTER SYMPTOMS
ABDOMINAL PAIN: 0
CHEST TIGHTNESS: 0
STRIDOR: 0
SHORTNESS OF BREATH: 1
COUGH: 1
WHEEZING: 0
CHOKING: 0

## 2021-09-21 ASSESSMENT — PAIN SCALES - GENERAL
PAINLEVEL_OUTOF10: 0
PAINLEVEL_OUTOF10: 0

## 2021-09-21 NOTE — PROGRESS NOTES
Progress Note    Admit Date: 2021  Day: 5  Diet: ADULT DIET; Regular; Low Sodium (2 gm); 1800 ml    CC: Chest pain + SOB    Interval history:    Pt seen at bedside. Nil acute overnight events reported. Still on 15L HFNC with SpO2: 89-92% though he denies any shortness of breath. Denies any Chest pain or palpitations. ESR: 22  Autoimmune panel done; ABDI: negative and Anti: dsDNA Ig      HPI:     Denece Myah a 50 y. o. male with history of HFpEF (2021 ECHO showed grade 2 diastolic dysfunction, 86% EF, LV hypertrophy), COPD on 2 L of oxygen at home, diabetes, hypertension, hyperlipidemia who presents with two weeks of worsening SOB. He reports his home oxygen saturations was in the 70's. He is not vaccinated for COVID and he reports that he was likely exposed to [employer] before his SOB began. At that time he also experiences 2-3 days of generalized body aches that have since resolved. Denies fevers and chills. Patient has been hospitalized previously for COPD and CHF exacerbations, and he reports this SOB feels more like a CHF exacerbation to him. He reports feeling slightly fluid overloaded though does not know his dry weight. He repeatedly takes off his HFNC in the room and desats down to the 60's, but quickly comes back up on 10L. Even with a pulse ox at 60 he denies SOB.    Upon admission, sodium 146, glucose 155, Pro-BNP 3129 (5000 on 2021 and 1847 in 2021), negative troponin, D-dimer 292. Chest XR showed a R pleural effusion and hazy bibasilar atelectasis vs. Small infiltrates.  It showed stable cardiomegaly and mild pulmonary congestion. He reports complete resolution of SOB after receiving IV lasix dose, however continues to desat without supplemental oxygen.       Medications:     Scheduled Meds:   insulin lispro  0-18 Units SubCUTAneous TID     insulin lispro  0-9 Units SubCUTAneous Nightly    clopidogrel  75 mg Oral Daily    nicotine  1 patch TransDERmal Daily    enoxaparin  40 mg SubCUTAneous BID    lisinopril  20 mg Oral Daily    gabapentin  800 mg Oral 4x Daily    predniSONE  40 mg Oral Daily    atorvastatin  80 mg Oral Daily    aspirin  81 mg Oral Daily    DULoxetine  20 mg Oral BID    budesonide-formoterol  2 puff Inhalation BID    montelukast  10 mg Oral Nightly    sodium chloride flush  5-40 mL IntraVENous 2 times per day     Continuous Infusions:   dextrose      sodium chloride 25 mL (09/20/21 0907)     PRN Meds:sodium chloride, albuterol-ipratropium, glucose, dextrose, glucagon (rDNA), dextrose, sodium chloride flush, sodium chloride, ondansetron **OR** ondansetron, polyethylene glycol, acetaminophen **OR** acetaminophen    Objective:   Vitals:   T-max:  Patient Vitals for the past 8 hrs:   BP Temp Temp src Pulse Resp SpO2 Weight   09/21/21 1200 100/60 98.2 °F (36.8 °C) Oral 92 18 (!) 89 % 184 lb 12.8 oz (83.8 kg)   09/21/21 1147 -- -- -- -- -- 90 % --   09/21/21 0910 -- -- -- -- -- 92 % --   09/21/21 0630 119/68 98.2 °F (36.8 °C) Oral 68 18 92 % --       Intake/Output Summary (Last 24 hours) at 9/21/2021 1316  Last data filed at 9/21/2021 0939  Gross per 24 hour   Intake 480 ml   Output 1175 ml   Net -695 ml       Review of Systems   HENT: Negative. Respiratory: Positive for cough and shortness of breath. Negative for choking, chest tightness, wheezing and stridor. Cardiovascular: Negative for chest pain, palpitations and leg swelling. Gastrointestinal: Negative for abdominal pain. Genitourinary: Negative. Musculoskeletal: Negative. Neurological: Negative. Physical Exam  Constitutional:       Appearance: He is obese. HENT:      Mouth/Throat:      Mouth: Mucous membranes are dry. Pharynx: Oropharynx is clear. Eyes:      Conjunctiva/sclera: Conjunctivae normal.      Pupils: Pupils are equal, round, and reactive to light. Cardiovascular:      Rate and Rhythm: Normal rate and regular rhythm. Pulses: Normal pulses. Heart sounds:  No murmur heard. No friction rub. No gallop. Pulmonary:      Effort: Pulmonary effort is normal. No respiratory distress. Breath sounds: Rales (lower lung base but minimal ) present. No wheezing or rhonchi. Abdominal:      General: Bowel sounds are normal. There is no distension. Tenderness: There is no abdominal tenderness. There is no guarding. Skin:     General: Skin is warm. Capillary Refill: Capillary refill takes less than 2 seconds. Neurological:      General: No focal deficit present. Mental Status: He is alert and oriented to person, place, and time. Psychiatric:         Mood and Affect: Mood normal.         LABS:    CBC:   Recent Labs     09/19/21 0429 09/20/21  0604 09/21/21  0549   WBC 9.8 9.6 9.1   HGB 15.0 15.5 16.1   HCT 47.1 48.6 49.9    276 299   MCV 92.3 91.4 90.3     Renal:    Recent Labs     09/19/21 0429 09/20/21  0604 09/21/21  0549    142 139   K 4.6 4.0 4.1    99 96*   CO2 35* 33* 33*   BUN 31* 25* 24*   CREATININE 0.9 0.8* 0.7*   GLUCOSE 141* 151* 158*   CALCIUM 9.9 9.8 10.0   MG 1.80 1.60* 1.60*   ANIONGAP 7 10 10     Hepatic: No results for input(s): AST, ALT, BILITOT, BILIDIR, PROT, LABALBU, ALKPHOS in the last 72 hours. Troponin: No results for input(s): TROPONINI in the last 72 hours. BNP: No results for input(s): BNP in the last 72 hours. Lipids: No results for input(s): CHOL, HDL in the last 72 hours. Invalid input(s): LDLCALCU, TRIGLYCERIDE  ABGs:  No results for input(s): PHART, DIO1QRD, PO2ART, LEK4WWJ, BEART, THGBART, D0YWZHGN, JVV2NLO in the last 72 hours. INR: No results for input(s): INR in the last 72 hours. Lactate: No results for input(s): LACTATE in the last 72 hours.   Cultures:  -----------------------------------------------------------------  RAD:   CT CHEST HIGH RESOLUTION   Final Result      Slow progression of bilateral lower lobe alveolar consolidation with air bronchograms raises concern for chronic process such as interstitial pneumonia, eosinophilic pneumonia, and organizing pneumonia. Neoplastic etiology is considered much less likely. MRI BRAIN WO CONTRAST   Final Result      1. No acute intracranial abnormality. No evidence of acute infarct. 2. Minimal nonspecific white matter signal abnormality on FLAIR imaging suggesting minimal chronic small vessel ischemic disease and/or minimal age-related degenerative change. CTA HEAD NECK W CONTRAST   Final Result      Study limited by motion artifact and poor contrast bolus. 1. Atherosclerotic disease at bilateral carotid bifurcations. Exact percentage of stenosis by NASCET criteria is difficult to comment upon due to artifacts. 2. 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 Moderately attenuated mid vertebral artery from upper C7  to C4 5 level. Distal to its left vertebral artery is patent without significant stenosis. PROCEDURE: CT ANGIOGRAPHY HEAD WITH/WITHOUT CONTRAST      INDICATION: aphasia, ams      COMPARISON: none      TECHNIQUE: Axial CT imaging obtained through the head prior to and following administration of IV contrast. Axial images, multiplanar reformatted images, and maximum intensity projection images were reviewed for CT angiographic technique. IV contrast: mL Omnipaque 350. FINDINGS:      ANTERIOR CIRCULATION: The intracranial internal carotid arteries, anterior cerebral arteries, and middle cerebral arteries are patent. There are calcified plaques with mild to moderate narrowing of bilateral ICA cavernous/supraclinoid segments. . No    evidence for aneurysm or arteriovenous malformation. POSTERIOR CIRCULATION: The bilateral vertebral arteries, basilar artery and posterior cerebral arteries are patent. There are calcified and noncalcified plaques with mild narrowing of left vertebral artery intradural segment.  Right vertebral intradural    segment is severely attenuated, likely hyperplastic. No evidence for aneurysm or arteriovenous malformation. INTRACRANIAL VENOUS SYSTEM: No evidence for intracranial venous thrombosis. IMPRESSION:      No evidence of intracranial large vessel occlusion. 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 Calcified plaques with mild to moderate narrowing of bilateral ICA cavernous/supraclinoid segments. CT HEAD WO CONTRAST   Final Result      No acute intracranial abnormality . CT CHEST PULMONARY EMBOLISM W CONTRAST   Final Result   1. No definite pulmonary embolus identified. 2.  Mild atherosclerotic changes of the aorta. No aortic aneurysm or    dissection. 3.  Patchy densities in the lower lobes and right middle lobe may    represent atelectasis versus infectious/inflammatory process. 4.  Dependent atelectasis bilaterally. Atelectasis in the lingula. 5.  Trace right pleural effusion. 6.  Nonspecific similar mildly prominent mediastinal and hilar lymph    nodes. Assessment/Plan:   Torsten Mcneil is a 50 y.o. male, PMHx HFpEF, COPD on 2 L of oxygen at home, diabetes, HTN,  who presents with two weeks of worsening SOB. Acute hypoxic and hypercapnic respiratory failure  Pt is on 2L Home Oxygen. Currently requiring 15L HFNC. ABG 7.34/67.9/65.4/37 on admit. ICT with trace pleural effusion, and bilateral lower lobe and right middle lobe atelectasis versus infectious/inflammatory process. Covid negative. Procal 0.06 which lowers suspicion for any concomitant bacterial infection. Viral respiratory panel was unremarkable. CHF exacerbation vs COPD exacerbation.  Patient still refusing BiPAP at night.  - Continue home Symbicort  - Continue home Singulair  - Combivent Respimat as needed  - Intermittent neb Q4H   - Still on 15 L of high flow oxygen, wean down for O2 Sat > 88% given history of COPD  - Pulmonology following   Suspecting cryptogenic organizing pneumonia vs hypersensitivity pneumonitis or mixed connective tissue disease. Steroids appropriate for autoimmune. ABDI negative. CRP 7.1 (decreased from 16.2). ESR: 22.     COPD  Low suspicion for exacerbation. Patient without cough, but with significant wheezing on exam across all lung fields. ABG hypercapnic but this is consistent at his baseline PCO2 60 previous labs. Received 2 doses Decadron 10 (9/17)  - continue Prednisone 40 mg Daily to complete 7 days (9/17-)    HFpEF   Low suspicion for exacerbation  ECHO 55% in 2/2021  Patient not overtly volume overloaded, could not appreciate JVD but this is complicated by body habitus. No lower extremity edema. no pleural effusion on imaging, consistent with exam.  ProBNP: 3129 -> 371 - improvement so likely there was some exarcerbation but with BNP decreasing, Pt has still not improved respiratory wise. Good Urine output over past 24 hours 1975 mL  - BNP Q3days  - Lasix 40 mg IV x 1 dose given today     TIA r/o   Brief episode of expressive aphasia. Rapid response called 9/17. CT head, CTA head and neck no evidence of intracranial large vessel occlusion. L vertebral artery occluded. Echo normal.  MRI brain without acute process, small vessel ischemic disease present. Folate normal, B12 normal  - Neurology on board, appreciate recommendation.    - Continue ASA, Plavix  - Check thiamine  -Pulmonology consult placed  - zosyn day 2 (9/19-) - concern for aspiration      CAD/PVD  Patient on long-term DAPT. Per cardiologist note Dr. Quinton Waldrop in February, this was supposed to be stopped. - Plavix was initially stopped due to home medication reconciliation. However, restarted 9/17 overnight due to concern of TIA   - ASA      Diabetes type 2  HbA1c: 7.1 6/2021  Sugars have been elevated on admission. Pt on steroids, BGs above 200 after meals.    - Switched to [facility]      HTN  - continue home meds     HLD  - Continue home meds     Neuropathy  - continue home gabapentin     Smoking cessation  Patient with significant smoking history 2.5 PPD since 10years old  - education and patch       Code Status: Full Code   FEN: ADULT DIET; Regular; Low Sodium (2 gm); 1800 ml  PPX:  Lovenox  DISPO: Kenrick Almaraz MD, PGY-1  Internal Medicine Resident  09/21/21  1:16 PM    This patient has been staffed and discussed with Bubba Kehr, MD.     Patient seen and examined, labs and imaging studies reviewed, agree with assessment and plan as outlined above. Continue with current care and plan. Discussed case with patients nurse, discussed case with care team, discussed plan.       Bubba Kehr, MD [address]

## 2021-09-21 NOTE — PROGRESS NOTES
Pulmonary Followup Note    Indication for visit: Acute hypoxemia    Subjective:  Remains on 15 L of high flow nasal cannula. Denies any SOB. Reports he feels good. Respiratory panel PCR: Negative, Legionella: Not detected. Blood Cx: NGTD   Procalcitonin low at 0.06. Afebrile     insulin lispro  0-18 Units SubCUTAneous TID WC    insulin lispro  0-9 Units SubCUTAneous Nightly    clopidogrel  75 mg Oral Daily    nicotine  1 patch TransDERmal Daily    enoxaparin  40 mg SubCUTAneous BID    lisinopril  20 mg Oral Daily    gabapentin  800 mg Oral 4x Daily    predniSONE  40 mg Oral Daily    atorvastatin  80 mg Oral Daily    aspirin  81 mg Oral Daily    DULoxetine  20 mg Oral BID    budesonide-formoterol  2 puff Inhalation BID    montelukast  10 mg Oral Nightly    sodium chloride flush  5-40 mL IntraVENous 2 times per day       Continuous Infusions:   dextrose      sodium chloride 25 mL (09/20/21 0907)       ROS:    PHYSICAL EXAMINATION:  /60   Pulse 92   Temp 98.2 °F (36.8 °C) (Oral)   Resp 18   Ht 4' 11\" (1.499 m)   Wt 184 lb 12.8 oz (83.8 kg)   SpO2 (!) 89%   BMI 37.33 kg/m²     Gen: Elderly male, obese, in high fowlers position in nil acute distress. On 15L o2 via HFNC  No accessory resp muscles  Eyes: PERRL, EOMI, normal conjunctivae  Ears, Nose, Mouth and Throat: Hearing is normal. Oropharynx is normal  Neck: No lymphadenopathy  Respiratory: No wheezing. Bibasilar crackles. No rhonchi  CV: RRR without M/R/R  Abd: +BS, soft, NT/ND  Musculoskeletal: No cyanosis, clubbing, or edema.   Skin: No rashes, ulcers, or subcutaneous nodules  Psychiatric: Alert and oriented to time place and person    DATA  CBC:   Recent Labs     09/19/21  0429 09/20/21  0604 09/21/21  0549   WBC 9.8 9.6 9.1   HGB 15.0 15.5 16.1   HCT 47.1 48.6 49.9   MCV 92.3 91.4 90.3    276 299     BMP:   Recent Labs     09/19/21  0429 09/20/21  0604 09/21/21  0549    142 139 K 4.6 4.0 4.1    99 96*   CO2 35* 33* 33*   BUN 31* 25* 24*   CREATININE 0.9 0.8* 0.7*     No results for input(s): PHART, VTC0JAB, PO2ART in the last 72 hours. LIVER PROFILE: No results for input(s): AST, ALT, LIPASE, BILIDIR, BILITOT, ALKPHOS in the last 72 hours. Invalid input(s): AMYLASE,  ALB    Radiology Review:  Pertinent images / reports were reviewed as a part of this visit. CT chest High res:   Impression      Slow progression of bilateral lower lobe alveolar consolidation with air bronchograms raises concern for chronic process such as interstitial pneumonia, eosinophilic pneumonia, and organizing pneumonia. Neoplastic etiology is considered much less likely. ASSESSMENT/PLAN:  Haley Ross is a 50 y.o. male with shortness of breath    Acute on chronic hypoxemic hypercapnic respiratory failure  Patient has a history of COPD and is usually on 2 L oxygen at home. Now requiring 15 L high flow nasal cannula. SPO2: 95%  Covid PCR negative. Respiratory panel PCR, Legionella: Not detected  Procalcitonin low at 0.06. CXR on admission: Hazy bibasilar atelectasis or small infiltrates and associated small right pleural effusion which is more prominent. ABG on admission: pH: 7.41/pCO2: 51.2/pO2: 66 - Pt usually hypercapnic per review of past ABGs  -Continue supplemental oxygen and wean per tolerated with oxygen goal of 88% and above. COPD  There is mild wheezing per physical exam.    -DuoNebs as needed  -Symbicort inhaler twice daily  - Prednisone daily     Patient still requiring 15 L high flow and still denies any shortness of breath. Still has crackles at the bases and has not improved with diuresis. I did the prone and supine CT scan him he does indeed have significant scarring in the bases of his lungs.   This could explain some of his oxygen requirements at home and progressive symptoms, however I do not think it fully explains why he now needs to be on 15 L of oxygen when he was reportedly satting well on just a couple liters months prior. Differential includes chronic eosinophilic pneumoniia, organizing pneumonia HP, and less likely malignancy. Cryptogenic organizing pneumonia, hypersensitivity pneumonitis or mixed connective tissue disease related pulmonary scarring are on the differential as well. However the treatment for these would be steroids which he is already on. The difference may be dose related. Screening inflammatory markers were unremarkable. They may need to be repeated when he's off steroids. We discussed doing a bronchoscopy or percutaneous biopsy. He is not enthusiastic about that idea and he is also on Plavix so I would not be able to do it in any timely manner. Oxygen saturations have not improved, and may actually be a bit worse. His wife was face timing with him (while driving!) when I walked in and she changed her story about how much O2 he was on and how well he oxygenated. Now she says that he was on 3L and his saturations weren't doing very well, cut couldn't identify how poorly or for how long. She did say that he basically sleeps all day, all the time and gets short of breath with every little thing he does. Since diuresis and prednisone aren't helping, and there's no sign of infection, I'm going to start him on high doses of steroids. Solumedrol 125mg Q6 hrs for 3 days. If he doesn't improve with that, then he's not steroid responsive and it's not BOOP/.     Bridgett Durham MD

## 2021-09-21 NOTE — PLAN OF CARE
Problem: Falls - Risk of:  Goal: Will remain free from falls  Description: Will remain free from falls  Outcome: Ongoing  Note: Pt is in bed with alarm on. Non-skid socks are on. Up as SBA. Fall precautions in place. Will continue to monitor. Problem: Gas Exchange - Impaired  Goal: Absence of hypoxia  Outcome: Ongoing  Note: Pt still requiring 15 L O2. SpO2 ranging from 87-92%. Will continue to monitor.

## 2021-09-21 NOTE — PLAN OF CARE
Problem: Falls - Risk of:  Goal: Will remain free from falls  Description: Will remain free from falls  9/20/2021 2121 by Sangeeta Maloney RN  Outcome: Ongoing     Problem: Falls - Risk of:  Goal: Absence of physical injury  Description: Absence of physical injury  9/20/2021 2121 by Sangeeta Maloney RN  Outcome: Ongoing     Problem: Airway Clearance - Ineffective  Goal: Achieve or maintain patent airway  9/20/2021 2121 by Sangeeta Maloney RN  Outcome: Ongoing     Problem: Gas Exchange - Impaired  Goal: Absence of hypoxia  9/20/2021 2121 by Sangeeta Maloney RN  Outcome: Ongoing

## 2021-09-22 LAB
ANION GAP SERPL CALCULATED.3IONS-SCNC: 14 MMOL/L (ref 3–16)
BASOPHILS ABSOLUTE: 0 K/UL (ref 0–0.2)
BASOPHILS RELATIVE PERCENT: 0.2 %
BUN BLDV-MCNC: 46 MG/DL (ref 7–20)
CALCIUM SERPL-MCNC: 10.2 MG/DL (ref 8.3–10.6)
CHLORIDE BLD-SCNC: 95 MMOL/L (ref 99–110)
CO2: 26 MMOL/L (ref 21–32)
CREAT SERPL-MCNC: 1.1 MG/DL (ref 0.9–1.3)
CREATININE URINE: 58.3 MG/DL (ref 39–259)
EOSINOPHILS ABSOLUTE: 0 K/UL (ref 0–0.6)
EOSINOPHILS RELATIVE PERCENT: 0 %
ESTIMATED AVERAGE GLUCOSE: 208.7 MG/DL
GFR AFRICAN AMERICAN: >60
GFR NON-AFRICAN AMERICAN: >60
GLUCOSE BLD-MCNC: 290 MG/DL (ref 70–99)
GLUCOSE BLD-MCNC: 307 MG/DL (ref 70–99)
GLUCOSE BLD-MCNC: 361 MG/DL (ref 70–99)
GLUCOSE BLD-MCNC: 370 MG/DL (ref 70–99)
GLUCOSE BLD-MCNC: 392 MG/DL (ref 70–99)
GLUCOSE BLD-MCNC: 411 MG/DL (ref 70–99)
HBA1C MFR BLD: 8.9 %
HCT VFR BLD CALC: 50.4 % (ref 40.5–52.5)
HEMOGLOBIN: 16 G/DL (ref 13.5–17.5)
LYMPHOCYTES ABSOLUTE: 0.5 K/UL (ref 1–5.1)
LYMPHOCYTES RELATIVE PERCENT: 5.4 %
MAGNESIUM: 1.9 MG/DL (ref 1.8–2.4)
MCH RBC QN AUTO: 28.9 PG (ref 26–34)
MCHC RBC AUTO-ENTMCNC: 31.6 G/DL (ref 31–36)
MCV RBC AUTO: 91.3 FL (ref 80–100)
MONOCYTES ABSOLUTE: 0.1 K/UL (ref 0–1.3)
MONOCYTES RELATIVE PERCENT: 0.6 %
NEUTROPHILS ABSOLUTE: 8.9 K/UL (ref 1.7–7.7)
NEUTROPHILS RELATIVE PERCENT: 93.8 %
PDW BLD-RTO: 17.4 % (ref 12.4–15.4)
PERFORMED ON: ABNORMAL
PLATELET # BLD: 310 K/UL (ref 135–450)
PMV BLD AUTO: 9 FL (ref 5–10.5)
POTASSIUM SERPL-SCNC: 4.5 MMOL/L (ref 3.5–5.1)
POTASSIUM SERPL-SCNC: 5.3 MMOL/L (ref 3.5–5.1)
PRO-BNP: 160 PG/ML (ref 0–124)
RBC # BLD: 5.53 M/UL (ref 4.2–5.9)
SODIUM BLD-SCNC: 135 MMOL/L (ref 136–145)
SODIUM URINE: <20 MMOL/L
WBC # BLD: 9.4 K/UL (ref 4–11)

## 2021-09-22 PROCEDURE — 6360000002 HC RX W HCPCS: Performed by: STUDENT IN AN ORGANIZED HEALTH CARE EDUCATION/TRAINING PROGRAM

## 2021-09-22 PROCEDURE — 2060000000 HC ICU INTERMEDIATE R&B

## 2021-09-22 PROCEDURE — 6370000000 HC RX 637 (ALT 250 FOR IP): Performed by: STUDENT IN AN ORGANIZED HEALTH CARE EDUCATION/TRAINING PROGRAM

## 2021-09-22 PROCEDURE — 80048 BASIC METABOLIC PNL TOTAL CA: CPT

## 2021-09-22 PROCEDURE — 94761 N-INVAS EAR/PLS OXIMETRY MLT: CPT

## 2021-09-22 PROCEDURE — 83880 ASSAY OF NATRIURETIC PEPTIDE: CPT

## 2021-09-22 PROCEDURE — 84132 ASSAY OF SERUM POTASSIUM: CPT

## 2021-09-22 PROCEDURE — 6370000000 HC RX 637 (ALT 250 FOR IP): Performed by: NURSE PRACTITIONER

## 2021-09-22 PROCEDURE — 2580000003 HC RX 258: Performed by: STUDENT IN AN ORGANIZED HEALTH CARE EDUCATION/TRAINING PROGRAM

## 2021-09-22 PROCEDURE — 84300 ASSAY OF URINE SODIUM: CPT

## 2021-09-22 PROCEDURE — 6360000002 HC RX W HCPCS: Performed by: INTERNAL MEDICINE

## 2021-09-22 PROCEDURE — 36415 COLL VENOUS BLD VENIPUNCTURE: CPT

## 2021-09-22 PROCEDURE — 94640 AIRWAY INHALATION TREATMENT: CPT

## 2021-09-22 PROCEDURE — 2700000000 HC OXYGEN THERAPY PER DAY

## 2021-09-22 PROCEDURE — 85025 COMPLETE CBC W/AUTO DIFF WBC: CPT

## 2021-09-22 PROCEDURE — 83735 ASSAY OF MAGNESIUM: CPT

## 2021-09-22 PROCEDURE — 99233 SBSQ HOSP IP/OBS HIGH 50: CPT | Performed by: INTERNAL MEDICINE

## 2021-09-22 PROCEDURE — 82570 ASSAY OF URINE CREATININE: CPT

## 2021-09-22 RX ORDER — PANTOPRAZOLE SODIUM 40 MG/1
40 TABLET, DELAYED RELEASE ORAL
Status: DISCONTINUED | OUTPATIENT
Start: 2021-09-23 | End: 2021-10-01 | Stop reason: HOSPADM

## 2021-09-22 RX ORDER — INSULIN LISPRO 100 [IU]/ML
6 INJECTION, SOLUTION INTRAVENOUS; SUBCUTANEOUS
Status: DISCONTINUED | OUTPATIENT
Start: 2021-09-22 | End: 2021-09-23

## 2021-09-22 RX ADMIN — INSULIN LISPRO 6 UNITS: 100 INJECTION, SOLUTION INTRAVENOUS; SUBCUTANEOUS at 17:39

## 2021-09-22 RX ADMIN — GABAPENTIN 800 MG: 400 CAPSULE ORAL at 12:19

## 2021-09-22 RX ADMIN — CLOPIDOGREL BISULFATE 75 MG: 75 TABLET ORAL at 08:33

## 2021-09-22 RX ADMIN — METHYLPREDNISOLONE SODIUM SUCCINATE 125 MG: 125 INJECTION, POWDER, FOR SOLUTION INTRAMUSCULAR; INTRAVENOUS at 08:33

## 2021-09-22 RX ADMIN — ENOXAPARIN SODIUM 40 MG: 40 INJECTION SUBCUTANEOUS at 08:33

## 2021-09-22 RX ADMIN — INSULIN LISPRO 15 UNITS: 100 INJECTION, SOLUTION INTRAVENOUS; SUBCUTANEOUS at 17:38

## 2021-09-22 RX ADMIN — GABAPENTIN 800 MG: 400 CAPSULE ORAL at 17:04

## 2021-09-22 RX ADMIN — DULOXETINE 20 MG: 20 CAPSULE, DELAYED RELEASE ORAL at 08:33

## 2021-09-22 RX ADMIN — METHYLPREDNISOLONE SODIUM SUCCINATE 125 MG: 125 INJECTION, POWDER, FOR SOLUTION INTRAMUSCULAR; INTRAVENOUS at 17:04

## 2021-09-22 RX ADMIN — ENOXAPARIN SODIUM 40 MG: 40 INJECTION SUBCUTANEOUS at 20:50

## 2021-09-22 RX ADMIN — INSULIN LISPRO 15 UNITS: 100 INJECTION, SOLUTION INTRAVENOUS; SUBCUTANEOUS at 08:32

## 2021-09-22 RX ADMIN — INSULIN LISPRO 6 UNITS: 100 INJECTION, SOLUTION INTRAVENOUS; SUBCUTANEOUS at 12:21

## 2021-09-22 RX ADMIN — BUDESONIDE AND FORMOTEROL FUMARATE DIHYDRATE 2 PUFF: 80; 4.5 AEROSOL RESPIRATORY (INHALATION) at 08:48

## 2021-09-22 RX ADMIN — METHYLPREDNISOLONE SODIUM SUCCINATE 125 MG: 125 INJECTION, POWDER, FOR SOLUTION INTRAMUSCULAR; INTRAVENOUS at 02:27

## 2021-09-22 RX ADMIN — ATORVASTATIN CALCIUM 80 MG: 80 TABLET, FILM COATED ORAL at 08:33

## 2021-09-22 RX ADMIN — INSULIN GLARGINE 16 UNITS: 100 INJECTION, SOLUTION SUBCUTANEOUS at 15:00

## 2021-09-22 RX ADMIN — BUDESONIDE AND FORMOTEROL FUMARATE DIHYDRATE 2 PUFF: 80; 4.5 AEROSOL RESPIRATORY (INHALATION) at 20:43

## 2021-09-22 RX ADMIN — DULOXETINE 20 MG: 20 CAPSULE, DELAYED RELEASE ORAL at 20:50

## 2021-09-22 RX ADMIN — GABAPENTIN 800 MG: 400 CAPSULE ORAL at 20:50

## 2021-09-22 RX ADMIN — LISINOPRIL 20 MG: 20 TABLET ORAL at 08:35

## 2021-09-22 RX ADMIN — GABAPENTIN 800 MG: 400 CAPSULE ORAL at 08:33

## 2021-09-22 RX ADMIN — INSULIN LISPRO 5 UNITS: 100 INJECTION, SOLUTION INTRAVENOUS; SUBCUTANEOUS at 20:50

## 2021-09-22 RX ADMIN — Medication 10 ML: at 08:33

## 2021-09-22 RX ADMIN — ASPIRIN 81 MG: 81 TABLET, COATED ORAL at 08:32

## 2021-09-22 RX ADMIN — INSULIN LISPRO 12 UNITS: 100 INJECTION, SOLUTION INTRAVENOUS; SUBCUTANEOUS at 12:18

## 2021-09-22 RX ADMIN — Medication 10 ML: at 20:49

## 2021-09-22 RX ADMIN — IPRATROPIUM BROMIDE AND ALBUTEROL 1 PUFF: 20; 100 SPRAY, METERED RESPIRATORY (INHALATION) at 08:49

## 2021-09-22 RX ADMIN — MONTELUKAST 10 MG: 10 TABLET, FILM COATED ORAL at 20:50

## 2021-09-22 RX ADMIN — METHYLPREDNISOLONE SODIUM SUCCINATE 125 MG: 125 INJECTION, POWDER, FOR SOLUTION INTRAMUSCULAR; INTRAVENOUS at 20:50

## 2021-09-22 ASSESSMENT — PAIN SCALES - GENERAL
PAINLEVEL_OUTOF10: 0

## 2021-09-22 NOTE — PROGRESS NOTES
Respiratory called this RN to inform that pt is not at SpO2 goal of >88% on 15L high flow nasal cannula. Pt transitioned to non-rebreather. Resident notified.

## 2021-09-22 NOTE — PROGRESS NOTES
Progress Note    Admit Date: 9/16/2021  Day: 6  Diet: ADULT DIET; Regular; Low Sodium (2 gm); 1800 ml    CC: Chest pain + SOB    Interval history:    Continues to be on 15L oxygen via NRB with SpO2: 87-94% Pt became hypotensive yesterday, 86/53 but BP improved today. Now on high dose steroids. Nil overnight acute events reported. Pt with nil complaints this morning. Denies any SOB, chest pain, abdominal pain, N/V.     HPI:   Leeroy Leija a 50 y. o. male with history of HFpEF (2/2021 ECHO showed grade 2 diastolic dysfunction, 18% EF, LV hypertrophy), COPD on 2 L of oxygen at home, diabetes, hypertension, hyperlipidemia who presents with two weeks of worsening SOB. He reports his home oxygen saturations was in the 70's. He is not vaccinated for COVID and he reports that he was likely exposed to [employer] before his SOB began. At that time he also experiences 2-3 days of generalized body aches that have since resolved. Denies fevers and chills. Patient has been hospitalized previously for COPD and CHF exacerbations, and he reports this SOB feels more like a CHF exacerbation to him. He reports feeling slightly fluid overloaded though does not know his dry weight. He repeatedly takes off his HFNC in the room and desats down to the 60's, but quickly comes back up on 10L. Even with a pulse ox at 60 he denies SOB.    Upon admission, sodium 146, glucose 155, Pro-BNP 3129 (5000 on 7/2021 and 1847 in 6/2021), negative troponin, D-dimer 292. Chest XR showed a R pleural effusion and hazy bibasilar atelectasis vs. Small infiltrates.  It showed stable cardiomegaly and mild pulmonary congestion. He reports complete resolution of SOB after receiving IV lasix dose, however continues to desat without supplemental oxygen.       Medications:     Scheduled Meds:   insulin glargine  10 Units SubCUTAneous Nightly    insulin lispro  0-18 Units SubCUTAneous TID WC    insulin lispro  0-9 Units SubCUTAneous Nightly    methylPREDNISolone  125 light. Cardiovascular:      Rate and Rhythm: Normal rate and regular rhythm. Pulses: Normal pulses. Heart sounds: No murmur heard. No friction rub. No gallop. Pulmonary:      Effort: Pulmonary effort is normal. No respiratory distress. Breath sounds: Rales (lower lung base but minimal- less )- more improved compared to day prior present. No wheezing or rhonchi. Abdominal:      General: Bowel sounds are normal. There is no distension. Tenderness: There is no abdominal tenderness. There is no guarding. Skin:     General: Skin is warm. Capillary Refill: Capillary refill takes less than 2 seconds. Neurological:      General: No focal deficit present. Mental Status: He is alert and oriented to person, place, and time. Psychiatric:         Mood and Affect: Mood normal.         LABS:    CBC:   Recent Labs     09/20/21  0604 09/21/21  0549 09/22/21  0544   WBC 9.6 9.1 9.4   HGB 15.5 16.1 16.0   HCT 48.6 49.9 50.4    299 310   MCV 91.4 90.3 91.3     Renal:    Recent Labs     09/20/21  0604 09/21/21  0549 09/22/21  0544    139 135*   K 4.0 4.1 5.3*   CL 99 96* 95*   CO2 33* 33* 26   BUN 25* 24* 46*   CREATININE 0.8* 0.7* 1.1   GLUCOSE 151* 158* 411*   CALCIUM 9.8 10.0 10.2   MG 1.60* 1.60* 1.90   ANIONGAP 10 10 14     Hepatic: No results for input(s): AST, ALT, BILITOT, BILIDIR, PROT, LABALBU, ALKPHOS in the last 72 hours. Troponin: No results for input(s): TROPONINI in the last 72 hours. BNP: No results for input(s): BNP in the last 72 hours. Lipids: No results for input(s): CHOL, HDL in the last 72 hours. Invalid input(s): LDLCALCU, TRIGLYCERIDE  ABGs:  No results for input(s): PHART, AXY5WFZ, PO2ART, LBM3XSE, BEART, THGBART, N2KGZMJU, HVQ4EVN in the last 72 hours. INR: No results for input(s): INR in the last 72 hours.   Lactate: No results for input(s): LACTATE in the last 72 hours.  Cultures:  -----------------------------------------------------------------  RAD:   CT CHEST HIGH RESOLUTION   Final Result      Slow progression of bilateral lower lobe alveolar consolidation with air bronchograms raises concern for chronic process such as interstitial pneumonia, eosinophilic pneumonia, and organizing pneumonia. Neoplastic etiology is considered much less likely. MRI BRAIN WO CONTRAST   Final Result      1. No acute intracranial abnormality. No evidence of acute infarct. 2. Minimal nonspecific white matter signal abnormality on FLAIR imaging suggesting minimal chronic small vessel ischemic disease and/or minimal age-related degenerative change. CTA HEAD NECK W CONTRAST   Final Result      Study limited by motion artifact and poor contrast bolus. 1. Atherosclerotic disease at bilateral carotid bifurcations. Exact percentage of stenosis by NASCET criteria is difficult to comment upon due to artifacts. 2. 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 Moderately attenuated mid vertebral artery from upper C7  to C4 5 level. Distal to its left vertebral artery is patent without significant stenosis. PROCEDURE: CT ANGIOGRAPHY HEAD WITH/WITHOUT CONTRAST      INDICATION: aphasia, ams      COMPARISON: none      TECHNIQUE: Axial CT imaging obtained through the head prior to and following administration of IV contrast. Axial images, multiplanar reformatted images, and maximum intensity projection images were reviewed for CT angiographic technique. IV contrast: mL Omnipaque 350. FINDINGS:      ANTERIOR CIRCULATION: The intracranial internal carotid arteries, anterior cerebral arteries, and middle cerebral arteries are patent. There are calcified plaques with mild to moderate narrowing of bilateral ICA cavernous/supraclinoid segments. . No    evidence for aneurysm or arteriovenous malformation.       POSTERIOR CIRCULATION: The bilateral vertebral arteries, basilar artery and posterior cerebral arteries are patent. There are calcified and noncalcified plaques with mild narrowing of left vertebral artery intradural segment. Right vertebral intradural    segment is severely attenuated, likely hyperplastic. No evidence for aneurysm or arteriovenous malformation. INTRACRANIAL VENOUS SYSTEM: No evidence for intracranial venous thrombosis. IMPRESSION:      No evidence of intracranial large vessel occlusion. 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 Calcified plaques with mild to moderate narrowing of bilateral ICA cavernous/supraclinoid segments. CT HEAD WO CONTRAST   Final Result      No acute intracranial abnormality . CT CHEST PULMONARY EMBOLISM W CONTRAST   Final Result   1. No definite pulmonary embolus identified. 2.  Mild atherosclerotic changes of the aorta. No aortic aneurysm or    dissection. 3.  Patchy densities in the lower lobes and right middle lobe may    represent atelectasis versus infectious/inflammatory process. 4.  Dependent atelectasis bilaterally. Atelectasis in the lingula. 5.  Trace right pleural effusion. 6.  Nonspecific similar mildly prominent mediastinal and hilar lymph    nodes. Assessment/Plan:   Jt Marley is a 50 y.o. male, PMHx HFpEF, COPD on 2 L of oxygen at home, diabetes, HTN,  who presents with two weeks of worsening SOB. Acute hypoxic and hypercapnic respiratory failure  Pt is on 2L Home Oxygen. Currently requiring 15L HFNC. CT Chest with trace pleural effusion, and bilateral lower lobe and right middle lobe atelectasis versus infectious/inflammatory process. Covid negative. Procal 0.06 which lowers suspicion for any concomitant bacterial infection. Viral respiratory panel was unremarkable.   CHF exacerbation vs COPD exacerbation.   - Continue home Symbicort  - Continue home

## 2021-09-22 NOTE — PROGRESS NOTES
Pt assessment complete and medications passed; pt alert and oriented x4, denies any pain. Oxygen sat stays between 88-90% on 15L. Other VSS. Pt slept well throughout the night. Bed alarm on and call light within reach.

## 2021-09-22 NOTE — PLAN OF CARE
Problem: Falls - Risk of:  Goal: Will remain free from falls  Description: Will remain free from falls  9/22/2021 0421 by Marielena Leigh RN  Outcome: Ongoing  9/21/2021 1913 by Andrea Childs RN  Outcome: Ongoing  Note: Pt is in bed with alarm on. Non-skid socks are on. Up as SBA. Fall precautions in place. Will continue to monitor. Goal: Absence of physical injury  Description: Absence of physical injury  Outcome: Ongoing     Problem: Airway Clearance - Ineffective  Goal: Achieve or maintain patent airway  Outcome: Ongoing     Problem: Gas Exchange - Impaired  Goal: Absence of hypoxia  9/22/2021 0421 by Marielena Leigh RN  Outcome: Ongoing  9/21/2021 1913 by Andrea Childs RN  Outcome: Ongoing  Note: Pt still requiring 15 L O2. SpO2 ranging from 87-92%. Will continue to monitor. Goal: Promote optimal lung function  Outcome: Ongoing     Problem: Breathing Pattern - Ineffective  Goal: Ability to achieve and maintain a regular respiratory rate  Outcome: Ongoing     Problem:  Body Temperature -  Risk of, Imbalanced  Goal: Ability to maintain a body temperature within defined limits  Outcome: Ongoing  Goal: Will regain or maintain usual level of consciousness  Outcome: Ongoing  Goal: Complications related to the disease process, condition or treatment will be avoided or minimized  Outcome: Ongoing     Problem: Isolation Precautions - Risk of Spread of Infection  Goal: Prevent transmission of infection  Outcome: Ongoing     Problem: Nutrition Deficits  Goal: Optimize nutritional status  Outcome: Ongoing     Problem: Risk for Fluid Volume Deficit  Goal: Maintain normal heart rhythm  Outcome: Ongoing  Goal: Maintain absence of muscle cramping  Outcome: Ongoing  Goal: Maintain normal serum potassium, sodium, calcium, phosphorus, and pH  Outcome: Ongoing     Problem: Loneliness or Risk for Loneliness  Goal: Demonstrate positive use of time alone when socialization is not possible  Outcome: Ongoing     Problem:

## 2021-09-22 NOTE — PROGRESS NOTES
Pulmonology Progress Note  PGY-2    Admit Date: 9/16/2021  Hospital Day: 7  Diet: ADULT DIET; Regular; Low Sodium (2 gm); 1800 ml  Code Status: Full Code       Interval history:  Patient was seen and examined this morning. Remained afebrile overnight. Patient is on 15L of HFNC with SpO2 in low 90s. Patient mentions that he is feeling better this morning and his breathing has improved comparing to yesterday. Denies fevers, chills, chest pain, nausea, vomiting, diarrhea, or constipation. Medications:   Scheduled Meds:   insulin lispro  0-18 Units SubCUTAneous TID WC    insulin lispro  0-9 Units SubCUTAneous Nightly    methylPREDNISolone  125 mg IntraVENous Q6H    clopidogrel  75 mg Oral Daily    nicotine  1 patch TransDERmal Daily    enoxaparin  40 mg SubCUTAneous BID    lisinopril  20 mg Oral Daily    gabapentin  800 mg Oral 4x Daily    atorvastatin  80 mg Oral Daily    aspirin  81 mg Oral Daily    DULoxetine  20 mg Oral BID    budesonide-formoterol  2 puff Inhalation BID    montelukast  10 mg Oral Nightly    sodium chloride flush  5-40 mL IntraVENous 2 times per day       Continuous Infusions:   dextrose      sodium chloride 25 mL (09/20/21 0907)       PRN Meds:  sodium chloride, albuterol-ipratropium, glucose, dextrose, glucagon (rDNA), dextrose, sodium chloride flush, sodium chloride, ondansetron **OR** ondansetron, polyethylene glycol, acetaminophen **OR** acetaminophen    Vital/I&O/Physical examination:   VS:  /73   Pulse 74   Temp 97.8 °F (36.6 °C) (Axillary)   Resp 16   Ht 4' 11\" (1.499 m)   Wt 182 lb 3.2 oz (82.6 kg)   SpO2 (!) 89%   BMI 36.80 kg/m²     I/O:    Intake/Output Summary (Last 24 hours) at 9/22/2021 0852  Last data filed at 9/22/2021 0703  Gross per 24 hour   Intake 1440 ml   Output 1900 ml   Net -460 ml       PE:  Physical Exam  Vitals reviewed. Constitutional:       Appearance: Normal appearance. HENT:      Head: Normocephalic and atraumatic.       Nose: Nose normal.      Mouth/Throat:      Mouth: Mucous membranes are moist.   Eyes:      Extraocular Movements: Extraocular movements intact. Conjunctiva/sclera: Conjunctivae normal.      Pupils: Pupils are equal, round, and reactive to light. Cardiovascular:      Rate and Rhythm: Normal rate and regular rhythm. Pulses: Normal pulses. Heart sounds: Normal heart sounds. Pulmonary:      Effort: Pulmonary effort is normal.      Comments: Bibasilar crackle on auscultation   Abdominal:      Palpations: Abdomen is soft. Musculoskeletal:         General: Normal range of motion. Cervical back: Normal range of motion and neck supple. Neurological:      General: No focal deficit present. Mental Status: He is alert and oriented to person, place, and time. Labs & Imaging:   LABS:  Renal:   Recent Labs     09/20/21  0604 09/21/21  0549 09/22/21  0544    139 135*   K 4.0 4.1 5.3*   CL 99 96* 95*   CO2 33* 33* 26   BUN 25* 24* 46*   CREATININE 0.8* 0.7* 1.1   GLUCOSE 151* 158* 411*   ANIONGAP 10 10 14     CBC:   Recent Labs     09/20/21  0604 09/21/21  0549 09/22/21  0544   WBC 9.6 9.1 9.4   HGB 15.5 16.1 16.0   HCT 48.6 49.9 50.4    299 310   MCV 91.4 90.3 91.3                            Hepatic: No results for input(s): AST, ALT, ALB, BILITOT, ALKPHOS in the last 72 hours. Troponin: No results for input(s): TROPONINI in the last 72 hours. BNP: No results for input(s): BNP in the last 72 hours. Lipids: No results for input(s): CHOL, HDL in the last 72 hours. Invalid input(s): LDLCALCU, TRIGLYCERIDE  INR: No results for input(s): INR in the last 72 hours. Lactate: No results for input(s): LACTATE in the last 72 hours. ABGs:No results for input(s): PHART, TBX6ZGV, PO2ART, EZO0WFI, BEART, THGBART, Q8QBAZJR, OXR7YWR in the last 72 hours.     UA:No results for input(s): NITRITE, COLORU, PHUR, LABCAST, WBCUA, RBCUA, MUCUS, TRICHOMONAS, YEAST, BACTERIA, CLARITYU, SPECGRAV, LEUKOCYTESUR, UROBILINOGEN, BILIRUBINUR, BLOODU, GLUCOSEU, AMORPHOUS in the last 72 hours. Invalid input(s): Ashley Morales     IMAGING:  CT CHEST HIGH RESOLUTION   Final Result      Slow progression of bilateral lower lobe alveolar consolidation with air bronchograms raises concern for chronic process such as interstitial pneumonia, eosinophilic pneumonia, and organizing pneumonia. Neoplastic etiology is considered much less likely. MRI BRAIN WO CONTRAST   Final Result      1. No acute intracranial abnormality. No evidence of acute infarct. 2. Minimal nonspecific white matter signal abnormality on FLAIR imaging suggesting minimal chronic small vessel ischemic disease and/or minimal age-related degenerative change. CTA HEAD NECK W CONTRAST   Final Result      Study limited by motion artifact and poor contrast bolus. 1. Atherosclerotic disease at bilateral carotid bifurcations. Exact percentage of stenosis by NASCET criteria is difficult to comment upon due to artifacts. 2. 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 Moderately attenuated mid vertebral artery from upper C7  to C4 5 level. Distal to its left vertebral artery is patent without significant stenosis. PROCEDURE: CT ANGIOGRAPHY HEAD WITH/WITHOUT CONTRAST      INDICATION: aphasia, ams      COMPARISON: none      TECHNIQUE: Axial CT imaging obtained through the head prior to and following administration of IV contrast. Axial images, multiplanar reformatted images, and maximum intensity projection images were reviewed for CT angiographic technique. IV contrast: mL Omnipaque 350. FINDINGS:      ANTERIOR CIRCULATION: The intracranial internal carotid arteries, anterior cerebral arteries, and middle cerebral arteries are patent. There are calcified plaques with mild to moderate narrowing of bilateral ICA cavernous/supraclinoid segments. . No    evidence for aneurysm or arteriovenous malformation. POSTERIOR CIRCULATION: The bilateral vertebral arteries, basilar artery and posterior cerebral arteries are patent. There are calcified and noncalcified plaques with mild narrowing of left vertebral artery intradural segment. Right vertebral intradural    segment is severely attenuated, likely hyperplastic. No evidence for aneurysm or arteriovenous malformation. INTRACRANIAL VENOUS SYSTEM: No evidence for intracranial venous thrombosis. IMPRESSION:      No evidence of intracranial large vessel occlusion. 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 Calcified plaques with mild to moderate narrowing of bilateral ICA cavernous/supraclinoid segments. CT HEAD WO CONTRAST   Final Result      No acute intracranial abnormality . CT CHEST PULMONARY EMBOLISM W CONTRAST   Final Result   1. No definite pulmonary embolus identified. 2.  Mild atherosclerotic changes of the aorta. No aortic aneurysm or    dissection. 3.  Patchy densities in the lower lobes and right middle lobe may    represent atelectasis versus infectious/inflammatory process. 4.  Dependent atelectasis bilaterally. Atelectasis in the lingula. 5.  Trace right pleural effusion. 6.  Nonspecific similar mildly prominent mediastinal and hilar lymph    nodes. Assessment & Plan:    Sandra Gaffney is a 50 y.o. male with PMH of HFpEF (2/2021 ECHO showed grade 2 diastolic dysfunction, 28% EF, LV hypertrophy), COPD on 2 L of oxygen at home, diabetes, hypertension, hyperlipidemia who presents with two weeks of worsening SOB. Acute on chronic hypoxemic hypercapnic respiratory failure:  - differential includes chronic eosinophilic pneumoniia, organizing pneumonia HP. Cryptogenic organizing pneumonia, hypersensitivity pneumonitis or mixed connective tissue disease related pulmonary scarring are on the differential as well.    - Patient has history of COPD and is usually on 2L of O2.   -  Currently, Patient is on 15L of HFNC with SpO2 in low 90s. - Inflammatory markers were unremarkable. Will repeat the inflammatory markers once patient is off steroids.  - Covid negative  -  Strep pneumo antigen, legionella antigen, respiratory panel negative. -  Procalc 0.06  - Chest x ray showed hazy bibasilar atelectasis or small infiltrates and associated small right pleural effusion which is more prominent.  - CT chest showed slow progression of bilateral lower lobe alveolar consolidation with air bronchograms raises concern for chronic process such as interstitial pneumonia, eosinophilic pneumonia, and organizing pneumonia. - ABG on admission: pH: 7.41/pCO2: 51.2/pO2: 66. Patient usually hypercapnic per past ABG  - Continue SoluMedrol 125 mg q 6hrs for 3 days (day 2)  - Continue supplemental oxygen and wean per tolerated with oxygen goal of 88% and above      COPD:  There no wheezing on lung auscultation  - DuoNebs as need  - Symbicort inhaler BID  - Currently, on SoluMedrol      Code Status: Full Code  ADULT DIET; Regular; Low Sodium (2 gm); 1800 ml     PPX: Lovenox       This patient will be discussed with attending, Dr. Abran Lakhani MD.    Ash Matthews MD, PGY- 2  Contact via [address] [place] [address]  9/22/2021,  8:52 AM    Patient seen, examined and discussed with the resident and I agree with the assessment and plan. Patient continues to remain profoundly hypoxemic and is on 15 L high flow nasal cannula and had a 100% nonrebreather added today. His saturations were 90% when I entered the room. He dropped to 85% just in sitting up to allow me to listen to his crackles. He also continues to maintain that he feels fine and does not feel like anything is wrong. I told his what he could remember of how he ended up at the hospital and basically boil down to his wife did not think he was breathing well and he let her call the squad so she would stop nagging him.   I started him on high-dose Solu-Medrol yesterday so has not quite been 24 hours. Thus far he has not made any improvements but can take up to 3 days. If he is not steroid responsive then it is unclear what the next move would be. He has had an echocardiogram that did not show any significant cardiac dysfunction and had a bubble study that did not show any evidence of intracardiac or intrapulmonary shunts. His ABG early in the admission did not show any evidence of methemoglobinemia. He has no evidence of infection. He does have evidence of basilar pulmonary scarring that has gotten progressively worse since February of this year. He is negative for Covid despite not being vaccinated.         Renee Lui MD

## 2021-09-23 ENCOUNTER — APPOINTMENT (OUTPATIENT)
Dept: GENERAL RADIOLOGY | Age: 56
DRG: 189 | End: 2021-09-23
Attending: INTERNAL MEDICINE
Payer: MEDICARE

## 2021-09-23 ENCOUNTER — NURSE ONLY (OUTPATIENT)
Dept: FAMILY MEDICINE CLINIC | Age: 56
End: 2021-09-23
Payer: MEDICARE

## 2021-09-23 DIAGNOSIS — Z12.11 COLON CANCER SCREENING: ICD-10-CM

## 2021-09-23 LAB
ANION GAP SERPL CALCULATED.3IONS-SCNC: 12 MMOL/L (ref 3–16)
BASE EXCESS ARTERIAL: 6.5 MMOL/L (ref -3–3)
BASOPHILS ABSOLUTE: 0 K/UL (ref 0–0.2)
BASOPHILS RELATIVE PERCENT: 0.2 %
BUN BLDV-MCNC: 50 MG/DL (ref 7–20)
CALCIUM SERPL-MCNC: 10.1 MG/DL (ref 8.3–10.6)
CARBOXYHEMOGLOBIN ARTERIAL: 0.8 % (ref 0–1.5)
CHLORIDE BLD-SCNC: 98 MMOL/L (ref 99–110)
CO2: 25 MMOL/L (ref 21–32)
CONTROL: ABNORMAL
CREAT SERPL-MCNC: 0.8 MG/DL (ref 0.9–1.3)
EOSINOPHILS ABSOLUTE: 0 K/UL (ref 0–0.6)
EOSINOPHILS RELATIVE PERCENT: 0 %
GFR AFRICAN AMERICAN: >60
GFR NON-AFRICAN AMERICAN: >60
GLUCOSE BLD-MCNC: 296 MG/DL (ref 70–99)
GLUCOSE BLD-MCNC: 342 MG/DL (ref 70–99)
GLUCOSE BLD-MCNC: 398 MG/DL (ref 70–99)
GLUCOSE BLD-MCNC: 466 MG/DL (ref 70–99)
GLUCOSE BLD-MCNC: 519 MG/DL (ref 70–99)
GLUCOSE BLD-MCNC: 523 MG/DL (ref 70–99)
GLUCOSE BLD-MCNC: 529 MG/DL (ref 70–99)
HCO3 ARTERIAL: 33 MMOL/L (ref 21–29)
HCT VFR BLD CALC: 50.7 % (ref 40.5–52.5)
HEMOCCULT STL QL: POSITIVE
HEMOGLOBIN, ART, EXTENDED: 16.4 G/DL
HEMOGLOBIN: 16.4 G/DL (ref 13.5–17.5)
LYMPHOCYTES ABSOLUTE: 0.5 K/UL (ref 1–5.1)
LYMPHOCYTES RELATIVE PERCENT: 3.7 %
MAGNESIUM: 1.8 MG/DL (ref 1.8–2.4)
MCH RBC QN AUTO: 28.8 PG (ref 26–34)
MCHC RBC AUTO-ENTMCNC: 32.3 G/DL (ref 31–36)
MCV RBC AUTO: 89.1 FL (ref 80–100)
METHEMOGLOBIN ARTERIAL: 0.1 % (ref 0–1.4)
MONOCYTES ABSOLUTE: 0.2 K/UL (ref 0–1.3)
MONOCYTES RELATIVE PERCENT: 1.4 %
NEUTROPHILS ABSOLUTE: 14 K/UL (ref 1.7–7.7)
NEUTROPHILS RELATIVE PERCENT: 94.7 %
O2 SAT, ARTERIAL: 86 % (ref 93–100)
PCO2 ARTERIAL: 53.5 MMHG (ref 35–45)
PDW BLD-RTO: 17.6 % (ref 12.4–15.4)
PERFORMED ON: ABNORMAL
PH ARTERIAL: 7.4 (ref 7.35–7.45)
PLATELET # BLD: 316 K/UL (ref 135–450)
PMV BLD AUTO: 9.2 FL (ref 5–10.5)
PO2 ARTERIAL: 52.3 MMHG (ref 75–108)
POTASSIUM SERPL-SCNC: 4.8 MMOL/L (ref 3.5–5.1)
RBC # BLD: 5.69 M/UL (ref 4.2–5.9)
SODIUM BLD-SCNC: 135 MMOL/L (ref 136–145)
TCO2 ARTERIAL: 35 MMOL/L
VITAMIN B1 WHOLE BLOOD: 343 NMOL/L (ref 70–180)
WBC # BLD: 14.8 K/UL (ref 4–11)

## 2021-09-23 PROCEDURE — 71045 X-RAY EXAM CHEST 1 VIEW: CPT

## 2021-09-23 PROCEDURE — 6360000002 HC RX W HCPCS: Performed by: INTERNAL MEDICINE

## 2021-09-23 PROCEDURE — 6370000000 HC RX 637 (ALT 250 FOR IP): Performed by: STUDENT IN AN ORGANIZED HEALTH CARE EDUCATION/TRAINING PROGRAM

## 2021-09-23 PROCEDURE — 6360000002 HC RX W HCPCS: Performed by: STUDENT IN AN ORGANIZED HEALTH CARE EDUCATION/TRAINING PROGRAM

## 2021-09-23 PROCEDURE — 83735 ASSAY OF MAGNESIUM: CPT

## 2021-09-23 PROCEDURE — 2700000000 HC OXYGEN THERAPY PER DAY

## 2021-09-23 PROCEDURE — 94761 N-INVAS EAR/PLS OXIMETRY MLT: CPT

## 2021-09-23 PROCEDURE — 80048 BASIC METABOLIC PNL TOTAL CA: CPT

## 2021-09-23 PROCEDURE — 6370000000 HC RX 637 (ALT 250 FOR IP): Performed by: NURSE PRACTITIONER

## 2021-09-23 PROCEDURE — 36415 COLL VENOUS BLD VENIPUNCTURE: CPT

## 2021-09-23 PROCEDURE — 94640 AIRWAY INHALATION TREATMENT: CPT

## 2021-09-23 PROCEDURE — 2580000003 HC RX 258: Performed by: STUDENT IN AN ORGANIZED HEALTH CARE EDUCATION/TRAINING PROGRAM

## 2021-09-23 PROCEDURE — 85025 COMPLETE CBC W/AUTO DIFF WBC: CPT

## 2021-09-23 PROCEDURE — 82274 ASSAY TEST FOR BLOOD FECAL: CPT | Performed by: NURSE PRACTITIONER

## 2021-09-23 PROCEDURE — 6370000000 HC RX 637 (ALT 250 FOR IP): Performed by: INTERNAL MEDICINE

## 2021-09-23 PROCEDURE — 99233 SBSQ HOSP IP/OBS HIGH 50: CPT | Performed by: INTERNAL MEDICINE

## 2021-09-23 PROCEDURE — 82803 BLOOD GASES ANY COMBINATION: CPT

## 2021-09-23 PROCEDURE — 2060000000 HC ICU INTERMEDIATE R&B

## 2021-09-23 PROCEDURE — 1200000000 HC SEMI PRIVATE

## 2021-09-23 PROCEDURE — 36600 WITHDRAWAL OF ARTERIAL BLOOD: CPT

## 2021-09-23 RX ORDER — INSULIN LISPRO 100 [IU]/ML
10 INJECTION, SOLUTION INTRAVENOUS; SUBCUTANEOUS
Status: DISCONTINUED | OUTPATIENT
Start: 2021-09-24 | End: 2021-09-24

## 2021-09-23 RX ORDER — INSULIN LISPRO 100 [IU]/ML
8 INJECTION, SOLUTION INTRAVENOUS; SUBCUTANEOUS
Status: DISCONTINUED | OUTPATIENT
Start: 2021-09-23 | End: 2021-09-23

## 2021-09-23 RX ORDER — FUROSEMIDE 10 MG/ML
40 INJECTION INTRAMUSCULAR; INTRAVENOUS ONCE
Status: COMPLETED | OUTPATIENT
Start: 2021-09-23 | End: 2021-09-23

## 2021-09-23 RX ORDER — MAGNESIUM SULFATE IN WATER 40 MG/ML
2000 INJECTION, SOLUTION INTRAVENOUS ONCE
Status: COMPLETED | OUTPATIENT
Start: 2021-09-23 | End: 2021-09-23

## 2021-09-23 RX ORDER — INSULIN LISPRO 100 [IU]/ML
10 INJECTION, SOLUTION INTRAVENOUS; SUBCUTANEOUS ONCE
Status: COMPLETED | OUTPATIENT
Start: 2021-09-23 | End: 2021-09-23

## 2021-09-23 RX ADMIN — DULOXETINE 20 MG: 20 CAPSULE, DELAYED RELEASE ORAL at 21:16

## 2021-09-23 RX ADMIN — MONTELUKAST 10 MG: 10 TABLET, FILM COATED ORAL at 21:16

## 2021-09-23 RX ADMIN — METHYLPREDNISOLONE SODIUM SUCCINATE 125 MG: 125 INJECTION, POWDER, FOR SOLUTION INTRAMUSCULAR; INTRAVENOUS at 02:54

## 2021-09-23 RX ADMIN — BUDESONIDE AND FORMOTEROL FUMARATE DIHYDRATE 2 PUFF: 80; 4.5 AEROSOL RESPIRATORY (INHALATION) at 10:39

## 2021-09-23 RX ADMIN — INSULIN LISPRO 15 UNITS: 100 INJECTION, SOLUTION INTRAVENOUS; SUBCUTANEOUS at 12:05

## 2021-09-23 RX ADMIN — INSULIN LISPRO 9 UNITS: 100 INJECTION, SOLUTION INTRAVENOUS; SUBCUTANEOUS at 22:19

## 2021-09-23 RX ADMIN — SODIUM CHLORIDE 25 ML: 9 INJECTION, SOLUTION INTRAVENOUS at 18:32

## 2021-09-23 RX ADMIN — INSULIN LISPRO 10 UNITS: 100 INJECTION, SOLUTION INTRAVENOUS; SUBCUTANEOUS at 20:01

## 2021-09-23 RX ADMIN — ENOXAPARIN SODIUM 40 MG: 40 INJECTION SUBCUTANEOUS at 08:59

## 2021-09-23 RX ADMIN — INSULIN LISPRO 8 UNITS: 100 INJECTION, SOLUTION INTRAVENOUS; SUBCUTANEOUS at 16:28

## 2021-09-23 RX ADMIN — INSULIN LISPRO 8 UNITS: 100 INJECTION, SOLUTION INTRAVENOUS; SUBCUTANEOUS at 12:05

## 2021-09-23 RX ADMIN — CLOPIDOGREL BISULFATE 75 MG: 75 TABLET ORAL at 08:59

## 2021-09-23 RX ADMIN — METHYLPREDNISOLONE SODIUM SUCCINATE 125 MG: 125 INJECTION, POWDER, FOR SOLUTION INTRAMUSCULAR; INTRAVENOUS at 14:23

## 2021-09-23 RX ADMIN — METHYLPREDNISOLONE SODIUM SUCCINATE 125 MG: 125 INJECTION, POWDER, FOR SOLUTION INTRAMUSCULAR; INTRAVENOUS at 21:16

## 2021-09-23 RX ADMIN — FUROSEMIDE 40 MG: 10 INJECTION, SOLUTION INTRAMUSCULAR; INTRAVENOUS at 08:55

## 2021-09-23 RX ADMIN — ENOXAPARIN SODIUM 40 MG: 40 INJECTION SUBCUTANEOUS at 21:16

## 2021-09-23 RX ADMIN — INSULIN HUMAN 5 UNITS: 100 INJECTION, SOLUTION PARENTERAL at 18:29

## 2021-09-23 RX ADMIN — MAGNESIUM SULFATE HEPTAHYDRATE 2000 MG: 2 INJECTION, SOLUTION INTRAVENOUS at 18:32

## 2021-09-23 RX ADMIN — METHYLPREDNISOLONE SODIUM SUCCINATE 125 MG: 125 INJECTION, POWDER, FOR SOLUTION INTRAMUSCULAR; INTRAVENOUS at 08:54

## 2021-09-23 RX ADMIN — Medication 10 ML: at 21:56

## 2021-09-23 RX ADMIN — ATORVASTATIN CALCIUM 80 MG: 80 TABLET, FILM COATED ORAL at 08:59

## 2021-09-23 RX ADMIN — GABAPENTIN 800 MG: 400 CAPSULE ORAL at 09:04

## 2021-09-23 RX ADMIN — INSULIN LISPRO 18 UNITS: 100 INJECTION, SOLUTION INTRAVENOUS; SUBCUTANEOUS at 16:28

## 2021-09-23 RX ADMIN — DULOXETINE 20 MG: 20 CAPSULE, DELAYED RELEASE ORAL at 08:59

## 2021-09-23 RX ADMIN — GABAPENTIN 800 MG: 400 CAPSULE ORAL at 21:16

## 2021-09-23 RX ADMIN — GABAPENTIN 800 MG: 400 CAPSULE ORAL at 16:28

## 2021-09-23 RX ADMIN — IPRATROPIUM BROMIDE AND ALBUTEROL 1 PUFF: 20; 100 SPRAY, METERED RESPIRATORY (INHALATION) at 20:40

## 2021-09-23 RX ADMIN — ASPIRIN 81 MG: 81 TABLET, COATED ORAL at 08:59

## 2021-09-23 RX ADMIN — PANTOPRAZOLE SODIUM 40 MG: 40 TABLET, DELAYED RELEASE ORAL at 09:04

## 2021-09-23 RX ADMIN — GABAPENTIN 800 MG: 400 CAPSULE ORAL at 12:08

## 2021-09-23 RX ADMIN — BUDESONIDE AND FORMOTEROL FUMARATE DIHYDRATE 2 PUFF: 80; 4.5 AEROSOL RESPIRATORY (INHALATION) at 20:40

## 2021-09-23 RX ADMIN — Medication 10 ML: at 08:55

## 2021-09-23 RX ADMIN — INSULIN LISPRO 9 UNITS: 100 INJECTION, SOLUTION INTRAVENOUS; SUBCUTANEOUS at 09:03

## 2021-09-23 ASSESSMENT — PAIN SCALES - GENERAL
PAINLEVEL_OUTOF10: 0

## 2021-09-23 NOTE — PROGRESS NOTES
Secure message sent to Dr. Chang Bur 87% on nonrebreather and 15 liters. Denies chest pain or feeling short of breath. Remains on continuous Sp02 monitoring.

## 2021-09-23 NOTE — PROGRESS NOTES
Dr. Gina Salcedo notified via perfect serve of blood glucose of 523. Currently awaiting response.  Electronically signed by Jona Ang RN on 9/23/2021 at 7:44 PM

## 2021-09-23 NOTE — CONSULTS
HF RN consult received from Dr Kody Wan as part of HF order set. Chart reviewed. Pt was also coding HF per CDS 9/17 - 9/22 but this was changed today to pulm edema/ resp failure. Pt presented to ED via EMS with c/o SOB x 2 weeks. Pt chronically uses 2L O2 NC and was satting in the 70's per EMS. He was transferred to [facility] from [facility] ED on 15 L NRB. He was started on Lasix gtt - initial CXR showed mild pulm edema. Lasix gtt was transitioned to IV bolus on 9/17 as felt he was not overtly volume overloaded. ProBNP on admission nwas 3129 with Cr 0.7. He is neg 4.5 liters to date with weight essentially unchanged. Rapid Response was called 9/17 for pt presumed TIA. O2 requirements have continued to increase. 9/23 pt is on 15L HF NC and NRB. He has just been transferred to PCU for Vapotherm. Pulm has been consulted and has tried multiple approaches / treatments to find definitive diagnosis. He is currently on Day 3 of high dose steroids. Pt does have a history of recovered EF and follows with [facility] cardiology. He was last seen in office on 2/23/21 by his primary cardiolgist, Dr Cornelious Habermann. He was not on a diuretic at that time. PCP office notes 5/24/21 then list Lasix 40 QD as a home med. HF measures have been implemented : daily weights, I/O and sodium restricted diet. HF careplan is current. HF instructions have been added to AVS.     Given patient's worsening condition, HF education will be deferred for this hospital stay. His prognosis looks grim. HF RN will continue to follow peripherally and will arrange follow up if warranted.

## 2021-09-23 NOTE — PROGRESS NOTES
4 Eyes Admission Assessment     I agree as the admission nurse that 2 RN's have performed a thorough Head to Toe Skin Assessment on the patient. ALL assessment sites listed below have been assessed on admission. Areas assessed by both nurses:   [x]   Head, Face, and Ears   [x]   Shoulders, Back, and Chest  [x]   Arms, Elbows, and Hands   [x]   Coccyx, Sacrum, and Ischium  [x]   Legs, Feet, and Heels        Does the Patient have Skin Breakdown?   Scattered bruising, redness on right cheek that patient said has been there for years  Scott Prevention initiated:  n/a  Wound Care Orders initiated:  n/a      WOC nurse consulted for Pressure Injury (Stage 3,4, Unstageable, DTI, NWPT, and Complex wounds) or Scott score 18 or lower:  n/a    Nurse 1 eSignature: Electronically signed by Jayde Webster RN on 9/23/21 at 1:56 PM EDT    **SHARE this note so that the co-signing nurse is able to place an eSignature**    Nurse 2 eSignature: Electronically signed by Jona Ang RN on 9/23/21 at 2:57 PM EDT

## 2021-09-23 NOTE — PROGRESS NOTES
Pulmonology Progress Note  PGY-2    Admit Date: 9/16/2021  Hospital Day: 8  Diet: ADULT DIET; Regular; Low Sodium (2 gm); 1800 ml  Code Status: Full Code       Interval history:  Patient was seen and examined this morning. Remained afebrile overnight. Patient is on 15L of HFNC with NRB with SpO2 in low 90s. Patient mentions that he is feeling better this morning. Mentions that his breathing is fine. Reports non productive cough. Denies fevers, chills, chest pain, nausea, vomiting, diarrhea, or constipation.       Medications:   Scheduled Meds:   furosemide  40 mg IntraVENous Once    insulin lispro  8 Units SubCUTAneous TID WC    insulin glargine  20 Units SubCUTAneous Daily    pantoprazole  40 mg Oral QAM AC    insulin lispro  0-18 Units SubCUTAneous TID WC    insulin lispro  0-9 Units SubCUTAneous Nightly    methylPREDNISolone  125 mg IntraVENous Q6H    clopidogrel  75 mg Oral Daily    nicotine  1 patch TransDERmal Daily    enoxaparin  40 mg SubCUTAneous BID    [Held by provider] lisinopril  20 mg Oral Daily    gabapentin  800 mg Oral 4x Daily    atorvastatin  80 mg Oral Daily    aspirin  81 mg Oral Daily    DULoxetine  20 mg Oral BID    budesonide-formoterol  2 puff Inhalation BID    montelukast  10 mg Oral Nightly    sodium chloride flush  5-40 mL IntraVENous 2 times per day       Continuous Infusions:   dextrose      sodium chloride 25 mL (09/20/21 0907)       PRN Meds:  sodium chloride, albuterol-ipratropium, glucose, dextrose, glucagon (rDNA), dextrose, sodium chloride flush, sodium chloride, ondansetron **OR** ondansetron, polyethylene glycol, acetaminophen **OR** acetaminophen    Vital/I&O/Physical examination:   VS:  BP (!) 102/55   Pulse 74   Temp 97.6 °F (36.4 °C) (Oral)   Resp 18   Ht 4' 11\" (1.499 m)   Wt 182 lb 3.2 oz (82.6 kg)   SpO2 97%   BMI 36.80 kg/m²     I/O:    Intake/Output Summary (Last 24 hours) at 9/23/2021 0828  Last data filed at 9/22/2021 2316  Gross per 24 hour   Intake 1200 ml   Output 200 ml   Net 1000 ml       PE:  Physical Exam  Vitals reviewed. Constitutional:       Appearance: Normal appearance. HENT:      Head: Normocephalic and atraumatic. Nose: Nose normal.      Mouth/Throat:      Mouth: Mucous membranes are moist.   Eyes:      Extraocular Movements: Extraocular movements intact. Conjunctiva/sclera: Conjunctivae normal.      Pupils: Pupils are equal, round, and reactive to light. Cardiovascular:      Rate and Rhythm: Normal rate and regular rhythm. Pulses: Normal pulses. Heart sounds: Normal heart sounds. Pulmonary:      Effort: Pulmonary effort is normal.      Comments: Diffuse crackle on lung auscultation   Abdominal:      Palpations: Abdomen is soft. Musculoskeletal:         General: Normal range of motion. Cervical back: Normal range of motion and neck supple. Neurological:      General: No focal deficit present. Mental Status: He is alert and oriented to person, place, and time. Labs & Imaging:   LABS:  Renal:   Recent Labs     09/21/21  0549 09/21/21  0549 09/22/21  0544 09/22/21  0958 09/23/21  0559     --  135*  --  135*   K 4.1   < > 5.3* 4.5 4.8   CL 96*  --  95*  --  98*   CO2 33*  --  26  --  25   BUN 24*  --  46*  --  50*   CREATININE 0.7*  --  1.1  --  0.8*   GLUCOSE 158*  --  411*  --  342*   ANIONGAP 10  --  14  --  12    < > = values in this interval not displayed. CBC:   Recent Labs     09/21/21  0549 09/22/21  0544 09/23/21  0559   WBC 9.1 9.4 14.8*   HGB 16.1 16.0 16.4   HCT 49.9 50.4 50.7    310 316   MCV 90.3 91.3 89.1                            Hepatic: No results for input(s): AST, ALT, ALB, BILITOT, ALKPHOS in the last 72 hours. Troponin: No results for input(s): TROPONINI in the last 72 hours. BNP: No results for input(s): BNP in the last 72 hours. Lipids: No results for input(s): CHOL, HDL in the last 72 hours.     Invalid input(s): LDLCALCU, TRIGLYCERIDE  INR: No results for input(s): INR in the last 72 hours. Lactate: No results for input(s): LACTATE in the last 72 hours. ABGs:No results for input(s): PHART, FMO9ZFC, PO2ART, MEI5EOO, BEART, THGBART, F6RSAAKP, UBU2YDK in the last 72 hours. UA:No results for input(s): NITRITE, COLORU, PHUR, LABCAST, WBCUA, RBCUA, MUCUS, TRICHOMONAS, YEAST, BACTERIA, CLARITYU, SPECGRAV, LEUKOCYTESUR, UROBILINOGEN, BILIRUBINUR, BLOODU, GLUCOSEU, AMORPHOUS in the last 72 hours. Invalid input(s): Comfort Eastman     IMAGING:  CT CHEST HIGH RESOLUTION   Final Result      Slow progression of bilateral lower lobe alveolar consolidation with air bronchograms raises concern for chronic process such as interstitial pneumonia, eosinophilic pneumonia, and organizing pneumonia. Neoplastic etiology is considered much less likely. MRI BRAIN WO CONTRAST   Final Result      1. No acute intracranial abnormality. No evidence of acute infarct. 2. Minimal nonspecific white matter signal abnormality on FLAIR imaging suggesting minimal chronic small vessel ischemic disease and/or minimal age-related degenerative change. CTA HEAD NECK W CONTRAST   Final Result      Study limited by motion artifact and poor contrast bolus. 1. Atherosclerotic disease at bilateral carotid bifurcations. Exact percentage of stenosis by NASCET criteria is difficult to comment upon due to artifacts. 2. 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 Moderately attenuated mid vertebral artery from upper C7  to C4 5 level. Distal to its left vertebral artery is patent without significant stenosis.             PROCEDURE: CT ANGIOGRAPHY HEAD WITH/WITHOUT CONTRAST      INDICATION: aphasia, ams      COMPARISON: none      TECHNIQUE: Axial CT imaging obtained through the head prior to and following administration of IV contrast. Axial images, multiplanar reformatted images, and maximum intensity projection images were reviewed for CT angiographic technique. IV contrast: mL Omnipaque 350. FINDINGS:      ANTERIOR CIRCULATION: The intracranial internal carotid arteries, anterior cerebral arteries, and middle cerebral arteries are patent. There are calcified plaques with mild to moderate narrowing of bilateral ICA cavernous/supraclinoid segments. . No    evidence for aneurysm or arteriovenous malformation. POSTERIOR CIRCULATION: The bilateral vertebral arteries, basilar artery and posterior cerebral arteries are patent. There are calcified and noncalcified plaques with mild narrowing of left vertebral artery intradural segment. Right vertebral intradural    segment is severely attenuated, likely hyperplastic. No evidence for aneurysm or arteriovenous malformation. INTRACRANIAL VENOUS SYSTEM: No evidence for intracranial venous thrombosis. IMPRESSION:      No evidence of intracranial large vessel occlusion. 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 Calcified plaques with mild to moderate narrowing of bilateral ICA cavernous/supraclinoid segments. CT HEAD WO CONTRAST   Final Result      No acute intracranial abnormality . CT CHEST PULMONARY EMBOLISM W CONTRAST   Final Result   1. No definite pulmonary embolus identified. 2.  Mild atherosclerotic changes of the aorta. No aortic aneurysm or    dissection. 3.  Patchy densities in the lower lobes and right middle lobe may    represent atelectasis versus infectious/inflammatory process. 4.  Dependent atelectasis bilaterally. Atelectasis in the lingula. 5.  Trace right pleural effusion. 6.  Nonspecific similar mildly prominent mediastinal and hilar lymph    nodes.               Assessment & Plan:    Becky Madrigal is a 50 y.o. male with PMH of HFpEF (2/2021 ECHO showed grade 2 diastolic dysfunction, 10% EF, LV hypertrophy), COPD on 2 L of oxygen at home, diabetes, hypertension, hyperlipidemia who presents with two weeks of worsening SOB. Acute on chronic hypoxemic hypercapnic respiratory failure:  - Differential includes chronic eosinophilic pneumoniia, organizing pneumonia HP. Cryptogenic organizing pneumonia, hypersensitivity pneumonitis or mixed connective tissue disease related pulmonary scarring are on the differential as well. - Patient has history of COPD and is usually on 2L of O2.   -  Currently, Patient is on 15L of HFNC with NRB with SpO2 in low 90s. Her oxygen saturation drops to mid 80s when sitting up for pulmonary exam.    - Inflammatory markers were unremarkable. Will repeat the inflammatory markers once patient is off steroids.  - Covid negative  -  Strep pneumo antigen, legionella antigen, respiratory panel negative. -  Procalc 0.06  - CT chest showed slow progression of bilateral lower lobe alveolar consolidation with air bronchograms raises concern for chronic process such as interstitial pneumonia, eosinophilic pneumonia, and organizing pneumonia. - ABG on admission: pH: 7.41/pCO2: 51.2/pO2: 66. Patient usually hypercapnic per past ABG  - Continue SoluMedrol 125 mg q 6hrs for 3 days (day 3)  - Chest x ray this morning Stable appearance of the chest. Bibasilar atelectasis/consolidation persists. - ABG this morning: pH 7.401, pCO2 53.5, pO2 52.3, Bicarb 33  - We will start patient on Vapotherm   - Continue supplemental oxygen and wean per tolerated with oxygen goal of 88% and above      COPD:  There no wheezing on lung auscultation  - DuoNebs as need  - Symbicort inhaler BID  - Currently, on SoluMedrol      Code Status: Full Code  ADULT DIET; Regular; Low Sodium (2 gm); 1800 ml     PPX: Lovenox       This patient will be discussed with attending, Dr. Madison Benitez MD.    Damián Dickerson MD, PGY- 2  Contact via [facility]  9/23/2021,  8:28 AM     Patient seen, examined and discussed with the resident and I agree with the assessment and plan.   Briefly, this is a 50 y.o. male  with acute hypoxemic respiratory failure, interstitial lung disease. Patient's oxygen requirements continue to rise and he has been increased from 15 L high flow to the addition of 100% nonrebreather. Even on that his sats are in the high 80s unless he moves in which they dropped into the mid 80s. He continues to indicate that he has no respiratory difficulty and feels better than when he came in. He still has crackles at the bases that have not improved with diuresis and he has not improved with high-dose steroids. He is got 1 more day of the high-dose steroids before I will comfortably declare him not steroid responsive. Unfortunately beyond that it is unclear what interventions I can do to improve his gas exchange. He has had a bubble echo that showed no evidence of shunt, a CTPA that showed no evidence of clot and he showed no evidence of infection including testing negative for Covid. He likely does have COPD but he does not appear to be in exacerbation and on the dose of steroid he is on I would be treating that anyway. Plan today is to transfer him to PCU and start Vapotherm. I also want him using an I-S as I suspect some of the issue may be related to the atelectasis of sitting in bed. Although I do not think that or the ILD is enough to explain how much oxygen he is requiring.         Donita Hogue MD

## 2021-09-23 NOTE — PROGRESS NOTES
Transferred to 80 with RN present during transport. Was on high flow oxygen and nonrebreather. Tolerated transfer well. Sister updated on transfer.  Had all belongings with him including cell phone and

## 2021-09-23 NOTE — PROGRESS NOTES
Progress Note    Admit Date: 9/16/2021  Day: 7  Diet: ADULT DIET; Regular; Low Sodium (2 gm); 1800 ml    CC: Chest pain + SOB    Interval history:    Pt seen at bedside. Continues to report that he feels fine. He was transitioned to 15L HFNC and NRB with SpO2: 88% due to worsening oxygenation requirements. Afebrile in the last 24 hrs. Admits to being fitted for BiPAP but adamant he hates it. Denies chest pain, SOB, palpitations, N/V.      HPI:   Radha Singletary a 50 y. o. male with history of HFpEF (2/2021 ECHO showed grade 2 diastolic dysfunction, 08% EF, LV hypertrophy), COPD on 2 L of oxygen at home, diabetes, hypertension, hyperlipidemia who presents with two weeks of worsening SOB. He reports his home oxygen saturations was in the 70's. He is not vaccinated for COVID and he reports that he was likely exposed to [employer] before his SOB began. At that time he also experiences 2-3 days of generalized body aches that have since resolved. Denies fevers and chills. Patient has been hospitalized previously for COPD and CHF exacerbations, and he reports this SOB feels more like a CHF exacerbation to him. He reports feeling slightly fluid overloaded though does not know his dry weight. He repeatedly takes off his HFNC in the room and desats down to the 60's, but quickly comes back up on 10L. Even with a pulse ox at 60 he denies SOB.    Upon admission, sodium 146, glucose 155, Pro-BNP 3129 (5000 on 7/2021 and 1847 in 6/2021), negative troponin, D-dimer 292. Chest XR showed a R pleural effusion and hazy bibasilar atelectasis vs. Small infiltrates.  It showed stable cardiomegaly and mild pulmonary congestion. He reports complete resolution of SOB after receiving IV lasix dose, however continues to desat without supplemental oxygen.       Medications:     Scheduled Meds:   furosemide  40 mg IntraVENous Once    insulin lispro  8 Units SubCUTAneous TID WC    insulin glargine  20 Units SubCUTAneous Daily    pantoprazole 40 mg Oral QAM AC    insulin lispro  0-18 Units SubCUTAneous TID WC    insulin lispro  0-9 Units SubCUTAneous Nightly    methylPREDNISolone  125 mg IntraVENous Q6H    clopidogrel  75 mg Oral Daily    nicotine  1 patch TransDERmal Daily    enoxaparin  40 mg SubCUTAneous BID    [Held by provider] lisinopril  20 mg Oral Daily    gabapentin  800 mg Oral 4x Daily    atorvastatin  80 mg Oral Daily    aspirin  81 mg Oral Daily    DULoxetine  20 mg Oral BID    budesonide-formoterol  2 puff Inhalation BID    montelukast  10 mg Oral Nightly    sodium chloride flush  5-40 mL IntraVENous 2 times per day     Continuous Infusions:   dextrose      sodium chloride 25 mL (09/20/21 0907)     PRN Meds:sodium chloride, albuterol-ipratropium, glucose, dextrose, glucagon (rDNA), dextrose, sodium chloride flush, sodium chloride, ondansetron **OR** ondansetron, polyethylene glycol, acetaminophen **OR** acetaminophen    Objective:   Vitals:   T-max:  Patient Vitals for the past 8 hrs:   BP Temp Temp src Pulse Resp SpO2   09/23/21 0725 (!) 102/55 97.6 °F (36.4 °C) Oral 74 18 97 %   09/23/21 0648 122/72 98.5 °F (36.9 °C) Axillary 73 18 (!) 88 %   09/23/21 0516 -- -- -- -- 16 (!) 88 %   09/23/21 0317 -- -- -- -- 14 (!) 88 %   09/23/21 0256 110/71 98.5 °F (36.9 °C) Axillary 69 16 (!) 88 %       Intake/Output Summary (Last 24 hours) at 9/23/2021 0840  Last data filed at 9/23/2021 0836  Gross per 24 hour   Intake 1440 ml   Output 250 ml   Net 1190 ml       Review of Systems   HENT: Negative. Respiratory: Positive for cough and shortness of breath. Negative for choking, chest tightness, wheezing and stridor. Cardiovascular: Negative for chest pain, palpitations and leg swelling. Gastrointestinal: Negative for abdominal pain. Genitourinary: Negative. Musculoskeletal: Negative. Neurological: Negative. Physical Exam  Constitutional:       Appearance: He is obese.    HENT:      Mouth/Throat:      Mouth: Mucous membranes are dry. Pharynx: Oropharynx is clear. Eyes:      Conjunctiva/sclera: Conjunctivae normal.      Pupils: Pupils are equal, round, and reactive to light. Cardiovascular:      Rate and Rhythm: Normal rate and regular rhythm. Pulses: Normal pulses. Heart sounds: No murmur heard. No friction rub. No gallop. Pulmonary:      Effort: Pulmonary effort is normal. No respiratory distress. Breath sounds: Minimal rales (unchanged) present. No wheezing or rhonchi. Abdominal:      General: Bowel sounds are normal. There is no distension. Tenderness: There is no abdominal tenderness. There is no guarding. Skin:     General: Skin is warm. Capillary Refill: Capillary refill takes less than 2 seconds. Neurological:      General: No focal deficit present. Mental Status: He is alert and oriented to person, place, and time. Psychiatric:         Mood and Affect: Mood normal.         LABS:    CBC:   Recent Labs     09/21/21  0549 09/22/21  0544 09/23/21  0559   WBC 9.1 9.4 14.8*   HGB 16.1 16.0 16.4   HCT 49.9 50.4 50.7    310 316   MCV 90.3 91.3 89.1     Renal:    Recent Labs     09/21/21  0549 09/21/21  0549 09/22/21  0544 09/22/21  0958 09/23/21  0559     --  135*  --  135*   K 4.1   < > 5.3* 4.5 4.8   CL 96*  --  95*  --  98*   CO2 33*  --  26  --  25   BUN 24*  --  46*  --  50*   CREATININE 0.7*  --  1.1  --  0.8*   GLUCOSE 158*  --  411*  --  342*   CALCIUM 10.0  --  10.2  --  10.1   MG 1.60*  --  1.90  --  1.80   ANIONGAP 10  --  14  --  12    < > = values in this interval not displayed. Hepatic: No results for input(s): AST, ALT, BILITOT, BILIDIR, PROT, LABALBU, ALKPHOS in the last 72 hours. Troponin: No results for input(s): TROPONINI in the last 72 hours. BNP: No results for input(s): BNP in the last 72 hours. Lipids: No results for input(s): CHOL, HDL in the last 72 hours.     Invalid input(s): LDLCALCU, TRIGLYCERIDE  ABGs:  No results for input(s): PHART, NCW1PDQ, PO2ART, UBS7ZYN, BEART, THGBART, G7EDHFLF, POE5QCW in the last 72 hours. INR: No results for input(s): INR in the last 72 hours. Lactate: No results for input(s): LACTATE in the last 72 hours. Cultures:  -----------------------------------------------------------------  RAD:   CT CHEST HIGH RESOLUTION   Final Result      Slow progression of bilateral lower lobe alveolar consolidation with air bronchograms raises concern for chronic process such as interstitial pneumonia, eosinophilic pneumonia, and organizing pneumonia. Neoplastic etiology is considered much less likely. MRI BRAIN WO CONTRAST   Final Result      1. No acute intracranial abnormality. No evidence of acute infarct. 2. Minimal nonspecific white matter signal abnormality on FLAIR imaging suggesting minimal chronic small vessel ischemic disease and/or minimal age-related degenerative change. CTA HEAD NECK W CONTRAST   Final Result      Study limited by motion artifact and poor contrast bolus. 1. Atherosclerotic disease at bilateral carotid bifurcations. Exact percentage of stenosis by NASCET criteria is difficult to comment upon due to artifacts. 2. 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 Moderately attenuated mid vertebral artery from upper C7  to C4 5 level. Distal to its left vertebral artery is patent without significant stenosis. PROCEDURE: CT ANGIOGRAPHY HEAD WITH/WITHOUT CONTRAST      INDICATION: aphasia, ams      COMPARISON: none      TECHNIQUE: Axial CT imaging obtained through the head prior to and following administration of IV contrast. Axial images, multiplanar reformatted images, and maximum intensity projection images were reviewed for CT angiographic technique. IV contrast: mL Omnipaque 350.       FINDINGS:      ANTERIOR CIRCULATION: The intracranial internal carotid arteries, anterior cerebral arteries, and middle cerebral arteries are patent. There are calcified plaques with mild to moderate narrowing of bilateral ICA cavernous/supraclinoid segments. . No    evidence for aneurysm or arteriovenous malformation. POSTERIOR CIRCULATION: The bilateral vertebral arteries, basilar artery and posterior cerebral arteries are patent. There are calcified and noncalcified plaques with mild narrowing of left vertebral artery intradural segment. Right vertebral intradural    segment is severely attenuated, likely hyperplastic. No evidence for aneurysm or arteriovenous malformation. INTRACRANIAL VENOUS SYSTEM: No evidence for intracranial venous thrombosis. IMPRESSION:      No evidence of intracranial large vessel occlusion. 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 Calcified plaques with mild to moderate narrowing of bilateral ICA cavernous/supraclinoid segments. CT HEAD WO CONTRAST   Final Result      No acute intracranial abnormality . CT CHEST PULMONARY EMBOLISM W CONTRAST   Final Result   1. No definite pulmonary embolus identified. 2.  Mild atherosclerotic changes of the aorta. No aortic aneurysm or    dissection. 3.  Patchy densities in the lower lobes and right middle lobe may    represent atelectasis versus infectious/inflammatory process. 4.  Dependent atelectasis bilaterally. Atelectasis in the lingula. 5.  Trace right pleural effusion. 6.  Nonspecific similar mildly prominent mediastinal and hilar lymph    nodes. Assessment/Plan:   Luc Camp is a 50 y.o. male, PMHx HFpEF, COPD on 2 L of oxygen at home, diabetes, HTN,  who presents with two weeks of worsening SOB. Acute hypoxic and hypercapnic respiratory failure  Pt is on 2L Home Oxygen. Currently requiring 15L HFNC + NRB -> worsening.   CT Chest with trace pleural effusion, and bilateral lower lobe and right middle lobe atelectasis versus infectious/inflammatory process. Covid negative. Procal 0.06 which lowers suspicion for any concomitant bacterial infection. Viral respiratory panel was unremarkable. CHF exacerbation vs COPD exacerbation. Leukocytosis today 14.8 but likely due to   ABG: pH: 7.401, pCO2: 53.5, pO2: 52.3 (decreased)  - Lasix 40 mg IV x 1  - CXR stat  - Escalate to Vapotherm. - Continue home Symbicort  - Continue home Singulair  - Combivent Respimat as needed  - Solu-Medrol 125mg ever 6 hrs for 3 days`( -)  - Wean down for O2 Sat more than 88% given history of COPD  - Pulmonology following   Suspecting cryptogenic organizing pneumonia vs hypersensitivity pneumonitis or mixed connective tissue disease. Steroids appropriate for autoimmune. ABDI negative. CRP 7.1 (decreased from 16.2). ESR: 22.     COPD  Low suspicion for exacerbation. He has been on Nebs/inhalers and Steroids with minimal improvement. Patient without cough, but with significant wheezing on exam across all lung fields. ABG hypercapnic but this is consistent at his baseline PCO2 60 previous labs. Received 2 doses Decadron 10 ()  - on Solu-Medrol 125mg ever 6 hrs for 3 days`( -)      HFpEF   ECHO 55% in 2021  Low suspicion for exacerbation. Has had diuresis with limited improvement. Patient not overtly volume overloaded, could not appreciate JVD but this is complicated by body habitus. No lower extremity edema. no pleural effusion on imaging, consistent with exam.  ProBNP: 3129 -> 371 -> 160 - improvement so likely there was some exarcerbation but with BNP decreasing, Pt has still not improved respiratory wise.    Trial of lasix 40 IV with no improvements   Good Urine output over past 24 hours 1975 mL  - BNP Q3days    Diabetes type 2  HbA1c: 7.1 2021  B  Pt started on high dose steroids yesterday, likely causing elevation of BG.   - Switched to HDSSI   - Increased Lantus to 20 U nightly  - Increased Lispro to 8 Units    LILLIAN - resolved  Cr: 1.1 -> 0.8  Like due to pre renal causes and diuretic use and becoming hypotensive yesterday. - Lisinopril Held  - Avoid Nephrotoxins  - Avoid NSAIDs    Hyperkalemia - resolved  K+: 4.3     Hyponatremia  Na: 135  Snow: <20 , UrCr: 58.3    TIA r/o   Brief episode of expressive aphasia. Rapid response called 9/17. CT head, CTA head and neck no evidence of intracranial large vessel occlusion. L vertebral artery occluded. Echo normal.  MRI brain without acute process, small vessel ischemic disease present. Folate normal, B12 normal  - Neurology on board  - Continue ASA, Plavix     CAD/PVD  Patient on long-term DAPT. Per cardiologist note Dr. Melissa Herrera in February, this was supposed to be stopped. - Plavix was initially stopped due to home medication reconciliation. However, restarted 9/17 overnight due to concern of TIA   - ASA        HTN  - continue home meds     HLD  - Continue home meds     Neuropathy  - continue home gabapentin     Smoking cessation  Patient with significant smoking history 2.5 PPD since 10years old  - education and patch       Code Status: Full Code   FEN: ADULT DIET; Regular; Low Sodium (2 gm); 1800 ml  PPX:  Lovenox, Protonix  DISPO: Subha Chavira MD, PGY-1  Internal Medicine Resident  09/23/21  8:40 AM    This patient has been staffed and discussed with Violeta Morales MD.       Patient seen and examined, labs and imaging studies reviewed, agree with assessment and plan as outlined above. Continue with current care and plan. Discussed case with patients nurse, discussed case with care team, discussed plan.       MD Melissa Leonard

## 2021-09-23 NOTE — ADT AUTH CERT
Utilization Reviews       Chronic Obstructive Pulmonary Disease - Care Day 7 (9/22/2021) by Kady Plascencia RN       Review Status Review Entered   Completed 9/23/2021 09:14      Criteria Review      Care Day: 7 Care Date: 9/22/2021 Level of Care: Intermediate Care    Guideline Day 3    Clinical Status    (X) * Hemodynamic stability    9/23/2021 9:14 AM EDT by Max Solis      /73  Pulse 74    (X) * Mental status at baseline    9/23/2021 9:14 AM EDT by Max Solis      He is alert and oriented to person, place, and time    (X) * No evidence of infection, or outpatient treatment planned    (X) * Uncompensated acidosis absent    ( ) * Oxygenation at baseline or acceptable for next level of care    9/23/2021 9:14 AM EDT by Max Solis      Patient is hypoxic on 15L HFNC with O2 Sats 86-89%, bumped up to non-rebreather mask    ( ) * Signs and symptoms of COPD (eg, dyspnea, Tachypnea, cough, sputum production) at baseline or acceptable for next level of care    9/23/2021 9:14 AM EDT by Magdalene Jeff (lower lung base but minimal- less )- more improved compared to day prior    ( ) * Discharge plans and education understood    Activity    (X) * Ambulatory or acceptable for next level of care    Routes    (X) * Oral hydration    (X) * Oral medications or regimen acceptable for next level of care    (X) * Oral diet or acceptable for next level of care    9/23/2021 9:14 AM EDT by Max Solis      regular    Interventions    (X) Pulse oximetry    (X) Oxygen as needed    9/23/2021 9:14 AM EDT by Max Solis      15L HFNC    Medications    (X) * Inhaled bronchodilator regimen established and feasible at next level of care    9/23/2021 9:14 AM EDT by Max Solis      Symbicort BID    ( ) Oral steroids    9/23/2021 9:14 AM EDT by Max Solis      methylPREDNISolone sodium (SOLU-MEDROL) injection 125 mg   Dose: 125 mg  Freq: EVERY 6 HOURS Route: IV    * Milestone   Additional Notes   9/22 - CD 7 Interval history:       Continues to be on 15L oxygen via NRB with SpO2: 87-94% Pt became hypotensive yesterday, 86/53 but BP improved today. Now on high dose steroids. Nil overnight acute events reported. Pt with nil complaints this morning. Denies any SOB, chest pain, abdominal pain, N/V.       09/22/21 0656 111/73 97.8 °F (36.6 °C) Axillary 74 16 (!) 89 %         Physical Exam   Constitutional:        Appearance: He is obese. HENT:       Mouth/Throat:       Mouth: Mucous membranes are dry.       Pharynx: Oropharynx is clear. Eyes:       Conjunctiva/sclera: Conjunctivae normal.       Pupils: Pupils are equal, round, and reactive to light. Cardiovascular:       Rate and Rhythm: Normal rate and regular rhythm.       Pulses: Normal pulses.       Heart sounds: No murmur heard. No friction rub. No gallop.     Pulmonary:       Effort: Pulmonary effort is normal. No respiratory distress.       Breath sounds: Rales (lower lung base but minimal- less )- more improved compared to day prior present. No wheezing or rhonchi. Abdominal:       General: Bowel sounds are normal. There is no distension.       Tenderness: There is no abdominal tenderness. There is no guarding. Skin:      General: Skin is warm.       Capillary Refill: Capillary refill takes less than 2 seconds. Neurological:       General: No focal deficit present.       Mental Status: He is alert and oriented to person, place, and time. Psychiatric:          Mood and Affect: Mood normal.            Acute hypoxic and hypercapnic respiratory failure   Pt is on 2L Home Oxygen. Currently requiring 15L HFNC. CT Chest with trace pleural effusion, and bilateral lower lobe and right middle lobe atelectasis versus infectious/inflammatory process.  Covid negative. Procal 0.06 which lowers suspicion for any concomitant bacterial infection. Viral respiratory panel was unremarkable.    CHF exacerbation vs COPD exacerbation.    - Continue home Symbicort - Continue home Singulair   - Combivent Respimat as needed   - Switched to Solu-Medrol 125mg ever 6 hrs for 3 days`(9/21 -)   - Wean down for O2 Sat more than 88% given history of COPD   - Pulmonology following               Suspecting cryptogenic organizing pneumonia vs hypersensitivity pneumonitis or mixed connective tissue disease. Steroids appropriate for autoimmune. ABDI negative.                CRP 7.1 (decreased from 16.2). ESR: 22.       COPD   Low suspicion for exacerbation. He has been on Nebs/inhalers and Steroids with minimal improvement. Patient without cough, but with significant wheezing on exam across all lung fields.  ABG hypercapnic but this is consistent at his baseline PCO2 60 previous labs. Received 2 doses Decadron 10 (9/17)   - on Solu-Medrol 125mg ever 6 hrs for 3 days`(9/21 -)           HFpEF    ECHO 55% in 2/2021   Low suspicion for exacerbation. Has had diuresis with limited improvement. Patient not overtly volume overloaded, could not appreciate JVD but this is complicated by body habitus. No lower extremity edema. no pleural effusion on imaging, consistent with exam.   ProBNP: 3129 -> 371 -> 160 - improvement so likely there was some exarcerbation but with BNP decreasing, Pt has still not improved respiratory wise. Trial of lasix 40 IV with no changes. Good Urine output over past 24 hours 1975 mL   - BNP Q3days       LILLIAN   Cr: 1.1 from 0.7 (9/21)   Like due to pre renal causes and diuretic use and becoming hypotensive yesterday. - Judicious IV fluid management   - Lisinopril Held   - Avoid Nephrotoxins   - Avoid NSAIDs       Hyperkalemia   K+: 5.3    Patient received lasix IV yesterday so expected K+ to decrease.    - Recheck K+       Hyponatremia   Na: 135   - Urine studies       TIA r/o    Brief episode of expressive aphasia. Rapid response called 9/17. CT head, CTA head and neck no evidence of intracranial large vessel occlusion. L vertebral artery occluded.  Echo normal.  MRI brain without acute process, small vessel ischemic disease present.  Folate normal, B12 normal   - Neurology on board, appreciate recommendation.     - Continue ASA, Plavix   - Check thiamine   - Pulmonology consult placed   - zosyn day 2 (-) Paramjit Cardenas for aspiration        CAD/PVD   Patient on long-term DAPT.  Per cardiologist note Dr. Estiven Mosher in February, this was supposed to be stopped. - Plavix was initially stopped due to home medication reconciliation. However, restarted  overnight due to concern of TIA    - ASA        Diabetes type 2   HbA1c: 7.1 2021   B   Pt started on high dose steroids yesterday, likely causing elevation of BG.    - Switched to HDSSI    - Lantus 15 U nightly added   - Lispro 6 Units TID added       HTN   - continue home meds       HLD   - Continue home meds       Neuropathy   - continue home gabapentin       Smoking cessation   Patient with significant smoking history 2.5 PPD since 10years old   - education and patch           Code Status: Full Code    FEN: ADULT DIET; Regular; Low Sodium (2 gm); 1800 ml   PPX:  Lovenox   DISPO: IP                   Pulmonary   Acute on chronic hypoxemic hypercapnic respiratory failure:   - differential includes chronic eosinophilic pneumoniia, organizing pneumonia HP. Cryptogenic organizing pneumonia, hypersensitivity pneumonitis or mixed connective tissue disease related pulmonary scarring are on the differential as well. - Patient has history of COPD and is usually on 2L of O2.    -  Currently, Patient is on 15L of HFNC with SpO2 in low 90s. - Inflammatory markers were unremarkable. Will repeat the inflammatory markers once patient is off steroids.   - Covid negative   -  Strep pneumo antigen, legionella antigen, respiratory panel negative.    -  Procalc 0.06   - Chest x ray showed hazy bibasilar atelectasis or small infiltrates and associated small right pleural effusion which is more prominent.   - CT chest showed slow progression of bilateral lower lobe alveolar consolidation with air bronchograms raises concern for chronic process such as interstitial pneumonia, eosinophilic pneumonia, and organizing pneumonia.    - ABG on admission: pH: 7.41/pCO2: 51.2/pO2: 66. Patient usually hypercapnic per past ABG   - Continue SoluMedrol 125 mg q 6hrs for 3 days (day 2)   - Continue supplemental oxygen and wean per tolerated with oxygen goal of 88% and above           COPD:   There no wheezing on lung auscultation   - DuoNebs as need   - Symbicort inhaler BID   - Currently, on [employer] for Rody Sklizbeth (MRN [id number]) as of 9/23/2021 09:21      9/22/2021 05:44   Sodium: 135 (L)   Potassium: 5.3 (H)   Chloride: 95 (L)   CO2: 26   BUN: 46 (H)               Scheduled Medications   · insulin glargine 10 Units SubCUTAneous Nightly   · insulin lispro 0-18 Units SubCUTAneous TID WC   · insulin lispro 0-9 Units SubCUTAneous Nightly   · methylPREDNISolone 125 mg IntraVENous Q6H   · clopidogrel 75 mg Oral Daily   · nicotine 1 patch TransDERmal Daily   · enoxaparin 40 mg SubCUTAneous BID   · lisinopril 20 mg Oral Daily   · gabapentin 800 mg Oral 4x Daily   · atorvastatin 80 mg Oral Daily   · aspirin 81 mg Oral Daily   · DULoxetine 20 mg Oral BID   · budesonide-formoterol 2 puff Inhalation BID   · montelukast 10 mg Oral Nightly

## 2021-09-23 NOTE — PLAN OF CARE
Pt assessment complete and medications passed; pt alert and oriented x4, denies any pain. Oxygen sat stays between 86-90% on 15L non rebreather mask. Other VSS. Pt slept well throughout the night. Bed alarm on and call light within reach. Problem: Falls - Risk of:  Goal: Will remain free from falls  Description: Will remain free from falls  9/23/2021 0305 by Whit Davis RN  Outcome: Ongoing  9/22/2021 1942 by Karyle Gist, RN  Outcome: Ongoing  Goal: Absence of physical injury  Description: Absence of physical injury  Outcome: Ongoing     Problem: Airway Clearance - Ineffective  Goal: Achieve or maintain patent airway  9/23/2021 0305 by Whit Davis RN  Outcome: Ongoing  9/22/2021 1942 by Karyle Gist, RN  Outcome: Ongoing     Problem: Gas Exchange - Impaired  Goal: Absence of hypoxia  9/23/2021 0305 by Whit Davis RN  Outcome: Ongoing  9/22/2021 1942 by Karyle Gist, RN  Outcome: Ongoing  Goal: Promote optimal lung function  Outcome: Ongoing     Problem: Breathing Pattern - Ineffective  Goal: Ability to achieve and maintain a regular respiratory rate  Outcome: Ongoing     Problem:  Body Temperature -  Risk of, Imbalanced  Goal: Ability to maintain a body temperature within defined limits  Outcome: Ongoing  Goal: Will regain or maintain usual level of consciousness  Outcome: Ongoing  Goal: Complications related to the disease process, condition or treatment will be avoided or minimized  Outcome: Ongoing     Problem: Isolation Precautions - Risk of Spread of Infection  Goal: Prevent transmission of infection  Outcome: Ongoing     Problem: Nutrition Deficits  Goal: Optimize nutritional status  Outcome: Ongoing     Problem: Risk for Fluid Volume Deficit  Goal: Maintain normal heart rhythm  Outcome: Ongoing  Goal: Maintain absence of muscle cramping  Outcome: Ongoing  Goal: Maintain normal serum potassium, sodium, calcium, phosphorus, and pH  Outcome: Ongoing     Problem: Loneliness or Risk for Loneliness  Goal: Demonstrate positive use of time alone when socialization is not possible  Outcome: Ongoing     Problem: Fatigue  Goal: Verbalize increase energy and improved vitality  Outcome: Ongoing     Problem: Patient Education: Go to Patient Education Activity  Goal: Patient/Family Education  Outcome: Ongoing     Problem: Discharge Planning:  Goal: Discharged to appropriate level of care  Description: Discharged to appropriate level of care  Outcome: Ongoing     Problem: Serum Glucose Level - Abnormal:  Goal: Ability to maintain appropriate glucose levels will improve  Description: Ability to maintain appropriate glucose levels will improve  Outcome: Ongoing     Problem: Sensory Perception - Impaired:  Goal: Ability to maintain a stable neurologic state will improve  Description: Ability to maintain a stable neurologic state will improve  Outcome: Ongoing     Problem: Gas Exchange - Impaired:  Goal: Levels of oxygenation will improve  Description: Levels of oxygenation will improve  Outcome: Ongoing     Problem: OXYGENATION/RESPIRATORY FUNCTION  Goal: Patient will maintain patent airway  Outcome: Ongoing  Goal: Patient will achieve/maintain normal respiratory rate/effort  Description: Respiratory rate and effort will be within normal limits for the patient  Outcome: Ongoing     Problem: HEMODYNAMIC STATUS  Goal: Patient has stable vital signs and fluid balance  Outcome: Ongoing     Problem: FLUID AND ELECTROLYTE IMBALANCE  Goal: Fluid and electrolyte balance are achieved/maintained  Outcome: Ongoing     Problem: ACTIVITY INTOLERANCE/IMPAIRED MOBILITY  Goal: Mobility/activity is maintained at optimum level for patient  Outcome: Ongoing

## 2021-09-24 LAB
ANION GAP SERPL CALCULATED.3IONS-SCNC: 11 MMOL/L (ref 3–16)
BASOPHILS ABSOLUTE: 0 K/UL (ref 0–0.2)
BASOPHILS RELATIVE PERCENT: 0.1 %
BUN BLDV-MCNC: 58 MG/DL (ref 7–20)
CALCIUM SERPL-MCNC: 9.6 MG/DL (ref 8.3–10.6)
CHLORIDE BLD-SCNC: 94 MMOL/L (ref 99–110)
CO2: 28 MMOL/L (ref 21–32)
CREAT SERPL-MCNC: 1.1 MG/DL (ref 0.9–1.3)
EOSINOPHILS ABSOLUTE: 0 K/UL (ref 0–0.6)
EOSINOPHILS RELATIVE PERCENT: 0 %
GFR AFRICAN AMERICAN: >60
GFR NON-AFRICAN AMERICAN: >60
GLUCOSE BLD-MCNC: 329 MG/DL (ref 70–99)
GLUCOSE BLD-MCNC: 333 MG/DL (ref 70–99)
GLUCOSE BLD-MCNC: 353 MG/DL (ref 70–99)
GLUCOSE BLD-MCNC: 369 MG/DL (ref 70–99)
GLUCOSE BLD-MCNC: 382 MG/DL (ref 70–99)
GLUCOSE BLD-MCNC: 411 MG/DL (ref 70–99)
GLUCOSE BLD-MCNC: 416 MG/DL (ref 70–99)
HCT VFR BLD CALC: 49 % (ref 40.5–52.5)
HEMOGLOBIN: 15.7 G/DL (ref 13.5–17.5)
LYMPHOCYTES ABSOLUTE: 0.7 K/UL (ref 1–5.1)
LYMPHOCYTES RELATIVE PERCENT: 5.4 %
MAGNESIUM: 2.4 MG/DL (ref 1.8–2.4)
MCH RBC QN AUTO: 28.8 PG (ref 26–34)
MCHC RBC AUTO-ENTMCNC: 32.1 G/DL (ref 31–36)
MCV RBC AUTO: 89.7 FL (ref 80–100)
MONOCYTES ABSOLUTE: 0.4 K/UL (ref 0–1.3)
MONOCYTES RELATIVE PERCENT: 2.9 %
NEUTROPHILS ABSOLUTE: 11.3 K/UL (ref 1.7–7.7)
NEUTROPHILS RELATIVE PERCENT: 91.6 %
PDW BLD-RTO: 16.9 % (ref 12.4–15.4)
PERFORMED ON: ABNORMAL
PLATELET # BLD: 305 K/UL (ref 135–450)
PMV BLD AUTO: 9.5 FL (ref 5–10.5)
POTASSIUM SERPL-SCNC: 4.4 MMOL/L (ref 3.5–5.1)
RBC # BLD: 5.46 M/UL (ref 4.2–5.9)
SODIUM BLD-SCNC: 133 MMOL/L (ref 136–145)
WBC # BLD: 12.3 K/UL (ref 4–11)

## 2021-09-24 PROCEDURE — 80048 BASIC METABOLIC PNL TOTAL CA: CPT

## 2021-09-24 PROCEDURE — 94660 CPAP INITIATION&MGMT: CPT

## 2021-09-24 PROCEDURE — 2580000003 HC RX 258: Performed by: STUDENT IN AN ORGANIZED HEALTH CARE EDUCATION/TRAINING PROGRAM

## 2021-09-24 PROCEDURE — 83735 ASSAY OF MAGNESIUM: CPT

## 2021-09-24 PROCEDURE — 6370000000 HC RX 637 (ALT 250 FOR IP): Performed by: INTERNAL MEDICINE

## 2021-09-24 PROCEDURE — 94799 UNLISTED PULMONARY SVC/PX: CPT

## 2021-09-24 PROCEDURE — 6360000002 HC RX W HCPCS: Performed by: INTERNAL MEDICINE

## 2021-09-24 PROCEDURE — 6360000002 HC RX W HCPCS: Performed by: STUDENT IN AN ORGANIZED HEALTH CARE EDUCATION/TRAINING PROGRAM

## 2021-09-24 PROCEDURE — 2060000000 HC ICU INTERMEDIATE R&B

## 2021-09-24 PROCEDURE — 6370000000 HC RX 637 (ALT 250 FOR IP): Performed by: NURSE PRACTITIONER

## 2021-09-24 PROCEDURE — 94640 AIRWAY INHALATION TREATMENT: CPT

## 2021-09-24 PROCEDURE — 1200000000 HC SEMI PRIVATE

## 2021-09-24 PROCEDURE — 99221 1ST HOSP IP/OBS SF/LOW 40: CPT | Performed by: NURSE PRACTITIONER

## 2021-09-24 PROCEDURE — 2580000003 HC RX 258: Performed by: INTERNAL MEDICINE

## 2021-09-24 PROCEDURE — 94150 VITAL CAPACITY TEST: CPT

## 2021-09-24 PROCEDURE — 6370000000 HC RX 637 (ALT 250 FOR IP): Performed by: STUDENT IN AN ORGANIZED HEALTH CARE EDUCATION/TRAINING PROGRAM

## 2021-09-24 PROCEDURE — 85025 COMPLETE CBC W/AUTO DIFF WBC: CPT

## 2021-09-24 PROCEDURE — 36415 COLL VENOUS BLD VENIPUNCTURE: CPT

## 2021-09-24 PROCEDURE — 2700000000 HC OXYGEN THERAPY PER DAY

## 2021-09-24 PROCEDURE — 99233 SBSQ HOSP IP/OBS HIGH 50: CPT | Performed by: INTERNAL MEDICINE

## 2021-09-24 PROCEDURE — 94761 N-INVAS EAR/PLS OXIMETRY MLT: CPT

## 2021-09-24 RX ORDER — PREDNISONE 1 MG/1
5 TABLET ORAL DAILY
Status: DISCONTINUED | OUTPATIENT
Start: 2021-09-25 | End: 2021-09-26

## 2021-09-24 RX ORDER — INSULIN LISPRO 100 [IU]/ML
15 INJECTION, SOLUTION INTRAVENOUS; SUBCUTANEOUS
Status: DISCONTINUED | OUTPATIENT
Start: 2021-09-24 | End: 2021-09-27

## 2021-09-24 RX ORDER — INSULIN LISPRO 100 [IU]/ML
5 INJECTION, SOLUTION INTRAVENOUS; SUBCUTANEOUS ONCE
Status: COMPLETED | OUTPATIENT
Start: 2021-09-24 | End: 2021-09-24

## 2021-09-24 RX ORDER — FUROSEMIDE 10 MG/ML
40 INJECTION INTRAMUSCULAR; INTRAVENOUS ONCE
Status: COMPLETED | OUTPATIENT
Start: 2021-09-24 | End: 2021-09-24

## 2021-09-24 RX ADMIN — INSULIN LISPRO 7 UNITS: 100 INJECTION, SOLUTION INTRAVENOUS; SUBCUTANEOUS at 22:01

## 2021-09-24 RX ADMIN — FUROSEMIDE 40 MG: 10 INJECTION, SOLUTION INTRAMUSCULAR; INTRAVENOUS at 09:17

## 2021-09-24 RX ADMIN — FUROSEMIDE 5 MG/HR: 10 INJECTION, SOLUTION INTRAMUSCULAR; INTRAVENOUS at 23:18

## 2021-09-24 RX ADMIN — GABAPENTIN 800 MG: 400 CAPSULE ORAL at 21:59

## 2021-09-24 RX ADMIN — FUROSEMIDE 5 MG/HR: 10 INJECTION, SOLUTION INTRAMUSCULAR; INTRAVENOUS at 11:07

## 2021-09-24 RX ADMIN — METHYLPREDNISOLONE SODIUM SUCCINATE 125 MG: 125 INJECTION, POWDER, FOR SOLUTION INTRAMUSCULAR; INTRAVENOUS at 07:57

## 2021-09-24 RX ADMIN — Medication 10 ML: at 09:17

## 2021-09-24 RX ADMIN — ASPIRIN 81 MG: 81 TABLET, COATED ORAL at 07:56

## 2021-09-24 RX ADMIN — Medication 10 ML: at 21:59

## 2021-09-24 RX ADMIN — GABAPENTIN 800 MG: 400 CAPSULE ORAL at 07:56

## 2021-09-24 RX ADMIN — IPRATROPIUM BROMIDE AND ALBUTEROL 1 PUFF: 20; 100 SPRAY, METERED RESPIRATORY (INHALATION) at 20:54

## 2021-09-24 RX ADMIN — BUDESONIDE AND FORMOTEROL FUMARATE DIHYDRATE 2 PUFF: 80; 4.5 AEROSOL RESPIRATORY (INHALATION) at 20:54

## 2021-09-24 RX ADMIN — INSULIN LISPRO 18 UNITS: 100 INJECTION, SOLUTION INTRAVENOUS; SUBCUTANEOUS at 17:26

## 2021-09-24 RX ADMIN — DULOXETINE 20 MG: 20 CAPSULE, DELAYED RELEASE ORAL at 21:59

## 2021-09-24 RX ADMIN — INSULIN LISPRO 15 UNITS: 100 INJECTION, SOLUTION INTRAVENOUS; SUBCUTANEOUS at 09:26

## 2021-09-24 RX ADMIN — METHYLPREDNISOLONE SODIUM SUCCINATE 125 MG: 125 INJECTION, POWDER, FOR SOLUTION INTRAMUSCULAR; INTRAVENOUS at 04:10

## 2021-09-24 RX ADMIN — INSULIN LISPRO 15 UNITS: 100 INJECTION, SOLUTION INTRAVENOUS; SUBCUTANEOUS at 17:26

## 2021-09-24 RX ADMIN — INSULIN LISPRO 15 UNITS: 100 INJECTION, SOLUTION INTRAVENOUS; SUBCUTANEOUS at 12:43

## 2021-09-24 RX ADMIN — DULOXETINE 20 MG: 20 CAPSULE, DELAYED RELEASE ORAL at 07:56

## 2021-09-24 RX ADMIN — INSULIN LISPRO 12 UNITS: 100 INJECTION, SOLUTION INTRAVENOUS; SUBCUTANEOUS at 09:26

## 2021-09-24 RX ADMIN — ATORVASTATIN CALCIUM 80 MG: 80 TABLET, FILM COATED ORAL at 07:56

## 2021-09-24 RX ADMIN — INSULIN LISPRO 5 UNITS: 100 INJECTION, SOLUTION INTRAVENOUS; SUBCUTANEOUS at 01:48

## 2021-09-24 RX ADMIN — GABAPENTIN 800 MG: 400 CAPSULE ORAL at 16:52

## 2021-09-24 RX ADMIN — CLOPIDOGREL BISULFATE 75 MG: 75 TABLET ORAL at 07:56

## 2021-09-24 RX ADMIN — PANTOPRAZOLE SODIUM 40 MG: 40 TABLET, DELAYED RELEASE ORAL at 06:25

## 2021-09-24 RX ADMIN — MONTELUKAST 10 MG: 10 TABLET, FILM COATED ORAL at 21:59

## 2021-09-24 RX ADMIN — BUDESONIDE AND FORMOTEROL FUMARATE DIHYDRATE 2 PUFF: 80; 4.5 AEROSOL RESPIRATORY (INHALATION) at 08:08

## 2021-09-24 RX ADMIN — GABAPENTIN 800 MG: 400 CAPSULE ORAL at 12:50

## 2021-09-24 RX ADMIN — ENOXAPARIN SODIUM 40 MG: 40 INJECTION SUBCUTANEOUS at 07:56

## 2021-09-24 RX ADMIN — ENOXAPARIN SODIUM 40 MG: 40 INJECTION SUBCUTANEOUS at 21:59

## 2021-09-24 RX ADMIN — INSULIN LISPRO 15 UNITS: 100 INJECTION, SOLUTION INTRAVENOUS; SUBCUTANEOUS at 12:45

## 2021-09-24 ASSESSMENT — PAIN SCALES - GENERAL
PAINLEVEL_OUTOF10: 0

## 2021-09-24 ASSESSMENT — ENCOUNTER SYMPTOMS
COUGH: 1
GASTROINTESTINAL NEGATIVE: 1
SHORTNESS OF BREATH: 1

## 2021-09-24 NOTE — PROGRESS NOTES
NUTRITION NOTE   Admission Date: 9/16/2021     Type and Reason for Visit: Initial, RD Nutrition Re-Screen/LOS    NUTRITION RECOMMENDATIONS:   1. PO Diet: Continue regular; low sodium diet with 1800 ml fluid restriction   2. Nutrition Education: HF guideline education provided    NUTRITION ASSESSMENT:  Pt assessed for LOS. EMR reflects good po intakes, >76% intakes of almost all meals noted. Wt fluctuations during adm likely d/t fluid shifts. Pt endorses having a good appetite. RD provided heart failure nutrition therapy education. RD gave verbal review as well as provided educational handout. Pt reports that both he and his wife grocery shop and cook. RD discussed following a low sodium diet, restricting fluid intake, and taking daily weights. RD suggested using different spices as an alternative to salt, and buying low sodium options of foods he may already buy. RD phone number left on educational handout, pt encouraged to call with any future questions or concerns. Patient admitted d/t: SOB    PMH significant for: HFpEF, COPD, HTN, HLD, T2DM    MALNUTRITION ASSESSMENT  Context of Malnutrition: Acute Illness   Malnutrition Status: No malnutrition    NUTRITION DIAGNOSIS   · Food & Nutrition-related knowledge deficit related to impaired respiratory function as evidenced by other (comment) (dx of CHF)      NUTRITION INTERVENTION  Food and/or Nutrient Delivery:  Continue Current Diet  Nutrition Education/Counseling:  Education completed      The patient will still be monitored per nutrition standards of care. Consult dietitian if nutrition interventions essential to patient care is needed.      Socorro Marroquin, [address], [address] [place] [address] Drive:  [phone]  Office:  [phone]

## 2021-09-24 NOTE — PLAN OF CARE
Problem: Gas Exchange - Impaired  Goal: Absence of hypoxia  Outcome: Ongoing  Note: Pt remains on VAPO at 40L 100%. No signs of respiratory distress and O2 sats remain above 88%. Problem: Patient Education: Go to Patient Education Activity  Goal: Patient/Family Education  Outcome: Ongoing  Note: Patients wife was able to visit today and be apart of conversations regarding code status. Pt understands and is in agreement with remaining a full code at this time. Problem: FLUID AND ELECTROLYTE IMBALANCE  Goal: Fluid and electrolyte balance are achieved/maintained  Outcome: Ongoing  Note: I/O this shift:  In: 240 [P.O.:240]  Out: 725 [Urine:725]     Pt is on Iv-push Lasix and continuous Lasix gtt.

## 2021-09-24 NOTE — CONSULTS
[facility]  Palliative Medicine Consultation Note      Date Of Admission:2021  Date of consult: 21  Seen by [facility] in the past:  No    Recommendations:        Introduced palliative care to pt and wife (on the phone), and had a voluntary discussion about advance care planning. The pt is agreeable to being intubated if needed, but says he wouldn't want to be on the vent more than 2 weeks, and would definitely not want a trach. His brother had a trach and  shortly thereafter. He said he'd want to be removed from the vent after 2 weeks and taken home, and I explained that might not be possible but that we could try. His wife was tearful and in agreement. Discussed CPR too and he says he'd want us to try resuscitation. D/w RN Hope. 1. Goals of Care/Advanced Care planning/Code status: Full Code, discussed with pt and wife today. He wants to continue aggressive care in hopes of being able to return home to be with his wife. He'd really like to have a cup of coffee and a cigarette at home. 2. Pain: pt denies pain and appears comfortable. 3. SOB: pt is upset about his increasing oxygen requirements but insists he doesn't feel that bad. He is currently on vapotherm 40 L 100% FiO2. He has been treated for acute on chronic HFpEF with lasix gtt, and steroids, but without improvement. 4. Disposition: TBD    Reason for Consult:         [x]  Goals of Care  []  Code Status Discussion/Advanced Care Planning   []  Psychosocial/Family Support  []  Symptom Management  []  Other (Specify)    Requesting Physician: Dr. Amaris Joseph:  Shortness of breath    History Obtained From:  patient, electronic medical record    History of Present Illness:         [place] is a 50 y.o. male with PMH of HFpEF (2021 ECHO showed grade 2 diastolic dysfunction, 49% EF, LV hypertrophy), COPD on 2 L of oxygen at home, diabetes, hypertension, hyperlipidemia who presented with shortness of breath. Patient has been hospitalized previously for COPD and CHF exacerbations. He was admitted with acute on chronic heart failure with preserved EF and started on a lasix drip. Pulmonology was consulted and suspect viral infection or interstitial lung disease. Pulm did start him on high dose steroids. Subjective:         Past Medical History:        Diagnosis Date    Acute respiratory failure ([facility] 07/11/2021    [facility]est[facility]     combined    COPD [facility]chronic obstructive pulmonary disease) ([facility])     Diabetes mellitus ([facility]     Hyperlipidemia     Hypertension     Oxygen dependent        Past Surgical History:        Procedure Laterality Date    HERNIA REPAIR      TONSILLECTOMY         Current Medications:    Medications Prior to Admission: Umeclidinium Bromide (INCRUSE ELLIPTA) 62.5 MCG/INH AEPB, INHALE 1 PUFF DAILY  ipratropium-albuterol (DUONEB) 0.5-2.5 (3) MG/3ML SOLN nebulizer solution, Take 3 mLs by nebulization every 4-6 hours as needed for Shortness of Breath  glimepiride (AMARYL) 2 MG tablet, TAKE 1 TABLET EACH MORNING BEFORE BREAKFAST. DISCONTINUE FARXIGA  albuterol sulfate  (90 Base) MCG/ACT inhaler, INHALE 2 PUFFS INTO THE LUNGS EVERY 6 HOURS AS NEEDED FOR WHEEZING OR SHORTNESS OF BREATH  pioglitazone (ACTOS) 15 MG tablet, TAKE (1) TABLET DAILY  bisoprolol (ZEBETA) 5 MG tablet, TAKE 1 TABLET BY MOUTH DAILY  Fluticasone furoate-vilanterol (BREO ELLIPTA) 200-25 MCG/INH AEPB inhaler, Inhale 1 puff into the lungs daily  gabapentin (NEURONTIN) 800 MG tablet, TAKE 1 TABLET 4 TIMES DAILY  HYDROcodone-acetaminophen (NORCO) 5-325 MG per tablet, Take 1 tablet by mouth every 8 hours as needed for Pain.    furosemide (LASIX) 40 MG tablet, Take 1 tablet by mouth daily  metFORMIN (GLUCOPHAGE) 1000 MG tablet, TAKE 1 TABLET BY MOUTH 2 TIMES DAILY (WITH MEALS)  atorvastatin (LIPITOR) 40 MG tablet, TAKE 1 TABLET ONCE DAILY  fenofibric acid (FIBRICOR) 135 MG CPDR capsule, TAKE 1 CAPSULE ONCE DAILY  lisinopril (PRINIVIL;ZESTRIL) 20 MG tablet, TAKE 1 TABLET ONCE DAILY  [DISCONTINUED] montelukast (SINGULAIR) 10 MG tablet,   [DISCONTINUED] DULoxetine (CYMBALTA) 20 MG extended release capsule, Take 20 mg by mouth 2 times daily  Cholecalciferol (VITAMIN D3) 1.25 MG (77915 UT) CAPS, Take 1 capsule by mouth Twice a Week  Magnesium 400 MG CAPS, Take 1 tablet by mouth daily  [DISCONTINUED] clopidogrel (PLAVIX) 75 MG tablet, Take 1 tablet by mouth daily  blood glucose monitor strips, Test 2 times a day & as needed for symptoms of irregular blood glucose. E11.9  glucose monitoring kit (FREESTYLE) monitoring kit, 1 kit by Does not apply route daily Patient wants brand name Patient tests bid  E11.9  blood glucose monitor kit and supplies, Test 2 times a day & as needed for symptoms of irregular blood glucose. aspirin 81 MG EC tablet, Take 81 mg by mouth daily  Cyanocobalamin 1000 MCG CAPS, Take 1 tablet by mouth daily  Omega-3 1000 MG CAPS, Take 1 capsule by mouth daily  Coenzyme Q10 (COQ10) 100 MG CAPS, Take 1 capsule by mouth daily  MULTIPLE VITAMINS PO, Take by mouth    Allergies:  Patient has no known allergies. Social History:    · TOBACCO: reports that he has been smoking. He has been smoking about 1.00 pack per day. He has never used smokeless tobacco.  · ETOH:   reports current alcohol use. · Patient currently lives with family  -wife    Review of Systems -   Review of Systems   Constitutional: Negative. HENT: Negative. Respiratory: Positive for cough and shortness of breath. Cardiovascular: Negative. Gastrointestinal: Negative. Genitourinary: Negative. Musculoskeletal: Negative. Neurological: Negative. Psychiatric/Behavioral: Negative.    :     Objective:        Physical Exam  Constitutional:       General: He is not in acute distress. HENT:      Head: Normocephalic and atraumatic. Cardiovascular:      Rate and Rhythm: Normal rate and regular rhythm.    Pulmonary:      Breath PHART 7.401   PVA0MFA 53.5*   PO2ART 52.3*     INR: No results for input(s): INR in the last 72 hours. U/A:No results for input(s): NITRITE, COLORU, PHUR, LABCAST, WBCUA, RBCUA, MUCUS, TRICHOMONAS, YEAST, BACTERIA, CLARITYU, SPECGRAV, LEUKOCYTESUR, UROBILINOGEN, BILIRUBINUR, BLOODU, GLUCOSEU, AMORPHOUS in the last 72 hours. Invalid input(s): KETONESU    XR CHEST PORTABLE   Final Result   Impression: Stable appearance of the chest. Bibasilar atelectasis/consolidation persists. CT CHEST HIGH RESOLUTION   Final Result      Slow progression of bilateral lower lobe alveolar consolidation with air bronchograms raises concern for chronic process such as interstitial pneumonia, eosinophilic pneumonia, and organizing pneumonia. Neoplastic etiology is considered much less likely. MRI BRAIN WO CONTRAST   Final Result      1. No acute intracranial abnormality. No evidence of acute infarct. 2. Minimal nonspecific white matter signal abnormality on FLAIR imaging suggesting minimal chronic small vessel ischemic disease and/or minimal age-related degenerative change. CTA HEAD NECK W CONTRAST   Final Result      Study limited by motion artifact and poor contrast bolus. 1. Atherosclerotic disease at bilateral carotid bifurcations. Exact percentage of stenosis by NASCET criteria is difficult to comment upon due to artifacts. 2. 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 Moderately attenuated mid vertebral artery from upper C7  to C4 5 level. Distal to its left vertebral artery is patent without significant stenosis.             PROCEDURE: CT ANGIOGRAPHY HEAD WITH/WITHOUT CONTRAST      INDICATION: aphasia, ams      COMPARISON: none      TECHNIQUE: Axial CT imaging obtained through the head prior to and following administration of IV contrast. Axial images, multiplanar reformatted images, and maximum intensity projection images were reviewed for CT angiographic technique. IV contrast: mL Omnipaque 350. FINDINGS:      ANTERIOR CIRCULATION: The intracranial internal carotid arteries, anterior cerebral arteries, and middle cerebral arteries are patent. There are calcified plaques with mild to moderate narrowing of bilateral ICA cavernous/supraclinoid segments. . No    evidence for aneurysm or arteriovenous malformation. POSTERIOR CIRCULATION: The bilateral vertebral arteries, basilar artery and posterior cerebral arteries are patent. There are calcified and noncalcified plaques with mild narrowing of left vertebral artery intradural segment. Right vertebral intradural    segment is severely attenuated, likely hyperplastic. No evidence for aneurysm or arteriovenous malformation. INTRACRANIAL VENOUS SYSTEM: No evidence for intracranial venous thrombosis. IMPRESSION:      No evidence of intracranial large vessel occlusion. 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 Calcified plaques with mild to moderate narrowing of bilateral ICA cavernous/supraclinoid segments. CT HEAD WO CONTRAST   Final Result      No acute intracranial abnormality . CT CHEST PULMONARY EMBOLISM W CONTRAST   Final Result   1. No definite pulmonary embolus identified. 2.  Mild atherosclerotic changes of the aorta. No aortic aneurysm or    dissection. 3.  Patchy densities in the lower lobes and right middle lobe may    represent atelectasis versus infectious/inflammatory process. 4.  Dependent atelectasis bilaterally. Atelectasis in the lingula. 5.  Trace right pleural effusion. 6.  Nonspecific similar mildly prominent mediastinal and hilar lymph    nodes.                 Conclusion/Time spent:         Recommendations see above     Pt [id number]  Time spent with patient and/or family: 15 min  Time reviewing records: 10 min   Time communicating with staff: 5 min     A total of 30 min minutes spent with the patient and family on unit greater than 50% in counseling regarding palliative care and in goals of care for the patient. Thank you to Dr. Aicha Alston for this consultation. We will continue to follow Mr. Pulliam's care as needed.     Deisi Hunter, MONICA  [address] [place] [address]

## 2021-09-24 NOTE — PROGRESS NOTES
Pulmonology Progress Note  PGY-2    Admit Date: 9/16/2021  Hospital Day: 9  Diet: ADULT DIET; Regular; Low Sodium (2 gm); 1800 ml  Code Status: Full Code       Interval history:  Patient was seen and examined this morning. Remained afebrile overnight. Patient is on 40L of vapotherm with FiO2 of 100%. Patient reports that he is doing fine. Endorses non productive cough. Denies fevers, chills, chest pain, nausea, vomiting, diarrhea, or constipation.       Medications:   Scheduled Meds:   insulin lispro  15 Units SubCUTAneous TID WC    furosemide  40 mg IntraVENous Once    insulin glargine  25 Units SubCUTAneous Daily    pantoprazole  40 mg Oral QAM AC    insulin lispro  0-18 Units SubCUTAneous TID WC    insulin lispro  0-9 Units SubCUTAneous Nightly    clopidogrel  75 mg Oral Daily    nicotine  1 patch TransDERmal Daily    enoxaparin  40 mg SubCUTAneous BID    [Held by provider] lisinopril  20 mg Oral Daily    gabapentin  800 mg Oral 4x Daily    atorvastatin  80 mg Oral Daily    aspirin  81 mg Oral Daily    DULoxetine  20 mg Oral BID    budesonide-formoterol  2 puff Inhalation BID    montelukast  10 mg Oral Nightly    sodium chloride flush  5-40 mL IntraVENous 2 times per day       Continuous Infusions:   furosemide (LASIX) 1mg/ml infusion      dextrose      sodium chloride 25 mL (09/23/21 1832)       PRN Meds:  sodium chloride, albuterol-ipratropium, glucose, dextrose, glucagon (rDNA), dextrose, sodium chloride flush, sodium chloride, ondansetron **OR** ondansetron, polyethylene glycol, acetaminophen **OR** acetaminophen    Vital/I&O/Physical examination:   VS:  /71   Pulse 75   Temp 97.8 °F (36.6 °C) (Oral)   Resp 16   Ht 4' 11\" (1.499 m)   Wt 181 lb 10.5 oz (82.4 kg)   SpO2 92%   BMI 36.69 kg/m²     I/O:    Intake/Output Summary (Last 24 hours) at 9/24/2021 0905  Last data filed at 9/24/2021 0747  Gross per 24 hour   Intake 1140 ml   Output 2025 ml   Net -885 ml the last 72 hours. Lactate: No results for input(s): LACTATE in the last 72 hours. ABGs:  Recent Labs     09/23/21  0842   PHART 7.401   MFJ5PGI 53.5*   PO2ART 52.3*   FCR5RLF 33*   BEART 6.5*   [id number] 86*   [id number] 35       UA:No results for input(s): NITRITE, COLORU, PHUR, LABCAST, WBCUA, RBCUA, MUCUS, TRICHOMONAS, YEAST, BACTERIA, CLARITYU, SPECGRAV, LEUKOCYTESUR, UROBILINOGEN, BILIRUBINUR, BLOODU, GLUCOSEU, AMORPHOUS in the last 72 hours. Invalid input(s): Leonardo Push     IMAGING:  XR CHEST PORTABLE   Final Result   Impression: Stable appearance of the chest. Bibasilar atelectasis/consolidation persists. CT CHEST HIGH RESOLUTION   Final Result      Slow progression of bilateral lower lobe alveolar consolidation with air bronchograms raises concern for chronic process such as interstitial pneumonia, eosinophilic pneumonia, and organizing pneumonia. Neoplastic etiology is considered much less likely. MRI BRAIN WO CONTRAST   Final Result      1. No acute intracranial abnormality. No evidence of acute infarct. 2. Minimal nonspecific white matter signal abnormality on FLAIR imaging suggesting minimal chronic small vessel ischemic disease and/or minimal age-related degenerative change. CTA HEAD NECK W CONTRAST   Final Result      Study limited by motion artifact and poor contrast bolus. 1. Atherosclerotic disease at bilateral carotid bifurcations. Exact percentage of stenosis by NASCET criteria is difficult to comment upon due to artifacts. 2. 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 Moderately attenuated mid vertebral artery from upper C7  to C4 5 level. Distal to its left vertebral artery is patent without significant stenosis.             PROCEDURE: CT ANGIOGRAPHY HEAD WITH/WITHOUT CONTRAST      INDICATION: aphasia, ams      COMPARISON: none      TECHNIQUE: Axial CT imaging obtained through the head prior to and following administration of IV contrast. Axial images, multiplanar reformatted images, and maximum intensity projection images were reviewed for CT angiographic technique. IV contrast: mL Omnipaque 350. FINDINGS:      ANTERIOR CIRCULATION: The intracranial internal carotid arteries, anterior cerebral arteries, and middle cerebral arteries are patent. There are calcified plaques with mild to moderate narrowing of bilateral ICA cavernous/supraclinoid segments. . No    evidence for aneurysm or arteriovenous malformation. POSTERIOR CIRCULATION: The bilateral vertebral arteries, basilar artery and posterior cerebral arteries are patent. There are calcified and noncalcified plaques with mild narrowing of left vertebral artery intradural segment. Right vertebral intradural    segment is severely attenuated, likely hyperplastic. No evidence for aneurysm or arteriovenous malformation. INTRACRANIAL VENOUS SYSTEM: No evidence for intracranial venous thrombosis. IMPRESSION:      No evidence of intracranial large vessel occlusion. 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 Calcified plaques with mild to moderate narrowing of bilateral ICA cavernous/supraclinoid segments. CT HEAD WO CONTRAST   Final Result      No acute intracranial abnormality . CT CHEST PULMONARY EMBOLISM W CONTRAST   Final Result   1. No definite pulmonary embolus identified. 2.  Mild atherosclerotic changes of the aorta. No aortic aneurysm or    dissection. 3.  Patchy densities in the lower lobes and right middle lobe may    represent atelectasis versus infectious/inflammatory process. 4.  Dependent atelectasis bilaterally. Atelectasis in the lingula. 5.  Trace right pleural effusion. 6.  Nonspecific similar mildly prominent mediastinal and hilar lymph    nodes.               Assessment & Plan:    Ace Sibley is a 50 y.o. male with PMH of HFpEF (2/2021 ECHO showed grade 2 diastolic dysfunction, 86% EF, LV hypertrophy), COPD on 2 L of oxygen at home, diabetes, hypertension, hyperlipidemia who presents with two weeks of worsening SOB. Acute on chronic hypoxemic hypercapnic respiratory failure:  - Differential includes chronic eosinophilic pneumoniia, organizing pneumonia HP. Cryptogenic organizing pneumonia, hypersensitivity pneumonitis or mixed connective tissue disease related pulmonary scarring are on the differential as well. - Patient has history of COPD and is usually on 2L of O2.   -  Currently, Patient is on 40L of Vapotherm with FiO2 of 100% and oxygen saturation in low 90s. His oxygen saturation drops to high 80s when sitting up for pulmonary exam.    - Inflammatory markers were unremarkable. Will repeat the inflammatory markers once patient is off steroids.  - Covid negative   -  Strep pneumo antigen, legionella antigen, respiratory panel negative. -  Procalc 0.06  - CT chest showed slow progression of bilateral lower lobe alveolar consolidation with air bronchograms raises concern for chronic process such as interstitial pneumonia, eosinophilic pneumonia, and organizing pneumonia.  - Echo on 09/18/2021: EF 50-55%, Indeterminate diastolic function, A bubble study was performed and fails to show evidence of shunting.   - ABG on admission: pH: 7.41/pCO2: 51.2/pO2: 66. Patient usually hypercapnic per past ABG  - Chest x ray on 09/23/2021 showed stable appearance of the chest, bibasilar atelectasis/consolidation persists  - Continue SoluMedrol 125 mg q 6hrs for 3 days (day 3). - Continue supplemental oxygen and wean per tolerated with oxygen goal of 88% and above  - Patient was started on lasix ggt  - Patient has been on high dose of steroid for three days. However, there has not been any significant improvement in his oxygen requirement.          COPD:  There no wheezing on lung auscultation  - DuoNebs as need  - Symbicort inhaler BID  - Patient has been on SoluMedrol for the past three days. Code Status: Full Code  ADULT DIET; Regular; Low Sodium (2 gm); 1800 ml     PPX: Lovenox       This patient will be discussed with attending, Dr. Lyn Marcos MD.    Verenice Whitehead MD, PGY- 2  Contact via [facility]  9/24/2021,  9:05 AM     Patient seen, examined and discussed with the resident and I agree with the assessment and plan. Patient's oxygen requirements continue to climb over the past 3 days. He has completed the high-dose steroid burst and that only did not improve but he objectively worsened. Steroids are now off. Etiology for his worsening hypoxemic respiratory failure remains unclear. He has had a CTPA that did not show any evidence of PE, he had an echocardiogram that did not show any evidence of pulmonary hypertension, intracardiac or intrapulmonary shunting. I did a supine and prone CT chest, high resolution but did show persistent basilar predominant scarring and contraction that had progressed since earlier this year. However the amount of airspace disease is disproportional to the amount of oxygen he is requiring. He had gotten several doses of IV Lasix throughout the admission without improvement in his oxygen saturations as well. However at this point there appears to be little other treatments that might turn things around other than to assume that one of the previous tests was misleading. He was given additional Lasix this morning and was started on a Lasix drip as well. Hopefully this will provide a little bit of improvement in his shunting so if his oxygen requirements will pull him back from the brink. In addition to the work-up above mentioned he does not have evidence of elevated methemoglobinemia. We spoke at some length about treatment options moving forward if his oxygen requirements continue to worsen.   He indicated that he would not want to be intubated but I do not think he fully understood that we would only put him on a ventilator if the other option was death. In the response to that conversation he indicated that he just wants to go home, that he is ready to go home. I explained that if he went home on the amount of oxygen he is on right now and he would die. He seemed to understand that but then later said similarly that he wishes he could just go home. Conversations have been had between he and his wife Lakeshia as yet but is unclear exactly what his wishes would be. Fortunately, he is satting at a survivable level on the 40 L of Vapotherm. We still could add a nonrebreather but thus far it has not proven to be necessary. Pulmonary will continue to follow closely.     Mildred Callejas MD

## 2021-09-24 NOTE — PROGRESS NOTES
Progress Note    Admit Date: 9/16/2021  Day: 8  Diet: ADULT DIET; Regular; Low Sodium (2 gm); 1800 ml    CC: Chest pain + SOB    Interval history:  Seen at bedside. He continues to say he \"feels fine\". Switched to Vapotherm 40L with FiO2 100% yesterday and has been maintained at 88-91% since then. Has a non productive cough. Afebrile. Glucose elevated overnight despite increase in insulin lantus and lispro. Received last dose of Solumedrol this am.       HPI:   Pascual Ro a 50 y. o. male with history of HFpEF (2/2021 ECHO showed grade 2 diastolic dysfunction, 70% EF, LV hypertrophy), COPD on 2 L of oxygen at home, diabetes, hypertension, hyperlipidemia who presents with two weeks of worsening SOB. He reports his home oxygen saturations was in the 70's. He is not vaccinated for COVID and he reports that he was likely exposed to [employer] before his SOB began. At that time he also experiences 2-3 days of generalized body aches that have since resolved. Denies fevers and chills. Patient has been hospitalized previously for COPD and CHF exacerbations, and he reports this SOB feels more like a CHF exacerbation to him. He reports feeling slightly fluid overloaded though does not know his dry weight. He repeatedly takes off his HFNC in the room and desats down to the 60's, but quickly comes back up on 10L. Even with a pulse ox at 60 he denies SOB.    Upon admission, sodium 146, glucose 155, Pro-BNP 3129 (5000 on 7/2021 and 1847 in 6/2021), negative troponin, D-dimer 292. Chest XR showed a R pleural effusion and hazy bibasilar atelectasis vs. Small infiltrates.  It showed stable cardiomegaly and mild pulmonary congestion. He reports complete resolution of SOB after receiving IV lasix dose, however continues to desat without supplemental oxygen.       Medications:     Scheduled Meds:   insulin lispro  15 Units SubCUTAneous TID WC    insulin glargine  25 Units SubCUTAneous Daily    pantoprazole  40 mg Oral QAM AC    insulin lispro  0-18 Units SubCUTAneous TID     insulin lispro  0-9 Units SubCUTAneous Nightly    clopidogrel  75 mg Oral Daily    nicotine  1 patch TransDERmal Daily    enoxaparin  40 mg SubCUTAneous BID    [Held by provider] lisinopril  20 mg Oral Daily    gabapentin  800 mg Oral 4x Daily    atorvastatin  80 mg Oral Daily    aspirin  81 mg Oral Daily    DULoxetine  20 mg Oral BID    budesonide-formoterol  2 puff Inhalation BID    montelukast  10 mg Oral Nightly    sodium chloride flush  5-40 mL IntraVENous 2 times per day     Continuous Infusions:   furosemide (LASIX) 1mg/ml infusion 5 mg/hr (09/24/21 1107)    dextrose      sodium chloride 25 mL (09/23/21 1832)     PRN Meds:sodium chloride, albuterol-ipratropium, glucose, dextrose, glucagon (rDNA), dextrose, sodium chloride flush, sodium chloride, ondansetron **OR** ondansetron, polyethylene glycol, acetaminophen **OR** acetaminophen    Objective:   Vitals:   T-max:  Patient Vitals for the past 8 hrs:   BP Temp Temp src Pulse Resp SpO2 Weight   09/24/21 1100 104/70 98.1 °F (36.7 °C) Oral 94 18 92 % --   09/24/21 0930 97/65 -- -- -- -- -- --   09/24/21 0924 (!) 111/58 -- -- -- -- -- --   09/24/21 0900 -- -- -- -- -- 91 % --   09/24/21 0845 -- -- -- -- -- 92 % --   09/24/21 0736 103/71 97.8 °F (36.6 °C) Oral 75 16 91 % --   09/24/21 0605 -- -- -- -- -- 90 % --   09/24/21 0600 -- -- -- -- -- -- 181 lb 10.5 oz (82.4 kg)   09/24/21 0409 121/69 97.6 °F (36.4 °C) Oral 75 17 91 % --       Intake/Output Summary (Last 24 hours) at 9/24/2021 1108  Last data filed at 9/24/2021 0925  Gross per 24 hour   Intake 1300 ml   Output 2000 ml   Net -700 ml       Review of Systems   HENT: Negative. Respiratory: Positive for cough and shortness of breath. Negative for choking, chest tightness, wheezing and stridor. Cardiovascular: Negative for chest pain, palpitations and leg swelling. Gastrointestinal: Negative for abdominal pain. Genitourinary: Negative. Musculoskeletal: Negative. Neurological: Negative. Physical Exam  Constitutional:       Appearance: He is obese. HENT:      Mouth/Throat:      Mouth: Mucous membranes are dry. Pharynx: Oropharynx is clear. Eyes:      Conjunctiva/sclera: Conjunctivae normal.      Pupils: Pupils are equal, round, and reactive to light. Cardiovascular:      Rate and Rhythm: Normal rate and regular rhythm. Pulses: Normal pulses. Heart sounds: No murmur heard. No friction rub. No gallop. Pulmonary:      Effort: Pulmonary effort is normal. No respiratory distress. Breath sounds: Minimal rales (unchanged) present. No wheezing or rhonchi. Abdominal:      General: Bowel sounds are normal. There is no distension. Tenderness: There is no abdominal tenderness. There is no guarding. Skin:     General: Skin is warm. Capillary Refill: Capillary refill takes less than 2 seconds. Neurological:      General: No focal deficit present. Mental Status: He is alert and oriented to person, place, and time. Psychiatric:         Mood and Affect: Mood normal.         LABS:    CBC:   Recent Labs     09/22/21  0544 09/23/21  0559 09/24/21  0456   WBC 9.4 14.8* 12.3*   HGB 16.0 16.4 15.7   HCT 50.4 50.7 49.0    316 305   MCV 91.3 89.1 89.7     Renal:    Recent Labs     09/22/21  0544 09/22/21  0544 09/22/21  0958 09/23/21  0559 09/24/21  0456   *  --   --  135* 133*   K 5.3*   < > 4.5 4.8 4.4   CL 95*  --   --  98* 94*   CO2 26  --   --  25 28   BUN 46*  --   --  50* 58*   CREATININE 1.1  --   --  0.8* 1.1   GLUCOSE 411*  --   --  342* 369*   CALCIUM 10.2  --   --  10.1 9.6   MG 1.90  --   --  1.80 2.40   ANIONGAP 14  --   --  12 11    < > = values in this interval not displayed. Hepatic: No results for input(s): AST, ALT, BILITOT, BILIDIR, PROT, LABALBU, ALKPHOS in the last 72 hours. Troponin: No results for input(s): TROPONINI in the last 72 hours.   BNP: No results for input(s): BNP in the last 72 hours. Lipids: No results for input(s): CHOL, HDL in the last 72 hours. Invalid input(s): LDLCALCU, TRIGLYCERIDE  ABGs:    Recent Labs     09/23/21  0842   PHART 7.401   LLH4YUS 53.5*   PO2ART 52.3*   UZR0EMD 33*   BEART 6.5*   [id number] 86*   SLR9TBD 35       INR: No results for input(s): INR in the last 72 hours. Lactate: No results for input(s): LACTATE in the last 72 hours. Cultures:  -----------------------------------------------------------------  RAD:   XR CHEST PORTABLE   Final Result   Impression: Stable appearance of the chest. Bibasilar atelectasis/consolidation persists. CT CHEST HIGH RESOLUTION   Final Result      Slow progression of bilateral lower lobe alveolar consolidation with air bronchograms raises concern for chronic process such as interstitial pneumonia, eosinophilic pneumonia, and organizing pneumonia. Neoplastic etiology is considered much less likely. MRI BRAIN WO CONTRAST   Final Result      1. No acute intracranial abnormality. No evidence of acute infarct. 2. Minimal nonspecific white matter signal abnormality on FLAIR imaging suggesting minimal chronic small vessel ischemic disease and/or minimal age-related degenerative change. CTA HEAD NECK W CONTRAST   Final Result      Study limited by motion artifact and poor contrast bolus. 1. Atherosclerotic disease at bilateral carotid bifurcations. Exact percentage of stenosis by NASCET criteria is difficult to comment upon due to artifacts. 2. 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 Moderately attenuated mid vertebral artery from upper C7  to C4 5 level. Distal to its left vertebral artery is patent without significant stenosis.             PROCEDURE: CT ANGIOGRAPHY HEAD WITH/WITHOUT CONTRAST      INDICATION: aphasia, ams      COMPARISON: none      TECHNIQUE: Axial CT imaging obtained through the head prior to and following administration of IV contrast. Axial images, multiplanar reformatted images, and maximum intensity projection images were reviewed for CT angiographic technique. IV contrast: mL Omnipaque 350. FINDINGS:      ANTERIOR CIRCULATION: The intracranial internal carotid arteries, anterior cerebral arteries, and middle cerebral arteries are patent. There are calcified plaques with mild to moderate narrowing of bilateral ICA cavernous/supraclinoid segments. . No    evidence for aneurysm or arteriovenous malformation. POSTERIOR CIRCULATION: The bilateral vertebral arteries, basilar artery and posterior cerebral arteries are patent. There are calcified and noncalcified plaques with mild narrowing of left vertebral artery intradural segment. Right vertebral intradural    segment is severely attenuated, likely hyperplastic. No evidence for aneurysm or arteriovenous malformation. INTRACRANIAL VENOUS SYSTEM: No evidence for intracranial venous thrombosis. IMPRESSION:      No evidence of intracranial large vessel occlusion. 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 Calcified plaques with mild to moderate narrowing of bilateral ICA cavernous/supraclinoid segments. CT HEAD WO CONTRAST   Final Result      No acute intracranial abnormality . CT CHEST PULMONARY EMBOLISM W CONTRAST   Final Result   1. No definite pulmonary embolus identified. 2.  Mild atherosclerotic changes of the aorta. No aortic aneurysm or    dissection. 3.  Patchy densities in the lower lobes and right middle lobe may    represent atelectasis versus infectious/inflammatory process. 4.  Dependent atelectasis bilaterally. Atelectasis in the lingula. 5.  Trace right pleural effusion. 6.  Nonspecific similar mildly prominent mediastinal and hilar lymph    nodes.               Assessment/Plan:   Alex Bermeo is a 50 y.o. male, PMHx HFpEF, COPD on 2 L of oxygen at home, diabetes, HTN,  who presents with two weeks of worsening SOB. Acute hypoxic and hypercapnic respiratory failure  Pt is on 2L Home Oxygen. Now on Vapotherm 40 L FiO2 100%. CT Chest with trace pleural effusion, and bilateral lower lobe and right middle lobe atelectasis versus infectious/inflammatory process. Covid negative. Procal 0.06 which lowers suspicion for any concomitant bacterial infection. Viral respiratory panel was unremarkable. CHF exacerbation vs COPD exacerbation - both seem unlikely at this point since pt has been treated and has has no improvement. Query Component of pulmonary hypertension  Leukocytosis improving 14.8 -> 12.3 but likely due to high dose steroids. ABG(9/23): pH: 7.401, pCO2: 53.5, pO2: 52.3   CXR (9/23): Stable appearance of the chest. Bibasilar atelectasis/consolidation persists  Received Solumedrol 9/21-24  - To start low dose prednisone tomorrow  - Lasix Iv infusion at 5mg/hr  - Continue home Symbicort  - Continue home Singulair  - Combivent Respimat as needed  - Wean down for O2 Sat more than 88% given history of COPD  - Pulmonology following   Suspecting cryptogenic organizing pneumonia vs hypersensitivity pneumonitis or mixed connective tissue disease. ABDI negative. Received 3 days high dose steroids. CRP 7.1 (decreased from 16.2). ESR: 22.     COPD  Low suspicion for exacerbation. He has been on Nebs/inhalers and Steroids with no improvement. Received 2 doses Decadron 10 (9/17) and Solu-Medrol 125mg ever 6 hrs for 3 days (9/21-24)      HFpEF   ECHO 55-60% in 9/2021  Low suspicion for exacerbation. Has had diuresis with limited improvement. Patient not overtly volume overloaded, could not appreciate JVD but this is complicated by body habitus. No lower extremity edema.  no pleural effusion on imaging, consistent with exam.  ProBNP: 3129 -> 371 -> 160 - improvement so likely there was some exarcerbation but with BNP decreasing, Pt has still not improved respiratory wise. Trial of lasix 40 IV with no improvements   Good Urine output over past 24 hours 1750 mL  - BNP Q3days    Diabetes type 2  HbA1c: 7.1 (2021)  B  Hyperglycemia likely due to high dose steroids. Last dose of steroids given today therefore will continue to monitor Glucose trends. - HDSSI   - Increased Lantus to 25 U nightly  - Increased Lispro to 15 Units TID   - Hypoglycemia protocol    LILLIAN   Cr: 1.1 -> 0.8 -> 1.1   Like due to pre renal causes and diuretic use and becoming hypotensive yesterday. - Encourage PO fluid intake. - Lisinopril Discontinued  - Avoid Nephrotoxins  - Avoid NSAIDs    Hyperkalemia - resolved  K+: 5.3 -> 4.4    Hyponatremia  Na: 133  Snow: <20 , UrCr: 58.3    TIA r/o   Brief episode of expressive aphasia. Rapid response called . CT head, CTA head and neck no evidence of intracranial large vessel occlusion. L vertebral artery occluded. Echo normal.  MRI brain without acute process, small vessel ischemic disease present. Folate normal, B12 normal  - Neurology reviewed  - Continue ASA, Plavix + Atorvastatin.      CAD/PVD  Patient on long-term DAPT. Per cardiologist note Dr. Snehal Reynolds in February, this was supposed to be stopped. - Plavix was initially stopped due to home medication reconciliation. However, restarted  overnight due to concern of TIA   - ASA     Chronic Issues  HTN - continue home meds  HLD - Continue home meds  Neuropathy - continue home gabapentin  Smoking cessation - Patient with significant smoking history 2.5 PPD since 10years old. Received education. Nicoderm patch.      Screening:  FOBT positive. Pt will benefit from GI refferal as an outpatient and a colonoscopy. Code Status: Full Code   FEN: ADULT DIET; Regular;  Low Sodium (2 gm); 1800 ml  PPX:  Lovenox, Protonix  DISPO: Lizet Roy MD, PGY-1  Internal Medicine Resident  21  11:08 AM    This patient has been staffed and discussed with Gracia Wagoner MD. Patient seen and examined, labs and imaging studies reviewed, agree with assessment and plan as outlined above. Continue with current care and plan. Discussed case with patients nurse, discussed case with care team, discussed plan.       MD Nikki Jaindave All

## 2021-09-24 NOTE — CARE COORDINATION
Case Management Assessment           Daily Note                 Date/ Time of Note: 9/24/2021 3:32 PM         Note completed by: Darius Arce RN    Patient Name: Lindsey Dalton  YOB: 1965    Diagnosis:COPD exacerbation ([address] [J44.1]  Patient Admission Status: Inpatient    Date of Admission:9/16/2021 10:06 PM Length of Stay: 8 GLOS: GMLOS: 4.33    Current Plan of Care: lasix gtt, IV steroids, 40L Vapotherm 100%  ________________________________________________________________________________________  PT AM-PAC:   / 24 per last evaluation on: not ordered    OT AM-PAC:   / 24 per last evaluation on: not ordered    DME Needs for discharge:   ________________________________________________________________________________________  Discharge Plan: To Be Determined DUE TO: pending medical progress    Tentative discharge date: TBD    Current barriers to discharge: transition off lasix, wean O2, medical stability and clearance for DC    Referrals completed: Not Applicable    Resources/ information provided: Not indicated at this time  ________________________________________________________________________________________  Case Management Notes: CM continues to follow for DC planning and needs. Patient on 40L vapotherm at 100%, on lasix gtt and IV steroids. PC consulted and Pulm following for continued management. Patient wants to return home at DC, CM will continue to follow for further discharge planning and needs. Fanny Villagran and his family were provided with choice of provider; he and his family are in agreement with the discharge plan.     Care Transition Patient: Ele Arce RN  [facility]  Case Management Department  Ph: [phone]  Fax: [phone]

## 2021-09-24 NOTE — PROGRESS NOTES
Progress Note  PGY-2    CC: chest pain + SOB  Patient's PCP: Lucy Gan, APRN - CNP    Interval History     No acute events overnight. Patient patient was seen at bedside this morning he is on Vapotherm 40 L saturating 93%. He is still on Lasix drip at 5. He said that he feels good and denies shortness of breath, nausea, vomiting and chest pain. MEDICATIONS:     No current facility-administered medications on file prior to encounter. Current Outpatient Medications on File Prior to Encounter   Medication Sig Dispense Refill    Umeclidinium Bromide (INCRUSE ELLIPTA) 62.5 MCG/INH AEPB INHALE 1 PUFF DAILY 30 each 3    ipratropium-albuterol (DUONEB) 0.5-2.5 (3) MG/3ML SOLN nebulizer solution Take 3 mLs by nebulization every 4-6 hours as needed for Shortness of Breath 360 mL 1    glimepiride (AMARYL) 2 MG tablet TAKE 1 TABLET EACH MORNING BEFORE BREAKFAST. DISCONTINUE FARXIGA 90 tablet 1    albuterol sulfate  (90 Base) MCG/ACT inhaler INHALE 2 PUFFS INTO THE LUNGS EVERY 6 HOURS AS NEEDED FOR WHEEZING OR SHORTNESS OF BREATH 8.5 g 5    pioglitazone (ACTOS) 15 MG tablet TAKE (1) TABLET DAILY 30 tablet 5    bisoprolol (ZEBETA) 5 MG tablet TAKE 1 TABLET BY MOUTH DAILY 30 tablet 5    Fluticasone furoate-vilanterol (BREO ELLIPTA) 200-25 MCG/INH AEPB inhaler Inhale 1 puff into the lungs daily 1 each 0    gabapentin (NEURONTIN) 800 MG tablet TAKE 1 TABLET 4 TIMES DAILY 120 tablet 0    HYDROcodone-acetaminophen (NORCO) 5-325 MG per tablet Take 1 tablet by mouth every 8 hours as needed for Pain.        furosemide (LASIX) 40 MG tablet Take 1 tablet by mouth daily 30 tablet 3    metFORMIN (GLUCOPHAGE) 1000 MG tablet TAKE 1 TABLET BY MOUTH 2 TIMES DAILY (WITH MEALS) 60 tablet 5    atorvastatin (LIPITOR) 40 MG tablet TAKE 1 TABLET ONCE DAILY 90 tablet 1    fenofibric acid (FIBRICOR) 135 MG CPDR capsule TAKE 1 CAPSULE ONCE DAILY 30 capsule 5    lisinopril (PRINIVIL;ZESTRIL) 20 MG tablet TAKE 1 TABLET ONCE DAILY 30 tablet 3    Cholecalciferol (VITAMIN D3) 1.25 MG (92654 UT) CAPS Take 1 capsule by mouth Twice a Week 2 capsule 1    Magnesium 400 MG CAPS Take 1 tablet by mouth daily 30 capsule 1    blood glucose monitor strips Test 2 times a day & as needed for symptoms of irregular blood glucose. E11.9 300 strip 0    glucose monitoring kit (FREESTYLE) monitoring kit 1 kit by Does not apply route daily Patient wants brand name Patient tests bid  E11.9 1 kit 0    blood glucose monitor kit and supplies Test 2 times a day & as needed for symptoms of irregular blood glucose.  1 kit 0    aspirin 81 MG EC tablet Take 81 mg by mouth daily      Cyanocobalamin 1000 MCG CAPS Take 1 tablet by mouth daily      Omega-3 1000 MG CAPS Take 1 capsule by mouth daily      Coenzyme Q10 (COQ10) 100 MG CAPS Take 1 capsule by mouth daily      MULTIPLE VITAMINS PO Take by mouth           Scheduled Meds:   insulin lispro  15 Units SubCUTAneous TID WC    [START ON 9/25/2021] predniSONE  5 mg Oral Daily    insulin glargine  25 Units SubCUTAneous Daily    pantoprazole  40 mg Oral QAM AC    insulin lispro  0-18 Units SubCUTAneous TID WC    insulin lispro  0-9 Units SubCUTAneous Nightly    clopidogrel  75 mg Oral Daily    nicotine  1 patch TransDERmal Daily    enoxaparin  40 mg SubCUTAneous BID    [Held by provider] lisinopril  20 mg Oral Daily    gabapentin  800 mg Oral 4x Daily    atorvastatin  80 mg Oral Daily    aspirin  81 mg Oral Daily    DULoxetine  20 mg Oral BID    budesonide-formoterol  2 puff Inhalation BID    montelukast  10 mg Oral Nightly    sodium chloride flush  5-40 mL IntraVENous 2 times per day      Continuous Infusions:   furosemide (LASIX) 1mg/ml infusion 5 mg/hr (09/24/21 1107)    dextrose      sodium chloride 25 mL (09/23/21 1832)     PRN Meds:sodium chloride, albuterol-ipratropium, glucose, dextrose, glucagon (rDNA), dextrose, sodium chloride flush, sodium chloride, ondansetron **OR** ondansetron, polyethylene glycol, acetaminophen **OR** acetaminophen    Allergies: No Known Allergies    PHYSICAL EXAM:       Vitals: /70   Pulse 94   Temp 98.1 °F (36.7 °C) (Oral)   Resp 18   Ht 4' 11\" (1.499 m)   Wt 181 lb 10.5 oz (82.4 kg)   SpO2 93%   BMI 36.69 kg/m²     I/O:      Intake/Output Summary (Last 24 hours) at 9/24/2021 1445  Last data filed at 9/24/2021 1248  Gross per 24 hour   Intake 1240 ml   Output 2875 ml   Net -1635 ml     I/O this shift:  In: 640 [P.O.:640]  Out: 4195 [Urine:1475]  I/O last 3 completed shifts: In: 9476 [P.O.:1080; I.V.:10]  Out: 1750 [Urine:1750]    Physical Examination:   ? General appearance: alert, appears stated age and cooperative. ? Skin: Skin color, texture, turgor normal.   ? HEENT: Head: Normocephalic. ? Neck: no adenopathy. ? Lungs: Expiratory wheezes. ? Heart: no murmur, click, rub or gallop. ? Abdomen: soft, non-tender; bowel sounds normal.  ? Extremities: no cyanosis or edema. ? Lymphatic: No significant lymph node enlargement palpable. ? Neurologic: Mental status: Alert, oriented, thought content appropriate. DATA:       Labs:  CBC:   Recent Labs     09/22/21  0544 09/23/21  0559 09/24/21  0456   WBC 9.4 14.8* 12.3*   HGB 16.0 16.4 15.7   HCT 50.4 50.7 49.0    316 305       BMP:   Recent Labs     09/22/21  0544 09/22/21  0544 09/22/21  0958 09/23/21  0559 09/24/21  0456   *  --   --  135* 133*   K 5.3*   < > 4.5 4.8 4.4   CL 95*  --   --  98* 94*   CO2 26  --   --  25 28   BUN 46*  --   --  50* 58*   CREATININE 1.1  --   --  0.8* 1.1   GLUCOSE 411*  --   --  342* 369*    < > = values in this interval not displayed. LFT's: No results for input(s): AST, ALT, ALB, BILITOT, ALKPHOS in the last 72 hours. Troponin: No results for input(s): TROPONINI in the last 72 hours. BNP: No results for input(s): BNP in the last 72 hours.   ABGs:   Recent Labs     09/23/21  0842   PHART 7.401   EBO1UNN 53.5*   PO2ART 52.3*     INR: No results for input(s): INR in the last 72 hours. Lipids: No results for input(s): CHOL, HDL in the last 72 hours. Invalid input(s): LDLCALCU    U/A:No results for input(s): NITRITE, COLORU, PHUR, LABCAST, WBCUA, RBCUA, MUCUS, TRICHOMONAS, YEAST, BACTERIA, CLARITYU, SPECGRAV, LEUKOCYTESUR, UROBILINOGEN, BILIRUBINUR, BLOODU, GLUCOSEU, AMORPHOUS in the last 72 hours. Invalid input(s): Tammysanjana Maxwell    Rad:  XR CHEST PORTABLE   Final Result   Impression: Stable appearance of the chest. Bibasilar atelectasis/consolidation persists. CT CHEST HIGH RESOLUTION   Final Result      Slow progression of bilateral lower lobe alveolar consolidation with air bronchograms raises concern for chronic process such as interstitial pneumonia, eosinophilic pneumonia, and organizing pneumonia. Neoplastic etiology is considered much less likely. MRI BRAIN WO CONTRAST   Final Result      1. No acute intracranial abnormality. No evidence of acute infarct. 2. Minimal nonspecific white matter signal abnormality on FLAIR imaging suggesting minimal chronic small vessel ischemic disease and/or minimal age-related degenerative change. CTA HEAD NECK W CONTRAST   Final Result      Study limited by motion artifact and poor contrast bolus. 1. Atherosclerotic disease at bilateral carotid bifurcations. Exact percentage of stenosis by NASCET criteria is difficult to comment upon due to artifacts. 2. 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 Moderately attenuated mid vertebral artery from upper C7  to C4 5 level. Distal to its left vertebral artery is patent without significant stenosis.             PROCEDURE: CT ANGIOGRAPHY HEAD WITH/WITHOUT CONTRAST      INDICATION: aphasia, ams      COMPARISON: none      TECHNIQUE: Axial CT imaging obtained through the head prior to and following administration of IV contrast. Axial images, multiplanar reformatted images, and maximum intensity projection images were reviewed for CT angiographic technique. IV contrast: mL Omnipaque 350. FINDINGS:      ANTERIOR CIRCULATION: The intracranial internal carotid arteries, anterior cerebral arteries, and middle cerebral arteries are patent. There are calcified plaques with mild to moderate narrowing of bilateral ICA cavernous/supraclinoid segments. . No    evidence for aneurysm or arteriovenous malformation. POSTERIOR CIRCULATION: The bilateral vertebral arteries, basilar artery and posterior cerebral arteries are patent. There are calcified and noncalcified plaques with mild narrowing of left vertebral artery intradural segment. Right vertebral intradural    segment is severely attenuated, likely hyperplastic. No evidence for aneurysm or arteriovenous malformation. INTRACRANIAL VENOUS SYSTEM: No evidence for intracranial venous thrombosis. IMPRESSION:      No evidence of intracranial large vessel occlusion. 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 Calcified plaques with mild to moderate narrowing of bilateral ICA cavernous/supraclinoid segments. CT HEAD WO CONTRAST   Final Result      No acute intracranial abnormality . CT CHEST PULMONARY EMBOLISM W CONTRAST   Final Result   1. No definite pulmonary embolus identified. 2.  Mild atherosclerotic changes of the aorta. No aortic aneurysm or    dissection. 3.  Patchy densities in the lower lobes and right middle lobe may    represent atelectasis versus infectious/inflammatory process. 4.  Dependent atelectasis bilaterally. Atelectasis in the lingula. 5.  Trace right pleural effusion. 6.  Nonspecific similar mildly prominent mediastinal and hilar lymph    nodes. ASSESSMENT AND PLAN:   Haley Ross is a 50 y.o. male, who was admitted with acute hypoxic respiratory failure of unclear etiology.     Hypoxic and hypercapnic respiratory failure-unclear etiology  On 3 L of O2 at home (has been desaturating around 2 weeks prior to admission). CT chest high-resolution (09/19): Slow progression of bilateral lower lobe alveolar consolidation with air bronchogram raises concern for chronic process such as interstitial pneumonia, acidophilic pneumonia, and organizing pneumonia. COVID- 19 PCR (09/16): negative. Procal 0.06. Viral respiratory panel unremarkable. Negative inflammatory markers (while on steroid treatment). - S/p prednisone (09/17-09/21), Solu-Medrol (09/21-24) with worsening of patient's oxygenation thus ruling out hypersensitivity pneumonia, eosinophilic pneumonia and organizing pneumonia.  - CODE STATUS discussed with the patient. He remains full code. If patient's condition worsening, will likely intubate. Patient does not want to be intubated for more than 2 weeks  - Attempting Lasix drip at 5 mg/hr  - Continue breathing treatments, inhalers and incentive spirometry  - On Vapotherm 40 L saturating 94%. Goal O2 sat >88%  - Pulmonology consulted, appreciate recommendations    COPD-not in acute exacerbation  Oxygen saturation does not improve with steroid treatment. HFpEF-not in acute exacerbation  Echo (09/18): EF 55-60%, indeterminate diastolic function, bubble study fails to show evidence of shunting. Hyperglycemia in the setting of T2DM  HbA1c: 7.1 (6/2021). Hyperglycemia due to high dose steroids.   - Last dose of steroids given today therefore will continue to monitor glucose trends-adjust insulin.   - HDSSI   - Increased Lantus to 25 U BID  - Increased Lispro to 15 Units TID   - Resident team was informed of monitoring the glucose level to prevent hypoglycemia  - Hypoglycemia protocol  - POCT glucose     LILLIAN   Cr: 0.8 -> 1.1>1.2. Likely due to diuretic use and hypotension.  - Encourage PO fluid intake  - Lisinopril held  - On Lasix 5.  Daily BMP to assess kidney function    Code Status:Full Code  FEN: Low Na diet   PPX: Lovenox   DISPO: [facility]    This patient has been staffed and discussed with Bebo Collazo MD.  -----------------------------  Avinash Houston MD, PGY-2  Internal Medicine

## 2021-09-24 NOTE — PLAN OF CARE
Problem: Falls - Risk of:  Goal: Will remain free from falls  Description: Will remain free from falls  Outcome: Ongoing  Note: No falls noted thus far this shift, bed in lowest position, alarm on, non-skid socks on, call light within reach, hourly checks, safety maintained, will continue to monitor. Call light within reach. Problem: Gas Exchange - Impaired  Goal: Absence of hypoxia  Outcome: Ongoing  Note: Pt remains on vapotherm 40L @ 100% FiO2. Pt is tolerating fairly well at 90%. Pt has dyspnea with exertion. Will continue to monitor. Problem: Gas Exchange - Impaired:  Goal: Levels of oxygenation will improve  Description: Levels of oxygenation will improve  Outcome: Ongoing     Problem: Serum Glucose Level - Abnormal:  Goal: Ability to maintain appropriate glucose levels will improve  Description: Ability to maintain appropriate glucose levels will improve  Outcome: Not Met This Shift  Note: Pt had high glucose levels upon assessment at the beginning of the shift. Resident was notified and gave new orders (see mar). Will continue to monitor and update resident as needed. Subjective:    Medications:  aspirin enteric coated 81 milliGRAM(s) Oral daily  docusate sodium 100 milliGRAM(s) Oral three times a day  glycerin Suppository - Adult 1 Suppository(s) Rectal every 4 hours  lidocaine   Patch 1 Patch Transdermal daily  metoprolol succinate ER 12.5 milliGRAM(s) Oral daily  mirtazapine 7.5 milliGRAM(s) Oral at bedtime  oxyCODONE    IR 5 milliGRAM(s) Oral every 6 hours PRN  oxyCODONE  ER Tablet 10 milliGRAM(s) Oral every 12 hours  pantoprazole    Tablet 40 milliGRAM(s) Oral before breakfast  phenylephrine 0.25% Suppository 1 Suppository(s) Rectal every 8 hours PRN  polyethylene glycol 3350 17 Gram(s) Oral at bedtime  senna 2 Tablet(s) Oral at bedtime  traMADol 25 milliGRAM(s) Oral every 6 hours PRN      Vital Signs Last 24 Hrs  T(C): 36.7 (10 Oct 2017 04:54), Max: 36.9 (09 Oct 2017 16:41)  T(F): 98 (10 Oct 2017 04:54), Max: 98.5 (09 Oct 2017 16:41)  HR: 92 (10 Oct 2017 04:54) (91 - 97)  BP(mean): 70 (09 Oct 2017 16:41) (70 - 72)  RR: 18 (10 Oct 2017 04:54) (16 - 18)  SpO2: 100% (10 Oct 2017 04:54) (100% - 100%)      I&O's Summary    09 Oct 2017 07:01  -  10 Oct 2017 07:00  --------------------------------------------------------  IN: 1060 mL / OUT: 900 mL / NET: 160 mL        General: No distress. Comfortable.  HEENT: EOM intact.  Neck: Neck supple. JVP not elevated. No masses  Chest: Clear to auscultation bilaterally  CV: Normal S1 and S2. normal LVAD HUM  No murmurs, rub, or gallops.   Abdomen: Soft, non-distended, non-tender  Skin: No rashes or skin breakdown  Neurology: Alert and oriented times three. Sensation intact  Psych: Affect normal    LVAD Interrogation:  Pump Flow:  Pump Speed:  Pulse Index:  Pump Power:  VAD Events:   Driveline evaluation:    Programming Changes: [ ] No changes made [ ] Changes made:    Labs:                        9.8    8.3   )-----------( 278      ( 10 Oct 2017 05:54 )             28.8     10-10    132<L>  |  93<L>  |  18  ----------------------------<  94  4.5   |  27  |  0.57    Ca    9.5      10 Oct 2017 05:54      PT/INR - ( 10 Oct 2017 05:54 )   PT: 25.4 sec;   INR: 2.31 ratio        Lactate Dehydrogenase, Serum: 350 U/L (10-10 @ 05:54)  Lactate Dehydrogenase, Serum: 351 U/L (10-08 @ 06:08) Subjective: Pt lying in bed encouraged to ambulate today     Medications:  aspirin enteric coated 81 milliGRAM(s) Oral daily  docusate sodium 100 milliGRAM(s) Oral three times a day  glycerin Suppository - Adult 1 Suppository(s) Rectal every 4 hours  lidocaine   Patch 1 Patch Transdermal daily  metoprolol succinate ER 12.5 milliGRAM(s) Oral daily  mirtazapine 7.5 milliGRAM(s) Oral at bedtime  oxyCODONE    IR 5 milliGRAM(s) Oral every 6 hours PRN  oxyCODONE  ER Tablet 10 milliGRAM(s) Oral every 12 hours  pantoprazole    Tablet 40 milliGRAM(s) Oral before breakfast  phenylephrine 0.25% Suppository 1 Suppository(s) Rectal every 8 hours PRN  polyethylene glycol 3350 17 Gram(s) Oral at bedtime  senna 2 Tablet(s) Oral at bedtime  traMADol 25 milliGRAM(s) Oral every 6 hours PRN      Vital Signs Last 24 Hrs  T(C): 36.7 (10 Oct 2017 04:54), Max: 36.9 (09 Oct 2017 16:41)  T(F): 98 (10 Oct 2017 04:54), Max: 98.5 (09 Oct 2017 16:41)  HR: 92 (10 Oct 2017 04:54) (91 - 97)  BP(mean): 70 (09 Oct 2017 16:41) (70 - 72)  RR: 18 (10 Oct 2017 04:54) (16 - 18)  SpO2: 100% (10 Oct 2017 04:54) (100% - 100%)      I&O's Summary    09 Oct 2017 07:01  -  10 Oct 2017 07:00  --------------------------------------------------------  IN: 1060 mL / OUT: 900 mL / NET: 160 mL        General: No distress. Comfortable.  HEENT: EOM intact.  Neck: Neck supple. JVP not elevated. No masses  Chest: Clear to auscultation bilaterally  CV: Normal S1 and S2. normal LVAD HUM  No murmurs, rub, or gallops.   Abdomen: Soft, non-distended, non-tender  Skin: No rashes or skin breakdown  Neurology: Alert and oriented times three. Sensation intact  Psych: Affect normal    LVAD Interrogation:  Pump Flow:5.1  Pump Speed:9000  Pulse Index:6.5  Pump Power:5.3  VAD Events: no   Driveline evaluation:  clean and dry   Programming Changes: [ ] No changes made  Labs:                        9.8    8.3   )-----------( 278      ( 10 Oct 2017 05:54 )             28.8     10-10    132<L>  |  93<L>  |  18  ----------------------------<  94  4.5   |  27  |  0.57    Ca    9.5      10 Oct 2017 05:54      PT/INR - ( 10 Oct 2017 05:54 )   PT: 25.4 sec;   INR: 2.31 ratio        Lactate Dehydrogenase, Serum: 350 U/L (10-10 @ 05:54)  Lactate Dehydrogenase, Serum: 351 U/L (10-08 @ 06:08) Subjective: Pt lying in bed encouraged to ambulate today     Medications:  aspirin enteric coated 81 milliGRAM(s) Oral daily  docusate sodium 100 milliGRAM(s) Oral three times a day  glycerin Suppository - Adult 1 Suppository(s) Rectal every 4 hours  lidocaine   Patch 1 Patch Transdermal daily  metoprolol succinate ER 12.5 milliGRAM(s) Oral daily  mirtazapine 7.5 milliGRAM(s) Oral at bedtime  oxyCODONE    IR 5 milliGRAM(s) Oral every 6 hours PRN  oxyCODONE  ER Tablet 10 milliGRAM(s) Oral every 12 hours  pantoprazole    Tablet 40 milliGRAM(s) Oral before breakfast  phenylephrine 0.25% Suppository 1 Suppository(s) Rectal every 8 hours PRN  polyethylene glycol 3350 17 Gram(s) Oral at bedtime  senna 2 Tablet(s) Oral at bedtime  traMADol 25 milliGRAM(s) Oral every 6 hours PRN      Vital Signs Last 24 Hrs  T(C): 36.7 (10 Oct 2017 04:54), Max: 36.9 (09 Oct 2017 16:41)  T(F): 98 (10 Oct 2017 04:54), Max: 98.5 (09 Oct 2017 16:41)  HR: 92 (10 Oct 2017 04:54) (91 - 97)  BP(mean): 70 (09 Oct 2017 16:41) (70 - 72)  RR: 18 (10 Oct 2017 04:54) (16 - 18)  SpO2: 100% (10 Oct 2017 04:54) (100% - 100%)    Tele: SR 80 - 100 no ectopy   I&O's Summary    09 Oct 2017 07:01  -  10 Oct 2017 07:00  --------------------------------------------------------  IN: 1060 mL / OUT: 900 mL / NET: 160 mL        General: No distress. Comfortable.  HEENT: EOM intact.  Neck: Neck supple. JVP not elevated. No masses  Chest: Clear to auscultation bilaterally  CV: Normal S1 and S2. normal LVAD HUM  No murmurs, rub, or gallops.   Abdomen: Soft, non-distended, non-tender  Skin: No rashes or skin breakdown  Neurology: Alert and oriented times three. Sensation intact  Psych: Affect normal    LVAD Interrogation:  Pump Flow:5.1  Pump Speed:9000  Pulse Index:6.5  Pump Power:5.3  VAD Events: no   Driveline evaluation:  clean and dry   Programming Changes: [ ] No changes made  Labs:                        9.8    8.3   )-----------( 278      ( 10 Oct 2017 05:54 )             28.8     10-10    132<L>  |  93<L>  |  18  ----------------------------<  94  4.5   |  27  |  0.57    Ca    9.5      10 Oct 2017 05:54      PT/INR - ( 10 Oct 2017 05:54 )   PT: 25.4 sec;   INR: 2.31 ratio        Lactate Dehydrogenase, Serum: 350 U/L (10-10 @ 05:54)  Lactate Dehydrogenase, Serum: 351 U/L (10-08 @ 06:08) Subjective: Pt lying in bed encouraged to ambulate today     Medications:  aspirin enteric coated 81 milliGRAM(s) Oral daily  docusate sodium 100 milliGRAM(s) Oral three times a day  glycerin Suppository - Adult 1 Suppository(s) Rectal every 4 hours  lidocaine   Patch 1 Patch Transdermal daily  metoprolol succinate ER 12.5 milliGRAM(s) Oral daily  mirtazapine 7.5 milliGRAM(s) Oral at bedtime  oxyCODONE    IR 5 milliGRAM(s) Oral every 6 hours PRN  oxyCODONE  ER Tablet 10 milliGRAM(s) Oral every 12 hours  pantoprazole    Tablet 40 milliGRAM(s) Oral before breakfast  phenylephrine 0.25% Suppository 1 Suppository(s) Rectal every 8 hours PRN  polyethylene glycol 3350 17 Gram(s) Oral at bedtime  senna 2 Tablet(s) Oral at bedtime  traMADol 25 milliGRAM(s) Oral every 6 hours PRN      Vital Signs Last 24 Hrs  T(C): 36.7 (10 Oct 2017 04:54), Max: 36.9 (09 Oct 2017 16:41)  T(F): 98 (10 Oct 2017 04:54), Max: 98.5 (09 Oct 2017 16:41)  HR: 92 (10 Oct 2017 04:54) (91 - 97)  BP(mean): 70 (09 Oct 2017 16:41) (70 - 72)  RR: 18 (10 Oct 2017 04:54) (16 - 18)  SpO2: 100% (10 Oct 2017 04:54) (100% - 100%)    Tele: SR 80 - 100 no ectopy   I&O's Summary    09 Oct 2017 07:01  -  10 Oct 2017 07:00  --------------------------------------------------------  IN: 1060 mL / OUT: 900 mL / NET: 160 mL        General: No distress. Comfortable.  HEENT: EOM intact.  Neck: Neck supple. JVP not elevated. No masses  Chest: Clear to auscultation bilaterally  CV: Normal S1 and S2. normal LVAD HUM  No murmurs, rub, or gallops.   Abdomen: Soft, non-distended, non-tender  Skin: No rashes or skin breakdown  Neurology: Alert and oriented times three. Sensation intact  Psych: Affect normal    LVAD Interrogation:  Pump Flow:5.1  Pump Speed:9000  Pulse Index:6.5  Pump Power:5.3  VAD Events: no   Driveline evaluation:  clean and dry   Programming Changes: No changes made  Labs:                        9.8    8.3   )-----------( 278      ( 10 Oct 2017 05:54 )             28.8     10-10    132<L>  |  93<L>  |  18  ----------------------------<  94  4.5   |  27  |  0.57    Ca    9.5      10 Oct 2017 05:54      PT/INR - ( 10 Oct 2017 05:54 )   PT: 25.4 sec;   INR: 2.31 ratio        Lactate Dehydrogenase, Serum: 350 U/L (10-10 @ 05:54)  Lactate Dehydrogenase, Serum: 351 U/L (10-08 @ 06:08)

## 2021-09-25 LAB
ANION GAP SERPL CALCULATED.3IONS-SCNC: 14 MMOL/L (ref 3–16)
BASOPHILS ABSOLUTE: 0.1 K/UL (ref 0–0.2)
BASOPHILS RELATIVE PERCENT: 0.5 %
BUN BLDV-MCNC: 68 MG/DL (ref 7–20)
CALCIUM SERPL-MCNC: 8.9 MG/DL (ref 8.3–10.6)
CHLORIDE BLD-SCNC: 96 MMOL/L (ref 99–110)
CO2: 28 MMOL/L (ref 21–32)
CREAT SERPL-MCNC: 1.2 MG/DL (ref 0.9–1.3)
EOSINOPHILS ABSOLUTE: 0 K/UL (ref 0–0.6)
EOSINOPHILS RELATIVE PERCENT: 0.2 %
GFR AFRICAN AMERICAN: >60
GFR NON-AFRICAN AMERICAN: >60
GLUCOSE BLD-MCNC: 186 MG/DL (ref 70–99)
GLUCOSE BLD-MCNC: 207 MG/DL (ref 70–99)
GLUCOSE BLD-MCNC: 210 MG/DL (ref 70–99)
GLUCOSE BLD-MCNC: 212 MG/DL (ref 70–99)
GLUCOSE BLD-MCNC: 240 MG/DL (ref 70–99)
HCT VFR BLD CALC: 49.2 % (ref 40.5–52.5)
HEMOGLOBIN: 16.7 G/DL (ref 13.5–17.5)
LYMPHOCYTES ABSOLUTE: 1.9 K/UL (ref 1–5.1)
LYMPHOCYTES RELATIVE PERCENT: 12.4 %
MAGNESIUM: 1.8 MG/DL (ref 1.8–2.4)
MCH RBC QN AUTO: 29.7 PG (ref 26–34)
MCHC RBC AUTO-ENTMCNC: 33.9 G/DL (ref 31–36)
MCV RBC AUTO: 87.6 FL (ref 80–100)
MONOCYTES ABSOLUTE: 1.1 K/UL (ref 0–1.3)
MONOCYTES RELATIVE PERCENT: 7.6 %
NEUTROPHILS ABSOLUTE: 11.9 K/UL (ref 1.7–7.7)
NEUTROPHILS RELATIVE PERCENT: 79.3 %
PDW BLD-RTO: 16.9 % (ref 12.4–15.4)
PERFORMED ON: ABNORMAL
PLATELET # BLD: 313 K/UL (ref 135–450)
PMV BLD AUTO: 9.5 FL (ref 5–10.5)
POTASSIUM SERPL-SCNC: 3.8 MMOL/L (ref 3.5–5.1)
PRO-BNP: 124 PG/ML (ref 0–124)
RBC # BLD: 5.62 M/UL (ref 4.2–5.9)
SODIUM BLD-SCNC: 138 MMOL/L (ref 136–145)
WBC # BLD: 15 K/UL (ref 4–11)

## 2021-09-25 PROCEDURE — 6370000000 HC RX 637 (ALT 250 FOR IP): Performed by: STUDENT IN AN ORGANIZED HEALTH CARE EDUCATION/TRAINING PROGRAM

## 2021-09-25 PROCEDURE — 2700000000 HC OXYGEN THERAPY PER DAY

## 2021-09-25 PROCEDURE — 94660 CPAP INITIATION&MGMT: CPT

## 2021-09-25 PROCEDURE — 6370000000 HC RX 637 (ALT 250 FOR IP): Performed by: NURSE PRACTITIONER

## 2021-09-25 PROCEDURE — 6370000000 HC RX 637 (ALT 250 FOR IP): Performed by: INTERNAL MEDICINE

## 2021-09-25 PROCEDURE — 94761 N-INVAS EAR/PLS OXIMETRY MLT: CPT

## 2021-09-25 PROCEDURE — 2580000003 HC RX 258: Performed by: STUDENT IN AN ORGANIZED HEALTH CARE EDUCATION/TRAINING PROGRAM

## 2021-09-25 PROCEDURE — 2060000000 HC ICU INTERMEDIATE R&B

## 2021-09-25 PROCEDURE — 36415 COLL VENOUS BLD VENIPUNCTURE: CPT

## 2021-09-25 PROCEDURE — 83735 ASSAY OF MAGNESIUM: CPT

## 2021-09-25 PROCEDURE — 85025 COMPLETE CBC W/AUTO DIFF WBC: CPT

## 2021-09-25 PROCEDURE — 6360000002 HC RX W HCPCS: Performed by: INTERNAL MEDICINE

## 2021-09-25 PROCEDURE — 94640 AIRWAY INHALATION TREATMENT: CPT

## 2021-09-25 PROCEDURE — 80048 BASIC METABOLIC PNL TOTAL CA: CPT

## 2021-09-25 PROCEDURE — 2580000003 HC RX 258: Performed by: INTERNAL MEDICINE

## 2021-09-25 PROCEDURE — 6360000002 HC RX W HCPCS: Performed by: STUDENT IN AN ORGANIZED HEALTH CARE EDUCATION/TRAINING PROGRAM

## 2021-09-25 PROCEDURE — 83880 ASSAY OF NATRIURETIC PEPTIDE: CPT

## 2021-09-25 RX ADMIN — Medication 10 ML: at 21:49

## 2021-09-25 RX ADMIN — BUDESONIDE AND FORMOTEROL FUMARATE DIHYDRATE 2 PUFF: 80; 4.5 AEROSOL RESPIRATORY (INHALATION) at 22:27

## 2021-09-25 RX ADMIN — INSULIN LISPRO 15 UNITS: 100 INJECTION, SOLUTION INTRAVENOUS; SUBCUTANEOUS at 16:53

## 2021-09-25 RX ADMIN — PANTOPRAZOLE SODIUM 40 MG: 40 TABLET, DELAYED RELEASE ORAL at 06:43

## 2021-09-25 RX ADMIN — INSULIN LISPRO 3 UNITS: 100 INJECTION, SOLUTION INTRAVENOUS; SUBCUTANEOUS at 12:24

## 2021-09-25 RX ADMIN — INSULIN LISPRO 15 UNITS: 100 INJECTION, SOLUTION INTRAVENOUS; SUBCUTANEOUS at 12:24

## 2021-09-25 RX ADMIN — MONTELUKAST 10 MG: 10 TABLET, FILM COATED ORAL at 21:49

## 2021-09-25 RX ADMIN — BUDESONIDE AND FORMOTEROL FUMARATE DIHYDRATE 2 PUFF: 80; 4.5 AEROSOL RESPIRATORY (INHALATION) at 10:37

## 2021-09-25 RX ADMIN — INSULIN LISPRO 6 UNITS: 100 INJECTION, SOLUTION INTRAVENOUS; SUBCUTANEOUS at 16:53

## 2021-09-25 RX ADMIN — DULOXETINE 20 MG: 20 CAPSULE, DELAYED RELEASE ORAL at 21:49

## 2021-09-25 RX ADMIN — INSULIN LISPRO 3 UNITS: 100 INJECTION, SOLUTION INTRAVENOUS; SUBCUTANEOUS at 21:47

## 2021-09-25 RX ADMIN — CLOPIDOGREL BISULFATE 75 MG: 75 TABLET ORAL at 08:38

## 2021-09-25 RX ADMIN — GABAPENTIN 800 MG: 400 CAPSULE ORAL at 12:24

## 2021-09-25 RX ADMIN — INSULIN LISPRO 6 UNITS: 100 INJECTION, SOLUTION INTRAVENOUS; SUBCUTANEOUS at 08:42

## 2021-09-25 RX ADMIN — INSULIN LISPRO 15 UNITS: 100 INJECTION, SOLUTION INTRAVENOUS; SUBCUTANEOUS at 08:41

## 2021-09-25 RX ADMIN — ENOXAPARIN SODIUM 40 MG: 40 INJECTION SUBCUTANEOUS at 21:48

## 2021-09-25 RX ADMIN — GABAPENTIN 800 MG: 400 CAPSULE ORAL at 08:39

## 2021-09-25 RX ADMIN — PREDNISONE 5 MG: 5 TABLET ORAL at 08:40

## 2021-09-25 RX ADMIN — ATORVASTATIN CALCIUM 80 MG: 80 TABLET, FILM COATED ORAL at 08:38

## 2021-09-25 RX ADMIN — FUROSEMIDE 5 MG/HR: 10 INJECTION, SOLUTION INTRAMUSCULAR; INTRAVENOUS at 20:17

## 2021-09-25 RX ADMIN — DULOXETINE 20 MG: 20 CAPSULE, DELAYED RELEASE ORAL at 08:39

## 2021-09-25 RX ADMIN — ENOXAPARIN SODIUM 40 MG: 40 INJECTION SUBCUTANEOUS at 08:38

## 2021-09-25 RX ADMIN — GABAPENTIN 800 MG: 400 CAPSULE ORAL at 16:53

## 2021-09-25 RX ADMIN — ASPIRIN 81 MG: 81 TABLET, COATED ORAL at 08:38

## 2021-09-25 RX ADMIN — GABAPENTIN 800 MG: 400 CAPSULE ORAL at 21:49

## 2021-09-25 ASSESSMENT — PAIN SCALES - GENERAL
PAINLEVEL_OUTOF10: 0

## 2021-09-25 NOTE — PLAN OF CARE
Problem: Falls - Risk of:  Goal: Will remain free from falls  Description: Will remain free from falls  9/25/2021 1532 by Kelly Chang RN  Outcome: Ongoing  Note: Pt continues to be a high fall risk. Pt has call light within reach, pt has bed alarm In place, pt has on skid socks on. Pt uses call system appropriately. Pt is an assist x1 with a gaitbelt with ambulation. Problem: Airway Clearance - Ineffective  Goal: Achieve or maintain patent airway  Outcome: Ongoing     Problem: Gas Exchange - Impaired  Goal: Absence of hypoxia  9/25/2021 1532 by Kelly Chang RN  Outcome: Ongoing  Note: Pt continues to be on Vapotherm 40 L @ 100% FiO2 with O2 saturations of >88%. Pt has some c/o SOB. Problem: Body Temperature -  Risk of, Imbalanced  Goal: Ability to maintain a body temperature within defined limits  Outcome: Ongoing  Note: Pt has been afebrile this shift. Problem: Nutrition Deficits  Goal: Optimize nutritional status  9/25/2021 1532 by Kelly Chang RN  Outcome: Ongoing  Note: Pt has been able to tolerate >75% of meals this shift.

## 2021-09-25 NOTE — PROGRESS NOTES
Pulmonology Progress Note  PGY-2    Admit Date: 9/16/2021  Hospital Day: 10  Diet: ADULT DIET; Regular; 3 carb choices (45 gm/meal); Low Sodium (2 gm); 1800 ml  Code Status: Full Code       Interval history:  Patient was seen and examined this morning. Remained afebrile overnight. Patient is on 40L of vapotherm with FiO2 of 100%. Pt was started on lasix ggt yesterday. I/O -1.95L in the past 24 hours. Patient reports that he is doing fine. Endorses non productive cough. Denies fevers, chills, chest pain, nausea, vomiting, diarrhea, or constipation.       Medications:   Scheduled Meds:   insulin lispro  15 Units SubCUTAneous TID WC    predniSONE  5 mg Oral Daily    insulin glargine  25 Units SubCUTAneous Daily    pantoprazole  40 mg Oral QAM AC    insulin lispro  0-18 Units SubCUTAneous TID WC    insulin lispro  0-9 Units SubCUTAneous Nightly    clopidogrel  75 mg Oral Daily    nicotine  1 patch TransDERmal Daily    enoxaparin  40 mg SubCUTAneous BID    [Held by provider] lisinopril  20 mg Oral Daily    gabapentin  800 mg Oral 4x Daily    atorvastatin  80 mg Oral Daily    aspirin  81 mg Oral Daily    DULoxetine  20 mg Oral BID    budesonide-formoterol  2 puff Inhalation BID    montelukast  10 mg Oral Nightly    sodium chloride flush  5-40 mL IntraVENous 2 times per day       Continuous Infusions:   furosemide (LASIX) 1mg/ml infusion 5 mg/hr (09/24/21 2318)    dextrose      sodium chloride 25 mL (09/23/21 1832)       PRN Meds:  sodium chloride, albuterol-ipratropium, glucose, dextrose, glucagon (rDNA), dextrose, sodium chloride flush, sodium chloride, ondansetron **OR** ondansetron, polyethylene glycol, acetaminophen **OR** acetaminophen    Vital/I&O/Physical examination:   VS:  /62   Pulse 92   Temp 98.4 °F (36.9 °C) (Oral)   Resp 16   Ht 4' 11\" (1.499 m)   Wt 180 lb 1.9 oz (81.7 kg)   SpO2 93%   BMI 36.38 kg/m²     I/O:    Intake/Output Summary (Last 24 hours) at 9/25/2021 [address] filed at 9/25/2021 1005  Gross per 24 hour   Intake 1260 ml   Output 3125 ml   Net -1865 ml       PE:  Physical Exam  Vitals reviewed. Constitutional:       Appearance: Normal appearance. HENT:      Head: Normocephalic and atraumatic. Nose: Nose normal.      Mouth/Throat:      Mouth: Mucous membranes are moist.   Eyes:      Extraocular Movements: Extraocular movements intact. Conjunctiva/sclera: Conjunctivae normal.      Pupils: Pupils are equal, round, and reactive to light. Cardiovascular:      Rate and Rhythm: Normal rate and regular rhythm. Pulses: Normal pulses. Heart sounds: Normal heart sounds. Pulmonary:      Comments: Diffuse crackle on lung auscultation. On 40L of Vapotherm with FiO2 of 100 and SpO2 in low 90s. Abdominal:      Palpations: Abdomen is soft. Musculoskeletal:         General: Normal range of motion. Cervical back: Normal range of motion and neck supple. Neurological:      General: No focal deficit present. Mental Status: He is alert and oriented to person, place, and time. Labs & Imaging:   LABS:  Renal:   Recent Labs     09/23/21  0559 09/24/21  0456 09/25/21  0514   * 133* 138   K 4.8 4.4 3.8   CL 98* 94* 96*   CO2 25 28 28   BUN 50* 58* 68*   CREATININE 0.8* 1.1 1.2   GLUCOSE 342* 369* 240*   ANIONGAP 12 11 14     CBC:   Recent Labs     09/23/21  0559 09/24/21  0456 09/25/21  0514   WBC 14.8* 12.3* 15.0*   HGB 16.4 15.7 16.7   HCT 50.7 49.0 49.2    305 313   MCV 89.1 89.7 87.6                            Hepatic: No results for input(s): AST, ALT, ALB, BILITOT, ALKPHOS in the last 72 hours. Troponin: No results for input(s): TROPONINI in the last 72 hours. BNP: No results for input(s): BNP in the last 72 hours. Lipids: No results for input(s): CHOL, HDL in the last 72 hours. Invalid input(s): LDLCALCU, TRIGLYCERIDE  INR: No results for input(s): INR in the last 72 hours.   Lactate: No results for input(s): LACTATE in the last 72 hours. ABGs:  Recent Labs     09/23/21  0842   PHART 7.401   [id number] 53.5*   PO2ART 52.3*   RVL1JQF 33*   BEART 6.5*   [id number] 86*   [id number] 35       UA:No results for input(s): NITRITE, COLORU, PHUR, LABCAST, WBCUA, RBCUA, MUCUS, TRICHOMONAS, YEAST, BACTERIA, CLARITYU, SPECGRAV, LEUKOCYTESUR, UROBILINOGEN, BILIRUBINUR, BLOODU, GLUCOSEU, AMORPHOUS in the last 72 hours. Invalid input(s): Kevin Deshpande     IMAGING:  XR CHEST PORTABLE   Final Result   Impression: Stable appearance of the chest. Bibasilar atelectasis/consolidation persists. CT CHEST HIGH RESOLUTION   Final Result      Slow progression of bilateral lower lobe alveolar consolidation with air bronchograms raises concern for chronic process such as interstitial pneumonia, eosinophilic pneumonia, and organizing pneumonia. Neoplastic etiology is considered much less likely. MRI BRAIN WO CONTRAST   Final Result      1. No acute intracranial abnormality. No evidence of acute infarct. 2. Minimal nonspecific white matter signal abnormality on FLAIR imaging suggesting minimal chronic small vessel ischemic disease and/or minimal age-related degenerative change. CTA HEAD NECK W CONTRAST   Final Result      Study limited by motion artifact and poor contrast bolus. 1. Atherosclerotic disease at bilateral carotid bifurcations. Exact percentage of stenosis by NASCET criteria is difficult to comment upon due to artifacts. 2. 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 Moderately attenuated mid vertebral artery from upper C7  to C4 5 level. Distal to its left vertebral artery is patent without significant stenosis.             PROCEDURE: CT ANGIOGRAPHY HEAD WITH/WITHOUT CONTRAST      INDICATION: aphasia, ams      COMPARISON: none      TECHNIQUE: Axial CT imaging obtained through the head prior to and following administration of IV contrast. Axial images, multiplanar reformatted images, and maximum intensity projection images were reviewed for CT angiographic technique. IV contrast: mL Omnipaque 350. FINDINGS:      ANTERIOR CIRCULATION: The intracranial internal carotid arteries, anterior cerebral arteries, and middle cerebral arteries are patent. There are calcified plaques with mild to moderate narrowing of bilateral ICA cavernous/supraclinoid segments. . No    evidence for aneurysm or arteriovenous malformation. POSTERIOR CIRCULATION: The bilateral vertebral arteries, basilar artery and posterior cerebral arteries are patent. There are calcified and noncalcified plaques with mild narrowing of left vertebral artery intradural segment. Right vertebral intradural    segment is severely attenuated, likely hyperplastic. No evidence for aneurysm or arteriovenous malformation. INTRACRANIAL VENOUS SYSTEM: No evidence for intracranial venous thrombosis. IMPRESSION:      No evidence of intracranial large vessel occlusion. 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 Calcified plaques with mild to moderate narrowing of bilateral ICA cavernous/supraclinoid segments. CT HEAD WO CONTRAST   Final Result      No acute intracranial abnormality . CT CHEST PULMONARY EMBOLISM W CONTRAST   Final Result   1. No definite pulmonary embolus identified. 2.  Mild atherosclerotic changes of the aorta. No aortic aneurysm or    dissection. 3.  Patchy densities in the lower lobes and right middle lobe may    represent atelectasis versus infectious/inflammatory process. 4.  Dependent atelectasis bilaterally. Atelectasis in the lingula. 5.  Trace right pleural effusion. 6.  Nonspecific similar mildly prominent mediastinal and hilar lymph    nodes.               Assessment & Plan:    Bernardo Posada is a 50 y.o. male with PMH of HFpEF (2/2021 ECHO showed grade 2 diastolic dysfunction, 06% EF, LV hypertrophy), COPD on 2 L of oxygen at home, diabetes, hypertension, hyperlipidemia who presents with two weeks of worsening SOB. Acute on chronic hypoxemic hypercapnic respiratory failure:  - S/p high dose of steroid for three days  - Etiology of his hypoxic respiratory failure is unclear. - Patient has history of COPD and is usually on 2L of O2.   -  Currently, Patient is on 40L of Vapotherm with FiO2 of 100% and oxygen saturation in low 90s. His oxygen saturation drops to high 80s when sitting up for pulmonary exam.    - Inflammatory markers were unremarkable. - S/p SoluMedrol 125 mg q 6hrs for 3 days. However, there has not been any significant improvement in his oxygen requirement. - Covid negative   -  Strep pneumo antigen, legionella antigen, respiratory panel negative. -  Procalc 0.06  - Patient had CTPA that did not show any evidence of PE. Had echo that did not show any evidence of pulmonary hypertension, intracardiac or intrapulmonary shunting.   - No evidence of elevated methemoglobinemia.   - CT chest showed slow progression of bilateral lower lobe alveolar consolidation with air bronchograms raises concern for chronic process such as interstitial pneumonia, eosinophilic pneumonia, and organizing pneumonia. - Chest x ray on 09/23/2021 showed stable appearance of the chest, bibasilar atelectasis/consolidation persists  - Continue supplemental oxygen and wean per tolerated with oxygen goal of 88% and above  - Patient was started on lasix ggt yesterday. COPD:  There no wheezing on lung auscultation  - DuoNebs as need  - Symbicort inhaler BID  - Patient has been on SoluMedrol for the past three days. Code Status: Full Code  ADULT DIET; Regular; 3 carb choices (45 gm/meal);  Low Sodium (2 gm); 1800 ml     PPX: Lovenox       This patient will be discussed with attending, Dr. Timmy Chin MD.    Yari Rodrigues MD, PGY- 2  Contact via Molecular Imaging  9/25/2021,  11:18 AM

## 2021-09-26 LAB
ALBUMIN SERPL-MCNC: 3.5 G/DL (ref 3.4–5)
ANION GAP SERPL CALCULATED.3IONS-SCNC: 12 MMOL/L (ref 3–16)
ANION GAP SERPL CALCULATED.3IONS-SCNC: 16 MMOL/L (ref 3–16)
BASOPHILS ABSOLUTE: 0.1 K/UL (ref 0–0.2)
BASOPHILS RELATIVE PERCENT: 0.5 %
BUN BLDV-MCNC: 67 MG/DL (ref 7–20)
BUN BLDV-MCNC: 70 MG/DL (ref 7–20)
CALCIUM SERPL-MCNC: 9 MG/DL (ref 8.3–10.6)
CALCIUM SERPL-MCNC: 9.3 MG/DL (ref 8.3–10.6)
CHLORIDE BLD-SCNC: 94 MMOL/L (ref 99–110)
CHLORIDE BLD-SCNC: 95 MMOL/L (ref 99–110)
CO2: 22 MMOL/L (ref 21–32)
CO2: 28 MMOL/L (ref 21–32)
CREAT SERPL-MCNC: 1.1 MG/DL (ref 0.9–1.3)
CREAT SERPL-MCNC: 1.2 MG/DL (ref 0.9–1.3)
EOSINOPHILS ABSOLUTE: 0.1 K/UL (ref 0–0.6)
EOSINOPHILS RELATIVE PERCENT: 0.6 %
GFR AFRICAN AMERICAN: >60
GFR AFRICAN AMERICAN: >60
GFR NON-AFRICAN AMERICAN: >60
GFR NON-AFRICAN AMERICAN: >60
GLUCOSE BLD-MCNC: 167 MG/DL (ref 70–99)
GLUCOSE BLD-MCNC: 218 MG/DL (ref 70–99)
GLUCOSE BLD-MCNC: 234 MG/DL (ref 70–99)
GLUCOSE BLD-MCNC: 260 MG/DL (ref 70–99)
GLUCOSE BLD-MCNC: 268 MG/DL (ref 70–99)
HCT VFR BLD CALC: 50.1 % (ref 40.5–52.5)
HEMOGLOBIN: 16.1 G/DL (ref 13.5–17.5)
LYMPHOCYTES ABSOLUTE: 1.8 K/UL (ref 1–5.1)
LYMPHOCYTES RELATIVE PERCENT: 15 %
MAGNESIUM: 1.8 MG/DL (ref 1.8–2.4)
MCH RBC QN AUTO: 28.8 PG (ref 26–34)
MCHC RBC AUTO-ENTMCNC: 32.1 G/DL (ref 31–36)
MCV RBC AUTO: 89.6 FL (ref 80–100)
MONOCYTES ABSOLUTE: 0.9 K/UL (ref 0–1.3)
MONOCYTES RELATIVE PERCENT: 7.5 %
NEUTROPHILS ABSOLUTE: 9.2 K/UL (ref 1.7–7.7)
NEUTROPHILS RELATIVE PERCENT: 76.4 %
PDW BLD-RTO: 17.3 % (ref 12.4–15.4)
PERFORMED ON: ABNORMAL
PHOSPHORUS: 4.2 MG/DL (ref 2.5–4.9)
PLATELET # BLD: 293 K/UL (ref 135–450)
PMV BLD AUTO: 9.9 FL (ref 5–10.5)
POTASSIUM SERPL-SCNC: 3.7 MMOL/L (ref 3.5–5.1)
POTASSIUM SERPL-SCNC: 4.3 MMOL/L (ref 3.5–5.1)
RBC # BLD: 5.6 M/UL (ref 4.2–5.9)
SODIUM BLD-SCNC: 132 MMOL/L (ref 136–145)
SODIUM BLD-SCNC: 135 MMOL/L (ref 136–145)
WBC # BLD: 12 K/UL (ref 4–11)

## 2021-09-26 PROCEDURE — 80069 RENAL FUNCTION PANEL: CPT

## 2021-09-26 PROCEDURE — 2700000000 HC OXYGEN THERAPY PER DAY

## 2021-09-26 PROCEDURE — 6370000000 HC RX 637 (ALT 250 FOR IP): Performed by: STUDENT IN AN ORGANIZED HEALTH CARE EDUCATION/TRAINING PROGRAM

## 2021-09-26 PROCEDURE — 6370000000 HC RX 637 (ALT 250 FOR IP): Performed by: NURSE PRACTITIONER

## 2021-09-26 PROCEDURE — 85025 COMPLETE CBC W/AUTO DIFF WBC: CPT

## 2021-09-26 PROCEDURE — 36415 COLL VENOUS BLD VENIPUNCTURE: CPT

## 2021-09-26 PROCEDURE — 2580000003 HC RX 258: Performed by: INTERNAL MEDICINE

## 2021-09-26 PROCEDURE — 99223 1ST HOSP IP/OBS HIGH 75: CPT | Performed by: INTERNAL MEDICINE

## 2021-09-26 PROCEDURE — 6360000002 HC RX W HCPCS: Performed by: STUDENT IN AN ORGANIZED HEALTH CARE EDUCATION/TRAINING PROGRAM

## 2021-09-26 PROCEDURE — 6370000000 HC RX 637 (ALT 250 FOR IP): Performed by: INTERNAL MEDICINE

## 2021-09-26 PROCEDURE — 6360000002 HC RX W HCPCS: Performed by: INTERNAL MEDICINE

## 2021-09-26 PROCEDURE — 94761 N-INVAS EAR/PLS OXIMETRY MLT: CPT

## 2021-09-26 PROCEDURE — 2060000000 HC ICU INTERMEDIATE R&B

## 2021-09-26 PROCEDURE — 94640 AIRWAY INHALATION TREATMENT: CPT

## 2021-09-26 PROCEDURE — 83735 ASSAY OF MAGNESIUM: CPT

## 2021-09-26 PROCEDURE — 94660 CPAP INITIATION&MGMT: CPT

## 2021-09-26 PROCEDURE — 99233 SBSQ HOSP IP/OBS HIGH 50: CPT | Performed by: INTERNAL MEDICINE

## 2021-09-26 RX ADMIN — INSULIN LISPRO 15 UNITS: 100 INJECTION, SOLUTION INTRAVENOUS; SUBCUTANEOUS at 08:34

## 2021-09-26 RX ADMIN — BUDESONIDE AND FORMOTEROL FUMARATE DIHYDRATE 2 PUFF: 80; 4.5 AEROSOL RESPIRATORY (INHALATION) at 09:53

## 2021-09-26 RX ADMIN — ENOXAPARIN SODIUM 40 MG: 40 INJECTION SUBCUTANEOUS at 08:31

## 2021-09-26 RX ADMIN — INSULIN LISPRO 3 UNITS: 100 INJECTION, SOLUTION INTRAVENOUS; SUBCUTANEOUS at 21:44

## 2021-09-26 RX ADMIN — INSULIN LISPRO 3 UNITS: 100 INJECTION, SOLUTION INTRAVENOUS; SUBCUTANEOUS at 12:50

## 2021-09-26 RX ADMIN — DULOXETINE 20 MG: 20 CAPSULE, DELAYED RELEASE ORAL at 08:31

## 2021-09-26 RX ADMIN — INSULIN LISPRO 15 UNITS: 100 INJECTION, SOLUTION INTRAVENOUS; SUBCUTANEOUS at 17:46

## 2021-09-26 RX ADMIN — INSULIN LISPRO 6 UNITS: 100 INJECTION, SOLUTION INTRAVENOUS; SUBCUTANEOUS at 17:46

## 2021-09-26 RX ADMIN — GABAPENTIN 800 MG: 400 CAPSULE ORAL at 12:50

## 2021-09-26 RX ADMIN — ENOXAPARIN SODIUM 40 MG: 40 INJECTION SUBCUTANEOUS at 21:43

## 2021-09-26 RX ADMIN — DULOXETINE 20 MG: 20 CAPSULE, DELAYED RELEASE ORAL at 21:43

## 2021-09-26 RX ADMIN — INSULIN LISPRO 15 UNITS: 100 INJECTION, SOLUTION INTRAVENOUS; SUBCUTANEOUS at 12:50

## 2021-09-26 RX ADMIN — BUDESONIDE AND FORMOTEROL FUMARATE DIHYDRATE 2 PUFF: 80; 4.5 AEROSOL RESPIRATORY (INHALATION) at 20:21

## 2021-09-26 RX ADMIN — ATORVASTATIN CALCIUM 80 MG: 80 TABLET, FILM COATED ORAL at 08:31

## 2021-09-26 RX ADMIN — GABAPENTIN 800 MG: 400 CAPSULE ORAL at 21:43

## 2021-09-26 RX ADMIN — SALINE NASAL SPRAY 1 SPRAY: 1.5 SOLUTION NASAL at 12:48

## 2021-09-26 RX ADMIN — GABAPENTIN 800 MG: 400 CAPSULE ORAL at 17:47

## 2021-09-26 RX ADMIN — ASPIRIN 81 MG: 81 TABLET, COATED ORAL at 08:31

## 2021-09-26 RX ADMIN — FUROSEMIDE 5 MG/HR: 10 INJECTION, SOLUTION INTRAMUSCULAR; INTRAVENOUS at 12:49

## 2021-09-26 RX ADMIN — PANTOPRAZOLE SODIUM 40 MG: 40 TABLET, DELAYED RELEASE ORAL at 08:31

## 2021-09-26 RX ADMIN — PREDNISONE 5 MG: 5 TABLET ORAL at 08:31

## 2021-09-26 RX ADMIN — CLOPIDOGREL BISULFATE 75 MG: 75 TABLET ORAL at 08:31

## 2021-09-26 RX ADMIN — MONTELUKAST 10 MG: 10 TABLET, FILM COATED ORAL at 21:43

## 2021-09-26 RX ADMIN — INSULIN LISPRO 6 UNITS: 100 INJECTION, SOLUTION INTRAVENOUS; SUBCUTANEOUS at 08:33

## 2021-09-26 RX ADMIN — GABAPENTIN 800 MG: 400 CAPSULE ORAL at 08:31

## 2021-09-26 ASSESSMENT — PAIN SCALES - GENERAL
PAINLEVEL_OUTOF10: 0

## 2021-09-26 ASSESSMENT — ENCOUNTER SYMPTOMS
WHEEZING: 0
COUGH: 1
CHOKING: 0
SHORTNESS OF BREATH: 0

## 2021-09-26 NOTE — CONSULTS
Reason for consult: RHC?    50 y.o. admitted 9/16/2021 with sob. Has had contd sob and has been treated wfor COPD exacerbation with steroids and nebs. Remains on Vapotherm at 40L, FiO2 100%. Today, he denies angina. No n/v/LH/dizziness. No syncope. No sig LE edema. No orthopnea or PND. Past Medical History:   Diagnosis Date    Acute respiratory failure ([facility] [address] 07/11/2021    CHF (congestive heart failure) (HCC)     combined    COPD (chronic obstructive pulmonary disease) (HCC)     Diabetes mellitus (HCC)     Hyperlipidemia     Hypertension     Oxygen dependent      Past Surgical History:   Procedure Laterality Date    HERNIA REPAIR      TONSILLECTOMY       Social History     Tobacco Use    Smoking status: Current Every Day Smoker     Packs/day: 1.00    Smokeless tobacco: Never Used   Vaping Use    Vaping Use: Never used   Substance Use Topics    Alcohol use: Yes     Comment: 6 pack per month    Drug use: Never     No Known Allergies    Family History   Problem Relation Age of Onset    Asthma Mother     Cancer Mother     Hearing Loss Mother     Vision Loss Mother     High Blood Pressure Father     High Cholesterol Father     Vision Loss Father     Asthma Sister     Diabetes Sister     Vision Loss Sister     Asthma Brother     Arthritis Brother     High Blood Pressure Brother      Review of Systems   General: No fevers, chills, fatigue, or night sweats. HEENT: No blurry or decreased vision. No changes in hearing, nasal discharge or sore throat. Cardiovascular: See HPI. No cramping in legs or buttocks when walking. Respiratory: + SOB, cough. Gastrointestinal: No abdominal pain, hematochezia, melana, or history of GI ulcers. Genito-Urinary: No dysuria or hematuria. No urgency or polyuria. Musculoskeletal: No complaints of joint pain, joint swelling or muscular weakness/soreness. Neurological: No dizziness or headaches. No numbness/tingling, speech problems or weakness. Psychological: No anxiety or depression  Hematological and Lymphatic: No abnormal bleeding or bruising, blood clots, jaundice. Endocrine: No malaise/lethargy, palpitations, polydipsia/polyuria, temperature intolerance or unexpected weight changes. Skin: No rashes or non-healing ulcers. PE:  Blood pressure 100/74, pulse 102, temperature 98.4 °F (36.9 °C), temperature source Oral, resp. rate 20, height 4' 11\" (1.499 m), weight 179 lb 14.3 oz (81.6 kg), SpO2 91 %. General (appearance): Well devel. No distress  Eyes: Anicteric. EOMI  Neck: No JVD obvious  Ears/Nose/Mouth/Thorat: No cyanosis  CV: Reg tach. No m/r/g   Respiratory:  Diminished, vapotherm on  GI: Abd s/nt/nd. No peritoneal signs  Skin: Warm, dry. No rashes  Neuro/Psych: Alert and oriented x 3. Appropriate behavior  Ext:  No c/c.  No edema  Pulses:  2+ carotid    Lab Results   Component Value Date    WBC 12.0 (H) 09/26/2021    HGB 16.1 09/26/2021    HCT 50.1 09/26/2021    MCV 89.6 09/26/2021     09/26/2021     Lab Results   Component Value Date     (L) 09/26/2021    K 3.7 09/26/2021    CL 95 (L) 09/26/2021    CO2 28 09/26/2021    BUN 70 (H) 09/26/2021    CREATININE 1.2 09/26/2021    GLUCOSE 260 (H) 09/26/2021    CALCIUM 9.0 09/26/2021    PROT 8.2 09/17/2021    LABALBU 4.1 09/17/2021    BILITOT 0.3 09/17/2021    ALKPHOS 59 09/17/2021    AST 17 09/17/2021    ALT 17 09/17/2021    LABGLOM >60 09/26/2021    GFRAA >60 09/26/2021    AGRATIO 1.2 09/16/2021    GLOB 3.4 09/16/2021     Lab Results   Component Value Date    INR 1.13 (H) 09/16/2021    PROTIME 12.9 (H) 09/16/2021     Lab Results   Component Value Date    CHOL 163 09/17/2021     Lab Results   Component Value Date    TRIG 141 09/17/2021     Lab Results   Component Value Date    HDL 31 (L) 09/17/2021    HDL 35 (L) 05/24/2021    HDL 50 03/30/2020     Lab Results   Component Value Date    LDLCALC 104 (H) 09/17/2021    LDLCALC 110 (H) 05/24/2021    LDLCALC 43 03/30/2020     Lab Results Component Value Date    LABVLDL 28 09/17/2021    LABVLDL 43 05/24/2021    LABVLDL 17 03/30/2020     No results found for: CHOLHDLRATIO    9/19/2021 CT Chest (High-Res):  Slow progression of bilateral lower lobe alveolar consolidation with air bronchograms raises concern for chronic process such as interstitial pneumonia, eosinophilic pneumonia, and organizing pneumonia. Neoplastic etiology is considered much less likely. 9/17/2021 PE CT Scan:  No PE. Patchy densities in lower lobes (infiltrate v. Atelec). Tr pl effusion. Hilar nodes. 2020 LHC: No obstructive CAD ([facility])    9/16/2021 TTE: EF 55-60%. Tr TR. Tr effusion. Neg bubble study. RV size and fx are read normal    A/P:  50 y.o. with hypoxic respiratory failure. Issues;  -Hypoxic respiratory failure  -Non-obstructive CAD (2020 cath at [address])  -Morbid obesity  -COPD  -Smoking  -HTN  -HL  -DM  -Abnormal chest CT    Recs:  -RHC is not unreasonable, though I'm not sure it will help a whole lot. If Pulm team wants this done to complete w/u, there is a legit indication. On imaing, RV is probably upper noramal in size or so.   -NPO p mn.  -Check BNP  -BUN/Cr are 70/1.2; not much room to diurese    R/b/a discussed and pt was agreeable

## 2021-09-26 NOTE — PLAN OF CARE
Problem: Falls - Risk of:  Goal: Will remain free from falls  Description: Will remain free from falls  9/26/2021 0926 by Michael Downs RN  Note: Pt is a High Fall Risk. See Eleuterioa Blaandrew Fall Risk Score. Pt bed in low position, bed wheels locked, non-skid socks in use, bed alarm on, and 2/4 side rails up. Call light and belongings left within reach and pt encouraged to call for assistance. Will continue with hourly rounds for PO intake, pain needs, toileting, and repositioning as needed. No needs expressed at this time. Problem: OXYGENATION/RESPIRATORY FUNCTION  Goal: Patient will maintain patent airway  Note: Vital signs stable (see doc flow sheets). SpO2 level 94% on Vapotherm 40L FiO2 100%. Continuous pulse oximeter in use on bedside monitor. Lungs are diminished to ausculation. Pt reports SUAREZ and non-productive cough. Lasix gtt continues as ordered. Strict I/O monitored. Will continue to monitor and report changes in pt condition to physician. Problem: Serum Glucose Level - Abnormal:  Goal: Ability to maintain appropriate glucose levels will improve  Description: Ability to maintain appropriate glucose levels will improve  Note: Glucose level prior to [id number]. Insulin given as ordered. Will continue to monitor glucose levels ACHS as ordered.

## 2021-09-26 NOTE — PROGRESS NOTES
Pulmonology Progress Note  PGY-2    Admit Date: 9/16/2021  Hospital Day: 11  Diet: ADULT DIET; Regular; 3 carb choices (45 gm/meal); Low Sodium (2 gm); 1800 ml  Code Status: Full Code       Interval history:  Patient was seen and examined this morning. Remained afebrile overnight. Patient is on 35L of vapotherm with FiO2 60% satting in low 90s. Pt is on lasix ggt. I/O -1.495L in the past 24 hours, I/O: -8.778L since admission. Patient reports that he is doing fine. Endorses non productive cough. Denies fevers, chills, chest pain, nausea, vomiting, diarrhea, or constipation.       Medications:   Scheduled Meds:   insulin glargine  25 Units SubCUTAneous BID    insulin lispro  15 Units SubCUTAneous TID WC    predniSONE  5 mg Oral Daily    pantoprazole  40 mg Oral QAM AC    insulin lispro  0-18 Units SubCUTAneous TID WC    insulin lispro  0-9 Units SubCUTAneous Nightly    clopidogrel  75 mg Oral Daily    nicotine  1 patch TransDERmal Daily    enoxaparin  40 mg SubCUTAneous BID    [Held by provider] lisinopril  20 mg Oral Daily    gabapentin  800 mg Oral 4x Daily    atorvastatin  80 mg Oral Daily    aspirin  81 mg Oral Daily    DULoxetine  20 mg Oral BID    budesonide-formoterol  2 puff Inhalation BID    montelukast  10 mg Oral Nightly    sodium chloride flush  5-40 mL IntraVENous 2 times per day       Continuous Infusions:   furosemide (LASIX) 1mg/ml infusion 5 mg/hr (09/25/21 2017)    dextrose      sodium chloride 25 mL (09/23/21 1832)       PRN Meds:  sodium chloride, albuterol-ipratropium, glucose, dextrose, glucagon (rDNA), dextrose, sodium chloride flush, sodium chloride, ondansetron **OR** ondansetron, polyethylene glycol, acetaminophen **OR** acetaminophen    Vital/I&O/Physical examination:   VS:  /74   Pulse 102   Temp 98.4 °F (36.9 °C) (Oral)   Resp 20   Ht 4' 11\" (1.499 m)   Wt 179 lb 14.3 oz (81.6 kg)   SpO2 99%   BMI 36.33 kg/m²     I/O:    Intake/Output Summary (Last 24 No results for input(s): LACTATE in the last 72 hours. ABGs:  No results for input(s): PHART, BXG5BIN, PO2ART, ZMI8ENC, BEART, THGBART, F3CBAPTL, NSC2TPJ in the last 72 hours. UA:No results for input(s): NITRITE, COLORU, PHUR, LABCAST, WBCUA, RBCUA, MUCUS, TRICHOMONAS, YEAST, BACTERIA, CLARITYU, SPECGRAV, LEUKOCYTESUR, UROBILINOGEN, BILIRUBINUR, BLOODU, GLUCOSEU, AMORPHOUS in the last 72 hours. Invalid input(s): Leonardo Push     IMAGING:  XR CHEST PORTABLE   Final Result   Impression: Stable appearance of the chest. Bibasilar atelectasis/consolidation persists. CT CHEST HIGH RESOLUTION   Final Result      Slow progression of bilateral lower lobe alveolar consolidation with air bronchograms raises concern for chronic process such as interstitial pneumonia, eosinophilic pneumonia, and organizing pneumonia. Neoplastic etiology is considered much less likely. MRI BRAIN WO CONTRAST   Final Result      1. No acute intracranial abnormality. No evidence of acute infarct. 2. Minimal nonspecific white matter signal abnormality on FLAIR imaging suggesting minimal chronic small vessel ischemic disease and/or minimal age-related degenerative change. CTA HEAD NECK W CONTRAST   Final Result      Study limited by motion artifact and poor contrast bolus. 1. Atherosclerotic disease at bilateral carotid bifurcations. Exact percentage of stenosis by NASCET criteria is difficult to comment upon due to artifacts. 2. 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 Moderately attenuated mid vertebral artery from upper C7  to C4 5 level. Distal to its left vertebral artery is patent without significant stenosis.             PROCEDURE: CT ANGIOGRAPHY HEAD WITH/WITHOUT CONTRAST      INDICATION: aphasia, ams      COMPARISON: none      TECHNIQUE: Axial CT imaging obtained through the head prior to and following administration of IV contrast. Axial images, multiplanar reformatted images, and maximum intensity projection images were reviewed for CT angiographic technique. IV contrast: mL Omnipaque 350. FINDINGS:      ANTERIOR CIRCULATION: The intracranial internal carotid arteries, anterior cerebral arteries, and middle cerebral arteries are patent. There are calcified plaques with mild to moderate narrowing of bilateral ICA cavernous/supraclinoid segments. . No    evidence for aneurysm or arteriovenous malformation. POSTERIOR CIRCULATION: The bilateral vertebral arteries, basilar artery and posterior cerebral arteries are patent. There are calcified and noncalcified plaques with mild narrowing of left vertebral artery intradural segment. Right vertebral intradural    segment is severely attenuated, likely hyperplastic. No evidence for aneurysm or arteriovenous malformation. INTRACRANIAL VENOUS SYSTEM: No evidence for intracranial venous thrombosis. IMPRESSION:      No evidence of intracranial large vessel occlusion. 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 Calcified plaques with mild to moderate narrowing of bilateral ICA cavernous/supraclinoid segments. CT HEAD WO CONTRAST   Final Result      No acute intracranial abnormality . CT CHEST PULMONARY EMBOLISM W CONTRAST   Final Result   1. No definite pulmonary embolus identified. 2.  Mild atherosclerotic changes of the aorta. No aortic aneurysm or    dissection. 3.  Patchy densities in the lower lobes and right middle lobe may    represent atelectasis versus infectious/inflammatory process. 4.  Dependent atelectasis bilaterally. Atelectasis in the lingula. 5.  Trace right pleural effusion. 6.  Nonspecific similar mildly prominent mediastinal and hilar lymph    nodes.               Assessment & Plan:    Lindsey Dalton is a 50 y.o. male with PMH of HFpEF (2/2021 ECHO showed grade 2 diastolic dysfunction, 89% EF, LV hypertrophy), COPD on 2 L of oxygen at home, diabetes, hypertension, hyperlipidemia who presents with two weeks of worsening SOB. Acute on chronic hypoxemic hypercapnic respiratory failure:  - S/p high dose of steroid for three days  - Etiology of his hypoxic respiratory failure is unclear. - Patient has history of COPD and is usually on 2L of O2.   -  Currently, Patient is on 35L of Vapotherm with FiO2 of 60% and oxygen saturation in low 90s. - Inflammatory markers were unremarkable. - S/p SoluMedrol 125 mg q 6hrs for 3 days. However, there has not been any significant improvement in his oxygen requirement. - Covid negative   -  Strep pneumo antigen, legionella antigen, respiratory panel negative. -  Procalc 0.06  - Patient had CTPA that did not show any evidence of PE. Had echo that did not show any evidence of pulmonary hypertension, intracardiac or intrapulmonary shunting.   - No evidence of elevated methemoglobinemia.   - CT chest showed slow progression of bilateral lower lobe alveolar consolidation with air bronchograms raises concern for chronic process such as interstitial pneumonia, eosinophilic pneumonia, and organizing pneumonia. - Chest x ray on 09/23/2021 showed stable appearance of the chest, bibasilar atelectasis/consolidation persists  - Continue supplemental oxygen and wean per tolerated with oxygen goal of 88% and above  - Patient was started on lasix ggt   -Prednisone 60 mg daily  -Cardiology consulted      COPD:  There no wheezing on lung auscultation  - DuoNebs as need  - Symbicort inhaler BID  - Prednisone 60mg daily       Code Status: Full Code  ADULT DIET; Regular; 3 carb choices (45 gm/meal); Low Sodium (2 gm); 1800 ml     PPX: Lovenox       This patient will be discussed with attending, Dr. Carla Keith MD.    Michelle Rashid MD, PGY- 2  Contact via [employer]  9/26/2021,  9:30 AM  Patient examined findings as discussed with Dr. Max Li agree with assessment and plan. Subjectively, he is unchanged, never complains of being short of breath. His oxygen requirements have decreased, and it appears that aggressive steroid therapy has been helpful. We will continue prednisone at 60 mg daily.

## 2021-09-26 NOTE — PLAN OF CARE
Problem: Falls - Risk of:  Goal: Will remain free from falls  Description: Will remain free from falls  9/26/2021 0231 by Polo Mendez RN  Outcome: Ongoing  Note: Patient remains free of falls thus far this shift. Patient is alert and oriented x4. Patient utilizes call light appropriately to call for assistance with no attempts made at self transfer. Currently resting in bed quietly. Safety precautions include fall armband, fall blanket to bed, bed alarm, and non slip footwear. Bed in lowest position, bed wheels locked, bed alarm on, side rails up 2/4 and call light within reach. Will continue to monitor. Problem: Gas Exchange - Impaired  Goal: Absence of hypoxia  9/26/2021 0231 by Polo Mendez RN  Outcome: Ongoing  Note: Patient continues on vapotherm at 100% FiO2 and 40 L sating >90%. Lung sounds diminished. Denies SOB. Currently resting in bed quietly. Will continue to monitor.

## 2021-09-26 NOTE — PROGRESS NOTES
Progress Note  PGY-1    Admit Date: 9/16/2021  Day: 10  Diet: ADULT DIET; Regular; 3 carb choices (45 gm/meal); Low Sodium (2 gm); 1800 ml    CC: Chest pain + SOB    Interval history:   Nil acute overnight events reported. No New complaints today Pt continues to be on 40 L of Vapotherm and FiO2: 100%. Currently saturating at 93%. He denies any SOB or chest pain. Reports he continues to feel comfortable. I discussed BiPAP with him once again for which he stated he does not want to wear it and it felt very uncomfortable. Glucose more manageable though still high this morning 260 mg/dL. Having BMs   Infrequent non productive cough.  No palpitations, no N/V      Medications:     Scheduled Meds:   insulin glargine  25 Units SubCUTAneous BID    insulin lispro  15 Units SubCUTAneous TID WC    predniSONE  5 mg Oral Daily    pantoprazole  40 mg Oral QAM AC    insulin lispro  0-18 Units SubCUTAneous TID WC    insulin lispro  0-9 Units SubCUTAneous Nightly    clopidogrel  75 mg Oral Daily    nicotine  1 patch TransDERmal Daily    enoxaparin  40 mg SubCUTAneous BID    [Held by provider] lisinopril  20 mg Oral Daily    gabapentin  800 mg Oral 4x Daily    atorvastatin  80 mg Oral Daily    aspirin  81 mg Oral Daily    DULoxetine  20 mg Oral BID    budesonide-formoterol  2 puff Inhalation BID    montelukast  10 mg Oral Nightly    sodium chloride flush  5-40 mL IntraVENous 2 times per day     Continuous Infusions:   furosemide (LASIX) 1mg/ml infusion 5 mg/hr (09/25/21 2017)    dextrose      sodium chloride 25 mL (09/23/21 1832)     PRN Meds:sodium chloride, albuterol-ipratropium, glucose, dextrose, glucagon (rDNA), dextrose, sodium chloride flush, sodium chloride, ondansetron **OR** ondansetron, polyethylene glycol, acetaminophen **OR** acetaminophen    Objective:   Vitals:   T-max:  Patient Vitals for the past 8 hrs:   BP Temp Temp src Pulse Resp SpO2 Weight   09/26/21 0824 100/74 98.4 °F (36.9 °C) Oral 102 20 99 % --   09/26/21 0705 -- -- -- 97 -- 92 % 179 lb 14.3 oz (81.6 kg)   09/26/21 0426 100/69 98.5 °F (36.9 °C) Oral 100 18 92 % --   09/26/21 0421 -- -- -- -- -- 91 % --   09/26/21 0229 -- -- -- 100 -- 90 % --       Intake/Output Summary (Last 24 hours) at 9/26/2021 0947  Last data filed at 9/26/2021 7754  Gross per 24 hour   Intake 1397.67 ml   Output 2525 ml   Net -1127.33 ml       Review of Systems   Constitutional: Negative for chills and fever. Respiratory: Positive for cough. Negative for choking, shortness of breath and wheezing. Cardiovascular: Negative for chest pain, palpitations and leg swelling. Neurological: Negative. Physical Exam  Constitutional:       Appearance: He is obese. HENT:      Head: Normocephalic. Mouth/Throat:      Mouth: Mucous membranes are dry. Pharynx: Oropharynx is clear. Eyes:      Conjunctiva/sclera: Conjunctivae normal.      Pupils: Pupils are equal, round, and reactive to light. Cardiovascular:      Rate and Rhythm: Normal rate and regular rhythm. Pulses: Normal pulses. Heart sounds: Normal heart sounds. No murmur heard. No friction rub. No gallop. Pulmonary:      Effort: Pulmonary effort is normal. No respiratory distress. Breath sounds: Normal breath sounds. No wheezing, rhonchi or rales. Abdominal:      General: Bowel sounds are normal. There is no distension. Palpations: Abdomen is soft. Tenderness: There is no abdominal tenderness. There is no guarding. Skin:     General: Skin is warm and dry. Capillary Refill: Capillary refill takes less than 2 seconds. Neurological:      General: No focal deficit present. Mental Status: He is alert.          LABS:    CBC:   Recent Labs     09/24/21 0456 09/25/21 0514 09/26/21  0515   WBC 12.3* 15.0* 12.0*   HGB 15.7 16.7 16.1   HCT 49.0 49.2 50.1    313 293   MCV 89.7 87.6 89.6     Renal:    Recent Labs     09/24/21 0456 09/25/21  0514 09/26/21  0515   NA C7 level. Moderately attenuated mid vertebral artery from upper C7  to C4 5 level. Distal to its left vertebral artery is patent without significant stenosis. PROCEDURE: CT ANGIOGRAPHY HEAD WITH/WITHOUT CONTRAST      INDICATION: aphasia, ams      COMPARISON: none      TECHNIQUE: Axial CT imaging obtained through the head prior to and following administration of IV contrast. Axial images, multiplanar reformatted images, and maximum intensity projection images were reviewed for CT angiographic technique. IV contrast: mL Omnipaque 350. FINDINGS:      ANTERIOR CIRCULATION: The intracranial internal carotid arteries, anterior cerebral arteries, and middle cerebral arteries are patent. There are calcified plaques with mild to moderate narrowing of bilateral ICA cavernous/supraclinoid segments. . No    evidence for aneurysm or arteriovenous malformation. POSTERIOR CIRCULATION: The bilateral vertebral arteries, basilar artery and posterior cerebral arteries are patent. There are calcified and noncalcified plaques with mild narrowing of left vertebral artery intradural segment. Right vertebral intradural    segment is severely attenuated, likely hyperplastic. No evidence for aneurysm or arteriovenous malformation. INTRACRANIAL VENOUS SYSTEM: No evidence for intracranial venous thrombosis. IMPRESSION:      No evidence of intracranial large vessel occlusion. 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 Calcified plaques with mild to moderate narrowing of bilateral ICA cavernous/supraclinoid segments. CT HEAD WO CONTRAST   Final Result      No acute intracranial abnormality . CT CHEST PULMONARY EMBOLISM W CONTRAST   Final Result   1. No definite pulmonary embolus identified. 2.  Mild atherosclerotic changes of the aorta. No aortic aneurysm or    dissection.    3.  Patchy densities in the lower lobes and right middle lobe may    represent atelectasis versus infectious/inflammatory process. 4.  Dependent atelectasis bilaterally. Atelectasis in the lingula. 5.  Trace right pleural effusion. 6.  Nonspecific similar mildly prominent mediastinal and hilar lymph    nodes. Assessment/Plan:   Haley Ross is a 50 y.o. male, PMHx HFpEF, COPD on 2 L of oxygen at home, diabetes, HTN,  who presents with two weeks of worsening SOB.      Acute hypoxic and hypercapnic respiratory failure  Pt is on 2L Home Oxygen. Now on Vapotherm 40 L FiO2 100%. CT Chest with trace pleural effusion, and bilateral lower lobe and right middle lobe atelectasis versus infectious/inflammatory process.  Covid negative. Procal 0.06 which lowers suspicion for any concomitant bacterial infection. Viral respiratory panel was unremarkable. CHF exacerbation vs COPD exacerbation - both seem unlikely at this point since pt has been treated and has has no improvement. Query Component of pulmonary hypertension though Echo not reflective of this. ABDI negative. CRP 7.1 (decreased from 16.2). ESR: 22. But pt was already on steroids. May need a repeat after steroids d/c   Leukocytosis improving 14.8 -> 12.3 -> 15.0 -> 12 but likely due to high dose steroids. ABG(9/23): pH: 7.401, pCO2: 53.5, pO2: 52.3   CXR (9/23): Stable appearance of the chest. Bibasilar atelectasis/consolidation persists  Solumedrol 9/21-24  - On prednisone 5mg   - Lasix Iv infusion at 5mg/hr continues  - Incentive spirometery. - Home Symbicort  - Home Singulair  - Combivent Respimat as needed  - Wean down for O2 Sat more than 88% given history of COPD  - Consult cardiology to to evaluate for RHC  - To obtain old notes from [facility] concerning previous Heart caths. - Pulmonology following                COPD  Low suspicion for exacerbation. He has been on Nebs/inhalers and Steroids with no improvement.    Received 2 doses Decadron 10 (9/17) and Solu-Medrol 125mg ever 6 hrs for 3 days (-)        HFpEF   ECHO 55-60% in 2021  Low suspicion for exacerbation. Has had diuresis with limited improvement. Patient not overtly volume overloaded, could not appreciate JVD but this is complicated by body habitus. No lower extremity edema. no pleural effusion on imaging, consistent with exam.  ProBNP: 3129 -> 371 -> 160 -> 124 - improvement so likely there was some exarcerbation but with BNP decreasing, Pt has still not improved respiratory wise. Good Urine output over past 24 hours 2475 mL  - On lasix gtt  - BNP Q3days     Diabetes type 2  HbA1c: 7.1 (2021)  B  Hyperglycemia likely due to high dose steroids. Last dose of steroids given today therefore will continue to monitor Glucose trends. - HDSSI   - Increased Lantus to 25 U twice daily   To observe POCT throughout the day and if its still not controlled to increase lantus  - Increased Lispro to 15 Units TID   - Hypoglycemia protocol     LILLIAN   Cr: 1.1 -> 0.8 -> 1.1 -> 1.2  Like due to pre renal causes and diuretic use   - PO intake of fluids  - RFTs Twice daily  - Avoid Nephrotoxins  - Avoid NSAIDs + ACE inhibitors     Hyperkalemia - resolved  K+: 5.3 -> 4.4 -> 3.7     Hyponatremia  Likely diet related as pt is on a low salt diet  Na: 133 -> 135  Snow: <20 , UrCr: 58.3     TIA r/o   Brief episode of expressive aphasia. Rapid response called . CT head, CTA head and neck no evidence of intracranial large vessel occlusion. L vertebral artery occluded. Echo normal.  MRI brain without acute process, small vessel ischemic disease present.  Folate normal, B12 normal  - Neurology reviewed  - Continue ASA, Plavix + Atorvastatin.      CAD/PVD  Patient on long-term DAPT.  Per cardiologist note Dr. Carlota Sam in February, this was supposed to be stopped. - Plavix was initially stopped due to home medication reconciliation.  However, restarted  overnight due to concern of TIA   - ASA      Chronic Issues  HTN - continue home meds  HLD - Continue home meds  Neuropathy - continue home gabapentin  Smoking cessation - Patient with significant smoking history 2.5 PPD since 10years old. Received education. Nicoderm patch.      Screening:  FOBT done as an IP and is positive. Upon d/s, patient will benefit from GI outpatient or a colonoscopy. Code Status: Full Code No additional code details  FEN: ADULT DIET; Regular; 3 carb choices (45 gm/meal);  Low Sodium (2 gm); 1800 ml  PPX: Lovenox  DISPO: Miguelito Kramer MD, PGY-1  Internal Medicine Resident  09/26/21  9:47 AM    This patient has been staffed and discussed with Doyle Wills MD.

## 2021-09-26 NOTE — PROGRESS NOTES
Spoke with patient and his wife today in regards to whether or not he would be okay with intubation. Patient stated that he would be okay with intubation for 14 days at this time.

## 2021-09-26 NOTE — PROGRESS NOTES
Pt appears comfortable on vapotherm 40lpm 100%. Pt not wearing bipap, stating he prefers vapotherm.  spo2 92%

## 2021-09-26 NOTE — PROGRESS NOTES
Pt's wife Lakeshia updated via phone. All questions answered at this time.  Electronically signed by Marlys Brian RN on 9/26/2021 at 12:13 PM

## 2021-09-27 PROBLEM — R09.02 SEVERE HYPOXEMIA: Status: ACTIVE | Noted: 2021-09-27

## 2021-09-27 PROBLEM — J84.89 ORGANIZING PNEUMONIA (HCC): Status: ACTIVE | Noted: 2021-09-27

## 2021-09-27 LAB
ALBUMIN SERPL-MCNC: 3.5 G/DL (ref 3.4–5)
ALBUMIN SERPL-MCNC: 3.6 G/DL (ref 3.4–5)
ANION GAP SERPL CALCULATED.3IONS-SCNC: 15 MMOL/L (ref 3–16)
ANION GAP SERPL CALCULATED.3IONS-SCNC: 16 MMOL/L (ref 3–16)
BASOPHILS ABSOLUTE: 0 K/UL (ref 0–0.2)
BASOPHILS RELATIVE PERCENT: 0.4 %
BUN BLDV-MCNC: 61 MG/DL (ref 7–20)
BUN BLDV-MCNC: 64 MG/DL (ref 7–20)
CALCIUM SERPL-MCNC: 9.5 MG/DL (ref 8.3–10.6)
CALCIUM SERPL-MCNC: 9.9 MG/DL (ref 8.3–10.6)
CHLORIDE BLD-SCNC: 92 MMOL/L (ref 99–110)
CHLORIDE BLD-SCNC: 93 MMOL/L (ref 99–110)
CO2: 24 MMOL/L (ref 21–32)
CO2: 26 MMOL/L (ref 21–32)
CREAT SERPL-MCNC: 1 MG/DL (ref 0.9–1.3)
CREAT SERPL-MCNC: 1.2 MG/DL (ref 0.9–1.3)
EOSINOPHILS ABSOLUTE: 0.2 K/UL (ref 0–0.6)
EOSINOPHILS RELATIVE PERCENT: 1.2 %
GFR AFRICAN AMERICAN: >60
GFR AFRICAN AMERICAN: >60
GFR NON-AFRICAN AMERICAN: >60
GFR NON-AFRICAN AMERICAN: >60
GLUCOSE BLD-MCNC: 216 MG/DL (ref 70–99)
GLUCOSE BLD-MCNC: 226 MG/DL (ref 70–99)
GLUCOSE BLD-MCNC: 231 MG/DL (ref 70–99)
GLUCOSE BLD-MCNC: 237 MG/DL (ref 70–99)
GLUCOSE BLD-MCNC: 417 MG/DL (ref 70–99)
GLUCOSE BLD-MCNC: 434 MG/DL (ref 70–99)
GLUCOSE BLD-MCNC: 495 MG/DL (ref 70–99)
HCT VFR BLD CALC: 49.7 % (ref 40.5–52.5)
HEMOGLOBIN: 16 G/DL (ref 13.5–17.5)
LYMPHOCYTES ABSOLUTE: 1.9 K/UL (ref 1–5.1)
LYMPHOCYTES RELATIVE PERCENT: 14.9 %
MAGNESIUM: 1.7 MG/DL (ref 1.8–2.4)
MCH RBC QN AUTO: 28.8 PG (ref 26–34)
MCHC RBC AUTO-ENTMCNC: 32.1 G/DL (ref 31–36)
MCV RBC AUTO: 89.7 FL (ref 80–100)
MONOCYTES ABSOLUTE: 0.8 K/UL (ref 0–1.3)
MONOCYTES RELATIVE PERCENT: 6.2 %
NEUTROPHILS ABSOLUTE: 10 K/UL (ref 1.7–7.7)
NEUTROPHILS RELATIVE PERCENT: 77.3 %
PDW BLD-RTO: 17.2 % (ref 12.4–15.4)
PERFORMED ON: ABNORMAL
PHOSPHORUS: 5.1 MG/DL (ref 2.5–4.9)
PHOSPHORUS: 5.9 MG/DL (ref 2.5–4.9)
PLATELET # BLD: 292 K/UL (ref 135–450)
PMV BLD AUTO: 9.9 FL (ref 5–10.5)
POTASSIUM SERPL-SCNC: 3.6 MMOL/L (ref 3.5–5.1)
POTASSIUM SERPL-SCNC: 4.9 MMOL/L (ref 3.5–5.1)
RBC # BLD: 5.54 M/UL (ref 4.2–5.9)
SODIUM BLD-SCNC: 131 MMOL/L (ref 136–145)
SODIUM BLD-SCNC: 135 MMOL/L (ref 136–145)
WBC # BLD: 12.9 K/UL (ref 4–11)

## 2021-09-27 PROCEDURE — 2700000000 HC OXYGEN THERAPY PER DAY

## 2021-09-27 PROCEDURE — 2580000003 HC RX 258: Performed by: INTERNAL MEDICINE

## 2021-09-27 PROCEDURE — 94761 N-INVAS EAR/PLS OXIMETRY MLT: CPT

## 2021-09-27 PROCEDURE — 80069 RENAL FUNCTION PANEL: CPT

## 2021-09-27 PROCEDURE — 6370000000 HC RX 637 (ALT 250 FOR IP): Performed by: STUDENT IN AN ORGANIZED HEALTH CARE EDUCATION/TRAINING PROGRAM

## 2021-09-27 PROCEDURE — 85025 COMPLETE CBC W/AUTO DIFF WBC: CPT

## 2021-09-27 PROCEDURE — 6370000000 HC RX 637 (ALT 250 FOR IP): Performed by: INTERNAL MEDICINE

## 2021-09-27 PROCEDURE — 99233 SBSQ HOSP IP/OBS HIGH 50: CPT | Performed by: INTERNAL MEDICINE

## 2021-09-27 PROCEDURE — 6360000002 HC RX W HCPCS: Performed by: STUDENT IN AN ORGANIZED HEALTH CARE EDUCATION/TRAINING PROGRAM

## 2021-09-27 PROCEDURE — 83735 ASSAY OF MAGNESIUM: CPT

## 2021-09-27 PROCEDURE — 36415 COLL VENOUS BLD VENIPUNCTURE: CPT

## 2021-09-27 PROCEDURE — 94640 AIRWAY INHALATION TREATMENT: CPT

## 2021-09-27 PROCEDURE — 6360000002 HC RX W HCPCS: Performed by: INTERNAL MEDICINE

## 2021-09-27 PROCEDURE — 6370000000 HC RX 637 (ALT 250 FOR IP): Performed by: NURSE PRACTITIONER

## 2021-09-27 PROCEDURE — 2060000000 HC ICU INTERMEDIATE R&B

## 2021-09-27 PROCEDURE — 2580000003 HC RX 258: Performed by: STUDENT IN AN ORGANIZED HEALTH CARE EDUCATION/TRAINING PROGRAM

## 2021-09-27 PROCEDURE — 94660 CPAP INITIATION&MGMT: CPT

## 2021-09-27 RX ORDER — POTASSIUM CHLORIDE 20 MEQ/1
40 TABLET, EXTENDED RELEASE ORAL ONCE
Status: COMPLETED | OUTPATIENT
Start: 2021-09-27 | End: 2021-09-27

## 2021-09-27 RX ORDER — INSULIN LISPRO 100 [IU]/ML
18 INJECTION, SOLUTION INTRAVENOUS; SUBCUTANEOUS
Status: DISCONTINUED | OUTPATIENT
Start: 2021-09-27 | End: 2021-09-28

## 2021-09-27 RX ORDER — MAGNESIUM SULFATE IN WATER 40 MG/ML
2000 INJECTION, SOLUTION INTRAVENOUS ONCE
Status: COMPLETED | OUTPATIENT
Start: 2021-09-27 | End: 2021-09-28

## 2021-09-27 RX ADMIN — GABAPENTIN 800 MG: 400 CAPSULE ORAL at 16:58

## 2021-09-27 RX ADMIN — PREDNISONE 60 MG: 10 TABLET ORAL at 10:13

## 2021-09-27 RX ADMIN — Medication 10 ML: at 10:20

## 2021-09-27 RX ADMIN — MAGNESIUM SULFATE HEPTAHYDRATE 2000 MG: 2 INJECTION, SOLUTION INTRAVENOUS at 21:24

## 2021-09-27 RX ADMIN — Medication 10 ML: at 21:06

## 2021-09-27 RX ADMIN — ASPIRIN 81 MG: 81 TABLET, COATED ORAL at 10:13

## 2021-09-27 RX ADMIN — GABAPENTIN 800 MG: 400 CAPSULE ORAL at 21:06

## 2021-09-27 RX ADMIN — CLOPIDOGREL BISULFATE 75 MG: 75 TABLET ORAL at 10:14

## 2021-09-27 RX ADMIN — DULOXETINE 20 MG: 20 CAPSULE, DELAYED RELEASE ORAL at 10:14

## 2021-09-27 RX ADMIN — ENOXAPARIN SODIUM 40 MG: 40 INJECTION SUBCUTANEOUS at 21:05

## 2021-09-27 RX ADMIN — ENOXAPARIN SODIUM 40 MG: 40 INJECTION SUBCUTANEOUS at 10:14

## 2021-09-27 RX ADMIN — GABAPENTIN 800 MG: 400 CAPSULE ORAL at 14:03

## 2021-09-27 RX ADMIN — INSULIN LISPRO 18 UNITS: 100 INJECTION, SOLUTION INTRAVENOUS; SUBCUTANEOUS at 14:04

## 2021-09-27 RX ADMIN — GABAPENTIN 800 MG: 400 CAPSULE ORAL at 10:14

## 2021-09-27 RX ADMIN — INSULIN LISPRO 9 UNITS: 100 INJECTION, SOLUTION INTRAVENOUS; SUBCUTANEOUS at 21:13

## 2021-09-27 RX ADMIN — INSULIN LISPRO 6 UNITS: 100 INJECTION, SOLUTION INTRAVENOUS; SUBCUTANEOUS at 14:06

## 2021-09-27 RX ADMIN — BUDESONIDE AND FORMOTEROL FUMARATE DIHYDRATE 2 PUFF: 80; 4.5 AEROSOL RESPIRATORY (INHALATION) at 08:30

## 2021-09-27 RX ADMIN — ATORVASTATIN CALCIUM 80 MG: 80 TABLET, FILM COATED ORAL at 10:13

## 2021-09-27 RX ADMIN — POTASSIUM CHLORIDE 40 MEQ: 1500 TABLET, EXTENDED RELEASE ORAL at 21:10

## 2021-09-27 RX ADMIN — MONTELUKAST 10 MG: 10 TABLET, FILM COATED ORAL at 21:06

## 2021-09-27 RX ADMIN — DULOXETINE 20 MG: 20 CAPSULE, DELAYED RELEASE ORAL at 21:06

## 2021-09-27 RX ADMIN — INSULIN LISPRO 18 UNITS: 100 INJECTION, SOLUTION INTRAVENOUS; SUBCUTANEOUS at 18:29

## 2021-09-27 RX ADMIN — FUROSEMIDE 5 MG/HR: 10 INJECTION, SOLUTION INTRAMUSCULAR; INTRAVENOUS at 10:34

## 2021-09-27 RX ADMIN — BUDESONIDE AND FORMOTEROL FUMARATE DIHYDRATE 2 PUFF: 80; 4.5 AEROSOL RESPIRATORY (INHALATION) at 20:59

## 2021-09-27 RX ADMIN — INSULIN LISPRO 6 UNITS: 100 INJECTION, SOLUTION INTRAVENOUS; SUBCUTANEOUS at 10:16

## 2021-09-27 ASSESSMENT — PAIN SCALES - GENERAL
PAINLEVEL_OUTOF10: 0

## 2021-09-27 ASSESSMENT — ENCOUNTER SYMPTOMS
SHORTNESS OF BREATH: 0
COUGH: 1
WHEEZING: 0
CHOKING: 0

## 2021-09-27 NOTE — PLAN OF CARE
Problem: Falls - Risk of:  Goal: Will remain free from falls  Description: Will remain free from falls  9/27/2021 1418 by DIO GRIER  Outcome: Ongoing  Note: RN educated patient on using call light for safety in ambulation to bathroom and back to bed  9/27/2021 0224 by Tete Anthony RN  Outcome: Ongoing  Goal: Absence of physical injury  Description: Absence of physical injury  Outcome: Ongoing     Problem: Airway Clearance - Ineffective  Goal: Achieve or maintain patent airway  Outcome: Ongoing     Problem: Gas Exchange - Impaired  Goal: Absence of hypoxia  9/27/2021 1418 by Marilee Perez  Outcome: Ongoing  9/27/2021 0224 by Tete Anthony RN  Outcome: Ongoing  Goal: Promote optimal lung function  Outcome: Ongoing     Problem: Breathing Pattern - Ineffective  Goal: Ability to achieve and maintain a regular respiratory rate  9/27/2021 1418 by DIO GRIER  Outcome: Ongoing  Note: RN educated patient on cough and deep breathing  9/27/2021 0224 by Tete Anthony RN  Outcome: Ongoing     Problem:  Body Temperature -  Risk of, Imbalanced  Goal: Ability to maintain a body temperature within defined limits  Outcome: Ongoing  Goal: Will regain or maintain usual level of consciousness  Outcome: Ongoing  Goal: Complications related to the disease process, condition or treatment will be avoided or minimized  Outcome: Ongoing     Problem: Isolation Precautions - Risk of Spread of Infection  Goal: Prevent transmission of infection  Outcome: Ongoing     Problem: Nutrition Deficits  Goal: Optimize nutritional status  Outcome: Ongoing     Problem: Risk for Fluid Volume Deficit  Goal: Maintain normal heart rhythm  Outcome: Ongoing  Goal: Maintain absence of muscle cramping  Outcome: Ongoing  Goal: Maintain normal serum potassium, sodium, calcium, phosphorus, and pH  Outcome: Ongoing     Problem: Loneliness or Risk for Loneliness  Goal: Demonstrate positive use of time alone when socialization is not possible  Outcome: Ongoing     Problem: Fatigue  Goal: Verbalize increase energy and improved vitality  Outcome: Ongoing     Problem: Patient Education: Go to Patient Education Activity  Goal: Patient/Family Education  Outcome: Ongoing     Problem: Discharge Planning:  Goal: Discharged to appropriate level of care  Description: Discharged to appropriate level of care  Outcome: Ongoing     Problem: Serum Glucose Level - Abnormal:  Goal: Ability to maintain appropriate glucose levels will improve  Description: Ability to maintain appropriate glucose levels will improve  9/27/2021 1418 by Demetri Linear  Outcome: Ongoing  9/27/2021 0224 by Liana Boast, RN  Outcome: Ongoing     Problem: Sensory Perception - Impaired:  Goal: Ability to maintain a stable neurologic state will improve  Description: Ability to maintain a stable neurologic state will improve  Outcome: Ongoing     Problem: Gas Exchange - Impaired:  Goal: Levels of oxygenation will improve  Description: Levels of oxygenation will improve  9/27/2021 1418 by Demetri Linear  Outcome: Ongoing  9/27/2021 0224 by Liana Boast, RN  Outcome: Ongoing     Problem: OXYGENATION/RESPIRATORY FUNCTION  Goal: Patient will maintain patent airway  Outcome: Ongoing  Goal: Patient will achieve/maintain normal respiratory rate/effort  Description: Respiratory rate and effort will be within normal limits for the patient  Outcome: Ongoing     Problem: HEMODYNAMIC STATUS  Goal: Patient has stable vital signs and fluid balance  9/27/2021 1418 by Demetri Linear  Outcome: Ongoing  9/27/2021 0224 by Liana Boast, RN  Outcome: Ongoing     Problem: FLUID AND ELECTROLYTE IMBALANCE  Goal: Fluid and electrolyte balance are achieved/maintained  Outcome: Ongoing     Problem: ACTIVITY INTOLERANCE/IMPAIRED MOBILITY  Goal: Mobility/activity is maintained at optimum level for patient  Outcome: Ongoing

## 2021-09-27 NOTE — PROGRESS NOTES
CC: [facility]  HPI:   50 y.o. admitted 2021 with sob. Has had contd sob and has been treated wfor COPD exacerbation with steroids and nebs. Remains on Vapotherm at 40L, FiO2 100%. S: No chest pain or LH/diziness. Breathing ok. On 14 Liters     Tele: Sinus     O:  Physical Exam:  /76   Pulse 87   Temp 98.6 °F (37 °C) (Oral)   Resp 14   Ht 4' 11\" (1.499 m)   Wt 174 lb 9.7 oz (79.2 kg)   SpO2 91%   BMI 35.27 kg/m²    General (appearance):  No acute distress  Eyes: anicteric   Neck: soft, No JVD  Ears/Nose/Mouth/Thorat: No cyanosis  CV: RRR   Respiratory:  Diminished, on vapotherm  GI: soft, non-tender, non-distended  Skin: Warm, dry. No rashes  Neuro/Psych: Alert and oriented x 3. Appropriate behavior  Ext:  No c/c.  No  edema  Pulses:  2+ radial     I.O's=     Weight  Admission: Weight: 185 lb 10 oz (84.2 kg)   Today: Weight: 174 lb 9.7 oz (79.2 kg)  Lab Results   Component Value Date    WBC 16.4 (H) 2021    HGB 16.9 2021    HCT 51.4 2021    MCV 87.8 2021     2021       CBC: Recent Labs     21  0514 21  0515 21  0516   WBC 15.0* 12.0* 12.9*   HGB 16.7 16.1 16.0   HCT 49.2 50.1 49.7   MCV 87.6 89.6 89.7    293 292     BMP:   Lab Results   Component Value Date     2021    K 4.6 2021    K 5.9 2021    CL 91 2021    CO2 22 2021    BUN 67 2021    CREATININE 1.1 2021    GLUCOSE 168 2021    CALCIUM 9.9 2021        Recent Labs     21  0515 21  1520 21  0516   * 132* 135*   K 3.7 4.3 3.6   CL 95* 94* 93*   CO2 28 22 26   PHOS  --  4.2 5.1*   BUN 70* 67* 61*   CREATININE 1.2 1.1 1.0     Mag:   Lab Results   Component Value Date    MG 1.70 2021     Pro-BNP:     Lab Results   Component Value Date    PROBNP 124 2021    PROBNP 160 2021    PROBNP 371 2021         Imagin2021 Echo:   Summary   Normal left ventricle size, wall thickness, [facility])  -Obesity  -COPD  -Smoking  -HTN  -HLD  -DM  -Abnormal CT chest     Plan:  Discussed with Dr. Angelo Griffin. No RHC at this time.    ASA, plavix  Statin  Lasix gtt

## 2021-09-27 NOTE — PROGRESS NOTES
Pulmonology Progress Note  PGY-2    Admit Date: 9/16/2021  Hospital Day: 12  Diet: Diet NPO Exceptions are: Sips of Water with Meds  Code Status: Full Code       Interval history:  Patient was seen and examined this morning. Remained afebrile overnight. Patient is on 35L of vapotherm with FiO2 60% satting in low 90s. Pt is on lasix ggt. I/O -1.45L in the past 24 hours, I/O: -10.79L since admission. Patient mentions that he is doing good. He denies having fevers, chills, chest pain, nausea, vomiting, diarrhea, or constipation. Mentions that he hopes to go home soon.      Medications:   Scheduled Meds:   magnesium sulfate  2,000 mg IntraVENous Once    potassium chloride  40 mEq Oral Once    insulin glargine  28 Units SubCUTAneous BID    insulin lispro  18 Units SubCUTAneous TID WC    predniSONE  60 mg Oral Daily    pantoprazole  40 mg Oral QAM AC    insulin lispro  0-18 Units SubCUTAneous TID WC    insulin lispro  0-9 Units SubCUTAneous Nightly    clopidogrel  75 mg Oral Daily    nicotine  1 patch TransDERmal Daily    enoxaparin  40 mg SubCUTAneous BID    [Held by provider] lisinopril  20 mg Oral Daily    gabapentin  800 mg Oral 4x Daily    atorvastatin  80 mg Oral Daily    aspirin  81 mg Oral Daily    DULoxetine  20 mg Oral BID    budesonide-formoterol  2 puff Inhalation BID    montelukast  10 mg Oral Nightly    sodium chloride flush  5-40 mL IntraVENous 2 times per day       Continuous Infusions:   furosemide (LASIX) 1mg/ml infusion 5 mg/hr (09/26/21 1249)    dextrose      sodium chloride 25 mL (09/23/21 1832)       PRN Meds:  sodium chloride, albuterol-ipratropium, glucose, dextrose, glucagon (rDNA), dextrose, sodium chloride flush, sodium chloride, ondansetron **OR** ondansetron, polyethylene glycol, acetaminophen **OR** acetaminophen    Vital/I&O/Physical examination:   VS:  /84   Pulse 96   Temp 98.5 °F (36.9 °C) (Oral)   Resp 21   Ht 4' 11\" (1.499 m)   Wt 174 lb 9.7 oz (79.2 kg)   SpO2 92%   BMI 35.27 kg/m²     I/O:    Intake/Output Summary (Last 24 hours) at 9/27/2021 0838  Last data filed at 9/27/2021 0436  Gross per 24 hour   Intake 1200 ml   Output 2800 ml   Net -1600 ml       PE:  Physical Exam  Vitals reviewed. Constitutional:       Appearance: Normal appearance. HENT:      Head: Normocephalic and atraumatic. Nose: Nose normal.      Mouth/Throat:      Mouth: Mucous membranes are moist.   Eyes:      Extraocular Movements: Extraocular movements intact. Conjunctiva/sclera: Conjunctivae normal.      Pupils: Pupils are equal, round, and reactive to light. Cardiovascular:      Rate and Rhythm: Normal rate and regular rhythm. Pulses: Normal pulses. Heart sounds: Normal heart sounds. Pulmonary:      Comments: Diffuse crackle on lung auscultation. On 35L of Vapotherm with FiO2 of 60 and SpO2 in high 90s. Abdominal:      Palpations: Abdomen is soft. Musculoskeletal:         General: Normal range of motion. Cervical back: Normal range of motion and neck supple. Neurological:      General: No focal deficit present. Mental Status: He is alert and oriented to person, place, and time. Labs & Imaging:   LABS:  Renal:   Recent Labs     09/26/21  0515 09/26/21  1520 09/27/21  0516   * 132* 135*   K 3.7 4.3 3.6   CL 95* 94* 93*   CO2 28 22 26   BUN 70* 67* 61*   CREATININE 1.2 1.1 1.0   GLUCOSE 260* 268* 226*   ANIONGAP 12 16 16     CBC:   Recent Labs     09/25/21  0514 09/26/21  0515 09/27/21  0516   WBC 15.0* 12.0* 12.9*   HGB 16.7 16.1 16.0   HCT 49.2 50.1 49.7    293 292   MCV 87.6 89.6 89.7                            Hepatic: No results for input(s): AST, ALT, ALB, BILITOT, ALKPHOS in the last 72 hours. Troponin: No results for input(s): TROPONINI in the last 72 hours. BNP: No results for input(s): BNP in the last 72 hours. Lipids: No results for input(s): CHOL, HDL in the last 72 hours.     Invalid input(s): LDLCALCU, TRIGLYCERIDE  INR: No results for input(s): INR in the last 72 hours. Lactate: No results for input(s): LACTATE in the last 72 hours. ABGs:  No results for input(s): PHART, EXO1EMO, PO2ART, WYR3BUS, BEART, THGBART, O6JOPXVA, RTJ7ILL in the last 72 hours. UA:No results for input(s): NITRITE, COLORU, PHUR, LABCAST, WBCUA, RBCUA, MUCUS, TRICHOMONAS, YEAST, BACTERIA, CLARITYU, SPECGRAV, LEUKOCYTESUR, UROBILINOGEN, BILIRUBINUR, BLOODU, GLUCOSEU, AMORPHOUS in the last 72 hours. Invalid input(s): [place]     IMAGING:  XR CHEST PORTABLE   Final Result   Impression: Stable appearance of the chest. Bibasilar atelectasis/consolidation persists. CT CHEST HIGH RESOLUTION   Final Result      Slow progression of bilateral lower lobe alveolar consolidation with air bronchograms raises concern for chronic process such as interstitial pneumonia, eosinophilic pneumonia, and organizing pneumonia. Neoplastic etiology is considered much less likely. MRI BRAIN WO CONTRAST   Final Result      1. No acute intracranial abnormality. No evidence of acute infarct. 2. Minimal nonspecific white matter signal abnormality on FLAIR imaging suggesting minimal chronic small vessel ischemic disease and/or minimal age-related degenerative change. CTA HEAD NECK W CONTRAST   Final Result      Study limited by motion artifact and poor contrast bolus. 1. Atherosclerotic disease at bilateral carotid bifurcations. Exact percentage of stenosis by NASCET criteria is difficult to comment upon due to artifacts. 2. 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 Moderately attenuated mid vertebral artery from upper C7  to C4 5 level. Distal to its left vertebral artery is patent without significant stenosis.             PROCEDURE: CT ANGIOGRAPHY HEAD WITH/WITHOUT CONTRAST      INDICATION: aphasia, ams      COMPARISON: none      TECHNIQUE: Axial CT imaging obtained through the head prior to and following administration of IV contrast. Axial images, multiplanar reformatted images, and maximum intensity projection images were reviewed for CT angiographic technique. IV contrast: mL Omnipaque 350. FINDINGS:      ANTERIOR CIRCULATION: The intracranial internal carotid arteries, anterior cerebral arteries, and middle cerebral arteries are patent. There are calcified plaques with mild to moderate narrowing of bilateral ICA cavernous/supraclinoid segments. . No    evidence for aneurysm or arteriovenous malformation. POSTERIOR CIRCULATION: The bilateral vertebral arteries, basilar artery and posterior cerebral arteries are patent. There are calcified and noncalcified plaques with mild narrowing of left vertebral artery intradural segment. Right vertebral intradural    segment is severely attenuated, likely hyperplastic. No evidence for aneurysm or arteriovenous malformation. INTRACRANIAL VENOUS SYSTEM: No evidence for intracranial venous thrombosis. IMPRESSION:      No evidence of intracranial large vessel occlusion. 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 Calcified plaques with mild to moderate narrowing of bilateral ICA cavernous/supraclinoid segments. CT HEAD WO CONTRAST   Final Result      No acute intracranial abnormality . CT CHEST PULMONARY EMBOLISM W CONTRAST   Final Result   1. No definite pulmonary embolus identified. 2.  Mild atherosclerotic changes of the aorta. No aortic aneurysm or    dissection. 3.  Patchy densities in the lower lobes and right middle lobe may    represent atelectasis versus infectious/inflammatory process. 4.  Dependent atelectasis bilaterally. Atelectasis in the lingula. 5.  Trace right pleural effusion. 6.  Nonspecific similar mildly prominent mediastinal and hilar lymph    nodes.               Assessment & Plan:    Torsten Mcneil is a 50 y.o. male with PMH of HFpEF (2/2021 ECHO showed grade 2 diastolic dysfunction, 98% EF, LV hypertrophy), COPD on 2 L of oxygen at home, diabetes, hypertension, hyperlipidemia who presents with two weeks of worsening SOB. Acute on chronic hypoxemic hypercapnic respiratory failure: (improving)  - S/p high dose of steroid for three days  - Etiology of his hypoxic respiratory failure is unclear. - Patient has history of COPD and is usually on 2L of O2.    - Inflammatory markers were unremarkable. - S/p SoluMedrol 125 mg q 6hrs for 3 days. - Covid negative   -  Strep pneumo antigen, legionella antigen, respiratory panel negative. -  Procalc 0.06  - Patient had CTPA that did not show any evidence of PE. Had echo that did not show any evidence of pulmonary hypertension, intracardiac or intrapulmonary shunting.   - No evidence of elevated methemoglobinemia.   - CT chest showed slow progression of bilateral lower lobe alveolar consolidation with air bronchograms raises concern for chronic process such as interstitial pneumonia, eosinophilic pneumonia, and organizing pneumonia. - Chest x ray on 09/23/2021 showed stable appearance of the chest, bibasilar atelectasis/consolidation persists  - Continue supplemental oxygen and wean per tolerated with oxygen goal of 88% and above  - Patient was started on lasix ggt   - Continue prednisone 60 mg daily  - This morning, Patient was on 35L of Vapotherm with FiO2 of 60% and oxygen saturation in high 90s. Decreased his oxygen to 30L of Vapotherm with FiO2 of 60%, patient continue to have oxygen saturation in low to mid 90s. His respiratory function has been improving for the past two days.         COPD:  There no wheezing on lung auscultation  - DuoNebs as need  - Symbicort inhaler BID  - Prednisone 60mg daily       Code Status: Full Code  Diet NPO Exceptions are: Sips of Water with Meds     PPX: Lovenox       This patient will be discussed with attending, Dr. Chelle Falcon MD.    Barry Wagner MD, PGY- 2  Contact via [employer]  9/27/2021,  8:38 AM  Patient examined, findings as discussed with Dr. Keron Olivia. Agree with assessment and plan. Oxygenation status continues to improve. There is question what treatment has been particularly helpful, as in the past 3 days, is urine output has been 1500 mL/day. He has remained on prednisone 40 to 60 mg daily. Response to prednisone could certainly be delayed. Continuing both Lasix infusion and prednisone 60 mg daily and monitor progress. Evaluating changes in chest CT when oxygen requirement falls substantially may be helpful.

## 2021-09-27 NOTE — PROGRESS NOTES
RN spoke with spouse concerning POC along with status, no concerns or questions. Heart cath canceled until tomorrow afternoon 9/28/21-patient and family are aware.

## 2021-09-27 NOTE — CARE COORDINATION
Case Management Assessment           Daily Note                 Date/ Time of Note: 9/27/2021 3:11 PM         Note completed by: Krishan Tang RN    Patient Name: Bob Camp  YOB: 1965    Diagnosis:COPD exacerbation ([address] [J44.1]  Patient Admission Status: Inpatient    Date of Admission:9/16/2021 10:06 PM Length of Stay: 11 GLOS: GMLOS: 4.33    Current Plan of Care: lasix gtt, RHC today, vapotherm 35L @ 60%  ________________________________________________________________________________________  PT AM-PAC:   / 24 per last evaluation on: not ordered    OT AM-PAC:   / 24 per last evaluation on: not ordered    DME Needs for discharge:   ________________________________________________________________________________________  Discharge Plan: Home    Tentative discharge date: TBD    Current barriers to discharge: weaning O2, RHC today, Lasix gtt    Referrals completed: Not Applicable    Resources/ information provided: Not indicated at this time  ________________________________________________________________________________________  Case Management Notes:  continues to follow for DC planning and needs. Patient continues on lasix gtt and weaning on O2, currently on 35L vapotherm at 60% FiO2. Patient plans for return home at DC, may benefit from [address] at DC and may need home O2 at DC. Kvng Otero and his family were provided with choice of provider; he and his family are in agreement with the discharge plan.     Care Transition Patient: Ele Tang RN  [facility]  Case Management Department  Ph: [phone]  Fax: [phone]

## 2021-09-27 NOTE — PLAN OF CARE
Problem: Falls - Risk of:  Goal: Will remain free from falls  Description: Will remain free from falls  Outcome: Ongoing     Problem: Gas Exchange - Impaired  Goal: Absence of hypoxia  Outcome: Ongoing     Problem: Breathing Pattern - Ineffective  Goal: Ability to achieve and maintain a regular respiratory rate  Outcome: Ongoing     Problem: Serum Glucose Level - Abnormal:  Goal: Ability to maintain appropriate glucose levels will improve  Description: Ability to maintain appropriate glucose levels will improve  Outcome: Ongoing     Problem: Gas Exchange - Impaired:  Goal: Levels of oxygenation will improve  Description: Levels of oxygenation will improve  Outcome: Ongoing     Problem: HEMODYNAMIC STATUS  Goal: Patient has stable vital signs and fluid balance  Outcome: Ongoing

## 2021-09-27 NOTE — PROGRESS NOTES
Progress Note  PGY-1    Admit Date: 9/16/2021  Day: 11  Diet: Diet NPO Exceptions are: Sips of Water with Meds    CC: Chest pain + SOB    Interval history:   Nil acute overnight events reported. Pt seen at bedside. He has been improving on his oxygen requirements. Now on 35 L Vapotherm with FiO2: 60% and SpO2 88-95%.    Continues on lasix drip      Medications:     Scheduled Meds:   predniSONE  60 mg Oral Daily    insulin glargine  25 Units SubCUTAneous BID    insulin lispro  15 Units SubCUTAneous TID WC    pantoprazole  40 mg Oral QAM AC    insulin lispro  0-18 Units SubCUTAneous TID WC    insulin lispro  0-9 Units SubCUTAneous Nightly    clopidogrel  75 mg Oral Daily    nicotine  1 patch TransDERmal Daily    enoxaparin  40 mg SubCUTAneous BID    [Held by provider] lisinopril  20 mg Oral Daily    gabapentin  800 mg Oral 4x Daily    atorvastatin  80 mg Oral Daily    aspirin  81 mg Oral Daily    DULoxetine  20 mg Oral BID    budesonide-formoterol  2 puff Inhalation BID    montelukast  10 mg Oral Nightly    sodium chloride flush  5-40 mL IntraVENous 2 times per day     Continuous Infusions:   furosemide (LASIX) 1mg/ml infusion 5 mg/hr (09/26/21 1249)    dextrose      sodium chloride 25 mL (09/23/21 1832)     PRN Meds:sodium chloride, albuterol-ipratropium, glucose, dextrose, glucagon (rDNA), dextrose, sodium chloride flush, sodium chloride, ondansetron **OR** ondansetron, polyethylene glycol, acetaminophen **OR** acetaminophen    Objective:   Vitals:   T-max:  Patient Vitals for the past 8 hrs:   BP Temp Temp src Pulse Resp SpO2 Weight   09/27/21 0629 -- -- -- 93 -- 90 % --   09/27/21 0521 -- -- -- -- -- -- 174 lb 9.7 oz (79.2 kg)   09/27/21 0436 98/73 98.6 °F (37 °C) Oral 99 18 94 % --   09/27/21 0408 -- -- -- -- 20 91 % --   09/27/21 0206 -- -- -- 95 -- 90 % --   09/27/21 0023 -- -- -- -- 20 94 % --   09/26/21 2340 92/63 98.9 °F (37.2 °C) Oral 97 18 95 % --       Intake/Output Summary (Last 24 hours) at 9/27/2021 0640  Last data filed at 9/27/2021 0436  Gross per 24 hour   Intake 1618 ml   Output 3075 ml   Net -1457 ml       Review of Systems   Constitutional: Negative for chills and fever. Respiratory: Positive for cough. Negative for choking, shortness of breath and wheezing. Cardiovascular: Negative for chest pain, palpitations and leg swelling. Neurological: Negative. Physical Exam  Constitutional:       Appearance: He is obese. HENT:      Head: Normocephalic. Mouth/Throat:      Mouth: Mucous membranes are dry. Pharynx: Oropharynx is clear. Eyes:      Conjunctiva/sclera: Conjunctivae normal.      Pupils: Pupils are equal, round, and reactive to light. Cardiovascular:      Rate and Rhythm: Normal rate and regular rhythm. Pulses: Normal pulses. Heart sounds: Normal heart sounds. No murmur heard. No friction rub. No gallop. Pulmonary:      Effort: Pulmonary effort is normal. No respiratory distress. Breath sounds: Normal breath sounds. No wheezing, rhonchi or rales. Abdominal:      General: Bowel sounds are normal. There is no distension. Palpations: Abdomen is soft. Tenderness: There is no abdominal tenderness. There is no guarding. Skin:     General: Skin is warm and dry. Capillary Refill: Capillary refill takes less than 2 seconds. Neurological:      General: No focal deficit present. Mental Status: He is alert.    Psychiatric:         Mood and Affect: Mood normal.         LABS:    CBC:   Recent Labs     09/25/21  0514 09/26/21  0515 09/27/21  0516   WBC 15.0* 12.0* 12.9*   HGB 16.7 16.1 16.0   HCT 49.2 50.1 49.7    293 292   MCV 87.6 89.6 89.7     Renal:    Recent Labs     09/25/21  0514 09/25/21  0514 09/26/21  0515 09/26/21  1520 09/27/21  0515 09/27/21  0516      < > 135* 132*  --  135*   K 3.8   < > 3.7 4.3  --  3.6   CL 96*   < > 95* 94*  --  93*   CO2 28   < > 28 22  --  26   BUN 68*   < > 70* 67*  --  61* CREATININE 1.2   < > 1.2 1.1  --  1.0   GLUCOSE 240*   < > 260* 268*  --  226*   CALCIUM 8.9   < > 9.0 9.3  --  9.9   MG 1.80  --  1.80  --  1.70*  --    PHOS  --   --   --  4.2  --  5.1*   ANIONGAP 14   < > 12 16  --  16    < > = values in this interval not displayed. Hepatic:   Recent Labs     09/26/21  1520 09/27/21  0516   LABALBU 3.5 3.6     Troponin: No results for input(s): TROPONINI in the last 72 hours. BNP: No results for input(s): BNP in the last 72 hours. Lipids: No results for input(s): CHOL, HDL in the last 72 hours. Invalid input(s): LDLCALCU, TRIGLYCERIDE  ABGs:    No results for input(s): PHART, EXG0CIQ, PO2ART, BTJ5JVM, BEART, THGBART, U2PTJRYH, SHH4OZR in the last 72 hours. INR: No results for input(s): INR in the last 72 hours. Lactate: No results for input(s): LACTATE in the last 72 hours. Cultures:  -----------------------------------------------------------------  RAD:   XR CHEST PORTABLE   Final Result   Impression: Stable appearance of the chest. Bibasilar atelectasis/consolidation persists. CT CHEST HIGH RESOLUTION   Final Result      Slow progression of bilateral lower lobe alveolar consolidation with air bronchograms raises concern for chronic process such as interstitial pneumonia, eosinophilic pneumonia, and organizing pneumonia. Neoplastic etiology is considered much less likely. MRI BRAIN WO CONTRAST   Final Result      1. No acute intracranial abnormality. No evidence of acute infarct. 2. Minimal nonspecific white matter signal abnormality on FLAIR imaging suggesting minimal chronic small vessel ischemic disease and/or minimal age-related degenerative change. CTA HEAD NECK W CONTRAST   Final Result      Study limited by motion artifact and poor contrast bolus. 1. Atherosclerotic disease at bilateral carotid bifurcations. Exact percentage of stenosis by NASCET criteria is difficult to comment upon due to artifacts.    2. 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 Moderately attenuated mid vertebral artery from upper C7  to C4 5 level. Distal to its left vertebral artery is patent without significant stenosis. PROCEDURE: CT ANGIOGRAPHY HEAD WITH/WITHOUT CONTRAST      INDICATION: aphasia, ams      COMPARISON: none      TECHNIQUE: Axial CT imaging obtained through the head prior to and following administration of IV contrast. Axial images, multiplanar reformatted images, and maximum intensity projection images were reviewed for CT angiographic technique. IV contrast: mL Omnipaque 350. FINDINGS:      ANTERIOR CIRCULATION: The intracranial internal carotid arteries, anterior cerebral arteries, and middle cerebral arteries are patent. There are calcified plaques with mild to moderate narrowing of bilateral ICA cavernous/supraclinoid segments. . No    evidence for aneurysm or arteriovenous malformation. POSTERIOR CIRCULATION: The bilateral vertebral arteries, basilar artery and posterior cerebral arteries are patent. There are calcified and noncalcified plaques with mild narrowing of left vertebral artery intradural segment. Right vertebral intradural    segment is severely attenuated, likely hyperplastic. No evidence for aneurysm or arteriovenous malformation. INTRACRANIAL VENOUS SYSTEM: No evidence for intracranial venous thrombosis. IMPRESSION:      No evidence of intracranial large vessel occlusion. 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 Calcified plaques with mild to moderate narrowing of bilateral ICA cavernous/supraclinoid segments. CT HEAD WO CONTRAST   Final Result      No acute intracranial abnormality . CT CHEST PULMONARY EMBOLISM W CONTRAST   Final Result   1. No definite pulmonary embolus identified. 2.  Mild atherosclerotic changes of the aorta.   No aortic aneurysm or dissection. 3.  Patchy densities in the lower lobes and right middle lobe may    represent atelectasis versus infectious/inflammatory process. 4.  Dependent atelectasis bilaterally. Atelectasis in the lingula. 5.  Trace right pleural effusion. 6.  Nonspecific similar mildly prominent mediastinal and hilar lymph    nodes. Assessment/Plan:   Luc Camp is a 50 y.o. male, PMHx HFpEF, COPD on 2 L of oxygen at home, diabetes, HTN,  who presents with two weeks of worsening SOB.      Acute hypoxic and hypercapnic respiratory failure  Currently On Vapotherm 35L with FiO2:60%, spo2: 88-95%   CHF exacerbation vs COPD exacerbation - both seem unlikely at this point since pt has been treated and has no improvement. Query Component of pulmonary hypertension though Echo not reflective of this. ABDI negative. CRP 7.1 (decreased from 16.2). ESR: 22. But pt was already on steroids. May need a repeat after steroids d/c   Leukocytosis improving 12.3 -> 15.0 -> 12 -> 12.9 but likely due steroids. CXR (): Stable appearance of the chest. Bibasilar atelectasis/consolidation persists  Solumedrol -  Overall pt is showing improvement over last 2 days since being on the lasix gtt. - Prednisone increased to 60 mg daily  - Lasix gtt 5mg/hr  - Incentive spirometery  - Continuing Home Symbicort  - Continuing Home Singulair  - Combivent Respimat as needed  - Wean down for O2 Sat more than 88% given history of COPD  - Cardiology following, may consider RHC. - Pulmonology following                Diabetes type 2  HbA1c: 7.1 (2021)  B  Hyperglycemia likely due to steroids.   - HDSSI   - Increased Lantus to 28 U twice daily   To observe POCT throughout the day and if its still not controlled to increase lantus  - Increased Lispro to 18 Units TID   - Hypoglycemia protocol      COPD  Low suspicion for exacerbation. He has been on Nebs/inhalers and Steroids with no improvement.    Received 2 doses Decadron 10 () and Solu-Medrol 125mg ever 6 hrs for 3 days (-)        HFpEF   ECHO 55-60% in 2021  Low suspicion for exacerbation. Has had diuresis with limited improvement. Patient not overtly volume overloaded, could not appreciate JVD but this is complicated by body habitus. No lower extremity edema. no pleural effusion on imaging, consistent with exam.  ProBNP: 3129 -> 371 -> 160 -> 124 - improvement so likely there was some exarcerbation but with BNP decreasing, Pt has still not improved respiratory wise. Good Urine output over past 24 hours 2475 mL  - On lasix gtt  - BNP Q3days    LILLIAN   Cr: 1.1 -> 0.8 -> 1.1 -> 1.2  Like due to pre renal causes and diuretic use   Elevated BUN likely due to the catabolic effect of corticosteroids  - PO intake of fluids  - RFTs Twice daily  - Avoid Nephrotoxins  - Avoid NSAIDs + ACE inhibitors     Electrolyte changes  Due to history of CAD and HFpEF  M.7, K: 3.6   - Magnesium repleted   - Potassium repleted to keep at 4.0      Hyponatremia  Likely diet related as pt is on a low salt diet  Na: 133 -> 135  Snow: <20 , UrCr: 58.3     TIA r/o   Brief episode of expressive aphasia. Rapid response called . CT head, CTA head and neck no evidence of intracranial large vessel occlusion. L vertebral artery occluded. Echo normal.  MRI brain without acute process, small vessel ischemic disease present.  Folate normal, B12 normal  - Neurology reviewed  - Continue ASA, Plavix + Atorvastatin.      CAD/PVD  [facility] in  at [facility]  Patient on long-term DAPT.  Per cardiologist note Dr. Estiven Mosher in February, this was supposed to be stopped. - Plavix was initially stopped due to home medication reconciliation.  However, restarted  overnight due to concern of TIA   - ASA      Chronic Issues  HTN - continue home meds  HLD - Continue home meds  Neuropathy - continue home gabapentin  Smoking cessation - Patient with significant smoking history 2.5 PPD since 12 years old. Received education. Nicoderm patch.      Screening:  FOBT done as an IP and is positive. Upon d/s, patient will benefit from GI outpatient or a colonoscopy. Code Status: Full Code No additional code details  FEN: Diet NPO Exceptions are: Sips of Water with Meds  PPX: Lovenox  DISPO: Eusebia Echavarria MD, PGY-1  Internal Medicine Resident  09/27/21  6:40 AM    This patient has been staffed and discussed with Jarret Carrasquillo MD.     Patient seen and examined, labs and imaging studies reviewed, agree with assessment and plan as outlined above. Continue with current care and plan. Discussed case with patients nurse, discussed case with care team, discussed plan. Patient still quite hypoxemic however he states he is feeling a lot better. Will discuss plan with pulm and cardiology.       MD Porsche Moncada

## 2021-09-28 LAB
ALBUMIN SERPL-MCNC: 3.8 G/DL (ref 3.4–5)
ALBUMIN SERPL-MCNC: 4 G/DL (ref 3.4–5)
ANION GAP SERPL CALCULATED.3IONS-SCNC: 17 MMOL/L (ref 3–16)
ANION GAP SERPL CALCULATED.3IONS-SCNC: 17 MMOL/L (ref 3–16)
BASOPHILS ABSOLUTE: 0.1 K/UL (ref 0–0.2)
BASOPHILS RELATIVE PERCENT: 0.4 %
BUN BLDV-MCNC: 62 MG/DL (ref 7–20)
BUN BLDV-MCNC: 63 MG/DL (ref 7–20)
CALCIUM SERPL-MCNC: 10.1 MG/DL (ref 8.3–10.6)
CALCIUM SERPL-MCNC: 10.3 MG/DL (ref 8.3–10.6)
CHLORIDE BLD-SCNC: 92 MMOL/L (ref 99–110)
CHLORIDE BLD-SCNC: 94 MMOL/L (ref 99–110)
CO2: 22 MMOL/L (ref 21–32)
CO2: 26 MMOL/L (ref 21–32)
CREAT SERPL-MCNC: 1 MG/DL (ref 0.9–1.3)
CREAT SERPL-MCNC: 1.2 MG/DL (ref 0.9–1.3)
EOSINOPHILS ABSOLUTE: 0.1 K/UL (ref 0–0.6)
EOSINOPHILS RELATIVE PERCENT: 0.9 %
GFR AFRICAN AMERICAN: >60
GFR AFRICAN AMERICAN: >60
GFR NON-AFRICAN AMERICAN: >60
GFR NON-AFRICAN AMERICAN: >60
GLUCOSE BLD-MCNC: 141 MG/DL (ref 70–99)
GLUCOSE BLD-MCNC: 188 MG/DL (ref 70–99)
GLUCOSE BLD-MCNC: 204 MG/DL (ref 70–99)
GLUCOSE BLD-MCNC: 236 MG/DL (ref 70–99)
GLUCOSE BLD-MCNC: 259 MG/DL (ref 70–99)
GLUCOSE BLD-MCNC: 292 MG/DL (ref 70–99)
HCT VFR BLD CALC: 51.1 % (ref 40.5–52.5)
HEMOGLOBIN: 16.7 G/DL (ref 13.5–17.5)
LYMPHOCYTES ABSOLUTE: 2.1 K/UL (ref 1–5.1)
LYMPHOCYTES RELATIVE PERCENT: 15.3 %
MAGNESIUM: 2.3 MG/DL (ref 1.8–2.4)
MCH RBC QN AUTO: 28.8 PG (ref 26–34)
MCHC RBC AUTO-ENTMCNC: 32.7 G/DL (ref 31–36)
MCV RBC AUTO: 88.2 FL (ref 80–100)
MONOCYTES ABSOLUTE: 0.9 K/UL (ref 0–1.3)
MONOCYTES RELATIVE PERCENT: 6.9 %
NEUTROPHILS ABSOLUTE: 10.4 K/UL (ref 1.7–7.7)
NEUTROPHILS RELATIVE PERCENT: 76.5 %
PDW BLD-RTO: 17.3 % (ref 12.4–15.4)
PERFORMED ON: ABNORMAL
PHOSPHORUS: 4.7 MG/DL (ref 2.5–4.9)
PHOSPHORUS: 5.3 MG/DL (ref 2.5–4.9)
PLATELET # BLD: 358 K/UL (ref 135–450)
PMV BLD AUTO: 10 FL (ref 5–10.5)
POTASSIUM SERPL-SCNC: 4.7 MMOL/L (ref 3.5–5.1)
POTASSIUM SERPL-SCNC: 5.1 MMOL/L (ref 3.5–5.1)
RBC # BLD: 5.79 M/UL (ref 4.2–5.9)
SODIUM BLD-SCNC: 133 MMOL/L (ref 136–145)
SODIUM BLD-SCNC: 135 MMOL/L (ref 136–145)
WBC # BLD: 13.6 K/UL (ref 4–11)

## 2021-09-28 PROCEDURE — 2580000003 HC RX 258: Performed by: INTERNAL MEDICINE

## 2021-09-28 PROCEDURE — 6360000002 HC RX W HCPCS: Performed by: INTERNAL MEDICINE

## 2021-09-28 PROCEDURE — 83735 ASSAY OF MAGNESIUM: CPT

## 2021-09-28 PROCEDURE — 2060000000 HC ICU INTERMEDIATE R&B

## 2021-09-28 PROCEDURE — 36415 COLL VENOUS BLD VENIPUNCTURE: CPT

## 2021-09-28 PROCEDURE — 6370000000 HC RX 637 (ALT 250 FOR IP): Performed by: STUDENT IN AN ORGANIZED HEALTH CARE EDUCATION/TRAINING PROGRAM

## 2021-09-28 PROCEDURE — 94640 AIRWAY INHALATION TREATMENT: CPT

## 2021-09-28 PROCEDURE — 2700000000 HC OXYGEN THERAPY PER DAY

## 2021-09-28 PROCEDURE — 6370000000 HC RX 637 (ALT 250 FOR IP): Performed by: INTERNAL MEDICINE

## 2021-09-28 PROCEDURE — 94660 CPAP INITIATION&MGMT: CPT

## 2021-09-28 PROCEDURE — 80069 RENAL FUNCTION PANEL: CPT

## 2021-09-28 PROCEDURE — 99233 SBSQ HOSP IP/OBS HIGH 50: CPT | Performed by: INTERNAL MEDICINE

## 2021-09-28 PROCEDURE — 6360000002 HC RX W HCPCS: Performed by: STUDENT IN AN ORGANIZED HEALTH CARE EDUCATION/TRAINING PROGRAM

## 2021-09-28 PROCEDURE — 2580000003 HC RX 258: Performed by: STUDENT IN AN ORGANIZED HEALTH CARE EDUCATION/TRAINING PROGRAM

## 2021-09-28 PROCEDURE — 85025 COMPLETE CBC W/AUTO DIFF WBC: CPT

## 2021-09-28 PROCEDURE — 6370000000 HC RX 637 (ALT 250 FOR IP): Performed by: NURSE PRACTITIONER

## 2021-09-28 PROCEDURE — 94761 N-INVAS EAR/PLS OXIMETRY MLT: CPT

## 2021-09-28 RX ORDER — INSULIN LISPRO 100 [IU]/ML
24 INJECTION, SOLUTION INTRAVENOUS; SUBCUTANEOUS
Status: DISCONTINUED | OUTPATIENT
Start: 2021-09-28 | End: 2021-09-28

## 2021-09-28 RX ORDER — PREDNISONE 20 MG/1
40 TABLET ORAL DAILY
Status: DISCONTINUED | OUTPATIENT
Start: 2021-09-29 | End: 2021-10-01 | Stop reason: HOSPADM

## 2021-09-28 RX ORDER — INSULIN LISPRO 100 [IU]/ML
20 INJECTION, SOLUTION INTRAVENOUS; SUBCUTANEOUS
Status: DISCONTINUED | OUTPATIENT
Start: 2021-09-29 | End: 2021-09-29

## 2021-09-28 RX ORDER — INSULIN LISPRO 100 [IU]/ML
21 INJECTION, SOLUTION INTRAVENOUS; SUBCUTANEOUS
Status: DISCONTINUED | OUTPATIENT
Start: 2021-09-29 | End: 2021-09-28

## 2021-09-28 RX ADMIN — INSULIN LISPRO 24 UNITS: 100 INJECTION, SOLUTION INTRAVENOUS; SUBCUTANEOUS at 17:06

## 2021-09-28 RX ADMIN — INSULIN LISPRO 3 UNITS: 100 INJECTION, SOLUTION INTRAVENOUS; SUBCUTANEOUS at 17:06

## 2021-09-28 RX ADMIN — PANTOPRAZOLE SODIUM 40 MG: 40 TABLET, DELAYED RELEASE ORAL at 06:26

## 2021-09-28 RX ADMIN — INSULIN LISPRO 3 UNITS: 100 INJECTION, SOLUTION INTRAVENOUS; SUBCUTANEOUS at 21:02

## 2021-09-28 RX ADMIN — INSULIN LISPRO 6 UNITS: 100 INJECTION, SOLUTION INTRAVENOUS; SUBCUTANEOUS at 12:01

## 2021-09-28 RX ADMIN — ASPIRIN 81 MG: 81 TABLET, COATED ORAL at 08:35

## 2021-09-28 RX ADMIN — BUDESONIDE AND FORMOTEROL FUMARATE DIHYDRATE 2 PUFF: 80; 4.5 AEROSOL RESPIRATORY (INHALATION) at 08:26

## 2021-09-28 RX ADMIN — ENOXAPARIN SODIUM 40 MG: 40 INJECTION SUBCUTANEOUS at 20:58

## 2021-09-28 RX ADMIN — FUROSEMIDE 5 MG/HR: 10 INJECTION, SOLUTION INTRAMUSCULAR; INTRAVENOUS at 23:36

## 2021-09-28 RX ADMIN — CLOPIDOGREL BISULFATE 75 MG: 75 TABLET ORAL at 08:35

## 2021-09-28 RX ADMIN — SALINE NASAL SPRAY 1 SPRAY: 1.5 SOLUTION NASAL at 14:49

## 2021-09-28 RX ADMIN — DULOXETINE 20 MG: 20 CAPSULE, DELAYED RELEASE ORAL at 08:35

## 2021-09-28 RX ADMIN — SALINE NASAL SPRAY 1 SPRAY: 1.5 SOLUTION NASAL at 17:05

## 2021-09-28 RX ADMIN — FUROSEMIDE 5 MG/HR: 10 INJECTION, SOLUTION INTRAMUSCULAR; INTRAVENOUS at 06:26

## 2021-09-28 RX ADMIN — Medication 10 ML: at 20:58

## 2021-09-28 RX ADMIN — Medication 5 ML: at 08:42

## 2021-09-28 RX ADMIN — GABAPENTIN 800 MG: 400 CAPSULE ORAL at 17:02

## 2021-09-28 RX ADMIN — PREDNISONE 60 MG: 10 TABLET ORAL at 08:35

## 2021-09-28 RX ADMIN — MONTELUKAST 10 MG: 10 TABLET, FILM COATED ORAL at 20:58

## 2021-09-28 RX ADMIN — SALINE NASAL SPRAY 1 SPRAY: 1.5 SOLUTION NASAL at 10:00

## 2021-09-28 RX ADMIN — INSULIN LISPRO 18 UNITS: 100 INJECTION, SOLUTION INTRAVENOUS; SUBCUTANEOUS at 08:46

## 2021-09-28 RX ADMIN — INSULIN LISPRO 24 UNITS: 100 INJECTION, SOLUTION INTRAVENOUS; SUBCUTANEOUS at 12:01

## 2021-09-28 RX ADMIN — DULOXETINE 20 MG: 20 CAPSULE, DELAYED RELEASE ORAL at 20:58

## 2021-09-28 RX ADMIN — ATORVASTATIN CALCIUM 80 MG: 80 TABLET, FILM COATED ORAL at 08:35

## 2021-09-28 RX ADMIN — ENOXAPARIN SODIUM 40 MG: 40 INJECTION SUBCUTANEOUS at 08:36

## 2021-09-28 RX ADMIN — INSULIN LISPRO 9 UNITS: 100 INJECTION, SOLUTION INTRAVENOUS; SUBCUTANEOUS at 08:43

## 2021-09-28 RX ADMIN — GABAPENTIN 800 MG: 400 CAPSULE ORAL at 20:58

## 2021-09-28 RX ADMIN — GABAPENTIN 800 MG: 400 CAPSULE ORAL at 12:04

## 2021-09-28 RX ADMIN — GABAPENTIN 800 MG: 400 CAPSULE ORAL at 08:35

## 2021-09-28 ASSESSMENT — PAIN SCALES - GENERAL
PAINLEVEL_OUTOF10: 0

## 2021-09-28 ASSESSMENT — ENCOUNTER SYMPTOMS
EYES NEGATIVE: 1
COUGH: 1
WHEEZING: 0
CHEST TIGHTNESS: 0
GASTROINTESTINAL NEGATIVE: 1
COUGH: 0
SHORTNESS OF BREATH: 1

## 2021-09-28 NOTE — PROGRESS NOTES
Reason for consult: RHC?    50 y.o. admitted 9/16/2021 with sob. Has had contd sob and has been treated wfor COPD exacerbation with steroids and nebs. Remains on Vapotherm at 40L, FiO2 100%. Today, he denies angina. No n/v/LH/dizziness. No syncope. No sig LE edema. No orthopnea or PND. Breathing is BETTER than yesterday/couple days ago. Says he's comfortable. PE:  Blood pressure 111/81, pulse 99, temperature 98.6 °F (37 °C), temperature source Oral, resp. rate 20, height 4' 11\" (1.499 m), weight 174 lb 9.7 oz (79.2 kg), SpO2 96 %. Vapotherm 45%  General (appearance): Well devel. No distress  Eyes: Anicteric. EOMI  Neck: No JVD obvious  Ears/Nose/Mouth/Thorat: No cyanosis  CV: RRR. Respiratory:  Diminished, vapotherm on  GI: Abd s/nt/nd. No peritoneal signs  Skin: Warm, dry. No rashes  Neuro/Psych: Alert and oriented x 3. Appropriate behavior  Ext:  No c/c.  No edema  Pulses:  2+ carotid    Lab Results   Component Value Date    WBC 13.6 (H) 09/28/2021    HGB 16.7 09/28/2021    HCT 51.1 09/28/2021    MCV 88.2 09/28/2021     09/28/2021     Lab Results   Component Value Date     (L) 09/28/2021    K 4.7 09/28/2021    CL 94 (L) 09/28/2021    CO2 22 09/28/2021    BUN 63 (H) 09/28/2021    CREATININE 1.0 09/28/2021    GLUCOSE 292 (H) 09/28/2021    CALCIUM 10.1 09/28/2021    PROT 8.2 09/17/2021    LABALBU 3.8 09/28/2021    BILITOT 0.3 09/17/2021    ALKPHOS 59 09/17/2021    AST 17 09/17/2021    ALT 17 09/17/2021    LABGLOM >60 09/28/2021    GFRAA >60 09/28/2021    AGRATIO 1.2 09/16/2021    GLOB 3.4 09/16/2021     Lab Results   Component Value Date    INR 1.13 (H) 09/16/2021    PROTIME 12.9 (H) 09/16/2021     Lab Results   Component Value Date    CHOL 163 09/17/2021     Lab Results   Component Value Date    TRIG 141 09/17/2021     Lab Results   Component Value Date    HDL 31 (L) 09/17/2021    HDL 35 (L) 05/24/2021    HDL 50 03/30/2020     Lab Results   Component Value Date    LDLCALC 104 (H) 09/17/2021 [address] 110 (H) 05/24/2021    LDLCALC 43 03/30/2020     Lab Results   Component Value Date    LABVLDL 28 09/17/2021    LABVLDL 43 05/24/2021    LABVLDL 17 03/30/2020     No results found for: CHOLHDLRATIO    9/19/2021 CT Chest (High-Res):  Slow progression of bilateral lower lobe alveolar consolidation with air bronchograms raises concern for chronic process such as interstitial pneumonia, eosinophilic pneumonia, and organizing pneumonia. Neoplastic etiology is considered much less likely. 9/17/2021 PE CT Scan:  No PE. Patchy densities in lower lobes (infiltrate v. Atelec). Tr pl effusion. Hilar nodes. 2020 LHC: No obstructive CAD ([facility])    9/16/2021 TTE: EF 55-60%. Tr TR. Tr effusion. Neg bubble study.  RV size and fx are read normal      Current Facility-Administered Medications:     insulin glargine (LANTUS;BASAGLAR) injection pen 28 Units, 28 Units, SubCUTAneous, BID, Jaylen Garcia MD, 28 Units at 09/27/21 2113    insulin lispro (1 Unit Dial) 18 Units, 18 Units, SubCUTAneous, TID VASU, Jaylen Garcia MD, 18 Units at 09/27/21 1829    predniSONE (DELTASONE) tablet 60 mg, 60 mg, Oral, Daily, Allison Sanchez MD, 60 mg at 09/27/21 1013    furosemide (LASIX) 100 mg in dextrose 5 % 100 mL infusion, 5 mg/hr, IntraVENous, Continuous, Karlene Jefferson MD, Last Rate: 5 mL/hr at 09/28/21 0626, 5 mg/hr at 09/28/21 0626    pantoprazole (PROTONIX) tablet 40 mg, 40 mg, Oral, QAM AC, Marci Carey MD, 40 mg at 09/28/21 0626    insulin lispro (1 Unit Dial) 0-18 Units, 0-18 Units, SubCUTAneous, TID Jaylen LEONE MD, 18 Units at 09/27/21 1829    insulin lispro (1 Unit Dial) 0-9 Units, 0-9 Units, SubCUTAneous, Nightly, Jaylen Garcia MD, 9 Units at 09/27/21 2113    sodium chloride (OCEAN, BABY AYR) 0.65 % nasal spray 1 spray, 1 spray, Each Nostril, PRN, Amador Caballero MD, 1 spray at 09/26/21 1248    clopidogrel (PLAVIX) tablet 75 mg, 75 mg, Oral, Daily, Dilia Mora APRN - CNP, 75 mg at 09/27/21 1014    nicotine (NICODERM CQ) 14 MG/24HR 1 patch, 1 patch, TransDERmal, Daily, Mil May MD, 1 patch at 09/27/21 1015    enoxaparin (LOVENOX) injection 40 mg, 40 mg, SubCUTAneous, BID, Stacie Jovel MD, 40 mg at 09/27/21 2105    [Held by provider] lisinopril (PRINIVIL;ZESTRIL) tablet 20 mg, 20 mg, Oral, Daily, Stacie Jovel MD, 20 mg at 09/22/21 0835    gabapentin (NEURONTIN) capsule 800 mg, 800 mg, Oral, 4x Daily, Stacie Jovel MD, 800 mg at 09/27/21 2106    albuterol-ipratropium (COMBIVENT RESPIMAT)  MCG/ACT inhaler 1 puff, 1 puff, Inhalation, Q4H PRN, Amador Caballero MD, 1 puff at 09/24/21 2054    atorvastatin (LIPITOR) tablet 80 mg, 80 mg, Oral, Daily, Marky Foreman MD, 80 mg at 09/27/21 1013    aspirin EC tablet 81 mg, 81 mg, Oral, Daily, Mil May MD, 81 mg at 09/27/21 1013    DULoxetine (CYMBALTA) extended release capsule 20 mg, 20 mg, Oral, BID, Mil May MD, 20 mg at 09/27/21 2106    glucose (GLUTOSE) 40 % oral gel 15 g, 15 g, Oral, PRN, Mil May MD    dextrose 50 % IV solution, 12.5 g, IntraVENous, PRN, Mil May MD    glucagon (rDNA) injection 1 mg, 1 mg, IntraMUSCular, PRN, Mil May MD    dextrose 5 % solution, 100 mL/hr, IntraVENous, PRN, Mil May MD    budesonide-formoterol [facility]) 80-4.5 MCG/ACT inhaler 2 puff, 2 puff, Inhalation, BID, Mil May MD, 2 puff at 09/27/21 2059    montelukast (SINGULAIR) tablet 10 mg, 10 mg, Oral, Nightly, Mil May MD, 10 mg at 09/27/21 2106    sodium chloride flush 0.9 % injection 5-40 mL, 5-40 mL, IntraVENous, 2 times per day, Mil May MD, 10 mL at 09/27/21 2106    sodium chloride flush 0.9 % injection 5-40 mL, 5-40 mL, IntraVENous, PRN, Mil May MD    0.9 % sodium chloride infusion, 25 mL, IntraVENous, PRN, Mil May MD, Last Rate: 100 mL/hr at 09/23/21 1832, 25 mL at 09/23/21 1832    ondansetron (ZOFRAN-ODT) disintegrating tablet 4 mg, 4 mg, Oral, Q8H PRN **OR** ondansetron (ZOFRAN) injection 4 mg, 4 mg, IntraVENous, Q6H PRN, Gonzalo Jerez MD    polyethylene glycol [facility]) packet 17 g, 17 g, Oral, Daily PRN, Gonzalo Jerez MD    acetaminophen (TYLENOL) tablet 650 mg, 650 mg, Oral, Q6H PRN **OR** acetaminophen (TYLENOL) suppository 650 mg, 650 mg, Rectal, Q6H PRN, Gonzalo Jerez MD    A/P:  50 y.o. with hypoxic respiratory failure. Issues;  -Hypoxic respiratory failure  -Non-obstructive CAD (2020 cath at [facility])  -Morbid obesity  -COPD  -Smoking  -HTN  -HL  -DM  -Abnormal chest CT    Recs:  -Neg 12.3L or so on 9/28 morning.  -Cont lasix gtt as long he's tolerating though I don't know if this is helping him or if it's something else  -Cont copd Rx  -Discussed RHC with him. We will reassess tomorrow to see if he needs it so he can eat today. I will d/t Dr. Rocío Jhaveri later today as well. -Will do RHC with brachial access tomorrow if he needs it, which he well may not. Sunshine Valencia MD, [facility]

## 2021-09-28 NOTE — PLAN OF CARE
Problem: Falls - Risk of:  Goal: Will remain free from falls  Description: Will remain free from falls  Outcome: Met This Shift  Note: Side rails up x 2 call light in reach bed in low position bed/chair alarm on when in use     Problem: Falls - Risk of:  Goal: Will remain free from falls  Description: Will remain free from falls  Outcome: Met This Shift  Note: Side rails up x 2 call light in reach bed in low position bed/chair alarm on when in use     Problem: Falls - Risk of:  Goal: Absence of physical injury  Description: Absence of physical injury  Outcome: Met This Shift     Problem: Falls - Risk of:  Goal: Absence of physical injury  Description: Absence of physical injury  Outcome: Met This Shift     Problem: Gas Exchange - Impaired  Goal: Absence of hypoxia  9/28/2021 0921 by Mony Anderson RN  Outcome: Met This Shift  Note: Vapotherm at 45/25 keeping sats >88% lungs clear for most part, no pedal/leg edema, LOC x 4 denies c/o cont. To monitor     Problem: Gas Exchange - Impaired  Goal: Absence of hypoxia  9/28/2021 0921 by Mony Anderson RN  Outcome: Met This Shift  Note: Vapotherm at 45/25 keeping sats >88% lungs clear for most part, no pedal/leg edema, LOC x 4 denies c/o cont. To monitor     Problem: Gas Exchange - Impaired  Goal: Promote optimal lung function  9/28/2021 0921 by Mony Anderson RN  Outcome: Met This Shift     Problem: Gas Exchange - Impaired  Goal: Promote optimal lung function  9/28/2021 0921 by Mony Anderson RN  Outcome: Met This Shift     Problem: Breathing Pattern - Ineffective  Goal: Ability to achieve and maintain a regular respiratory rate  9/28/2021 0921 by Mony Anderson RN  Outcome: Met This Shift     Problem: Body Temperature -  Risk of, Imbalanced  Goal: Ability to maintain a body temperature within defined limits  Outcome: Met This Shift  Note: No fever     Problem:  Body Temperature -  Risk of, Imbalanced  Goal: Will regain or maintain usual level of consciousness  Outcome: Met This Shift     Problem: Risk for Fluid Volume Deficit  Goal: Maintain normal heart rhythm  Outcome: Met This Shift  Note: SR. On Tel.      Problem: Risk for Fluid Volume Deficit  Goal: Maintain absence of muscle cramping  Outcome: Met This Shift     Problem: Risk for Fluid Volume Deficit  Goal: Maintain normal serum potassium, sodium, calcium, phosphorus, and pH  Outcome: Met This Shift  Note: Cont to monitor     Problem: Fatigue  Goal: Verbalize increase energy and improved vitality  Outcome: Met This Shift     Problem: Fatigue  Goal: Verbalize increase energy and improved vitality  Outcome: Met This Shift     Problem: Patient Education: Go to Patient Education Activity  Goal: Patient/Family Education  Outcome: Met This Shift     Problem: Patient Education: Go to Patient Education Activity  Goal: Patient/Family Education  Outcome: Met This Shift     Problem: Discharge Planning:  Goal: Discharged to appropriate level of care  Description: Discharged to appropriate level of care  Outcome: Met This Shift  Note: On steroids blood sugars high trending insulin doses with rising blood sugars      Problem: Sensory Perception - Impaired:  Goal: Ability to maintain a stable neurologic state will improve  Description: Ability to maintain a stable neurologic state will improve  Outcome: Met This Shift     Problem: Gas Exchange - Impaired:  Goal: Levels of oxygenation will improve  Description: Levels of oxygenation will improve  Outcome: Met This Shift     Problem: OXYGENATION/RESPIRATORY FUNCTION  Goal: Patient will maintain patent airway  Outcome: Met This Shift     Problem: OXYGENATION/RESPIRATORY FUNCTION  Goal: Patient will maintain patent airway  Outcome: Met This Shift     Problem: OXYGENATION/RESPIRATORY FUNCTION  Goal: Patient will achieve/maintain normal respiratory rate/effort  Description: Respiratory rate and effort will be within normal limits for the patient  Outcome: Met This Shift Problem: FLUID AND ELECTROLYTE IMBALANCE  Goal: Fluid and electrolyte balance are achieved/maintained  Outcome: Met This Shift  Note: On lasix gtt voiding well cont. to monitor     Problem: ACTIVITY INTOLERANCE/IMPAIRED MOBILITY  Goal: Mobility/activity is maintained at optimum level for patient  Outcome: Met This Shift     Problem: ACTIVITY INTOLERANCE/IMPAIRED MOBILITY  Goal: Mobility/activity is maintained at optimum level for patient  Outcome: Met This Shift     Problem: Serum Glucose Level - Abnormal:  Goal: Ability to maintain appropriate glucose levels will improve  Description: Ability to maintain appropriate glucose levels will improve  Outcome: Ongoing  Note: See above     Problem: Serum Glucose Level - Abnormal:  Goal: Ability to maintain appropriate glucose levels will improve  Description: Ability to maintain appropriate glucose levels will improve  Outcome: Ongoing  Note: See above     Problem: Airway Clearance - Ineffective  Goal: Achieve or maintain patent airway  Outcome: Completed     Problem: Airway Clearance - Ineffective  Goal: Achieve or maintain patent airway  Outcome: Completed     Problem: Isolation Precautions - Risk of Spread of Infection  Goal: Prevent transmission of infection  Outcome: Completed     Problem: Isolation Precautions - Risk of Spread of Infection  Goal: Prevent transmission of infection  Outcome: Completed     Problem: Loneliness or Risk for Loneliness  Goal: Demonstrate positive use of time alone when socialization is not possible  Outcome: Completed     Problem: HEMODYNAMIC STATUS  Goal: Patient has stable vital signs and fluid balance  Outcome: Completed

## 2021-09-28 NOTE — PROGRESS NOTES
Pulmonology Progress Note    Admit Date: 9/16/2021  Day:13  Vent Day: None  IV Access:Peripheral  IV Fluids:None  Vasopressors:None                Antibiotics: None  Diet: ADULT DIET; Regular; 4 carb choices (60 gm/meal); Low Sodium (2 gm)    CC: SOB and chest pain    Interval history: Has had decreasing oxygen requirments. Has had good urine output.  NO fever, chills, cough reported       Medications:     Scheduled Meds:   insulin glargine  35 Units SubCUTAneous BID    insulin lispro  24 Units SubCUTAneous TID WC    predniSONE  60 mg Oral Daily    pantoprazole  40 mg Oral QAM AC    insulin lispro  0-18 Units SubCUTAneous TID WC    insulin lispro  0-9 Units SubCUTAneous Nightly    clopidogrel  75 mg Oral Daily    nicotine  1 patch TransDERmal Daily    enoxaparin  40 mg SubCUTAneous BID    [Held by provider] lisinopril  20 mg Oral Daily    gabapentin  800 mg Oral 4x Daily    atorvastatin  80 mg Oral Daily    aspirin  81 mg Oral Daily    DULoxetine  20 mg Oral BID    budesonide-formoterol  2 puff Inhalation BID    montelukast  10 mg Oral Nightly    sodium chloride flush  5-40 mL IntraVENous 2 times per day     Continuous Infusions:   furosemide (LASIX) 1mg/ml infusion 5 mg/hr (09/28/21 0626)    dextrose      sodium chloride 25 mL (09/23/21 1832)     PRN Meds:sodium chloride, albuterol-ipratropium, glucose, dextrose, glucagon (rDNA), dextrose, sodium chloride flush, sodium chloride, ondansetron **OR** ondansetron, polyethylene glycol, acetaminophen **OR** acetaminophen    Objective:   Vitals:   T-max:  Patient Vitals for the past 8 hrs:   BP Temp Temp src Pulse Resp SpO2 Weight   09/28/21 0829 -- -- -- -- -- 95 % --   09/28/21 0825 -- -- -- -- -- 95 % --   09/28/21 0726 131/80 98.4 °F (36.9 °C) Oral -- -- 95 % --   09/28/21 0507 111/81 98.6 °F (37 °C) Oral 99 20 96 % 174 lb 9.7 oz (79.2 kg)   09/28/21 0435 -- -- -- -- 14 92 % --   09/28/21 0400 -- -- -- 93 21 93 % --   09/28/21 0200 -- -- -- 91 Mood and Affect: Mood normal.         Behavior: Behavior normal.         Thought Content: Thought content normal.         Judgment: Judgment normal.           LABS:    CBC:   Recent Labs     09/26/21  0515 09/27/21  0516 09/28/21  0518   WBC 12.0* 12.9* 13.6*   HGB 16.1 16.0 16.7   HCT 50.1 49.7 51.1    292 358   MCV 89.6 89.7 88.2     Renal:    Recent Labs     09/26/21  0515 09/26/21  1520 09/27/21  0515 09/27/21  0516 09/27/21  1644 09/28/21  0518   *   < >  --  135* 131* 133*   K 3.7   < >  --  3.6 4.9 4.7   CL 95*   < >  --  93* 92* 94*   CO2 28   < >  --  26 24 22   BUN 70*   < >  --  61* 64* 63*   CREATININE 1.2   < >  --  1.0 1.2 1.0   GLUCOSE 260*   < >  --  226* 434* 292*   CALCIUM 9.0   < >  --  9.9 9.5 10.1   MG 1.80  --  1.70*  --   --  2.30   PHOS  --    < >  --  5.1* 5.9* 4.7   ANIONGAP 12   < >  --  16 15 17*    < > = values in this interval not displayed. Hepatic:   Recent Labs     09/27/21  0516 09/27/21 1644 09/28/21 0518   LABALBU 3.6 3.5 3.8     Troponin: No results for input(s): TROPONINI in the last 72 hours. BNP: No results for input(s): BNP in the last 72 hours. Lipids: No results for input(s): CHOL, HDL in the last 72 hours. Invalid input(s): LDLCALCU, TRIGLYCERIDE  ABGs:  No results for input(s): PHART, NHX4ARL, PO2ART, CYV3NUU, BEART, THGBART, D3NAASPN, YSS6UES in the last 72 hours. INR: No results for input(s): INR in the last 72 hours. Lactate: No results for input(s): LACTATE in the last 72 hours. Cultures:  -----------------------------------------------------------------  RAD:   XR CHEST PORTABLE   Final Result   Impression: Stable appearance of the chest. Bibasilar atelectasis/consolidation persists. CT CHEST HIGH RESOLUTION   Final Result      Slow progression of bilateral lower lobe alveolar consolidation with air bronchograms raises concern for chronic process such as interstitial pneumonia, eosinophilic pneumonia, and organizing pneumonia. Neoplastic etiology is considered much less likely. MRI BRAIN WO CONTRAST   Final Result      1. No acute intracranial abnormality. No evidence of acute infarct. 2. Minimal nonspecific white matter signal abnormality on FLAIR imaging suggesting minimal chronic small vessel ischemic disease and/or minimal age-related degenerative change. CTA HEAD NECK W CONTRAST   Final Result      Study limited by motion artifact and poor contrast bolus. 1. Atherosclerotic disease at bilateral carotid bifurcations. Exact percentage of stenosis by NASCET criteria is difficult to comment upon due to artifacts. 2. 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 Moderately attenuated mid vertebral artery from upper C7  to C4 5 level. Distal to its left vertebral artery is patent without significant stenosis. PROCEDURE: CT ANGIOGRAPHY HEAD WITH/WITHOUT CONTRAST      INDICATION: aphasia, ams      COMPARISON: none      TECHNIQUE: Axial CT imaging obtained through the head prior to and following administration of IV contrast. Axial images, multiplanar reformatted images, and maximum intensity projection images were reviewed for CT angiographic technique. IV contrast: mL Omnipaque 350. FINDINGS:      ANTERIOR CIRCULATION: The intracranial internal carotid arteries, anterior cerebral arteries, and middle cerebral arteries are patent. There are calcified plaques with mild to moderate narrowing of bilateral ICA cavernous/supraclinoid segments. . No    evidence for aneurysm or arteriovenous malformation. POSTERIOR CIRCULATION: The bilateral vertebral arteries, basilar artery and posterior cerebral arteries are patent. There are calcified and noncalcified plaques with mild narrowing of left vertebral artery intradural segment. Right vertebral intradural    segment is severely attenuated, likely hyperplastic.       No evidence for aneurysm or arteriovenous malformation. INTRACRANIAL VENOUS SYSTEM: No evidence for intracranial venous thrombosis. IMPRESSION:      No evidence of intracranial large vessel occlusion. 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 Calcified plaques with mild to moderate narrowing of bilateral ICA cavernous/supraclinoid segments. CT HEAD WO CONTRAST   Final Result      No acute intracranial abnormality . CT CHEST PULMONARY EMBOLISM W CONTRAST   Final Result   1. No definite pulmonary embolus identified. 2.  Mild atherosclerotic changes of the aorta. No aortic aneurysm or    dissection. 3.  Patchy densities in the lower lobes and right middle lobe may    represent atelectasis versus infectious/inflammatory process. 4.  Dependent atelectasis bilaterally. Atelectasis in the lingula. 5.  Trace right pleural effusion. 6.  Nonspecific similar mildly prominent mediastinal and hilar lymph    nodes. Assessment/Plan:     Acute on chronic hypoxic/hypercapnic resp failure improving 2/2 to unclear etiology. Has been on high dose steroids as well as lasix drip. Overall has decreasing oxygen requirements. CoVId negative all other antigen negative. Negative for PE.   -Continue lasix drip as appears to helping oxygen requirments. . Usually on 2L at home.  Still has a increase in WBC but has been on steroids which could be reflective of that as he has remained afebrile with overall improvement.   -Continue Prednisone and lasix as patient improving   -Recommend repeat CT imaging of the chest when supplemental oxygen has decreased  -Cardiology discussing the utility of RHC     COPD: not consistent with exacerbation    Continue steroids for interstitial lung process   Continue Symbicort, singular       Brandi Wylie MD, PGY 3  09/28/21  9:40 AM    This patient has been staffed and discussed with Dr Ellaree Bloch    Patient examined, findings as discussed with Dr Sherry Petit. Agree with assessment and plan. Improving oxygenation over past 3 days has been quite significant. Continue diuretic for fluid volume overload, and steroids for intersitial lung process, uncertain etiology. Follow up chest CT when O2 requirement reduced to single digits. Discussed with Dr Nuris Kessler, Dr Penny Pettit.

## 2021-09-28 NOTE — PROGRESS NOTES
Progress Note    Admit Date: 9/16/2021  Day: 12  Diet: Diet NPO Exceptions are: Sips of Water with Meds    CC: Chest pain + SOB    Interval history:   Nil acute overnight events reported. Pt continues to improve with his oxygen requirements. On 25L vapotherm with 45% FiO2, spo2:96%. On lasix gtt. Has good UO and stable Cr 1.0   Denies any new complaints.  Remains afebrile    Medications:     Scheduled Meds:   insulin glargine  28 Units SubCUTAneous BID    insulin lispro  18 Units SubCUTAneous TID WC    predniSONE  60 mg Oral Daily    pantoprazole  40 mg Oral QAM AC    insulin lispro  0-18 Units SubCUTAneous TID WC    insulin lispro  0-9 Units SubCUTAneous Nightly    clopidogrel  75 mg Oral Daily    nicotine  1 patch TransDERmal Daily    enoxaparin  40 mg SubCUTAneous BID    [Held by provider] lisinopril  20 mg Oral Daily    gabapentin  800 mg Oral 4x Daily    atorvastatin  80 mg Oral Daily    aspirin  81 mg Oral Daily    DULoxetine  20 mg Oral BID    budesonide-formoterol  2 puff Inhalation BID    montelukast  10 mg Oral Nightly    sodium chloride flush  5-40 mL IntraVENous 2 times per day     Continuous Infusions:   furosemide (LASIX) 1mg/ml infusion 5 mg/hr (09/28/21 0626)    dextrose      sodium chloride 25 mL (09/23/21 1832)     PRN Meds:sodium chloride, albuterol-ipratropium, glucose, dextrose, glucagon (rDNA), dextrose, sodium chloride flush, sodium chloride, ondansetron **OR** ondansetron, polyethylene glycol, acetaminophen **OR** acetaminophen    Objective:   Vitals:   T-max:  Patient Vitals for the past 8 hrs:   BP Temp Temp src Pulse Resp SpO2 Weight   09/28/21 0507 111/81 98.6 °F (37 °C) Oral 99 20 96 % 174 lb 9.7 oz (79.2 kg)   09/28/21 0435 -- -- -- -- 14 92 % --   09/28/21 0400 -- -- -- 93 21 93 % --   09/28/21 0200 -- -- -- 91 14 94 % --   09/28/21 0004 -- -- -- -- -- 95 % --   09/27/21 2353 107/73 98.4 °F (36.9 °C) Oral 91 18 90 % --       Intake/Output Summary (Last 24 hours) at 9/28/2021 0651  Last data filed at 9/28/2021 7296  Gross per 24 hour   Intake 640 ml   Output 2725 ml   Net -2085 ml       Review of Systems   Constitutional: Negative for chills and fever. Respiratory: Positive for cough (non productive and infrequent). Negative for chest tightness and wheezing. Cardiovascular: Negative for chest pain, palpitations and leg swelling. Gastrointestinal: Negative. Genitourinary: Negative. Neurological: Positive for dizziness. Negative for weakness and light-headedness. Physical Exam  Constitutional:       Appearance: He is obese. HENT:      Mouth/Throat:      Mouth: Mucous membranes are dry. Pharynx: Oropharynx is clear. Eyes:      Pupils: Pupils are equal, round, and reactive to light. Cardiovascular:      Rate and Rhythm: Normal rate and regular rhythm. Pulses: Normal pulses. Heart sounds: Normal heart sounds. No murmur heard. No gallop. Pulmonary:      Effort: No respiratory distress. Breath sounds: Normal breath sounds. No wheezing or rales. Abdominal:      General: Bowel sounds are normal. There is no distension. Palpations: Abdomen is soft. Tenderness: There is no abdominal tenderness. There is no guarding. Musculoskeletal:         General: No swelling. Right lower leg: No edema. Left lower leg: No edema. Skin:     General: Skin is warm and dry. Capillary Refill: Capillary refill takes less than 2 seconds. Neurological:      General: No focal deficit present. Mental Status: He is alert and oriented to person, place, and time.          LABS:    CBC:   Recent Labs     09/26/21  0515 09/27/21  0516 09/28/21  0518   WBC 12.0* 12.9* 13.6*   HGB 16.1 16.0 16.7   HCT 50.1 49.7 51.1    292 358   MCV 89.6 89.7 88.2     Renal:    Recent Labs     09/26/21  0515 09/26/21  1520 09/27/21  0515 09/27/21  0516 09/27/21  1644 09/28/21  0518   *   < >  --  135* 131* 133*   K 3.7   < >  --  3.6 4.9 4.7   CL 95*   < >  --  93* 92* 94*   CO2 28   < >  --  26 24 22   BUN 70*   < >  --  61* 64* 63*   CREATININE 1.2   < >  --  1.0 1.2 1.0   GLUCOSE 260*   < >  --  226* 434* 292*   CALCIUM 9.0   < >  --  9.9 9.5 10.1   MG 1.80  --  1.70*  --   --  2.30   PHOS  --    < >  --  5.1* 5.9* 4.7   ANIONGAP 12   < >  --  16 15 17*    < > = values in this interval not displayed. Hepatic:   Recent Labs     09/27/21  0516 09/27/21  1644 09/28/21  0518   LABALBU 3.6 3.5 3.8     Troponin: No results for input(s): TROPONINI in the last 72 hours. BNP: No results for input(s): BNP in the last 72 hours. Lipids: No results for input(s): CHOL, HDL in the last 72 hours. Invalid input(s): LDLCALCU, TRIGLYCERIDE  ABGs:  No results for input(s): PHART, WBH1OXL, PO2ART, ZVZ1HRL, BEART, THGBART, N5ROJNTU, SIX8KJU in the last 72 hours. INR: No results for input(s): INR in the last 72 hours. Lactate: No results for input(s): LACTATE in the last 72 hours. Cultures:  -----------------------------------------------------------------  RAD:   XR CHEST PORTABLE   Final Result   Impression: Stable appearance of the chest. Bibasilar atelectasis/consolidation persists. CT CHEST HIGH RESOLUTION   Final Result      Slow progression of bilateral lower lobe alveolar consolidation with air bronchograms raises concern for chronic process such as interstitial pneumonia, eosinophilic pneumonia, and organizing pneumonia. Neoplastic etiology is considered much less likely. MRI BRAIN WO CONTRAST   Final Result      1. No acute intracranial abnormality. No evidence of acute infarct. 2. Minimal nonspecific white matter signal abnormality on FLAIR imaging suggesting minimal chronic small vessel ischemic disease and/or minimal age-related degenerative change. CTA HEAD NECK W CONTRAST   Final Result      Study limited by motion artifact and poor contrast bolus.          1. Atherosclerotic disease at bilateral carotid bifurcations. Exact percentage of stenosis by NASCET criteria is difficult to comment upon due to artifacts. 2. 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 Moderately attenuated mid vertebral artery from upper C7  to C4 5 level. Distal to its left vertebral artery is patent without significant stenosis. PROCEDURE: CT ANGIOGRAPHY HEAD WITH/WITHOUT CONTRAST      INDICATION: aphasia, ams      COMPARISON: none      TECHNIQUE: Axial CT imaging obtained through the head prior to and following administration of IV contrast. Axial images, multiplanar reformatted images, and maximum intensity projection images were reviewed for CT angiographic technique. IV contrast: mL Omnipaque 350. FINDINGS:      ANTERIOR CIRCULATION: The intracranial internal carotid arteries, anterior cerebral arteries, and middle cerebral arteries are patent. There are calcified plaques with mild to moderate narrowing of bilateral ICA cavernous/supraclinoid segments. . No    evidence for aneurysm or arteriovenous malformation. POSTERIOR CIRCULATION: The bilateral vertebral arteries, basilar artery and posterior cerebral arteries are patent. There are calcified and noncalcified plaques with mild narrowing of left vertebral artery intradural segment. Right vertebral intradural    segment is severely attenuated, likely hyperplastic. No evidence for aneurysm or arteriovenous malformation. INTRACRANIAL VENOUS SYSTEM: No evidence for intracranial venous thrombosis. IMPRESSION:      No evidence of intracranial large vessel occlusion. 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 Calcified plaques with mild to moderate narrowing of bilateral ICA cavernous/supraclinoid segments. CT HEAD WO CONTRAST   Final Result      No acute intracranial abnormality . CT CHEST PULMONARY EMBOLISM W CONTRAST   Final Result   1. No definite pulmonary embolus identified. 2.  Mild atherosclerotic changes of the aorta. No aortic aneurysm or    dissection. 3.  Patchy densities in the lower lobes and right middle lobe may    represent atelectasis versus infectious/inflammatory process. 4.  Dependent atelectasis bilaterally. Atelectasis in the lingula. 5.  Trace right pleural effusion. 6.  Nonspecific similar mildly prominent mediastinal and hilar lymph    nodes. Assessment/Plan:   Dandy Frances a 50 y. o. male, PMHx HFpEF, COPD on 2 L of oxygen at home, diabetes, HTN,  who presents with two weeks of worsening SOB.      Acute hypoxic and hypercapnic respiratory failure  Currently On Vapotherm 25L with FiO2:45%, spo2: 92-96%   CHF exacerbation vs COPD exacerbation - both seem unlikely at this point since pt has been treated and has no improvement.   Query Component of pulmonary hypertension though Echo not reflective of this. ABDI negative. CRP 7.1 (decreased from 16.2). ESR: 22. But pt was already on steroids. May need a repeat after steroids d/c   Leukocytosis improving 12.3 -> 15.0 -> 12 -> 12.9 but likely due steroids.   CXR (): Stable appearance of the chest. Bibasilar atelectasis/consolidation persists  Solumedrol -  Overall pt is showing improvement over last 3 days since being on the lasix gtt. - Prednisone 60mg daily  - Lasix gtt 5mg/hr  - Incentive spirometery  - Continuing Home Symbicort  - Continuing Home Singulair  - Combivent Respimat as needed  - Wean down for O2 Sat more than 88% given history of COPD  - Cardiology following, may consider RHC tomorrow per notes  - Pulmonology following                Diabetes type 2  HbA1c: 7.1 (2021)  B  Hyperglycemia not optimally controlled due to increase in steroids.   - HDSSI   - Increased Lantus to 35 U twice daily  - Increased Lispro to 24 Units TID   - Hypoglycemia protocol        COPD  Low suspicion for exacerbation.  He has been on Nebs/inhalers and Steroids with no improvement. Received 2 doses Decadron 10 (9/17) and Solu-Medrol 125mg x 3 days (9/21-24)        HFpEF   ECHO 55-60% in 9/2021  Low suspicion for exacerbation. Has had diuresis with limited improvement. Patient not overtly volume overloaded, could not appreciate JVD but this is complicated by body habitus. No lower extremity edema. no pleural effusion on imaging, consistent with exam.  ProBNP: 3129 -> 371 -> 160 -> 124 - improvement so likely there was some exarcerbation but with BNP decreasing, Pt has still not improved respiratory wise. Good Urine output over past 24 hours 2475 mL  - On lasix gtt  - BNP Q3days     LILLIAN   Cr: 1.1 -> 0.8 -> 1.1 -> 1.2 ->1.0   Like due to pre renal causes and diuretic use   Elevated BUN likely due to the catabolic effect of corticosteroids  - PO intake of fluids  - RFTs Twice daily  - Avoid Nephrotoxins  - Avoid NSAIDs + ACE inhibitors     Hyponatremia  Likely diet related as pt is on a low salt diet  Na: 133 -> 135  Snow: <20 , UrCr: 58.3     TIA r/o   Brief episode of expressive aphasia. Rapid response called 9/17. CT head, CTA head and neck no evidence of intracranial large vessel occlusion. L vertebral artery occluded. Echo normal.  MRI brain without acute process, small vessel ischemic disease present.  Folate normal, B12 normal  - Neurology reviewed  - Continue ASA, Plavix + Atorvastatin.      CAD/PVD  LHC in 2020 at [facility]  Patient on long-term DAPT.  Per cardiologist note Dr. Ephraim Canas in February, this was supposed to be stopped. - Plavix was initially stopped due to home medication reconciliation. However, restarted 9/17 overnight due to concern of TIA   - ASA      Chronic Issues  HTN - continue home meds  HLD - Continue home meds  Neuropathy - continue home gabapentin  Smoking cessation - Patient with significant smoking history 2.5 PPD since 10years old. Received education.  Nicoderm patch.      Screening:  FOBT done as an IP and is positive. Upon d/s, patient will benefit from GI outpatient or a colonoscopy. Code Status:  FEN: ADULT DIET; Regular; 4 carb choices (60 gm/meal); Low Sodium (2 gm)  PPX: Lovenox  DISPO: U    Margret Krishnamurthy MD, PGY-1  Internal Medicine resident  09/28/21  6:51 AM    This patient has been staffed and discussed with Dia Cortes MD.       Patient seen and examined, labs and imaging studies reviewed, agree with assessment and plan as outlined above. Continue with current care and plan. Discussed case with patients nurse, discussed case with care team, discussed plan.       MD Alicja Oconnell

## 2021-09-29 ENCOUNTER — APPOINTMENT (OUTPATIENT)
Dept: CT IMAGING | Age: 56
DRG: 189 | End: 2021-09-29
Attending: INTERNAL MEDICINE
Payer: MEDICARE

## 2021-09-29 LAB
ALBUMIN SERPL-MCNC: 3.7 G/DL (ref 3.4–5)
ALBUMIN SERPL-MCNC: 3.8 G/DL (ref 3.4–5)
ANION GAP SERPL CALCULATED.3IONS-SCNC: 17 MMOL/L (ref 3–16)
ANION GAP SERPL CALCULATED.3IONS-SCNC: 20 MMOL/L (ref 3–16)
BASOPHILS ABSOLUTE: 0.1 K/UL (ref 0–0.2)
BASOPHILS RELATIVE PERCENT: 0.5 %
BUN BLDV-MCNC: 67 MG/DL (ref 7–20)
BUN BLDV-MCNC: 77 MG/DL (ref 7–20)
CALCIUM SERPL-MCNC: 9.5 MG/DL (ref 8.3–10.6)
CALCIUM SERPL-MCNC: 9.9 MG/DL (ref 8.3–10.6)
CHLORIDE BLD-SCNC: 88 MMOL/L (ref 99–110)
CHLORIDE BLD-SCNC: 91 MMOL/L (ref 99–110)
CO2: 21 MMOL/L (ref 21–32)
CO2: 22 MMOL/L (ref 21–32)
CREAT SERPL-MCNC: 1.1 MG/DL (ref 0.9–1.3)
CREAT SERPL-MCNC: 1.1 MG/DL (ref 0.9–1.3)
EOSINOPHILS ABSOLUTE: 0.2 K/UL (ref 0–0.6)
EOSINOPHILS RELATIVE PERCENT: 1 %
GFR AFRICAN AMERICAN: >60
GFR AFRICAN AMERICAN: >60
GFR NON-AFRICAN AMERICAN: >60
GFR NON-AFRICAN AMERICAN: >60
GLUCOSE BLD-MCNC: 168 MG/DL (ref 70–99)
GLUCOSE BLD-MCNC: 189 MG/DL (ref 70–99)
GLUCOSE BLD-MCNC: 196 MG/DL (ref 70–99)
GLUCOSE BLD-MCNC: 287 MG/DL (ref 70–99)
GLUCOSE BLD-MCNC: 354 MG/DL (ref 70–99)
GLUCOSE BLD-MCNC: 389 MG/DL (ref 70–99)
HCT VFR BLD CALC: 51.4 % (ref 40.5–52.5)
HEMOGLOBIN: 16.9 G/DL (ref 13.5–17.5)
LYMPHOCYTES ABSOLUTE: 2.7 K/UL (ref 1–5.1)
LYMPHOCYTES RELATIVE PERCENT: 16.5 %
MAGNESIUM: 2.1 MG/DL (ref 1.8–2.4)
MCH RBC QN AUTO: 28.9 PG (ref 26–34)
MCHC RBC AUTO-ENTMCNC: 33 G/DL (ref 31–36)
MCV RBC AUTO: 87.8 FL (ref 80–100)
MONOCYTES ABSOLUTE: 1.2 K/UL (ref 0–1.3)
MONOCYTES RELATIVE PERCENT: 7.4 %
NEUTROPHILS ABSOLUTE: 12.3 K/UL (ref 1.7–7.7)
NEUTROPHILS RELATIVE PERCENT: 74.6 %
PDW BLD-RTO: 16.7 % (ref 12.4–15.4)
PERFORMED ON: ABNORMAL
PHOSPHORUS: 4.8 MG/DL (ref 2.5–4.9)
PHOSPHORUS: 4.9 MG/DL (ref 2.5–4.9)
PLATELET # BLD: 348 K/UL (ref 135–450)
PMV BLD AUTO: 9.9 FL (ref 5–10.5)
POTASSIUM SERPL-SCNC: 4.6 MMOL/L (ref 3.5–5.1)
POTASSIUM SERPL-SCNC: 5.3 MMOL/L (ref 3.5–5.1)
RBC # BLD: 5.85 M/UL (ref 4.2–5.9)
SODIUM BLD-SCNC: 126 MMOL/L (ref 136–145)
SODIUM BLD-SCNC: 133 MMOL/L (ref 136–145)
WBC # BLD: 16.4 K/UL (ref 4–11)

## 2021-09-29 PROCEDURE — 80069 RENAL FUNCTION PANEL: CPT

## 2021-09-29 PROCEDURE — 6360000002 HC RX W HCPCS: Performed by: STUDENT IN AN ORGANIZED HEALTH CARE EDUCATION/TRAINING PROGRAM

## 2021-09-29 PROCEDURE — 6370000000 HC RX 637 (ALT 250 FOR IP): Performed by: STUDENT IN AN ORGANIZED HEALTH CARE EDUCATION/TRAINING PROGRAM

## 2021-09-29 PROCEDURE — 2580000003 HC RX 258: Performed by: STUDENT IN AN ORGANIZED HEALTH CARE EDUCATION/TRAINING PROGRAM

## 2021-09-29 PROCEDURE — 83735 ASSAY OF MAGNESIUM: CPT

## 2021-09-29 PROCEDURE — 94761 N-INVAS EAR/PLS OXIMETRY MLT: CPT

## 2021-09-29 PROCEDURE — 36415 COLL VENOUS BLD VENIPUNCTURE: CPT

## 2021-09-29 PROCEDURE — 2700000000 HC OXYGEN THERAPY PER DAY

## 2021-09-29 PROCEDURE — 6370000000 HC RX 637 (ALT 250 FOR IP): Performed by: NURSE PRACTITIONER

## 2021-09-29 PROCEDURE — 99233 SBSQ HOSP IP/OBS HIGH 50: CPT | Performed by: INTERNAL MEDICINE

## 2021-09-29 PROCEDURE — 6360000002 HC RX W HCPCS: Performed by: INTERNAL MEDICINE

## 2021-09-29 PROCEDURE — APPSS30 APP SPLIT SHARED TIME 16-30 MINUTES: Performed by: NURSE PRACTITIONER

## 2021-09-29 PROCEDURE — 71250 CT THORAX DX C-: CPT

## 2021-09-29 PROCEDURE — 6370000000 HC RX 637 (ALT 250 FOR IP)

## 2021-09-29 PROCEDURE — 85025 COMPLETE CBC W/AUTO DIFF WBC: CPT

## 2021-09-29 PROCEDURE — 6370000000 HC RX 637 (ALT 250 FOR IP): Performed by: INTERNAL MEDICINE

## 2021-09-29 PROCEDURE — 94640 AIRWAY INHALATION TREATMENT: CPT

## 2021-09-29 PROCEDURE — 94660 CPAP INITIATION&MGMT: CPT

## 2021-09-29 PROCEDURE — 2580000003 HC RX 258: Performed by: INTERNAL MEDICINE

## 2021-09-29 PROCEDURE — 2060000000 HC ICU INTERMEDIATE R&B

## 2021-09-29 RX ORDER — ATORVASTATIN CALCIUM 40 MG/1
TABLET, FILM COATED ORAL
Qty: 90 TABLET | Refills: 0 | Status: SHIPPED | OUTPATIENT
Start: 2021-09-29 | End: 2022-01-21

## 2021-09-29 RX ORDER — INSULIN LISPRO 100 [IU]/ML
18 INJECTION, SOLUTION INTRAVENOUS; SUBCUTANEOUS
Status: DISCONTINUED | OUTPATIENT
Start: 2021-09-29 | End: 2021-09-30

## 2021-09-29 RX ADMIN — DULOXETINE 20 MG: 20 CAPSULE, DELAYED RELEASE ORAL at 09:53

## 2021-09-29 RX ADMIN — PANTOPRAZOLE SODIUM 40 MG: 40 TABLET, DELAYED RELEASE ORAL at 06:54

## 2021-09-29 RX ADMIN — INSULIN LISPRO 9 UNITS: 100 INJECTION, SOLUTION INTRAVENOUS; SUBCUTANEOUS at 13:49

## 2021-09-29 RX ADMIN — ATORVASTATIN CALCIUM 80 MG: 80 TABLET, FILM COATED ORAL at 09:52

## 2021-09-29 RX ADMIN — GABAPENTIN 800 MG: 400 CAPSULE ORAL at 16:58

## 2021-09-29 RX ADMIN — INSULIN GLARGINE 26 UNITS: 100 INJECTION, SOLUTION SUBCUTANEOUS at 22:40

## 2021-09-29 RX ADMIN — GABAPENTIN 800 MG: 400 CAPSULE ORAL at 13:48

## 2021-09-29 RX ADMIN — GABAPENTIN 800 MG: 400 CAPSULE ORAL at 22:56

## 2021-09-29 RX ADMIN — DULOXETINE 20 MG: 20 CAPSULE, DELAYED RELEASE ORAL at 22:56

## 2021-09-29 RX ADMIN — INSULIN LISPRO 3 UNITS: 100 INJECTION, SOLUTION INTRAVENOUS; SUBCUTANEOUS at 10:03

## 2021-09-29 RX ADMIN — INSULIN LISPRO 2 UNITS: 100 INJECTION, SOLUTION INTRAVENOUS; SUBCUTANEOUS at 22:41

## 2021-09-29 RX ADMIN — Medication 10 ML: at 10:08

## 2021-09-29 RX ADMIN — ENOXAPARIN SODIUM 40 MG: 40 INJECTION SUBCUTANEOUS at 09:51

## 2021-09-29 RX ADMIN — INSULIN LISPRO 15 UNITS: 100 INJECTION, SOLUTION INTRAVENOUS; SUBCUTANEOUS at 17:33

## 2021-09-29 RX ADMIN — INSULIN LISPRO 18 UNITS: 100 INJECTION, SOLUTION INTRAVENOUS; SUBCUTANEOUS at 17:32

## 2021-09-29 RX ADMIN — INSULIN HUMAN 10 UNITS: 100 INJECTION, SOLUTION PARENTERAL at 19:01

## 2021-09-29 RX ADMIN — Medication 10 ML: at 22:57

## 2021-09-29 RX ADMIN — ENOXAPARIN SODIUM 40 MG: 40 INJECTION SUBCUTANEOUS at 22:56

## 2021-09-29 RX ADMIN — GABAPENTIN 800 MG: 400 CAPSULE ORAL at 09:52

## 2021-09-29 RX ADMIN — CLOPIDOGREL BISULFATE 75 MG: 75 TABLET ORAL at 09:52

## 2021-09-29 RX ADMIN — INSULIN LISPRO 18 UNITS: 100 INJECTION, SOLUTION INTRAVENOUS; SUBCUTANEOUS at 13:51

## 2021-09-29 RX ADMIN — ASPIRIN 81 MG: 81 TABLET, COATED ORAL at 09:52

## 2021-09-29 RX ADMIN — MONTELUKAST 10 MG: 10 TABLET, FILM COATED ORAL at 22:56

## 2021-09-29 RX ADMIN — PREDNISONE 40 MG: 20 TABLET ORAL at 09:53

## 2021-09-29 RX ADMIN — FUROSEMIDE 5 MG/HR: 10 INJECTION, SOLUTION INTRAMUSCULAR; INTRAVENOUS at 20:01

## 2021-09-29 RX ADMIN — BUDESONIDE AND FORMOTEROL FUMARATE DIHYDRATE 2 PUFF: 80; 4.5 AEROSOL RESPIRATORY (INHALATION) at 08:37

## 2021-09-29 RX ADMIN — BUDESONIDE AND FORMOTEROL FUMARATE DIHYDRATE 2 PUFF: 80; 4.5 AEROSOL RESPIRATORY (INHALATION) at 20:16

## 2021-09-29 RX ADMIN — INSULIN GLARGINE 26 UNITS: 100 INJECTION, SOLUTION SUBCUTANEOUS at 10:37

## 2021-09-29 ASSESSMENT — ENCOUNTER SYMPTOMS
CONSTIPATION: 0
VOMITING: 0
WHEEZING: 0
NAUSEA: 0
COUGH: 1
SHORTNESS OF BREATH: 0
ABDOMINAL DISTENTION: 0
DIARRHEA: 0
CHEST TIGHTNESS: 0
APNEA: 0

## 2021-09-29 ASSESSMENT — PAIN SCALES - WONG BAKER: WONGBAKER_NUMERICALRESPONSE: 0

## 2021-09-29 ASSESSMENT — PAIN SCALES - GENERAL
PAINLEVEL_OUTOF10: 0

## 2021-09-29 NOTE — DISCHARGE INSTR - COC
Continuity of Care Form    Patient Name: Luisito Finnegan   :  1965  MRN:  [id number]    Admit date:  2021  Discharge date:  ***    Code Status Order: Full Code   Advance Directives:     Admitting Physician:  Devona Holter, MD  PCP: Mack Sanchez, APRN - CNP    Discharging Nurse: [facility] Unit/Room#: 8355/2508-22  Discharging Unit Phone Number: ***    Emergency Contact:   Extended Emergency Contact Information  Primary Emergency Contact: Taylor Klein  Address: [address] [place] [address]  [address] Phone: [phone]  Relation: Spouse  Secondary Emergency Contact: Burton Mckeon  [place] Phone: [phone]  Relation: Spouse   needed? No    Past Surgical History:  Past Surgical History:   Procedure Laterality Date    HERNIA REPAIR      TONSILLECTOMY         Immunization History: There is no immunization history on file for this patient.     Active Problems:  Patient Active Problem List   Diagnosis Code    Hypertension I10    Other hyperlipidemia E78.49    CHF (congestive heart failure) ([facility]) I50.9    COPD exacerbation ([facility]) J44.1    Acute on chronic respiratory failure with hypoxia and hypercapnia ([facility]) J96.21, J96.22    Coronary artery disease involving native coronary artery of native heart without angina pectoris I25.10    Type 2 diabetes mellitus without complication ([facility]) S43.7    Tobacco use Z72.0    Chest pain R07.9    Elevated d-dimer R79.89    Organizing pneumonia ([facility] [address] J84.89    Severe hypoxemia R09.02       Isolation/Infection:   Isolation          No Isolation        Patient Infection Status     Infection Onset Added Last Indicated Last Indicated By Review Planned Expiration Resolved Resolved By    None active    Resolved    COVID-19 Rule Out 21 COVID-19 (Ordered)   21 Rule-Out Test Resulted    COVID-19 Rule Out 21 COVID-19 (Ordered) 09/17/21 Rule-Out Test Resulted    COVID-19 Rule Out 07/11/21 07/11/21 07/12/21 COVID-19 & Influenza Combo (Ordered)   07/12/21 Rule-Out Test Resulted    COVID-19 Rule Out 06/07/21 06/07/21 06/07/21 COVID-19 & Influenza Combo (Ordered)   06/07/21 Rule-Out Test Resulted    COVID-19 Rule Out 06/07/21 06/07/21 06/07/21 COVID-19, Rapid (Ordered)   06/07/21 Rule-Out Test Resulted    COVID-19 Rule Out 02/07/21 02/07/21 02/07/21 COVID-19 (Ordered)   02/07/21 Rule-Out Test Resulted          Nurse Assessment:  Last Vital Signs: /84   Pulse 103   Temp 98.4 °F (36.9 °C) (Oral)   Resp 18   Ht 4' 11\" (1.499 m)   Wt 173 lb 11.6 oz (78.8 kg)   SpO2 90%   BMI 35.09 kg/m²     Last documented pain score (0-10 scale): Pain Level: 0  Last Weight:   Wt Readings from Last 1 Encounters:   09/29/21 173 lb 11.6 oz (78.8 kg)     Mental Status:  {IP PT MENTAL STATUS:16741}    IV Access:  { RAQUEL IV ACCESS:530345711}    Nursing Mobility/ADLs:  Walking   {P DME OJTC:530084380}  Transfer  {[facility] DME CSFW:456537204}  Bathing  {[facility] DME OBHM:968442873}  Dressing  {[facility] DME DYWJ:429168085}  Toileting  {[facility] DME FLUX:434861870}  Feeding  {[facility] DME COYB:438217553}  Med Admin  {[facility] DME CFKN:721347979}  Med Delivery   { RAQULE MED Delivery:539515761}    Wound Care Documentation and Therapy:        Elimination:  Continence:   · Bowel: {YES / WR:20451}  · Bladder: {YES / VX:22044}  Urinary Catheter: {Urinary Catheter:494923604}   Colostomy/Ileostomy/Ileal Conduit: {YES / VH:96869}       Date of Last BM: ***    Intake/Output Summary (Last 24 hours) at 9/29/2021 1716  Last data filed at 9/29/2021 1700  Gross per 24 hour   Intake 610 ml   Output 1525 ml   Net -915 ml     I/O last 3 completed shifts: In: 620 [P.O.:600;  I.V.:20]  Out: 1825 [Urine:1825]    Safety Concerns:     [address] Safety Concerns:846811067}    Impairments/Disabilities:      [address] Impairments/Disabilities:367995009}    Nutrition Therapy:  Current Nutrition Therapy:   [address] Diet WKKX:539012371}    Routes of Feeding: {CHP DME Other Feedings:568413261}  Liquids: {Slp liquid thickness:23627}  Daily Fluid Restriction: {CHP DME Yes amt example:145317293}  Last Modified Barium Swallow with Video (Video Swallowing Test): {Done Not Done EXKY:024951241}    Treatments at the Time of Hospital Discharge:   Respiratory Treatments: ***  Oxygen Therapy:  {Therapy; copd oxygen:93069}  Ventilator:    {[facility] CC Vent QZOX:583474326}     Heart Failure Instructions for Daily Management  Patient was treated for chronic diastolic heart failure. he  will require the following:     Please weigh daily on the same scale and approximately the same time of day. Report weight gain of 3 pounds/day or 5 pounds/week to : [facility] ([phone])   Please use hospital discharge weight as baseline reference.  Please monitor for signs and symptoms of and report to MD:  o Worsening Heart Failure: sudden weight gain, shortness of breath, lower extremity or general edema/swelling, abdominal bloating/swelling, inability to lie flat, intolerance to usual activity, or cough (especially at night). Report these finding even if no increase in weight.  o Dehydration:  having difficulty or a decrease in urination, dizziness, worsening fatigue, or new onset/worsening of generalized weakness.  Please continue a LOW SODIUM diet and LIMIT fluid intake to 48 - 64 ounces ( 1.5 - 2 liters) per day.  Call [facility] ([phone]) with any questions or concerns.  Please continue heart failure education to patient and family/support system.  See After Visit Summary for hospital follow up appointment details.  Consider spiritual care referral for support and/or completion of advance directives .    Consider: having the facility MD complete required 7 day follow up, [place] [address] telehealth program if patient agreeable and able to participate and palliative care consult for ongoing goals of care, end of life, and/or chronic disease management discussions. Dr Lexi Durbin is pt's primary cardiologist.       Rehab Therapies: {THERAPEUTIC INTERVENTION:4143448837}  Weight Bearing Status/Restrictions: [address] [facility] Weight Bearin}  Other Medical Equipment (for information only, NOT a DME order):  {EQUIPMENT:922344027}  Other Treatments: ***    Patient's personal belongings (please select all that are sent with patient):  {CHP DME Belongings:336147406}    RN SIGNATURE:  {Esignature:220001294}    CASE MANAGEMENT/SOCIAL WORK SECTION    Inpatient Status Date: ***    Readmission Risk Assessment Score:  Readmission Risk              Risk of Unplanned Readmission:  30           Discharging to Facility/ Agency   · Name:   · Address:  · Phone:  · Fax:    Dialysis Facility (if applicable)   · Name:  · Address:  · Dialysis Schedule:  · Phone:  · Fax:    / signature: {Esignature:087755420}    PHYSICIAN SECTION    Prognosis: {Prognosis:1983402954}    Condition at Discharge: [address] Patient Condition:566010299}    Rehab Potential (if transferring to Rehab): {Prognosis:5865353381}    Recommended Labs or Other Treatments After Discharge: ***    Physician Certification: I certify the above information and transfer of Noemy Merino  is necessary for the continuing treatment of the diagnosis listed and that he requires {Admit to Appropriate Level of Care:93302} for {GREATER/LESS:372075379} 30 days.      Update Admission H&P: {[facility]P [facility]ME [facility]:113172142}    PHYSICIAN SIGNATURE:  {Esignature:452314093}

## 2021-09-29 NOTE — CARE COORDINATION
CM continues to follow for DC planning and needs. Pt from home with spouse. Patient continues on lasix gtt and weaning on O2, currently on 2 L, which is baseline. Has home O2 with Carlyn. Patient plans for return home at .  . RHC cancelled for today. CM spoke with nursing regarding PT/OT order for discharge planning.      Danielito Auguste RN, BSN, [facility]  Case Management Department  [phone]

## 2021-09-29 NOTE — PROGRESS NOTES
NUTRITION NOTE   Admission Date: 9/16/2021     Type and Reason for Visit: Reassess    NUTRITION RECOMMENDATIONS:   1. PO Diet: Continue regular; 4CC; low sodium diet   2. Nutrition Education: HF guideline education provided    NUTRITION ASSESSMENT:  Pt continues with good nutrition; intakes noted per EMR all >76% po. Wt loss noted throughout adm although pt with heart failure so wt fluctuations likely d/t fluid shifts. Will continue to monitor po intakes and nutrition status. Please re-consult RD as needed for review of HF education. Patient admitted d/t: SOB    PMH significant for: HFpEF, COPD, HTN, HLD, T2DM    MALNUTRITION ASSESSMENT  Context of Malnutrition: Acute Illness   Malnutrition Status: No malnutrition    NUTRITION DIAGNOSIS   · Food & Nutrition-related knowledge deficit related to impaired respiratory function as evidenced by other (comment) (dx of CHF)      NUTRITION INTERVENTION  Food and/or Nutrient Delivery:  Continue Current Diet  Nutrition Education/Counseling:  Education completed      The patient will still be monitored per nutrition standards of care. Consult dietitian if nutrition interventions essential to patient care is needed.      Francisco Dee, [address] [place] [address], [address] [place] [address] Drive:  [phone]  Office:  [phone]

## 2021-09-29 NOTE — PROGRESS NOTES
09/29/21 0200 -- -- -- -- 16 93 % --       Intake/Output Summary (Last 24 hours) at 9/29/2021 0901  Last data filed at 9/29/2021 5967  Gross per 24 hour   Intake 736 ml   Output 2425 ml   Net -1689 ml         Physical Exam  Constitutional:       General: He is not in acute distress. Appearance: Normal appearance. He is not ill-appearing, toxic-appearing or diaphoretic. HENT:      Head: Normocephalic and atraumatic. Eyes:      General: No scleral icterus. Right eye: No discharge. Left eye: No discharge. Extraocular Movements: Extraocular movements intact. Conjunctiva/sclera: Conjunctivae normal.      Pupils: Pupils are equal, round, and reactive to light. Cardiovascular:      Rate and Rhythm: Normal rate and regular rhythm. Pulmonary:      Effort: Pulmonary effort is normal.      Breath sounds: Rales present. Abdominal:      General: Abdomen is flat. Bowel sounds are normal. There is no distension. Palpations: Abdomen is soft. Tenderness: There is no abdominal tenderness. Musculoskeletal:         General: Normal range of motion. Cervical back: Normal range of motion and neck supple. No rigidity. Right lower leg: No edema. Left lower leg: No edema. Skin:     Findings: Bruising (multiple areas) present. Neurological:      General: No focal deficit present. Mental Status: He is alert and oriented to person, place, and time. Mental status is at baseline. Cranial Nerves: No cranial nerve deficit. Sensory: No sensory deficit. Motor: No weakness. Coordination: Coordination normal.      Gait: Gait normal.      Deep Tendon Reflexes: Reflexes normal.   Psychiatric:         Mood and Affect: Mood normal.         Behavior: Behavior normal.         Thought Content:  Thought content normal.         Judgment: Judgment normal.           LABS:    CBC:   Recent Labs     09/27/21  0516 09/28/21  0518 09/29/21  0526   WBC 12.9* 13.6* 16.4* HGB 16.0 16.7 16.9   HCT 49.7 51.1 51.4    358 348   MCV 89.7 88.2 87.8     Renal:    Recent Labs     09/27/21  0515 09/27/21 0516 09/28/21 0518 09/28/21 1823 09/29/21 0526   NA  --    < > 133* 135* 133*   K  --    < > 4.7 5.1 4.6   CL  --    < > 94* 92* 91*   CO2  --    < > 22 26 22   BUN  --    < > 63* 62* 67*   CREATININE  --    < > 1.0 1.2 1.1   GLUCOSE  --    < > 292* 141* 168*   CALCIUM  --    < > 10.1 10.3 9.9   MG 1.70*  --  2.30  --  2.10   PHOS  --    < > 4.7 5.3* 4.9   ANIONGAP  --    < > 17* 17* 20*    < > = values in this interval not displayed. Hepatic:   Recent Labs     09/28/21 0518 09/28/21 1823 09/29/21 0526   LABALBU 3.8 4.0 3.8     Troponin: No results for input(s): TROPONINI in the last 72 hours. BNP: No results for input(s): BNP in the last 72 hours. Lipids: No results for input(s): CHOL, HDL in the last 72 hours. Invalid input(s): LDLCALCU, TRIGLYCERIDE  ABGs:  No results for input(s): PHART, TBV9MJF, PO2ART, EIX8BSB, BEART, THGBART, H2JSIZCJ, ZPD7CYA in the last 72 hours. INR: No results for input(s): INR in the last 72 hours. Lactate: No results for input(s): LACTATE in the last 72 hours. Cultures:  -----------------------------------------------------------------  RAD:   XR CHEST PORTABLE   Final Result   Impression: Stable appearance of the chest. Bibasilar atelectasis/consolidation persists. CT CHEST HIGH RESOLUTION   Final Result      Slow progression of bilateral lower lobe alveolar consolidation with air bronchograms raises concern for chronic process such as interstitial pneumonia, eosinophilic pneumonia, and organizing pneumonia. Neoplastic etiology is considered much less likely. MRI BRAIN WO CONTRAST   Final Result      1. No acute intracranial abnormality. No evidence of acute infarct.    2. Minimal nonspecific white matter signal abnormality on FLAIR imaging suggesting minimal chronic small vessel ischemic disease and/or minimal age-related degenerative change. CTA HEAD NECK W CONTRAST   Final Result      Study limited by motion artifact and poor contrast bolus. 1. Atherosclerotic disease at bilateral carotid bifurcations. Exact percentage of stenosis by NASCET criteria is difficult to comment upon due to artifacts. 2. 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 Moderately attenuated mid vertebral artery from upper C7  to C4 5 level. Distal to its left vertebral artery is patent without significant stenosis. PROCEDURE: CT ANGIOGRAPHY HEAD WITH/WITHOUT CONTRAST      INDICATION: aphasia, ams      COMPARISON: none      TECHNIQUE: Axial CT imaging obtained through the head prior to and following administration of IV contrast. Axial images, multiplanar reformatted images, and maximum intensity projection images were reviewed for CT angiographic technique. IV contrast: mL Omnipaque 350. FINDINGS:      ANTERIOR CIRCULATION: The intracranial internal carotid arteries, anterior cerebral arteries, and middle cerebral arteries are patent. There are calcified plaques with mild to moderate narrowing of bilateral ICA cavernous/supraclinoid segments. . No    evidence for aneurysm or arteriovenous malformation. POSTERIOR CIRCULATION: The bilateral vertebral arteries, basilar artery and posterior cerebral arteries are patent. There are calcified and noncalcified plaques with mild narrowing of left vertebral artery intradural segment. Right vertebral intradural    segment is severely attenuated, likely hyperplastic. No evidence for aneurysm or arteriovenous malformation. INTRACRANIAL VENOUS SYSTEM: No evidence for intracranial venous thrombosis. IMPRESSION:      No evidence of intracranial large vessel occlusion. 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       Calcified plaques with mild to moderate narrowing of bilateral ICA cavernous/supraclinoid segments. CT HEAD WO CONTRAST   Final Result      No acute intracranial abnormality . CT CHEST PULMONARY EMBOLISM W CONTRAST   Final Result   1. No definite pulmonary embolus identified. 2.  Mild atherosclerotic changes of the aorta. No aortic aneurysm or    dissection. 3.  Patchy densities in the lower lobes and right middle lobe may    represent atelectasis versus infectious/inflammatory process. 4.  Dependent atelectasis bilaterally. Atelectasis in the lingula. 5.  Trace right pleural effusion. 6.  Nonspecific similar mildly prominent mediastinal and hilar lymph    nodes. Assessment/Plan:     Acute on chronic hypoxic/hypercapnic resp failure improving 2/2 to unclear etiology. Has been on high dose steroids as well as lasix drip. Overall has decreasing oxygen requirements. CoVId negative all other antigen negative. Negative for PE.   -Continue lasix drip as appears to helping oxygen requirments. . Usually on 2L at home. Still has a increase in WBC but has been on steroids which could be reflective of that as he has remained afebrile with overall improvement.   -Continue Prednisone and lasix as patient improving   -Recommend repeat CT imaging of the chest when supplemental oxygen has decreased  -Cardiology will not do RHC at this time     COPD: not consistent with exacerbation    Continue steroids for interstitial lung process   Continue Symbicort, singular    smoking cessation education    Efren Bucio MD, PGY-3  09/29/21  9:01 AM    This patient has been staffed and discussed with Dr Chelle Falcon     Patient examined, findings as discussed with Dr. Abdulkadir Banegas. Agree with assessment and plan. CT scan today shows nearly complete resolution of basilar consolidations, and significant improvement of vascular congestion.   The question remains whether steroids or diuretic made the most substantial difference, helping resolve infiltrates and improve hypoxemia. The clearing of consolidation is most likely result of steroid therapy, and I think this process can be best described as an organizing pneumonia. Continued steroid therapy is indicated to prevent recurrence, although the dose will be tapered. Diuresis certainly played some role in improving him clinically, given the extent of fluid removal.  Anticipate discharge in the next 24 hours.   Continue to wean oxygen as tolerated

## 2021-09-29 NOTE — PLAN OF CARE
Problem: Falls - Risk of:  Goal: Will remain free from falls  Description: Will remain free from falls  Outcome: Ongoing   Bed alarm on; bed in lowest position w/wheels locked; call light within reach; non skid socks on  Problem: Gas Exchange - Impaired  Goal: Absence of hypoxia  Outcome: Ongoing   Pt was put on a high flow nc nd is stating above 88%  Problem: ACTIVITY INTOLERANCE/IMPAIRED MOBILITY  Goal: Mobility/activity is maintained at optimum level for patient  Outcome: Ongoing   Pt was able to get up and use the restroom with minimal assistance

## 2021-09-29 NOTE — PROGRESS NOTES
Progress Note    Admit Date: 9/16/2021  Day: 13  Diet: ADULT DIET; Regular; 4 carb choices (60 gm/meal); Low Sodium (2 gm)    CC: SOB    Interval history:   Nil acute overnight events reported. Pt seen at bedside with no new complaints. Transitioned from Vapotherm to HFNC 15 L at 99%. Denies any chest pain, SOB, palpitations.      Medications:     Scheduled Meds:   predniSONE  40 mg Oral Daily    insulin glargine  28 Units SubCUTAneous BID    insulin lispro  20 Units SubCUTAneous TID WC    pantoprazole  40 mg Oral QAM AC    insulin lispro  0-18 Units SubCUTAneous TID WC    insulin lispro  0-9 Units SubCUTAneous Nightly    clopidogrel  75 mg Oral Daily    nicotine  1 patch TransDERmal Daily    enoxaparin  40 mg SubCUTAneous BID    [Held by provider] lisinopril  20 mg Oral Daily    gabapentin  800 mg Oral 4x Daily    atorvastatin  80 mg Oral Daily    aspirin  81 mg Oral Daily    DULoxetine  20 mg Oral BID    budesonide-formoterol  2 puff Inhalation BID    montelukast  10 mg Oral Nightly    sodium chloride flush  5-40 mL IntraVENous 2 times per day     Continuous Infusions:   furosemide (LASIX) 1mg/ml infusion 5 mg/hr (09/28/21 2336)    dextrose      sodium chloride 25 mL (09/23/21 1832)     PRN Meds:sodium chloride, albuterol-ipratropium, glucose, dextrose, glucagon (rDNA), dextrose, sodium chloride flush, sodium chloride, ondansetron **OR** ondansetron, polyethylene glycol, acetaminophen **OR** acetaminophen    Objective:   Vitals:   T-max:  Patient Vitals for the past 8 hrs:   BP Temp Temp src Pulse Resp SpO2 Weight   09/29/21 0653 -- -- -- -- -- -- 173 lb 11.6 oz (78.8 kg)   09/29/21 0447 (!) 131/92 98.5 °F (36.9 °C) Oral 93 18 94 % --   09/29/21 0400 -- -- -- -- 18 92 % --   09/29/21 0200 -- -- -- -- 16 93 % --       Intake/Output Summary (Last 24 hours) at 9/29/2021 0802  Last data filed at 9/29/2021 0200  Gross per 24 hour   Intake 743 ml   Output 2475 ml   Net -1732 ml       Review of Systems   Constitutional: Negative for chills, fatigue and fever. Respiratory: Positive for cough. Negative for apnea, chest tightness, shortness of breath and wheezing. Cardiovascular: Negative for chest pain, palpitations and leg swelling. Gastrointestinal: Negative for abdominal distention, constipation, diarrhea, nausea and vomiting. Genitourinary: Negative. Neurological: Negative. Physical Exam  Constitutional:       Appearance: He is obese. HENT:      Head: Normocephalic and atraumatic. Mouth/Throat:      Mouth: Mucous membranes are dry. Pharynx: Oropharynx is clear. Eyes:      Conjunctiva/sclera: Conjunctivae normal.      Pupils: Pupils are equal, round, and reactive to light. Cardiovascular:      Rate and Rhythm: Normal rate and regular rhythm. Pulses: Normal pulses. Pulmonary:      Effort: Pulmonary effort is normal.      Comments: On HFNC   Abdominal:      General: Bowel sounds are normal. There is no distension. Palpations: Abdomen is soft. Tenderness: There is no abdominal tenderness. Skin:     Capillary Refill: Capillary refill takes less than 2 seconds. Neurological:      General: No focal deficit present. Mental Status: He is alert and oriented to person, place, and time.          LABS:    CBC:   Recent Labs     09/27/21  0516 09/28/21  0518 09/29/21  0526   WBC 12.9* 13.6* 16.4*   HGB 16.0 16.7 16.9   HCT 49.7 51.1 51.4    358 348   MCV 89.7 88.2 87.8     Renal:    Recent Labs     09/27/21  0515 09/27/21  0516 09/28/21  0518 09/28/21  1823 09/29/21  0526   NA  --    < > 133* 135* 133*   K  --    < > 4.7 5.1 4.6   CL  --    < > 94* 92* 91*   CO2  --    < > 22 26 22   BUN  --    < > 63* 62* 67*   CREATININE  --    < > 1.0 1.2 1.1   GLUCOSE  --    < > 292* 141* 168*   CALCIUM  --    < > 10.1 10.3 9.9   MG 1.70*  --  2.30  --  2.10   PHOS  --    < > 4.7 5.3* 4.9   ANIONGAP  --    < > 17* 17* 20*    < > = values in this interval not ANGIOGRAPHY HEAD WITH/WITHOUT CONTRAST      INDICATION: aphasia, ams      COMPARISON: none      TECHNIQUE: Axial CT imaging obtained through the head prior to and following administration of IV contrast. Axial images, multiplanar reformatted images, and maximum intensity projection images were reviewed for CT angiographic technique. IV contrast: mL Omnipaque 350. FINDINGS:      ANTERIOR CIRCULATION: The intracranial internal carotid arteries, anterior cerebral arteries, and middle cerebral arteries are patent. There are calcified plaques with mild to moderate narrowing of bilateral ICA cavernous/supraclinoid segments. . No    evidence for aneurysm or arteriovenous malformation. POSTERIOR CIRCULATION: The bilateral vertebral arteries, basilar artery and posterior cerebral arteries are patent. There are calcified and noncalcified plaques with mild narrowing of left vertebral artery intradural segment. Right vertebral intradural    segment is severely attenuated, likely hyperplastic. No evidence for aneurysm or arteriovenous malformation. INTRACRANIAL VENOUS SYSTEM: No evidence for intracranial venous thrombosis. IMPRESSION:      No evidence of intracranial large vessel occlusion. 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 Calcified plaques with mild to moderate narrowing of bilateral ICA cavernous/supraclinoid segments. CT HEAD WO CONTRAST   Final Result      No acute intracranial abnormality . CT CHEST PULMONARY EMBOLISM W CONTRAST   Final Result   1. No definite pulmonary embolus identified. 2.  Mild atherosclerotic changes of the aorta. No aortic aneurysm or    dissection. 3.  Patchy densities in the lower lobes and right middle lobe may    represent atelectasis versus infectious/inflammatory process. 4.  Dependent atelectasis bilaterally. Atelectasis in the lingula.    5.  Trace right pleural effusion. 6.  Nonspecific similar mildly prominent mediastinal and hilar lymph    nodes. Assessment/Plan:   Eduardo Wagoner a 50 y. o. male, PMHx HFpEF, COPD on 2 L of oxygen at home, diabetes, HTN,  who presents with two weeks of worsening SOB.      Acute hypoxic and hypercapnic respiratory failure  Improving Oxygenation. Transitioned from Vapotherm to HFNC at 15L with spo2: 96%  Unknown etiology of acute respiratory failure though CHF exacerbation vs COPD exacerbation discussed and both was treated. Long standing COPD so its reasonable to say there is a pulmonary hypertension component to pts current status; ECHO not reflective of that. Cardiology contemplating RHC. On Lasix gtt and high dose steroids which seemed to help. ABDI negative. CRP 7.1 (decreased from 16.2). ESR: 22. But pt was already on steroids. May need a repeat after steroids d/c   Leukocytosis likely due to steroids. Solumedrol -  Overall pt is showing improvement over last 4 days since being on the lasix gtt.   - Prednisone decreased to 40mg daily   - Lasix gtt 5mg/hr  - Incentive spirometery  - Continuing Home Symbicort  - Continuing Home Singulair  - Combivent Respimat as needed  - Wean down for O2 Sat more than 88% given history of COPD  - Cardiology following, no RHC recommended at this point  - Pulmonology following                Diabetes type 2  HbA1c: 7.1 (2021)  B this am  - HDSSI   - Lantus 28 u BID  - Lispro 18 u TID  - Hypoglycemia protocol        COPD  Low suspicion for exacerbation. He has been on Nebs/inhalers and Steroids with no improvement. Received 2 doses Decadron 10 () and Solu-Medrol 125mg x 3 days (-)        HFpEF   ECHO 55-60% in 2021  Low suspicion for exacerbation. Has had diuresis with limited improvement. Patient not overtly volume overloaded, could not appreciate JVD but this is complicated by body habitus. No lower extremity edema.  no pleural effusion on imaging, consistent with exam.  ProBNP: 3129 -> 371 -> 160 -> 124 - improvement so likely there was some exarcerbation but with BNP decreasing, Pt has still not improved respiratory wise. Good Urine output over past 24 hours 2475 mL  - On lasix gtt     LILLIAN   Cr: 1.1 -> 0.8 -> 1.1 -> 1.2 ->1.0   Like due to pre renal causes and diuretic use   Elevated BUN likely due to the catabolic effect of corticosteroids  - PO intake of fluids  - RFTs Twice daily  - Avoid Nephrotoxins  - Avoid NSAIDs + ACE inhibitors     Hyponatremia  Likely diet related as pt is on a low salt diet  Na: 133 -> 135 ->133  Snow: <20 , UrCr: 58.3     TIA r/o   Brief episode of expressive aphasia. Rapid response called 9/17. CT head, CTA head and neck no evidence of intracranial large vessel occlusion. L vertebral artery occluded. Echo normal.  MRI brain without acute process, small vessel ischemic disease present.  Folate normal, B12 normal  - Neurology reviewed  - Continue ASA, Plavix + Atorvastatin.      CAD/PVD  [facility]HC in [facility] at [facility]  Patient on long-term DAPT.  Per cardiologist note Dr. Mynor Brooke in February, this was supposed to be stopped. - Plavix was initially stopped due to home medication reconciliation. However, restarted 9/17 overnight due to concern of TIA   - ASA      Chronic Issues  HTN - continue home meds  HLD - Continue home meds  Neuropathy - continue home gabapentin  Smoking cessation - Patient with significant smoking history 2.5 PPD since 10years old. Received education. Nicoderm patch.      Screening:  FOBT done as an IP and is positive. Upon d/s, patient will benefit from GI outpatient or a colonoscopy. Code Status: Full code  FEN: ADULT DIET; Regular; 4 carb choices (60 gm/meal); Low Sodium (2 gm)  PPX: Lovenox  DISPO: PCU.      José Miguel Miles MD, PGY-1  Internal Medicine Resident  09/29/21  8:02 AM    This patient has been staffed and discussed with Sarah Salinas MD.     Patient seen and examined, labs and imaging studies reviewed, agree with assessment and plan as outlined above. Continue with current care and plan. Discussed case with patients nurse, discussed case with care team, discussed plan. Patient looks remarkably better, I titrated him down on his o2 requirements to 2L, he is doing great, hoping to dc in am.  Greater than 35 minutes spent on case over half face to face discussed need for covid 19 vaccination risks benefits and alternatives at length.       MD Nidhi Soriano

## 2021-09-29 NOTE — PROGRESS NOTES
Reason for consult: RHC, CHF, sob    50 y.o. admitted 9/16/2021 with sob. Has had contd sob and has been treated wfor COPD exacerbation with steroids and nebs. Remains on Vapotherm at 40L, FiO2 100%. No CP. No sig edema. SOB improving. PE:  Blood pressure (!) 131/92, pulse 104, temperature 98.4 °F (36.9 °C), temperature source Oral, resp. rate 18, height 4' 11\" (1.499 m), weight 173 lb 11.6 oz (78.8 kg), SpO2 98 %. NC O2  General (appearance): Well devel. No distress  Eyes: Anicteric. EOMI  Neck: No JVD obvious  Ears/Nose/Mouth/Thorat: No cyanosis  CV: RRR/mild tachycardia. No r/g  Respiratory:  Diminished  GI: Abd s/nt/nd. No peritoneal signs  Skin: Warm, dry. No rashes  Neuro/Psych: Alert and oriented x 3. Appropriate behavior  Ext:  No c/c.  No edema  Pulses:  2+ carotid    Lab Results   Component Value Date    WBC 16.4 (H) 09/29/2021    HGB 16.9 09/29/2021    HCT 51.4 09/29/2021    MCV 87.8 09/29/2021     09/29/2021     Lab Results   Component Value Date     (L) 09/29/2021    K 4.6 09/29/2021    CL 91 (L) 09/29/2021    CO2 22 09/29/2021    BUN 67 (H) 09/29/2021    CREATININE 1.1 09/29/2021    GLUCOSE 168 (H) 09/29/2021    CALCIUM 9.9 09/29/2021    PROT 8.2 09/17/2021    LABALBU 3.8 09/29/2021    BILITOT 0.3 09/17/2021    ALKPHOS 59 09/17/2021    AST 17 09/17/2021    ALT 17 09/17/2021    LABGLOM >60 09/29/2021    GFRAA >60 09/29/2021    AGRATIO 1.2 09/16/2021    GLOB 3.4 09/16/2021     Lab Results   Component Value Date    INR 1.13 (H) 09/16/2021    PROTIME 12.9 (H) 09/16/2021     Lab Results   Component Value Date    CHOL 163 09/17/2021     Lab Results   Component Value Date    TRIG 141 09/17/2021     Lab Results   Component Value Date    HDL 31 (L) 09/17/2021    HDL 35 (L) 05/24/2021    HDL 50 03/30/2020     Lab Results   Component Value Date    LDLCALC 104 (H) 09/17/2021    LDLCALC 110 (H) 05/24/2021    LDLCALC 43 03/30/2020     Lab Results   Component Value Date    LABVLDL 28 09/17/2021 LABVLDL 43 05/24/2021    LABVLDL 17 03/30/2020     No results found for: CHOLHDLRATIO    9/19/2021 CT Chest (High-Res):  Slow progression of bilateral lower lobe alveolar consolidation with air bronchograms raises concern for chronic process such as interstitial pneumonia, eosinophilic pneumonia, and organizing pneumonia. Neoplastic etiology is considered much less likely. 9/17/2021 PE CT Scan:  No PE. Patchy densities in lower lobes (infiltrate v. Atelec). Tr pl effusion. Hilar nodes. 2020 LHC: No obstructive CAD ([facility])    9/16/2021 TTE: EF 55-60%. Tr TR. Tr effusion. Neg bubble study.  RV size and fx are read normal      Current Facility-Administered Medications:     insulin lispro (1 Unit Dial) 18 Units, 18 Units, SubCUTAneous, TID , Fanny Causey MD    insulin glargine (LANTUS;BASAGLAR) injection pen 26 Units, 26 Units, SubCUTAneous, BID, Ryan Velasco MD, 26 Units at 09/29/21 1037    predniSONE (DELTASONE) tablet 40 mg, 40 mg, Oral, Daily, Kimberlee Solis MD, 40 mg at 09/29/21 0953    furosemide (LASIX) 100 mg in dextrose 5 % 100 mL infusion, 5 mg/hr, IntraVENous, Continuous, Violeta Morales MD, Last Rate: 5 mL/hr at 09/28/21 2336, 5 mg/hr at 09/28/21 2336    pantoprazole (PROTONIX) tablet 40 mg, 40 mg, Oral, QAM AC, Nish Jaramillo MD, 40 mg at 09/29/21 0654    insulin lispro (1 Unit Dial) 0-18 Units, 0-18 Units, SubCUTAneous, TID WC, Ryan Velasco MD, 3 Units at 09/29/21 1003    insulin lispro (1 Unit Dial) 0-9 Units, 0-9 Units, SubCUTAneous, Nightly, Ryan Velasco MD, 3 Units at 09/28/21 2102    sodium chloride (OCEAN, BABY AYR) 0.65 % nasal spray 1 spray, 1 spray, Each Nostril, PRN, Liana Pineda MD, 1 spray at 09/28/21 1705    clopidogrel (PLAVIX) tablet 75 mg, 75 mg, Oral, Daily, Dilia Mora, APRN - CNP, 75 mg at 09/29/21 0952    nicotine (NICODERM CQ) 14 MG/24HR 1 patch, 1 patch, TransDERmal, Daily, Kathyrn Yepez MD, 1 patch at 09/29/21 0958    enoxaparin (LOVENOX) injection 40 mg, 40 mg, SubCUTAneous, BID, David Tomas MD, 40 mg at 09/29/21 0951    [Held by provider] lisinopril (PRINIVIL;ZESTRIL) tablet 20 mg, 20 mg, Oral, Daily, David Tomas MD, 20 mg at 09/22/21 0835    gabapentin (NEURONTIN) capsule 800 mg, 800 mg, Oral, 4x Daily, David Tomas MD, 800 mg at 09/29/21 0952    albuterol-ipratropium (COMBIVENT RESPIMAT)  MCG/ACT inhaler 1 puff, 1 puff, Inhalation, Q4H PRN, Fanny Mills MD, 1 puff at 09/24/21 2054    atorvastatin (LIPITOR) tablet 80 mg, 80 mg, Oral, Daily, Brittani Gaytan MD, 80 mg at 09/29/21 7681    aspirin EC tablet 81 mg, 81 mg, Oral, Daily, Emily Hoff MD, 81 mg at 09/29/21 3397    DULoxetine (CYMBALTA) extended release capsule 20 mg, 20 mg, Oral, BID, Emily Hoff MD, 20 mg at 09/29/21 0953    glucose (GLUTOSE) 40 % oral gel 15 g, 15 g, Oral, PRN, Emily Hoff MD    dextrose 50 % IV solution, 12.5 g, IntraVENous, PRN, Emily Hoff MD    glucagon (rDNA) injection 1 mg, 1 mg, IntraMUSCular, PRN, Emily Hoff MD    dextrose 5 % solution, 100 mL/hr, IntraVENous, PRN, Emily Hoff MD    budesonide-formoterol (SYMBICORT) 80-4.5 MCG/ACT inhaler 2 puff, 2 puff, Inhalation, BID, Emily Hoff MD, 2 puff at 09/29/21 0837    montelukast (SINGULAIR) tablet 10 mg, 10 mg, Oral, Nightly, Emily Hoff MD, 10 mg at 09/28/21 2058    sodium chloride flush 0.9 % injection 5-40 mL, 5-40 mL, IntraVENous, 2 times per day, Emily Hoff MD, 10 mL at 09/29/21 1008    sodium chloride flush 0.9 % injection 5-40 mL, 5-40 mL, IntraVENous, PRN, Emily Hoff MD    0.9 % sodium chloride infusion, 25 mL, IntraVENous, PRN, Emily Hoff MD, Last Rate: 100 mL/hr at 09/23/21 1832, 25 mL at 09/23/21 1832    ondansetron (ZOFRAN-ODT) disintegrating tablet 4 mg, 4 mg, Oral, Q8H PRN **OR** ondansetron (ZOFRAN) injection 4 mg, 4 mg, IntraVENous, Q6H PRN, Emily Hoff MD    polyethylene glycol (GLYCOLAX) packet 17 g, 17 g, Oral, Daily PRN, MD Minal Turner acetaminophen (TYLENOL) tablet 650 mg, 650 mg, Oral, Q6H PRN **OR** acetaminophen (TYLENOL) suppository 650 mg, 650 mg, Rectal, Q6H PRN, Kendell Garcia MD    A/P:  50 y.o. with hypoxic respiratory failure. Issues;  -Hypoxic respiratory failure  -Non-obstructive CAD (2020 cath at [facility])  -Morbid obesity  -COPD  -Smoking  -HTN  -HL  -DM  -Abnormal chest CT    Recs:  -Neg 14.1L or so on 9/29 morning.  -Cont Lasix gtt  -Cont COPD meds  -Cancelled RHC for today. Cont diuresing and see how he does. Not sure if it's the lasix, the copd treatment, or both (or neither) for the reason for improvement, but either way he's getting better. Quyen Goodwin MD, [facility]

## 2021-09-30 PROBLEM — E78.2 MIXED HYPERLIPIDEMIA: Status: ACTIVE | Noted: 2020-03-30

## 2021-09-30 LAB
ALBUMIN SERPL-MCNC: 3.9 G/DL (ref 3.4–5)
ALBUMIN SERPL-MCNC: 4.1 G/DL (ref 3.4–5)
ANION GAP SERPL CALCULATED.3IONS-SCNC: 17 MMOL/L (ref 3–16)
ANION GAP SERPL CALCULATED.3IONS-SCNC: 18 MMOL/L (ref 3–16)
BASOPHILS ABSOLUTE: 0.1 K/UL (ref 0–0.2)
BASOPHILS RELATIVE PERCENT: 0.4 %
BUN BLDV-MCNC: 74 MG/DL (ref 7–20)
BUN BLDV-MCNC: 75 MG/DL (ref 7–20)
CALCIUM SERPL-MCNC: 10.4 MG/DL (ref 8.3–10.6)
CALCIUM SERPL-MCNC: 9.8 MG/DL (ref 8.3–10.6)
CHLORIDE BLD-SCNC: 86 MMOL/L (ref 99–110)
CHLORIDE BLD-SCNC: 91 MMOL/L (ref 99–110)
CO2: 19 MMOL/L (ref 21–32)
CO2: 25 MMOL/L (ref 21–32)
CREAT SERPL-MCNC: 1.3 MG/DL (ref 0.9–1.3)
CREAT SERPL-MCNC: 1.5 MG/DL (ref 0.9–1.3)
EOSINOPHILS ABSOLUTE: 0.1 K/UL (ref 0–0.6)
EOSINOPHILS RELATIVE PERCENT: 0.6 %
GFR AFRICAN AMERICAN: 59
GFR AFRICAN AMERICAN: >60
GFR NON-AFRICAN AMERICAN: 49
GFR NON-AFRICAN AMERICAN: 58
GLUCOSE BLD-MCNC: 206 MG/DL (ref 70–99)
GLUCOSE BLD-MCNC: 212 MG/DL (ref 70–99)
GLUCOSE BLD-MCNC: 354 MG/DL (ref 70–99)
GLUCOSE BLD-MCNC: 358 MG/DL (ref 70–99)
GLUCOSE BLD-MCNC: 384 MG/DL (ref 70–99)
GLUCOSE BLD-MCNC: 391 MG/DL (ref 70–99)
HCT VFR BLD CALC: 52.1 % (ref 40.5–52.5)
HEMOGLOBIN: 16.8 G/DL (ref 13.5–17.5)
LYMPHOCYTES ABSOLUTE: 3.1 K/UL (ref 1–5.1)
LYMPHOCYTES RELATIVE PERCENT: 17.7 %
MAGNESIUM: 2.3 MG/DL (ref 1.8–2.4)
MCH RBC QN AUTO: 28.7 PG (ref 26–34)
MCHC RBC AUTO-ENTMCNC: 32.2 G/DL (ref 31–36)
MCV RBC AUTO: 89.1 FL (ref 80–100)
MONOCYTES ABSOLUTE: 1.2 K/UL (ref 0–1.3)
MONOCYTES RELATIVE PERCENT: 7 %
NEUTROPHILS ABSOLUTE: 12.9 K/UL (ref 1.7–7.7)
NEUTROPHILS RELATIVE PERCENT: 74.3 %
PDW BLD-RTO: 17 % (ref 12.4–15.4)
PERFORMED ON: ABNORMAL
PHOSPHORUS: 5.3 MG/DL (ref 2.5–4.9)
PHOSPHORUS: 5.5 MG/DL (ref 2.5–4.9)
PLATELET # BLD: 360 K/UL (ref 135–450)
PMV BLD AUTO: 10 FL (ref 5–10.5)
POTASSIUM SERPL-SCNC: 4.8 MMOL/L (ref 3.5–5.1)
POTASSIUM SERPL-SCNC: 6.3 MMOL/L (ref 3.5–5.1)
RBC # BLD: 5.85 M/UL (ref 4.2–5.9)
SODIUM BLD-SCNC: 123 MMOL/L (ref 136–145)
SODIUM BLD-SCNC: 133 MMOL/L (ref 136–145)
WBC # BLD: 17.3 K/UL (ref 4–11)

## 2021-09-30 PROCEDURE — 99233 SBSQ HOSP IP/OBS HIGH 50: CPT | Performed by: INTERNAL MEDICINE

## 2021-09-30 PROCEDURE — 6370000000 HC RX 637 (ALT 250 FOR IP): Performed by: STUDENT IN AN ORGANIZED HEALTH CARE EDUCATION/TRAINING PROGRAM

## 2021-09-30 PROCEDURE — 6370000000 HC RX 637 (ALT 250 FOR IP): Performed by: NURSE PRACTITIONER

## 2021-09-30 PROCEDURE — 99232 SBSQ HOSP IP/OBS MODERATE 35: CPT | Performed by: NURSE PRACTITIONER

## 2021-09-30 PROCEDURE — 83735 ASSAY OF MAGNESIUM: CPT

## 2021-09-30 PROCEDURE — 2700000000 HC OXYGEN THERAPY PER DAY

## 2021-09-30 PROCEDURE — 36415 COLL VENOUS BLD VENIPUNCTURE: CPT

## 2021-09-30 PROCEDURE — 85025 COMPLETE CBC W/AUTO DIFF WBC: CPT

## 2021-09-30 PROCEDURE — 6360000002 HC RX W HCPCS: Performed by: STUDENT IN AN ORGANIZED HEALTH CARE EDUCATION/TRAINING PROGRAM

## 2021-09-30 PROCEDURE — 97161 PT EVAL LOW COMPLEX 20 MIN: CPT

## 2021-09-30 PROCEDURE — 97165 OT EVAL LOW COMPLEX 30 MIN: CPT

## 2021-09-30 PROCEDURE — 6370000000 HC RX 637 (ALT 250 FOR IP): Performed by: INTERNAL MEDICINE

## 2021-09-30 PROCEDURE — 94680 O2 UPTK RST&XERS DIR SIMPLE: CPT

## 2021-09-30 PROCEDURE — 2580000003 HC RX 258: Performed by: STUDENT IN AN ORGANIZED HEALTH CARE EDUCATION/TRAINING PROGRAM

## 2021-09-30 PROCEDURE — 2060000000 HC ICU INTERMEDIATE R&B

## 2021-09-30 PROCEDURE — 97535 SELF CARE MNGMENT TRAINING: CPT

## 2021-09-30 PROCEDURE — 94761 N-INVAS EAR/PLS OXIMETRY MLT: CPT

## 2021-09-30 PROCEDURE — 97116 GAIT TRAINING THERAPY: CPT

## 2021-09-30 PROCEDURE — 94640 AIRWAY INHALATION TREATMENT: CPT

## 2021-09-30 PROCEDURE — 80069 RENAL FUNCTION PANEL: CPT

## 2021-09-30 RX ORDER — PEN NEEDLE, DIABETIC 29 GAUGE
1 NEEDLE, DISPOSABLE MISCELLANEOUS DAILY
Qty: 100 EACH | Refills: 3 | Status: SHIPPED | OUTPATIENT
Start: 2021-09-30

## 2021-09-30 RX ORDER — PREDNISONE 10 MG/1
TABLET ORAL
Qty: 30 TABLET | Refills: 0 | Status: SHIPPED | OUTPATIENT
Start: 2021-09-30 | End: 2021-09-30 | Stop reason: HOSPADM

## 2021-09-30 RX ORDER — PREDNISONE 10 MG/1
TABLET ORAL
Qty: 30 TABLET | Refills: 0 | Status: ON HOLD | OUTPATIENT
Start: 2021-09-30 | End: 2021-10-10 | Stop reason: HOSPADM

## 2021-09-30 RX ORDER — NICOTINE 21 MG/24HR
1 PATCH, TRANSDERMAL 24 HOURS TRANSDERMAL DAILY
Qty: 30 PATCH | Refills: 3 | Status: SHIPPED | OUTPATIENT
Start: 2021-10-01 | End: 2021-10-28 | Stop reason: ALTCHOICE

## 2021-09-30 RX ORDER — GLIPIZIDE 5 MG/1
5 TABLET ORAL 2 TIMES DAILY
Qty: 60 TABLET | Refills: 3 | Status: SHIPPED | OUTPATIENT
Start: 2021-09-30 | End: 2022-03-01 | Stop reason: SDUPTHER

## 2021-09-30 RX ORDER — PREDNISONE 10 MG/1
TABLET ORAL
Qty: 30 TABLET | Refills: 0 | Status: SHIPPED | OUTPATIENT
Start: 2021-09-30 | End: 2021-09-30 | Stop reason: SDUPTHER

## 2021-09-30 RX ORDER — INSULIN LISPRO 100 [IU]/ML
20 INJECTION, SOLUTION INTRAVENOUS; SUBCUTANEOUS
Status: DISCONTINUED | OUTPATIENT
Start: 2021-09-30 | End: 2021-10-01 | Stop reason: HOSPADM

## 2021-09-30 RX ORDER — PANTOPRAZOLE SODIUM 40 MG/1
40 TABLET, DELAYED RELEASE ORAL
Qty: 30 TABLET | Refills: 1 | Status: SHIPPED | OUTPATIENT
Start: 2021-10-01 | End: 2021-12-03

## 2021-09-30 RX ADMIN — PANTOPRAZOLE SODIUM 40 MG: 40 TABLET, DELAYED RELEASE ORAL at 04:51

## 2021-09-30 RX ADMIN — BUDESONIDE AND FORMOTEROL FUMARATE DIHYDRATE 2 PUFF: 80; 4.5 AEROSOL RESPIRATORY (INHALATION) at 20:55

## 2021-09-30 RX ADMIN — PREDNISONE 40 MG: 20 TABLET ORAL at 10:46

## 2021-09-30 RX ADMIN — GABAPENTIN 800 MG: 400 CAPSULE ORAL at 22:42

## 2021-09-30 RX ADMIN — GABAPENTIN 800 MG: 400 CAPSULE ORAL at 14:18

## 2021-09-30 RX ADMIN — INSULIN LISPRO 7 UNITS: 100 INJECTION, SOLUTION INTRAVENOUS; SUBCUTANEOUS at 22:42

## 2021-09-30 RX ADMIN — INSULIN LISPRO 20 UNITS: 100 INJECTION, SOLUTION INTRAVENOUS; SUBCUTANEOUS at 14:19

## 2021-09-30 RX ADMIN — INSULIN LISPRO 20 UNITS: 100 INJECTION, SOLUTION INTRAVENOUS; SUBCUTANEOUS at 18:51

## 2021-09-30 RX ADMIN — Medication 5 ML: at 22:43

## 2021-09-30 RX ADMIN — CLOPIDOGREL BISULFATE 75 MG: 75 TABLET ORAL at 10:46

## 2021-09-30 RX ADMIN — DULOXETINE 20 MG: 20 CAPSULE, DELAYED RELEASE ORAL at 10:46

## 2021-09-30 RX ADMIN — GABAPENTIN 800 MG: 400 CAPSULE ORAL at 18:52

## 2021-09-30 RX ADMIN — INSULIN LISPRO 15 UNITS: 100 INJECTION, SOLUTION INTRAVENOUS; SUBCUTANEOUS at 14:19

## 2021-09-30 RX ADMIN — BUDESONIDE AND FORMOTEROL FUMARATE DIHYDRATE 2 PUFF: 80; 4.5 AEROSOL RESPIRATORY (INHALATION) at 09:46

## 2021-09-30 RX ADMIN — INSULIN LISPRO 15 UNITS: 100 INJECTION, SOLUTION INTRAVENOUS; SUBCUTANEOUS at 18:52

## 2021-09-30 RX ADMIN — GABAPENTIN 800 MG: 400 CAPSULE ORAL at 10:46

## 2021-09-30 RX ADMIN — ENOXAPARIN SODIUM 40 MG: 40 INJECTION SUBCUTANEOUS at 10:46

## 2021-09-30 RX ADMIN — ENOXAPARIN SODIUM 40 MG: 40 INJECTION SUBCUTANEOUS at 22:42

## 2021-09-30 RX ADMIN — ATORVASTATIN CALCIUM 80 MG: 80 TABLET, FILM COATED ORAL at 10:46

## 2021-09-30 RX ADMIN — INSULIN LISPRO 6 UNITS: 100 INJECTION, SOLUTION INTRAVENOUS; SUBCUTANEOUS at 10:47

## 2021-09-30 RX ADMIN — ASPIRIN 81 MG: 81 TABLET, COATED ORAL at 10:46

## 2021-09-30 RX ADMIN — MONTELUKAST 10 MG: 10 TABLET, FILM COATED ORAL at 22:42

## 2021-09-30 RX ADMIN — DULOXETINE 20 MG: 20 CAPSULE, DELAYED RELEASE ORAL at 22:42

## 2021-09-30 ASSESSMENT — PAIN SCALES - GENERAL
PAINLEVEL_OUTOF10: 0

## 2021-09-30 ASSESSMENT — PAIN SCALES - WONG BAKER
WONGBAKER_NUMERICALRESPONSE: 0

## 2021-09-30 NOTE — PLAN OF CARE
Problem: Falls - Risk of:  Goal: Will remain free from falls  Description: Will remain free from falls  Outcome: Ongoing  Goal: Absence of physical injury  Description: Absence of physical injury  Outcome: Ongoing     Problem: Gas Exchange - Impaired  Goal: Absence of hypoxia  Outcome: Ongoing  Goal: Promote optimal lung function  Outcome: Ongoing     Problem: Breathing Pattern - Ineffective  Goal: Ability to achieve and maintain a regular respiratory rate  Outcome: Ongoing     Problem:  Body Temperature -  Risk of, Imbalanced  Goal: Ability to maintain a body temperature within defined limits  Outcome: Ongoing  Goal: Will regain or maintain usual level of consciousness  Outcome: Ongoing  Goal: Complications related to the disease process, condition or treatment will be avoided or minimized  Outcome: Ongoing     Problem: Nutrition Deficits  Goal: Optimize nutritional status  Outcome: Ongoing     Problem: Risk for Fluid Volume Deficit  Goal: Maintain normal heart rhythm  Outcome: Ongoing  Goal: Maintain absence of muscle cramping  Outcome: Ongoing  Goal: Maintain normal serum potassium, sodium, calcium, phosphorus, and pH  Outcome: Ongoing     Problem: Fatigue  Goal: Verbalize increase energy and improved vitality  Outcome: Ongoing     Problem: Patient Education: Go to Patient Education Activity  Goal: Patient/Family Education  Outcome: Ongoing     Problem: Discharge Planning:  Goal: Discharged to appropriate level of care  Description: Discharged to appropriate level of care  Outcome: Ongoing     Problem: Serum Glucose Level - Abnormal:  Goal: Ability to maintain appropriate glucose levels will improve  Description: Ability to maintain appropriate glucose levels will improve  Outcome: Ongoing     Problem: Sensory Perception - Impaired:  Goal: Ability to maintain a stable neurologic state will improve  Description: Ability to maintain a stable neurologic state will improve  Outcome: Ongoing     Problem: Gas Exchange - Impaired:  Goal: Levels of oxygenation will improve  Description: Levels of oxygenation will improve  Outcome: Ongoing     Problem: OXYGENATION/RESPIRATORY FUNCTION  Goal: Patient will maintain patent airway  Outcome: Ongoing  Goal: Patient will achieve/maintain normal respiratory rate/effort  Description: Respiratory rate and effort will be within normal limits for the patient  Outcome: Ongoing     Problem: FLUID AND ELECTROLYTE IMBALANCE  Goal: Fluid and electrolyte balance are achieved/maintained  Outcome: Ongoing     Problem: ACTIVITY INTOLERANCE/IMPAIRED MOBILITY  Goal: Mobility/activity is maintained at optimum level for patient  Outcome: Ongoing     Problem: Skin Integrity:  Goal: Will show no infection signs and symptoms  Description: Will show no infection signs and symptoms  Outcome: Ongoing  Goal: Absence of new skin breakdown  Description: Absence of new skin breakdown  Outcome: Ongoing     A/O X 4. Lasix gtt d/c. Pt stable for discharge. Pt unable to leave until tomorrow morning d/t oxygen tank not being delivered on time. PO prednisone. Fall precautions.

## 2021-09-30 NOTE — PROGRESS NOTES
failure) ([facility] [address] COPD (chronic obstructive pulmonary disease) ([facility] [address] Diabetes mellitus ([facility] [address] Hyperlipidemia, Hypertension, and Oxygen dependent. has a past surgical history that includes Tonsillectomy and hernia repair. Restrictions   Up w/ assist    Subjective   General  Chart Reviewed: Yes  Patient assessed for rehabilitation services?: Yes  Additional Pertinent Hx: 50 y.o. M to ED w/ c/o progressive SOB. Hospital Course: CT Chest Pulm: neg PE; MRI Brain: neg; CT Chest: almost complete resolution of the airspace disease in the lung bases with minimal residual consolidation in the right lower lobe and post infectious scarring in the left lung base; COVID (-). PMH: heart failure, COPD on 2 L of oxygen at home, diabetes, hypertension, hyperlipidemia  Family / Caregiver Present: No  Referring Practitioner: Dr. Adamaris Montaño  Diagnosis: COPD Exacerbation    Subjective  Subjective: In bed on entry. Jamie Hurst to be discharged home today. Denies concerns for discharge home - feels at baseline.     Patient Currently in Pain: Denies      Social/Functional History  Social/Functional History  Lives With: Spouse  Type of Home: Apartment  Home Layout: One level (1st floor)  Home Access: Stairs to enter with rails  Entrance Stairs - Number of Steps: 2 ASHLYN with unilateral HR  Bathroom Shower/Tub: Tub/Shower unit, Shower chair with back  [employer]: Standard (sink nearby)  [employer]: Grab bars in shower, Shower chair, Hand-held shower  Home Equipment:  (none)  ADL Assistance: Independent  Homemaking Assistance: Independent (shares with wife, laundry in apartment)  Ambulation Assistance: Independent (without AD)  Transfer Assistance: Independent  Active : No  Patient's  Info: wife drives  Occupation: Retired  Leisure & Hobbies: fishing, being outdoors  Additional Comments: Pt reports that his wife will provide 24 hr supervision/assist at d/c.       Objective   Vision: Impaired  Vision Exceptions: Wears glasses at all times  Hearing: Within functional limits             Balance  Sitting Balance: Independent  Standing Balance: Independent    Standing Balance  Time: > 5 minutes  Activity: mobility in room/hallway  Comment: tolerated well; no c/o SOB; no c/o fatigue; O2 sats on baseline 2L 93%, HR 110s    Functional Mobility  Functional - Mobility Device: No device  Activity: To/from bathroom; Other (mobility in hallway)  Assist Level: Supervision  Functional Mobility Comments: Note: steady, no LOB, good awareness of O2 tubing    Toilet Transfers  Toilet - Technique: Ambulating  Equipment Used: Standard toilet (w/ bar)  Toilet Transfer: Modified independent    ADL  LE Dressing: Independent (don pants)     Coordination  Movements Are Fluid And Coordinated: Yes        Bed mobility  Supine to Sit: Modified independent (HOB elevated)  Scooting: Independent (to EOB)     Transfers  Sit to stand: Independent  Stand to sit: Independent        Cognition  Overall Cognitive Status: WFL                       LUE Strength  Gross LUE Strength: WFL  RUE Strength  Gross RUE Strength: WFL               Pt seen by OT for eval and treat. Treatment included: bed mobility, functional transfer/mobility, ADL, pt education         Plan   Discharge acute OT - no needs.                                                  AM-PAC Score        AM-[facility] Inpatient Daily Activity Raw Score: 23 (09/30/21 1201)  AM-PAC Inpatient ADL T-Scale Score : 51.12 (09/30/21 1201)  ADL Inpatient CMS 0-100% Score: 15.86 (09/30/21 1201)  ADL Inpatient CMS G-Code Modifier : CI (09/30/21 1201)              Therapy Time   Individual Concurrent Group Co-treatment   Time In 1050         Time Out 1118         Minutes 28              Timed Code Treatment Minutes:   13    Total Treatment Minutes:  Adeel Rutledge 95 OTR/L #[id number]

## 2021-09-30 NOTE — PROGRESS NOTES
09/30/21 0950   Resting (Room Air)   SpO2 87   HR 94   Resting (On O2)   SpO2 92   HR 91   O2 Device Nasal cannula   O2 Flow Rate (l/min) 1 l/min   Comments Patient comfortable and without respiratory distress   During Walk (Room Air)   SpO2 86   HR 98   Walk/Assistance Device Ambulation   Rate of Dyspnea 0   During Walk (On O2)   SpO2 91      O2 Device Nasal cannula   O2 Flow Rate (l/min) 2 l/min   Need Additional O2 Flow Rate Rows No   Walk/Assistance Device Ambulation   Rate of Dyspnea 1   Comments Patirnt states they felt good while walking   After Walk   SpO2 92      O2 Device Nasal cannula   O2 Flow Rate (l/min) 1 l/min   Does the Patient Qualify for Home O2 Yes   Liter Flow at Rest 1   Liter Flow on Exertion 2   Does the Patient Need Portable Oxygen Tanks Yes

## 2021-09-30 NOTE — CARE COORDINATION
Case Management Assessment            Discharge Note                    Date / Time of Note: 9/30/2021 4:35 PM                  Discharge Note Completed by: Tom Cyr RN    Patient Name: Reggie Chappell   YOB: 1965  Diagnosis: COPD exacerbation ([facility] [address] [J44.1]   Date / Time: 9/16/2021 10:06 PM    Current PCP: LINK Toney - CNP  Clinic patient: No    Hospitalization in the last 30 days: No    Advance Directives:  Code Status: Full Code  [place] DNR form completed and on chart: No, Not Indicated    Financial:  Payor: [employer] Cover[employer] / Plan: [place] / Product Type: *No Product type* /      Pharmacy:    [address], [facility] [address] [place] [id number]  Phone: [phone] Fax: [phone]    [address] [place] [address] [phone] Helder Elyel [phone]  [facility] [address]  Phone: [phone] Fax: [phone]      Assistance purchasing medications?: Potential Assistance Purchasing Medications: Yes  Assistance provided by Case Management: None at this time    Does patient want to participate in local refill/ meds to beds program?: Yes    Meds To Beds General Rules:  1. Can ONLY be done Monday- Friday between 8:30am-5pm  2. Prescription(s) must be in pharmacy by 3pm to be filled same day  3. Copy of patient's insurance/ prescription drug card and patient face sheet must be sent along with the prescription(s)  4. Cost of Rx cannot be added to hospital bill. If financial assistance is needed, please contact unit  or ;  or  CANNOT provide pharmacy voucher for patients co-pays  5.  Patients can then  the prescription on their way out of the hospital at discharge, or pharmacy can deliver to the bedside if staff is available. (payment due at time of pick-up or delivery - cash, check, or card accepted)     Able to afford home medications/ co-pay costs: Yes    ADLS:  Current PT AM-PAC Score: 21 /24  Current OT AM-PAC Score: 23 /24      DISCHARGE Disposition: Home- No Services Needed    LOC at discharge: Not Applicable  RAQUEL Completed: No, Not Indicated    Notification completed in HENS/PAS?:  Not Applicable    IMM Completed:   Yes, Case management has presented and reviewed IMM letter #2 to the patient and/or family/ POA. Patient and/or family/POA verbalized understanding of their medicare rights and appeal process if needed. Patient and/or family/POA has signed, initialed and placed today's date (09.30.2021) and time (.) on IMM letter #2 on the the appropriate lines. Patient and/or family/POA, copy of letter offered and they are aware that this original copy of IMM letter #2 is available prior to discharge from the paper chart on the unit. Electronic documentation has been entered into epic for IMM letter #2 and original paper copy has been added to the paper chart at the nurses station.      Transportation:  Transportation PLAN for discharge: family   Mode of Transport: Private Car  Reason for medical transport: Not Applicable  Name of [address] [place] [address], [address]: Not Applicable  Time of Transport:     Transport form completed: No, Not Indicated    Home Care:  [address] ordered at discharge: No, Not [address]: Not Applicable  Orders faxed: No    Durable Medical Equipment:  DME Provider:   Equipment obtained during hospitalization:     Home Oxygen and Respiratory Equipment:  Oxygen needed at discharge?: Yes  [address]: [facility]  Phone: [phone]  Portable tank available for discharge?: Yes; new tank being delivered    Dialysis:  Dialysis patient: No    Dialysis Center:  Not Applicable    Hospice Services:  Location: Not Applicable  Agency: Not Applicable    Consents signed: No, Not Indicated    Referrals made at [facility] for outpatient continued care:  Not Applicable    Additional GAYLE Notes: CM called [employer] ([phone]) and requested portable tank be delivered to bedside today, as wife has portable tank but it is empty and she did not realize until getting to the hospital. Per [employer] rep it will be a couple hours before it can be delivered today, nursing aware and will update patient. [employer] will call main number with updated arrival time for delivery. The Plan for Transition of Care is related to the following treatment goals of COPD exacerbation ([facility] [address] [J44.1]    The Patient and/or patient representative Moshe Garcia and his family were provided with a choice of provider and agrees with the discharge plan Yes    Freedom of choice list was provided with basic dialogue that supports the patient's individualized plan of care/goals and shares the quality data associated with the providers.  Yes    Care Transitions patient: No    Brady Rothman RN  [facility]  Case Management Department  Ph: [phone]  Fax: [phone]

## 2021-09-30 NOTE — PROGRESS NOTES
thickness, and systolic function with an   estimated ejection fraction of 55-60%. No regional wall motion abnormalities are seen. Indeterminate diastolic function. Individual aortic valve leaflets are not clearly visualized. Trivial tricuspid regurgitation. Trace pericardial effusion noted globally. A bubble study was performed and fails to show evidence of shunting. Definity contrast agent was used to help visualize endocardial borders. 9/23/2021 CXR:  Stable appearance of the chest. Bibasilar atelectasis/consolidation persists. 9/19/2021 MRI brain     1. No acute intracranial abnormality. No evidence of acute infarct. 2. Minimal nonspecific white matter signal abnormality on FLAIR imaging suggesting minimal chronic small vessel ischemic disease and/or minimal age-related degenerative change. 9/19/2021 CT chest     Slow progression of bilateral lower lobe alveolar consolidation with air bronchograms raises concern for chronic process such as interstitial pneumonia, eosinophilic pneumonia, and organizing pneumonia. Neoplastic etiology is considered much less likely     9/17/2021 CTA head/neck     1. Atherosclerotic disease at bilateral carotid bifurcations. Exact percentage of stenosis by NASCET criteria is difficult to comment upon due to artifacts. 2. 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 Moderately attenuated mid vertebral artery from upper C7  to C4 5 level. Distal to its left vertebral artery is patent without significant stenosis. No evidence of intracranial large vessel occlusion.       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       Calcified plaques with mild to moderate narrowing of bilateral ICA cavernous/supraclinoid segments.        Assessment:  50 y.o. with hypoxic respiratory failure  Issues:  -Hypoxic respiratory failure  -Non-obstructive CAD (2020 cath at [facility])  -Obesity  -COPD  -Smoking  -HTN  -HLD  -DM  -Abnormal CT chest     Plan:  No RHC at this time. ASA, plavix  Statin  Lasix gtt was stopped.  BUN 75

## 2021-09-30 NOTE — PROGRESS NOTES
Pulmonology Progress Note    Admit Date: 9/16/2021  Vent Day: None  IV Access:Peripheral  IV Fluids:None  Vasopressors:None                Antibiotics: None  Diet: ADULT DIET; Regular; 4 carb choices (60 gm/meal); Low Sodium (2 gm)    CC: SOB    Interval history: NO acute events reported overnight. Patient with decreasing oxygen requirements. CT scan of his chest much improvement of lung processees. Currenlty on 1L NC.          Medications:     Scheduled Meds:   insulin glargine  28 Units SubCUTAneous BID    insulin lispro  20 Units SubCUTAneous TID WC    predniSONE  40 mg Oral Daily    pantoprazole  40 mg Oral QAM AC    insulin lispro  0-18 Units SubCUTAneous TID WC    insulin lispro  0-9 Units SubCUTAneous Nightly    clopidogrel  75 mg Oral Daily    nicotine  1 patch TransDERmal Daily    enoxaparin  40 mg SubCUTAneous BID    [Held by provider] lisinopril  20 mg Oral Daily    gabapentin  800 mg Oral 4x Daily    atorvastatin  80 mg Oral Daily    aspirin  81 mg Oral Daily    DULoxetine  20 mg Oral BID    budesonide-formoterol  2 puff Inhalation BID    montelukast  10 mg Oral Nightly    sodium chloride flush  5-40 mL IntraVENous 2 times per day     Continuous Infusions:   dextrose      sodium chloride 25 mL (09/23/21 1832)     PRN Meds:sodium chloride, albuterol-ipratropium, glucose, dextrose, glucagon (rDNA), dextrose, sodium chloride flush, sodium chloride, ondansetron **OR** ondansetron, polyethylene glycol, acetaminophen **OR** acetaminophen    Objective:   Vitals:   T-max:  Patient Vitals for the past 8 hrs:   BP Temp Temp src Pulse Resp SpO2 Weight   09/30/21 0729 134/89 98.3 °F (36.8 °C) Oral 90 17 97 % --   09/30/21 0430 117/81 97.5 °F (36.4 °C) Oral 85 11 99 % 173 lb 6.4 oz (78.7 kg)       Intake/Output Summary (Last 24 hours) at 9/30/2021 0858  Last data filed at 9/30/2021 0430  Gross per 24 hour   Intake 2829.93 ml   Output 1175 ml   Net 1654.93 ml         Physical Exam  HENT: CALCIUM 10.1   < > 9.9 9.5 10.4   MG 2.30  --  2.10  --  2.30   PHOS 4.7   < > 4.9 4.8 5.3*   ANIONGAP 17*   < > 20* 17* 17*    < > = values in this interval not displayed. Hepatic:   Recent Labs     09/29/21  0526 09/29/21  1626 09/30/21  0428   LABALBU 3.8 3.7 4.1     Troponin: No results for input(s): TROPONINI in the last 72 hours. BNP: No results for input(s): BNP in the last 72 hours. Lipids: No results for input(s): CHOL, HDL in the last 72 hours. Invalid input(s): LDLCALCU, TRIGLYCERIDE  ABGs:  No results for input(s): PHART, QQS7IYP, PO2ART, GQN4HYC, BEART, THGBART, W0BGOBRI, XHJ6JFO in the last 72 hours. INR: No results for input(s): INR in the last 72 hours. Lactate: No results for input(s): LACTATE in the last 72 hours. Cultures:  -----------------------------------------------------------------  RAD:   CT CHEST WO CONTRAST   Final Result      There has been almost complete resolution of the airspace disease in the lung bases with minimal residual consolidation in the right lower lobe and post infectious scarring in the left lung base. Coronary artery calcification noted. XR CHEST PORTABLE   Final Result   Impression: Stable appearance of the chest. Bibasilar atelectasis/consolidation persists. CT CHEST HIGH RESOLUTION   Final Result      Slow progression of bilateral lower lobe alveolar consolidation with air bronchograms raises concern for chronic process such as interstitial pneumonia, eosinophilic pneumonia, and organizing pneumonia. Neoplastic etiology is considered much less likely. MRI BRAIN WO CONTRAST   Final Result      1. No acute intracranial abnormality. No evidence of acute infarct. 2. Minimal nonspecific white matter signal abnormality on FLAIR imaging suggesting minimal chronic small vessel ischemic disease and/or minimal age-related degenerative change.          CTA HEAD NECK W CONTRAST   Final Result      Study limited by motion artifact and poor contrast bolus. 1. Atherosclerotic disease at bilateral carotid bifurcations. Exact percentage of stenosis by NASCET criteria is difficult to comment upon due to artifacts. 2. 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 Moderately attenuated mid vertebral artery from upper C7  to C4 5 level. Distal to its left vertebral artery is patent without significant stenosis. PROCEDURE: CT ANGIOGRAPHY HEAD WITH/WITHOUT CONTRAST      INDICATION: aphasia, ams      COMPARISON: none      TECHNIQUE: Axial CT imaging obtained through the head prior to and following administration of IV contrast. Axial images, multiplanar reformatted images, and maximum intensity projection images were reviewed for CT angiographic technique. IV contrast: mL Omnipaque 350. FINDINGS:      ANTERIOR CIRCULATION: The intracranial internal carotid arteries, anterior cerebral arteries, and middle cerebral arteries are patent. There are calcified plaques with mild to moderate narrowing of bilateral ICA cavernous/supraclinoid segments. . No    evidence for aneurysm or arteriovenous malformation. POSTERIOR CIRCULATION: The bilateral vertebral arteries, basilar artery and posterior cerebral arteries are patent. There are calcified and noncalcified plaques with mild narrowing of left vertebral artery intradural segment. Right vertebral intradural    segment is severely attenuated, likely hyperplastic. No evidence for aneurysm or arteriovenous malformation. INTRACRANIAL VENOUS SYSTEM: No evidence for intracranial venous thrombosis. IMPRESSION:      No evidence of intracranial large vessel occlusion. 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 Calcified plaques with mild to moderate narrowing of bilateral ICA cavernous/supraclinoid segments.       CT HEAD WO CONTRAST   Final Result      No acute intracranial abnormality . CT CHEST PULMONARY EMBOLISM W CONTRAST   Final Result   1. No definite pulmonary embolus identified. 2.  Mild atherosclerotic changes of the aorta. No aortic aneurysm or    dissection. 3.  Patchy densities in the lower lobes and right middle lobe may    represent atelectasis versus infectious/inflammatory process. 4.  Dependent atelectasis bilaterally. Atelectasis in the lingula. 5.  Trace right pleural effusion. 6.  Nonspecific similar mildly prominent mediastinal and hilar lymph    nodes. Assessment/Plan:     Acute on chronic hypoxic/hypercapnic resp failure improving 2/2 to unclear etiology. Has been on high dose steroids as well as lasix drip. Overall has decreasing oxygen requirements. CoVId negative all other antigen negative. Negative for PE. CT chest much improved. -Decrease prednisone and can switch to oral lasix  -Cardiology will not do RHC at this time  -Likely needs fluid restriction/ closer monitoring at home with fluid status   -Home oxygen evaluation is still pending   -Should be followed up by pulmonology once discharged as well as cardiology     COPD: not consistent with exacerbation    Continue steroids for interstitial lung process   Continue Symbicort, singular    smoking cessation education    Would discharge on 40mg pred daily for 1 week then taper to 30 for a week as likely there is both a cardiac and pulmonary component to his hypoxia. Patient was instructed to schedule an appointment in 2 weeks with pulmonoogy    Stable for discharge form pulmonary standpoint         Anselmo Hidalgo MD, PGY-3  09/30/21  8:58 AM    This patient has been staffed and discussed with Dr Jeromy Manjarrez     Patient examined, findings as discussed with Dr. Deborah Bustamante. Agree with assessment and plan. Pulmonary problem may be best characterized as organizing pneumonia, and has responded very nicely to high-dose glucocorticoid therapy.   Continue steroids with plan to taper off fairly quickly. Agree with plan for discharge home, on 1 L nasal oxygen continuously.   He will follow-up with Dr. Madison Benitez or myself in 2 weeks

## 2021-09-30 NOTE — PLAN OF CARE
Problem: Falls - Risk of:  Goal: Will remain free from falls  Description: Will remain free from falls  9/30/2021 0202 by Marshall Monterroso RN  Outcome: Met This Shift  9/29/2021 1544 by Kelle Ely RN  Outcome: Ongoing  Goal: Absence of physical injury  Description: Absence of physical injury  9/30/2021 0202 by Marshall Monterroso RN  Outcome: Met This Shift  9/29/2021 1544 by Kelle Ely RN  Outcome: Ongoing     Problem: Gas Exchange - Impaired  Goal: Absence of hypoxia  9/30/2021 0202 by Marshall Monterroso RN  Outcome: Met This Shift  9/29/2021 1544 by Kelle Ely RN  Outcome: Ongoing  Goal: Promote optimal lung function  9/30/2021 0202 by Marshall Monterroso RN  Outcome: Ongoing  9/29/2021 1544 by Kelle Ely RN  Outcome: Ongoing     Problem: Breathing Pattern - Ineffective  Goal: Ability to achieve and maintain a regular respiratory rate  9/30/2021 0202 by Marshall Monterroso RN  Outcome: Met This Shift  9/29/2021 1544 by Kelle Ely RN  Outcome: Ongoing     Problem:  Body Temperature -  Risk of, Imbalanced  Goal: Ability to maintain a body temperature within defined limits  9/30/2021 0202 by Marshall Monterroso RN  Outcome: Met This Shift  9/29/2021 1544 by Kelle Ely RN  Outcome: Ongoing  Goal: Will regain or maintain usual level of consciousness  9/30/2021 0202 by Marshall Monterroso RN  Outcome: Ongoing  9/29/2021 1544 by Kelle Ely RN  Outcome: Ongoing  Goal: Complications related to the disease process, condition or treatment will be avoided or minimized  9/30/2021 0202 by Marshall Monterroso RN  Outcome: Ongoing  9/29/2021 1544 by Kelle Ely RN  Outcome: Ongoing     Problem: Nutrition Deficits  Goal: Optimize nutritional status  9/30/2021 0202 by Marshall Monterroso RN  Outcome: Met This Shift  9/29/2021 1544 by Kelle Ely RN  Outcome: Ongoing     Problem: Risk for Fluid Volume Deficit  Goal: Maintain normal heart rhythm  9/30/2021 0202 by Promise Hutton Colt Luna RN  Outcome: Met This Shift  9/29/2021 1544 by Yecenia Drew RN  Outcome: Ongoing  Goal: Maintain absence of muscle cramping  9/30/2021 0202 by Jacobo Fenton RN  Outcome: Ongoing  9/29/2021 1544 by Yecenia Drew RN  Outcome: Ongoing  Goal: Maintain normal serum potassium, sodium, calcium, phosphorus, and pH  9/30/2021 0202 by Jacobo Fenton RN  Outcome: Ongoing  9/29/2021 1544 by Yecenia Drew RN  Outcome: Ongoing     Problem: Fatigue  Goal: Verbalize increase energy and improved vitality  9/30/2021 0202 by Jacobo Fenton RN  Outcome: Ongoing  9/29/2021 1544 by Yecenia Drew RN  Outcome: Ongoing     Problem: Patient Education: Go to Patient Education Activity  Goal: Patient/Family Education  9/30/2021 0202 by Jacobo Fenton RN  Outcome: Ongoing  9/29/2021 1544 by Yecenia Drew RN  Outcome: Ongoing     Problem: Discharge Planning:  Goal: Discharged to appropriate level of care  Description: Discharged to appropriate level of care  9/30/2021 0202 by Jacobo Fenton RN  Outcome: Ongoing  9/29/2021 1544 by Yecenia Drew RN  Outcome: Ongoing     Problem: Serum Glucose Level - Abnormal:  Goal: Ability to maintain appropriate glucose levels will improve  Description: Ability to maintain appropriate glucose levels will improve  9/30/2021 0202 by Jacobo Fenton RN  Outcome: Not Met This Shift  9/29/2021 1544 by Yecenia Drew RN  Outcome: Ongoing     Problem: Sensory Perception - Impaired:  Goal: Ability to maintain a stable neurologic state will improve  Description: Ability to maintain a stable neurologic state will improve  9/30/2021 0202 by Jacobo Fenton RN  Outcome: Ongoing  9/29/2021 1544 by Yecenia Drew RN  Outcome: Ongoing     Problem: Gas Exchange - Impaired:  Goal: Levels of oxygenation will improve  Description: Levels of oxygenation will improve  9/30/2021 0202 by Jacobo Fenton RN  Outcome: Ongoing  9/29/2021 1544 by Yecenia Drew RN  Outcome: Ongoing     Problem: OXYGENATION/RESPIRATORY FUNCTION  Goal: Patient will maintain patent airway  9/30/2021 0202 by Trav Nagy RN  Outcome: Met This Shift  9/29/2021 1544 by Brittani Gregg RN  Outcome: Ongoing  Goal: Patient will achieve/maintain normal respiratory rate/effort  Description: Respiratory rate and effort will be within normal limits for the patient  9/30/2021 0202 by Trav Nagy RN  Outcome: Met This Shift  9/29/2021 1544 by Brittani Gregg RN  Outcome: Ongoing     Problem: FLUID AND ELECTROLYTE IMBALANCE  Goal: Fluid and electrolyte balance are achieved/maintained  9/30/2021 0202 by Trav Nagy RN  Outcome: Ongoing  9/29/2021 1544 by Brittani Gregg RN  Outcome: Ongoing     Problem: ACTIVITY INTOLERANCE/IMPAIRED MOBILITY  Goal: Mobility/activity is maintained at optimum level for patient  9/30/2021 0202 by Trav Nagy RN  Outcome: Ongoing  9/29/2021 1544 by Brittani Gregg RN  Outcome: Ongoing

## 2021-10-01 VITALS
RESPIRATION RATE: 15 BRPM | HEIGHT: 60 IN | BODY MASS INDEX: 34.38 KG/M2 | DIASTOLIC BLOOD PRESSURE: 80 MMHG | TEMPERATURE: 98.8 F | SYSTOLIC BLOOD PRESSURE: 120 MMHG | WEIGHT: 175.1 LBS | OXYGEN SATURATION: 96 % | HEART RATE: 102 BPM

## 2021-10-01 LAB
ALBUMIN SERPL-MCNC: 4 G/DL (ref 3.4–5)
ANION GAP SERPL CALCULATED.3IONS-SCNC: 17 MMOL/L (ref 3–16)
BASOPHILS ABSOLUTE: 0.1 K/UL (ref 0–0.2)
BASOPHILS RELATIVE PERCENT: 0.3 %
BUN BLDV-MCNC: 72 MG/DL (ref 7–20)
CALCIUM SERPL-MCNC: 10.8 MG/DL (ref 8.3–10.6)
CHLORIDE BLD-SCNC: 88 MMOL/L (ref 99–110)
CO2: 23 MMOL/L (ref 21–32)
CREAT SERPL-MCNC: 1.2 MG/DL (ref 0.9–1.3)
EOSINOPHILS ABSOLUTE: 0.1 K/UL (ref 0–0.6)
EOSINOPHILS RELATIVE PERCENT: 0.4 %
GFR AFRICAN AMERICAN: >60
GFR NON-AFRICAN AMERICAN: >60
GLUCOSE BLD-MCNC: 332 MG/DL (ref 70–99)
GLUCOSE BLD-MCNC: 84 MG/DL (ref 70–99)
HCT VFR BLD CALC: 51.2 % (ref 40.5–52.5)
HEMOGLOBIN: 16.6 G/DL (ref 13.5–17.5)
LYMPHOCYTES ABSOLUTE: 3.3 K/UL (ref 1–5.1)
LYMPHOCYTES RELATIVE PERCENT: 17.3 %
MCH RBC QN AUTO: 28.8 PG (ref 26–34)
MCHC RBC AUTO-ENTMCNC: 32.4 G/DL (ref 31–36)
MCV RBC AUTO: 88.7 FL (ref 80–100)
MONOCYTES ABSOLUTE: 1.5 K/UL (ref 0–1.3)
MONOCYTES RELATIVE PERCENT: 8 %
NEUTROPHILS ABSOLUTE: 14.2 K/UL (ref 1.7–7.7)
NEUTROPHILS RELATIVE PERCENT: 74 %
PDW BLD-RTO: 16.9 % (ref 12.4–15.4)
PERFORMED ON: ABNORMAL
PHOSPHORUS: 4.9 MG/DL (ref 2.5–4.9)
PLATELET # BLD: 319 K/UL (ref 135–450)
PMV BLD AUTO: 10.2 FL (ref 5–10.5)
POTASSIUM SERPL-SCNC: 5.2 MMOL/L (ref 3.5–5.1)
RBC # BLD: 5.76 M/UL (ref 4.2–5.9)
SODIUM BLD-SCNC: 128 MMOL/L (ref 136–145)
WBC # BLD: 19.1 K/UL (ref 4–11)

## 2021-10-01 PROCEDURE — 2700000000 HC OXYGEN THERAPY PER DAY

## 2021-10-01 PROCEDURE — 6360000002 HC RX W HCPCS: Performed by: STUDENT IN AN ORGANIZED HEALTH CARE EDUCATION/TRAINING PROGRAM

## 2021-10-01 PROCEDURE — 6370000000 HC RX 637 (ALT 250 FOR IP): Performed by: STUDENT IN AN ORGANIZED HEALTH CARE EDUCATION/TRAINING PROGRAM

## 2021-10-01 PROCEDURE — 2580000003 HC RX 258: Performed by: STUDENT IN AN ORGANIZED HEALTH CARE EDUCATION/TRAINING PROGRAM

## 2021-10-01 PROCEDURE — 85025 COMPLETE CBC W/AUTO DIFF WBC: CPT

## 2021-10-01 PROCEDURE — 94761 N-INVAS EAR/PLS OXIMETRY MLT: CPT

## 2021-10-01 PROCEDURE — 36415 COLL VENOUS BLD VENIPUNCTURE: CPT

## 2021-10-01 PROCEDURE — 6370000000 HC RX 637 (ALT 250 FOR IP): Performed by: INTERNAL MEDICINE

## 2021-10-01 PROCEDURE — 94640 AIRWAY INHALATION TREATMENT: CPT

## 2021-10-01 PROCEDURE — 6370000000 HC RX 637 (ALT 250 FOR IP): Performed by: NURSE PRACTITIONER

## 2021-10-01 PROCEDURE — 80069 RENAL FUNCTION PANEL: CPT

## 2021-10-01 RX ADMIN — ATORVASTATIN CALCIUM 80 MG: 80 TABLET, FILM COATED ORAL at 08:47

## 2021-10-01 RX ADMIN — BUDESONIDE AND FORMOTEROL FUMARATE DIHYDRATE 2 PUFF: 80; 4.5 AEROSOL RESPIRATORY (INHALATION) at 08:44

## 2021-10-01 RX ADMIN — PREDNISONE 40 MG: 20 TABLET ORAL at 08:47

## 2021-10-01 RX ADMIN — INSULIN LISPRO 20 UNITS: 100 INJECTION, SOLUTION INTRAVENOUS; SUBCUTANEOUS at 08:55

## 2021-10-01 RX ADMIN — ASPIRIN 81 MG: 81 TABLET, COATED ORAL at 08:47

## 2021-10-01 RX ADMIN — Medication 10 ML: at 08:49

## 2021-10-01 RX ADMIN — PANTOPRAZOLE SODIUM 40 MG: 40 TABLET, DELAYED RELEASE ORAL at 04:52

## 2021-10-01 RX ADMIN — DULOXETINE 20 MG: 20 CAPSULE, DELAYED RELEASE ORAL at 08:47

## 2021-10-01 RX ADMIN — ENOXAPARIN SODIUM 40 MG: 40 INJECTION SUBCUTANEOUS at 08:48

## 2021-10-01 RX ADMIN — IPRATROPIUM BROMIDE AND ALBUTEROL 1 PUFF: 20; 100 SPRAY, METERED RESPIRATORY (INHALATION) at 08:42

## 2021-10-01 RX ADMIN — INSULIN LISPRO 12 UNITS: 100 INJECTION, SOLUTION INTRAVENOUS; SUBCUTANEOUS at 08:55

## 2021-10-01 RX ADMIN — GABAPENTIN 800 MG: 400 CAPSULE ORAL at 08:47

## 2021-10-01 RX ADMIN — CLOPIDOGREL BISULFATE 75 MG: 75 TABLET ORAL at 08:47

## 2021-10-01 ASSESSMENT — PAIN SCALES - WONG BAKER
WONGBAKER_NUMERICALRESPONSE: 0

## 2021-10-01 ASSESSMENT — PAIN SCALES - GENERAL
PAINLEVEL_OUTOF10: 0

## 2021-10-01 NOTE — PLAN OF CARE
Problem: Falls - Risk of:  Goal: Will remain free from falls  Description: Will remain free from falls  10/1/2021 1108 by Melissa Corea  Outcome: Completed  10/1/2021 1011 by Melissa Corea  Outcome: Ongoing  10/1/2021 0205 by Othella Lennox, RN  Outcome: Met This Shift  Goal: Absence of physical injury  Description: Absence of physical injury  10/1/2021 1108 by Melissa Corea  Outcome: Completed  10/1/2021 1011 by Melissa Corea  Outcome: Ongoing  10/1/2021 0205 by Othella Lennox, RN  Outcome: Met This Shift     Problem: Gas Exchange - Impaired  Goal: Absence of hypoxia  10/1/2021 1108 by Melissa Corea  Outcome: Completed  10/1/2021 1011 by Melissa Corea  Outcome: Ongoing  10/1/2021 0205 by Othella Lennox, RN  Outcome: Ongoing  Goal: Promote optimal lung function  10/1/2021 1108 by Melissa Corea  Outcome: Completed  10/1/2021 1011 by Melissa Corea  Outcome: Ongoing  10/1/2021 0205 by Othella Lennox, RN  Outcome: Ongoing     Problem: Breathing Pattern - Ineffective  Goal: Ability to achieve and maintain a regular respiratory rate  10/1/2021 1108 by Melissa Corea  Outcome: Completed  10/1/2021 1011 by Melissa Corea  Outcome: Ongoing  10/1/2021 0205 by Othella Lennox, RN  Outcome: Met This Shift     Problem:  Body Temperature -  Risk of, Imbalanced  Goal: Ability to maintain a body temperature within defined limits  10/1/2021 1108 by Melissa Corea  Outcome: Completed  10/1/2021 1011 by Melissa Corea  Outcome: Ongoing  10/1/2021 0205 by Othella Lennox, RN  Outcome: Met This Shift  Goal: Will regain or maintain usual level of consciousness  10/1/2021 1108 by Melissa Corea  Outcome: Completed  10/1/2021 1011 by Melissa Corea  Outcome: Ongoing  10/1/2021 0205 by Othella Lennox, RN  Outcome: Ongoing  Goal: Complications related to the disease process, condition or treatment will be avoided or minimized  10/1/2021 1108 by Melissa Corea  Outcome: Completed  10/1/2021 1011 by Burnis Thayer  Outcome: Ongoing  10/1/2021 0205 by Zaria Jorgensen RN  Outcome: Ongoing     Problem: Nutrition Deficits  Goal: Optimize nutritional status  10/1/2021 1108 by Burnis Thayer  Outcome: Completed  10/1/2021 1011 by Burnis Thayer  Outcome: Ongoing     Problem: Risk for Fluid Volume Deficit  Goal: Maintain normal heart rhythm  10/1/2021 1108 by Burnis Thayer  Outcome: Completed  10/1/2021 1011 by Tashiais Thayer  Outcome: Ongoing  10/1/2021 0205 by Zaria Jorgensen RN  Outcome: Ongoing  Goal: Maintain absence of muscle cramping  10/1/2021 1108 by Burnis Thayer  Outcome: Completed  10/1/2021 1011 by Burnis Thayer  Outcome: Ongoing  10/1/2021 0205 by Zaria Jorgensen RN  Outcome: Ongoing  Goal: Maintain normal serum potassium, sodium, calcium, phosphorus, and pH  10/1/2021 1108 by Burnis Marcy  Outcome: Completed  10/1/2021 1011 by Tashiais Marcy  Outcome: Ongoing  10/1/2021 0205 by Zaria Jorgensen RN  Outcome: Ongoing     Problem: Fatigue  Goal: Verbalize increase energy and improved vitality  10/1/2021 1108 by Burnis Thayer  Outcome: Completed  10/1/2021 1011 by Burnis Thayer  Outcome: Ongoing  10/1/2021 0205 by Zaria Jorgensen RN  Outcome: Not Met This Shift     Problem: Patient Education: Go to Patient Education Activity  Goal: Patient/Family Education  10/1/2021 1108 by Burnis Marcy  Outcome: Completed  10/1/2021 1011 by Burnis Thayer  Outcome: Ongoing  10/1/2021 0205 by Zaria Jorgensen RN  Outcome: Ongoing     Problem: Discharge Planning:  Goal: Discharged to appropriate level of care  Description: Discharged to appropriate level of care  10/1/2021 1108 by Burnis Thayer  Outcome: Completed  10/1/2021 1011 by Burnis Thayer  Outcome: Ongoing  10/1/2021 0205 by Zaria Jorgensen RN  Outcome: Not Met This Shift     Problem: Serum Glucose Level - Abnormal:  Goal: Ability to maintain appropriate glucose levels will improve  Description: Ability to maintain appropriate glucose levels will improve  10/1/2021 1108 by Burnis Terrebonne  Outcome: Completed  10/1/2021 1011 by Burnanya Terrebonne  Outcome: Ongoing  10/1/2021 0205 by Zaria Jorgensen RN  Outcome: Not Met This Shift     Problem: Sensory Perception - Impaired:  Goal: Ability to maintain a stable neurologic state will improve  Description: Ability to maintain a stable neurologic state will improve  10/1/2021 1108 by Burnis Terrebonne  Outcome: Completed  10/1/2021 1011 by Burnanya Marcy  Outcome: Ongoing  10/1/2021 0205 by Zaria Jorgensen RN  Outcome: Ongoing     Problem: Gas Exchange - Impaired:  Goal: Levels of oxygenation will improve  Description: Levels of oxygenation will improve  10/1/2021 1108 by Burnis Terrebonne  Outcome: Completed  10/1/2021 1011 by Burnanya Terrebonne  Outcome: Ongoing  10/1/2021 0205 by Zaria Jorgensen RN  Outcome: Ongoing     Problem: OXYGENATION/RESPIRATORY FUNCTION  Goal: Patient will maintain patent airway  10/1/2021 1108 by Burnis Marcy  Outcome: Completed  10/1/2021 1011 by Chiara Terrebonne  Outcome: Ongoing  10/1/2021 0205 by Zaria Jorgensen RN  Outcome: Met This Shift  Goal: Patient will achieve/maintain normal respiratory rate/effort  Description: Respiratory rate and effort will be within normal limits for the patient  10/1/2021 1108 by Burnis Marcy  Outcome: Completed  10/1/2021 1011 by Chiara Vidal  Outcome: Ongoing  10/1/2021 0205 by Zaria Jorgensen RN  Outcome: Met This Shift     Problem: FLUID AND ELECTROLYTE IMBALANCE  Goal: Fluid and electrolyte balance are achieved/maintained  10/1/2021 1108 by Burnis Terrebonne  Outcome: Completed  10/1/2021 1011 by Burnis Marcy  Outcome: Ongoing  10/1/2021 0205 by Zaria Jorgensen RN  Outcome: Ongoing     Problem: ACTIVITY INTOLERANCE/IMPAIRED MOBILITY  Goal: Mobility/activity is maintained at optimum level for patient  10/1/2021 1108 by Janette Keith  Outcome: Completed  10/1/2021 1011 by Janette Keith  Outcome: Ongoing  10/1/2021 0205 by Rolando Condon RN  Outcome: Ongoing     Problem: Skin Integrity:  Goal: Will show no infection signs and symptoms  Description: Will show no infection signs and symptoms  10/1/2021 1108 by Janette Keith  Outcome: Completed  10/1/2021 1011 by Janette Keith  Outcome: Ongoing  10/1/2021 0205 by Rolando Condon RN  Outcome: Met This Shift  Goal: Absence of new skin breakdown  Description: Absence of new skin breakdown  10/1/2021 1108 by Janette Keith  Outcome: Completed  10/1/2021 1011 by Janette Keith  Outcome: Ongoing  10/1/2021 0205 by Rolando Condon RN  Outcome: Met This Shift

## 2021-10-01 NOTE — PROGRESS NOTES
Patient discharged and left with wife on 2l via NC and home oxygen tank. pO2 at 96%, VSS, no pain noted no S/S of respirtory distress. AOX4. All IV and lines removed.

## 2021-10-01 NOTE — PLAN OF CARE
Problem: Falls - Risk of:  Goal: Will remain free from falls  Description: Will remain free from falls  10/1/2021 1011 by Rocío Mahan  Outcome: Ongoing  10/1/2021 0205 by Zenia Smith RN  Outcome: Met This Shift  Goal: Absence of physical injury  Description: Absence of physical injury  10/1/2021 1011 by Rocío Mahan  Outcome: Ongoing  10/1/2021 0205 by Zenia Smith RN  Outcome: Met This Shift     Problem: Gas Exchange - Impaired  Goal: Absence of hypoxia  10/1/2021 1011 by Sy Carter[address]  Outcome: Ongoing  10/1/2021 0205 by Zenia Smith RN  Outcome: Ongoing  Goal: Promote optimal lung function  10/1/2021 1011 by Rocío Mahan  Outcome: Ongoing  10/1/2021 0205 by Zenia Smith RN  Outcome: Ongoing     Problem: Breathing Pattern - Ineffective  Goal: Ability to achieve and maintain a regular respiratory rate  10/1/2021 1011 by Rocío Mahan  Outcome: Ongoing  10/1/2021 0205 by Zenia Smith RN  Outcome: Met This Shift     Problem:  Body Temperature -  Risk of, Imbalanced  Goal: Ability to maintain a body temperature within defined limits  10/1/2021 1011 by Rocío Mahan  Outcome: Ongoing  10/1/2021 0205 by Zenia Smith RN  Outcome: Met This Shift  Goal: Will regain or maintain usual level of consciousness  10/1/2021 1011 by Rocío Mahan  Outcome: Ongoing  10/1/2021 0205 by Zenia Smith RN  Outcome: Ongoing  Goal: Complications related to the disease process, condition or treatment will be avoided or minimized  10/1/2021 1011 by Rocío Mahan  Outcome: Ongoing  10/1/2021 0205 by Zenia Smith RN  Outcome: Ongoing     Problem: Nutrition Deficits  Goal: Optimize nutritional status  Outcome: Ongoing     Problem: Risk for Fluid Volume Deficit  Goal: Maintain normal heart rhythm  10/1/2021 1011 by [id number]  Outcome: Ongoing  10/1/2021 0205 by Zenia Smith RN  Outcome: Ongoing  Goal: Maintain absence of muscle cramping  10/1/2021 1011 by Dorina Bond  Outcome: Ongoing  10/1/2021 0205 by Ginger Park RN  Outcome: Ongoing  Goal: Maintain normal serum potassium, sodium, calcium, phosphorus, and pH  10/1/2021 1011 by Kennyth Bernheim[address]  Outcome: Ongoing  10/1/2021 0205 by Ginger Park RN  Outcome: Ongoing     Problem: Fatigue  Goal: Verbalize increase energy and improved vitality  10/1/2021 1011 by Dorina Bond  Outcome: Ongoing  10/1/2021 0205 by Ginger Park RN  Outcome: Not Met This Shift     Problem: Patient Education: Go to Patient Education Activity  Goal: Patient/Family Education  10/1/2021 1011 by Dorina Bond  Outcome: Ongoing  10/1/2021 0205 by Ginger Park RN  Outcome: Ongoing     Problem: Discharge Planning:  Goal: Discharged to appropriate level of care  Description: Discharged to appropriate level of care  10/1/2021 1011 by Dorina Bond  Outcome: Ongoing  10/1/2021 0205 by Ginger Park RN  Outcome: Not Met This Shift     Problem: Serum Glucose Level - Abnormal:  Goal: Ability to maintain appropriate glucose levels will improve  Description: Ability to maintain appropriate glucose levels will improve  10/1/2021 1011 by Dorina Bond  Outcome: Ongoing  10/1/2021 0205 by Ginger Park RN  Outcome: Not Met This Shift     Problem: Sensory Perception - Impaired:  Goal: Ability to maintain a stable neurologic state will improve  Description: Ability to maintain a stable neurologic state will improve  10/1/2021 1011 by Dorina Bond  Outcome: Ongoing  10/1/2021 0205 by Ginger Park RN  Outcome: Ongoing     Problem: Gas Exchange - Impaired:  Goal: Levels of oxygenation will improve  Description: Levels of oxygenation will improve  10/1/2021 1011 by Kennyth Bernheim[address]  Outcome: Ongoing  10/1/2021 0205 by Ginger Park RN  Outcome: Ongoing     Problem: OXYGENATION/RESPIRATORY FUNCTION  Goal: Patient will maintain patent airway  10/1/2021 1011 by Iain Chaudhari  Outcome: Ongoing  10/1/2021 0205 by Deirdre Cadet RN  Outcome: Met This Shift  Goal: Patient will achieve/maintain normal respiratory rate/effort  Description: Respiratory rate and effort will be within normal limits for the patient  10/1/2021 1011 by Iain Chaudhari  Outcome: Ongoing  10/1/2021 0205 by Deirdre Cadet RN  Outcome: Met This Shift     Problem: FLUID AND ELECTROLYTE IMBALANCE  Goal: Fluid and electrolyte balance are achieved/maintained  10/1/2021 1011 by Iain Chaudhari  Outcome: Ongoing  10/1/2021 0205 by Deirdre Cadet RN  Outcome: Ongoing     Problem: ACTIVITY INTOLERANCE/IMPAIRED MOBILITY  Goal: Mobility/activity is maintained at optimum level for patient  10/1/2021 1011 by Iain Chaudhari  Outcome: Ongoing  10/1/2021 0205 by Deirdre Cadet RN  Outcome: Ongoing     Problem: Skin Integrity:  Goal: Will show no infection signs and symptoms  Description: Will show no infection signs and symptoms  10/1/2021 1011 by Iain Chaudhari  Outcome: Ongoing  10/1/2021 0205 by Deirdre Cadet RN  Outcome: Met This Shift  Goal: Absence of new skin breakdown  Description: Absence of new skin breakdown  10/1/2021 1011 by Iain Chaudhari  Outcome: Ongoing  10/1/2021 0205 by Deirdre Cadet RN  Outcome: Met This Shift

## 2021-10-01 NOTE — PLAN OF CARE
Problem: Falls - Risk of:  Goal: Will remain free from falls  Description: Will remain free from falls  10/1/2021 0205 by Deirdre Cadet RN  Outcome: Met This Shift  9/30/2021 1817 by David Chavis RN  Outcome: Ongoing  Goal: Absence of physical injury  Description: Absence of physical injury  10/1/2021 0205 by Deirdre Cadet RN  Outcome: Met This Shift  9/30/2021 1817 by David Chavis RN  Outcome: Ongoing     Problem: Gas Exchange - Impaired  Goal: Absence of hypoxia  10/1/2021 0205 by Deirdre Cadet RN  Outcome: Ongoing  9/30/2021 1817 by David Chavis RN  Outcome: Ongoing  Goal: Promote optimal lung function  10/1/2021 0205 by Deirdre Cadet RN  Outcome: Ongoing  9/30/2021 1817 by David Chavis RN  Outcome: Ongoing     Problem: Breathing Pattern - Ineffective  Goal: Ability to achieve and maintain a regular respiratory rate  10/1/2021 0205 by Deirdre Cadet RN  Outcome: Met This Shift  9/30/2021 1817 by David Chavis RN  Outcome: Ongoing     Problem:  Body Temperature -  Risk of, Imbalanced  Goal: Ability to maintain a body temperature within defined limits  10/1/2021 0205 by Deirdre Cadet RN  Outcome: Met This Shift  9/30/2021 1817 by David Chavis RN  Outcome: Ongoing  Goal: Will regain or maintain usual level of consciousness  10/1/2021 0205 by Deirdre Cadet RN  Outcome: Ongoing  9/30/2021 1817 by David Chavis RN  Outcome: Ongoing  Goal: Complications related to the disease process, condition or treatment will be avoided or minimized  10/1/2021 0205 by Deirdre Cadet RN  Outcome: Ongoing  9/30/2021 1817 by David Chavis RN  Outcome: Ongoing     Problem: Nutrition Deficits  Goal: Optimize nutritional status  9/30/2021 1817 by David Chavis RN  Outcome: Ongoing     Problem: Risk for Fluid Volume Deficit  Goal: Maintain normal heart rhythm  10/1/2021 0205 by Deirdre Cadet RN  Outcome: Ongoing  9/30/2021 1817 by David Chavis RN  Outcome: Ongoing  Goal: Maintain absence of muscle cramping  10/1/2021 0205 by Tk Walter RN  Outcome: Ongoing  9/30/2021 1817 by Jorge A Giles RN  Outcome: Ongoing  Goal: Maintain normal serum potassium, sodium, calcium, phosphorus, and pH  10/1/2021 0205 by Tk Walter RN  Outcome: Ongoing  9/30/2021 1817 by Jorge A Giles RN  Outcome: Ongoing     Problem: Fatigue  Goal: Verbalize increase energy and improved vitality  10/1/2021 0205 by Tk Walter RN  Outcome: Not Met This Shift  9/30/2021 1817 by Jorge A Giles RN  Outcome: Ongoing     Problem: Discharge Planning:  Goal: Discharged to appropriate level of care  Description: Discharged to appropriate level of care  10/1/2021 0205 by Tk Walter RN  Outcome: Not Met This Shift  9/30/2021 1817 by Jorge A Giles RN  Outcome: Ongoing     Problem: Serum Glucose Level - Abnormal:  Goal: Ability to maintain appropriate glucose levels will improve  Description: Ability to maintain appropriate glucose levels will improve  10/1/2021 0205 by Tk Walter RN  Outcome: Not Met This Shift  9/30/2021 1817 by Jorge A Giles RN  Outcome: Ongoing

## 2021-10-04 ENCOUNTER — CARE COORDINATION (OUTPATIENT)
Dept: CASE MANAGEMENT | Age: 56
End: 2021-10-04

## 2021-10-04 NOTE — CARE COORDINATION
Patient contacted regarding COVID-19 risk. Care Transition Nurse contacted the family by telephone to perform post discharge assessment. Call within 2 business days of discharge: Yes. Verified name and  with family as identifiers. Provided introduction to self, and explanation of the CTN/ACM role, and reason for call due to risk factors for infection and/or exposure to COVID-19. Symptoms reviewed with family who verbalized the following symptoms: pain or aching joints, no new symptoms and no worsening symptoms. Due to no new or worsening symptoms encounter was not routed to provider for escalation. Discussed follow-up appointments. If no appointment was previously scheduled, appointment scheduling offered: Yes. [facility] follow up appointment(s):   Future Appointments   Date Time Provider Soumya Renteria   10/7/2021  2:00 PM Juli Eglin, APRN - CNP [facility] [place] - [facility]   10/11/2021 11:00 AM [facility] [place] CNP [facility]   2021 11:00 AM Joaquina Decker MD [facility]     Non-face-to-face services provided:  Obtained and reviewed discharge summary and/or continuity of care documents  Education of patient/family/caregiver/guardian to support self-management-. Assessment and support for treatment adherence and medication management-. Advance Care Planning:   Does patient have an Advance Directive:  not on file. Educated patient about risk for severe COVID-19 due to risk factors according to CDC guidelines. CTN reviewed discharge instructions, medical action plan and red flag symptoms with the family who verbalized understanding. Discussed COVID vaccination status: No. Education provided on COVID-19 vaccination as appropriate. Discussed exposure protocols and quarantine with CDC Guidelines. Family was given an opportunity to verbalize any questions and concerns and agrees to contact CTN or health care provider for questions related to their healthcare.     Reviewed and educated family on any new and changed medications related to discharge diagnosis     Was patient discharged with a pulse oximeter? No Discussed and confirmed pulse oximeter discharge instructions and when to notify provider or seek emergency care. CTN spoke with patients spouse this am for initial 24 hour discharge COVID -19 Monitoring call. Spouse states patient is really weak, was having some chills on yesterday. Spouse reports patient is not running any fevers, no nausea, vomiting, chest pain, SOB or cough noted. Spouse feels patient is overall doing well, other than weakness, states he is using a walker with ambulation. CTN encouraged spouse to continue to monitor for any of the above s/s, as well as monitoring for any increased weakness, reporting to MD immediately. Patient hs follow up with PCP on 10/07/2021. CTN provided contact information. Plan for follow up in 7-14 days. based on severity of symptoms and risk factors.     Thank Corazon Sellers RN  Care Transition Coordinator  Contact MAUREEN:[phone]

## 2021-10-05 DIAGNOSIS — G62.9 NEUROPATHY: ICD-10-CM

## 2021-10-05 RX ORDER — GABAPENTIN 800 MG/1
TABLET ORAL
Qty: 120 TABLET | Refills: 0 | Status: SHIPPED | OUTPATIENT
Start: 2021-10-05 | End: 2021-11-01 | Stop reason: SDUPTHER

## 2021-10-05 NOTE — TELEPHONE ENCOUNTER
Date of last refill of this med was 5-24-21, # of pills given 120 and # of refills given 0. Their next appointment is 10-7-21, the last date patient was seen was 8-27-21. Does patient have medication agreement on file? No  Has drug screen been done in last 12 months if needed?  no

## 2021-10-06 ENCOUNTER — TELEPHONE (OUTPATIENT)
Dept: OTHER | Facility: CLINIC | Age: 56
End: 2021-10-06

## 2021-10-06 ENCOUNTER — APPOINTMENT (OUTPATIENT)
Dept: GENERAL RADIOLOGY | Age: 56
End: 2021-10-06
Payer: MEDICARE

## 2021-10-06 ENCOUNTER — HOSPITAL ENCOUNTER (INPATIENT)
Age: 56
LOS: 4 days | Discharge: HOME HEALTH | DRG: 871 | End: 2021-10-10
Attending: INTERNAL MEDICINE | Admitting: INTERNAL MEDICINE
Payer: MEDICARE

## 2021-10-06 ENCOUNTER — APPOINTMENT (OUTPATIENT)
Dept: CT IMAGING | Age: 56
DRG: 871 | End: 2021-10-06
Attending: INTERNAL MEDICINE
Payer: MEDICARE

## 2021-10-06 ENCOUNTER — HOSPITAL ENCOUNTER (EMERGENCY)
Age: 56
Discharge: ANOTHER HOSPITAL | End: 2021-10-06
Attending: EMERGENCY MEDICINE
Payer: MEDICARE

## 2021-10-06 ENCOUNTER — APPOINTMENT (OUTPATIENT)
Dept: GENERAL RADIOLOGY | Age: 56
DRG: 871 | End: 2021-10-06
Attending: INTERNAL MEDICINE
Payer: MEDICARE

## 2021-10-06 VITALS
TEMPERATURE: 101.3 F | SYSTOLIC BLOOD PRESSURE: 78 MMHG | OXYGEN SATURATION: 100 % | BODY MASS INDEX: 34.36 KG/M2 | RESPIRATION RATE: 18 BRPM | HEIGHT: 60 IN | HEART RATE: 88 BPM | DIASTOLIC BLOOD PRESSURE: 46 MMHG | WEIGHT: 175 LBS

## 2021-10-06 DIAGNOSIS — R50.9 FEBRILE ILLNESS, ACUTE: ICD-10-CM

## 2021-10-06 DIAGNOSIS — E11.69 DIABETES MELLITUS TYPE 2 IN OBESE (HCC): ICD-10-CM

## 2021-10-06 DIAGNOSIS — R79.89 D-DIMER, ELEVATED: ICD-10-CM

## 2021-10-06 DIAGNOSIS — N17.0 ACUTE RENAL FAILURE WITH TUBULAR NECROSIS (HCC): Primary | ICD-10-CM

## 2021-10-06 DIAGNOSIS — R40.0 SOMNOLENCE: ICD-10-CM

## 2021-10-06 DIAGNOSIS — R78.81 BACTEREMIA DUE TO ESCHERICHIA COLI: ICD-10-CM

## 2021-10-06 DIAGNOSIS — I50.21 ACUTE SYSTOLIC CONGESTIVE HEART FAILURE (HCC): ICD-10-CM

## 2021-10-06 DIAGNOSIS — E66.9 DIABETES MELLITUS TYPE 2 IN OBESE (HCC): ICD-10-CM

## 2021-10-06 DIAGNOSIS — J96.00 SEPSIS WITH ACUTE RESPIRATORY FAILURE AND SEPTIC SHOCK, DUE TO UNSPECIFIED ORGANISM, UNSPECIFIED WHETHER HYPOXIA OR HYPERCAPNIA PRESENT (HCC): Primary | ICD-10-CM

## 2021-10-06 DIAGNOSIS — R65.21 SEPSIS WITH ACUTE RESPIRATORY FAILURE AND SEPTIC SHOCK, DUE TO UNSPECIFIED ORGANISM, UNSPECIFIED WHETHER HYPOXIA OR HYPERCAPNIA PRESENT (HCC): Primary | ICD-10-CM

## 2021-10-06 DIAGNOSIS — B96.20 BACTEREMIA DUE TO ESCHERICHIA COLI: ICD-10-CM

## 2021-10-06 DIAGNOSIS — A41.9 SEPSIS WITH ACUTE RESPIRATORY FAILURE AND SEPTIC SHOCK, DUE TO UNSPECIFIED ORGANISM, UNSPECIFIED WHETHER HYPOXIA OR HYPERCAPNIA PRESENT (HCC): Primary | ICD-10-CM

## 2021-10-06 DIAGNOSIS — R09.02 HYPOXEMIA: ICD-10-CM

## 2021-10-06 LAB
A/G RATIO: 0.8 (ref 1.1–2.2)
ALBUMIN SERPL-MCNC: 1.7 G/DL (ref 3.4–5)
ALBUMIN SERPL-MCNC: 2.2 G/DL (ref 3.4–5)
ALBUMIN SERPL-MCNC: 2.8 G/DL (ref 3.4–5)
ALP BLD-CCNC: 138 U/L (ref 40–129)
ALT SERPL-CCNC: 42 U/L (ref 10–40)
AMPHETAMINE SCREEN, URINE: ABNORMAL
ANION GAP SERPL CALCULATED.3IONS-SCNC: 12 MMOL/L (ref 3–16)
ANION GAP SERPL CALCULATED.3IONS-SCNC: 14 MMOL/L (ref 3–16)
ANION GAP SERPL CALCULATED.3IONS-SCNC: 15 MMOL/L (ref 3–16)
ANION GAP SERPL CALCULATED.3IONS-SCNC: 17 MMOL/L (ref 3–16)
APTT: 28 SEC (ref 26.2–38.6)
AST SERPL-CCNC: 44 U/L (ref 15–37)
BACTERIA: ABNORMAL /HPF
BARBITURATE SCREEN URINE: ABNORMAL
BASE EXCESS ARTERIAL: -11.3 MMOL/L (ref -3–3)
BASE EXCESS ARTERIAL: -7.5 MMOL/L (ref -3–3)
BASE EXCESS ARTERIAL: -9 (ref -3–3)
BASE EXCESS VENOUS: -8.1 MMOL/L (ref -3–3)
BASOPHILS ABSOLUTE: 0 K/UL (ref 0–0.2)
BASOPHILS ABSOLUTE: 0 K/UL (ref 0–0.2)
BASOPHILS RELATIVE PERCENT: 0.2 %
BASOPHILS RELATIVE PERCENT: 0.2 %
BENZODIAZEPINE SCREEN, URINE: POSITIVE
BILIRUB SERPL-MCNC: <0.2 MG/DL (ref 0–1)
BILIRUBIN URINE: NEGATIVE
BLOOD, URINE: ABNORMAL
BUN BLDV-MCNC: 106 MG/DL (ref 7–20)
BUN BLDV-MCNC: 7 MG/DL (ref 7–20)
BUN BLDV-MCNC: 84 MG/DL (ref 7–20)
BUN BLDV-MCNC: 99 MG/DL (ref 7–20)
CALCIUM SERPL-MCNC: 10.1 MG/DL (ref 8.3–10.6)
CALCIUM SERPL-MCNC: 6.5 MG/DL (ref 8.3–10.6)
CALCIUM SERPL-MCNC: 8 MG/DL (ref 8.3–10.6)
CALCIUM SERPL-MCNC: 8.6 MG/DL (ref 8.3–10.6)
CANNABINOID SCREEN URINE: ABNORMAL
CARBOXYHEMOGLOBIN ARTERIAL: 1.1 % (ref 0–1.5)
CARBOXYHEMOGLOBIN ARTERIAL: 2.8 % (ref 0–1.5)
CARBOXYHEMOGLOBIN: 4.2 % (ref 0–1.5)
CHLORIDE BLD-SCNC: 102 MMOL/L (ref 99–110)
CHLORIDE BLD-SCNC: 109 MMOL/L (ref 99–110)
CHLORIDE BLD-SCNC: 99 MMOL/L (ref 99–110)
CHLORIDE BLD-SCNC: 99 MMOL/L (ref 99–110)
CLARITY: ABNORMAL
CO2: 12 MMOL/L (ref 21–32)
CO2: 15 MMOL/L (ref 21–32)
CO2: 19 MMOL/L (ref 21–32)
CO2: 23 MMOL/L (ref 21–32)
COCAINE METABOLITE SCREEN URINE: ABNORMAL
COLOR: YELLOW
CREAT SERPL-MCNC: 2.6 MG/DL (ref 0.9–1.3)
CREAT SERPL-MCNC: 3.3 MG/DL (ref 0.9–1.3)
CREAT SERPL-MCNC: 4.1 MG/DL (ref 0.9–1.3)
CREAT SERPL-MCNC: <0.5 MG/DL (ref 0.9–1.3)
D DIMER: 1628 NG/ML DDU (ref 0–229)
EOSINOPHILS ABSOLUTE: 0 K/UL (ref 0–0.6)
EOSINOPHILS ABSOLUTE: 0 K/UL (ref 0–0.6)
EOSINOPHILS RELATIVE PERCENT: 0.1 %
EOSINOPHILS RELATIVE PERCENT: 0.2 %
EPITHELIAL CELLS, UA: ABNORMAL /HPF (ref 0–5)
GFR AFRICAN AMERICAN: 19
GFR AFRICAN AMERICAN: 24
GFR AFRICAN AMERICAN: 31
GFR AFRICAN AMERICAN: >60
GFR NON-AFRICAN AMERICAN: 15
GFR NON-AFRICAN AMERICAN: 20
GFR NON-AFRICAN AMERICAN: 26
GFR NON-AFRICAN AMERICAN: >60
GLOBULIN: 3.7 G/DL
GLUCOSE BLD-MCNC: 106 MG/DL (ref 70–99)
GLUCOSE BLD-MCNC: 181 MG/DL (ref 70–99)
GLUCOSE BLD-MCNC: 270 MG/DL (ref 70–99)
GLUCOSE BLD-MCNC: 277 MG/DL (ref 70–99)
GLUCOSE BLD-MCNC: 327 MG/DL (ref 70–99)
GLUCOSE BLD-MCNC: 330 MG/DL (ref 70–99)
GLUCOSE URINE: NEGATIVE MG/DL
HCO3 ARTERIAL: 16 MMOL/L (ref 21–29)
HCO3 ARTERIAL: 17 MMOL/L (ref 21–29)
HCO3 ARTERIAL: 19.4 MMOL/L (ref 21–29)
HCO3 VENOUS: 19.1 MMOL/L (ref 23–29)
HCT VFR BLD CALC: 35.4 % (ref 40.5–52.5)
HCT VFR BLD CALC: 39.8 % (ref 40.5–52.5)
HEMOGLOBIN, ART, EXTENDED: 13.3 G/DL (ref 13.5–17.5)
HEMOGLOBIN, ART, EXTENDED: 9.1 G/DL
HEMOGLOBIN: 11.4 G/DL (ref 13.5–17.5)
HEMOGLOBIN: 13.1 G/DL (ref 13.5–17.5)
INR BLD: 1.17 (ref 0.88–1.12)
KETONES, URINE: NEGATIVE MG/DL
LACTIC ACID, SEPSIS: 1.1 MMOL/L (ref 0.4–1.9)
LACTIC ACID, SEPSIS: 1.2 MMOL/L (ref 0.4–1.9)
LEUKOCYTE ESTERASE, URINE: ABNORMAL
LYMPHOCYTES ABSOLUTE: 0.2 K/UL (ref 1–5.1)
LYMPHOCYTES ABSOLUTE: 0.5 K/UL (ref 1–5.1)
LYMPHOCYTES RELATIVE PERCENT: 1.2 %
LYMPHOCYTES RELATIVE PERCENT: 2.5 %
Lab: ABNORMAL
MAGNESIUM: 2 MG/DL (ref 1.8–2.4)
MAGNESIUM: 3 MG/DL (ref 1.8–2.4)
MCH RBC QN AUTO: 28.8 PG (ref 26–34)
MCH RBC QN AUTO: 29.1 PG (ref 26–34)
MCHC RBC AUTO-ENTMCNC: 32.1 G/DL (ref 31–36)
MCHC RBC AUTO-ENTMCNC: 32.8 G/DL (ref 31–36)
MCV RBC AUTO: 88.8 FL (ref 80–100)
MCV RBC AUTO: 89.6 FL (ref 80–100)
METHADONE SCREEN, URINE: ABNORMAL
METHEMOGLOBIN ARTERIAL: 0.3 %
METHEMOGLOBIN ARTERIAL: 0.3 % (ref 0–1.4)
METHEMOGLOBIN VENOUS: 0.3 %
MICROSCOPIC EXAMINATION: YES
MONOCYTES ABSOLUTE: 0.6 K/UL (ref 0–1.3)
MONOCYTES ABSOLUTE: 0.6 K/UL (ref 0–1.3)
MONOCYTES RELATIVE PERCENT: 2.8 %
MONOCYTES RELATIVE PERCENT: 3 %
MUCUS: ABNORMAL /LPF
NEUTROPHILS ABSOLUTE: 18.8 K/UL (ref 1.7–7.7)
NEUTROPHILS ABSOLUTE: 19.3 K/UL (ref 1.7–7.7)
NEUTROPHILS RELATIVE PERCENT: 94.2 %
NEUTROPHILS RELATIVE PERCENT: 95.6 %
NITRITE, URINE: NEGATIVE
O2 SAT, ARTERIAL: 95.5 %
O2 SAT, ARTERIAL: 96 % (ref 93–100)
O2 SAT, ARTERIAL: 98 % (ref 93–100)
O2 SAT, VEN: 65 %
O2 THERAPY: ABNORMAL
O2 THERAPY: ABNORMAL
OPIATE SCREEN URINE: ABNORMAL
OSMOLALITY URINE: 366 MOSM/KG (ref 390–1070)
OXYCODONE URINE: ABNORMAL
PCO2 ARTERIAL: 33.6 MM HG (ref 35–45)
PCO2 ARTERIAL: 41.9 MMHG (ref 35–45)
PCO2 ARTERIAL: 44.3 MMHG (ref 35–45)
PCO2, VEN: 45.6 MMHG (ref 40–50)
PDW BLD-RTO: 17.2 % (ref 12.4–15.4)
PDW BLD-RTO: 17.2 % (ref 12.4–15.4)
PERFORMED ON: ABNORMAL
PH ARTERIAL: 7.19 (ref 7.35–7.45)
PH ARTERIAL: 7.26 (ref 7.35–7.45)
PH ARTERIAL: 7.31 (ref 7.35–7.45)
PH UA: 5
PH UA: 5 (ref 5–8)
PH VENOUS: 7.24 (ref 7.35–7.45)
PHENCYCLIDINE SCREEN URINE: ABNORMAL
PHOSPHORUS: 4.6 MG/DL (ref 2.5–4.9)
PHOSPHORUS: 6.3 MG/DL (ref 2.5–4.9)
PLATELET # BLD: 141 K/UL (ref 135–450)
PLATELET # BLD: 181 K/UL (ref 135–450)
PMV BLD AUTO: 9.7 FL (ref 5–10.5)
PMV BLD AUTO: 9.9 FL (ref 5–10.5)
PO2 ARTERIAL: 113 MMHG (ref 75–108)
PO2 ARTERIAL: 88.6 MM HG (ref 75–108)
PO2 ARTERIAL: 89.1 MMHG (ref 75–108)
PO2, VEN: 39.3 MMHG (ref 25–40)
POC SAMPLE TYPE: ABNORMAL
POTASSIUM REFLEX MAGNESIUM: 3.7 MMOL/L (ref 3.5–5.1)
POTASSIUM SERPL-SCNC: 4.1 MMOL/L (ref 3.5–5.1)
POTASSIUM SERPL-SCNC: 5.7 MMOL/L (ref 3.5–5.1)
POTASSIUM SERPL-SCNC: 6.4 MMOL/L (ref 3.5–5.1)
PRO-BNP: 8243 PG/ML (ref 0–124)
PROCALCITONIN: 0.15 NG/ML (ref 0–0.15)
PROPOXYPHENE SCREEN: ABNORMAL
PROTEIN UA: ABNORMAL MG/DL
PROTHROMBIN TIME: 13.4 SEC (ref 9.9–12.7)
RBC # BLD: 3.95 M/UL (ref 4.2–5.9)
RBC # BLD: 4.48 M/UL (ref 4.2–5.9)
RBC UA: ABNORMAL /HPF (ref 0–4)
SARS-COV-2, NAAT: NOT DETECTED
SODIUM BLD-SCNC: 131 MMOL/L (ref 136–145)
SODIUM BLD-SCNC: 132 MMOL/L (ref 136–145)
SODIUM BLD-SCNC: 136 MMOL/L (ref 136–145)
SODIUM BLD-SCNC: 137 MMOL/L (ref 136–145)
SODIUM URINE: <20 MMOL/L
SPECIFIC GRAVITY UA: 1.02 (ref 1–1.03)
TCO2 ARTERIAL: 17 MMOL/L
TCO2 ARTERIAL: 18 MMOL/L
TCO2 ARTERIAL: 20.7 MMOL/L
TCO2 CALC VENOUS: 21 MMOL/L
TOTAL PROTEIN: 6.5 G/DL (ref 6.4–8.2)
TROPONIN: <0.01 NG/ML
TROPONIN: <0.01 NG/ML
URINE REFLEX TO CULTURE: YES
URINE TYPE: ABNORMAL
UROBILINOGEN, URINE: 0.2 E.U./DL
WBC # BLD: 20 K/UL (ref 4–11)
WBC # BLD: 20.2 K/UL (ref 4–11)
WBC UA: ABNORMAL /HPF (ref 0–5)

## 2021-10-06 PROCEDURE — 6360000002 HC RX W HCPCS: Performed by: STUDENT IN AN ORGANIZED HEALTH CARE EDUCATION/TRAINING PROGRAM

## 2021-10-06 PROCEDURE — 83880 ASSAY OF NATRIURETIC PEPTIDE: CPT

## 2021-10-06 PROCEDURE — 31500 INSERT EMERGENCY AIRWAY: CPT

## 2021-10-06 PROCEDURE — 83735 ASSAY OF MAGNESIUM: CPT

## 2021-10-06 PROCEDURE — 87040 BLOOD CULTURE FOR BACTERIA: CPT

## 2021-10-06 PROCEDURE — 83605 ASSAY OF LACTIC ACID: CPT

## 2021-10-06 PROCEDURE — 81001 URINALYSIS AUTO W/SCOPE: CPT

## 2021-10-06 PROCEDURE — 2580000003 HC RX 258: Performed by: EMERGENCY MEDICINE

## 2021-10-06 PROCEDURE — 84484 ASSAY OF TROPONIN QUANT: CPT

## 2021-10-06 PROCEDURE — 02HV33Z INSERTION OF INFUSION DEVICE INTO SUPERIOR VENA CAVA, PERCUTANEOUS APPROACH: ICD-10-PCS | Performed by: INTERNAL MEDICINE

## 2021-10-06 PROCEDURE — 71045 X-RAY EXAM CHEST 1 VIEW: CPT

## 2021-10-06 PROCEDURE — 85730 THROMBOPLASTIN TIME PARTIAL: CPT

## 2021-10-06 PROCEDURE — 85610 PROTHROMBIN TIME: CPT

## 2021-10-06 PROCEDURE — 2580000003 HC RX 258: Performed by: INTERNAL MEDICINE

## 2021-10-06 PROCEDURE — 2580000003 HC RX 258: Performed by: STUDENT IN AN ORGANIZED HEALTH CARE EDUCATION/TRAINING PROGRAM

## 2021-10-06 PROCEDURE — 36415 COLL VENOUS BLD VENIPUNCTURE: CPT

## 2021-10-06 PROCEDURE — 87088 URINE BACTERIA CULTURE: CPT

## 2021-10-06 PROCEDURE — 36556 INSERT NON-TUNNEL CV CATH: CPT | Performed by: INTERNAL MEDICINE

## 2021-10-06 PROCEDURE — 82803 BLOOD GASES ANY COMBINATION: CPT

## 2021-10-06 PROCEDURE — 5A1945Z RESPIRATORY VENTILATION, 24-96 CONSECUTIVE HOURS: ICD-10-PCS | Performed by: INTERNAL MEDICINE

## 2021-10-06 PROCEDURE — 96374 THER/PROPH/DIAG INJ IV PUSH: CPT

## 2021-10-06 PROCEDURE — 80069 RENAL FUNCTION PANEL: CPT

## 2021-10-06 PROCEDURE — 85025 COMPLETE CBC W/AUTO DIFF WBC: CPT

## 2021-10-06 PROCEDURE — 6360000002 HC RX W HCPCS

## 2021-10-06 PROCEDURE — 80053 COMPREHEN METABOLIC PANEL: CPT

## 2021-10-06 PROCEDURE — 94002 VENT MGMT INPAT INIT DAY: CPT

## 2021-10-06 PROCEDURE — 36620 INSERTION CATHETER ARTERY: CPT

## 2021-10-06 PROCEDURE — 84145 PROCALCITONIN (PCT): CPT

## 2021-10-06 PROCEDURE — 80307 DRUG TEST PRSMV CHEM ANLYZR: CPT

## 2021-10-06 PROCEDURE — 87081 CULTURE SCREEN ONLY: CPT

## 2021-10-06 PROCEDURE — 87086 URINE CULTURE/COLONY COUNT: CPT

## 2021-10-06 PROCEDURE — 96375 TX/PRO/DX INJ NEW DRUG ADDON: CPT

## 2021-10-06 PROCEDURE — 87635 SARS-COV-2 COVID-19 AMP PRB: CPT

## 2021-10-06 PROCEDURE — 84300 ASSAY OF URINE SODIUM: CPT

## 2021-10-06 PROCEDURE — U0005 INFEC AGEN DETEC AMPLI PROBE: HCPCS

## 2021-10-06 PROCEDURE — 6360000002 HC RX W HCPCS: Performed by: EMERGENCY MEDICINE

## 2021-10-06 PROCEDURE — 93005 ELECTROCARDIOGRAM TRACING: CPT | Performed by: EMERGENCY MEDICINE

## 2021-10-06 PROCEDURE — 2500000003 HC RX 250 WO HCPCS: Performed by: EMERGENCY MEDICINE

## 2021-10-06 PROCEDURE — 51702 INSERT TEMP BLADDER CATH: CPT

## 2021-10-06 PROCEDURE — 96365 THER/PROPH/DIAG IV INF INIT: CPT

## 2021-10-06 PROCEDURE — 2000000000 HC ICU R&B

## 2021-10-06 PROCEDURE — 85379 FIBRIN DEGRADATION QUANT: CPT

## 2021-10-06 PROCEDURE — U0003 INFECTIOUS AGENT DETECTION BY NUCLEIC ACID (DNA OR RNA); SEVERE ACUTE RESPIRATORY SYNDROME CORONAVIRUS 2 (SARS-COV-2) (CORONAVIRUS DISEASE [COVID-19]), AMPLIFIED PROBE TECHNIQUE, MAKING USE OF HIGH THROUGHPUT TECHNOLOGIES AS DESCRIBED BY CMS-2020-01-R: HCPCS

## 2021-10-06 PROCEDURE — 87186 SC STD MICRODIL/AGAR DIL: CPT

## 2021-10-06 PROCEDURE — 70450 CT HEAD/BRAIN W/O DYE: CPT

## 2021-10-06 PROCEDURE — 99223 1ST HOSP IP/OBS HIGH 75: CPT | Performed by: INTERNAL MEDICINE

## 2021-10-06 PROCEDURE — 99284 EMERGENCY DEPT VISIT MOD MDM: CPT

## 2021-10-06 PROCEDURE — 6370000000 HC RX 637 (ALT 250 FOR IP): Performed by: EMERGENCY MEDICINE

## 2021-10-06 PROCEDURE — 99291 CRITICAL CARE FIRST HOUR: CPT | Performed by: INTERNAL MEDICINE

## 2021-10-06 PROCEDURE — 82570 ASSAY OF URINE CREATININE: CPT

## 2021-10-06 PROCEDURE — 84156 ASSAY OF PROTEIN URINE: CPT

## 2021-10-06 PROCEDURE — 36620 INSERTION CATHETER ARTERY: CPT | Performed by: INTERNAL MEDICINE

## 2021-10-06 PROCEDURE — 2700000000 HC OXYGEN THERAPY PER DAY

## 2021-10-06 PROCEDURE — 2500000003 HC RX 250 WO HCPCS: Performed by: STUDENT IN AN ORGANIZED HEALTH CARE EDUCATION/TRAINING PROGRAM

## 2021-10-06 PROCEDURE — 94761 N-INVAS EAR/PLS OXIMETRY MLT: CPT

## 2021-10-06 PROCEDURE — 87150 DNA/RNA AMPLIFIED PROBE: CPT

## 2021-10-06 PROCEDURE — 83935 ASSAY OF URINE OSMOLALITY: CPT

## 2021-10-06 PROCEDURE — 36556 INSERT NON-TUNNEL CV CATH: CPT

## 2021-10-06 RX ORDER — METHYLPREDNISOLONE SODIUM SUCCINATE 40 MG/ML
40 INJECTION, POWDER, LYOPHILIZED, FOR SOLUTION INTRAMUSCULAR; INTRAVENOUS EVERY 12 HOURS
Status: DISCONTINUED | OUTPATIENT
Start: 2021-10-06 | End: 2021-10-07

## 2021-10-06 RX ORDER — HEPARIN SODIUM 1000 [USP'U]/ML
4800 INJECTION, SOLUTION INTRAVENOUS; SUBCUTANEOUS ONCE
Status: COMPLETED | OUTPATIENT
Start: 2021-10-06 | End: 2021-10-06

## 2021-10-06 RX ORDER — PROPOFOL 10 MG/ML
5-50 INJECTION, EMULSION INTRAVENOUS
Status: DISCONTINUED | OUTPATIENT
Start: 2021-10-06 | End: 2021-10-08

## 2021-10-06 RX ORDER — ACETAMINOPHEN 650 MG/1
650 SUPPOSITORY RECTAL EVERY 6 HOURS PRN
Status: DISCONTINUED | OUTPATIENT
Start: 2021-10-06 | End: 2021-10-10 | Stop reason: HOSPADM

## 2021-10-06 RX ORDER — SODIUM CHLORIDE 0.9 % (FLUSH) 0.9 %
5-40 SYRINGE (ML) INJECTION PRN
Status: DISCONTINUED | OUTPATIENT
Start: 2021-10-06 | End: 2021-10-10 | Stop reason: HOSPADM

## 2021-10-06 RX ORDER — INSULIN LISPRO 100 [IU]/ML
0-18 INJECTION, SOLUTION INTRAVENOUS; SUBCUTANEOUS EVERY 6 HOURS
Status: DISCONTINUED | OUTPATIENT
Start: 2021-10-06 | End: 2021-10-08

## 2021-10-06 RX ORDER — ACETAMINOPHEN 325 MG/1
650 TABLET ORAL EVERY 6 HOURS PRN
Status: DISCONTINUED | OUTPATIENT
Start: 2021-10-06 | End: 2021-10-10 | Stop reason: HOSPADM

## 2021-10-06 RX ORDER — HEPARIN SODIUM 1000 [USP'U]/ML
2400 INJECTION, SOLUTION INTRAVENOUS; SUBCUTANEOUS PRN
Status: DISCONTINUED | OUTPATIENT
Start: 2021-10-06 | End: 2021-10-06 | Stop reason: HOSPADM

## 2021-10-06 RX ORDER — INSULIN LISPRO 100 [IU]/ML
0-9 INJECTION, SOLUTION INTRAVENOUS; SUBCUTANEOUS NIGHTLY
Status: DISCONTINUED | OUTPATIENT
Start: 2021-10-06 | End: 2021-10-06

## 2021-10-06 RX ORDER — DEXTROSE MONOHYDRATE 25 G/50ML
12.5 INJECTION, SOLUTION INTRAVENOUS PRN
Status: DISCONTINUED | OUTPATIENT
Start: 2021-10-06 | End: 2021-10-08 | Stop reason: SDUPTHER

## 2021-10-06 RX ORDER — HEPARIN SODIUM 1000 [USP'U]/ML
80 INJECTION, SOLUTION INTRAVENOUS; SUBCUTANEOUS PRN
Status: DISCONTINUED | OUTPATIENT
Start: 2021-10-06 | End: 2021-10-06

## 2021-10-06 RX ORDER — SODIUM CHLORIDE 9 MG/ML
25 INJECTION, SOLUTION INTRAVENOUS PRN
Status: DISCONTINUED | OUTPATIENT
Start: 2021-10-06 | End: 2021-10-10 | Stop reason: HOSPADM

## 2021-10-06 RX ORDER — INSULIN LISPRO 100 [IU]/ML
0-18 INJECTION, SOLUTION INTRAVENOUS; SUBCUTANEOUS
Status: DISCONTINUED | OUTPATIENT
Start: 2021-10-06 | End: 2021-10-06

## 2021-10-06 RX ORDER — METHYLPREDNISOLONE SODIUM SUCCINATE 125 MG/2ML
125 INJECTION, POWDER, LYOPHILIZED, FOR SOLUTION INTRAMUSCULAR; INTRAVENOUS ONCE
Status: COMPLETED | OUTPATIENT
Start: 2021-10-06 | End: 2021-10-06

## 2021-10-06 RX ORDER — ACETAMINOPHEN 650 MG/1
650 SUPPOSITORY RECTAL ONCE
Status: COMPLETED | OUTPATIENT
Start: 2021-10-06 | End: 2021-10-06

## 2021-10-06 RX ORDER — NICOTINE POLACRILEX 4 MG
15 LOZENGE BUCCAL PRN
Status: DISCONTINUED | OUTPATIENT
Start: 2021-10-06 | End: 2021-10-10 | Stop reason: HOSPADM

## 2021-10-06 RX ORDER — DEXTROSE MONOHYDRATE 50 MG/ML
100 INJECTION, SOLUTION INTRAVENOUS PRN
Status: DISCONTINUED | OUTPATIENT
Start: 2021-10-06 | End: 2021-10-08 | Stop reason: SDUPTHER

## 2021-10-06 RX ORDER — SODIUM CHLORIDE, SODIUM LACTATE, POTASSIUM CHLORIDE, AND CALCIUM CHLORIDE .6; .31; .03; .02 G/100ML; G/100ML; G/100ML; G/100ML
30 INJECTION, SOLUTION INTRAVENOUS ONCE
Status: COMPLETED | OUTPATIENT
Start: 2021-10-06 | End: 2021-10-06

## 2021-10-06 RX ORDER — IPRATROPIUM BROMIDE AND ALBUTEROL SULFATE 2.5; .5 MG/3ML; MG/3ML
1 SOLUTION RESPIRATORY (INHALATION) ONCE
Status: COMPLETED | OUTPATIENT
Start: 2021-10-06 | End: 2021-10-06

## 2021-10-06 RX ORDER — PROPOFOL 10 MG/ML
INJECTION, EMULSION INTRAVENOUS
Status: COMPLETED
Start: 2021-10-06 | End: 2021-10-06

## 2021-10-06 RX ORDER — HEPARIN SODIUM 1000 [USP'U]/ML
40 INJECTION, SOLUTION INTRAVENOUS; SUBCUTANEOUS PRN
Status: DISCONTINUED | OUTPATIENT
Start: 2021-10-06 | End: 2021-10-06

## 2021-10-06 RX ORDER — HEPARIN SODIUM 10000 [USP'U]/100ML
5-30 INJECTION, SOLUTION INTRAVENOUS CONTINUOUS
Status: DISCONTINUED | OUTPATIENT
Start: 2021-10-06 | End: 2021-10-06

## 2021-10-06 RX ORDER — HEPARIN SODIUM 10000 [USP'U]/100ML
1090 INJECTION, SOLUTION INTRAVENOUS CONTINUOUS
Status: DISCONTINUED | OUTPATIENT
Start: 2021-10-06 | End: 2021-10-06 | Stop reason: HOSPADM

## 2021-10-06 RX ORDER — SODIUM CHLORIDE 0.9 % (FLUSH) 0.9 %
5-40 SYRINGE (ML) INJECTION EVERY 12 HOURS SCHEDULED
Status: DISCONTINUED | OUTPATIENT
Start: 2021-10-06 | End: 2021-10-10 | Stop reason: HOSPADM

## 2021-10-06 RX ORDER — HEPARIN SODIUM 1000 [USP'U]/ML
4800 INJECTION, SOLUTION INTRAVENOUS; SUBCUTANEOUS PRN
Status: DISCONTINUED | OUTPATIENT
Start: 2021-10-06 | End: 2021-10-06 | Stop reason: HOSPADM

## 2021-10-06 RX ORDER — SODIUM CHLORIDE 9 MG/ML
INJECTION, SOLUTION INTRAVENOUS CONTINUOUS
Status: DISCONTINUED | OUTPATIENT
Start: 2021-10-06 | End: 2021-10-06

## 2021-10-06 RX ORDER — FUROSEMIDE 10 MG/ML
40 INJECTION INTRAMUSCULAR; INTRAVENOUS ONCE
Status: DISCONTINUED | OUTPATIENT
Start: 2021-10-06 | End: 2021-10-06 | Stop reason: HOSPADM

## 2021-10-06 RX ADMIN — DEXTROSE MONOHYDRATE 8 MCG/MIN: 50 INJECTION, SOLUTION INTRAVENOUS at 17:31

## 2021-10-06 RX ADMIN — SODIUM CHLORIDE, SODIUM LACTATE, POTASSIUM CHLORIDE, AND CALCIUM CHLORIDE 2382 ML: .6; .31; .03; .02 INJECTION, SOLUTION INTRAVENOUS at 17:42

## 2021-10-06 RX ADMIN — PIPERACILLIN SODIUM AND TAZOBACTAM SODIUM 4500 MG: 4; .5 INJECTION, POWDER, LYOPHILIZED, FOR SOLUTION INTRAVENOUS at 12:12

## 2021-10-06 RX ADMIN — PROPOFOL 10 MCG/KG/MIN: 10 INJECTION, EMULSION INTRAVENOUS at 18:09

## 2021-10-06 RX ADMIN — METHYLPREDNISOLONE SODIUM SUCCINATE 125 MG: 125 INJECTION, POWDER, FOR SOLUTION INTRAMUSCULAR; INTRAVENOUS at 11:07

## 2021-10-06 RX ADMIN — Medication 10 ML: at 22:02

## 2021-10-06 RX ADMIN — FAMOTIDINE 10 MG: 10 INJECTION, SOLUTION INTRAVENOUS at 17:57

## 2021-10-06 RX ADMIN — HEPARIN SODIUM 1090 UNITS/HR: 10000 INJECTION, SOLUTION INTRAVENOUS at 12:21

## 2021-10-06 RX ADMIN — CEFEPIME HYDROCHLORIDE 1000 MG: 1 INJECTION, POWDER, FOR SOLUTION INTRAMUSCULAR; INTRAVENOUS at 20:09

## 2021-10-06 RX ADMIN — HEPARIN SODIUM 18 UNITS/KG/HR: 10000 INJECTION, SOLUTION INTRAVENOUS at 18:38

## 2021-10-06 RX ADMIN — HEPARIN SODIUM 4800 UNITS: 1000 INJECTION, SOLUTION INTRAVENOUS; SUBCUTANEOUS at 12:21

## 2021-10-06 RX ADMIN — METHYLPREDNISOLONE SODIUM SUCCINATE 40 MG: 40 INJECTION, POWDER, FOR SOLUTION INTRAMUSCULAR; INTRAVENOUS at 17:46

## 2021-10-06 RX ADMIN — VANCOMYCIN HYDROCHLORIDE 1500 MG: 10 INJECTION, POWDER, LYOPHILIZED, FOR SOLUTION INTRAVENOUS at 18:39

## 2021-10-06 RX ADMIN — IPRATROPIUM BROMIDE AND ALBUTEROL SULFATE 1 AMPULE: .5; 3 SOLUTION RESPIRATORY (INHALATION) at 11:07

## 2021-10-06 RX ADMIN — ACETAMINOPHEN 650 MG: 650 SUPPOSITORY RECTAL at 11:25

## 2021-10-06 RX ADMIN — NOREPINEPHRINE BITARTRATE 4.99 MCG/MIN: 1 INJECTION INTRAVENOUS at 12:07

## 2021-10-06 RX ADMIN — SODIUM CHLORIDE: 9 INJECTION, SOLUTION INTRAVENOUS at 22:03

## 2021-10-06 ASSESSMENT — PULMONARY FUNCTION TESTS
PIF_VALUE: 41
PIF_VALUE: 28
PIF_VALUE: 31
PIF_VALUE: 30
PIF_VALUE: 29
PIF_VALUE: 33
PIF_VALUE: 35
PIF_VALUE: 31

## 2021-10-06 ASSESSMENT — PAIN SCALES - GENERAL
PAINLEVEL_OUTOF10: 0
PAINLEVEL_OUTOF10: 0

## 2021-10-06 NOTE — ED NOTES
Pts wife ,[facility], called and updated on pts status and plan of care     Meghan Davila RN  10/06/21 9769

## 2021-10-06 NOTE — PROGRESS NOTES
Clinical Pharmacy Progress Note    Vancomycin - Management by Pharmacy    Consult Date(s): 10/6/21  Consulting Provider(s): Maya Lemos    Assessment / Plan    :Suspected Sepsis of Unknown Source - Vancomycin   Concurrent Antimicrobials: zosyn   Day of Vanc Therapy: 1   Current Dosing Method: Intermittent   Therapeutic Goal: 15-20     Current Dose / Frequency: 1500 mg once [10/6]   Plan / Rationale: Patients CrCl is ~ 17.9, Scr of 4.1. Patient also is on zosyn. Random level is due 10/7 @ 0600.  Will continue to monitor clinical condition and make adjustments to regimen as appropriate. Thank you for consulting Pharmacy! [id number] [address]      Subjective/Objective: Mr. Noemy Merino is a 50 y.o. male admitted for Sepsis with acute respiratory failure and septic shock . Pharmacy has been consulted to Vancomycin. Height:   Ht Readings from Last 1 Encounters:   10/06/21 4' 11.02\" (1.499 m)     Weight:   Wt Readings from Last 1 Encounters:   10/06/21 175 lb 0.7 oz (79.4 kg)     Labs / Ancillary Data:    CrCl cannot be calculated (Unknown ideal weight. ). Recent Labs     10/06/21  0920   CREATININE 4.1*   *   WBC 20.0*         .

## 2021-10-06 NOTE — TELEPHONE ENCOUNTER
Writer contacted ED provider Calvin Mccollum  to inform of 30 day readmission risk. No Decision on disposition at this time.

## 2021-10-06 NOTE — CONSULTS
Pharmacy to Manage Heparin Infusion per [facility]    Dx:  PE/DVT  Pt adjusted wt = 60.4 kg (adjusted wt if actual body weight > 120% ideal body weight). Baseline aPTT = 28 at 09:20. High Dose Heparin Infusion  Heparin 80 units/kg IVP bolus followed by Heparin infusion at 18 units/kg/hr (recommended initial max dose 2100 units/hr). Recheck aPTT in 6 hours. Goal aPTT = 60-90 seconds. Initial Heparin bolus = 4800 units;  initial infusion rate = 1090 units/hr. Next APTT to scheduled 6 hours after initiation of infusion.   Elina Martinez R.Ph.10/6/345812:35 AM

## 2021-10-06 NOTE — ED NOTES
Placed call to the transfer center to see if there are any ICU bed in the [facility]  10/06/21 1122

## 2021-10-06 NOTE — ED NOTES
[facility] Clinical RN phoned and states pt will not be going to the ER and that they will now be going to room 4503. Clinical RN told this nurse to inform [facility] crew that she would be meeting them on the helipad.  [facility] crew informed     Fletcher Arvizu RN  10/06/21 [id number]

## 2021-10-06 NOTE — PROCEDURES
Simeon Licea is a 50 y.o. male patient. No diagnosis found. Past Medical History:   Diagnosis Date    Acute respiratory failure ([facility] [address] 07/11/2021    CHF (congestive heart failure) (HCC)     combined    COPD (chronic obstructive pulmonary disease) (HCC)     Diabetes mellitus (HCC)     Hyperlipidemia     Hypertension     Oxygen dependent      Blood pressure 123/69, pulse 73, temperature 99.7 °F (37.6 °C), temperature source Bladder, resp. rate 19, height 4' 11.02\" (1.499 m), weight 175 lb 0.7 oz (79.4 kg), SpO2 100 %. Central Line    Date/Time: 10/6/2021 5:13 PM  Performed by: Magi Alicia MD  Authorized by: Magi Alicia MD   Consent: The procedure was performed in an emergent situation. Relevant documents: relevant documents present and verified  Test results: test results available and properly labeled  Site marked: the operative site was marked  Imaging studies: imaging studies available  Required items: required blood products, implants, devices, and special equipment available  Patient identity confirmed: arm band  Time out: Immediately prior to procedure a \"time out\" was called to verify the correct patient, procedure, equipment, support staff and site/side marked as required.   Indications: vascular access and central pressure monitoring  Preparation: skin prepped with 2% chlorhexidine  Skin prep agent dried: skin prep agent completely dried prior to procedure  Sterile barriers: all five maximum sterile barriers used - cap, mask, sterile gown, sterile gloves, and large sterile sheet  Hand hygiene: hand hygiene performed prior to central venous catheter insertion  Location details: right subclavian  Patient position: reverse Trendelenburg  Catheter type: triple lumen  Catheter size: 7 Fr  Pre-procedure: landmarks identified  Ultrasound guidance: yes  Number of attempts: 3  Successful placement: yes  Post-procedure: line sutured and dressing applied  Assessment: blood return through all ports,  free fluid flow,  placement verified by x-ray and no pneumothorax on x-ray  Patient tolerance: patient tolerated the procedure well with no immediate complications  Comments: EBL > 5 cc          Jerri Childs MD  10/6/2021

## 2021-10-06 NOTE — ED NOTES
Pt to ER via EMS. Responsive to painful stimuli @ this time. Pt continued on 15 L/NRM. Cardiac monitor applied. Pt hot to touch. EMS states pt had recent cardiac stent placement 4 days ago.  Dr Linda Roberts @ bedside     Oval Parisian, RN  10/06/21 2032

## 2021-10-06 NOTE — CONSULTS
Pulmonary Consult    Patient's PCP: LINK Castillo CNP  Referred by: Richy Elizalde MD for sepsis, respiratory failure     HISTORY OF PRESENT ILLNESS:    This is a  50 y.o. male with a history PMH of CHF, COPD and others as listed below presented to [place] with AMS and fever  He was admitted to the hospital on 9/16 and discharged on 9/30  At that time he had acute on chronic hypercarbic respiratory failure etiology of which was unclear, however he has O2 dependent COPD and CHF. He was intubated at [place] before transfer to the ICU      Past Medical / Surgical History:    Past Medical History:   Diagnosis Date    Acute respiratory failure ([facility] [address] 07/11/2021    CHF (congestive heart failure) ([facility])     combined    COPD (chronic obstructive pulmonary disease) ([facility] [address]     Diabetes mellitus ([facility] [address]     Hyperlipidemia     Hypertension     Oxygen dependent      Past Surgical History:   Procedure Laterality Date    HERNIA REPAIR      TONSILLECTOMY       Prior to Admission:    No current facility-administered medications on file prior to encounter. Current Outpatient Medications on File Prior to Encounter   Medication Sig Dispense Refill    gabapentin (NEURONTIN) 800 MG tablet TAKE 1 TABLET 4 TIMES DAILY 120 tablet 0    pantoprazole (PROTONIX) 40 MG tablet Take 1 tablet by mouth every morning (before breakfast) 30 tablet 1    nicotine (NICODERM CQ) 14 MG/24HR Place 1 patch onto the skin daily 30 patch 3    insulin glargine (LANTUS;BASAGLAR) 100 UNIT/ML injection pen Inject 28 Units into the skin 2 times daily 5 pen 3    glipiZIDE (GLUCOTROL) 5 MG tablet Take 1 tablet by mouth 2 times daily 60 tablet 3    Insulin Pen Needle ([employer] PEN NEEDLES 29G) 29G X 12MM MISC 1 each by Does not apply route daily 100 each 3    predniSONE (DELTASONE) 10 MG tablet Take 3 tablets by mouth daily for 5 days, THEN 2 tablets daily for 5 days, THEN 1 tablet daily for 5 days.  30 tablet 0    atorvastatin (LIPITOR) 40 MG tablet TAKE (1) TABLET DAILY 90 tablet 0    Umeclidinium Bromide (INCRUSE ELLIPTA) 62.5 MCG/INH AEPB INHALE 1 PUFF DAILY 30 each 3    ipratropium-albuterol (DUONEB) 0.5-2.5 (3) MG/3ML SOLN nebulizer solution Take 3 mLs by nebulization every 4-6 hours as needed for Shortness of Breath 360 mL 1    albuterol sulfate  (90 Base) MCG/ACT inhaler INHALE 2 PUFFS INTO THE LUNGS EVERY 6 HOURS AS NEEDED FOR WHEEZING OR SHORTNESS OF BREATH 8.5 g 5    Fluticasone furoate-vilanterol (BREO ELLIPTA) 200-25 MCG/INH AEPB inhaler Inhale 1 puff into the lungs daily 1 each 0    HYDROcodone-acetaminophen (NORCO) 5-325 MG per tablet Take 1 tablet by mouth every 8 hours as needed for Pain.  metFORMIN (GLUCOPHAGE) 1000 MG tablet TAKE 1 TABLET BY MOUTH 2 TIMES DAILY (WITH MEALS) 60 tablet 5    fenofibric acid (FIBRICOR) 135 MG CPDR capsule TAKE 1 CAPSULE ONCE DAILY 30 capsule 5    Cholecalciferol (VITAMIN D3) 1.25 MG (81881 UT) CAPS Take 1 capsule by mouth Twice a Week 2 capsule 1    Magnesium 400 MG CAPS Take 1 tablet by mouth daily 30 capsule 1    blood glucose monitor strips Test 2 times a day & as needed for symptoms of irregular blood glucose. E11.9 300 strip 0    glucose monitoring kit (FREESTYLE) monitoring kit 1 kit by Does not apply route daily Patient wants brand name Patient tests bid  E11.9 1 kit 0    blood glucose monitor kit and supplies Test 2 times a day & as needed for symptoms of irregular blood glucose. 1 kit 0    Cyanocobalamin 1000 MCG CAPS Take 1 tablet by mouth daily      Omega-3 1000 MG CAPS Take 1 capsule by mouth daily      Coenzyme Q10 (COQ10) 100 MG CAPS Take 1 capsule by mouth daily      MULTIPLE VITAMINS PO Take by mouth         Allergies:  Patient has no known allergies. Social History:   TOBACCO:   reports that he has been smoking. He has been smoking about 1.00 pack per day. He has never used smokeless tobacco.     ETOH:   reports current alcohol use.     Family History: Problem Relation Age of Onset    Asthma Mother     Cancer Mother     Hearing Loss Mother     Vision Loss Mother     High Blood Pressure Father     High Cholesterol Father     Vision Loss Father     Asthma Sister     Diabetes Sister     Vision Loss Sister     Asthma Brother     Arthritis Brother     High Blood Pressure Brother          Review of Systems   Unable to perform ROS: Intubated       PHYSICAL EXAM:  /62   Pulse 73   Temp 96.1 °F (35.6 °C) (Bladder)   Resp 19   Ht 4' 11.02\" (1.499 m)   Wt 175 lb 0.7 oz (79.4 kg)   SpO2 100%   BMI 35.34 kg/m²     Physical Exam  Vitals reviewed. Constitutional:       Appearance: He is well-developed. He is obese. HENT:      Head: Normocephalic and atraumatic. Eyes:      Pupils: Pupils are equal, round, and reactive to light. Neck:      Vascular: No JVD. Cardiovascular:      Rate and Rhythm: Normal rate and regular rhythm. Heart sounds: No murmur heard. Pulmonary:      Effort: Pulmonary effort is normal. No respiratory distress. Breath sounds: No stridor. Rales present. No wheezing. Abdominal:      General: Bowel sounds are normal.      Palpations: Abdomen is soft. Musculoskeletal:         General: No deformity. Cervical back: Neck supple. Right lower leg: No edema. Left lower leg: No edema. Skin:     General: Skin is warm and dry. Neurological:      General: No focal deficit present. LABS:  Recent Labs     10/06/21  0920 10/06/21  1815   WBC 20.0* 20.2*   HGB 13.1* 11.4*   HCT 39.8* 35.4*    141                                                                  Recent Labs     10/06/21  0920   *   K 5.7*   CL 99   CO2 19*   *   CREATININE 4.1*   GLUCOSE 181*     Recent Labs     10/06/21  0920   AST 44*   ALT 42*   BILITOT <0.2   ALKPHOS 138*     Recent Labs     10/06/21  0920   TROPONINI <0.01     No results for input(s): BNP in the last 72 hours.   Lab Results   Component Value Date    PHART 7.194 10/06/2021    IZQ1IUV 41.9 10/06/2021    PO2ART 113.0 10/06/2021     Recent Labs     10/06/21  0920   INR 1.17*     Recent Labs     10/06/21  0920   COLORU Yellow   PHUR 5.0   WBCUA 10-20*   RBCUA 3-4   MUCUS Rare*   BACTERIA 2+*   CLARITYU SL CLOUDY*   SPECGRAV 1.020   LEUKOCYTESUR SMALL*   UROBILINOGEN 0.2   BILIRUBINUR Negative   BLOODU SMALL*   GLUCOSEU Negative        DATA:  Echo on 9/18/21  Summary   Normal left ventricle size, wall thickness, and systolic function with an   estimated ejection fraction of 55-60%. No regional wall motion abnormalities are seen. Indeterminate diastolic function. Individual aortic valve leaflets are not clearly visualized. Trivial tricuspid regurgitation. Trace pericardial effusion noted globally. A bubble study was performed and fails to show evidence of shunting. Definity contrast agent was used to help visualize endocardial borders. CXR  Endotracheal tube in satisfactory position.       No significant change in the bilateral lower lung airspace disease. Assessment &Plan:    Patient Active Problem List:     Hypertension     Mixed hyperlipidemia     CHF (congestive heart failure) (HCC)     COPD exacerbation (HCC)     Acute on chronic respiratory failure with hypoxia and hypercapnia (HCC)     CAD in native artery     Type 2 diabetes mellitus without complication (HCC)     Tobacco use     Chest pain     Elevated d-dimer     Organizing pneumonia ([address]     Severe hypoxemia     Sepsis with acute respiratory failure and septic shock (HCC)    Acute respiratory failure on mechanical vent support. , RR 18/27, FiO2 80, PEEP 5  ABG 7.19/41/113  Suspect septic shock Fever with Tmax of 102.6 with leukocytosis and left shift. Possible pneumonia though procal is not elevated. Suspected pulmonary edema. Pro BNP is 8424. Baseline 124, however this is in the setting of LILLIAN.     COPD - active smoking - on O2 at 2 liters at baseline   LILLIAN with creatinine of 4.1 on presentation down to 3.3  Hyperkalemia     It is possible that his hypercarbic respiratory failure is medication induced, then aspiration. He received IVF bolus  Continue ABX  Recheck ABG and adjust minute ventilation accordingly   D/c heparin drip. There is significant airspace disease making DVT unlikely     Pt has a high probability of imminent or life-threatening deterioration requiring close monitoring, and highly complex decision-making and/or interventions of high intensity to assess, manipulate, and support his critical organ systems to prevent a likely inevitable decline which could occur if left untreated.      A total critical care time 60 minutes was used. This includes but not limited to examining patient, collaborating with other physicians, monitoring vital signs, telemetry, continuous pulse oximetry, and clinical response to IV medications, documentation time, review and interpretation of laboratory and radiological data, review of nursing notes and old record review. This time excludes any time that may have been spent performing procedures for life threatening organ failure. Thank you LINK Beltran CNP for the opportunity to be involved in this patients care.  If you have any questions or concerns please feel free to contact me  Full Code

## 2021-10-06 NOTE — ED NOTES
Transfer center consulted for [facility] hospital hospitalist. Transfer center states [facility] has no ICU beds and neither does [facility]. MD made aware.       Josh Cordero RN  10/06/21 [id number]

## 2021-10-06 NOTE — H&P
ICU HISTORY AND PHYSICAL       Hospital Day: 1  ICU Day: 1                                                         Code:Full Code  Admit Date: 10/6/2021  PCP: LINK Beltrna CNP                                  CC: MARC    HPI: Pt is a 51 y/o male with a PMHx of HFpEF, COPD on 2L of oxygen at home, DM, HLD, HTN who presented to [facility] ED via EMS with AMS and was transferred to [facility]. Pt previously admitted to [facility] on 9/30 for an acute on chronic hypercapnic hypoxic respiratory failure. During that admission he was on 40L vapotherm, tx for COPD and CHF, though etiology of pt's hypoxic hypercapnic respiratory failure remained unclear throughout hospital stay. He was on a lasix drip for five days, received steroids for his COPD and was weened down to 3L of oxygen and discharged. When pt was in [facility] ED, he was noted to have a temperature of 103F, WBC 20, , D-Dimer 1600, Cr 4.1 increased from 1.1, and his BNP was greater than 8,000. Pt was intubated, started on vasopressors. CXR (10/03) displayed perihilar and lower zone airspace opacities have increased, particularly on the right. The ED physician believed pt to be in septic shock and he was transferred to [facility] for a higher level of care. PAST HISTORY:     Past Medical History:   Diagnosis Date    Acute respiratory failure ([facility] [address] 07/11/2021    CHF (congestive heart failure) (HCC)     combined    COPD (chronic obstructive pulmonary disease) (HCC)     Diabetes mellitus ([facility] [address]     Hyperlipidemia     Hypertension     Oxygen dependent        Past Surgical History:   Procedure Laterality Date    HERNIA REPAIR      TONSILLECTOMY         SocialHistory:   The patient lives with his wife.     Alcohol: drinks ETOH  Illicit drugs: no use  Tobacco:  42 plus pack year smoking hx    Family History:  Family History   Problem Relation Age of Onset    Asthma Mother     Cancer Mother     Hearing Loss Mother     Vision Loss Mother     High Blood Pressure Father  High Cholesterol Father     Vision Loss Father     Asthma Sister     Diabetes Sister     Vision Loss Sister     Asthma Brother     Arthritis Brother     High Blood Pressure Brother        MEDICATIONS:     No current facility-administered medications on file prior to encounter. Current Outpatient Medications on File Prior to Encounter   Medication Sig Dispense Refill    gabapentin (NEURONTIN) 800 MG tablet TAKE 1 TABLET 4 TIMES DAILY 120 tablet 0    pantoprazole (PROTONIX) 40 MG tablet Take 1 tablet by mouth every morning (before breakfast) 30 tablet 1    nicotine (NICODERM CQ) 14 MG/24HR Place 1 patch onto the skin daily 30 patch 3    insulin glargine (LANTUS;BASAGLAR) 100 UNIT/ML injection pen Inject 28 Units into the skin 2 times daily 5 pen 3    glipiZIDE (GLUCOTROL) 5 MG tablet Take 1 tablet by mouth 2 times daily 60 tablet 3    Insulin Pen Needle ([employer] PEN NEEDLES 29G) 29G X 12MM MISC 1 each by Does not apply route daily 100 each 3    predniSONE (DELTASONE) 10 MG tablet Take 3 tablets by mouth daily for 5 days, THEN 2 tablets daily for 5 days, THEN 1 tablet daily for 5 days. 30 tablet 0    atorvastatin (LIPITOR) 40 MG tablet TAKE (1) TABLET DAILY 90 tablet 0    Umeclidinium Bromide (INCRUSE ELLIPTA) 62.5 MCG/INH AEPB INHALE 1 PUFF DAILY 30 each 3    ipratropium-albuterol (DUONEB) 0.5-2.5 (3) MG/3ML SOLN nebulizer solution Take 3 mLs by nebulization every 4-6 hours as needed for Shortness of Breath 360 mL 1    albuterol sulfate  (90 Base) MCG/ACT inhaler INHALE 2 PUFFS INTO THE LUNGS EVERY 6 HOURS AS NEEDED FOR WHEEZING OR SHORTNESS OF BREATH 8.5 g 5    Fluticasone furoate-vilanterol (BREO ELLIPTA) 200-25 MCG/INH AEPB inhaler Inhale 1 puff into the lungs daily 1 each 0    HYDROcodone-acetaminophen (NORCO) 5-325 MG per tablet Take 1 tablet by mouth every 8 hours as needed for Pain.        metFORMIN (GLUCOPHAGE) 1000 MG tablet TAKE 1 TABLET BY MOUTH 2 TIMES DAILY (WITH MEALS) 60 tablet 5    fenofibric acid (FIBRICOR) 135 MG CPDR capsule TAKE 1 CAPSULE ONCE DAILY 30 capsule 5    Cholecalciferol (VITAMIN D3) 1.25 MG (07395 UT) CAPS Take 1 capsule by mouth Twice a Week 2 capsule 1    Magnesium 400 MG CAPS Take 1 tablet by mouth daily 30 capsule 1    blood glucose monitor strips Test 2 times a day & as needed for symptoms of irregular blood glucose. E11.9 300 strip 0    glucose monitoring kit (FREESTYLE) monitoring kit 1 kit by Does not apply route daily Patient wants brand name Patient tests bid  E11.9 1 kit 0    blood glucose monitor kit and supplies Test 2 times a day & as needed for symptoms of irregular blood glucose.  1 kit 0    Cyanocobalamin 1000 MCG CAPS Take 1 tablet by mouth daily      Omega-3 1000 MG CAPS Take 1 capsule by mouth daily      Coenzyme Q10 (COQ10) 100 MG CAPS Take 1 capsule by mouth daily      MULTIPLE VITAMINS PO Take by mouth           Scheduled Meds:   sodium chloride flush  5-40 mL IntraVENous 2 times per day    lactated ringers bolus  30 mL/kg IntraVENous Once    famotidine (PEPCID) injection  10 mg IntraVENous Daily    propofol        vancomycin  20 mg/kg IntraVENous Once    vancomycin (VANCOCIN) intermittent dosing (placeholder)   Other RX Placeholder    [START ON 10/7/2021] piperacillin-tazobactam  3,375 mg IntraVENous Q12H      Continuous Infusions:   phenylephrine (DOROTHEA-SYNEPHRINE) 50mg/250mL infusion      norepinephrine      vasopressin (Septic Shock) infusion      heparin (PORCINE) Infusion      sodium chloride       PRN Meds:sodium chloride flush, sodium chloride, acetaminophen **OR** acetaminophen    Allergies: No Known Allergies    REVIEW OF SYSTEMS:       History obtained from chart review    Review of Systems   Unable to perform ROS: Intubated       PHYSICAL EXAM:       Vitals: /69   Pulse 85   Temp 99.7 °F (37.6 °C) (Bladder)   Resp 18   Ht 4' 11.02\" (1.499 m)   Wt 175 lb 0.7 oz (79.4 kg)   SpO2 98%   BMI 35.34 kg/m² I/O:  No intake or output data in the 24 hours ending 10/06/21 1514  No intake/output data recorded. No intake/output data recorded. Physical Examination:     Physical Exam  Constitutional:       Appearance: He is obese. He is ill-appearing and toxic-appearing. He is not diaphoretic. HENT:      Head: Normocephalic and atraumatic. Cardiovascular:      Rate and Rhythm: Normal rate and regular rhythm. Heart sounds: No murmur heard. No friction rub. No gallop. Pulmonary:      Breath sounds: Wheezing and rhonchi present. No rales. Abdominal:      General: There is no distension. Palpations: There is no mass. Skin:     Findings: Bruising present. Comments: Extensive bruising on pt's left UE and lower abdomen            Access:   -Central Access Day #: 1                                  -Peripheral Access Day#:1  -Arterial line Day#:1                                  Reinoso Day#:1                                          ETT Day#:1  Vent Settings: Vent Mode: PRVC Rate Set: 18 bmp/Vt Ordered: 500 mL/ /FiO2 : 80 %    Recent Labs     10/06/21  0920   PHART 7.259*   WXF2QBT 44.3   PO2ART 89.1           DATA:       Labs:  CBC:   Recent Labs     10/06/21  0920   WBC 20.0*   HGB 13.1*   HCT 39.8*          BMP:   Recent Labs     10/06/21  0920   *   K 5.7*   CL 99   CO2 19*   *   CREATININE 4.1*   GLUCOSE 181*     LFT's:   Recent Labs     10/06/21  0920   AST 44*   ALT 42*   BILITOT <0.2   ALKPHOS 138*     Troponin:   Recent Labs     10/06/21  0920   TROPONINI <0.01     BNP:No results for input(s): BNP in the last 72 hours.   ABGs:   Recent Labs     10/06/21  0920   PHART 7.259*   KRU9BEC 44.3   PO2ART 89.1     INR:   Recent Labs     10/06/21  0920   INR 1.17*       U/A:  Recent Labs     10/06/21  0920   COLORU Yellow   PHUR 5.0   WBCUA 10-20*   RBCUA 3-4   MUCUS Rare*   BACTERIA 2+*   CLARITYU SL CLOUDY*   SPECGRAV 1.020   LEUKOCYTESUR SMALL*   UROBILINOGEN 0.2   BILIRUBINUR Negative   BLOODU SMALL*   GLUCOSEU Negative       XR CHEST PORTABLE    (Results Pending)       ASSESSMENT AND PLAN:     Acute hypoxic respiratory failure  On admission pt unresponsive with spO2 71%. Pro-BNP 8,243, CXR (10/06) perihilar and lower zone airspace opacities have increased, particularly on the right. ABG 7.240, pCO2 45.6, pO2 44.3 and HCO3 19.4. Suspect cause of hypoxia to be sepsis vs possible volume overload    -Consider Cardiology consult  -Mechanical ventilation: Mode: PRVC Rate Set: 18 bmp/Vt Ordered: 500 mL/ /FiO2 : 80 %  -Sedation:  -ABG daily    Septic Shock, suspected  On admission pt had fever of 103F, WBC 20, D-Dimer 1628, Lactic Acid 1.1. CXR (10/06) displayed perihilar and lower zone airspace opacities have increased, particularly on the right. Rapid covid was negative (10/06), though pt is unvaccinated. -Vanc  -Cefepime  -Levophed  -Covid-19 PCR  -Blood cultures x2 (10/06)  -Urine culture (10/06)  -CBC daily    LILLIAN  Hyperkalemia  On admission Cr 4.1, last Cr 1.2 on 10/01. K 5.7 admission. -BMP daily  -Mg daily  -Nephrology consulted    HFpEF  Chronic condition. Last EF 55% to 60% on 9/2021. Last admission on 9/30 pt was on a lasix drip. Pro-BNP 8,243 on admission. COPD  Chronic condition. Pt on multiple inhalers at home. Pt on 2L oxygen, Incruse, Breo and albuterol at home. Received high dose steroids during last admission on 9/30. DMT2  Chronic condition. Pt on metformin, glipizide, and insulin at home.  Last admission (9/30) pt on HDSSI.    -HDSSI  -Hypoglycemia protocol  -POCT glucose q4H    Code Status:Full Code  FEN: Diet NPO  PPX:  Heparin gtt  DISPO: IP    This patient has been staffed and discussed with    -----------------------------  Susan Butt DO, PGY-1  10/6/2021  3:14 PM

## 2021-10-06 NOTE — ED NOTES
Pt to helicopter via [address] [facility] crew without further incident.  Report called to [facility] in [address] format     Hakeem Pathak RN  10/06/21 2405

## 2021-10-06 NOTE — CONSULTS
ICU Consult Note       Hospital Day: 1  ICU Day:  1                                                        Code:Full Code  Admit Date: 10/6/2021  PCP: LINK Beltran CNP                                  CC: AMS    Interval Hx: Pt arrived on unit, arterial and central lines placed. Mechanical ventilation, vasopressors, heparin gtt were continued. HPI:  Pt is a 49 y/o male with a PMHx of HFpEF, COPD on 2L of oxygen at home, DM, HLD, HTN who presented to [facility] ED via EMS with AMS and was transferred to [facility]. Pt previously admitted to [facility] on 9/30 for an acute on chronic hypercapnic hypoxic respiratory failure. During that admission he was on 40L vapotherm, tx for COPD and CHF, though etiology of pt's hypoxic hypercapnic respiratory failure remained unclear throughout hospital stay. He was on a lasix drip for five days, received steroids for his COPD and was weened down to 3L of oxygen and discharged. When pt was in [facility] ED, he was noted to have a temperature of 103F, WBC 20, , D-Dimer 1600, Cr 4.1 increased from 1.1, and his BNP was greater than 8,000. Pt was intubated, started on vasopressors. CXR (10/03) displayed perihilar and lower zone airspace opacities have increased, particularly on the right.     The ED physician believed pt to be in septic shock and he was transferred to [facility] for a higher level of care.     PAST HISTORY:     Past Medical History:   Diagnosis Date    Acute respiratory failure ([facility] [address] 07/11/2021    CHF (congestive heart failure) (HCC)     combined    COPD (chronic obstructive pulmonary disease) (HCC)     Diabetes mellitus ([facility] [address]     Hyperlipidemia     Hypertension     Oxygen dependent        Past Surgical History:   Procedure Laterality Date    HERNIA REPAIR      TONSILLECTOMY         SocialHistory:   The patient lives at    Alcohol:  Illicit drugs: no use  Tobacco:      Family History:  Family History   Problem Relation Age of Onset    Asthma Mother     Cancer Mother  Hearing Loss Mother     Vision Loss Mother     High Blood Pressure Father     High Cholesterol Father     Vision Loss Father     Asthma Sister     Diabetes Sister     Vision Loss Sister     Asthma Brother     Arthritis Brother     High Blood Pressure Brother        MEDICATIONS:     No current facility-administered medications on file prior to encounter. Current Outpatient Medications on File Prior to Encounter   Medication Sig Dispense Refill    gabapentin (NEURONTIN) 800 MG tablet TAKE 1 TABLET 4 TIMES DAILY 120 tablet 0    pantoprazole (PROTONIX) 40 MG tablet Take 1 tablet by mouth every morning (before breakfast) 30 tablet 1    nicotine (NICODERM CQ) 14 MG/24HR Place 1 patch onto the skin daily 30 patch 3    insulin glargine (LANTUS;BASAGLAR) 100 UNIT/ML injection pen Inject 28 Units into the skin 2 times daily 5 pen 3    glipiZIDE (GLUCOTROL) 5 MG tablet Take 1 tablet by mouth 2 times daily 60 tablet 3    Insulin Pen Needle ([employer] PEN NEEDLES 29G) 29G X 12MM MISC 1 each by Does not apply route daily 100 each 3    predniSONE (DELTASONE) 10 MG tablet Take 3 tablets by mouth daily for 5 days, THEN 2 tablets daily for 5 days, THEN 1 tablet daily for 5 days. 30 tablet 0    atorvastatin (LIPITOR) 40 MG tablet TAKE (1) TABLET DAILY 90 tablet 0    Umeclidinium Bromide (INCRUSE ELLIPTA) 62.5 MCG/INH AEPB INHALE 1 PUFF DAILY 30 each 3    ipratropium-albuterol (DUONEB) 0.5-2.5 (3) MG/3ML SOLN nebulizer solution Take 3 mLs by nebulization every 4-6 hours as needed for Shortness of Breath 360 mL 1    albuterol sulfate  (90 Base) MCG/ACT inhaler INHALE 2 PUFFS INTO THE LUNGS EVERY 6 HOURS AS NEEDED FOR WHEEZING OR SHORTNESS OF BREATH 8.5 g 5    Fluticasone furoate-vilanterol (BREO ELLIPTA) 200-25 MCG/INH AEPB inhaler Inhale 1 puff into the lungs daily 1 each 0    HYDROcodone-acetaminophen (NORCO) 5-325 MG per tablet Take 1 tablet by mouth every 8 hours as needed for Pain.        metFORMIN (GLUCOPHAGE) 1000 MG tablet TAKE 1 TABLET BY MOUTH 2 TIMES DAILY (WITH MEALS) 60 tablet 5    fenofibric acid (FIBRICOR) 135 MG CPDR capsule TAKE 1 CAPSULE ONCE DAILY 30 capsule 5    Cholecalciferol (VITAMIN D3) 1.25 MG (49358 UT) CAPS Take 1 capsule by mouth Twice a Week 2 capsule 1    Magnesium 400 MG CAPS Take 1 tablet by mouth daily 30 capsule 1    blood glucose monitor strips Test 2 times a day & as needed for symptoms of irregular blood glucose. E11.9 300 strip 0    glucose monitoring kit (FREESTYLE) monitoring kit 1 kit by Does not apply route daily Patient wants brand name Patient tests bid  E11.9 1 kit 0    blood glucose monitor kit and supplies Test 2 times a day & as needed for symptoms of irregular blood glucose.  1 kit 0    Cyanocobalamin 1000 MCG CAPS Take 1 tablet by mouth daily      Omega-3 1000 MG CAPS Take 1 capsule by mouth daily      Coenzyme Q10 (COQ10) 100 MG CAPS Take 1 capsule by mouth daily      MULTIPLE VITAMINS PO Take by mouth           Scheduled Meds:   sodium chloride flush  5-40 mL IntraVENous 2 times per day    famotidine (PEPCID) injection  10 mg IntraVENous Daily    vancomycin  20 mg/kg IntraVENous Once    vancomycin (VANCOCIN) intermittent dosing (placeholder)   Other RX Placeholder    cefepime  1,000 mg IntraVENous Q12H    methylPREDNISolone  40 mg IntraVENous Q12H    insulin lispro  0-18 Units SubCUTAneous Q6H      Continuous Infusions:   norepinephrine 8 mcg/min (10/06/21 1731)    heparin (PORCINE) Infusion 18 Units/kg/hr (10/06/21 1838)    sodium chloride      dextrose      propofol 10 mcg/kg/min (10/06/21 1809)    fentaNYL       PRN Meds:sodium chloride flush, sodium chloride, acetaminophen **OR** acetaminophen, heparin (porcine), heparin (porcine), glucose, dextrose, glucagon (rDNA), dextrose    Allergies: No Known Allergies    REVIEW OF SYSTEMS:       History obtained from chart review    Review of Systems   Unable to perform ROS: Intubated PHYSICAL EXAM:       Vitals: /69   Pulse 73   Temp 99.7 °F (37.6 °C) (Bladder)   Resp 19   Ht 4' 11.02\" (1.499 m)   Wt 175 lb 0.7 oz (79.4 kg)   SpO2 100%   BMI 35.34 kg/m²     I/O:  No intake or output data in the 24 hours ending 10/06/21 1849  No intake/output data recorded. No intake/output data recorded. Physical Examination:     Physical Exam  Constitutional:       Appearance: He is ill-appearing and toxic-appearing. He is not diaphoretic. HENT:      Head: Normocephalic and atraumatic. Cardiovascular:      Rate and Rhythm: Normal rate and regular rhythm. Heart sounds: No murmur heard. No friction rub. No gallop. Pulmonary:      Breath sounds: Wheezing and rhonchi present. No rales. Abdominal:      General: There is distension. Palpations: There is no mass. Musculoskeletal:      Right lower leg: No edema. Left lower leg: No edema. Access:   -Central Access Day #: 1                                  -Peripheral Access Day#:1  -Arterial line Day#:1                                  Reinoso Day#:1                                       ETT Day#:1  Vent Settings: Vent Mode: PRVC Rate Set: 18 bmp/Vt Ordered: 500 mL/ /FiO2 : 80 %    Recent Labs     10/06/21  0920   PHART 7.259*   WDB5CMU 44.3   PO2ART 89.1           DATA:       Labs:  CBC:   Recent Labs     10/06/21  0920   WBC 20.0*   HGB 13.1*   HCT 39.8*          BMP:   Recent Labs     10/06/21  0920   *   K 5.7*   CL 99   CO2 19*   *   CREATININE 4.1*   GLUCOSE 181*     LFT's:   Recent Labs     10/06/21  0920   AST 44*   ALT 42*   BILITOT <0.2   ALKPHOS 138*     Troponin:   Recent Labs     10/06/21  0920   TROPONINI <0.01     BNP:No results for input(s): BNP in the last 72 hours.   ABGs:   Recent Labs     10/06/21  0920   PHART 7.259*   WSA3DAF 44.3   PO2ART 89.1     INR:   Recent Labs     10/06/21  0920   INR 1.17*       U/A:  Recent Labs     10/06/21  0920   COLORU Yellow   PHUR 5.0 WBCUA 10-20*   RBCUA 3-4   MUCUS Rare*   BACTERIA 2+*   CLARITYU SL CLOUDY*   SPECGRAV 1.020   LEUKOCYTESUR SMALL*   UROBILINOGEN 0.2   BILIRUBINUR Negative   BLOODU SMALL*   GLUCOSEU Negative       XR CHEST PORTABLE   Final Result      ET tube in place above the leandro. Right IJ central venous catheter placed, its tip in the distal SVC. No evidence of pneumothorax. Diffuse ill-defined airspace density throughout both lungs, right greater than left. Findings appear to have progressed slightly since the earlier study         US RENAL COMPLETE    (Results Pending)       ASSESSMENT AND PLAN:     Acute hypoxic respiratory failure  On admission pt unresponsive with spO2 71%. Pro-BNP 8,243, CXR (10/06) perihilar and lower zone airspace opacities have increased, particularly on the right. ABG 7.240, pCO2 45.6, pO2 44.3 and HCO3 19.4. Suspect cause of hypoxia to be sepsis vs possible volume overload     -Consider Cardiology consult  -Mechanical ventilation: Mode: PRVC Rate Set: 18 bmp/Vt Ordered: 500 mL/ /FiO2 : 80 %  -Sedation:  -ABG daily     Septic Shock, suspected  On admission pt had fever of 103F, WBC 20, D-Dimer 1628, Lactic Acid 1.1. CXR (10/06) displayed perihilar and lower zone airspace opacities have increased, particularly on the right. Rapid covid was negative (10/06), though pt is unvaccinated. Septic shock suspected at OSH, source of sepsis unknown.     -s/p IVF  -Vanc  -Cefepime  -Levophed  -Covid-19 PCR  -Respiratory culture  -MRSA nasal swab  -Blood cultures x2 (10/06)  -Urine culture (10/06)  -CBC daily     LILLIAN  Hyperkalemia  On admission Cr 4.1, last Cr 1.2 on 10/01. K 5.7 admission. -s/p IVF   -BMP daily  -Mg daily  -Nephrology consulted     HFpEF  Chronic condition. Last EF 55% to 60% on 9/2021. Last admission on 9/30 pt was on a lasix drip. Pro-BNP 8,243 on admission.     -Consider consulting cardiology    COPD  Chronic condition. Pt on multiple inhalers at home.  Pt on 2L oxygen, Incruse, Breo and albuterol at home. Received high dose steroids during last admission on 9/30.    -Solumedrol 40 mg q12H  -ABG daily     DMT2  Chronic condition. Pt on metformin, glipizide, and insulin at home.  Last admission (9/30) pt on HDSSI.     -HDSSI  -Hypoglycemia protocol  -POCT glucose q4H    Code Status:Full Code  FEN: Diet NPO  PPX:  Heparin gtt  DISPO: IP    This patient has been staffed and discussed with Dr. Elva Fuentes.  -----------------------------  Christie Bustillo DO, PGY-1  10/6/2021  6:49 PM

## 2021-10-06 NOTE — ED NOTES
Spoke with pts wife, Christi Tripp, given updated pt info per her request     Oj Herrera RN  10/06/21 1678

## 2021-10-06 NOTE — PROCEDURES
Luisito Finnegan is a 50 y.o. male patient. 1. Sepsis with acute respiratory failure and septic shock, due to unspecified organism, unspecified whether hypoxia or hypercapnia present [facility])      Past Medical History:   Diagnosis Date    Acute respiratory failure ([address] 07/11/2021    CHF (congestive heart failure) (HCC)     combined    COPD (chronic obstructive pulmonary disease) (HCC)     Diabetes mellitus (HCC)     Hyperlipidemia     Hypertension     Oxygen dependent      Blood pressure 123/69, pulse 73, temperature 99.7 °F (37.6 °C), temperature source Bladder, resp. rate 19, height 4' 11.02\" (1.499 m), weight 175 lb 0.7 oz (79.4 kg), SpO2 100 %. Insert Arterial Line    Date/Time: 10/6/2021 5:23 PM  Performed by: Jonathan Mckeon MD  Authorized by: Jonathan Mckeon MD   Consent: The procedure was performed in an emergent situation. Risks and benefits: risks, benefits and alternatives were discussed  Relevant documents: relevant documents present and verified  Test results: test results available and properly labeled  Site marked: the operative site was marked  Imaging studies: imaging studies available  Patient identity confirmed: arm band  Preparation: Patient was prepped and draped in the usual sterile fashion.   Indications: multiple ABGs, respiratory failure and hemodynamic monitoring  Location: right radial    Sedation:  Patient sedated: no    Theron's test normal: yes  Needle gauge: 20  Seldinger technique: Seldinger technique used  Cutdown: cutdown required  Number of attempts: 1  Post-procedure: line sutured and dressing applied  Post-procedure CMS: normal  Comments: EBL < 5cc          Jonathan Mckeon MD  10/6/2021

## 2021-10-06 NOTE — ED NOTES
Pt intubated via med student with Dr Elizabeth Enrique @ bed supervising.  # 7.5 ETT showing 23 cm @ lip      Jacquie Gaines RN  10/06/21 6719

## 2021-10-06 NOTE — ED PROVIDER NOTES
[facility]      Pt Name: Luc Camp  MRN: [id number]  [place] 1965  Date of evaluation: 10/6/2021  Provider: Galen Acevedo MD    CHIEF COMPLAINT       Chief Complaint   Patient presents with    Altered Mental Status     Pt found unresponsive with RA O2 sat of 71% by EMS         HISTORY OF PRESENT ILLNESS   (Location/Symptom, Timing/Onset, Context/Setting, Quality, Duration, Modifying Factors, Severity)  Note limiting factors. Luc Camp is a 50 y.o. male who presents to the emergency department     Patient presented here by EMS. He is mental status is altered has history of respiratory failure CHF COPD patient is unable to really communicate. We do know that his pulse ox was low at the scene. Also presents with a temperature and his blood pressure is soft. Patient underwent full septic work-up including control of his airway. With the endotracheal intubation  Patient really had no other history. Old records reviewed. Please see below    The history is provided by the patient. Nursing Notes were reviewed. REVIEW OF SYSTEMS    (2-9 systems for level 4, 10 or more for level 5)     Review of Systems   Unable to perform ROS: Mental status change   All other systems reviewed and are negative. Except as noted above the remainder of the review of systems was reviewed and negative.        PAST MEDICAL HISTORY     Past Medical History:   Diagnosis Date    Acute respiratory failure ([facility] 07/11/2021    CHF (congestive heart failure) (HCC)     combined    COPD (chronic obstructive pulmonary disease) ([facility]     Diabetes mellitus ([facility]     Hyperlipidemia     Hypertension     Oxygen dependent          SURGICAL HISTORY       Past Surgical History:   Procedure Laterality Date    HERNIA REPAIR      TONSILLECTOMY           CURRENT MEDICATIONS       Previous Medications    ALBUTEROL SULFATE  (90 BASE) MCG/ACT INHALER    INHALE 2 PUFFS INTO THE LUNGS EVERY 6 HOURS AS NEEDED FOR WHEEZING OR SHORTNESS OF BREATH    ATORVASTATIN (LIPITOR) 40 MG TABLET    TAKE (1) TABLET DAILY    BLOOD GLUCOSE MONITOR KIT AND SUPPLIES    Test 2 times a day & as needed for symptoms of irregular blood glucose. BLOOD GLUCOSE MONITOR STRIPS    Test 2 times a day & as needed for symptoms of irregular blood glucose. E11.9    CHOLECALCIFEROL (VITAMIN D3) 1.25 MG (22323 UT) CAPS    Take 1 capsule by mouth Twice a Week    COENZYME Q10 (COQ10) 100 MG CAPS    Take 1 capsule by mouth daily    CYANOCOBALAMIN 1000 MCG CAPS    Take 1 tablet by mouth daily    FENOFIBRIC ACID (FIBRICOR) 135 MG CPDR CAPSULE    TAKE 1 CAPSULE ONCE DAILY    FLUTICASONE FUROATE-VILANTEROL (BREO ELLIPTA) 200-25 MCG/INH AEPB INHALER    Inhale 1 puff into the lungs daily    GABAPENTIN (NEURONTIN) 800 MG TABLET    TAKE 1 TABLET 4 TIMES DAILY    GLIPIZIDE (GLUCOTROL) 5 MG TABLET    Take 1 tablet by mouth 2 times daily    GLUCOSE MONITORING KIT (FREESTYLE) MONITORING KIT    1 kit by Does not apply route daily Patient wants brand name Patient tests bid  E11.9    HYDROCODONE-ACETAMINOPHEN (NORCO) 5-325 MG PER TABLET    Take 1 tablet by mouth every 8 hours as needed for Pain.      INSULIN GLARGINE (LANTUS;BASAGLAR) 100 UNIT/ML INJECTION PEN    Inject 28 Units into the skin 2 times daily    INSULIN PEN NEEDLE ([employer] PEN NEEDLES 29G) 29G X 12MM MISC    1 each by Does not apply route daily    IPRATROPIUM-ALBUTEROL (DUONEB) 0.5-2.5 (3) MG/3ML SOLN NEBULIZER SOLUTION    Take 3 mLs by nebulization every 4-6 hours as needed for Shortness of Breath    MAGNESIUM 400 MG CAPS    Take 1 tablet by mouth daily    METFORMIN (GLUCOPHAGE) 1000 MG TABLET    TAKE 1 TABLET BY MOUTH 2 TIMES DAILY (WITH MEALS)    MULTIPLE VITAMINS PO    Take by mouth    NICOTINE (NICODERM CQ) 14 MG/24HR    Place 1 patch onto the skin daily    OMEGA-3 1000 MG CAPS    Take 1 capsule by mouth daily    PANTOPRAZOLE (PROTONIX) 40 MG TABLET    Take 1 tablet by mouth every morning (before breakfast)    PREDNISONE (DELTASONE) 10 MG TABLET    Take 3 tablets by mouth daily for 5 days, THEN 2 tablets daily for 5 days, THEN 1 tablet daily for 5 days. UMECLIDINIUM BROMIDE (INCRUSE ELLIPTA) 62.5 MCG/INH AEPB    INHALE 1 PUFF DAILY       ALLERGIES     Patient has no known allergies. FAMILY HISTORY       Family History   Problem Relation Age of Onset    Asthma Mother     Cancer Mother     Hearing Loss Mother     Vision Loss Mother     High Blood Pressure Father     High Cholesterol Father     Vision Loss Father     Asthma Sister     Diabetes Sister     Vision Loss Sister     Asthma Brother     Arthritis Brother     High Blood Pressure Brother           SOCIAL HISTORY       Social History     Socioeconomic History    Marital status:      Spouse name: None    Number of children: None    Years of education: None    Highest education level: None   Occupational History    None   Tobacco Use    Smoking status: Current Every Day Smoker     Packs/day: 1.00    Smokeless tobacco: Never Used   Vaping Use    Vaping Use: Never used   Substance and Sexual Activity    Alcohol use: Yes     Comment: 6 pack per month    Drug use: Never    Sexual activity: None   Other Topics Concern    None   Social History Narrative    None     Social Determinants of Health     Financial Resource Strain:     Difficulty of Paying Living Expenses:    Food Insecurity:     Worried About Running Out of Food in the Last Year:     Ran Out of Food in the Last Year:    Transportation Needs:     Lack of Transportation (Medical):      Lack of Transportation (Non-Medical):    Physical Activity:     Days of Exercise per Week:     Minutes of Exercise per Session:    Stress:     Feeling of Stress :    Social Connections:     Frequency of Communication with Friends and Family:     Frequency of Social Gatherings with Friends and Family:     Attends Mosque Services:     Active Member of Clubs or Organizations:     Attends Club or Organization Meetings:     Marital Status:    Intimate Partner Violence:     Fear of Current or Ex-Partner:     Emotionally Abused:     Physically Abused:     Sexually Abused:        SCREENINGS    Nelda Coma Scale  Eye Opening: To pain  Best Verbal Response: Inappropriate words  Best Motor Response: Withdraws from pain  Bessie Coma Scale Score: 9          PHYSICAL EXAM    (up to 7 for level 4, 8 or more for level 5)     ED Triage Vitals   BP Temp Temp Source Pulse Resp SpO2 Height Weight   10/06/21 0920 10/06/21 0935 10/06/21 1106 10/06/21 0920 10/06/21 0920 10/06/21 0920 10/06/21 0920 10/06/21 0920   108/60 102.6 °F (39.2 °C) Bladder 118 (!) 36 98 % 4' 11\" (1.499 m) 175 lb (79.4 kg)       Physical Exam  Vitals reviewed. Constitutional:       General: He is in acute distress. Appearance: He is obese. He is ill-appearing and toxic-appearing. HENT:      Head: Normocephalic. Eyes:      Pupils: Pupils are equal, round, and reactive to light. Right eye: Pupil is round and reactive. Left eye: Pupil is round and reactive. Cardiovascular:      Rate and Rhythm: Normal rate. Heart sounds: Normal heart sounds. No murmur heard. No friction rub. Pulmonary:      Breath sounds: Wheezing and rhonchi present. Abdominal:      General: There is no distension. Palpations: There is no mass. Musculoskeletal:         General: Normal range of motion. Cervical back: No rigidity. Skin:     General: Skin is warm. Neurological:      GCS: GCS eye subscore is 3. GCS verbal subscore is 3. GCS motor subscore is 4. Cranial Nerves: No cranial nerve deficit, dysarthria or facial asymmetry.        Initial GCS was around 10 he was very very lethargic slightly to moderately confused however this did deteriorate over the next hour or so we did get an arterial blood gas which revealed probably a combination of a metabolic acidosis probably is from his kidney failure  At this point thus we decided to intubate him. His GCS had dropped at least 2 6 maybe a 5  DIAGNOSTIC RESULTS     EKG: All EKG's are interpreted by the Emergency Department Physician who either signs or Co-signs this chart in the absence of a cardiologist.        RADIOLOGY:   Non-plain film images such as CT, Ultrasound and MRI are read by the radiologist. Plain radiographic images are visualized and preliminarily interpreted by the emergency physician with the below findings:        Interpretation per the Radiologist below, if available at the time of this note:    XR CHEST PORTABLE   Final Result   Endotracheal tube in satisfactory position. No significant change in the bilateral lower lung airspace disease. XR CHEST PORTABLE   Final Result   Perihilar and lower zone airspace opacities have increased, particularly on   the right. This may represent pulmonary edema given history of recent   procedure. Developing pneumonitis can not be excluded.                  LABS:  Results for orders placed or performed during the hospital encounter of 10/06/21   Lactate, Sepsis   Result Value Ref Range    Lactic Acid, Sepsis 1.1 0.4 - 1.9 mmol/L   Blood gas, arterial   Result Value Ref Range    pH, Arterial 7.259 (L) 7.350 - 7.450    pCO2, Arterial 44.3 35.0 - 45.0 mmHg    pO2, Arterial 89.1 75.0 - 108.0 mmHg    HCO3, Arterial 19.4 (L) 21.0 - 29.0 mmol/L    Base Excess, Arterial -7.5 (L) -3.0 - 3.0 mmol/L    Hemoglobin, Art, Extended 13.3 (L) 13.5 - 17.5 g/dL    O2 Sat, Arterial 95.5 >92 %    Carboxyhgb, Arterial 2.8 (H) 0.0 - 1.5 %    Methemoglobin, Arterial 0.3 <1.5 %    TCO2, Arterial 20.7 Not Established mmol/L    O2 Therapy Unknown    Blood gas, venous   Result Value Ref Range    pH, Jesus 7.240 (L) 7.350 - 7.450    pCO2, Jesus 45.6 40.0 - 50.0 mmHg    pO2, Jesus 39.3 25 - 40 mmHg    HCO3, Venous 19.1 (L) 23.0 - 29.0 mmol/L    Base Excess, Jesus -8.1 (L) -3.0 - 3.0 mmol/L    O2 Sat, Jesus 65 Not Established %    Carboxyhemoglobin 4.2 (H) 0.0 - 1.5 %    MetHgb, Jesus 0.3 <1.5 %    TC02 (Calc), Jesus 21 Not Established mmol/L    O2 Therapy Unknown    CBC Auto Differential   Result Value Ref Range    WBC 20.0 (H) 4.0 - 11.0 K/uL    RBC 4.48 4.20 - 5.90 M/uL    Hemoglobin 13.1 (L) 13.5 - 17.5 g/dL    Hematocrit 39.8 (L) 40.5 - 52.5 %    MCV 88.8 80.0 - 100.0 fL    MCH 29.1 26.0 - 34.0 pg    MCHC 32.8 31.0 - 36.0 g/dL    RDW 17.2 (H) 12.4 - 15.4 %    Platelets 457 036 - 642 K/uL    MPV 9.9 5.0 - 10.5 fL    Neutrophils % 94.2 %    Lymphocytes % 2.5 %    Monocytes % 3.0 %    Eosinophils % 0.1 %    Basophils % 0.2 %    Neutrophils Absolute 18.8 (H) 1.7 - 7.7 K/uL    Lymphocytes Absolute 0.5 (L) 1.0 - 5.1 K/uL    Monocytes Absolute 0.6 0.0 - 1.3 K/uL    Eosinophils Absolute 0.0 0.0 - 0.6 K/uL    Basophils Absolute 0.0 0.0 - 0.2 K/uL   Comprehensive Metabolic Panel   Result Value Ref Range    Sodium 132 (L) 136 - 145 mmol/L    Potassium 5.7 (H) 3.5 - 5.1 mmol/L    Chloride 99 99 - 110 mmol/L    CO2 19 (L) 21 - 32 mmol/L    Anion Gap 14 3 - 16    Glucose 181 (H) 70 - 99 mg/dL     (HH) 7 - 20 mg/dL    CREATININE 4.1 (H) 0.9 - 1.3 mg/dL    GFR Non-African American 15 (A) >60    GFR  19 (A) >60    Calcium 10.1 8.3 - 10.6 mg/dL    Total Protein 6.5 6.4 - 8.2 g/dL    Albumin 2.8 (L) 3.4 - 5.0 g/dL    Albumin/Globulin Ratio 0.8 (L) 1.1 - 2.2    Total Bilirubin <0.2 0.0 - 1.0 mg/dL    Alkaline Phosphatase 138 (H) 40 - 129 U/L    ALT 42 (H) 10 - 40 U/L    AST 44 (H) 15 - 37 U/L    Globulin 3.7 g/dL   Protime-INR   Result Value Ref Range    Protime 13.4 (H) 9.9 - 12.7 sec    INR 1.17 (H) 0.88 - 1.12   Troponin   Result Value Ref Range    Troponin <0.01 <0.01 ng/mL   Brain Natriuretic Peptide   Result Value Ref Range    Pro-BNP 8,243 (H) 0 - 124 pg/mL   D-Dimer, Quantitative   Result Value Ref Range    D-Dimer, Quant 1628 (H) 0 - 229 ng/mL DDU   Urinalysis Reflex to Culture    Specimen: Urine, clean catch   Result Value Ref Range    Color, UA Yellow Straw/Yellow    Clarity, UA SL CLOUDY (A) Clear    Glucose, Ur Negative Negative mg/dL    Bilirubin Urine Negative Negative    Ketones, Urine Negative Negative mg/dL    Specific Gravity, UA 1.020 1.005 - 1.030    Blood, Urine SMALL (A) Negative    pH, UA 5.0 5.0 - 8.0    Protein, UA TRACE (A) Negative mg/dL    Urobilinogen, Urine 0.2 <2.0 E.U./dL    Nitrite, Urine Negative Negative    Leukocyte Esterase, Urine SMALL (A) Negative    Microscopic Examination YES     Urine Type NotGiven     Urine Reflex to Culture Yes    Magnesium   Result Value Ref Range    Magnesium 3.00 (H) 1.80 - 2.40 mg/dL   APTT   Result Value Ref Range    aPTT 28.0 26.2 - 38.6 sec   Microscopic Urinalysis   Result Value Ref Range    Mucus, UA Rare (A) None Seen /LPF    WBC, UA 10-20 (A) 0 - 5 /HPF    RBC, UA 3-4 0 - 4 /HPF    Epithelial Cells, UA 0-1 0 - 5 /HPF    Bacteria, UA 2+ (A) None Seen /HPF            EMERGENCY DEPARTMENT COURSE and DIFFERENTIAL DIAGNOSIS/MDM:     Vitals:    10/06/21 1210 10/06/21 1215 10/06/21 1220 10/06/21 1225   BP: 87/60 92/66 (!) 77/53 110/74   Pulse: 92 94 102 98   Resp: 15 17 26 21   Temp:       TempSrc:       SpO2: 92% 98% (!) 65% 99%   Weight:       Height:               MDM      REASSESSMENT      Unfortunately with this pandemic I did have spent a lot of time on the phone trying to just get this patient to a place where he could get the acute care level that he needs which includes dialysis probably intensive care monitoring to monitor his Levophed and heparin drips. Also on decisions to pursue his elevated D-dimer.   [facility] I therefore talked to the internal medicine physician who graciously excepted it but then there was no promise on a bed they said they were discharged dependence at this point we did push for the patient to go to the ED I did talk to Kathleen Lyn who is very gracious and accepted the patient to go to his ED so he could get the advance care that he needs his blood pressure remains marginal is rhythm strips remain good his pulse ox is 93% on respiratory settings of approximately 450 rate of 18 and 100% so he still is hypoxic by definition he it will be sent by air care. I did give him a full report to air care also. Arterial blood gas ordered and obtained by us with ultrasound  Also interpreted  Venous blood gas also obtained  Septic protocol instituted. Fever control given antibiotics given Levophed started heparin anticoagulation full process underwent. Patient at this point being sent by her care. This patient underwent 70 minutes of critical care time +15 minutes for intubation. CRITICAL CARE TIME     CONSULTS:  None      PROCEDURES:     Intubation    Date/Time: 10/6/2021 12:20 PM  Performed by: Matilda Santos MD  Authorized by: Matilda Santos MD     Consent:     Consent obtained:  Emergent situation  Pre-procedure details:     Patient status:  Unresponsive    Mallampati score:  III    Pretreatment medications:  Midazolam    Paralytics:  Succinylcholine  Procedure details:     Preoxygenation:  Bag valve mask    CPR in progress: no      Intubation method:  Oral    Oral intubation technique:  Fiber optic    Laryngoscope blade: Mac 4    Tube size (mm):  7.5    Tube type:  Cuffed    Number of attempts:  1    Tube visualized through cords: yes    Placement assessment:     ETT to lip:  23    Tube secured with:  ETT kidd    Breath sounds:  Equal    Placement verification: chest rise and CXR verification      CXR findings:  ETT in right main stem    Tube repositioned: yes    Post-procedure details:     Patient tolerance of procedure: Tolerated well, no immediate complications  Comments:      Pulled the ET tube back approximately 1 inch.   And good breath sounds are noted bilaterally        MEDICATIONS GIVEN THIS VISIT:  Medications   vancomycin (VANCOCIN) 2,000 mg in dextrose 5 % 500 mL IVPB (has no administration in time range)   furosemide (LASIX) injection 40 mg (40 mg IntraVENous Not Given 10/6/21 1225)   heparin (porcine) injection 4,800 Units (has no administration in time range)   heparin (porcine) injection 2,400 Units (has no administration in time range)   heparin 25,000 units in dextrose 5% 250 mL (premix) infusion (1,090 Units/hr IntraVENous New Bag 10/6/21 1221)   norepinephrine (LEVOPHED) 16,000 mcg in dextrose 5 % 250 mL infusion (4.992 mcg/min IntraVENous New Bag 10/6/21 1207)   piperacillin-tazobactam (ZOSYN) 4,500 mg in sodium chloride 0.9 % 100 mL IVPB (mini-bag) (0 mg IntraVENous Stopped 10/6/21 1242)   ipratropium-albuterol (DUONEB) nebulizer solution 1 ampule (1 ampule Inhalation Given 10/6/21 1107)   methylPREDNISolone sodium (SOLU-MEDROL) injection 125 mg (125 mg IntraVENous Given 10/6/21 1107)   heparin (porcine) injection 4,800 Units (4,800 Units IntraVENous Given 10/6/21 1221)   acetaminophen (TYLENOL) suppository 650 mg (650 mg Rectal Given 10/6/21 1125)        FINAL IMPRESSION      1. Acute renal failure with tubular necrosis (HCC)    2. Somnolence    3. D-dimer, elevated    4. Acute systolic congestive heart failure ([facility]    5. Hypoxemia    6. Diabetes mellitus type 2 in obese ([facility]    7. Febrile illness, acute            DISPOSITION/PLAN   DISPOSITION Decision To Transfer 10/06/2021 10:57:03 AM      PATIENT REFERRED TO:  No follow-up provider specified. DISCHARGE MEDICATIONS:  New Prescriptions    No medications on file       Controlled Substances Monitoring  RX Monitoring 3/26/2021   Periodic Controlled Substance Monitoring No signs of potential drug abuse or diversion identified. (Please note that portions of this note were completed with a voice recognition program.  Efforts were made to edit the dictations but occasionally words are mis-transcribed.)    Patient was advised to return to the Emergency Department if there was any worsening.     Karen Arango MD (electronically signed)  Attending Emergency Physician    .ggedlorene Bueno MD  10/06/21 Gifty Wheatley MD  10/12/21 0652

## 2021-10-06 NOTE — ED NOTES
Claudia Hurtado RN confirmed with [address] to continue with current meds as ordered, Dr Didier Winston made aware     Meghan Davila RN  10/06/21 2611

## 2021-10-06 NOTE — ED NOTES
Pt now noted with snoring respirations and O2 sat of 88% on 15L/NRM. Pt unresponsive to all stimuli.  Dr Татьяна Gonzalez made aware     Ruben Bustillo RN  10/06/21 9615

## 2021-10-07 ENCOUNTER — APPOINTMENT (OUTPATIENT)
Dept: GENERAL RADIOLOGY | Age: 56
DRG: 871 | End: 2021-10-07
Attending: INTERNAL MEDICINE
Payer: MEDICARE

## 2021-10-07 ENCOUNTER — APPOINTMENT (OUTPATIENT)
Dept: ULTRASOUND IMAGING | Age: 56
DRG: 871 | End: 2021-10-07
Attending: INTERNAL MEDICINE
Payer: MEDICARE

## 2021-10-07 PROBLEM — A41.51 SEPTIC SHOCK DUE TO ESCHERICHIA COLI (HCC): Status: ACTIVE | Noted: 2021-10-07

## 2021-10-07 PROBLEM — R65.21 SEPTIC SHOCK DUE TO ESCHERICHIA COLI (HCC): Status: ACTIVE | Noted: 2021-10-07

## 2021-10-07 PROBLEM — R78.81 BACTEREMIA: Status: ACTIVE | Noted: 2021-10-07

## 2021-10-07 PROBLEM — B96.20 BACTEREMIA DUE TO ESCHERICHIA COLI: Status: ACTIVE | Noted: 2021-10-07

## 2021-10-07 LAB
ANION GAP SERPL CALCULATED.3IONS-SCNC: 13 MMOL/L (ref 3–16)
BASE EXCESS ARTERIAL: -0.1 MMOL/L (ref -3–3)
BASE EXCESS ARTERIAL: -2 (ref -3–3)
BASOPHILS ABSOLUTE: 0 K/UL (ref 0–0.2)
BASOPHILS RELATIVE PERCENT: 0.1 %
BUN BLDV-MCNC: 94 MG/DL (ref 7–20)
CALCIUM SERPL-MCNC: 8.7 MG/DL (ref 8.3–10.6)
CARBOXYHEMOGLOBIN ARTERIAL: 1.7 % (ref 0–1.5)
CHLORIDE BLD-SCNC: 102 MMOL/L (ref 99–110)
CO2: 21 MMOL/L (ref 21–32)
CREAT SERPL-MCNC: 3 MG/DL (ref 0.9–1.3)
CREATININE URINE: 36.2 MG/DL (ref 39–259)
CREATININE URINE: 36.2 MG/DL (ref 39–259)
EOSINOPHILS ABSOLUTE: 0 K/UL (ref 0–0.6)
EOSINOPHILS RELATIVE PERCENT: 0.1 %
GFR AFRICAN AMERICAN: 27
GFR NON-AFRICAN AMERICAN: 22
GLUCOSE BLD-MCNC: 201 MG/DL (ref 70–99)
GLUCOSE BLD-MCNC: 232 MG/DL (ref 70–99)
GLUCOSE BLD-MCNC: 295 MG/DL (ref 70–99)
GLUCOSE BLD-MCNC: 330 MG/DL (ref 70–99)
GLUCOSE BLD-MCNC: 342 MG/DL (ref 70–99)
GLUCOSE BLD-MCNC: 347 MG/DL (ref 70–99)
HCO3 ARTERIAL: 24.8 MMOL/L (ref 21–29)
HCO3 ARTERIAL: 26 MMOL/L (ref 21–29)
HCT VFR BLD CALC: 33.8 % (ref 40.5–52.5)
HEMOGLOBIN, ART, EXTENDED: 16.1 G/DL
HEMOGLOBIN: 10.9 G/DL (ref 13.5–17.5)
LYMPHOCYTES ABSOLUTE: 0.4 K/UL (ref 1–5.1)
LYMPHOCYTES RELATIVE PERCENT: 2.2 %
MAGNESIUM: 2.7 MG/DL (ref 1.8–2.4)
MCH RBC QN AUTO: 28.6 PG (ref 26–34)
MCHC RBC AUTO-ENTMCNC: 32.3 G/DL (ref 31–36)
MCV RBC AUTO: 88.4 FL (ref 80–100)
METHEMOGLOBIN ARTERIAL: 0.6 % (ref 0–1.4)
MONOCYTES ABSOLUTE: 0.5 K/UL (ref 0–1.3)
MONOCYTES RELATIVE PERCENT: 3.2 %
NEUTROPHILS ABSOLUTE: 15.5 K/UL (ref 1.7–7.7)
NEUTROPHILS RELATIVE PERCENT: 94.4 %
O2 SAT, ARTERIAL: 51 % (ref 93–100)
O2 SAT, ARTERIAL: 95 % (ref 93–100)
PCO2 ARTERIAL: 45 MMHG (ref 35–45)
PCO2 ARTERIAL: 50.7 MM HG (ref 35–45)
PDW BLD-RTO: 17.4 % (ref 12.4–15.4)
PERFORMED ON: ABNORMAL
PH ARTERIAL: 7.3 (ref 7.35–7.45)
PH ARTERIAL: 7.37 (ref 7.35–7.45)
PLATELET # BLD: 132 K/UL (ref 135–450)
PMV BLD AUTO: 10.4 FL (ref 5–10.5)
PO2 ARTERIAL: 30.4 MM HG (ref 75–108)
PO2 ARTERIAL: 78.8 MMHG (ref 75–108)
POC SAMPLE TYPE: ABNORMAL
POTASSIUM REFLEX MAGNESIUM: 4.9 MMOL/L (ref 3.5–5.1)
PRO-BNP: 3870 PG/ML (ref 0–124)
PROTEIN PROTEIN: 14 MG/DL
PROTEIN/CREAT RATIO: 0.4 MG/DL
RBC # BLD: 3.82 M/UL (ref 4.2–5.9)
REPORT: NORMAL
SARS-COV-2: NOT DETECTED
SODIUM BLD-SCNC: 136 MMOL/L (ref 136–145)
TCO2 ARTERIAL: 26 MMOL/L
TCO2 ARTERIAL: 27 MMOL/L
VANCOMYCIN RANDOM: 19.3 UG/ML
WBC # BLD: 16.4 K/UL (ref 4–11)

## 2021-10-07 PROCEDURE — 89220 SPUTUM SPECIMEN COLLECTION: CPT

## 2021-10-07 PROCEDURE — 80048 BASIC METABOLIC PNL TOTAL CA: CPT

## 2021-10-07 PROCEDURE — 76770 US EXAM ABDO BACK WALL COMP: CPT

## 2021-10-07 PROCEDURE — 94761 N-INVAS EAR/PLS OXIMETRY MLT: CPT

## 2021-10-07 PROCEDURE — 2580000003 HC RX 258: Performed by: INTERNAL MEDICINE

## 2021-10-07 PROCEDURE — 6370000000 HC RX 637 (ALT 250 FOR IP): Performed by: STUDENT IN AN ORGANIZED HEALTH CARE EDUCATION/TRAINING PROGRAM

## 2021-10-07 PROCEDURE — 80202 ASSAY OF VANCOMYCIN: CPT

## 2021-10-07 PROCEDURE — 74018 RADEX ABDOMEN 1 VIEW: CPT

## 2021-10-07 PROCEDURE — 82947 ASSAY GLUCOSE BLOOD QUANT: CPT

## 2021-10-07 PROCEDURE — 2500000003 HC RX 250 WO HCPCS: Performed by: STUDENT IN AN ORGANIZED HEALTH CARE EDUCATION/TRAINING PROGRAM

## 2021-10-07 PROCEDURE — 99233 SBSQ HOSP IP/OBS HIGH 50: CPT | Performed by: INTERNAL MEDICINE

## 2021-10-07 PROCEDURE — 6360000002 HC RX W HCPCS: Performed by: STUDENT IN AN ORGANIZED HEALTH CARE EDUCATION/TRAINING PROGRAM

## 2021-10-07 PROCEDURE — 6360000002 HC RX W HCPCS: Performed by: INTERNAL MEDICINE

## 2021-10-07 PROCEDURE — 76705 ECHO EXAM OF ABDOMEN: CPT

## 2021-10-07 PROCEDURE — 2000000000 HC ICU R&B

## 2021-10-07 PROCEDURE — 2500000003 HC RX 250 WO HCPCS: Performed by: INTERNAL MEDICINE

## 2021-10-07 PROCEDURE — 36415 COLL VENOUS BLD VENIPUNCTURE: CPT

## 2021-10-07 PROCEDURE — 99291 CRITICAL CARE FIRST HOUR: CPT | Performed by: INTERNAL MEDICINE

## 2021-10-07 PROCEDURE — 2580000003 HC RX 258: Performed by: STUDENT IN AN ORGANIZED HEALTH CARE EDUCATION/TRAINING PROGRAM

## 2021-10-07 PROCEDURE — 82803 BLOOD GASES ANY COMBINATION: CPT

## 2021-10-07 PROCEDURE — 85025 COMPLETE CBC W/AUTO DIFF WBC: CPT

## 2021-10-07 PROCEDURE — 2700000000 HC OXYGEN THERAPY PER DAY

## 2021-10-07 PROCEDURE — 83735 ASSAY OF MAGNESIUM: CPT

## 2021-10-07 PROCEDURE — 83880 ASSAY OF NATRIURETIC PEPTIDE: CPT

## 2021-10-07 PROCEDURE — 87070 CULTURE OTHR SPECIMN AEROBIC: CPT

## 2021-10-07 PROCEDURE — 87205 SMEAR GRAM STAIN: CPT

## 2021-10-07 PROCEDURE — 94003 VENT MGMT INPAT SUBQ DAY: CPT

## 2021-10-07 RX ORDER — METHYLPREDNISOLONE SODIUM SUCCINATE 40 MG/ML
20 INJECTION, POWDER, LYOPHILIZED, FOR SOLUTION INTRAMUSCULAR; INTRAVENOUS DAILY
Status: DISCONTINUED | OUTPATIENT
Start: 2021-10-08 | End: 2021-10-08

## 2021-10-07 RX ORDER — SODIUM CHLORIDE, SODIUM GLUCONATE, SODIUM ACETATE, POTASSIUM CHLORIDE AND MAGNESIUM CHLORIDE 526; 502; 368; 37; 30 MG/100ML; MG/100ML; MG/100ML; MG/100ML; MG/100ML
INJECTION, SOLUTION INTRAVENOUS CONTINUOUS
Status: DISCONTINUED | OUTPATIENT
Start: 2021-10-07 | End: 2021-10-08

## 2021-10-07 RX ORDER — HEPARIN SODIUM 5000 [USP'U]/ML
5000 INJECTION, SOLUTION INTRAVENOUS; SUBCUTANEOUS EVERY 8 HOURS SCHEDULED
Status: DISCONTINUED | OUTPATIENT
Start: 2021-10-07 | End: 2021-10-10 | Stop reason: HOSPADM

## 2021-10-07 RX ADMIN — INSULIN LISPRO 6 UNITS: 100 INJECTION, SOLUTION INTRAVENOUS; SUBCUTANEOUS at 22:20

## 2021-10-07 RX ADMIN — INSULIN HUMAN 8 UNITS: 100 INJECTION, SOLUTION PARENTERAL at 11:37

## 2021-10-07 RX ADMIN — CEFEPIME HYDROCHLORIDE 1000 MG: 1 INJECTION, POWDER, FOR SOLUTION INTRAMUSCULAR; INTRAVENOUS at 07:18

## 2021-10-07 RX ADMIN — SODIUM BICARBONATE: 84 INJECTION, SOLUTION INTRAVENOUS at 10:02

## 2021-10-07 RX ADMIN — SODIUM CHLORIDE, SODIUM GLUCONATE, SODIUM ACETATE, POTASSIUM CHLORIDE AND MAGNESIUM CHLORIDE: 526; 502; 368; 37; 30 INJECTION, SOLUTION INTRAVENOUS at 13:31

## 2021-10-07 RX ADMIN — CEFEPIME HYDROCHLORIDE 1000 MG: 1 INJECTION, POWDER, FOR SOLUTION INTRAMUSCULAR; INTRAVENOUS at 18:45

## 2021-10-07 RX ADMIN — INSULIN LISPRO 6 UNITS: 100 INJECTION, SOLUTION INTRAVENOUS; SUBCUTANEOUS at 18:48

## 2021-10-07 RX ADMIN — PROPOFOL 15 MCG/KG/MIN: 10 INJECTION, EMULSION INTRAVENOUS at 06:29

## 2021-10-07 RX ADMIN — INSULIN GLARGINE 20 UNITS: 100 INJECTION, SOLUTION SUBCUTANEOUS at 20:48

## 2021-10-07 RX ADMIN — INSULIN HUMAN 8 UNITS: 100 INJECTION, SOLUTION PARENTERAL at 20:48

## 2021-10-07 RX ADMIN — INSULIN LISPRO 12 UNITS: 100 INJECTION, SOLUTION INTRAVENOUS; SUBCUTANEOUS at 05:44

## 2021-10-07 RX ADMIN — INSULIN LISPRO 9 UNITS: 100 INJECTION, SOLUTION INTRAVENOUS; SUBCUTANEOUS at 10:04

## 2021-10-07 RX ADMIN — INSULIN LISPRO 12 UNITS: 100 INJECTION, SOLUTION INTRAVENOUS; SUBCUTANEOUS at 00:03

## 2021-10-07 RX ADMIN — VANCOMYCIN HYDROCHLORIDE 1250 MG: 10 INJECTION, POWDER, LYOPHILIZED, FOR SOLUTION INTRAVENOUS at 10:16

## 2021-10-07 RX ADMIN — Medication 10 ML: at 09:44

## 2021-10-07 RX ADMIN — Medication 50 MCG/HR: at 09:28

## 2021-10-07 RX ADMIN — SODIUM BICARBONATE: 84 INJECTION, SOLUTION INTRAVENOUS at 00:01

## 2021-10-07 RX ADMIN — INSULIN HUMAN 8 UNITS: 100 INJECTION, SOLUTION PARENTERAL at 16:40

## 2021-10-07 RX ADMIN — HEPARIN SODIUM 5000 UNITS: 5000 INJECTION INTRAVENOUS; SUBCUTANEOUS at 22:18

## 2021-10-07 RX ADMIN — HEPARIN SODIUM 5000 UNITS: 5000 INJECTION INTRAVENOUS; SUBCUTANEOUS at 15:46

## 2021-10-07 RX ADMIN — FAMOTIDINE 10 MG: 10 INJECTION, SOLUTION INTRAVENOUS at 08:51

## 2021-10-07 RX ADMIN — METHYLPREDNISOLONE SODIUM SUCCINATE 40 MG: 40 INJECTION, POWDER, FOR SOLUTION INTRAMUSCULAR; INTRAVENOUS at 05:44

## 2021-10-07 ASSESSMENT — PULMONARY FUNCTION TESTS
PIF_VALUE: 33
PIF_VALUE: 33
PIF_VALUE: 27
PIF_VALUE: 28
PIF_VALUE: 27
PIF_VALUE: 29
PIF_VALUE: 25
PIF_VALUE: 27
PIF_VALUE: 30
PIF_VALUE: 28
PIF_VALUE: 27
PIF_VALUE: 38
PIF_VALUE: 26
PIF_VALUE: 27
PIF_VALUE: 35
PIF_VALUE: 26
PIF_VALUE: 33
PIF_VALUE: 14
PIF_VALUE: 30
PIF_VALUE: 33
PIF_VALUE: 23
PIF_VALUE: 31
PIF_VALUE: 27
PIF_VALUE: 35
PIF_VALUE: 32
PIF_VALUE: 29
PIF_VALUE: 13
PIF_VALUE: 26
PIF_VALUE: 27
PIF_VALUE: 37
PIF_VALUE: 29
PIF_VALUE: 30
PIF_VALUE: 25
PIF_VALUE: 13
PIF_VALUE: 27

## 2021-10-07 ASSESSMENT — ENCOUNTER SYMPTOMS
ABDOMINAL PAIN: 1
BACK PAIN: 0
ABDOMINAL DISTENTION: 1

## 2021-10-07 ASSESSMENT — PAIN SCALES - GENERAL
PAINLEVEL_OUTOF10: 0

## 2021-10-07 NOTE — PROGRESS NOTES
Comprehensive Nutrition Assessment    RECOMMENDATIONS:  Per ASPEN guidelines, suggest start of alternative nutrition within 24-48 hours intubation, as appropriate. 1. PO Diet: Continue NPO  If does not extubated, for EN recommend Recommend EN formula Peptide-Based  Vital AF 1.2 @ goal rate 40 ml/hr to provide 960 ml total volume, 1152kcal, 72 g protein and 779 ml free water. Initiate EN @20mL/hr and as tolerated, increase by 20 mL/hr q 4 hours until goal of 40 mL/hr is met. Maintenance water flush of 30 ml every 4 hours. NUTRITION ASSESSMENT:   Type and Reason for Visit:  Type and Reason for Visit: Initial   Nutritional summary & status: Nutrition eval triggered as pt is NPO/intubated, thus nutritionally compromised. Noted small amount of propofol; no pressors. Per MD, SBT and if unable to extubated, EN will start. RD will monitor ability to start EN. Patient admitted d/t acute hypoxic respiratory failure. Presented to [facility] ED via EMS with AMS and was transferred to [facility]. Pt previously admitted to [facility] on 9/30 for an acute on chronic hypercapnic hypoxic respiratory failure.      PMH significant for PMHx of HFpEF, COPD on 2L of oxygen at home, DM, HLD, HTN who     MALNUTRITION ASSESSMENT  Context of Malnutrition: Chronic Illness   Malnutrition Status: Insufficient data    NUTRITION DIAGNOSIS   · Inadequate oral intake related to impaired respiratory function as evidenced by NPO or clear liquid status due to medical condition, intubation      [address] and/or Nutrient Delivery:  Continue NPO  Nutrition Education/Counseling:  No recommendation at this time   Goals:  pt will tolerate start of most appropriate form of nutrition        Nutrition Monitoring and Evaluation:   Food/Nutrient Intake Outcomes:  Food and Nutrient Intake  Physical Signs/Symptoms Outcomes:  Biochemical Data, Weight     OBJECTIVE DATA: Significant to nutrition assessment  · Nutrition-Focused Physical Findings: Nutrition Related Findings: carlos posadas;   · Labs: Reviewed; BS- 330, A1c 8.9 (9/21/21)  · Meds: Reviewed; levo, fentanyl/propofol,   · Wounds: Wound Type: None     CURRENT NUTRITION THERAPIES  Diet NPO     Additional Calorie Sources:   propofol = 7.1 ml provides 187 kcal  PO Intake: Average Meal Intake: NPO   PO Supplement Intake:Average Supplements Intake: NPO  IVF: sodium bicarb @ 100 ml/hr     ANTHROPOMETRICS  Current Height: 4' 11.02\" (149.9 cm)  Current Weight: 175 lb 0.7 oz (79.4 kg)    Admission weight: 175 lb 0.7 oz (79.4 kg)  Ideal Body Weight (lbs) (Calculated): 100 lbs (Ideal Body Weight (Kg) (Calculated): 45 kg)  Usual Bodyweight ~190 lb per EMR  Weight Changes evelyn;        BMI BMI (Calculated): 35.4    Wt Readings from Last 50 Encounters:   10/06/21 175 lb 0.7 oz (79.4 kg)   10/06/21 175 lb (79.4 kg)   10/01/21 175 lb 1.6 oz (79.4 kg)   09/16/21 190 lb (86.2 kg)   08/27/21 191 lb 4 oz (86.8 kg)   07/13/21 190 lb 8 oz (86.4 kg)   06/09/21 184 lb 12.8 oz (83.8 kg)   05/24/21 184 lb 6 oz (83.6 kg)   02/11/21 180 lb 14.4 oz (82.1 kg)   08/10/20 188 lb 6 oz (85.4 kg)   03/30/20 173 lb 2 oz (78.5 kg)       COMPARATIVE STANDARDS  Energy (kcal):  ~3958-5938 (15-18); Weight Used for Energy Requirements:  Current (79.4 kg)     Protein (g):  68-81g (1.5-1.8); Weight Used for Protein Requirements:  Ideal (45 kg )        Fluid (ml/day):  1500 ml min ; Method Used for Fluid Requirements:  Other (Comment)      The patient will still be monitored per nutrition standards of care. Consult dietitian if nutrition interventions essential to patient care is needed.      Kateryna Chávez, [address]:  [phone]  Office:  [phone]

## 2021-10-07 NOTE — PROGRESS NOTES
Clinical Pharmacy Progress Note    - Vancomycin has been discontinued - Pharmacy will sign off on dosing  - If vancomycin is resumed, please re-consult Pharmacy    Please call with any questions.     Silvana Mcpherson PharmD, [facility]  Wireless: [phone]  10/7/2021 10:38 AM

## 2021-10-07 NOTE — PLAN OF CARE
Problem: Non-Violent Restraints  Goal: Removal from restraints as soon as assessed to be safe  10/7/2021 1933 by Victoria Lucia RN  Outcome: Ongoing     Problem: Falls - Risk of:  Goal: Will remain free from falls  Description: Will remain free from falls  10/7/2021 1933 by Victoria Lucia RN  Outcome: Ongoing     Problem: Skin Integrity:  Goal: Will show no infection signs and symptoms  Description: Will show no infection signs and symptoms  Outcome: Ongoing

## 2021-10-07 NOTE — PROGRESS NOTES
Spoke with pt's wife to obtain collateral on the events leading up to the current hospitalization of the pt. She states that since the pt has returned home from his prior hospitalization o 9/30 he has not been feeling well, has not been able to walk on his own. She notes that he has had decreased PO intake since his return, and two days ago fell and landed on his knees. Pt states that the pt went to bed yesterday (9/05) around 3 PM, and this morning she had difficulty awakening him, when the pt did arouse, he called his wife by the wrong name. The pt's spouse notes that the pt went to his pain management appointment yesterday, and had LE pain that was bothering him. The pt's pain is managed with Gabapentin and Vicodin, and per wife the pt does not take the Vicodin that often. The pt's wife also states that she manages the administration of his medications and that the pt was still completing the steroid taper from his last hospitalization.

## 2021-10-07 NOTE — CARE COORDINATION
SW Following, Pt from home w/SO, Pt has home O2 through [employer] [phone], Pt is typically on 2-3L O2 @ baseline, Pt is vented, SW will continue to follow for DC needs/recs.     Electronically signed by PRISCILLA Brown LSW on 10/7/2021 at 10:56 AM   [phone]

## 2021-10-07 NOTE — FLOWSHEET NOTE
D: Results for Mel Sleeper (MRN [id number]) as of 10/7/2021 11:17   Ref. Range 10/7/2021 09:25   pH, Arterial Latest Ref Range: 7.350 - 7.450  7.298 (L)   pCO2, Arterial Latest Ref Range: 35.0 - 45.0 mm Hg 50.7 (H)   pO2, Arterial Latest Ref Range: 75.0 - 108.0 mm Hg 30.4 (LL)   HCO3, Arterial Latest Ref Range: 21.0 - 29.0 mmol/L 24.8   TCO2 (calc), Art Latest Ref Range: Not Established mmol/L 26   Base Excess, Arterial Latest Ref Range: -3 - 3  -2   O2 Sat, Arterial Latest Ref Range: 93 - 100 % 51 (L)   Sample Type Unknown ART   A: venous, or mixed sample   R: Will monitor.

## 2021-10-07 NOTE — PROGRESS NOTES
MECHANICAL VENTILATION WEANING PROTOCOL    PRE-TRIAL PATIENT ASSESSMENT - COMPLETED AT 1130    Ventilatory Assessment:    PARAMETER CRITERIA FOR WEANING   Spontaneous Cough:  Yes    Sputum Characteristics:  · Sputum Amount: Small  · Tenacity: Thick  · Sputum Color: White, Tan SPONTANEOUS COUGH With small to moderate  Amount of secretions   FiO2 : 50 % FIO2 less than or equal to 50%     PEEP less than or equal to 8   Progressive Mobility Protocol  Yes     ABG:  Lab Results   Component Value Date    PHART 7.298 10/07/2021    SGN2RWT 50.7 10/07/2021    PO2ART 30.4 10/07/2021    J8WVNWYM 51 10/07/2021    HPT4XQW 24.8 10/07/2021    BEART -2 10/07/2021     HGB/WBC:  Lab Results   Component Value Date    HGB 10.9 10/07/2021    WBC 16.4 10/07/2021        Vital Signs:    PARAMETER CRITERIA FOR WEANING Meets Criteria   Pulse: 74 Within patient's normal limits / stable Yes   Resp: 22 Less than or equal to 30 Yes   BP: 98/64 Within patient's normal limits / minimal pressors (Hemodynamically Stable) Yes   SpO2: 100 % Greater than or equal to 90% Yes   End Tidal CO2: 30 (%) Within patient's normal limits Yes   Temp: 98.1 °F (36.7 °C) Less than 38. 5oC / 101. 3oF Yes     [x]    Based on this assessment and the [facility] Ventilator Weaning Protocol, this patient  IS being placed on a Spontaneous Breathing Trial (SBT) at this time.  []    Based on this assessment and the [facility] Ventilator Weaning Protocol, this patient  IS NOT being placed on a Spontaneous Breathing Trial (SBT) at this time. []    Patient  IS NOT being placed on a Spontaneous Breathing Trial (SBT) at this time because of factors not previously addressed.   Those factors include      Vital Capacity (VC) =     Maximum Inspiratory Force (MIF) = 22    SBT - Initiated at  1150    Ventilator Settings:  CPAP - 5 cmH2O, PS - 8 cmH2O(if using settings other than CPAP 5/PS 8, please explain reasons for settings here):       1 Minute SBT Respiratory Parameters:   VE: 7.2 L   RR: 16 b/m   VT: 550 mL (average VT = VE/RR)   RSBI: 29 (RR/VT in liters)   ETCO2: 34 cmH2O   SPO2: 98 %   If on sedation, amount and type Fentanyl 10    [x]   RR is less than 35, RSBI is less than 100, patient's vitals signs are stable, and patient is in no apparent distress; therefore, patient is being left on SBT for up to 1 hour. []   RR is greater than 35, RSBI is greater than 100, VS's are unstable, or patient is in distress; therefore, patient is being placed back on previous settings. SBT  Concluded at  [phone]. Weaning Parameters/VS's at conclusion of SBT:   VE: 5.8 L   RR:  b/m   VT: *** mL (average VT = VE/RR)   RSBI: *** (RR/VT in liters)   ETCO2: *** cmH2O   SPO2: *** %    If on sedation, amount and type ***    []   Patient tolerated SBT for full 60 minutes with acceptable weaning parameters and vital signs and showed no signs or distress. []   Patient tolerated SBT for full 60 minutes, but had unacceptable weaning parameters or vital signs, and/or signs of distress. [x]   Patient was unable to tolerate SBT for 60 minutes and was placed back on previous settings.             COMMENTS:

## 2021-10-07 NOTE — PROGRESS NOTES
D: Results for Saran Nioxn (MRN [id number]) as of 10/7/2021 16:29   Ref. Range 10/7/2021 14:00   Hemoglobin, Art, Extended Latest Units: g/dL 16.10   pH, Arterial Latest Ref Range: 7.350 - 7.450  7.366   pCO2, Arterial Latest Ref Range: 35.0 - 45.0 mmHg 45.0   pO2, Arterial Latest Ref Range: 75.0 - 108.0 mmHg 78.8   HCO3, Arterial Latest Ref Range: 21 - 29 mmol/L 26   TCO2 (calc), Art Latest Units: mmol/L 27   Base Excess, Arterial Latest Ref Range: -3.0 - 3.0 mmol/L -0.1   O2 Sat, Arterial Latest Ref Range: 93 - 100 % 95   Methemoglobin, Arterial Latest Ref Range: 0.0 - 1.4 % 0.6   Carboxyhgb, Arterial Latest Ref Range: 0.0 - 1.5 % 1.7 (H)   A: on ventilator support.    R: stable

## 2021-10-07 NOTE — PROGRESS NOTES
dextrose      Intake/Output Summary (Last 24 hours) at 10/7/2021 0829  Last data filed at 10/7/2021 0600  Gross per 24 hour   Intake 1261.35 ml   Output 1425 ml   Net -163.65 ml       Physical Exam Performed:    BP (!) 83/55   Pulse 66   Temp 95.4 °F (35.2 °C) (Bladder)   Resp 20   Ht 4' 11.02\" (1.499 m)   Wt 175 lb 0.7 oz (79.4 kg)   SpO2 93%   BMI 35.34 kg/m²     General appearance: intubated  HEENT: Pupils equal, round, and reactive to light. Conjunctivae/corneas clear. Neck: Supple, with full range of motion. No jugular venous distention. Trachea midline. Respiratory:  Normal respiratory effort. On vent, bilateral air entry  Cardiovascular: Regular rate and rhythm with normal S1/S2 without murmurs, rubs or gallops. Abdomen: Soft, non-tender, non-distended with normal bowel sounds. Musculoskeletal: No edema bilaterally. Full range of motion without deformity. Skin: Skin color, texture, turgor normal.  No rashes or lesions.   Neurologic:    Limited exam as intubated  Moving all extremities, able to give thumbs up, lift neck up  Psychiatric: intubated  Capillary Refill: Brisk,< 3 seconds   Peripheral Pulses: +2 palpable, equal bilaterally       Labs:   Recent Labs     10/06/21  0920 10/06/21  1815 10/07/21  0533   WBC 20.0* 20.2* 16.4*   HGB 13.1* 11.4* 10.9*   HCT 39.8* 35.4* 33.8*    141 132*     Recent Labs     10/06/21  2000 10/06/21  2114 10/07/21  0533   * 136 136   K 6.4* 4.1 4.9   CL 99 109 102   CO2 15* 12* 21   BUN 99* 84* 94*   CREATININE 3.3* 2.6* 3.0*   CALCIUM 8.6 6.5* 8.7   PHOS 6.3* 4.6  --      Recent Labs     10/06/21  0920   AST 44*   ALT 42*   BILITOT <0.2   ALKPHOS 138*     Recent Labs     10/06/21  0920   INR 1.17*     Recent Labs     10/06/21  0920 10/06/21  1815   TROPONINI <0.01 <0.01       Urinalysis:      Lab Results   Component Value Date    NITRU Negative 10/06/2021    WBCUA 10-20 10/06/2021    BACTERIA 2+ 10/06/2021    RBCUA 3-4 10/06/2021    BLOODU SMALL 10/06/2021    SPECGRAV 1.020 10/06/2021    GLUCOSEU Negative 10/06/2021       Radiology:  CT HEAD WO CONTRAST   Final Result      No evidence of acute intracranial abnormality. XR CHEST PORTABLE   Final Result      ET tube in place above the leandro. Right IJ central venous catheter placed, its tip in the distal SVC. No evidence of pneumothorax. Diffuse ill-defined airspace density throughout both lungs, right greater than left. Findings appear to have progressed slightly since the earlier study         US RENAL COMPLETE    (Results Pending)           Assessment/Plan:    Active Hospital Problems    Diagnosis Date Noted    Septic shock due to Escherichia coli ([facility] [address] [A41.51, R65.21] 10/07/2021     Priority: High    Bacteremia due to Escherichia coli [R78.81, B96.20] 10/07/2021     Priority: High    Sepsis with acute respiratory failure and septic shock (HCC) [A41.9, R65.21, J96.00] 10/06/2021     Acute metabolic encephalopathy due to bacteremia POA  - improving, on vent following commands    Acute on chronic respiratory failure with hypoxemia possibly due to underlying septic shock from bacteremia, and flash pulmonary edema. Baseline O2 is 2-3 L NC  - vent support, discussed with Dr. Tiffanie Subramanian will plan for SBT today and possible extubation    Septic shock due to [facility] bacteremia, UA concerning for infectious source but no that significantly remarkable to have lead to bacteremia  - Continue Cefepime, DC Vancomycin  - s/p IVF on admission, weaned off pressors    Acute kidney injury - Cr 4.1 on admission, 1.2 recent dc  Monitor UOP  Likely due to hemodynamic changes from Septic shock  Avoid NSAIDs (Ibuprofen, Aleve, Motrin, Naproxen, Toradol etc), Fleet enemas, IV contrast Dye and other nephrotoxins!   Nephrology following    COPD wo acute exacerbation  - continue breath rx as needed    Type 2 DM: SSI, Hypoglycemia protocol  POC glucose checks      DVT Prophylaxis: Heparin sq  Diet: Diet NPO  ADULT TUBE FEEDING; Nasogastric; Peptide Based; Continuous; 20; Yes; 20; Q 4 hours; 40; 30; Q 4 hours  Code Status: Full Code    PT/OT Eval Status: order once extubated    Dispo - Continue inpatient care    Bev Ornelas MD  Hopsitalist

## 2021-10-07 NOTE — PLAN OF CARE
Problem: Non-Violent Restraints  Goal: Removal from restraints as soon as assessed to be safe  Outcome: Ongoing     Problem: Falls - Risk of:  Goal: Will remain free from falls  Description: Will remain free from falls  Outcome: Ongoing     Problem: Pain:  Goal: Patient's pain/discomfort is manageable  Description: Patient's pain/discomfort is manageable  Outcome: Ongoing

## 2021-10-07 NOTE — PROGRESS NOTES
18 94 %    10/07/21 0700 (!) 78/49   66 19 94 %    10/07/21 0600 89/60   66 19 95 %    10/07/21 0500 (!) 94/57   66 19 94 %    10/07/21 0404    69 21 94 %    10/07/21 0400 (!) 89/51   67 19 93 %    10/07/21 0300 (!) 82/54   70 20 93 %    10/07/21 0200 92/64   76 18 94 %        Intake/Output Summary (Last 24 hours) at 10/7/2021 0929  Last data filed at 10/7/2021 0600  Gross per 24 hour   Intake 1261.35 ml   Output 1425 ml   Net -163.65 ml       Review of Systems   Cardiovascular: Negative for chest pain. Gastrointestinal: Positive for abdominal distention and abdominal pain. Musculoskeletal: Negative for arthralgias, back pain and myalgias. Physical Exam  Constitutional:       General: He is not in acute distress. Appearance: He is obese. He is ill-appearing. He is not toxic-appearing or diaphoretic. HENT:      Head: Normocephalic and atraumatic. Eyes:      Extraocular Movements: Extraocular movements intact. Comments: By observation   Cardiovascular:      Rate and Rhythm: Normal rate and regular rhythm. Heart sounds: No murmur heard. No friction rub. No gallop. Pulmonary:      Breath sounds: Rhonchi present. No wheezing or rales. Abdominal:      General: Bowel sounds are normal. There is distension. Tenderness: There is no abdominal tenderness. There is no guarding or rebound. Musculoskeletal:      Right lower leg: No edema. Left lower leg: No edema. Skin:     Findings: Bruising present. Comments: Bruising on L UE and bilateral lower abdomen. Neurological:      Mental Status: He is alert. Comments: Orientated to person, place but not time.            LABS:    CBC:   Recent Labs     10/06/21  0920 10/06/21  1815 10/07/21  0533   WBC 20.0* 20.2* 16.4*   HGB 13.1* 11.4* 10.9*   HCT 39.8* 35.4* 33.8*    141 132*   MCV 88.8 89.6 88.4     Renal:    Recent Labs     10/06/21  0920 10/06/21  0920 10/06/21  1815 10/06/21  1815 10/06/21  2000 10/06/21  2114 10/07/21  0533   *   < > 137   < > 131* 136 136   K 5.7*   < > 3.7  --  6.4* 4.1 4.9   CL 99   < > 102   < > 99 109 102   CO2 19*   < > 23   < > 15* 12* 21   *   < > 7   < > 99* 84* 94*   CREATININE 4.1*   < > <0.5*   < > 3.3* 2.6* 3.0*   GLUCOSE 181*   < > 106*   < > 330* 277* 330*   CALCIUM 10.1   < > 8.0*   < > 8.6 6.5* 8.7   MG 3.00*  --  2.00  --   --   --  2.70*   PHOS  --   --   --   --  6.3* 4.6  --    ANIONGAP 14   < > 12   < > 17* 15 13    < > = values in this interval not displayed. Hepatic:   Recent Labs     10/06/21  0920 10/06/21  2000 10/06/21  2114   AST 44*  --   --    ALT 42*  --   --    BILITOT <0.2  --   --    PROT 6.5  --   --    LABALBU 2.8* 2.2* 1.7*   ALKPHOS 138*  --   --      Troponin:   Recent Labs     10/06/21  0920 10/06/21  1815   TROPONINI <0.01 <0.01     BNP: No results for input(s): BNP in the last 72 hours. Lipids: No results for input(s): CHOL, HDL in the last 72 hours. Invalid input(s): LDLCALCU, TRIGLYCERIDE  ABGs:    Recent Labs     10/06/21  0920 10/06/21  1815 10/06/21  2241   PHART 7.259* 7.194* 7.311*   HUE7CEA 44.3 41.9 33.6*   PO2ART 89.1 113.0* 88.6   FGP2AAN 19.4* 16* 17.0*   BEART -7.5* -11.3* -9*   Q3XAVZGU 95.5 98 96   QHC0FTF 20.7 17 18       INR:   Recent Labs     10/06/21  0920   INR 1.17*     Lactate: No results for input(s): LACTATE in the last 72 hours. Cultures:  -----------------------------------------------------------------  RAD:   CT HEAD WO CONTRAST   Final Result      No evidence of acute intracranial abnormality. XR CHEST PORTABLE   Final Result      ET tube in place above the leandro. Right IJ central venous catheter placed, its tip in the distal SVC. No evidence of pneumothorax. Diffuse ill-defined airspace density throughout both lungs, right greater than left.  Findings appear to have progressed slightly since the earlier study         US RENAL COMPLETE    (Results Pending) Assessment/Plan:     Acute hypoxic respiratory failure  On admission pt unresponsive with spO2 71%. Pro-BNP 8,243, CXR (10/06) perihilar and lower zone airspace opacities have increased, particularly on the right. ABG 7.240, pCO2 45.6, pO2 44.3 and HCO3 19.4. Suspect cause of hypoxia to be sepsis vs possible volume overload     -Mechanical ventilation: Mode: PRVC Rate Set: 18 bmp/Vt Ordered: 500 mL/ /FiO2 : 60 %, PEEP 5, Min Vol 11.9  -Sedation: propofol 15  -ABG daily     Septic Shock, suspected  On admission pt had fever of 103F, WBC 20, D-Dimer 1628, Lactic Acid 1.1. CXR (10/06) displayed perihilar and lower zone airspace opacities have increased, particularly on the right. Rapid covid was negative (10/06), though pt is unvaccinated.     -Thermal blanket  -Vanc  -Cefepime  -Levophed  -Blood cultures x2 (10/06)  -Urine culture (10/06)  -MRSA nasal swab  -Respiratory culture  -CBC daily     LILLIAN  Hyperkalemia  On admission Cr 4.1, last Cr 1.2 on 10/01. K 5.7 admission. - mL/hr  -BMP daily  -Mg daily  -Nephrology consulted     HFpEF  Chronic condition. Last EF 55% to 60% on 9/2021. Last admission on 9/30 pt was on a lasix drip. Pro-BNP 8,243 on admission. Follow up troponin < 0.01 (10/06). -Pro-BNP every third day, starting today    COPD  Chronic condition. Pt on multiple inhalers at home. Pt on 2L oxygen, Incruse, Breo and albuterol at home. Received high dose steroids during last admission on 9/30.    -Solumedrol 40 mg q12H    DMT2  Chronic condition. Pt on metformin, glipizide, and insulin at home. Last admission (9/30) pt on HDSSI. Blood sugar elevated last evening on 10/07.    -Consider adding in nightly Lantus  -HDSSI  -Hypoglycemia protocol  -POCT glucose q4H    Code Status: Full code  FEN: Diet NPO  PPX: Heparin gtt  DISPO: OBINNA Butt DO, PGY-1  10/07/21  9:29 AM    This patient has been staffed and discussed with Dr. Eyal De Leon.     Patient was seen, examined and discussed with Dr. Ruth Ann Wilde. I agree with the interval history. My physical exam confirms the findings listed below  Chart was reviewed including labs and medical records confirm the findings noted    Acute respiratory failure on mechanical vent support. , RR 18/27, FiO2 50, PEEP 5  ABG 7.29/50/30 (venous gas)  Septic shock: fever - trended down   E.coli bacteremia   Leukocytosis is trending down to 16.4  Suspected pulmonary edema. Pro BNP is 8424. Baseline 124, however this is in the setting of LILLIAN. there is no significant peripheral edema on exam.    COPD - active smoking - on O2 at 2 liters at baseline   LILLIAN:  Creatinine continue to trend down   Hyperkalemia - resolved        SBT today   D/c vanc  Continue cefepime   Decrease solu medrol to 20  D/c heparin drip   Start TF     Pt has a high probability of imminent or life-threatening deterioration requiring close monitoring, and highly complex decision-making and/or interventions of high intensity to assess, manipulate, and support his critical organ systems to prevent a likely inevitable decline which could occur if left untreated.      A total critical care time 35 minutes was used. This includes but not limited to examining patient, collaborating with other physicians, monitoring vital signs, telemetry, continuous pulse oximetry, and clinical response to IV medications, documentation time, review and interpretation of laboratory and radiological data, review of nursing notes and old record review. This time excludes any time that may have been spent performing procedures for life threatening organ failure.

## 2021-10-07 NOTE — PLAN OF CARE
Problem: Nutrition  Goal: Optimal nutrition therapy  Outcome: Ongoing  Note: Nutrition Problem #1: Inadequate oral intake  Intervention: Food and/or Nutrient Delivery: Continue NPO  Nutritional Goals: pt will tolerate start of most appropriate form of nutrition

## 2021-10-07 NOTE — PROGRESS NOTES
Heparin drip stopped per verbal order with resident at bedside. VSS. Core temp low. Jose hugger applied. No abnormal neuro findings. Squeezes hands to command. Shakes head no when asked if in pain. Will cont to monitor.

## 2021-10-07 NOTE — CONSULTS
Nephrology Consult Note                                                                                                                                                                                                                                                                                                                                                               Office : [phone]     Fax :[phone]              Patient's Name: Janet Covarrubias  10/6/2021    Reason for Consult: LILLIAN   Requesting Physician:  LINK Maxwell CNP      Chief Complaint:  Unresponsive     History of Present Ilness:    Pt is a 49 y/o male with a PMHx of HFpEF, COPD on 2L of oxygen at home, DM, HLD, HTN who presented to [place] [address] ED via EMS with AMS and was transferred to [facility]. Pt previously admitted to [facility] on 9/30 for an acute on chronic hypercapnic hypoxic respiratory failure. During that admission he was on 40L vapotherm, tx for COPD and CHF, though etiology of pt's hypoxic hypercapnic respiratory failure remained unclear throughout hospital stay. He was on a lasix drip for five days, received steroids for his COPD and was weened down to 3L of oxygen and discharged.      When pt was in [place] [address] ED, he was noted to have a temperature of 103F, WBC 20, , D-Dimer 1600, Cr 4.1 increased from 1.1, and his BNP was greater than 8,000.  Pt was intubated, started on vasopressors      Past Medical History:   Diagnosis Date    Acute respiratory failure ([address] 07/11/2021    CHF (congestive heart failure) (HCC)     combined    COPD (chronic obstructive pulmonary disease) (HCC)     Diabetes mellitus (HCC)     Hyperlipidemia     Hypertension     Oxygen dependent        Past Surgical History:   Procedure Laterality Date    HERNIA REPAIR      TONSILLECTOMY         Family History   Problem Relation Age of Onset    Asthma Mother     Cancer Mother     Hearing Loss Mother     Vision Loss Mother     High Blood Pressure Father     High Cholesterol Father     Vision Loss Father     Asthma Sister     Diabetes Sister     Vision Loss Sister     Asthma Brother     Arthritis Brother     High Blood Pressure Brother         reports that he has been smoking. He has been smoking about 1.00 pack per day. He has never used smokeless tobacco. He reports current alcohol use. He reports that he does not use drugs. Allergies:  Patient has no known allergies.     Current Medications:    norepinephrine (LEVOPHED) 16,000 mcg in dextrose 5 % 250 mL infusion, Continuous  sodium chloride flush 0.9 % injection 5-40 mL, 2 times per day  sodium chloride flush 0.9 % injection 5-40 mL, PRN  0.9 % sodium chloride infusion, PRN  acetaminophen (TYLENOL) tablet 650 mg, Q6H PRN   Or  acetaminophen (TYLENOL) suppository 650 mg, Q6H PRN  famotidine (PEPCID) injection 10 mg, Daily  vancomycin (VANCOCIN) intermittent dosing (placeholder), RX Placeholder  glucose (GLUTOSE) 40 % oral gel 15 g, PRN  dextrose 50 % IV solution, PRN  glucagon (rDNA) injection 1 mg, PRN  dextrose 5 % solution, PRN  cefepime (MAXIPIME) 1000 mg IVPB minibag, Q12H  methylPREDNISolone sodium (SOLU-MEDROL) injection 40 mg, Q12H  propofol injection, Titrated  fentaNYL (SUBLIMAZE) 1,000 mcg in sodium chloride 0.9% 100 mL infusion, Continuous  insulin lispro (1 Unit Dial) 0-18 Units, Q6H  sodium bicarbonate 150 mEq in dextrose 5 % 1,000 mL infusion, Continuous        Review of Systems:   Intubated       Physical exam:     Vitals:  BP 97/67   Pulse 59   Temp 96.4 °F (35.8 °C) (Bladder)   Resp 23   Ht 4' 11.02\" (1.499 m)   Wt 175 lb 0.7 oz (79.4 kg)   SpO2 98%   BMI 35.34 kg/m²   Constitutional:  Intubated   Skin: no rash, turgor wnl  Heent:  eomi, mmm  Neck: no bruits or jvd noted  Cardiovascular:  S1, S2 without m/r/g  Respiratory: CTA B without w/r/r  Abdomen:  +bs, soft, nt, nd  Ext: + lower extremity edema  Musculoskeletal:  Rom, muscular strength dec     Data:   Labs:  CBC:   Recent Labs 10/06/21  0920 10/06/21  1815   WBC 20.0* 20.2*   HGB 13.1* 11.4*    141     BMP:    Recent Labs     10/06/21  1815 10/06/21  2000 10/06/21  2114    131* 136   K 3.7 6.4* 4.1    99 109   CO2 23 15* 12*   BUN 7 99* 84*   CREATININE <0.5* 3.3* 2.6*   GLUCOSE 106* 330* 277*     Ca/Mg/Phos:   Recent Labs     10/06/21  0920 10/06/21  0920 10/06/21  1815 10/06/21  2000 10/06/21  2114   CALCIUM 10.1   < > 8.0* 8.6 6.5*   MG 3.00*  --  2.00  --   --    PHOS  --   --   --  6.3* 4.6    < > = values in this interval not displayed. Hepatic:   Recent Labs     10/06/21  0920   AST 44*   ALT 42*   BILITOT <0.2   ALKPHOS 138*     Troponin:   Recent Labs     10/06/21  0920 10/06/21  1815   TROPONINI <0.01 <0.01     BNP: No results for input(s): BNP in the last 72 hours. Lipids: No results for input(s): CHOL, TRIG, HDL, LDLCALC, LABVLDL in the last 72 hours. ABGs:   Recent Labs     10/06/21  1815   PHART 7.194*   PO2ART 113.0*   KUH2OQP 41.9     INR:   Recent Labs     10/06/21  0920   INR 1.17*     UA:  Recent Labs     10/06/21  0920   COLORU Yellow   CLARITYU SL CLOUDY*   GLUCOSEU Negative   BILIRUBINUR Negative   KETUA Negative   SPECGRAV 1.020   BLOODU SMALL*   PHUR 5.0   PROTEINU TRACE*   UROBILINOGEN 0.2   NITRU Negative   LEUKOCYTESUR SMALL*   LABMICR YES   URINETYPE NotGiven      Urine Microscopic:   Recent Labs     10/06/21  0920   BACTERIA 2+*   WBCUA 10-20*   RBCUA 3-4   EPIU 0-1     Urine Culture: No results for input(s): LABURIN in the last 72 hours. Urine Chemistry:   Recent Labs     10/06/21  1830   NAUR <20             IMAGING:  CT HEAD WO CONTRAST   Final Result      No evidence of acute intracranial abnormality. XR CHEST PORTABLE   Final Result      ET tube in place above the leandro. Right IJ central venous catheter placed, its tip in the distal SVC. No evidence of pneumothorax. Diffuse ill-defined airspace density throughout both lungs, right greater than left.  Findings appear to have progressed slightly since the earlier study         US RENAL COMPLETE    (Results Pending)       Assessment/Plan   1. LILLIAN     2. Septic shock     3. Anemia    4. Acid- base/ Electrolyte imbalance     5.  Acidosis     Plan   - change IVF to Bicarb   - Ur studies  - UO is better   - BP better   - wean pressors   - Abx   - Cx   - labs in am     Recommend to dose adjust all medications  based on renal functions  Maintain SBP> 90 mmHg   Daily weights   AVOID NSAIDs  Avoid Nephrotoxins  Monitor Intake/Output  Call if significant decrease in urine output                 Thank you for allowing us to participate in care of Scott Mora MD  Feel free to contact me   [facility] [address]  Office : [phone]  Fax :[phone]

## 2021-10-08 ENCOUNTER — APPOINTMENT (OUTPATIENT)
Dept: GENERAL RADIOLOGY | Age: 56
DRG: 871 | End: 2021-10-08
Attending: INTERNAL MEDICINE
Payer: MEDICARE

## 2021-10-08 LAB
ANION GAP SERPL CALCULATED.3IONS-SCNC: 11 MMOL/L (ref 3–16)
BASE EXCESS ARTERIAL: 2.4 MMOL/L (ref -3–3)
BASOPHILS ABSOLUTE: 0 K/UL (ref 0–0.2)
BASOPHILS RELATIVE PERCENT: 0 %
BUN BLDV-MCNC: 82 MG/DL (ref 7–20)
CALCIUM SERPL-MCNC: 8.9 MG/DL (ref 8.3–10.6)
CARBOXYHEMOGLOBIN ARTERIAL: 0.9 % (ref 0–1.5)
CHLORIDE BLD-SCNC: 107 MMOL/L (ref 99–110)
CO2: 26 MMOL/L (ref 21–32)
CREAT SERPL-MCNC: 1.9 MG/DL (ref 0.9–1.3)
EOSINOPHILS ABSOLUTE: 0 K/UL (ref 0–0.6)
EOSINOPHILS RELATIVE PERCENT: 0 %
GFR AFRICAN AMERICAN: 45
GFR NON-AFRICAN AMERICAN: 37
GLUCOSE BLD-MCNC: 102 MG/DL (ref 70–99)
GLUCOSE BLD-MCNC: 169 MG/DL (ref 70–99)
GLUCOSE BLD-MCNC: 213 MG/DL (ref 70–99)
GLUCOSE BLD-MCNC: 229 MG/DL (ref 70–99)
GLUCOSE BLD-MCNC: 249 MG/DL (ref 70–99)
HCO3 ARTERIAL: 29 MMOL/L (ref 21–29)
HCT VFR BLD CALC: 32.9 % (ref 40.5–52.5)
HEMOGLOBIN, ART, EXTENDED: 21.6 G/DL
HEMOGLOBIN: 10.7 G/DL (ref 13.5–17.5)
LYMPHOCYTES ABSOLUTE: 0.6 K/UL (ref 1–5.1)
LYMPHOCYTES RELATIVE PERCENT: 4 %
MAGNESIUM: 2.7 MG/DL (ref 1.8–2.4)
MCH RBC QN AUTO: 28.6 PG (ref 26–34)
MCHC RBC AUTO-ENTMCNC: 32.7 G/DL (ref 31–36)
MCV RBC AUTO: 87.6 FL (ref 80–100)
METHEMOGLOBIN ARTERIAL: 0.1 % (ref 0–1.4)
MONOCYTES ABSOLUTE: 0.3 K/UL (ref 0–1.3)
MONOCYTES RELATIVE PERCENT: 2 %
MRSA CULTURE ONLY: NORMAL
NEUTROPHILS ABSOLUTE: 15.1 K/UL (ref 1.7–7.7)
NEUTROPHILS RELATIVE PERCENT: 94 %
O2 SAT, ARTERIAL: 85 % (ref 93–100)
ORGANISM: ABNORMAL
PCO2 ARTERIAL: 48.5 MMHG (ref 35–45)
PDW BLD-RTO: 16.8 % (ref 12.4–15.4)
PERFORMED ON: ABNORMAL
PH ARTERIAL: 7.38 (ref 7.35–7.45)
PLATELET # BLD: 120 K/UL (ref 135–450)
PMV BLD AUTO: 10.1 FL (ref 5–10.5)
PO2 ARTERIAL: 54 MMHG (ref 75–108)
POTASSIUM REFLEX MAGNESIUM: 4.7 MMOL/L (ref 3.5–5.1)
RBC # BLD: 3.75 M/UL (ref 4.2–5.9)
SODIUM BLD-SCNC: 144 MMOL/L (ref 136–145)
TCO2 ARTERIAL: 30 MMOL/L
URINE CULTURE, ROUTINE: ABNORMAL
WBC # BLD: 16.1 K/UL (ref 4–11)

## 2021-10-08 PROCEDURE — 6370000000 HC RX 637 (ALT 250 FOR IP): Performed by: STUDENT IN AN ORGANIZED HEALTH CARE EDUCATION/TRAINING PROGRAM

## 2021-10-08 PROCEDURE — 6360000002 HC RX W HCPCS: Performed by: STUDENT IN AN ORGANIZED HEALTH CARE EDUCATION/TRAINING PROGRAM

## 2021-10-08 PROCEDURE — 82803 BLOOD GASES ANY COMBINATION: CPT

## 2021-10-08 PROCEDURE — 36415 COLL VENOUS BLD VENIPUNCTURE: CPT

## 2021-10-08 PROCEDURE — 85025 COMPLETE CBC W/AUTO DIFF WBC: CPT

## 2021-10-08 PROCEDURE — 2580000003 HC RX 258: Performed by: STUDENT IN AN ORGANIZED HEALTH CARE EDUCATION/TRAINING PROGRAM

## 2021-10-08 PROCEDURE — 99291 CRITICAL CARE FIRST HOUR: CPT | Performed by: INTERNAL MEDICINE

## 2021-10-08 PROCEDURE — 83735 ASSAY OF MAGNESIUM: CPT

## 2021-10-08 PROCEDURE — 80048 BASIC METABOLIC PNL TOTAL CA: CPT

## 2021-10-08 PROCEDURE — 2700000000 HC OXYGEN THERAPY PER DAY

## 2021-10-08 PROCEDURE — 99233 SBSQ HOSP IP/OBS HIGH 50: CPT | Performed by: INTERNAL MEDICINE

## 2021-10-08 PROCEDURE — 2000000000 HC ICU R&B

## 2021-10-08 PROCEDURE — 2500000003 HC RX 250 WO HCPCS: Performed by: STUDENT IN AN ORGANIZED HEALTH CARE EDUCATION/TRAINING PROGRAM

## 2021-10-08 PROCEDURE — 94761 N-INVAS EAR/PLS OXIMETRY MLT: CPT

## 2021-10-08 PROCEDURE — 94003 VENT MGMT INPAT SUBQ DAY: CPT

## 2021-10-08 PROCEDURE — 71045 X-RAY EXAM CHEST 1 VIEW: CPT

## 2021-10-08 PROCEDURE — 6370000000 HC RX 637 (ALT 250 FOR IP): Performed by: INTERNAL MEDICINE

## 2021-10-08 RX ORDER — INSULIN LISPRO 100 [IU]/ML
0-12 INJECTION, SOLUTION INTRAVENOUS; SUBCUTANEOUS
Status: DISCONTINUED | OUTPATIENT
Start: 2021-10-08 | End: 2021-10-10 | Stop reason: HOSPADM

## 2021-10-08 RX ORDER — NICOTINE POLACRILEX 4 MG
15 LOZENGE BUCCAL PRN
Status: DISCONTINUED | OUTPATIENT
Start: 2021-10-08 | End: 2021-10-10 | Stop reason: HOSPADM

## 2021-10-08 RX ORDER — DEXTROSE MONOHYDRATE 50 MG/ML
100 INJECTION, SOLUTION INTRAVENOUS PRN
Status: DISCONTINUED | OUTPATIENT
Start: 2021-10-08 | End: 2021-10-10 | Stop reason: HOSPADM

## 2021-10-08 RX ORDER — DEXTROSE MONOHYDRATE 25 G/50ML
12.5 INJECTION, SOLUTION INTRAVENOUS PRN
Status: DISCONTINUED | OUTPATIENT
Start: 2021-10-08 | End: 2021-10-10 | Stop reason: HOSPADM

## 2021-10-08 RX ORDER — INSULIN LISPRO 100 [IU]/ML
0-18 INJECTION, SOLUTION INTRAVENOUS; SUBCUTANEOUS
Status: DISCONTINUED | OUTPATIENT
Start: 2021-10-08 | End: 2021-10-08

## 2021-10-08 RX ORDER — INSULIN LISPRO 100 [IU]/ML
0-6 INJECTION, SOLUTION INTRAVENOUS; SUBCUTANEOUS NIGHTLY
Status: DISCONTINUED | OUTPATIENT
Start: 2021-10-08 | End: 2021-10-10 | Stop reason: HOSPADM

## 2021-10-08 RX ADMIN — INSULIN LISPRO 6 UNITS: 100 INJECTION, SOLUTION INTRAVENOUS; SUBCUTANEOUS at 05:30

## 2021-10-08 RX ADMIN — Medication 10 ML: at 08:44

## 2021-10-08 RX ADMIN — HEPARIN SODIUM 5000 UNITS: 5000 INJECTION INTRAVENOUS; SUBCUTANEOUS at 22:41

## 2021-10-08 RX ADMIN — METHYLPREDNISOLONE SODIUM SUCCINATE 20 MG: 40 INJECTION, POWDER, FOR SOLUTION INTRAMUSCULAR; INTRAVENOUS at 08:44

## 2021-10-08 RX ADMIN — CEFEPIME HYDROCHLORIDE 2000 MG: 2 INJECTION, POWDER, FOR SOLUTION INTRAVENOUS at 19:46

## 2021-10-08 RX ADMIN — CEFEPIME HYDROCHLORIDE 1000 MG: 1 INJECTION, POWDER, FOR SOLUTION INTRAMUSCULAR; INTRAVENOUS at 07:54

## 2021-10-08 RX ADMIN — Medication 20 ML: at 04:53

## 2021-10-08 RX ADMIN — HEPARIN SODIUM 5000 UNITS: 5000 INJECTION INTRAVENOUS; SUBCUTANEOUS at 17:09

## 2021-10-08 RX ADMIN — INSULIN LISPRO 2 UNITS: 100 INJECTION, SOLUTION INTRAVENOUS; SUBCUTANEOUS at 20:49

## 2021-10-08 RX ADMIN — Medication 10 ML: at 20:50

## 2021-10-08 RX ADMIN — INSULIN HUMAN 8 UNITS: 100 INJECTION, SOLUTION PARENTERAL at 10:29

## 2021-10-08 RX ADMIN — FAMOTIDINE 10 MG: 10 INJECTION, SOLUTION INTRAVENOUS at 08:44

## 2021-10-08 RX ADMIN — HEPARIN SODIUM 5000 UNITS: 5000 INJECTION INTRAVENOUS; SUBCUTANEOUS at 05:30

## 2021-10-08 RX ADMIN — INSULIN LISPRO 3 UNITS: 100 INJECTION, SOLUTION INTRAVENOUS; SUBCUTANEOUS at 12:48

## 2021-10-08 ASSESSMENT — PULMONARY FUNCTION TESTS
PIF_VALUE: 13
PIF_VALUE: 28
PIF_VALUE: 14
PIF_VALUE: 7
PIF_VALUE: 13
PIF_VALUE: 26
PIF_VALUE: 27
PIF_VALUE: 19
PIF_VALUE: 30
PIF_VALUE: 31
PIF_VALUE: 30
PIF_VALUE: 26
PIF_VALUE: 13
PIF_VALUE: 19
PIF_VALUE: 21
PIF_VALUE: 13
PIF_VALUE: 30
PIF_VALUE: 13

## 2021-10-08 ASSESSMENT — PAIN SCALES - GENERAL
PAINLEVEL_OUTOF10: 0

## 2021-10-08 ASSESSMENT — ENCOUNTER SYMPTOMS
ABDOMINAL PAIN: 0
BACK PAIN: 0

## 2021-10-08 ASSESSMENT — PAIN SCALES - WONG BAKER: WONGBAKER_NUMERICALRESPONSE: 0

## 2021-10-08 NOTE — RESULT ENCOUNTER NOTE
Culture reviewed, patient is positive for E. coli bacteremia and is currently admitted at [facility]..  After reviewing hospitalist note, they are aware of this finding as well. No need for any further work-up or phone calls.

## 2021-10-08 NOTE — PROGRESS NOTES
Hospitalist Progress Note      PCP: LINK Garrett - CNP    Date of Admission: 10/6/2021    Chief Complaint: altered mental status    Hospital Course:   Lewis Benitez 50 y.o. male   - history of HFpEF (2/2021 ECHO showed grade 2 diastolic dysfunction, 40% EF, LV hypertrophy), COPD on 2 L of oxygen at home, diabetes, hypertension, hyperlipidemia  - recent admission 09/16 - 09/30 -- for acute on chronic respiratory failure with hypoxemia needing 40 L Vapotherm, diuresed with lasix gtt, repeated Chest CT revealed almost complete resolution of the airspace disease in the lung bases with minimal residual consolidation in the right lower lobe and post infectious scarring in the left lung base.  - 10/06 - presents to the ED w/ altered mental status  - in [place] ED, he was noted to have a temperature of 103F, WBC 20, , D-Dimer 1600, Cr 4.1 increased from 1.1, and his BNP was greater than 8,000. Pt was intubated, started on vasopressors. CXR (10/03) displayed perihilar and lower zone airspace opacities have increased, particularly on the right.   - work up showed 2/2 Cx +ve for [facility]    Subjective:   Patient extubated today, says he feels good, hungry and wants to eat  No other complains, no chest pain or sob      Medications:  Reviewed    Infusion Medications    electrolyte-A 75 mL/hr at 10/08/21 0600    norepinephrine Stopped (10/06/21 2155)    sodium chloride      dextrose      propofol 2 mcg/kg/min (10/08/21 0600)    fentaNYL 10 mcg/hr (10/07/21 1643)     Scheduled Medications    insulin glargine  20 Units SubCUTAneous Nightly    methylPREDNISolone  20 mg IntraVENous Daily    heparin (porcine)  5,000 Units SubCUTAneous 3 times per day    sodium chloride flush  5-40 mL IntraVENous 2 times per day    famotidine (PEPCID) injection  10 mg IntraVENous Daily    cefepime  1,000 mg IntraVENous Q12H    insulin lispro  0-18 Units SubCUTAneous Q6H     PRN Meds: sodium chloride flush, sodium chloride, acetaminophen **OR** acetaminophen, glucose, dextrose, glucagon (rDNA), dextrose      Intake/Output Summary (Last 24 hours) at 10/8/2021 0820  Last data filed at 10/8/2021 0600  Gross per 24 hour   Intake 2268.98 ml   Output 1800 ml   Net 468.98 ml       Physical Exam Performed:    /84   Pulse 83   Temp 98.1 °F (36.7 °C) (Bladder)   Resp 18   Ht 4' 11.02\" (1.499 m)   Wt 175 lb 0.7 oz (79.4 kg)   SpO2 98%   BMI 35.33 kg/m²     General appearance: NAD  HEENT: Pupils equal, round, and reactive to light. Conjunctivae/corneas clear. Neck: Supple, with full range of motion. No jugular venous distention. Trachea midline. Respiratory:  Normal respiratory effort. On vent, bilateral air entry  Cardiovascular: Regular rate and rhythm with normal S1/S2 without murmurs, rubs or gallops. Abdomen: Soft, non-tender, non-distended with normal bowel sounds. Musculoskeletal: No edema bilaterally. Full range of motion without deformity. Skin: Skin color, texture, turgor normal.  No rashes or lesions. Neurologic:    Moving all extremities, no FNDs  Psychiatric: alert oriented to person place and time  Capillary Refill: Brisk,< 3 seconds   Peripheral Pulses: +2 palpable, equal bilaterally       Labs:   Recent Labs     10/06/21  1815 10/07/21  0533 10/08/21  0505   WBC 20.2* 16.4* 16.1*   HGB 11.4* 10.9* 10.7*   HCT 35.4* 33.8* 32.9*    132* 120*     Recent Labs     10/06/21  2000 10/06/21  2000 10/06/21  2114 10/07/21  0533 10/08/21  0505   *   < > 136 136 144   K 6.4*   < > 4.1 4.9 4.7   CL 99   < > 109 102 107   CO2 15*   < > 12* 21 26   BUN 99*   < > 84* 94* 82*   CREATININE 3.3*   < > 2.6* 3.0* 1.9*   CALCIUM 8.6   < > 6.5* 8.7 8.9   PHOS 6.3*  --  4.6  --   --     < > = values in this interval not displayed.      Recent Labs     10/06/21  0920   AST 44*   ALT 42*   BILITOT <0.2   ALKPHOS 138*     Recent Labs     10/06/21  0920   INR 1.17*     Recent Labs     10/06/21  0920 10/06/21  1815   TROPONINI <0.01 <0.01       Urinalysis:      Lab Results   Component Value Date    NITRU Negative 10/06/2021    WBCUA 10-20 10/06/2021    BACTERIA 2+ 10/06/2021    RBCUA 3-4 10/06/2021    BLOODU SMALL 10/06/2021    SPECGRAV 1.020 10/06/2021    GLUCOSEU Negative 10/06/2021       Radiology:  XR CHEST PORTABLE   Final Result   1. There is an endotracheal tube in place with the tip located 2.5 cm    from the leandro. There is an NG tube extending into the stomach. There    is a right internal jugular central venous catheter terminating in the    superior vena cava. 2.  Possible trace right pleural effusion. XR ABDOMEN (KUB) (SINGLE AP VIEW)   Final Result      Orogastric tube terminates over the gastric body. US RENAL COMPLETE   Final Result      No evidence of cholelithiasis or acute cholecystitis. No evidence of biliary obstruction. Cystic lesion in the right kidney measuring 1.5 cm which is incompletely evaluated. US ABDOMEN LIMITED   Final Result      No evidence of cholelithiasis or acute cholecystitis. No evidence of biliary obstruction. Cystic lesion in the right kidney measuring 1.5 cm which is incompletely evaluated. CT HEAD WO CONTRAST   Final Result      No evidence of acute intracranial abnormality. XR CHEST PORTABLE   Final Result      ET tube in place above the leandro. Right IJ central venous catheter placed, its tip in the distal SVC. No evidence of pneumothorax. Diffuse ill-defined airspace density throughout both lungs, right greater than left.  Findings appear to have progressed slightly since the earlier study                 Assessment/Plan:    Active Hospital Problems    Diagnosis Date Noted    [facility] [A41.51, R65.21] 10/07/2021     Priority: High    Bacteremia due to Escherichia coli [R78.81, B96.20] 10/07/2021     Priority: High    Sepsis with acute respiratory failure and septic shock (HCC) [A41.9, R65.21, J96.00] 10/06/2021     Acute metabolic encephalopathy due to bacteremia POA  - Resolved    Acute on chronic respiratory failure with hypoxemia possibly due to underlying septic shock from bacteremia, and flash pulmonary edema. Baseline O2 is 2-3 L NC  -Extubated, on 5 L oxygen doing much better, denies any shortness of breath or chest pain    Septic shock due to Ecoli bacteremia likely due to UTI  - Continue Cefepime, unsure what the source of bacteremia is, discussed with Dr. Renee Higginbotham, could be transient bacteremia in the setting of renal compromise state with steroid use  I will treat for 14 days total depending on sensitivity results from urine culture    Acute kidney injury - Cr 4.1 on admission, 1.2 recent dc, Likely due to hemodynamic changes from Septic shock  Continue strict input output, creatinine is improving  Avoid NSAIDs (Ibuprofen, Aleve, Motrin, Naproxen, Toradol etc), Fleet enemas, IV contrast Dye and other nephrotoxins!   Nephrology following    COPD wo acute exacerbation  - continue breath rx as needed  -No need for steroids    Type 2 DM: SSI, Hypoglycemia protocol  POC glucose checks  Monitor BG and asjust insulin accordingly      DVT Prophylaxis: Heparin sq  Diet: Diet NPO  ADULT TUBE FEEDING; Nasogastric; Peptide Based; Continuous; 20; Yes; 20; Q 4 hours; 40; 30; Q 4 hours   Code Status: Full Code    PT/OT Eval Status: Ordered  Dispo - Continue inpatient care, awaiting sensitivities of urine culture, continue monitoring respiratory status, PT OT has been ordered  Suspect discharge Sunday or Monday    Kateryna Maddox MD  Hopsitalist

## 2021-10-08 NOTE — PLAN OF CARE
Problem: Non-Violent Restraints  Goal: Removal from restraints as soon as assessed to be safe  Outcome: Ongoing     Problem: Non-Violent Restraints  Goal: No harm/injury to patient while restraints in use  Outcome: Ongoing     Problem: Falls - Risk of:  Goal: Will remain free from falls  Description: Will remain free from falls  Outcome: Ongoing     Problem: Skin Integrity:  Goal: Will show no infection signs and symptoms  Description: Will show no infection signs and symptoms  Outcome: Ongoing     Problem: Skin Integrity:  Goal: Will show no infection signs and symptoms  Description: Will show no infection signs and symptoms  Outcome: Ongoing     Problem: Safety:  Goal: Free from intentional harm  Description: Free from intentional harm  Outcome: Ongoing     Problem: Pain:  Goal: Patient's pain/discomfort is manageable  Description: Patient's pain/discomfort is manageable  Outcome: Ongoing     Problem: HEMODYNAMIC STATUS  Goal: Patient has stable vital signs and fluid balance  Outcome: Ongoing     Problem: OXYGENATION/RESPIRATORY FUNCTION  Goal: Patient will maintain patent airway  Outcome: Ongoing

## 2021-10-08 NOTE — PROGRESS NOTES
b/m   VT: 525 mL (average VT = VE/RR)   RSBI: 38 (RR/VT in liters)   ETCO2: 34 cmH2O   SPO2: 96 %   If on sedation, amount and type Propofol 2, Fentanyl 12.5    [x]   RR is less than 35, RSBI is less than 100, patient's vitals signs are stable, and patient is in no apparent distress; therefore, patient is being left on SBT for up to 1 hour. []   RR is greater than 35, RSBI is greater than 100, VS's are unstable, or patient is in distress; therefore, patient is being placed back on previous settings. SBT  Concluded at  1045. Weaning Parameters/VS's at conclusion of SBT:   VE: 10.2 L   RR: 20 b/m   VT: 510 mL (average VT = VE/RR)   RSBI: 39 (RR/VT in liters)   ETCO2: 33 cmH2O   SPO2: 96 %    If on sedation, amount and type Propofol 2, Fentanyl 12.5    [x]   Patient tolerated SBT for full 60 minutes with acceptable weaning parameters and vital signs and showed no signs or distress. []   Patient tolerated SBT for full 60 minutes, but had unacceptable weaning parameters or vital signs, and/or signs of distress. []   Patient was unable to tolerate SBT for 60 minutes and was placed back on previous settings.             COMMENTS:

## 2021-10-08 NOTE — PROGRESS NOTES
Observed patients et tube was 20 @ the lip. Repositioned et tube to 24 at the lip and called for stat chest xray to confirm tube placement. Spo2 is 100%.

## 2021-10-08 NOTE — PROGRESS NOTES
Clinical Pharmacy Progress Note    PERTINENT MEDICATIONS:  Cefepime 1000mg IV q12h       LABORATORY:  Recent Labs     10/06/21  2114 10/07/21  0533 10/08/21  0505      < > 144   K 4.1   < > 4.7      < > 107   CO2 12*   < > 26   PHOS 4.6  --   --    BUN 84*   < > 82*   CREATININE 2.6*   < > 1.9*    < > = values in this interval not displayed. EstCrCL ~39 mL/min based on Scr of 1.9     ASSESSMENT/PLAN:  1)  Renal Dose adjustment:   · Patient with improving LILLIAN. Scr today 1.9, a decrease from  3 yesterday. · Due to change in renal function, will adjust per [facility] Renal Dose Policy to Cefepime [id number] IV q12h. · Will continue to monitor renal function, and adjust dosing as appropriate.        Please call with questions --   Jennifer Jones PharmD., BCPS   10/8/2021 4:05 PM  Wireless: [phone]

## 2021-10-08 NOTE — PLAN OF CARE
Problem: Non-Violent Restraints  Goal: Removal from restraints as soon as assessed to be safe  Outcome: Ongoing  Goal: No harm/injury to patient while restraints in use  10/8/2021 1927 by Nguyen Gonzales RN  Outcome: Ongoing  10/8/2021 0636 by Charlie Del Castillo RN  Outcome: Ongoing  Goal: Patient's dignity will be maintained  Outcome: Ongoing     Problem: Falls - Risk of:  Goal: Will remain free from falls  Description: Will remain free from falls  Outcome: Ongoing  Goal: Absence of physical injury  Description: Absence of physical injury  Outcome: Ongoing     Problem: Skin Integrity:  Goal: Will show no infection signs and symptoms  Description: Will show no infection signs and symptoms  Outcome: Ongoing  Goal: Absence of new skin breakdown  Description: Absence of new skin breakdown  10/8/2021 1927 by Nguyen Gonzales RN  Outcome: Ongoing  10/8/2021 0636 by Charlie Del Castillo RN  Outcome: Ongoing     Problem: Infection:  Goal: Will remain free from infection  Description: Will remain free from infection  Outcome: Ongoing     Problem: Safety:  Goal: Free from accidental physical injury  Description: Free from accidental physical injury  Outcome: Ongoing  Goal: Free from intentional harm  Description: Free from intentional harm  10/8/2021 1927 by Nguyen Gonzales RN  Outcome: Ongoing  10/8/2021 0636 by Charlie Del Castillo RN  Outcome: Ongoing     Problem: Daily Care:  Goal: Daily care needs are met  Description: Daily care needs are met  Outcome: Ongoing     Problem: Pain:  Goal: Patient's pain/discomfort is manageable  Description: Patient's pain/discomfort is manageable  10/8/2021 1927 by Nguyen Gonzales RN  Outcome: Ongoing  10/8/2021 0636 by Charlie Del Castillo RN  Outcome: Ongoing     Problem: Skin Integrity:  Goal: Skin integrity will stabilize  Description: Skin integrity will stabilize  Outcome: Ongoing     Problem: Discharge Planning:  Goal: Patients continuum of care needs are met  Description: Patients continuum of care needs are met  Outcome: Ongoing     Problem: Nutrition  Goal: Optimal nutrition therapy  Outcome: Ongoing     Problem: OXYGENATION/RESPIRATORY FUNCTION  Goal: Patient will maintain patent airway  10/8/2021 1927 by Abe Wyman RN  Outcome: Ongoing  10/8/2021 0636 by Sergio Still RN  Outcome: Ongoing  Goal: Patient will achieve/maintain normal respiratory rate/effort  Description: Respiratory rate and effort will be within normal limits for the patient  Outcome: Ongoing     Problem: HEMODYNAMIC STATUS  Goal: Patient has stable vital signs and fluid balance  10/8/2021 1927 by Abe Wyman RN  Outcome: Ongoing  10/8/2021 0636 by Sergio Still RN  Outcome: Ongoing     Problem: FLUID AND ELECTROLYTE IMBALANCE  Goal: Fluid and electrolyte balance are achieved/maintained  Outcome: Ongoing     Problem: ACTIVITY INTOLERANCE/IMPAIRED MOBILITY  Goal: Mobility/activity is maintained at optimum level for patient  Outcome: Ongoing

## 2021-10-08 NOTE — CARE COORDINATION
SW Following, Pt from home w/SO, Has home O2 through [place] [phone], 2-3L O2 @ baseline, sedation is being weaned, Pt was extubated and is on 5L O2, SW will continue to follow for DC needs/recs.     Electronically signed by PRISCILLA Romero LSW on 10/8/2021 at 1:06 PM   [phone]

## 2021-10-08 NOTE — PROGRESS NOTES
Patient has been successfully weaned from Mechanical Ventilation. RSBI before extubation was 57 with EtCO2 of 33 and SpO2 of 97 on 45% FiO2. Patient extubated and placed on 5 liters/min via nasal cannula. Post extubation SpO2 is 94% with HR  89 bpm and RR 19 breaths/min. Patient had strong cough that was non-productive. Extubation Well tolerated by patient. Elizabeth Enrique

## 2021-10-08 NOTE — PROGRESS NOTES
Nephrology Consult Note                                                                                                                                                                                                                                                                                                                                                               Office : [phone]     Fax :[phone]              Patient's Name: Rachel Candelaria  10/7/2021    Reason for Consult: LILLIAN   Requesting Physician:  LINK Munguia CNP      Chief Complaint:  Unresponsive     History of Present Ilness:        Good UO   On IVF   Cr better   No diarrhea   Reinoso in place     Past Medical History:   Diagnosis Date    [facility] 07/11/2021    [facility]     combined    COPD [facility]ronic obstructive pulmonary disease) [facility]     Diabetes mellitus ([facility]     Hyperlipidemia     Hypertension     Oxygen dependent        Past Surgical History:   Procedure Laterality Date    HERNIA REPAIR      TONSILLECTOMY         Family History   Problem Relation Age of Onset    Asthma Mother     Cancer Mother     Hearing Loss Mother     Vision Loss Mother     High Blood Pressure Father     High Cholesterol Father     Vision Loss Father     Asthma Sister     Diabetes Sister     Vision Loss Sister     Asthma Brother     Arthritis Brother     High Blood Pressure Brother         reports that he has been smoking. He has been smoking about 1.00 pack per day. He has never used smokeless tobacco. He reports current alcohol use. He reports that he does not use drugs. Allergies:  Patient has no known allergies.     Current Medications:    insulin glargine (LANTUS;BASAGLAR) injection pen 20 Units, Nightly  [START ON 10/8/2021] methylPREDNISolone sodium (SOLU-MEDROL) injection 20 mg, Daily  heparin (porcine) injection 5,000 Units, 3 times per day  electrolyte-A (PLASMALYTE-A) solution, Continuous  norepinephrine (LEVOPHED) 16,000 mcg in dextrose 5 % 250 mL infusion, Continuous  sodium chloride flush 0.9 % injection 5-40 mL, 2 times per day  sodium chloride flush 0.9 % injection 5-40 mL, PRN  0.9 % sodium chloride infusion, PRN  acetaminophen (TYLENOL) tablet 650 mg, Q6H PRN   Or  acetaminophen (TYLENOL) suppository 650 mg, Q6H PRN  famotidine (PEPCID) injection 10 mg, Daily  glucose (GLUTOSE) 40 % oral gel 15 g, PRN  dextrose 50 % IV solution, PRN  glucagon (rDNA) injection 1 mg, PRN  dextrose 5 % solution, PRN  cefepime (MAXIPIME) 1000 mg IVPB minibag, Q12H  propofol injection, Titrated  fentaNYL (SUBLIMAZE) 1,000 mcg in sodium chloride 0.9% 100 mL infusion, Continuous  insulin lispro (1 Unit Dial) 0-18 Units, Q6H        Review of Systems:   Intubated       Physical exam:     Vitals:  BP (!) 80/56   Pulse 68   Temp 98.5 °F (36.9 °C) (Oral)   Resp 18   Ht 4' 11.02\" (1.499 m)   Wt 175 lb 0.7 oz (79.4 kg)   SpO2 94%   BMI 35.33 kg/m²   Constitutional:  Intubated   Skin: no rash, turgor wnl  Heent:  eomi, mmm  Neck: no bruits or jvd noted  Cardiovascular:  S1, S2 without m/r/g  Respiratory: CTA B without w/r/r  Abdomen:  +bs, soft, nt, nd  Ext: + lower extremity edema  Musculoskeletal:  Rom, muscular strength dec     Data:   Labs:  CBC:   Recent Labs     10/06/21  0920 10/06/21  1815 10/07/21  0533   WBC 20.0* 20.2* 16.4*   HGB 13.1* 11.4* 10.9*    141 132*     BMP:    Recent Labs     10/06/21  2000 10/06/21  2114 10/07/21  0533   * 136 136   K 6.4* 4.1 4.9   CL 99 109 102   CO2 15* 12* 21   BUN 99* 84* 94*   CREATININE 3.3* 2.6* 3.0*   GLUCOSE 330* 277* 330*     Ca/Mg/Phos:   Recent Labs     10/06/21  0920 10/06/21  0920 10/06/21  1815 10/06/21  1815 10/06/21  2000 10/06/21  2114 10/07/21  0533   CALCIUM 10.1   < > 8.0*   < > 8.6 6.5* 8.7   MG 3.00*  --  2.00  --   --   --  2.70*   PHOS  --   --   --   --  6.3* 4.6  --     < > = values in this interval not displayed. Hepatic:   Recent Labs     10/06/21  0920   AST 44*   ALT 42*   BILITOT <0.2   ALKPHOS 138*     Troponin:   Recent Labs     10/06/21  0920 10/06/21  1815   TROPONINI <0.01 <0.01     BNP: No results for input(s): BNP in the last 72 hours. Lipids: No results for input(s): CHOL, TRIG, HDL, LDLCALC, LABVLDL in the last 72 hours. ABGs:   Recent Labs     10/07/21  1400   PHART 7.366   PO2ART 78.8   [id number] 45.0     INR:   Recent Labs     10/06/21  0920   INR 1.17*     UA:  Recent Labs     10/06/21  0920 10/06/21  0920 10/06/21  2200   COLORU Yellow  --   --    CLARITYU SL CLOUDY*  --   --    GLUCOSEU Negative  --   --    BILIRUBINUR Negative  --   --    KETUA Negative  --   --    SPECGRAV 1.020  --   --    BLOODU SMALL*  --   --    PHUR 5.0   < > 5.0   PROTEINU TRACE*  --   --    UROBILINOGEN 0.2  --   --    NITRU Negative  --   --    LEUKOCYTESUR SMALL*  --   --    LABMICR YES  --   --    URINETYPE NotGiven  --   --     < > = values in this interval not displayed. Urine Microscopic:   Recent Labs     10/06/21  0920   BACTERIA 2+*   WBCUA 10-20*   RBCUA 3-4   EPIU 0-1     Urine Culture: No results for input(s): LABURIN in the last 72 hours. Urine Chemistry:   Recent Labs     10/06/21  1830 10/06/21  2200   LABCREA  --  36.2*  36.2*   PROTEINUR  --  14.00*   NAUR <20  --              IMAGING:  XR ABDOMEN (KUB) (SINGLE AP VIEW)   Final Result      Orogastric tube terminates over the gastric body. US RENAL COMPLETE   Final Result      No evidence of cholelithiasis or acute cholecystitis. No evidence of biliary obstruction. Cystic lesion in the right kidney measuring 1.5 cm which is incompletely evaluated. US ABDOMEN LIMITED   Final Result      No evidence of cholelithiasis or acute cholecystitis. No evidence of biliary obstruction. Cystic lesion in the right kidney measuring 1.5 cm which is incompletely evaluated.          CT HEAD WO CONTRAST   Final Result      No evidence of acute intracranial abnormality. XR CHEST PORTABLE   Final Result      ET tube in place above the leandro. Right IJ central venous catheter placed, its tip in the distal SVC. No evidence of pneumothorax. Diffuse ill-defined airspace density throughout both lungs, right greater than left. Findings appear to have progressed slightly since the earlier study             Assessment/Plan   1. LILLIAN     2. Septic shock     3. Anemia    4. Acid- base/ Electrolyte imbalance     5.  Acidosis     Plan   - cont iVF   - Cr better   - Bicarb better   - UO is better   - BP better   - off pressors   - Abx   - Cx   - labs in am     Recommend to dose adjust all medications  based on renal functions  Maintain SBP> 90 mmHg   Daily weights   AVOID NSAIDs  Avoid Nephrotoxins  Monitor Intake/Output  Call if significant decrease in urine output                 Thank you for allowing us to participate in care of Michelle Rojas MD  Feel free to contact me   [facility] [address]  Office : [phone]  Fax :[phone]

## 2021-10-08 NOTE — PROGRESS NOTES
ICU Progress Note    Admit Date: 10/6/2021  Day: 3  Vent Day: 3  IV Access: Peripheral, CVC  IV Fluids: Plasmalyte-A  Vasopressors: None                Antibiotics: Cefepime  Diet: Diet NPO  ADULT TUBE FEEDING; Nasogastric; Peptide Based; Continuous; 20; Yes; 20; Q 4 hours; 40; 30; Q 4 hours    CC: AMS    Interval history: No significant events overnight. Pt currently afebrile and his temperature is no longer hypothermic as well. Vasopressors have been stopped, a MAP greater than 65 has been maintained. His Cr is improving with administration of IVF. Pt's blood cultures displayed gram negative rods and the preliminary results of his respiratory culture display gram negative rods as well. Sedation is being titrated down and FiO2 has been decreased as well. Pt failed SBT yesterday, but will consider attempting SBT again today.     Temp 98.1F  WBC: 16.1  Cr: 1.9  UO:  mL  Blood Sugar 229    Medications:     Scheduled Meds:   insulin glargine  20 Units SubCUTAneous Nightly    methylPREDNISolone  20 mg IntraVENous Daily    heparin (porcine)  5,000 Units SubCUTAneous 3 times per day    sodium chloride flush  5-40 mL IntraVENous 2 times per day    famotidine (PEPCID) injection  10 mg IntraVENous Daily    cefepime  1,000 mg IntraVENous Q12H    insulin lispro  0-18 Units SubCUTAneous Q6H     Continuous Infusions:   electrolyte-A 75 mL/hr at 10/08/21 0600    norepinephrine Stopped (10/06/21 2155)    sodium chloride      dextrose      propofol 2 mcg/kg/min (10/08/21 0600)    fentaNYL 10 mcg/hr (10/07/21 1643)     PRN Meds:sodium chloride flush, sodium chloride, acetaminophen **OR** acetaminophen, glucose, dextrose, glucagon (rDNA), dextrose    Objective:   Vitals:   T-max:  Patient Vitals for the past 8 hrs:   BP Pulse Resp SpO2   10/08/21 0600  82 18 96 %   10/08/21 0500  72 16 96 %   10/08/21 0400 107/63 71 16 96 %   10/08/21 0355  83 19 98 %   10/08/21 0116    95 %   10/08/21 0114  61 18 95 % Intake/Output Summary (Last 24 hours) at 10/8/2021 0636  Last data filed at 10/8/2021 0600  Gross per 24 hour   Intake 2268.98 ml   Output 700 ml   Net 1568.98 ml       Review of Systems   Constitutional: Negative for fever. Cardiovascular: Negative for chest pain. Gastrointestinal: Negative for abdominal pain. Musculoskeletal: Negative for back pain, joint swelling and myalgias. Neurological: Negative for headaches. Physical Exam  Constitutional:       Appearance: He is obese. He is not ill-appearing, toxic-appearing or diaphoretic. HENT:      Head: Normocephalic and atraumatic. Eyes:      Extraocular Movements: Extraocular movements intact. Cardiovascular:      Rate and Rhythm: Normal rate and regular rhythm. Heart sounds: No murmur heard. No friction rub. No gallop. Pulmonary:      Breath sounds: No wheezing, rhonchi or rales. Abdominal:      General: There is no distension. Tenderness: There is no abdominal tenderness. There is no guarding or rebound. Musculoskeletal:      Right lower leg: No edema. Left lower leg: No edema. Neurological:      Mental Status: He is alert.            LABS:    CBC:   Recent Labs     10/06/21  1815 10/07/21  0533 10/08/21  0505   WBC 20.2* 16.4* 16.1*   HGB 11.4* 10.9* 10.7*   HCT 35.4* 33.8* 32.9*    132* 120*   MCV 89.6 88.4 87.6     Renal:    Recent Labs     10/06/21  1815 10/06/21  1815 10/06/21  2000 10/06/21  2000 10/06/21  2114 10/07/21  0533 10/08/21  0505      < > 131*   < > 136 136 144   K 3.7  --  6.4*   < > 4.1 4.9 4.7      < > 99   < > 109 102 107   CO2 23   < > 15*   < > 12* 21 26   BUN 7   < > 99*   < > 84* 94* 82*   CREATININE <0.5*   < > 3.3*   < > 2.6* 3.0* 1.9*   GLUCOSE 106*   < > 330*   < > 277* 330* 213*   CALCIUM 8.0*   < > 8.6   < > 6.5* 8.7 8.9   MG 2.00  --   --   --   --  2.70* 2.70*   PHOS  --   --  6.3*  --  4.6  --   --    ANIONGAP 12   < > 17*   < > 15 13 11    < > = values in this interval not displayed. Hepatic:   Recent Labs     10/06/21  0920 10/06/21  2000 10/06/21  2114   AST 44*  --   --    ALT 42*  --   --    BILITOT <0.2  --   --    PROT 6.5  --   --    LABALBU 2.8* 2.2* 1.7*   ALKPHOS 138*  --   --      Troponin:   Recent Labs     10/06/21  0920 10/06/21  1815   TROPONINI <0.01 <0.01     BNP: No results for input(s): BNP in the last 72 hours. Lipids: No results for input(s): CHOL, HDL in the last 72 hours. Invalid input(s): LDLCALCU, TRIGLYCERIDE  ABGs:    Recent Labs     10/06/21  2241 10/07/21  0925 10/07/21  1400   PHART 7.311* 7.298* 7.366   PMV9GXC 33.6* 50.7* 45.0   PO2ART 88.6 30.4* 78.8   LUH3GYJ 17.0* 24.8 26   BEART -9* -2 -0.1   F2LGBZNH 96 51* 95   LTR6SDI 18 26 27       INR:   Recent Labs     10/06/21  0920   INR 1.17*     Lactate: No results for input(s): LACTATE in the last 72 hours. Cultures:  -----------------------------------------------------------------  RAD:   XR CHEST PORTABLE   Final Result   1. There is an endotracheal tube in place with the tip located 2.5 cm    from the leandro. There is an NG tube extending into the stomach. There    is a right internal jugular central venous catheter terminating in the    superior vena cava. 2.  Possible trace right pleural effusion. XR ABDOMEN (KUB) (SINGLE AP VIEW)   Final Result      Orogastric tube terminates over the gastric body. US RENAL COMPLETE   Final Result      No evidence of cholelithiasis or acute cholecystitis. No evidence of biliary obstruction. Cystic lesion in the right kidney measuring 1.5 cm which is incompletely evaluated. US ABDOMEN LIMITED   Final Result      No evidence of cholelithiasis or acute cholecystitis. No evidence of biliary obstruction. Cystic lesion in the right kidney measuring 1.5 cm which is incompletely evaluated. CT HEAD WO CONTRAST   Final Result      No evidence of acute intracranial abnormality.        XR CHEST PORTABLE Final Result      ET tube in place above the leandro. Right IJ central venous catheter placed, its tip in the distal SVC. No evidence of pneumothorax. Diffuse ill-defined airspace density throughout both lungs, right greater than left. Findings appear to have progressed slightly since the earlier study               Assessment/Plan:     Acute hypoxic respiratory failure  On admission pt unresponsive with spO2 71%. Pro-BNP 8,243, CXR (10/06) perihilar and lower zone airspace opacities have increased, particularly on the right. ABG 7.240, pCO2 45.6, pO2 44.3 and HCO3 19.4. Suspect cause of hypoxia to be sepsis vs possible volume overload     -Failed SBT (10/07), though pt improved consider SBT again today. -Mechanical ventilation: Mode: PRVC Rate Set: 18 bmp/Vt Ordered: 500 mL/ /FiO2 : 45 %, PEEP 5, Min Vol 11.9  -Sedation: fentanyl 10, propofol 2  -ABG daily  -TF started through OG tube (10/07)     Sepsis  On admission pt had fever of 103F, WBC 20, D-Dimer 1628, Lactic Acid 1.1. CXR (10/06) displayed perihilar and lower zone airspace opacities have increased, particularly on the right. Rapid covid and Covid-19 PCR both negative (10/06), though pt is unvaccinated.    -Respiratory culture, in process, gram negative rods  -Stopped levophed  -Thermal blanket, pt hypothermic  -Cefepime  -Blood cultures x2 (10/06), positive for gram negative rods, sensitivities pending  -Urine culture (10/06)  -MRSA nasal swab, pending  -CBC daily     LILLIAN  On admission Cr 4.1, last Cr 1.2 on 10/01. K 5.7 admission.     -Plasmalyte-A 75 mL/hr  -BMP daily  -Mg daily  -Nephrology following     HFpEF  Chronic condition. Last EF 55% to 60% on 9/2021. Last admission on 9/30 pt was on a lasix drip. Pro-BNP 8,243 on admission. Follow up troponin < 0.01 (10/06).    -Pro-BNP 3870 (10/07)  -Pro-BNP every third day, starting      COPD  Chronic condition. Pt on multiple inhalers at home.  Pt on 2L oxygen, Incruse, Breo and albuterol at home. Received high dose steroids during last admission on 9/30.     -Solumedrol 20 mg daily     DMT2  Chronic condition. Pt on metformin, glipizide, and insulin at home. Last admission (9/30) pt on HDSSI. Blood sugar elevated last evening on 10/07.     -Increase insulin  -Lantus 20u qHS  -Lispro 8u q6H  -HDSSI  -Hypoglycemia protocol  -POCT glucose q4H    Code Status: Full Code  FEN: Diet NPO  ADULT TUBE FEEDING; Nasogastric; Peptide Based; Continuous; 20; Yes; 20; Q 4 hours; 40; 30; Q 4 hours  PPX: Heparin TID  DISPO: IP    Tim Chung DO, PGY-1  10/08/21  6:36 AM    This patient has been staffed and discussed with Dr. Ryan Sesay. Patient was seen, examined and discussed with Dr. Eusebia Chavarria. I agree with the interval history. My physical exam confirms the findings listed below  Chart was reviewed including labs and medical records confirm the findings noted     Acute respiratory failure on mechanical vent support.   , RR 18/29, FiO2 45, PEEP 5  ABG 7.29/50/30 (venous gas)  Septic shock: fever - trended down   E.coli bacteremia   Leukocytosis is trending down to 16.1  Suspected pulmonary edema.  Pro BNP is 8424.  Baseline 124, however this is in the setting of LILLIAN.  there is no significant peripheral edema on exam.    COPD - active smoking - on O2 at 2 liters at baseline   LILLIAN:  creatinine is down to 1.9  Hyperkalemia - resolved   Hyperglycemia      SBT today   Continue cefepime   D/c heparin drip   D/c solu medrol   launtus increased from 20 to 25     Pt has a high probability of imminent or life-threatening deterioration requiring close monitoring, and highly complex decision-making and/or interventions of high intensity to assess, manipulate, and support his critical organ systems to prevent a likely inevitable decline which could occur if left untreated.      A total critical care time 35 minutes was used.  This includes but not limited to examining patient, collaborating with other physicians, monitoring

## 2021-10-09 LAB
ANION GAP SERPL CALCULATED.3IONS-SCNC: 10 MMOL/L (ref 3–16)
BASOPHILS ABSOLUTE: 0 K/UL (ref 0–0.2)
BASOPHILS RELATIVE PERCENT: 0.1 %
BLOOD CULTURE, ROUTINE: ABNORMAL
BLOOD CULTURE, ROUTINE: ABNORMAL
BUN BLDV-MCNC: 62 MG/DL (ref 7–20)
CALCIUM SERPL-MCNC: 9.1 MG/DL (ref 8.3–10.6)
CHLORIDE BLD-SCNC: 104 MMOL/L (ref 99–110)
CO2: 26 MMOL/L (ref 21–32)
CREAT SERPL-MCNC: 1.3 MG/DL (ref 0.9–1.3)
CULTURE, BLOOD 2: ABNORMAL
CULTURE, RESPIRATORY: NORMAL
EKG ATRIAL RATE: 98 BPM
EKG DIAGNOSIS: NORMAL
EKG P AXIS: 37 DEGREES
EKG P-R INTERVAL: 142 MS
EKG Q-T INTERVAL: 306 MS
EKG QRS DURATION: 82 MS
EKG QTC CALCULATION (BAZETT): 390 MS
EKG R AXIS: 126 DEGREES
EKG T AXIS: 52 DEGREES
EKG VENTRICULAR RATE: 98 BPM
EOSINOPHILS ABSOLUTE: 0 K/UL (ref 0–0.6)
EOSINOPHILS RELATIVE PERCENT: 0.3 %
GFR AFRICAN AMERICAN: >60
GFR NON-AFRICAN AMERICAN: 58
GLUCOSE BLD-MCNC: 109 MG/DL (ref 70–99)
GLUCOSE BLD-MCNC: 94 MG/DL (ref 70–99)
GRAM STAIN RESULT: NORMAL
HCT VFR BLD CALC: 34.7 % (ref 40.5–52.5)
HEMOGLOBIN: 11.5 G/DL (ref 13.5–17.5)
LYMPHOCYTES ABSOLUTE: 0.9 K/UL (ref 1–5.1)
LYMPHOCYTES RELATIVE PERCENT: 7.3 %
MAGNESIUM: 2.4 MG/DL (ref 1.8–2.4)
MCH RBC QN AUTO: 28.8 PG (ref 26–34)
MCHC RBC AUTO-ENTMCNC: 33.2 G/DL (ref 31–36)
MCV RBC AUTO: 86.5 FL (ref 80–100)
MONOCYTES ABSOLUTE: 0.4 K/UL (ref 0–1.3)
MONOCYTES RELATIVE PERCENT: 3.3 %
NEUTROPHILS ABSOLUTE: 11.2 K/UL (ref 1.7–7.7)
NEUTROPHILS RELATIVE PERCENT: 89 %
ORGANISM: ABNORMAL
PDW BLD-RTO: 17.2 % (ref 12.4–15.4)
PERFORMED ON: NORMAL
PLATELET # BLD: 132 K/UL (ref 135–450)
PMV BLD AUTO: 9.7 FL (ref 5–10.5)
POTASSIUM REFLEX MAGNESIUM: 4.4 MMOL/L (ref 3.5–5.1)
RBC # BLD: 4.01 M/UL (ref 4.2–5.9)
SODIUM BLD-SCNC: 140 MMOL/L (ref 136–145)
WBC # BLD: 12.6 K/UL (ref 4–11)

## 2021-10-09 PROCEDURE — 97535 SELF CARE MNGMENT TRAINING: CPT

## 2021-10-09 PROCEDURE — 6370000000 HC RX 637 (ALT 250 FOR IP): Performed by: INTERNAL MEDICINE

## 2021-10-09 PROCEDURE — 99233 SBSQ HOSP IP/OBS HIGH 50: CPT | Performed by: INTERNAL MEDICINE

## 2021-10-09 PROCEDURE — 2060000000 HC ICU INTERMEDIATE R&B

## 2021-10-09 PROCEDURE — 85025 COMPLETE CBC W/AUTO DIFF WBC: CPT

## 2021-10-09 PROCEDURE — 80048 BASIC METABOLIC PNL TOTAL CA: CPT

## 2021-10-09 PROCEDURE — 94761 N-INVAS EAR/PLS OXIMETRY MLT: CPT

## 2021-10-09 PROCEDURE — 2580000003 HC RX 258: Performed by: STUDENT IN AN ORGANIZED HEALTH CARE EDUCATION/TRAINING PROGRAM

## 2021-10-09 PROCEDURE — 6360000002 HC RX W HCPCS: Performed by: STUDENT IN AN ORGANIZED HEALTH CARE EDUCATION/TRAINING PROGRAM

## 2021-10-09 PROCEDURE — 6370000000 HC RX 637 (ALT 250 FOR IP): Performed by: STUDENT IN AN ORGANIZED HEALTH CARE EDUCATION/TRAINING PROGRAM

## 2021-10-09 PROCEDURE — 97116 GAIT TRAINING THERAPY: CPT

## 2021-10-09 PROCEDURE — 2700000000 HC OXYGEN THERAPY PER DAY

## 2021-10-09 PROCEDURE — 6360000002 HC RX W HCPCS: Performed by: INTERNAL MEDICINE

## 2021-10-09 PROCEDURE — 97162 PT EVAL MOD COMPLEX 30 MIN: CPT

## 2021-10-09 PROCEDURE — 94640 AIRWAY INHALATION TREATMENT: CPT

## 2021-10-09 PROCEDURE — 36415 COLL VENOUS BLD VENIPUNCTURE: CPT

## 2021-10-09 PROCEDURE — 93010 ELECTROCARDIOGRAM REPORT: CPT | Performed by: INTERNAL MEDICINE

## 2021-10-09 PROCEDURE — 2500000003 HC RX 250 WO HCPCS: Performed by: STUDENT IN AN ORGANIZED HEALTH CARE EDUCATION/TRAINING PROGRAM

## 2021-10-09 PROCEDURE — 83735 ASSAY OF MAGNESIUM: CPT

## 2021-10-09 PROCEDURE — 6360000002 HC RX W HCPCS

## 2021-10-09 PROCEDURE — 36592 COLLECT BLOOD FROM PICC: CPT

## 2021-10-09 PROCEDURE — 97530 THERAPEUTIC ACTIVITIES: CPT

## 2021-10-09 PROCEDURE — 97165 OT EVAL LOW COMPLEX 30 MIN: CPT

## 2021-10-09 RX ORDER — ARFORMOTEROL TARTRATE 15 UG/2ML
15 SOLUTION RESPIRATORY (INHALATION) 2 TIMES DAILY
Status: DISCONTINUED | OUTPATIENT
Start: 2021-10-09 | End: 2021-10-10 | Stop reason: HOSPADM

## 2021-10-09 RX ORDER — BUDESONIDE 0.25 MG/2ML
0.25 INHALANT ORAL 2 TIMES DAILY
Status: DISCONTINUED | OUTPATIENT
Start: 2021-10-09 | End: 2021-10-10 | Stop reason: HOSPADM

## 2021-10-09 RX ORDER — FLUTICASONE FUROATE AND VILANTEROL 100; 25 UG/1; UG/1
1 POWDER RESPIRATORY (INHALATION) DAILY
Status: DISCONTINUED | OUTPATIENT
Start: 2021-10-09 | End: 2021-10-09 | Stop reason: SDUPTHER

## 2021-10-09 RX ORDER — CIPROFLOXACIN 2 MG/ML
400 INJECTION, SOLUTION INTRAVENOUS EVERY 12 HOURS
Status: DISCONTINUED | OUTPATIENT
Start: 2021-10-09 | End: 2021-10-10 | Stop reason: HOSPADM

## 2021-10-09 RX ORDER — IPRATROPIUM BROMIDE AND ALBUTEROL SULFATE 2.5; .5 MG/3ML; MG/3ML
1 SOLUTION RESPIRATORY (INHALATION) EVERY 4 HOURS PRN
Status: DISCONTINUED | OUTPATIENT
Start: 2021-10-09 | End: 2021-10-10 | Stop reason: HOSPADM

## 2021-10-09 RX ADMIN — Medication 10 ML: at 09:09

## 2021-10-09 RX ADMIN — ACETAMINOPHEN 650 MG: 325 TABLET ORAL at 09:09

## 2021-10-09 RX ADMIN — TIOTROPIUM BROMIDE INHALATION SPRAY 2 PUFF: 3.12 SPRAY, METERED RESPIRATORY (INHALATION) at 13:19

## 2021-10-09 RX ADMIN — SODIUM CHLORIDE 25 ML: 9 INJECTION, SOLUTION INTRAVENOUS at 20:59

## 2021-10-09 RX ADMIN — HEPARIN SODIUM 5000 UNITS: 5000 INJECTION INTRAVENOUS; SUBCUTANEOUS at 21:01

## 2021-10-09 RX ADMIN — CEFEPIME HYDROCHLORIDE 2000 MG: 2 INJECTION, POWDER, FOR SOLUTION INTRAVENOUS at 06:56

## 2021-10-09 RX ADMIN — ARFORMOTEROL TARTRATE 15 MCG: 15 SOLUTION RESPIRATORY (INHALATION) at 23:13

## 2021-10-09 RX ADMIN — BUDESONIDE 250 MCG: 0.25 SUSPENSION RESPIRATORY (INHALATION) at 13:18

## 2021-10-09 RX ADMIN — CIPROFLOXACIN 400 MG: 2 INJECTION, SOLUTION INTRAVENOUS at 09:12

## 2021-10-09 RX ADMIN — FAMOTIDINE 10 MG: 10 INJECTION, SOLUTION INTRAVENOUS at 09:09

## 2021-10-09 RX ADMIN — BUDESONIDE 250 MCG: 0.25 SUSPENSION RESPIRATORY (INHALATION) at 23:20

## 2021-10-09 RX ADMIN — Medication 30 ML: at 21:09

## 2021-10-09 RX ADMIN — HEPARIN SODIUM 5000 UNITS: 5000 INJECTION INTRAVENOUS; SUBCUTANEOUS at 15:46

## 2021-10-09 RX ADMIN — CIPROFLOXACIN 400 MG: 2 INJECTION, SOLUTION INTRAVENOUS at 21:01

## 2021-10-09 RX ADMIN — ARFORMOTEROL TARTRATE 15 MCG: 15 SOLUTION RESPIRATORY (INHALATION) at 13:18

## 2021-10-09 RX ADMIN — INSULIN LISPRO 2 UNITS: 100 INJECTION, SOLUTION INTRAVENOUS; SUBCUTANEOUS at 21:02

## 2021-10-09 RX ADMIN — HEPARIN SODIUM 5000 UNITS: 5000 INJECTION INTRAVENOUS; SUBCUTANEOUS at 06:56

## 2021-10-09 ASSESSMENT — PAIN SCALES - WONG BAKER

## 2021-10-09 ASSESSMENT — PAIN SCALES - GENERAL
PAINLEVEL_OUTOF10: 2
PAINLEVEL_OUTOF10: 2
PAINLEVEL_OUTOF10: 0

## 2021-10-09 ASSESSMENT — ENCOUNTER SYMPTOMS
ABDOMINAL PAIN: 0
BACK PAIN: 0

## 2021-10-09 NOTE — PROGRESS NOTES
Physical Therapy    Facility/Department: [facility] ICU  Initial Assessment and Treatment    NAME: Teresa Martinez  : 1965  MRN: [id number]    Date of Service: 10/9/2021    Discharge Recommendations:    Teresa Martinez scored a  17/24 on the AM-PAC short mobility form. Current research shows that an AM-PAC score of 18 or greater is typically associated with a discharge to the patient's home setting. Based on the patient's AM-PAC score and their current functional mobility deficits, it is recommended that the patient have 2-3 sessions per week of Physical Therapy at d/c to increase the patient's independence. At this time, this patient demonstrates the endurance and safety to discharge home with home services) and a follow up treatment frequency of 2-3x/wk. Please see assessment section for further patient specific details. If patient discharges prior to next session this note will serve as a discharge summary. Please see below for the latest assessment towards goals. PT Equipment Recommendations  Equipment Needed: No    Assessment   Body structures, Functions, Activity limitations: Decreased functional mobility ; Decreased endurance  Assessment: Pt is 51 yo M with decreased endurance for activity. Pt's O2 requirement increased from 3 L to 5 L O2 with activity due to SUAREZ and decreasing O2 sats. Pt with gait deviations that are unchanged with use of walker. Pt feels he is physically moving mildly off his baseline. Rec 24 hour assist initially and home PT. No DME needs. Will follow.   Treatment Diagnosis: Decreased endurance for activity  Prognosis: Good  Decision Making: Medium Complexity  PT Education: PT Role;Energy Conservation;General Safety  Patient Education: Pt verbalized understanding  REQUIRES PT FOLLOW UP: Yes  Activity Tolerance  Activity Tolerance: Patient limited by fatigue;Patient limited by endurance       Patient Diagnosis(es): The encounter diagnosis was Sepsis with acute respiratory failure and septic shock, due to unspecified organism, unspecified whether hypoxia or hypercapnia present ([facility] [address] has a past medical history of Acute respiratory failure ([facility] [address] CHF (congestive heart failure) ([facility] [address] COPD (chronic obstructive pulmonary disease) ([facility] [address] Diabetes mellitus ([facility] [address] Hyperlipidemia, Hypertension, and Oxygen dependent. has a past surgical history that includes Tonsillectomy and hernia repair. Restrictions  Position Activity Restriction  Other position/activity restrictions: No activity orders     Vision/Hearing  Vision: Impaired  Vision Exceptions: Wears glasses at all times  Hearing: Within functional limits       Subjective  General  Chart Reviewed: Yes  Additional Pertinent Hx: Pt admitted on 10/6/21 with sepsis with acute respiratory failure. Pt intubated in ER at [place]. [place] and air cared to [facility]. Pt extubated on 10/8/21. H/o CHF, COPD, DM, HTN, ARF, home O2.   Referring Practitioner: Dr. Mariam Gonzaelz  Diagnosis: Sepsis with ARF  Subjective  Subjective: Pt supine in bed and agreeable to PT  Pain Screening  Patient Currently in Pain: Denies    Orientation  Orientation  Overall Orientation Status: Impaired (\"Hospital\")  Orientation Level: Disoriented to time;Oriented to person     Social/Functional History  Social/Functional History  Lives With: Spouse  Type of Home: Apartment  Home Layout: One level  Home Access: Stairs to enter with rails  Entrance Stairs - Number of Steps: 2  Bathroom Shower/Tub: Tub/Shower unit  Bathroom Toilet: Standard (w/ vanity for leverage)  Bathroom Equipment: Grab bars in shower, Shower chair, Hand-held shower  Bathroom Accessibility: Accessible  Home Equipment: 4 wheeled walker, Oxygen (1 L home O2)  ADL Assistance: Independent  Homemaking Assistance: Needs assistance (Shares duties, wife does laundry)  Ambulation Assistance: Independent  Transfer Assistance: Independent  Active : No (Wife drives)  Additional Comments: Pt goes grocery shopping with wife and rides scooter. Denies h/o falls. Objective  AROM RLE (degrees)  RLE AROM: WFL  AROM LLE (degrees)  LLE AROM : WFL     Strength RLE  Strength RLE: WFL  Strength LLE  Strength LLE: WFL     Bed mobility  Supine to Sit: Stand by assistance (HOB elevated, using rail, effortful)  Scooting: Stand by assistance     Transfers  Sit to Stand: Contact guard assistance  Stand to sit: Contact guard assistance     Ambulation  More Ambulation?: Yes  Ambulation 1  Device: No Device  Other Apparatus: O2 (5 L O2)  Assistance: Contact guard assistance  Quality of Gait: Limp with gait, SUAREZ  Gait Deviations: Slow Kaycee;Decreased step length;Decreased step height  Distance: 20 feet x 2  Comments: O2 sats 89% following bed mobility on 3 L O2, O2 increased to 5 L O2 and O2 sats improved to 96%. Seated respiratory rate 26 at rest and pt breathing with accessory muscles.   Ambulation 2  Device 2: No device  Other Apparatus 2: O2 (5 L)  Assistance 2: Contact guard assistance  Quality of Gait 2: Limp with gait and SUAREZ  Gait Deviations: Slow Kaycee;Decreased step length;Decreased step height  Distance: 15 feet  Comments: Pt's limp and level of assist unchanged with use of walker     Balance  Sitting - Static: Good  Standing - Static: Fair  Standing - Dynamic: Fair;-    Treatment:  Functional mobility training and pt education    Plan   Plan  Times per week: 2-5  Current Treatment Recommendations: Functional Mobility Training, Gait Training, Endurance Training, Transfer Training, Safety Education & Training, Cognitive Reorientation  Safety Devices  Type of devices: Call light within reach, Chair alarm in place, Left in chair, Nurse notified    Goals  Short term goals  Time Frame for Short term goals: by discharge  Short term goal 1: Pt will perform bed mobility with supervision  Short term goal 2: Pt will transfer sit to and from stand with SBA  Short term goal 3: Pt will ambulate 80 feet with SBA and O2    Therapy Time   Individual Concurrent Group Co-treatment   Time In 1120         Time Out 1200         Minutes 40           Timed Code Treatment Minutes:   25    Total Treatment Minutes:  [address], PT

## 2021-10-09 NOTE — PROGRESS NOTES
Occupational Therapy   Occupational Therapy Initial Assessment and Treatment    Date: 10/9/2021   Patient Name: Clay Mcpherson  MRN: [id number]     : 1965    Date of Service: 10/9/2021    Discharge Recommendations:  Clay Mcpherson scored a 20/24 on the AM-PAC ADL Inpatient form. Current research shows that an AM-PAC score of 18 or greater is typically associated with a discharge to the patient's home setting. Based on the patient's AM-PAC score, and their current ADL deficits, it is recommended that the patient have 2-3 sessions per week of Occupational Therapy at d/c to increase the patient's independence. At this time, this patient demonstrates the endurance and safety to discharge home with home services and a follow up treatment frequency of 2-3x/wk. Please see assessment section for further patient specific details. HOME HEALTH CARE: LEVEL 1 STANDARD    - Initial home health evaluation to occur within 24-48 hours, in patient home   - Therapy to evaluate with goal of regaining prior level of functioning   - Therapy to evaluate if patient has [id number] [facility] needs for personal care      If patient discharges prior to next session this note will serve as a discharge summary. Please see below for the latest assessment towards goals. OT Equipment Recommendations  Equipment Needed: No    Assessment   Performance deficits / Impairments: Decreased functional mobility ; Decreased ADL status; Decreased endurance  Assessment: Functioning below independent baseline. Pt does wear 1L O2 per NC at home - currently, wearing 5L. Pt desaturated on 3L and nurse increased O2 to 5L. Pt is SOB on min exertion - has baseline SOB, but reports present SOB is increased from baseline. Plans for home at discharge - reports wife can provide initial 25 hrA. Will benefit from OT at home. Continue per POC.   Treatment Diagnosis: impaired ADLs/transfers and decreased activity tolerance  Decision Making: Low Complexity  OT History  Lives With: Spouse  Type of Home: Apartment  Home Layout: One level  Home Access: Stairs to enter with rails  Entrance Stairs - Number of Steps: 2  Bathroom Shower/Tub: Tub/Shower unit  Bathroom Toilet: Standard (w/ vanity for leverage)  Bathroom Equipment: Grab bars in shower, Shower chair, Hand-held shower  Bathroom Accessibility: Accessible  Home Equipment: 4 wheeled walker, Oxygen (1 L home O2)  ADL Assistance: [address]: Needs assistance (Shares duties, wife does laundry)  Ambulation Assistance: Independent  Transfer Assistance: Independent  Active : No (Wife drives)  Additional Comments: Pt goes grocery shopping with wife and rides scooter. Denies h/o falls. Objective   Vision: Impaired  Vision Exceptions: Wears glasses at all times  Hearing: Within functional limits      Orientation  Overall Orientation Status: Impaired  Orientation Level: Oriented to person (oriented to \"hospital\" not which one; oriented to year, not month; somewhat oriented to situation)        Balance  Sitting Balance: Stand by assistance  Standing Balance:  (SBA - static; CGA - dynamic/mobility)    Standing Balance  Time: ~60-90 sec (x2)  Comment: SOB; fatigued    Functional Mobility  Functional - Mobility Device: No device  Activity: To/from bathroom; Other (10' to toilet; ~25' mobliity in room)  Assist Level: Contact guard assistance  Functional Mobility Comments: Note: mildly unsteady, had 2 LOB requiring CG to recover    Toilet Transfers  Toilet - Technique: Ambulating  Equipment Used: Standard toilet (w/ bar)  Toilet Transfer: Contact guard assistance    ADL  LE Dressing: Contact guard assistance; Increased time to complete  Toileting: Contact guard assistance (denied need to use toilet; simulated task)     Coordination  Movements Are Fluid And Coordinated: Yes        Bed mobility  Supine to Sit: Stand by assistance (HOB elevated, using rail, effortful)  Scooting: Stand by assistance Transfers  Sit to stand: Contact guard assistance  Stand to sit: Contact guard assistance        Cognition  Cognition Comment: pleasant, cooperative, follows simple directions           Sensation  Overall Sensation Status: Impaired (neuropathy hands/feet)             LUE Strength  Gross LUE Strength: WFL  RUE Strength  Gross RUE Strength: WFL               Pt seen by OT for eval and treat.  Treatment included: bed mobility, functional transfer/mobility, ADL, pt education         Plan   Plan  Times per week: 2-5  Times per day: Daily  Current Treatment Recommendations: Functional Mobility Training, Endurance Training, Safety Education & Training, Equipment Evaluation, Education, & procurement, Self-Care / ADL, Home Management Training                                                      AM-PAC Score        AM-[facility] Inpatient Daily Activity Raw Score: 20 (10/09/21 1230)  AM-PAC Inpatient ADL T-Scale Score : 42.03 (10/09/21 1230)  ADL Inpatient CMS 0-100% Score: 38.32 (10/09/21 1230)  ADL Inpatient CMS G-Code Modifier : María Jose (10/09/21 1230)    Goals  Short term goals  Time Frame for Short term goals: Discharge  Short term goal 1: toilet transfer w/ Supervision  Short term goal 2: increase standing tolerance to 5 minutes for ADL, SBA  Short term goal 3: LB dressing w/ SBA  Short term goal 4: verbalize 3 energy conservation techniques to utilize at home  Short term goal 5: functional mobility for item retrieval, SBA  Patient Goals   Patient goals : to go home       Therapy Time   Individual Concurrent Group Co-treatment   Time In 1120         Time Out 1158         Minutes 38           Timed Code Treatment Minutes:   23    Total Treatment Minutes:  [facility] OTR/L #[id number]

## 2021-10-09 NOTE — PROGRESS NOTES
While updating patients wife learned they do have a pulse oximeter however they were unsure how to use it or what it meant. Was able to educate patient and his wife via facetime on how to use their pulse oximeter. Patients wife was able to demonstrate use, explained what the numbers meant. Voiced understanding. Pt is currently dependent on oxygen. Has no desire to stop smoking and currently smokes 2 packs per day. Educated on risks of smoking while on oxygen. Pt voiced understanding, states he will remove oxygen to smoke. States he is not comfortable with the chemicals in vaping or nicotine inhalers so prefers to keep smoking cigarettes.

## 2021-10-09 NOTE — PROGRESS NOTES
Nephrology  Note                                                                                                                                                                                                                                                                                                                                                               Office : [phone]     Fax :[phone]              Patient's Name: Noemy Covington UO   On IVF   Cr better   No diarrhea   Reinoso in place     Past Medical History:   Diagnosis Date    Acute respiratory failure ([address] 07/11/2021    CHF (congestive heart failure) (HCC)     combined    COPD (chronic obstructive pulmonary disease) (HCC)     Diabetes mellitus (HCC)     Hyperlipidemia     Hypertension     Oxygen dependent        Past Surgical History:   Procedure Laterality Date    HERNIA REPAIR      TONSILLECTOMY         Family History   Problem Relation Age of Onset    Asthma Mother     Cancer Mother     Hearing Loss Mother     Vision Loss Mother     High Blood Pressure Father     High Cholesterol Father     Vision Loss Father     Asthma Sister     Diabetes Sister     Vision Loss Sister     Asthma Brother     Arthritis Brother     High Blood Pressure Brother         reports that he has been smoking. He has been smoking about 1.00 pack per day. He has never used smokeless tobacco. He reports current alcohol use. He reports that he does not use drugs. Allergies:  Patient has no known allergies.     Current Medications:    insulin glargine (LANTUS;BASAGLAR) injection pen 25 Units, Nightly  glucose (GLUTOSE) 40 % oral gel 15 g, PRN  dextrose 50 % IV solution, PRN  glucagon (rDNA) injection 1 mg, PRN  dextrose 5 % solution, PRN  insulin lispro (1 Unit Dial) 0-12 Units, TID WC  insulin lispro (1 Unit Dial) 0-6 Units, Nightly  cefepime (MAXIPIME) 2000 mg IVPB minibag, Q12H  heparin (porcine) injection 5,000 Units, 3 times per 48.5*     INR:   Recent Labs     10/06/21  0920   INR 1.17*     UA:  Recent Labs     10/06/21  0920 10/06/21  0920 10/06/21  2200   COLORU Yellow  --   --    CLARITYU SL CLOUDY*  --   --    GLUCOSEU Negative  --   --    BILIRUBINUR Negative  --   --    KETUA Negative  --   --    SPECGRAV 1.020  --   --    BLOODU SMALL*  --   --    PHUR 5.0   < > 5.0   PROTEINU TRACE*  --   --    UROBILINOGEN 0.2  --   --    NITRU Negative  --   --    LEUKOCYTESUR SMALL*  --   --    LABMICR YES  --   --    URINETYPE NotGiven  --   --     < > = values in this interval not displayed. Urine Microscopic:   Recent Labs     10/06/21  0920   BACTERIA 2+*   WBCUA 10-20*   RBCUA 3-4   EPIU 0-1     Urine Culture:   Recent Labs     10/06/21  0920   LABURIN >100,000 CFU/ml     Urine Chemistry:   Recent Labs     10/06/21  1830 10/06/21  2200   LABCREA  --  36.2*  36.2*   PROTEINUR  --  14.00*   NAUR <20  --              IMAGING:  XR CHEST PORTABLE   Final Result   1. There is an endotracheal tube in place with the tip located 2.5 cm    from the leandro. There is an NG tube extending into the stomach. There    is a right internal jugular central venous catheter terminating in the    superior vena cava. 2.  Possible trace right pleural effusion. XR ABDOMEN (KUB) (SINGLE AP VIEW)   Final Result      Orogastric tube terminates over the gastric body. US RENAL COMPLETE   Final Result      No evidence of cholelithiasis or acute cholecystitis. No evidence of biliary obstruction. Cystic lesion in the right kidney measuring 1.5 cm which is incompletely evaluated. US ABDOMEN LIMITED   Final Result      No evidence of cholelithiasis or acute cholecystitis. No evidence of biliary obstruction. Cystic lesion in the right kidney measuring 1.5 cm which is incompletely evaluated. CT HEAD WO CONTRAST   Final Result      No evidence of acute intracranial abnormality.        XR CHEST PORTABLE   Final Result ET tube in place above the leandro. Right IJ central venous catheter placed, its tip in the distal SVC. No evidence of pneumothorax. Diffuse ill-defined airspace density throughout both lungs, right greater than left. Findings appear to have progressed slightly since the earlier study             Assessment/Plan   1. LILLIAN     2. Septic shock     3. Anemia    4. Acid- base/ Electrolyte imbalance     5.  Acidosis     Plan   - d/c iVF   - Cr better   - Bicarb better   - UO is better   - BP better   - off pressors   - Abx   - Cx   - labs in am     Recommend to dose adjust all medications  based on renal functions  Maintain SBP> 90 mmHg   Daily weights   AVOID NSAIDs  Avoid Nephrotoxins  Monitor Intake/Output  Call if significant decrease in urine output                 Thank you for allowing us to participate in care of Fidelina Curtis MD  Feel free to contact me   [facility] [address]  Office : [phone]  Fax :[phone]

## 2021-10-09 NOTE — PROGRESS NOTES
Nephrology  Note                                                                                                                                                                                                                                                                                                                                                               Office : [phone]     Fax :[phone]              Patient's Name: Chris Gottlieb        Good UO    Cr better   No diarrhea   Reinoso in place     Past Medical History:   Diagnosis Date    Acute respiratory failure ([address] 07/11/2021    CHF (congestive heart failure) (HCC)     combined    COPD (chronic obstructive pulmonary disease) (HCC)     Diabetes mellitus (HCC)     Hyperlipidemia     Hypertension     Oxygen dependent        Past Surgical History:   Procedure Laterality Date    HERNIA REPAIR      TONSILLECTOMY         Family History   Problem Relation Age of Onset    Asthma Mother     Cancer Mother     Hearing Loss Mother     Vision Loss Mother     High Blood Pressure Father     High Cholesterol Father     Vision Loss Father     Asthma Sister     Diabetes Sister     Vision Loss Sister     Asthma Brother     Arthritis Brother     High Blood Pressure Brother         reports that he has been smoking. He has been smoking about 1.00 pack per day. He has never used smokeless tobacco. He reports current alcohol use. He reports that he does not use drugs. Allergies:  Patient has no known allergies.     Current Medications:    ciprofloxacin (CIPRO) IVPB 400 mg, Q12H  ipratropium-albuterol (DUONEB) nebulizer solution 1 ampule, Q4H PRN  tiotropium (SPIRIVA RESPIMAT) 2.5 MCG/ACT inhaler 2 puff, Daily  budesonide (PULMICORT) nebulizer suspension 250 mcg, BID   And  Arformoterol Tartrate (BROVANA) nebulizer solution 15 mcg, BID  insulin glargine (LANTUS;BASAGLAR) injection pen 25 Units, Nightly  glucose (GLUTOSE) 40 % oral gel 15 g, PRN  dextrose 50 % IV solution, PRN  glucagon (rDNA) injection 1 mg, PRN  dextrose 5 % solution, PRN  insulin lispro (1 Unit Dial) 0-12 Units, TID WC  insulin lispro (1 Unit Dial) 0-6 Units, Nightly  heparin (porcine) injection 5,000 Units, 3 times per day  sodium chloride flush 0.9 % injection 5-40 mL, 2 times per day  sodium chloride flush 0.9 % injection 5-40 mL, PRN  0.9 % sodium chloride infusion, PRN  acetaminophen (TYLENOL) tablet 650 mg, Q6H PRN   Or  acetaminophen (TYLENOL) suppository 650 mg, Q6H PRN  famotidine (PEPCID) injection 10 mg, Daily  glucose (GLUTOSE) 40 % oral gel 15 g, PRN          Physical exam:     Vitals:  /73   Pulse 82   Temp 98.6 °F (37 °C) (Oral)   Resp 23   Ht 4' 11.02\" (1.499 m)   Wt 189 lb 9.5 oz (86 kg)   SpO2 97%   BMI 38.27 kg/m²     Skin: no rash, turgor wnl  Heent:  eomi, mmm  Neck: no bruits or jvd noted  Cardiovascular:  S1, S2 without m/r/g  Respiratory: CTA B without w/r/r  Abdomen:  +bs, soft, nt, nd  Ext: + lower extremity edema  Musculoskeletal:  Rom, muscular strength dec     Data:   Labs:  CBC:   Recent Labs     10/07/21  0533 10/08/21  0505 10/09/21  0444   WBC 16.4* 16.1* 12.6*   HGB 10.9* 10.7* 11.5*   * 120* 132*     BMP:    Recent Labs     10/07/21  0533 10/08/21  0505 10/09/21  0444    144 140   K 4.9 4.7 4.4    107 104   CO2 21 26 26   BUN 94* 82* 62*   CREATININE 3.0* 1.9* 1.3   GLUCOSE 330* 213* 109*     Ca/Mg/Phos:   Recent Labs     10/06/21  2000 10/06/21  2000 10/06/21  2114 10/06/21  2114 10/07/21  0533 10/08/21  0505 10/09/21  0444   CALCIUM 8.6   < > 6.5*   < > 8.7 8.9 9.1   MG  --   --   --    < > 2.70* 2.70* 2.40   PHOS 6.3*  --  4.6  --   --   --   --     < > = values in this interval not displayed. Hepatic:   No results for input(s): AST, ALT, ALB, BILITOT, ALKPHOS in the last 72 hours. Troponin:   Recent Labs     10/06/21  1815   TROPONINI <0.01     BNP: No results for input(s): BNP in the last 72 hours.   Lipids: No results for input(s): CHOL, TRIG, HDL, LDLCALC, LABVLDL in the last 72 hours. ABGs:   Recent Labs     10/08/21  1117   PHART 7.384   PO2ART 54.0*   PKM9ALH 48.5*     INR:   No results for input(s): INR in the last 72 hours. UA:  Recent Labs     10/06/21  2200   PHUR 5.0      Urine Microscopic:   No results for input(s): LABCAST, BACTERIA, COMU, HYALCAST, WBCUA, RBCUA, EPIU in the last 72 hours. Urine Culture:   No results for input(s): LABURIN in the last 72 hours. Urine Chemistry:   Recent Labs     10/06/21  1830 10/06/21  2200   LABCREA  --  36.2*  36.2*   PROTEINUR  --  14.00*   NAUR <20  --              IMAGING:  XR CHEST PORTABLE   Final Result   1. There is an endotracheal tube in place with the tip located 2.5 cm    from the leandro. There is an NG tube extending into the stomach. There    is a right internal jugular central venous catheter terminating in the    superior vena cava. 2.  Possible trace right pleural effusion. XR ABDOMEN (KUB) (SINGLE AP VIEW)   Final Result      Orogastric tube terminates over the gastric body. US RENAL COMPLETE   Final Result      No evidence of cholelithiasis or acute cholecystitis. No evidence of biliary obstruction. Cystic lesion in the right kidney measuring 1.5 cm which is incompletely evaluated. US ABDOMEN LIMITED   Final Result      No evidence of cholelithiasis or acute cholecystitis. No evidence of biliary obstruction. Cystic lesion in the right kidney measuring 1.5 cm which is incompletely evaluated. CT HEAD WO CONTRAST   Final Result      No evidence of acute intracranial abnormality. XR CHEST PORTABLE   Final Result      ET tube in place above the leandro. Right IJ central venous catheter placed, its tip in the distal SVC. No evidence of pneumothorax. Diffuse ill-defined airspace density throughout both lungs, right greater than left.  Findings appear to have progressed slightly since the earlier study             Assessment/Plan   1. LILLIAN     2. Septic shock     3. Anemia    4. Acid- base/ Electrolyte imbalance     5.  Acidosis     Plan   - d/c iVF   - Cr better   - Bicarb better   - UO is good   - BP better   - off pressors   - Abx   - Cx   - labs in am     Recommend to dose adjust all medications  based on renal functions  Maintain SBP> 90 mmHg   Daily weights   AVOID NSAIDs  Avoid Nephrotoxins  Monitor Intake/Output  Call if significant decrease in urine output                 Thank you for allowing us to participate in care of Berto Hutchison MD  Feel free to contact me   [facility] [address]  Office : [phone]  Fax :[phone]

## 2021-10-09 NOTE — PROGRESS NOTES
4 Eyes Admission Assessment     I agree as the admission nurse that 2 RN's have performed a thorough Head to Toe Skin Assessment on the patient. ALL assessment sites listed below have been assessed on admission. Areas assessed by both nurses:   [x]   Head, Face, and Ears   [x]   Shoulders, Back, and Chest  [x]   Arms, Elbows, and Hands   [x]   Coccyx, Sacrum, and Ischium: excoriation on buttocks  [x]   Legs, Feet, and Heels        Does the Patient have Skin Breakdown?   Yes a wound was noted on the Admission Assessment and an LDA was Initiated documentation include the [place]net[place]-wound, Wound Assessment, Measurements, Dressing Treatment, Drainage, and Color\",         Scott Prevention initiated:  Yes   Wound Care Orders initiated:  No      WOC nurse consulted for Pressure Injury (Stage 3,4, Unstageable, DTI, NWPT, and Complex wounds) or Scott score 18 or lower:  NA      Nurse 1 eSignature: Electronically signed by Heidy Chapman RN on 10/9/21 at 7:38 PM EDT    **SHARE this note so that the co-signing nurse is able to place an eSignature**    Nurse 2 eSignature: Electronically signed by Bryce Dubin, RN on 48/7/68 at 10:25 PM EDT

## 2021-10-09 NOTE — PROGRESS NOTES
ICU Progress Note    Admit Date: 10/6/2021  Day: 3  Vent Day: 3  IV Access: Peripheral, CVC  IV Fluids: Plasmalyte-A  Vasopressors: None                Antibiotics: Cefepime  Diet: ADULT DIET; Regular; 4 carb choices (60 gm/meal)    CC: AMS    Interval history: extubated yesterday on 3L o2      Medications:     Scheduled Meds:   insulin glargine  25 Units SubCUTAneous Nightly    insulin lispro  0-12 Units SubCUTAneous TID WC    insulin lispro  0-6 Units SubCUTAneous Nightly    cefepime  2,000 mg IntraVENous Q12H    heparin (porcine)  5,000 Units SubCUTAneous 3 times per day    sodium chloride flush  5-40 mL IntraVENous 2 times per day    famotidine (PEPCID) injection  10 mg IntraVENous Daily     Continuous Infusions:   dextrose      sodium chloride       PRN Meds:glucose, dextrose, glucagon (rDNA), dextrose, sodium chloride flush, sodium chloride, acetaminophen **OR** acetaminophen, glucose    Objective:   Vitals:   T-max:  Patient Vitals for the past 8 hrs:   BP Temp Temp src Pulse Resp SpO2   10/09/21 0600 (!) 154/133   82 24 95 %   10/09/21 0500 (!) 144/85   80 21 94 %   10/09/21 0400 134/67 98.8 °F (37.1 °C) Oral 77 26 95 %   10/09/21 0300 (!) 133/91   74 24 95 %   10/09/21 0200 120/71   72 21 96 %   10/09/21 0100 (!) 145/82   74 25 95 %   10/09/21 0000 (!) 184/154 98.3 °F (36.8 °C) Oral 76 21 94 %   10/08/21 2300 (!) 149/89   76 16 95 %       Intake/Output Summary (Last 24 hours) at 10/9/2021 0658  Last data filed at 10/9/2021 0253  Gross per 24 hour   Intake 2393.88 ml   Output 1950 ml   Net 443.88 ml       Review of Systems   Constitutional: Negative for fever. Cardiovascular: Negative for chest pain. Gastrointestinal: Negative for abdominal pain. Musculoskeletal: Negative for back pain, joint swelling and myalgias. Neurological: Negative for headaches. Physical Exam  Constitutional:       Appearance: He is obese. He is not ill-appearing, toxic-appearing or diaphoretic. 10/08/21  1117   PHART 7.298* 7.366 7.384   NYL5RIC 50.7* 45.0 48.5*   PO2ART 30.4* 78.8 54.0*   LCW6DWB 24.8 26 29   BEART -2 -0.1 2.4   [id number] 51* 95 85*   CLM4CUJ 26 27 30       INR:   Recent Labs     10/06/21  0920   INR 1.17*     Lactate: No results for input(s): LACTATE in the last 72 hours. Cultures:  -----------------------------------------------------------------  RAD:   XR CHEST PORTABLE   Final Result   1. There is an endotracheal tube in place with the tip located 2.5 cm    from the leandro. There is an NG tube extending into the stomach. There    is a right internal jugular central venous catheter terminating in the    superior vena cava. 2.  Possible trace right pleural effusion. XR ABDOMEN (KUB) (SINGLE AP VIEW)   Final Result      Orogastric tube terminates over the gastric body. US RENAL COMPLETE   Final Result      No evidence of cholelithiasis or acute cholecystitis. No evidence of biliary obstruction. Cystic lesion in the right kidney measuring 1.5 cm which is incompletely evaluated. US ABDOMEN LIMITED   Final Result      No evidence of cholelithiasis or acute cholecystitis. No evidence of biliary obstruction. Cystic lesion in the right kidney measuring 1.5 cm which is incompletely evaluated. CT HEAD WO CONTRAST   Final Result      No evidence of acute intracranial abnormality. XR CHEST PORTABLE   Final Result      ET tube in place above the leandro. Right IJ central venous catheter placed, its tip in the distal SVC. No evidence of pneumothorax. Diffuse ill-defined airspace density throughout both lungs, right greater than left. Findings appear to have progressed slightly since the earlier study               Assessment/Plan:     Acute hypoxic respiratory failure  On admission pt unresponsive with spO2 71%.  Pro-BNP 8,243, CXR (10/06) perihilar and lower zone airspace opacities have increased, particularly on the right. ABG 7.240, pCO2 45.6, pO2 44.3 and HCO3 19.4. Suspect cause of hypoxia to be sepsis vs possible volume overload     -Failed SBT (10/07), though pt improved consider SBT and extubatd on 10/8  -pt on 3L O2     Septic shock 2/2 E.coli bacteremia  On admission pt had fever of 103F, WBC 20, D-Dimer 1628, Lactic Acid 1.1. CXR (10/06) displayed perihilar and lower zone airspace opacities have increased, particularly on the right. Rapid covid and Covid-19 PCR both negative (10/06), though pt is unvaccinated.    -Respiratory culture,gram negative rods, gram + cocci  -Stopped levophed  -temps improved  -Cefepime day 4  -Blood cultures x2 (10/06), positive for E. Coli  -Urine culture (10/06)  -MRSA cx negative  -CBC daily     LILLIAN  On admission Cr 4.1, last Cr 1.2 on 10/01. K 5.7 admission.     -Plasmalyte-A 75 mL/hr  -BMP daily  -Mg daily  -Nephrology following     HFpEF  Chronic condition. Last EF 55% to 60% on 9/2021. Last admission on 9/30 pt was on a lasix drip. Pro-BNP 8,243 on admission. Follow up troponin < 0.01 (10/06).    -Pro-BNP 3870 (10/07)  -Pro-BNP every third day, starting      COPD  Chronic condition. Pt on multiple inhalers at home. Pt on 2L oxygen, Incruse, Breo and albuterol at home. Received high dose steroids during last admission on 9/30.     -nebulized inhalers, pulmicort, provana an dspiriva     DMT2  Chronic condition. Pt on metformin, glipizide, and insulin at home. Last admission (9/30) pt on HDSSI. Blood sugar elevated last evening on 10/07.     -Increase insulin  -Lantus 25u qHS  -Lispro 8u q6H  -MDSSI  -Hypoglycemia protocol  -POCT glucose q4H    Code Status: Full Code  FEN: ADULT DIET; Regular; 4 carb choices (60 gm/meal)  PPX: Heparin TID  DISPO: Aubrie Guadalupe MD, PGY-1  10/09/21  6:58 AM    This patient has been staffed and discussed with Dr. Royce Juárez. Patient was seen, examined and discussed with . I agree with the interval history.  My physical exam confirms the findings listed below  Chart was reviewed including labs and medical records confirm the findings noted     Acute respiratory failure on mechanical vent support.  Extubated on 10/8  Septic shock - resolved   E.coli bacteremia - pan sensitive   Leukocytosis is trending down    COPD - active smoking - on O2 at 2 liters at baseline   LILLIAN:  resolved   Hyperkalemia - resolved   Hyperglycemia      Agree on changing ABX to cipro  OK to transfer to the floor

## 2021-10-09 NOTE — PROGRESS NOTES
Hospitalist Progress Note      PCP: LINK Yu - CNP    Date of Admission: 10/6/2021    Chief Complaint: Altered mental status    Hospital Course:   Simeon Licea 50 y.o. male   - history of HFpEF (2/2021 ECHO showed grade 2 diastolic dysfunction, 04% EF, LV hypertrophy), COPD on 2 L of oxygen at home, diabetes, hypertension, hyperlipidemia  - recent admission 09/16 - 09/30 -- for acute on chronic respiratory failure with hypoxemia needing 40 L Vapotherm, diuresed with lasix gtt, repeated Chest CT revealed almost complete resolution of the airspace disease in the lung bases with minimal residual consolidation in the right lower lobe and post infectious scarring in the left lung base.  - 10/06 - presents to the ED w/ altered mental status  - in [place] ED, he was noted to have a temperature of 103F, WBC 20, , D-Dimer 1600, Cr 4.1 increased from 1.1, and his BNP was greater than 8,000. Pt was intubated, started on vasopressors. CXR (10/03) displayed perihilar and lower zone airspace opacities have increased, particularly on the right. - work up showed 2/2 Cx +ve for [facility]    Subjective:   Patient seen and examined. Out of bed in chair, denies chest pain or shortness of breath.       Medications:  Reviewed    Infusion Medications    dextrose      sodium chloride       Scheduled Medications    ciprofloxacin  400 mg IntraVENous Q12H    tiotropium  2 puff Inhalation Daily    budesonide  0.25 mg Nebulization BID    And    Arformoterol Tartrate  15 mcg Nebulization BID    insulin glargine  25 Units SubCUTAneous Nightly    insulin lispro  0-12 Units SubCUTAneous TID     insulin lispro  0-6 Units SubCUTAneous Nightly    heparin (porcine)  5,000 Units SubCUTAneous 3 times per day    sodium chloride flush  5-40 mL IntraVENous 2 times per day    famotidine (PEPCID) injection  10 mg IntraVENous Daily     PRN Meds: ipratropium-albuterol, glucose, dextrose, glucagon (rDNA), dextrose, sodium chloride flush, sodium chloride, acetaminophen **OR** acetaminophen, glucose      Intake/Output Summary (Last 24 hours) at 10/9/2021 1225  Last data filed at 10/9/2021 0600  Gross per 24 hour   Intake 2373.88 ml   Output 1500 ml   Net 873.88 ml       Physical Exam Performed:    /67   Pulse 93   Temp 98.8 °F (37.1 °C) (Oral)   Resp 28   Ht 4' 11.02\" (1.499 m)   Wt 189 lb 9.5 oz (86 kg)   SpO2 90%   BMI 38.27 kg/m²     General appearance: NAD  HEENT: Pupils equal, round, and reactive to light. Conjunctivae/corneas clear. Neck: Supple, with full range of motion. No jugular venous distention. Trachea midline. Respiratory:  Normal respiratory effort. bilateral air entry some scattered wheezing present  Cardiovascular: Regular rate and rhythm with normal S1/S2 without murmurs, rubs or gallops. Abdomen: Soft, non-tender, non-distended with normal bowel sounds. Musculoskeletal: No edema bilaterally. Full range of motion without deformity. Skin: Skin color, texture, turgor normal.  No rashes or lesions. Neurologic:    Moving all extremities, no FNDs  Psychiatric: alert oriented to person place and time  Capillary Refill: Brisk,< 3 seconds   Peripheral Pulses: +2 palpable, equal bilaterally       Labs:   Recent Labs     10/07/21  0533 10/08/21  0505 10/09/21  0444   WBC 16.4* 16.1* 12.6*   HGB 10.9* 10.7* 11.5*   HCT 33.8* 32.9* 34.7*   * 120* 132*     Recent Labs     10/06/21  1815 10/06/21  2000 10/06/21  2000 10/06/21  2114 10/07/21  0533 10/08/21  0505 10/09/21  0444   NA   < > 131*   < > 136 136 144 140   K   < > 6.4*   < > 4.1 4.9 4.7 4.4   CL   < > 99   < > 109 102 107 104   CO2   < > 15*   < > 12* 21 26 26   BUN   < > 99*   < > 84* 94* 82* 62*   CREATININE   < > 3.3*   < > 2.6* 3.0* 1.9* 1.3   CALCIUM   < > 8.6   < > 6.5* 8.7 8.9 9.1   PHOS  --  6.3*  --  4.6  --   --   --     < > = values in this interval not displayed.      No results for input(s): AST, ALT, BILIDIR, BILITOT, ALKPHOS in the last 72 hours. No results for input(s): INR in the last 72 hours. Recent Labs     10/06/21  1815   TROPONINI <0.01       Urinalysis:      Lab Results   Component Value Date    NITRU Negative 10/06/2021    WBCUA 10-20 10/06/2021    BACTERIA 2+ 10/06/2021    RBCUA 3-4 10/06/2021    BLOODU SMALL 10/06/2021    SPECGRAV 1.020 10/06/2021    GLUCOSEU Negative 10/06/2021       Radiology:  XR CHEST PORTABLE   Final Result   1. There is an endotracheal tube in place with the tip located 2.5 cm    from the leandro. There is an NG tube extending into the stomach. There    is a right internal jugular central venous catheter terminating in the    superior vena cava. 2.  Possible trace right pleural effusion. XR ABDOMEN (KUB) (SINGLE AP VIEW)   Final Result      Orogastric tube terminates over the gastric body. US RENAL COMPLETE   Final Result      No evidence of cholelithiasis or acute cholecystitis. No evidence of biliary obstruction. Cystic lesion in the right kidney measuring 1.5 cm which is incompletely evaluated. US ABDOMEN LIMITED   Final Result      No evidence of cholelithiasis or acute cholecystitis. No evidence of biliary obstruction. Cystic lesion in the right kidney measuring 1.5 cm which is incompletely evaluated. CT HEAD WO CONTRAST   Final Result      No evidence of acute intracranial abnormality. XR CHEST PORTABLE   Final Result      ET tube in place above the leandro. Right IJ central venous catheter placed, its tip in the distal SVC. No evidence of pneumothorax. Diffuse ill-defined airspace density throughout both lungs, right greater than left.  Findings appear to have progressed slightly since the earlier study                 Assessment/Plan:    Active Hospital Problems    Diagnosis Date Noted    Septic shock due to Escherichia coli ([facility] [address] [A41.51, R65.21] 10/07/2021     Priority: High    Bacteremia due to Escherichia coli [R78.81, B96.20] 10/07/2021 Priority: High    Sepsis with acute respiratory failure and septic shock (HCC) [A41.9, R65.21, J96.00] 10/06/2021     Acute metabolic encephalopathy due to bacteremia POA - resolved    Acute on chronic respiratory failure with hypoxemia possibly due to underlying septic shock from bacteremia, and flash pulmonary edema on admission. Baseline O2 is 2-3 L NC. Extubated 10/08, on 5 L oxygen doing much better and down to 2-3 L Oxygen, denies any shortness of breath or chest pain    Septic shock due to Ecoli bacteremia likely due to UTI; Senstivities reviewed, changed abx to Ciprofloxacin. Unsure what the source of bacteremia is, discussed with Dr. Taniya Knight, could be transient bacteremia in the setting of renal compromise state with steroid use  I will treat for 14 days total    Acute kidney injury - Cr 4.1 on admission, 1.2 recent dc, Likely due to hemodynamic changes from Septic shock  Continue strict input output,  Cr is now down to baseline  Avoid NSAIDs (Ibuprofen, Aleve, Motrin, Naproxen, Toradol etc), Fleet enemas, IV contrast Dye and other nephrotoxins! Nephrology following    COPD wo acute exacerbation  - continue breath rx as needed  - Add Duonebs prn  - Start spiriva scheduled  -No need for steroids    Type 2 DM: SSI, Hypoglycemia protocol  POC glucose checks  Monitor BG and asjust insulin accordingly      DVT Prophylaxis: Heparin sq  Diet: ADULT DIET;  Regular; 4 carb choices (60 gm/meal)   Code Status: Full Code    PT/OT Eval Status: 20/24 on the AM-PAC ADL Inpatient form  Dispo - Continue inpatient care, dc Sunday if stable    Linda Roy MD  Hopsitalist

## 2021-10-09 NOTE — PROGRESS NOTES
Pt transferred from ICU. Orientated to room/call light, connected to tele and assisted to the restroom.

## 2021-10-09 NOTE — PROGRESS NOTES
Transfer From ICU to Medical Floor    The patient was seen and examined. Mariam Littlejohn is a 50 y.o. male with a past medical history of HFp EF, COPD on 2 L of oxygen at home, T2DM, HLD, hypertension. Who presented with altered mental status. The patient was previously admitted to [facility] on 9/30 for an acute on chronic hypercapnic hypoxemic respiratory failure. During his admission the patient was on 4 L of Vapotherm and Lasix drip for 5 days you are receiving the steroids for COPD and was weened down to 3 L of oxygen at discharge. When patient was  in the facility [facility], he was noticed to have temperature of 103F, WBC 20, D-dimer 1600, creatinine 4.1 increased from his baseline of 1.1 and BNP greater than 8000. The patient was intubated started on vasopressors, chest x-ray showed perihilar and lower zone airspace opacities increased from the previous one, particularly on the right side. The patient was transferred to the ICU for further work-up and management of sepsis shock. Acute hypoxic respiratory failure- resolved  On admission 10/06 pt unresponsive with spO2 71%. Pro-BNP 8,243, CXR (10/06) perihilar and lower zone airspace opacities have increased, particularly on the right.  Failed SBT on 10/07. Patient was extubated on 10/8. On 10/9 the patient went back to his baseline oxygen requirement levels (3 L). Septic Shock, suspected - resolved  On admission pt had fever of 103F, WBC 20, D-Dimer 1628, Lactic Acid 1.1. CXR (10/06) displayed perihilar and lower zone airspace opacities have increased, particularly on the right. Rapid covid was negative (10/06), though pt is unvaccinated. Vanc and cefepime were started. Patient was hypotensive and required Levophed drip. MRSA was negative. Blood cultures and urine cultures were positive were positive for E. coli.      LILLIAN  Hyperkalemia  On admission Cr 4.1, last Cr 1.2 on 10/01. K 5.7 admission. Creatinine 1.3 and potassium 4.4 today.   Nephrology was consulted. Follow-up BMP daily    HFpEF  Chronic condition. Last EF 55% to 60% on 9/2021. Last admission on 9/30 pt was on a lasix drip. Pro-BNP 8,243 on admission. Follow up troponin < 0.01 (10/06). Please follow-up BNP  every third day.   COPD  Chronic condition. Pt on multiple inhalers at home. Pt on 2L oxygen, Incruse, Breo and albuterol at home. Received high dose steroids during last admission on 9/30. Solu-Medrol 40 mg every 12 hours were given. Patient is now on her baseline oxygen support.       DMT2  Chronic condition. Pt on metformin, glipizide, and insulin at home. Last admission (9/30) pt on HDSSI. Blood sugar elevated last evening on 10/07. Patient is on high-dose insulin sliding scale and hypoglycemia protocol. We will follow up blood glucose every 4 hours. Consider adding nightly Lantus.      The patient was under acceptable conditions to be transferred to the [facility]. Vitals:    10/09/21 1851   BP: (!) 148/72   Pulse: 82   Resp: 28   Temp: 98.4 °F (36.9 °C)   SpO2: 94%       Physical Exam  · General appearance: alert, appears stated age and cooperative  · Skin: Skin color, texture, turgor normal.   · HEENT: Head: Normocephalic, no lesions, without obvious abnormality. · Pharynx: Dental Hygiene adequate. Normal buccal mucosa. Normal pharynx. · Neck: no adenopathy, no carotid bruit, no JVD, supple, symmetrical, trachea midline and thyroid not enlarged, symmetric, no tenderness/mass/nodules  · Lungs: clear to auscultation bilaterally  · Heart: regular rate and rhythm, S1, S2 normal, no murmur, click, rub or gallop  · Abdomen: soft, non-tender; bowel sounds normal; no masses,  no organomegaly  · Extremities: extremities normal, atraumatic, no cyanosis or edema  · Neurologic: CN II-XII grossly intact.   Mental status: Alert, oriented, thought content appropriate        Intake/Output Summary (Last 24 hours) at 10/9/2021 1902  Last data filed at 10/9/2021 1600  Gross per 24 hour   Intake 1450.03 ml   Output 2075 ml   Net -624.97 ml     Lab Results   Component Value Date    CREATININE 1.3 10/09/2021    BUN 62 (H) 10/09/2021     10/09/2021    K 4.4 10/09/2021     10/09/2021    CO2 26 10/09/2021     Lab Results   Component Value Date    WBC 12.6 (H) 10/09/2021    HGB 11.5 (L) 10/09/2021    HCT 34.7 (L) 10/09/2021    MCV 86.5 10/09/2021     (L) 10/09/2021          Scheduled Meds:   ciprofloxacin  400 mg IntraVENous Q12H    tiotropium  2 puff Inhalation Daily    budesonide  0.25 mg Nebulization BID    And    Arformoterol Tartrate  15 mcg Nebulization BID    insulin glargine  25 Units SubCUTAneous Nightly    insulin lispro  0-12 Units SubCUTAneous TID WC    insulin lispro  0-6 Units SubCUTAneous Nightly    heparin (porcine)  5,000 Units SubCUTAneous 3 times per day    sodium chloride flush  5-40 mL IntraVENous 2 times per day    famotidine (PEPCID) injection  10 mg IntraVENous Daily     Continuous Infusions:   dextrose      sodium chloride       PRN Meds:ipratropium-albuterol, glucose, dextrose, glucagon (rDNA), dextrose, sodium chloride flush, sodium chloride, acetaminophen **OR** acetaminophen, glucose     Assessment and Plan       Septic Shock, 2/2 E Coli bacteremia  On admission pt had fever of 103F, WBC 20, D-Dimer 1628, Lactic Acid 1.1. CXR (10/06) displayed perihilar and lower zone airspace opacities have increased, particularly on the right. Rapid covid was negative (10/06), though pt is unvaccinated. Vanc and cefepime were started. Patient was hypotensive and required Levophed drip. MRSA was negative. Blood cultures and urine cultures were positive were positive for E. coli. -Continue ciprofloxacin     LILLIAN-Resolved  Hyperkalemia  On admission Cr 4.1, last Cr 1.2 on 10/01. K 5.7 admission. Creatinine 1.3 and potassium 4.4 today. Nephrology was consulted. -Follow-up BMP daily    HFpEF  Chronic condition. Last EF 55% to 60% on 9/2021.  Last admission on 9/30 pt was on a lasix drip. Pro-BNP 8,243 on admission. Follow up troponin < 0.01 (10/06). -Follow-up BNP  every third day.     COPD  Chronic condition. Pt on multiple inhalers at home. Pt on 2L oxygen, Incruse, Breo and albuterol at home. Received high dose steroids during last admission on 9/30. Solu-Medrol 40 mg every 12 hours were given. Patient is now on her baseline oxygen support.       DMT2  Chronic condition. Pt on metformin, glipizide, and insulin at home. Last admission (9/30) pt on HDSSI. Blood sugar elevated last evening on 10/07. -HDISS  -Consider adding nightly Lantus  -POCT glucose q4h       The patient was under acceptable conditions to be transferred to the [facility]. Code Status: Full Code  FEN: ADULT DIET; Regular; 4 carb choices (60 gm/meal)  PPX: Heparin TID  DISPO: IP      The objective and subjective findings as well as the ICU course of treatment have been reviewed with the ICU team. The treatment plan has been reviewed with the ICU team. The patient is being transferred to [place] [address] in stable condition.     Electronically signed by Gabby Bee MD on 10/9/21 at 3:42 PM EDT

## 2021-10-10 VITALS
DIASTOLIC BLOOD PRESSURE: 65 MMHG | RESPIRATION RATE: 16 BRPM | HEART RATE: 65 BPM | HEIGHT: 60 IN | OXYGEN SATURATION: 95 % | SYSTOLIC BLOOD PRESSURE: 132 MMHG | WEIGHT: 184.3 LBS | TEMPERATURE: 98.3 F | BODY MASS INDEX: 36.18 KG/M2

## 2021-10-10 LAB
ANION GAP SERPL CALCULATED.3IONS-SCNC: 8 MMOL/L (ref 3–16)
BASOPHILS ABSOLUTE: 0.1 K/UL (ref 0–0.2)
BASOPHILS RELATIVE PERCENT: 0.9 %
BUN BLDV-MCNC: 40 MG/DL (ref 7–20)
CALCIUM SERPL-MCNC: 9.2 MG/DL (ref 8.3–10.6)
CHLORIDE BLD-SCNC: 108 MMOL/L (ref 99–110)
CO2: 27 MMOL/L (ref 21–32)
CREAT SERPL-MCNC: 1.1 MG/DL (ref 0.9–1.3)
EOSINOPHILS ABSOLUTE: 0.1 K/UL (ref 0–0.6)
EOSINOPHILS RELATIVE PERCENT: 0.8 %
GFR AFRICAN AMERICAN: >60
GFR NON-AFRICAN AMERICAN: >60
GLUCOSE BLD-MCNC: 120 MG/DL (ref 70–99)
GLUCOSE BLD-MCNC: 150 MG/DL (ref 70–99)
HCT VFR BLD CALC: 33.5 % (ref 40.5–52.5)
HEMOGLOBIN: 11.3 G/DL (ref 13.5–17.5)
LYMPHOCYTES ABSOLUTE: 0.9 K/UL (ref 1–5.1)
LYMPHOCYTES RELATIVE PERCENT: 10.3 %
MAGNESIUM: 1.8 MG/DL (ref 1.8–2.4)
MCH RBC QN AUTO: 29.5 PG (ref 26–34)
MCHC RBC AUTO-ENTMCNC: 33.8 G/DL (ref 31–36)
MCV RBC AUTO: 87.1 FL (ref 80–100)
MONOCYTES ABSOLUTE: 0.4 K/UL (ref 0–1.3)
MONOCYTES RELATIVE PERCENT: 4.8 %
NEUTROPHILS ABSOLUTE: 7.2 K/UL (ref 1.7–7.7)
NEUTROPHILS RELATIVE PERCENT: 83.2 %
PDW BLD-RTO: 17.4 % (ref 12.4–15.4)
PERFORMED ON: ABNORMAL
PLATELET # BLD: 157 K/UL (ref 135–450)
PMV BLD AUTO: 8.6 FL (ref 5–10.5)
POTASSIUM REFLEX MAGNESIUM: 4.2 MMOL/L (ref 3.5–5.1)
PRO-BNP: 9952 PG/ML (ref 0–124)
RBC # BLD: 3.85 M/UL (ref 4.2–5.9)
SODIUM BLD-SCNC: 143 MMOL/L (ref 136–145)
WBC # BLD: 8.6 K/UL (ref 4–11)

## 2021-10-10 PROCEDURE — 83880 ASSAY OF NATRIURETIC PEPTIDE: CPT

## 2021-10-10 PROCEDURE — 94761 N-INVAS EAR/PLS OXIMETRY MLT: CPT

## 2021-10-10 PROCEDURE — 2700000000 HC OXYGEN THERAPY PER DAY

## 2021-10-10 PROCEDURE — 83735 ASSAY OF MAGNESIUM: CPT

## 2021-10-10 PROCEDURE — 80048 BASIC METABOLIC PNL TOTAL CA: CPT

## 2021-10-10 PROCEDURE — 6360000002 HC RX W HCPCS: Performed by: STUDENT IN AN ORGANIZED HEALTH CARE EDUCATION/TRAINING PROGRAM

## 2021-10-10 PROCEDURE — 6370000000 HC RX 637 (ALT 250 FOR IP): Performed by: INTERNAL MEDICINE

## 2021-10-10 PROCEDURE — 2500000003 HC RX 250 WO HCPCS: Performed by: STUDENT IN AN ORGANIZED HEALTH CARE EDUCATION/TRAINING PROGRAM

## 2021-10-10 PROCEDURE — 85025 COMPLETE CBC W/AUTO DIFF WBC: CPT

## 2021-10-10 PROCEDURE — 99232 SBSQ HOSP IP/OBS MODERATE 35: CPT | Performed by: INTERNAL MEDICINE

## 2021-10-10 PROCEDURE — 2580000003 HC RX 258: Performed by: STUDENT IN AN ORGANIZED HEALTH CARE EDUCATION/TRAINING PROGRAM

## 2021-10-10 PROCEDURE — 94640 AIRWAY INHALATION TREATMENT: CPT

## 2021-10-10 PROCEDURE — 6360000002 HC RX W HCPCS

## 2021-10-10 RX ORDER — FUROSEMIDE 20 MG/1
20 TABLET ORAL DAILY
Qty: 60 TABLET | Refills: 1 | Status: SHIPPED | OUTPATIENT
Start: 2021-10-10 | End: 2022-04-05

## 2021-10-10 RX ORDER — BLOOD-GLUCOSE METER
1 KIT MISCELLANEOUS DAILY
Qty: 1 KIT | Refills: 0 | Status: SHIPPED | OUTPATIENT
Start: 2021-10-10 | End: 2022-05-13 | Stop reason: SDUPTHER

## 2021-10-10 RX ORDER — CIPROFLOXACIN 500 MG/1
500 TABLET, FILM COATED ORAL 2 TIMES DAILY
Qty: 20 TABLET | Refills: 0 | Status: SHIPPED | OUTPATIENT
Start: 2021-10-10 | End: 2021-10-19 | Stop reason: ALTCHOICE

## 2021-10-10 RX ORDER — FUROSEMIDE 20 MG/1
20 TABLET ORAL DAILY
Qty: 60 TABLET | Refills: 1 | Status: SHIPPED | OUTPATIENT
Start: 2021-10-10 | End: 2021-10-10 | Stop reason: SDUPTHER

## 2021-10-10 RX ORDER — GLUCOSAMINE HCL/CHONDROITIN SU 500-400 MG
CAPSULE ORAL
Qty: 200 STRIP | Refills: 0 | Status: SHIPPED | OUTPATIENT
Start: 2021-10-10 | End: 2022-05-13 | Stop reason: SDUPTHER

## 2021-10-10 RX ORDER — FUROSEMIDE 20 MG/1
20 TABLET ORAL DAILY
Status: DISCONTINUED | OUTPATIENT
Start: 2021-10-10 | End: 2021-10-10 | Stop reason: HOSPADM

## 2021-10-10 RX ORDER — CIPROFLOXACIN 500 MG/1
500 TABLET, FILM COATED ORAL 2 TIMES DAILY
Qty: 20 TABLET | Refills: 0 | Status: SHIPPED | OUTPATIENT
Start: 2021-10-10 | End: 2021-10-10 | Stop reason: SDUPTHER

## 2021-10-10 RX ADMIN — TIOTROPIUM BROMIDE INHALATION SPRAY 2 PUFF: 3.12 SPRAY, METERED RESPIRATORY (INHALATION) at 09:15

## 2021-10-10 RX ADMIN — INSULIN LISPRO 2 UNITS: 100 INJECTION, SOLUTION INTRAVENOUS; SUBCUTANEOUS at 13:03

## 2021-10-10 RX ADMIN — HEPARIN SODIUM 5000 UNITS: 5000 INJECTION INTRAVENOUS; SUBCUTANEOUS at 04:46

## 2021-10-10 RX ADMIN — SODIUM CHLORIDE 25 ML: 9 INJECTION, SOLUTION INTRAVENOUS at 08:22

## 2021-10-10 RX ADMIN — BUDESONIDE 250 MCG: 0.25 SUSPENSION RESPIRATORY (INHALATION) at 09:13

## 2021-10-10 RX ADMIN — Medication 10 ML: at 08:20

## 2021-10-10 RX ADMIN — CIPROFLOXACIN 400 MG: 2 INJECTION, SOLUTION INTRAVENOUS at 08:23

## 2021-10-10 RX ADMIN — FUROSEMIDE 20 MG: 20 TABLET ORAL at 13:06

## 2021-10-10 RX ADMIN — FAMOTIDINE 10 MG: 10 INJECTION, SOLUTION INTRAVENOUS at 08:18

## 2021-10-10 RX ADMIN — ARFORMOTEROL TARTRATE 15 MCG: 15 SOLUTION RESPIRATORY (INHALATION) at 09:13

## 2021-10-10 ASSESSMENT — PAIN SCALES - WONG BAKER
WONGBAKER_NUMERICALRESPONSE: 0
WONGBAKER_NUMERICALRESPONSE: 0

## 2021-10-10 ASSESSMENT — PAIN SCALES - GENERAL
PAINLEVEL_OUTOF10: 0

## 2021-10-10 NOTE — PROGRESS NOTES
Provided shift updates to [facility], pt's wife, at this time.     Electronically signed by Charles Ramon RN on 61/92/7560 at 6:28 AM

## 2021-10-10 NOTE — PROGRESS NOTES
Discharge note: Patient has been seen by doctor. Discharge order obtained, and discharge instructions reviewed. Patient educated, using the teach back method, about follow up instructions and discharge instructions. A completed copy of the AVS instructions given to patient and all questions answered. IV catheters removed without complaints, catheter intact, sites WNL. Discharged to [facility] via wheel chair. Pts wife brought O2 tank from home- pt being discharged on 3L. Patient and wife educated on the importance of wearing oxygen at all times and smoking cessation.

## 2021-10-10 NOTE — DISCHARGE INSTR - COC
Continuity of Care Form    Patient Name: Noemy Merino   :  1965  MRN:  [id number]    Admit date:  10/6/2021  Discharge date:  10/10/2021    Code Status Order: Full Code   Advance Directives:      Admitting Physician:  Yakov Keene MD  PCP: Jenell Cranker, APRN - CNP    Discharging Nurse: [facility] Unit/Room#: 5119/9795-50  Discharging Unit Phone Number: [phone]    Emergency Contact:   Extended Emergency Contact Information  Primary Emergency Contact: Osmin Escobar  Address: [address] [place] [address]  [address] Phone: [phone]  Relation: Spouse  Secondary Emergency Contact: Avelina Cornel  Mobile Phone: [phone]  Relation: Spouse   needed? No    Past Surgical History:  Past Surgical History:   Procedure Laterality Date    HERNIA REPAIR      TONSILLECTOMY         Immunization History: There is no immunization history on file for this patient.     Active Problems:  Patient Active Problem List   Diagnosis Code    Hypertension I10    Mixed hyperlipidemia E78.2    CHF (congestive heart failure) ([facility]) I50.9    COPD exacerbation ([facility]) J44.1    Acute on chronic respiratory failure with hypoxia and hypercapnia ([facility]) J96.21, J96.22    CAD in native artery I25.10    Type 2 diabetes mellitus without complication ([facility]) N36.1    Tobacco use Z72.0    Chest pain R07.9    Elevated d-dimer R79.89    Organizing pneumonia ([facility]) J84.89    Severe hypoxemia R09.02    Sepsis with acute respiratory failure and septic shock ([facility]) A41.9, R65.21, J96.00    Septic shock due to Escherichia coli ([facility]) A41.51, R65.21    Bacteremia due to Escherichia coli R78.81, B96.20       Isolation/Infection:   Isolation            No Isolation          Patient Infection Status       Infection Onset Added Last Indicated Last Indicated By Review Planned Expiration Resolved Resolved By    None active    Resolved    C-diff Rule Out 10/06/21 10/06/21 10/06/21 Clostridium difficile toxin/antigen (Ordered)   10/08/21 Rose Correa, RN    Order discontinued    COVID-19 Rule Out 10/06/21 10/06/21 10/06/21 COVID-19 (Ordered)   10/07/21 Rule-Out Test Resulted    COVID-19 Rule Out 10/06/21 10/06/21 10/06/21 COVID-19, Rapid (Ordered)   10/06/21 Rule-Out Test Resulted    COVID-19 Rule Out 09/17/21 09/17/21 09/17/21 COVID-19 (Ordered)   09/18/21 Rule-Out Test Resulted    COVID-19 Rule Out 09/16/21 09/16/21 09/17/21 COVID-19 (Ordered)   09/17/21 Rule-Out Test Resulted    COVID-19 Rule Out 07/11/21 07/11/21 07/12/21 COVID-19 & Influenza Combo (Ordered)   07/12/21 Rule-Out Test Resulted    COVID-19 Rule Out 06/07/21 06/07/21 06/07/21 COVID-19 & Influenza Combo (Ordered)   06/07/21 Rule-Out Test Resulted    COVID-19 Rule Out 06/07/21 06/07/21 06/07/21 COVID-19, Rapid (Ordered)   06/07/21 Rule-Out Test Resulted    COVID-19 Rule Out 02/07/21 02/07/21 02/07/21 COVID-19 (Ordered)   02/07/21 Rule-Out Test Resulted            Nurse Assessment:  Last Vital Signs: BP (!) 147/74   Pulse 78   Temp 98.1 °F (36.7 °C) (Oral)   Resp 22   Ht 4' 11.02\" (1.499 m)   Wt 184 lb 4.8 oz (83.6 kg)   SpO2 94%   BMI 37.20 kg/m²     Last documented pain score (0-10 scale): Pain Level: 0  Last Weight:   Wt Readings from Last 1 Encounters:   10/10/21 184 lb 4.8 oz (83.6 kg)     Mental Status:  Alert and Orientated     IV Access:  - None    Nursing Mobility/ADLs:  Walking   Independent  Transfer  Independent  Bathing  Assisted  Dressing  Assisted  Toileting  Assisted  Feeding  Assisted  Med Admin  Assisted  Med Delivery   whole    Wound Care Documentation and Therapy:        Elimination:  Continence:   · Bowel:  Yes  · Bladder: Yes  Urinary Catheter: None   Colostomy/Ileostomy/Ileal Conduit: No       Date of Last BM: 10/10/2021    Intake/Output Summary (Last 24 hours) at 10/10/2021 1139  Last data filed at 10/10/2021 0930  Gross per 24 hour   Intake 1490 ml   Output 600 ml   Net 890 ml     I/O last 3 completed shifts: In: 7866 [P.O.:1280; I.V.:30; IV Piggyback:200]  Out: 800 [Urine:800]    Safety Concerns: At Risk for Falls    Impairments/Disabilities:      None    Nutrition Therapy:  Current Nutrition Therapy:   - Oral Diet:  General    Routes of Feeding: Oral  Liquids: No Restrictions  Daily Fluid Restriction: no  Last Modified Barium Swallow with Video (Video Swallowing Test): not done    Treatments at the Time of Hospital Discharge:   Respiratory Treatments: ***  Oxygen Therapy:  is on oxygen at 3 L/min per nasal cannula. Ventilator:    - No ventilator support    Rehab Therapies: ***  Weight Bearing Status/Restrictions: No weight bearing restirctions  Other Medical Equipment (for information only, NOT a DME order):  ***  Other Treatments: ***    Patient's personal belongings (please select all that are sent with patient):  None    RN SIGNATURE:  Electronically signed by Giselle Melendez RN on 10/10/21 at 1:31 PM EDT    CASE MANAGEMENT/SOCIAL WORK SECTION    Inpatient Status Date: ***    Readmission Risk Assessment Score:  Readmission Risk              Risk of Unplanned Readmission:  33           Discharging to Facility/ [address] [place] [address]       Phone: [phone]       Fax: [phone]        ·     / signature: Electronically signed by PRISCILLA Piña, LSW on 10/10/21 at 12:10 PM EDT    PHYSICIAN SECTION    Prognosis: Fair    Condition at Discharge: Stable    Rehab Potential (if transferring to Rehab): n/a    Recommended Labs or Other Treatments After Discharge:  - Vital signs monitoring: Nurse to perform BP, HR, RR, Temp, and Weight with each visit and teach patient and caregivers on taking daily.   Nurse to call physician if pulse less than 50 or greater than 120, respiratory rate less than 12 or greater than 25, oral temperature greater than or equal to 086 oF, systolic BP less than 90 or greater than 934, diastolic BP less than 50 or greater than 100. - Medication compliance and education for  new medications changes in previous medications safety concerns  - Consult Physical Therapy for  Evaluate and Treat  - Consult Occupational Therapy for  Evaluate and Treat    Physician Certification: I certify the above information and transfer of Cely Jiménez  is necessary for the continuing treatment of the diagnosis listed and that he requires [address] for less 30 days.      Update Admission H&P: No change in H&P    PHYSICIAN SIGNATURE:  Electronically signed by Patsy Womack MD on 10/10/21 at 11:39 AM EDT

## 2021-10-10 NOTE — PROGRESS NOTES
PRN  dextrose 50 % IV solution, PRN  glucagon (rDNA) injection 1 mg, PRN  dextrose 5 % solution, PRN  insulin lispro (1 Unit Dial) 0-12 Units, TID WC  insulin lispro (1 Unit Dial) 0-6 Units, Nightly  heparin (porcine) injection 5,000 Units, 3 times per day  sodium chloride flush 0.9 % injection 5-40 mL, 2 times per day  sodium chloride flush 0.9 % injection 5-40 mL, PRN  0.9 % sodium chloride infusion, PRN  acetaminophen (TYLENOL) tablet 650 mg, Q6H PRN   Or  acetaminophen (TYLENOL) suppository 650 mg, Q6H PRN  famotidine (PEPCID) injection 10 mg, Daily  glucose (GLUTOSE) 40 % oral gel 15 g, PRN          Physical exam:     Vitals:  BP (!) 152/69   Pulse 78   Temp 98.1 °F (36.7 °C) (Oral)   Resp 22   Ht 4' 11.02\" (1.499 m)   Wt 184 lb 4.8 oz (83.6 kg)   SpO2 94%   BMI 37.20 kg/m²     Skin: no rash, turgor wnl  Heent:  eomi, mmm  Neck: no bruits or jvd noted  Cardiovascular:  S1, S2 without m/r/g  Respiratory: CTA B without w/r/r  Abdomen:  +bs, soft, nt, nd  Ext: + lower extremity edema  Musculoskeletal:  Rom, muscular strength dec     Data:   Labs:  CBC:   Recent Labs     10/08/21  0505 10/09/21  0444 10/10/21  0453   WBC 16.1* 12.6* 8.6   HGB 10.7* 11.5* 11.3*   * 132* 157     BMP:    Recent Labs     10/08/21  0505 10/09/21  0444 10/10/21  0453    140 143   K 4.7 4.4 4.2    104 108   CO2 26 26 27   BUN 82* 62* 40*   CREATININE 1.9* 1.3 1.1   GLUCOSE 213* 109* 120*     Ca/Mg/Phos:   Recent Labs     10/08/21  0505 10/09/21  0444 10/10/21  0453   CALCIUM 8.9 9.1 9.2   MG 2.70* 2.40 1.80     Hepatic:   No results for input(s): AST, ALT, ALB, BILITOT, ALKPHOS in the last 72 hours. Troponin:   No results for input(s): TROPONINI in the last 72 hours. BNP: No results for input(s): BNP in the last 72 hours. Lipids: No results for input(s): CHOL, TRIG, HDL, LDLCALC, LABVLDL in the last 72 hours.   ABGs:   Recent Labs     10/08/21  1117   PHART 7.384   PO2ART 54.0*   TWT2VIT 48.5*     INR:   No results for input(s): INR in the last 72 hours. UA:  No results for input(s): [address], GLUCOSEU, BILIRUBINUR, KETUA, SPECGRAV, BLOODU, PHUR, PROTEINU, UROBILINOGEN, NITRU, LEUKOCYTESUR, [place] in the last 72 hours. Urine Microscopic:   No results for input(s): LABCAST, BACTERIA, COMU, HYALCAST, WBCUA, RBCUA, EPIU in the last 72 hours. Urine Culture:   No results for input(s): LABURIN in the last 72 hours. Urine Chemistry:   No results for input(s): Una Quitter, PROTEINUR, NAUR in the last 72 hours. IMAGING:  XR CHEST PORTABLE   Final Result   1. There is an endotracheal tube in place with the tip located 2.5 cm    from the leandro. There is an NG tube extending into the stomach. There    is a right internal jugular central venous catheter terminating in the    superior vena cava. 2.  Possible trace right pleural effusion. XR ABDOMEN (KUB) (SINGLE AP VIEW)   Final Result      Orogastric tube terminates over the gastric body. US RENAL COMPLETE   Final Result      No evidence of cholelithiasis or acute cholecystitis. No evidence of biliary obstruction. Cystic lesion in the right kidney measuring 1.5 cm which is incompletely evaluated. US ABDOMEN LIMITED   Final Result      No evidence of cholelithiasis or acute cholecystitis. No evidence of biliary obstruction. Cystic lesion in the right kidney measuring 1.5 cm which is incompletely evaluated. CT HEAD WO CONTRAST   Final Result      No evidence of acute intracranial abnormality. XR CHEST PORTABLE   Final Result      ET tube in place above the leandro. Right IJ central venous catheter placed, its tip in the distal SVC. No evidence of pneumothorax. Diffuse ill-defined airspace density throughout both lungs, right greater than left. Findings appear to have progressed slightly since the earlier study             Assessment/Plan   1. LILLIAN     2. Septic shock     3. Anemia    4. Acid- base/ Electrolyte imbalance     5.  Acidosis     Plan   - d/c iVF   - Cr better   - Bicarb better   - UO is good   - BP better   - off pressors   - off abx   - labs in am     Recommend to dose adjust all medications  based on renal functions  Maintain SBP> 90 mmHg   Daily weights   AVOID NSAIDs  Avoid Nephrotoxins  Monitor Intake/Output  Call if significant decrease in urine output                 Thank you for allowing us to participate in care of Ameena Hernandez MD  Feel free to contact me   [facility] [address]  Office : [phone]  Fax :[phone]

## 2021-10-10 NOTE — PLAN OF CARE
Problem: Non-Violent Restraints  Goal: Removal from restraints as soon as assessed to be safe  Outcome: Completed  Goal: No harm/injury to patient while restraints in use  Outcome: Completed  Goal: Patient's dignity will be maintained  Outcome: Completed     Problem: Falls - Risk of:  Goal: Will remain free from falls  Description: Will remain free from falls  Outcome: Completed  Goal: Absence of physical injury  Description: Absence of physical injury  Outcome: Completed     Problem: Skin Integrity:  Goal: Will show no infection signs and symptoms  Description: Will show no infection signs and symptoms  Outcome: Completed  Goal: Absence of new skin breakdown  Description: Absence of new skin breakdown  Outcome: Completed     Problem: Infection:  Goal: Will remain free from infection  Description: Will remain free from infection  Outcome: Completed     Problem: Safety:  Goal: Free from accidental physical injury  Description: Free from accidental physical injury  Outcome: Completed  Goal: Free from intentional harm  Description: Free from intentional harm  Outcome: Completed     Problem: Daily Care:  Goal: Daily care needs are met  Description: Daily care needs are met  Outcome: Completed     Problem: Pain:  Goal: Patient's pain/discomfort is manageable  Description: Patient's pain/discomfort is manageable  Outcome: Completed     Problem: Skin Integrity:  Goal: Skin integrity will stabilize  Description: Skin integrity will stabilize  Outcome: Completed     Problem: Discharge Planning:  Goal: Patients continuum of care needs are met  Description: Patients continuum of care needs are met  Outcome: Completed     Problem: Nutrition  Goal: Optimal nutrition therapy  Outcome: Completed     Problem: OXYGENATION/RESPIRATORY FUNCTION  Goal: Patient will maintain patent airway  Outcome: Completed  Goal: Patient will achieve/maintain normal respiratory rate/effort  Description: Respiratory rate and effort will be within normal limits for the patient  Outcome: Completed     Problem: HEMODYNAMIC STATUS  Goal: Patient has stable vital signs and fluid balance  Outcome: Completed     Problem: FLUID AND ELECTROLYTE IMBALANCE  Goal: Fluid and electrolyte balance are achieved/maintained  Outcome: Completed     Problem: ACTIVITY INTOLERANCE/IMPAIRED MOBILITY  Goal: Mobility/activity is maintained at optimum level for patient  Outcome: Completed

## 2021-10-10 NOTE — DISCHARGE SUMMARY
INTERNAL MEDICINE DEPARTMENT AT [address] [place] [address]  DISCHARGE SUMMARY    Patient ID: Alex Bermeo                                             Discharge Date: 10/10/2021   Patient's PCP: Tasha Antunez, APRN - CNP                                          Discharge Physician: Jed Orozco MD MD  Admit Date: 10/6/2021   Admitting Physician: Hilario Cameron MD    PROBLEMS DURING HOSPITALIZATION:  Hospital Problems         Last Modified POA    Sepsis with acute respiratory failure and septic shock ([facility] [address] 10/6/2021 Yes    Septic shock due to Escherichia coli ([facility] [address] 10/7/2021 Yes    Bacteremia due to Escherichia coli 10/7/2021 Yes        DISCHARGE DIAGNOSES:  Acute metabolic encephalopathy  Acute hypoxic respiratory failure  Septic Shock  LILLIAN  HFpEF  COPD  DMT2    Hospital Course:    61-year-old male with a past medical history of HFpEF, COPD on 2 L of oxygen at home, diabetes, hypertension and hyperlipidemia who presented to the hospital with acute metabolic encephalopathy. Marcela Shook was also found to be in respiratory distress and was diagnosed with acute on chronic respiratory failure with hypoxemia.  He was intubated.  The patient was subsequently diagnosed with septic shock and was found to have E. coli bacteremia.  He was started on fluids antibiotics and transferred to the ICU for vasopressors.  Gradually his septic shock improved and his oxygen requirements on the ventilator came down after which he had a successful spontaneous breathing trial and was extubated on October 8, 2021.  During admission he was found to have an LILLIAN as well likely secondary to hypovolemia and poor perfusion on a background of sepsis which resolved with fluids and vasopressors.  After improvement in his condition he was transferred out of the ICU and based on sensitivities was started on ciprofloxacin.  On the day of discharge he was completely asymptomatic and was resting in bed on his chronic 2 L of oxygen.  He was discharged on a 10-day course of ciprofloxacin to complete a 14-day course of antibiotics.  Given the fact that he has a history of HFpEF and was also recently admitted for hypoxia where he required 40 L of oxygen on Vapotherm and his condition improving with a Lasix drip (diuresing about 14 L) he was also started on Lasix 20 mg p.o. daily on this discharge as well.  Patient was instructed to follow-up with his PCP in 1 week for further health care management towards which he expressed understanding and was in agreement. Physical Exam:  BP (!) 147/74   Pulse 78   Temp 98.1 °F (36.7 °C) (Oral)   Resp 22   Ht 4' 11.02\" (1.499 m)   Wt 184 lb 4.8 oz (83.6 kg)   SpO2 94%   BMI 37.20 kg/m²      Physical Activity:     Days of Exercise per Week:     Minutes of Exercise per Session:     Physical Exam  Constitutional:       General: He is not in acute distress. Appearance: Normal appearance. He is obese. He is not ill-appearing. HENT:      Head: Normocephalic and atraumatic. Eyes:      Extraocular Movements: Extraocular movements intact. Cardiovascular:      Rate and Rhythm: Normal rate and regular rhythm. Heart sounds: No murmur heard. No gallop. Pulmonary:      Effort: No respiratory distress. Breath sounds: Wheezing and rales present. Comments: Dec breath sounds b/l  Some scattered wheezing present b/l  Chest:      Chest wall: No tenderness. Abdominal:      Palpations: Abdomen is soft. Musculoskeletal:         General: No swelling. Right lower leg: No edema. Left lower leg: No edema. Skin:     General: Skin is warm. Capillary Refill: Capillary refill takes less than 2 seconds. Neurological:      Mental Status: He is alert and oriented to person, place, and time. Psychiatric:         Mood and Affect: Mood normal.         Behavior: Behavior normal.         Consults: nephrology, pulmonology  Significant Diagnostic Studies:   XR CHEST PORTABLE   Final Result   1.   There is an endotracheal tube in place with the tip located 2.5 cm    from the leandro. There is an NG tube extending into the stomach. There    is a right internal jugular central venous catheter terminating in the    superior vena cava. 2.  Possible trace right pleural effusion. XR ABDOMEN (KUB) (SINGLE AP VIEW)   Final Result      Orogastric tube terminates over the gastric body. US RENAL COMPLETE   Final Result      No evidence of cholelithiasis or acute cholecystitis. No evidence of biliary obstruction. Cystic lesion in the right kidney measuring 1.5 cm which is incompletely evaluated. US ABDOMEN LIMITED   Final Result      No evidence of cholelithiasis or acute cholecystitis. No evidence of biliary obstruction. Cystic lesion in the right kidney measuring 1.5 cm which is incompletely evaluated. CT HEAD WO CONTRAST   Final Result      No evidence of acute intracranial abnormality. XR CHEST PORTABLE   Final Result      ET tube in place above the leandro. Right IJ central venous catheter placed, its tip in the distal SVC. No evidence of pneumothorax. Diffuse ill-defined airspace density throughout both lungs, right greater than left. Findings appear to have progressed slightly since the earlier study           Disposition: home  Discharged Condition: Stable  Follow Up: Primary Care Physician in one week    DISCHARGE MEDICATION:     Medication List      START taking these medications    ciprofloxacin 500 MG tablet  Commonly known as: CIPRO  Take 1 tablet by mouth 2 times daily for 10 days     furosemide 20 MG tablet  Commonly known as: Lasix  Take 1 tablet by mouth daily        CHANGE how you take these medications    * blood glucose monitor kit and supplies  Test 2 times a day & as needed for symptoms of irregular blood glucose. What changed: Another medication with the same name was added. Make sure you understand how and when to take each.      * glucose monitoring kit  1 kit by Does not apply route daily Patient wants brand name Patient tests bid  E11.9  What changed: Another medication with the same name was added. Make sure you understand how and when to take each. * glucose monitoring kit  1 kit by Does not apply route daily  What changed: You were already taking a medication with the same name, and this prescription was added. Make sure you understand how and when to take each. * blood glucose test strips  Test 2 times a day & as needed for symptoms of irregular blood glucose. E11.9  What changed: Another medication with the same name was added. Make sure you understand how and when to take each. * blood glucose test strips  Test 3 times a day & as needed for symptoms of irregular blood glucose. Dispense sufficient amount for indicated testing frequency plus additional to accommodate PRN testing needs. What changed: You were already taking a medication with the same name, and this prescription was added. Make sure you understand how and when to take each. insulin glargine 100 UNIT/ML injection pen  Commonly known as: LANTUS;BASAGLAR  Inject 25 Units into the skin 2 times daily  What changed: how much to take         * This list has 5 medication(s) that are the same as other medications prescribed for you. Read the directions carefully, and ask your doctor or other care provider to review them with you.             CONTINUE taking these medications    albuterol sulfate  (90 Base) MCG/ACT inhaler  INHALE 2 PUFFS INTO THE LUNGS EVERY 6 HOURS AS NEEDED FOR WHEEZING OR SHORTNESS OF BREATH     atorvastatin 40 MG tablet  Commonly known as: LIPITOR  TAKE (1) TABLET DAILY     CoQ10 100 MG Caps     Cyanocobalamin 1000 MCG Caps     fenofibric acid 135 MG Cpdr capsule  Commonly known as: FIBRICOR  TAKE 1 CAPSULE ONCE DAILY     Fluticasone furoate-vilanterol 200-25 MCG/INH Aepb inhaler  Commonly known as: BREO ELLIPTA  Inhale 1 puff into the lungs daily     gabapentin 800

## 2021-10-10 NOTE — PLAN OF CARE
Problem: Falls - Risk of:  Goal: Will remain free from falls  Description: Will remain free from falls  Outcome: Ongoing  Note: Pt is a Fall Risk. See Michelle Servant Fall Risk Score. Pt up with CGA. Pt bed in low position and side rails up. Call light and belongings in reach. Pt encouraged to call for assistance. Will continue with hourly rounds for PO intake, pain needs, toileting, and repositioning as needed. Problem: HEMODYNAMIC STATUS  Goal: Patient has stable vital signs and fluid balance  Outcome: Ongoing  Note: VSS so far this shift. Pt on 3L nasal cannula (home dose). Pt voiding adequately this shift. Strict I&Os and daily weight. Will continue to monitor.

## 2021-10-10 NOTE — CARE COORDINATION
Case Management Assessment            Discharge Note                    Date / Time of Note: 10/10/2021 12:07 PM                  Discharge Note Completed by: PRISCILLA Mancera, HAYDEEW    Patient Name: Becky Madrigal   YOB: 1965  Diagnosis: Sepsis with acute respiratory failure and septic shock, due to unspecified organism, unspecified whether hypoxia or hypercapnia present ([facility] [address] [A41.9, R65.21, J96.00]   Date / Time: 10/6/2021  1:28 PM    Current PCP: Eboni Morgan, LINK - CNP  Clinic patient: No    Hospitalization in the last 30 days: No    Advance Directives:  Code Status: [facility] DNR form completed and on chart: No    Financial:  Payor: Lisa Speak / Plan: [place] / Product Type: *No Product type* /      Pharmacy:    [facility] [address] [address]  Phone: [phone] Fax: [phone]    [address] [place] [address] [phone] Vandana Postin [phone]  [address] [facility] [address]  Phone: [phone] Fax: [phone]      Assistance purchasing medications?: Potential Assistance Purchasing Medications: Yes  Assistance provided by Case Management: None at this time    Does patient want to participate in local refill/ meds to beds program?: Yes    Meds To Beds General Rules:  1. Can ONLY be done Monday- Friday between 8:30am-5pm  2. Prescription(s) must be in pharmacy by 3pm to be filled same day  3. Copy of patient's insurance/ prescription drug card and patient face sheet must be sent along with the prescription(s)  4. Cost of Rx cannot be added to hospital bill. If financial assistance is needed, please contact unit  or ;  or  CANNOT provide pharmacy voucher for patients co-pays  5.  Patients can then  the prescription on their way out of the hospital at discharge, or pharmacy can deliver to the bedside if staff is available. (payment due at time of pick-up or delivery - cash, check, or card accepted)     Able to afford home medications/ co-pay costs: Yes    ADLS:  Current PT AM-PAC Score: 17 /24  Current OT AM-PAC Score: 20 /24      DISCHARGE Disposition: Home with [facility]: [address]     LOC at discharge: Not Applicable  RAQUEL Completed: Yes    Notification completed in HENS/PAS?:  Not Applicable    IMM Completed:   Not Indicated    Transportation:  Transportation PLAN for discharge: family   Mode of Transport: [address]:  [address] ordered at discharge: Yes  [address]: Darren Bryan  Phone: [phone]  Fax: [phone]  Orders faxed: Yes    Durable Medical Equipment:  DME Provider: None  Equipment obtained during hospitalization:     Home Oxygen and Respiratory Equipment:  Oxygen needed at discharge?: Yes  [address]: [facility]  Phone: [phone]  Portable tank available for discharge?: Yes    Dialysis:  Dialysis patient: No    Dialysis Center:  Not Applicable    Additional CM Notes: Pt from home w/SO, Pt to return [facility] w/[facility] [place] [place] spoke w/Sandra  [phone] w/[facility] and confirmed start of care. Pt's family will transport home. The Plan for Transition of Care is related to the following treatment goals of Sepsis with acute respiratory failure and septic shock, due to unspecified organism, unspecified whether hypoxia or hypercapnia present ([facility] [address] [A41.9, R65.21, J96.00]    The Patient and/or patient representative Diedre Prader and his family were provided with a choice of provider and agrees with the discharge plan Yes    Freedom of choice list was provided with basic dialogue that supports the patient's individualized plan of care/goals and shares the quality data associated with the providers.  Yes    Care Transitions patient: No    PRISCILLA Dueñas, HAYDEEW  [facility]  Case Management Department  Ph: [phone]  Fax: [phone]

## 2021-10-11 ENCOUNTER — TELEPHONE (OUTPATIENT)
Dept: FAMILY MEDICINE CLINIC | Age: 56
End: 2021-10-11

## 2021-10-11 DIAGNOSIS — E11.9 TYPE 2 DIABETES MELLITUS WITHOUT COMPLICATION, UNSPECIFIED WHETHER LONG TERM INSULIN USE (HCC): Primary | ICD-10-CM

## 2021-10-11 DIAGNOSIS — R19.5 POSITIVE FIT (FECAL IMMUNOCHEMICAL TEST): Primary | ICD-10-CM

## 2021-10-11 LAB
GLUCOSE BLD-MCNC: 136 MG/DL (ref 70–99)
GLUCOSE BLD-MCNC: 230 MG/DL (ref 70–99)
PERFORMED ON: ABNORMAL
PERFORMED ON: ABNORMAL

## 2021-10-11 RX ORDER — CONTAINER,EMPTY
1 EACH MISCELLANEOUS PRN
Qty: 1 EACH | Refills: 3 | Status: SHIPPED | OUTPATIENT
Start: 2021-10-11 | End: 2022-05-13 | Stop reason: SDUPTHER

## 2021-10-11 RX ORDER — LANCETS 30 GAUGE
1 EACH MISCELLANEOUS DAILY
Qty: 100 EACH | Refills: 3 | Status: SHIPPED | OUTPATIENT
Start: 2021-10-11 | End: 2022-05-13 | Stop reason: SDUPTHER

## 2021-10-11 NOTE — TELEPHONE ENCOUNTER
Patient has sutures in from the feeding tube and did not get any instruction on if they were to be taken out and when and if they would resolve on their own.  He is scheduled to see you on 10-15-21

## 2021-10-12 ENCOUNTER — TELEPHONE (OUTPATIENT)
Dept: PULMONOLOGY | Age: 56
End: 2021-10-12

## 2021-10-13 NOTE — TELEPHONE ENCOUNTER
Spoke to wife the sutures are from the feeding tube in his neck. He is scheduled 10-15-21 for a hospital follow up. She also stated today he had a fever of 101.3 Advised Tylenol if gets worse go to the ER.

## 2021-10-14 ENCOUNTER — HOSPITAL ENCOUNTER (EMERGENCY)
Age: 56
Discharge: HOME | End: 2021-10-14
Attending: EMERGENCY MEDICINE
Payer: MEDICARE

## 2021-10-14 ENCOUNTER — APPOINTMENT (OUTPATIENT)
Dept: GENERAL RADIOLOGY | Age: 56
End: 2021-10-14
Payer: MEDICARE

## 2021-10-14 VITALS
HEIGHT: 60 IN | HEART RATE: 67 BPM | WEIGHT: 180 LBS | DIASTOLIC BLOOD PRESSURE: 51 MMHG | TEMPERATURE: 99.1 F | BODY MASS INDEX: 35.34 KG/M2 | SYSTOLIC BLOOD PRESSURE: 110 MMHG | RESPIRATION RATE: 19 BRPM | OXYGEN SATURATION: 96 %

## 2021-10-14 DIAGNOSIS — Z86.79 HISTORY OF CHRONIC CHF: ICD-10-CM

## 2021-10-14 DIAGNOSIS — Z72.0 TOBACCO USE: ICD-10-CM

## 2021-10-14 DIAGNOSIS — R06.02 SHORTNESS OF BREATH: Primary | ICD-10-CM

## 2021-10-14 DIAGNOSIS — Z99.81 SUPPLEMENTAL OXYGEN DEPENDENT: ICD-10-CM

## 2021-10-14 DIAGNOSIS — Z87.09 HISTORY OF COPD: ICD-10-CM

## 2021-10-14 LAB
A/G RATIO: 0.7 (ref 1.1–2.2)
ALBUMIN SERPL-MCNC: 2.6 G/DL (ref 3.4–5)
ALP BLD-CCNC: 66 U/L (ref 40–129)
ALT SERPL-CCNC: 30 U/L (ref 10–40)
ANION GAP SERPL CALCULATED.3IONS-SCNC: 8 MMOL/L (ref 3–16)
AST SERPL-CCNC: 27 U/L (ref 15–37)
BASE EXCESS VENOUS: 3.2 MMOL/L (ref -3–3)
BASOPHILS ABSOLUTE: 0.1 K/UL (ref 0–0.2)
BASOPHILS RELATIVE PERCENT: 1.7 %
BILIRUB SERPL-MCNC: <0.2 MG/DL (ref 0–1)
BILIRUBIN URINE: NEGATIVE
BLOOD, URINE: ABNORMAL
BUN BLDV-MCNC: 31 MG/DL (ref 7–20)
CALCIUM SERPL-MCNC: 8.5 MG/DL (ref 8.3–10.6)
CARBOXYHEMOGLOBIN: 12 % (ref 0–1.5)
CHLORIDE BLD-SCNC: 103 MMOL/L (ref 99–110)
CLARITY: CLEAR
CO2: 29 MMOL/L (ref 21–32)
COLOR: YELLOW
CREAT SERPL-MCNC: 1.1 MG/DL (ref 0.9–1.3)
EOSINOPHILS ABSOLUTE: 0.1 K/UL (ref 0–0.6)
EOSINOPHILS RELATIVE PERCENT: 0.7 %
EPITHELIAL CELLS, UA: NORMAL /HPF (ref 0–5)
GFR AFRICAN AMERICAN: >60
GFR NON-AFRICAN AMERICAN: >60
GLOBULIN: 3.5 G/DL
GLUCOSE BLD-MCNC: 97 MG/DL (ref 70–99)
GLUCOSE URINE: NEGATIVE MG/DL
HCO3 VENOUS: 29.1 MMOL/L (ref 23–29)
HCT VFR BLD CALC: 32.8 % (ref 40.5–52.5)
HEMOGLOBIN: 10.6 G/DL (ref 13.5–17.5)
KETONES, URINE: NEGATIVE MG/DL
LACTIC ACID, SEPSIS: 1 MMOL/L (ref 0.4–1.9)
LEUKOCYTE ESTERASE, URINE: ABNORMAL
LYMPHOCYTES ABSOLUTE: 1.8 K/UL (ref 1–5.1)
LYMPHOCYTES RELATIVE PERCENT: 21.3 %
MCH RBC QN AUTO: 28.5 PG (ref 26–34)
MCHC RBC AUTO-ENTMCNC: 32.2 G/DL (ref 31–36)
MCV RBC AUTO: 88.4 FL (ref 80–100)
METHEMOGLOBIN VENOUS: 0.3 %
MICROSCOPIC EXAMINATION: YES
MONOCYTES ABSOLUTE: 0.7 K/UL (ref 0–1.3)
MONOCYTES RELATIVE PERCENT: 8.7 %
NEUTROPHILS ABSOLUTE: 5.6 K/UL (ref 1.7–7.7)
NEUTROPHILS RELATIVE PERCENT: 67.6 %
NITRITE, URINE: NEGATIVE
O2 SAT, VEN: 98 %
O2 THERAPY: ABNORMAL
PCO2, VEN: 50.5 MMHG (ref 40–50)
PDW BLD-RTO: 17.4 % (ref 12.4–15.4)
PH UA: 6.5 (ref 5–8)
PH VENOUS: 7.38 (ref 7.35–7.45)
PLATELET # BLD: 299 K/UL (ref 135–450)
PMV BLD AUTO: 8.2 FL (ref 5–10.5)
PO2, VEN: 126.9 MMHG (ref 25–40)
POTASSIUM REFLEX MAGNESIUM: 4.7 MMOL/L (ref 3.5–5.1)
PROTEIN UA: NEGATIVE MG/DL
RBC # BLD: 3.71 M/UL (ref 4.2–5.9)
RBC UA: NORMAL /HPF (ref 0–4)
SODIUM BLD-SCNC: 140 MMOL/L (ref 136–145)
SPECIFIC GRAVITY UA: <=1.005 (ref 1–1.03)
TCO2 CALC VENOUS: 31 MMOL/L
TOTAL PROTEIN: 6.1 G/DL (ref 6.4–8.2)
TROPONIN: <0.01 NG/ML
URINE REFLEX TO CULTURE: ABNORMAL
URINE TYPE: ABNORMAL
UROBILINOGEN, URINE: 0.2 E.U./DL
WBC # BLD: 8.3 K/UL (ref 4–11)
WBC UA: NORMAL /HPF (ref 0–5)

## 2021-10-14 PROCEDURE — 99285 EMERGENCY DEPT VISIT HI MDM: CPT

## 2021-10-14 PROCEDURE — 36415 COLL VENOUS BLD VENIPUNCTURE: CPT

## 2021-10-14 PROCEDURE — 85025 COMPLETE CBC W/AUTO DIFF WBC: CPT

## 2021-10-14 PROCEDURE — 87040 BLOOD CULTURE FOR BACTERIA: CPT

## 2021-10-14 PROCEDURE — 83605 ASSAY OF LACTIC ACID: CPT

## 2021-10-14 PROCEDURE — 80053 COMPREHEN METABOLIC PANEL: CPT

## 2021-10-14 PROCEDURE — 81001 URINALYSIS AUTO W/SCOPE: CPT

## 2021-10-14 PROCEDURE — 82803 BLOOD GASES ANY COMBINATION: CPT

## 2021-10-14 PROCEDURE — 93005 ELECTROCARDIOGRAM TRACING: CPT | Performed by: EMERGENCY MEDICINE

## 2021-10-14 PROCEDURE — 71045 X-RAY EXAM CHEST 1 VIEW: CPT

## 2021-10-14 PROCEDURE — 84484 ASSAY OF TROPONIN QUANT: CPT

## 2021-10-14 RX ORDER — PIOGLITAZONEHYDROCHLORIDE 15 MG/1
15 TABLET ORAL DAILY
Status: ON HOLD | COMMUNITY
End: 2022-10-26

## 2021-10-14 RX ORDER — BISOPROLOL FUMARATE 5 MG/1
5 TABLET ORAL DAILY
COMMUNITY
End: 2021-10-19

## 2021-10-14 NOTE — TELEPHONE ENCOUNTER
Okay the sutures are most likely from his central line/IV that he had when he was hospitalized. If fever does not improve he does need to go back to the hospital for evaluation.

## 2021-10-14 NOTE — ED TRIAGE NOTES
Presents with c/o low O2 sat. States I was d/c from [facility] 3 days ago, was on the vent for 7 days. I have been doing fine since I have been home. My O2 sat dropped and my wife called the squad. I am on home O2 and a concentrator at 3 lpm. Denies any other sx. Denies chest pain, dizziness. Noted with brusing to lower abd, also noted to have sutures to Right side of neck from previous central line.

## 2021-10-15 ENCOUNTER — TELEPHONE (OUTPATIENT)
Dept: OTHER | Facility: CLINIC | Age: 56
End: 2021-10-15

## 2021-10-15 LAB
EKG ATRIAL RATE: 66 BPM
EKG DIAGNOSIS: NORMAL
EKG P AXIS: 26 DEGREES
EKG P-R INTERVAL: 144 MS
EKG Q-T INTERVAL: 392 MS
EKG QRS DURATION: 84 MS
EKG QTC CALCULATION (BAZETT): 410 MS
EKG R AXIS: 97 DEGREES
EKG T AXIS: 58 DEGREES
EKG VENTRICULAR RATE: 66 BPM

## 2021-10-15 PROCEDURE — 93010 ELECTROCARDIOGRAM REPORT: CPT | Performed by: INTERNAL MEDICINE

## 2021-10-15 NOTE — TELEPHONE ENCOUNTER
[facility] contacted pt regarding ED follow up. Spoke with pt's wife, she stated she schedules appt's for pt.

## 2021-10-15 NOTE — ED PROVIDER NOTES
CHIEF COMPLAINT  Shortness of Breath (D/C from [facility] 3 days ago, O2 sat dropped. on home O2 at 3 LPM. )      HISTORY OF PRESENT ILLNESS  Rachel Candelaria is a 50 y.o. male presents to the ED with shortness of breath, just prior to arrival, wife called EMS because his sats dropped to the [de-identified], by the time I saw the patient he stated he was feeling back to normal, no longer short of breath, at his baseline, on 3 L nasal cannula, he states he was using his oxygen concentrator but usually switches to a tank when he gets up to ambulate, but he did not so he felt like he was not getting enough oxygen. He was just discharged a few days ago from [facility] had a complicated hospital course involving intubation/ICU stay, reports he is no longer on antibiotics, he does have CHF and COPD, continues smoking but has cut back a lot, no current chest pain, states his blood pressure always runs a little low, reports he has plenty of his inhaler/nebulizer meds at home, denies fever or new symptoms otherwise today, no other complaints, modifying factors or associated symptoms. I have reviewed the following from the nursing documentation.     Past Medical History:   Diagnosis Date    Acute respiratory failure ([facility] [address] 07/11/2021    CHF (congestive heart failure) (HCC)     combined    COPD (chronic obstructive pulmonary disease) (HCC)     Diabetes mellitus (HCC)     Hyperlipidemia     Hypertension     Oxygen dependent      Past Surgical History:   Procedure Laterality Date    HERNIA REPAIR      TONSILLECTOMY       Family History   Problem Relation Age of Onset    Asthma Mother     Cancer Mother    Zuñiga Hearing Loss Mother     Vision Loss Mother     High Blood Pressure Father     High Cholesterol Father     Vision Loss Father    Zuñiga Asthma Sister     Diabetes Sister     Vision Loss Sister     Asthma Brother     Arthritis Brother     High Blood Pressure Brother      Social History     Socioeconomic History    Marital status:      Spouse name: Not on file    Number of children: Not on file    Years of education: Not on file    Highest education level: Not on file   Occupational History    Not on file   Tobacco Use    Smoking status: Current Every Day Smoker     Packs/day: 1.00    Smokeless tobacco: Never Used   Vaping Use    Vaping Use: Never used   Substance and Sexual Activity    Alcohol use: Yes     Comment: 6 pack per month    Drug use: Never    Sexual activity: Not on file   Other Topics Concern    Not on file   Social History Narrative    Not on file     Social Determinants of Health     Financial Resource Strain:     Difficulty of Paying Living Expenses:    Food Insecurity:     Worried About Running Out of Food in the Last Year:     [address] in the Last Year:    Transportation Needs:     Lack of Transportation (Medical):  Lack of Transportation (Non-Medical):    Physical Activity:     Days of Exercise per Week:     Minutes of Exercise per Session:    Stress:     Feeling of Stress :    Social Connections:     Frequency of Communication with Friends and Family:     Frequency of Social Gatherings with Friends and Family:     Attends Zoroastrian Services:     Active Member of Clubs or Organizations:     Attends Club or Organization Meetings:     Marital Status:    Intimate Partner Violence:     Fear of Current or Ex-Partner:     Emotionally Abused:     Physically Abused:     Sexually Abused:      No current facility-administered medications for this encounter.      Current Outpatient Medications   Medication Sig Dispense Refill    bisoprolol (ZEBETA) 5 MG tablet Take 5 mg by mouth daily      pioglitazone (ACTOS) 15 MG tablet Take 15 mg by mouth daily      Lancets MISC 1 each by Does not apply route daily 100 each 3    sharps container 1 each by Does not apply route as needed (for disposable of lancets and needles) 1 each 3    glucose monitoring (FREESTYLE FREEDOM) kit 1 kit by Does not apply route daily 1 kit 0    blood glucose monitor strips Test 3 times a day & as needed for symptoms of irregular blood glucose. Dispense sufficient amount for indicated testing frequency plus additional to accommodate PRN testing needs.  200 strip 0    insulin glargine (LANTUS;BASAGLAR) 100 UNIT/ML injection pen Inject 25 Units into the skin 2 times daily 5 pen 3    ciprofloxacin (CIPRO) 500 MG tablet Take 1 tablet by mouth 2 times daily for 10 days 20 tablet 0    furosemide (LASIX) 20 MG tablet Take 1 tablet by mouth daily 60 tablet 1    gabapentin (NEURONTIN) 800 MG tablet TAKE 1 TABLET 4 TIMES DAILY 120 tablet 0    pantoprazole (PROTONIX) 40 MG tablet Take 1 tablet by mouth every morning (before breakfast) 30 tablet 1    nicotine (NICODERM CQ) 14 MG/24HR Place 1 patch onto the skin daily 30 patch 3    glipiZIDE (GLUCOTROL) 5 MG tablet Take 1 tablet by mouth 2 times daily 60 tablet 3    Insulin Pen Needle ([employer] PEN NEEDLES 29G) 29G X 12MM MISC 1 each by Does not apply route daily 100 each 3    atorvastatin (LIPITOR) 40 MG tablet TAKE (1) TABLET DAILY 90 tablet 0    Umeclidinium Bromide (INCRUSE ELLIPTA) 62.5 MCG/INH AEPB INHALE 1 PUFF DAILY 30 each 3    ipratropium-albuterol (DUONEB) 0.5-2.5 (3) MG/3ML SOLN nebulizer solution Take 3 mLs by nebulization every 4-6 hours as needed for Shortness of Breath 360 mL 1    albuterol sulfate  (90 Base) MCG/ACT inhaler INHALE 2 PUFFS INTO THE LUNGS EVERY 6 HOURS AS NEEDED FOR WHEEZING OR SHORTNESS OF BREATH 8.5 g 5    Fluticasone furoate-vilanterol (BREO ELLIPTA) 200-25 MCG/INH AEPB inhaler Inhale 1 puff into the lungs daily 1 each 0    metFORMIN (GLUCOPHAGE) 1000 MG tablet TAKE 1 TABLET BY MOUTH 2 TIMES DAILY (WITH MEALS) 60 tablet 5    fenofibric acid (FIBRICOR) 135 MG CPDR capsule TAKE 1 CAPSULE ONCE DAILY 30 capsule 5    Cholecalciferol (VITAMIN D3) 1.25 MG (44653 UT) CAPS Take 1 capsule by mouth Twice a Week 2 capsule 1    Magnesium 400 MG CAPS Take 1 tablet by mouth daily 30 capsule 1    blood glucose monitor strips Test 2 times a day & as needed for symptoms of irregular blood glucose. E11.9 300 strip 0    glucose monitoring kit (FREESTYLE) monitoring kit 1 kit by Does not apply route daily Patient wants brand name Patient tests bid  E11.9 1 kit 0    blood glucose monitor kit and supplies Test 2 times a day & as needed for symptoms of irregular blood glucose. 1 kit 0    Cyanocobalamin 1000 MCG CAPS Take 1 tablet by mouth daily      Omega-3 1000 MG CAPS Take 1 capsule by mouth daily      Coenzyme Q10 (COQ10) 100 MG CAPS Take 1 capsule by mouth daily      MULTIPLE VITAMINS PO Take by mouth       No Known Allergies    REVIEW OF SYSTEMS  10 systems reviewed, pertinent positives per HPI otherwise noted to be negative. PHYSICAL EXAM  BP (!) 110/51   Pulse 67   Temp 99.1 °F (37.3 °C) (Oral)   Resp 19   Ht 4' 11\" (1.499 m)   Wt 180 lb (81.6 kg)   SpO2 96%   BMI 36.36 kg/m²   GENERAL APPEARANCE: Awake and alert. Cooperative. No acute distress, appears older than stated age/chronically ill-appearing  HEAD: Normocephalic. Atraumatic. EYES: PERRL. EOM's grossly intact. ENT: Mucous membranes are moist.  Airway patent, no stridor, wearing nasal cannula  NECK: Supple. No rigidity, thick neck  HEART: RRR. No murmurs  LUNGS: Respirations unlabored. Lungs are with coarse breath sounds, slightly diminished throughout, no significant wheezing crackles or rhonchi. ABDOMEN: Soft. Non-distended. Non-tender. No guarding or rebound. Rotund abdomen   EXTREMITIES: trace b/l LE peripheral edema. No pitting, Moves all extremities equally. Distal lower extremity pulses faintly palpable, chronic appearing vascular skin changes  SKIN: Warm and dry. No acute rashes. NEUROLOGICAL: Alert and oriented. No gross facial drooping. Strength 5/5, sensation intact. No truncal ataxia.   Normal speech, steady gait  PSYCHIATRIC: Normal mood and affect. LABS  I have reviewed all labs for this visit.    Results for orders placed or performed during the hospital encounter of 10/14/21   Comprehensive Metabolic Panel w/ Reflex to MG   Result Value Ref Range    Sodium 140 136 - 145 mmol/L    Potassium reflex Magnesium 4.7 3.5 - 5.1 mmol/L    Chloride 103 99 - 110 mmol/L    CO2 29 21 - 32 mmol/L    Anion Gap 8 3 - 16    Glucose 97 70 - 99 mg/dL    BUN 31 (H) 7 - 20 mg/dL    CREATININE 1.1 0.9 - 1.3 mg/dL    GFR Non-African American >60 >60    GFR African American >60 >60    Calcium 8.5 8.3 - 10.6 mg/dL    Total Protein 6.1 (L) 6.4 - 8.2 g/dL    Albumin 2.6 (L) 3.4 - 5.0 g/dL    Albumin/Globulin Ratio 0.7 (L) 1.1 - 2.2    Total Bilirubin <0.2 0.0 - 1.0 mg/dL    Alkaline Phosphatase 66 40 - 129 U/L    ALT 30 10 - 40 U/L    AST 27 15 - 37 U/L    Globulin 3.5 Not Established g/dL   Troponin   Result Value Ref Range    Troponin <0.01 <0.01 ng/mL   Lactate, Sepsis   Result Value Ref Range    Lactic Acid, Sepsis 1.0 0.4 - 1.9 mmol/L   Urinalysis Reflex to Culture    Specimen: Urine, clean catch   Result Value Ref Range    Color, UA Yellow Straw/Yellow    Clarity, UA Clear Clear    Glucose, Ur Negative Negative mg/dL    Bilirubin Urine Negative Negative    Ketones, Urine Negative Negative mg/dL    Specific Gravity, UA <=1.005 1.005 - 1.030    Blood, Urine TRACE-INTACT (A) Negative    pH, UA 6.5 5.0 - 8.0    Protein, UA Negative Negative mg/dL    Urobilinogen, Urine 0.2 <2.0 E.U./dL    Nitrite, Urine Negative Negative    Leukocyte Esterase, Urine TRACE (A) Negative    Microscopic Examination YES     Urine Type NotGiven     Urine Reflex to Culture Not Indicated    Blood Gas, Venous   Result Value Ref Range    pH, Jesus 7.379 7.350 - 7.450    pCO2, Jesus 50.5 (H) 40.0 - 50.0 mmHg    pO2, Jesus 126.9 (H) 25 - 40 mmHg    HCO3, Venous 29.1 (H) 23.0 - 29.0 mmol/L    Base Excess, Jesus 3.2 (H) -3.0 - 3.0 mmol/L    O2 Sat, Jesus 98 Not Established %    Carboxyhemoglobin 12.0 (H) 0.0 - 1.5 %    MetHgb, Jesus 0.3 <1.5 %    TC02 (Calc), Jesus 31 Not Established mmol/L    O2 Therapy Unknown    CBC Auto Differential   Result Value Ref Range    WBC 8.3 4.0 - 11.0 K/uL    RBC 3.71 (L) 4.20 - 5.90 M/uL    Hemoglobin 10.6 (L) 13.5 - 17.5 g/dL    Hematocrit 32.8 (L) 40.5 - 52.5 %    MCV 88.4 80.0 - 100.0 fL    MCH 28.5 26.0 - 34.0 pg    MCHC 32.2 31.0 - 36.0 g/dL    RDW 17.4 (H) 12.4 - 15.4 %    Platelets 685 571 - 670 K/uL    MPV 8.2 5.0 - 10.5 fL    Neutrophils % 67.6 %    Lymphocytes % 21.3 %    Monocytes % 8.7 %    Eosinophils % 0.7 %    Basophils % 1.7 %    Neutrophils Absolute 5.6 1.7 - 7.7 K/uL    Lymphocytes Absolute 1.8 1.0 - 5.1 K/uL    Monocytes Absolute 0.7 0.0 - 1.3 K/uL    Eosinophils Absolute 0.1 0.0 - 0.6 K/uL    Basophils Absolute 0.1 0.0 - 0.2 K/uL   Microscopic Urinalysis   Result Value Ref Range    WBC, UA 3-5 0 - 5 /HPF    RBC, UA 0-2 0 - 4 /HPF    Epithelial Cells, UA 0-1 0 - 5 /HPF   EKG 12 Lead   Result Value Ref Range    Ventricular Rate 66 BPM    Atrial Rate 66 BPM    P-R Interval 144 ms    QRS Duration 84 ms    Q-T Interval 392 ms    QTc Calculation (Bazett) 410 ms    P Axis 26 degrees    R Axis 97 degrees    T Axis 58 degrees    Diagnosis       Normal sinus rhythmRightward axisRSR' or QR pattern in V1 suggests right ventricular conduction delayBorderline ECGWhen compared with ECG of 06-OCT-2021 09:20,Vent. rate has decreased BY  32 BPM       EKG  The Ekg interpreted by me shows  Normal sinus rhythm with rate 66, QTc 410, borderline right axis deviation, no ST segment or T wave changes indicative of acute ischemia      RADIOLOGY  XR CHEST PORTABLE    Result Date: 10/14/2021  EXAMINATION: ONE XRAY VIEW OF THE CHEST 10/14/2021 6:06 pm COMPARISON: 10/08/2021. HISTORY: ORDERING SYSTEM PROVIDED HISTORY: short of breath TECHNOLOGIST PROVIDED HISTORY: Reason for exam:->short of breath Reason for Exam: Shortness of Breath (D/C from [facility] x 3 days ago, O2 sat dropped.  on home O2 at 3 LPM. ) Acuity: Acute Type of Exam: Initial FINDINGS: The cardiac silhouette is mildly enlarged. Mild central vascular congestion. There is poor definition of the diaphragms bilaterally, possibly accentuated by lordotic technique but concerning for infiltrate, atelectasis or possibly effusion. No overt failure. The remainder of the lungs are grossly clear. Cardiomegaly with central vascular congestion and no overt failure. Poor definition of the diaphragms, possibly infiltrate, atelectasis or effusion or related to lordotic technique. If more definitive evaluation is indicated, consider follow-up PA and lateral chest.           ED COURSE/MDM  Patient seen and evaluated. Old records reviewed. Labs and imaging reviewed and results discussed with patient. 74-year-old male with COPD/CHF, recent discharge from the hospital, given recent history, nursing placed order for work-up, no significant change in chest x-ray or lab work, patient states he just overdid it and will use his tank oxygen when he ambulates in the future, he states he feels fine now and wants to go home, he was irritated his wife prematurely called EMS, vitals at baseline on his home oxygen, will follow up with primary care/pulmonology, has appointment next week with cardiology, strict return precautions given, all questions answered, will return if any worsening symptoms or new concerns, see AVS for further discharge information, patient verbalized understanding of plan, felt comfortable going home. At least 3 minutes spent with patient in discussion related to smoking cessation counseling. Patient advised of the impact of smoking including cancer, COPD, heart disease, stroke, among other health problems. Patient strongly encouraged to quit smoking. Offered resources and recommended counseling, nicotine patches/gums, and following up with primary care to discuss prescription options.   We discussed that if he was going to smoke, make sure he did not have oxygen NC on. Orders Placed This Encounter   Procedures    Culture, Blood 1    Culture, Blood 2    XR CHEST PORTABLE    Comprehensive Metabolic Panel w/ Reflex to MG    Troponin    Urinalysis Reflex to Culture    Blood Gas, Venous    CBC Auto Differential    Microscopic Urinalysis    EKG 12 Lead       CLINICAL IMPRESSION  1. Shortness of breath    2. Supplemental oxygen dependent    3. Tobacco use    4. History of COPD    5. History of chronic CHF        Blood pressure (!) 110/51, pulse 67, temperature 99.1 °F (37.3 °C), temperature source Oral, resp. rate 19, height 4' 11\" (1.499 m), weight 180 lb (81.6 kg), SpO2 96 %. DISPOSITION  Zaida Martinez was discharged to home in stable condition.                   Srinath Hernandez DO  10/19/21 1150

## 2021-10-19 ENCOUNTER — OFFICE VISIT (OUTPATIENT)
Dept: CARDIOLOGY CLINIC | Age: 56
End: 2021-10-19
Payer: MEDICARE

## 2021-10-19 ENCOUNTER — CARE COORDINATION (OUTPATIENT)
Dept: CASE MANAGEMENT | Age: 56
End: 2021-10-19

## 2021-10-19 ENCOUNTER — OFFICE VISIT (OUTPATIENT)
Dept: FAMILY MEDICINE CLINIC | Age: 56
End: 2021-10-19
Payer: MEDICARE

## 2021-10-19 VITALS
SYSTOLIC BLOOD PRESSURE: 95 MMHG | OXYGEN SATURATION: 94 % | BODY MASS INDEX: 36.23 KG/M2 | WEIGHT: 179.38 LBS | HEART RATE: 72 BPM | TEMPERATURE: 98.3 F | DIASTOLIC BLOOD PRESSURE: 60 MMHG

## 2021-10-19 VITALS
HEIGHT: 60 IN | DIASTOLIC BLOOD PRESSURE: 50 MMHG | WEIGHT: 178 LBS | BODY MASS INDEX: 34.95 KG/M2 | HEART RATE: 66 BPM | SYSTOLIC BLOOD PRESSURE: 90 MMHG

## 2021-10-19 DIAGNOSIS — I50.32 CHRONIC HEART FAILURE WITH PRESERVED EJECTION FRACTION (HCC): Primary | ICD-10-CM

## 2021-10-19 DIAGNOSIS — I10 PRIMARY HYPERTENSION: ICD-10-CM

## 2021-10-19 DIAGNOSIS — I73.9 CLAUDICATION (HCC): ICD-10-CM

## 2021-10-19 DIAGNOSIS — J96.22 ACUTE ON CHRONIC RESPIRATORY FAILURE WITH HYPOXIA AND HYPERCAPNIA (HCC): ICD-10-CM

## 2021-10-19 DIAGNOSIS — E78.2 MIXED HYPERLIPIDEMIA: ICD-10-CM

## 2021-10-19 DIAGNOSIS — G62.9 NEUROPATHY: ICD-10-CM

## 2021-10-19 DIAGNOSIS — N17.9 ACUTE RENAL FAILURE, UNSPECIFIED ACUTE RENAL FAILURE TYPE (HCC): ICD-10-CM

## 2021-10-19 DIAGNOSIS — Z72.0 TOBACCO USE: ICD-10-CM

## 2021-10-19 DIAGNOSIS — J96.21 ACUTE ON CHRONIC RESPIRATORY FAILURE WITH HYPOXIA AND HYPERCAPNIA (HCC): ICD-10-CM

## 2021-10-19 DIAGNOSIS — I50.22 CHRONIC SYSTOLIC CONGESTIVE HEART FAILURE (HCC): ICD-10-CM

## 2021-10-19 DIAGNOSIS — Z09 HOSPITAL DISCHARGE FOLLOW-UP: Primary | ICD-10-CM

## 2021-10-19 LAB
BLOOD CULTURE, ROUTINE: NORMAL
CULTURE, BLOOD 2: NORMAL

## 2021-10-19 PROCEDURE — 99214 OFFICE O/P EST MOD 30 MIN: CPT | Performed by: NURSE PRACTITIONER

## 2021-10-19 PROCEDURE — 1111F DSCHRG MED/CURRENT MED MERGE: CPT | Performed by: NURSE PRACTITIONER

## 2021-10-19 PROCEDURE — G8484 FLU IMMUNIZE NO ADMIN: HCPCS | Performed by: NURSE PRACTITIONER

## 2021-10-19 PROCEDURE — 4004F PT TOBACCO SCREEN RCVD TLK: CPT | Performed by: NURSE PRACTITIONER

## 2021-10-19 PROCEDURE — G8427 DOCREV CUR MEDS BY ELIG CLIN: HCPCS | Performed by: NURSE PRACTITIONER

## 2021-10-19 PROCEDURE — 3017F COLORECTAL CA SCREEN DOC REV: CPT | Performed by: NURSE PRACTITIONER

## 2021-10-19 PROCEDURE — G8417 CALC BMI ABV UP PARAM F/U: HCPCS | Performed by: NURSE PRACTITIONER

## 2021-10-19 ASSESSMENT — ENCOUNTER SYMPTOMS
SORE THROAT: 0
DIARRHEA: 0
COUGH: 1
RHINORRHEA: 0
VOMITING: 0
NAUSEA: 0
SHORTNESS OF BREATH: 0
WHEEZING: 0
ABDOMINAL PAIN: 0

## 2021-10-19 NOTE — PROGRESS NOTES
[facility] hospital follow up    HPI:  50 y.o. with chronic HFpEF (EF 55%), HTN, HLD, COPD, DM and PAD (multiples stents in legs) who is here for [facility] hospital follow up. He feels his SOB is at baseline. He uses 2-3 liters O2 at home. Notes very little LE edema R>L. Weight is down. He has pains (sharp, cramping, burning, achy) in both legs with rest and exertion. He has hx of multiple stents in legs and diabetic neuropathy. He denies non-healing wounds. He is incontinent at night and has follow up with urology. He denies cp, LH/dizziness, palpitations or syncope. No orthopnea or PND. No fever, chills, n/v/d or GI/ bleeding. Tolerating medications. BP in the 90's. Past Medical History:   Diagnosis Date    Acute respiratory failure ([facility] [address] 07/11/2021    CHF (congestive heart failure) ([facility])     combined    COPD (chronic obstructive pulmonary disease) ([facility])     Diabetes mellitus ([facility] [address]     Hyperlipidemia     Hypertension     Oxygen dependent      Past Surgical History:   Procedure Laterality Date    HERNIA REPAIR      TONSILLECTOMY       Family History   Problem Relation Age of Onset    Asthma Mother     Cancer Mother     Hearing Loss Mother     Vision Loss Mother     High Blood Pressure Father     High Cholesterol Father     Vision Loss Father     Asthma Sister     Diabetes Sister     Vision Loss Sister     Asthma Brother     Arthritis Brother     High Blood Pressure Brother      Social History     Tobacco Use    Smoking status: Current Every Day Smoker     Packs/day: 1.00    Smokeless tobacco: Never Used   Vaping Use    Vaping Use: Never used   Substance Use Topics    Alcohol use: Yes     Comment: 6 pack per month    Drug use: Never     Allergies:Patient has no known allergies. Review of Systems  General: No changes in weight, fatigue, or night sweats. HEENT: No blurry or decreased vision. No changes in hearing, nasal discharge or sore throat. Cardiovascular:  See HPI.    Respiratory: No cough, hemoptysis, or wheezing. Gastrointestinal:  No abdominal pain, hematochezia, melana, constipation, diarrhea, or history of GI ulcers. Genito-Urinary: No dysuria or hematuria. No urgency or polyuria. Musculoskeletal:  No complaints of joint pain, joint swelling or muscular weakness/soreness. Neurological:  No dizziness, headaches, numbness/tingling, speech problems or weakness. Psychological:  No anxiety or depression. Hematological and Lymphatic: No abnormal bleeding or bruising, blood clots, jaundice or swollen lymph nodes. Endocrine:   No malaise/lethargy, palpitations, polydipsia/polyuria, temperature intolerance or unexpected weight changes  Skin:  No rashes or non-healing ulcers. Physical Exam:  BP (!) 90/50 (Site: Left Upper Arm, Position: Sitting, Cuff Size: Medium Adult)   Pulse 66   Ht 4' 11\" (1.499 m)   Wt 178 lb (80.7 kg)   BMI 35.95 kg/m²    General (appearance):  No acute distress  Eyes: anicteric   Neck: soft, No JVD  Ears/Nose/Mouth/Thorat: No cyanosis  CV: RRR   Respiratory:  Diminished, no wheezes or crackles  GI: soft, non-tender, non-distended  Skin: Warm, dry. No rashes wounds or sores  Neuro/Psych: Alert and oriented x 3. Appropriate behavior  Ext:  No c/c.  Tr LE edema  Pulses:  2+ radial, carotid. + bilateral DP and PT pulses     Weight  Wt Readings from Last 3 Encounters:   10/19/21 178 lb (80.7 kg)   10/14/21 180 lb (81.6 kg)   10/10/21 184 lb 4.8 oz (83.6 kg)          CBC:   Lab Results   Component Value Date    WBC 8.3 10/14/2021    HGB 10.6 (L) 10/14/2021    HCT 32.8 (L) 10/14/2021    MCV 88.4 10/14/2021     10/14/2021     BMP:  Lab Results   Component Value Date    CREATININE 1.1 10/14/2021    BUN 31 (H) 10/14/2021     10/14/2021    K 4.7 10/14/2021     10/14/2021    CO2 29 10/14/2021     Mag:   Lab Results   Component Value Date    MG 1.80 10/10/2021     LIVER PROFILE:   Lab Results   Component Value Date    ALT 30 10/14/2021    AST 27 10/14/2021    ALKPHOS 66 10/14/2021    BILITOT <0.2 10/14/2021     PT/INR:   Lab Results   Component Value Date    INR 1.17 (H) 10/06/2021    INR 1.13 (H) 2021    PROTIME 13.4 (H) 10/06/2021    PROTIME 12.9 (H) 2021     Pro-BNP   Lab Results   Component Value Date    PROBNP 9,952 10/10/2021    PROBNP 3,870 10/07/2021    PROBNP 8,243 10/06/2021     LIPIDS:  No components found for: CHLPL  Lab Results   Component Value Date    TRIG 141 2021     Lab Results   Component Value Date    HDL 31 (L) 2021    HDL 35 (L) 2021    HDL 50 2020     Lab Results   Component Value Date    LDLCALC 104 (H) 2021    LDLCALC 110 (H) 2021    LDLCALC 43 2020     Lab Results   Component Value Date    LABVLDL 28 2021    LABVLDL 43 2021    LABVLDL 17 2020     TSH:  Lab Results   Component Value Date    TSH 0.67 2021       IMAGIN2021 CT chest:     There has been almost complete resolution of the airspace disease in the lung bases with minimal residual consolidation in the right lower lobe and post infectious scarring in the left lung base.       Coronary artery calcification noted. 2021 Echo:   Summary   Normal left ventricle size, wall thickness, and systolic function with an   estimated ejection fraction of 55-60%. No regional wall motion abnormalities are seen. Indeterminate diastolic function. Individual aortic valve leaflets are not clearly visualized. Trivial tricuspid regurgitation. Trace pericardial effusion noted globally. A bubble study was performed and fails to show evidence of shunting. Definity contrast agent was used to help visualize endocardial borders.     2021 CTA head/neck      1. Atherosclerotic disease at bilateral carotid bifurcations. Exact percentage of stenosis by NASCET criteria is difficult to comment upon due to artifacts.    2. 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 Moderately attenuated mid vertebral artery from upper C7  to C4 5 level. Distal to its left vertebral artery is patent without significant stenosis.        No evidence of intracranial large vessel occlusion.       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       Calcified plaques with mild to moderate narrowing of bilateral ICA cavernous/supraclinoid segments.          Medications:   Current Outpatient Medications   Medication Sig Dispense Refill    pioglitazone (ACTOS) 15 MG tablet Take 15 mg by mouth daily      Lancets MISC 1 each by Does not apply route daily 100 each 3    sharps container 1 each by Does not apply route as needed (for disposable of lancets and needles) 1 each 3    glucose monitoring (FREESTYLE FREEDOM) kit 1 kit by Does not apply route daily 1 kit 0    blood glucose monitor strips Test 3 times a day & as needed for symptoms of irregular blood glucose. Dispense sufficient amount for indicated testing frequency plus additional to accommodate PRN testing needs.  200 strip 0    insulin glargine (LANTUS;BASAGLAR) 100 UNIT/ML injection pen Inject 25 Units into the skin 2 times daily 5 pen 3    furosemide (LASIX) 20 MG tablet Take 1 tablet by mouth daily 60 tablet 1    gabapentin (NEURONTIN) 800 MG tablet TAKE 1 TABLET 4 TIMES DAILY 120 tablet 0    glipiZIDE (GLUCOTROL) 5 MG tablet Take 1 tablet by mouth 2 times daily 60 tablet 3    Insulin Pen Needle ([employer] PEN NEEDLES 29G) 29G X 12MM MISC 1 each by Does not apply route daily 100 each 3    atorvastatin (LIPITOR) 40 MG tablet TAKE (1) TABLET DAILY 90 tablet 0    Umeclidinium Bromide (INCRUSE ELLIPTA) 62.5 MCG/INH AEPB INHALE 1 PUFF DAILY 30 each 3    ipratropium-albuterol (DUONEB) 0.5-2.5 (3) MG/3ML SOLN nebulizer solution Take 3 mLs by nebulization every 4-6 hours as needed for Shortness of Breath 360 mL 1    albuterol sulfate  (90 Base) MCG/ACT inhaler INHALE 2 PUFFS INTO THE LUNGS EVERY 6 HOURS AS NEEDED FOR WHEEZING OR SHORTNESS OF BREATH 8.5 g 5    Fluticasone furoate-vilanterol (BREO ELLIPTA) 200-25 MCG/INH AEPB inhaler Inhale 1 puff into the lungs daily 1 each 0    fenofibric acid (FIBRICOR) 135 MG CPDR capsule TAKE 1 CAPSULE ONCE DAILY 30 capsule 5    Cholecalciferol (VITAMIN D3) 1.25 MG (24262 UT) CAPS Take 1 capsule by mouth Twice a Week 2 capsule 1    Magnesium 400 MG CAPS Take 1 tablet by mouth daily 30 capsule 1    blood glucose monitor strips Test 2 times a day & as needed for symptoms of irregular blood glucose. E11.9 300 strip 0    glucose monitoring kit (FREESTYLE) monitoring kit 1 kit by Does not apply route daily Patient wants brand name Patient tests bid  E11.9 1 kit 0    blood glucose monitor kit and supplies Test 2 times a day & as needed for symptoms of irregular blood glucose. 1 kit 0    Omega-3 1000 MG CAPS Take 1 capsule by mouth daily      Coenzyme Q10 (COQ10) 100 MG CAPS Take 1 capsule by mouth daily      MULTIPLE VITAMINS PO Take by mouth      pantoprazole (PROTONIX) 40 MG tablet Take 1 tablet by mouth every morning (before breakfast) (Patient not taking: Reported on 10/19/2021) 30 tablet 1    nicotine (NICODERM CQ) 14 MG/24HR Place 1 patch onto the skin daily (Patient not taking: Reported on 10/19/2021) 30 patch 3    metFORMIN (GLUCOPHAGE) 1000 MG tablet TAKE 1 TABLET BY MOUTH 2 TIMES DAILY (WITH MEALS) (Patient not taking: Reported on 10/19/2021) 60 tablet 5    Cyanocobalamin 1000 MCG CAPS Take 1 tablet by mouth daily (Patient not taking: Reported on 10/19/2021)       No current facility-administered medications for this visit. Assessment:  1. Chronic heart failure with preserved ejection fraction ([facility] [address]    2. Primary hypertension    3. Mixed hyperlipidemia    4.  Claudication [facility])          Plan:  HFpEF   Appears compensated   Lasix 20 mg po daily   Daily weight, low salt diet, compression stockings   Stop Bisoprolol d/t sBP 90's  HTN   BP soft   Stop bisoprolol  HLD   Lipitor and fibicor  Claudication hx PAD   Discussed arterial duplex but pt has strong distal pulses and no wounds.  Pt/family will hold off on scan and monitor      Follow up with Dr Pili Carbone in 1 month         Reviewed most recent: CBC, BMP, LFT, Lipids, Mag, PT/INR, BNP, TSH  Reviewed most recent: ECG, Echo, CT, CTA,

## 2021-10-19 NOTE — CARE COORDINATION
You Patient resolved from the Care Transitions episode on 10/19/2021    Patient/family has been provided the following resources and education related to COVID-19:                         Signs, symptoms and red flags related to COVID-19            CDC exposure and quarantine guidelines            [employer] exposure contact - [phone]            Contact for their local Department of Health                 Patient currently reports that the following symptoms have improved:  shortness of breath and no new/worsening symptoms. CTN spoke with patients spouse this afternoon for final follow up COVID -19 Monitoring call. Spouse states patient had follow up with both PCP and Cardiologist today, patient very SOB with exertion due to all the walking, per spouse. Spouse states patient is usually able to get around without any trouble, but today his legs began to bother him, as he has chronic leg pain. Patient with no reports of any nausea, vomiting, fevers, chills, Cough. No congestion, heart palpitations, urinary trouble, dizziness or lightheadedness. Spouse also reported that Zabeta and Cipro were both discontinued, has scheduled follow up with Nephrologist on 10/21/2021. No other issues or concerns, spouse states patient feels much better since returning home. No further outreach scheduled with this CTN/ACM. Episode of Care resolved. Patient has this CTN/ACM contact information if future needs arise.     Thank Aleksandar Valle RN  Care Transition Coordinator  Contact JUNG:[phone]

## 2021-10-19 NOTE — TELEPHONE ENCOUNTER
[employer] nurse called and said patient stopped his nicotine patch and metformin. Also he was Amaryl 2mg daily, lisinopril 20mg and Combvident inhaler.  Sorry this did not get to you prior to his appt)

## 2021-10-19 NOTE — PROGRESS NOTES
Post-Discharge Transitional Care Management Services or Hospital Follow Up      Sandra Gaffney   YOB: 1965    Date of Office Visit:  10/19/2021  Date of Hospital Admission: 10/14/21  Date of Hospital Discharge: 10/14/21  Readmission Risk Score(high >=14%. Medium >=10%):Readmission Risk Score: 33      Care management risk score Rising risk (score 2-5) and Complex Care (Scores >=6): 3     Non face to face  following discharge, date last encounter closed (first attempt may have been earlier): *No documented post hospital discharge outreach found in the last 14 days *No documented post hospital discharge outreach found in the last 14 days    Call initiated 2 business days of discharge: *No response recorded in the last 14 days     Patient Active Problem List   Diagnosis    Hypertension    Mixed hyperlipidemia    [facility]est[facility]    COPD exacerbation ([facility]    Acute on chronic respiratory failure with hypoxia and hypercapnia ([facility]    CAD in native artery    Type 2 diabetes mellitus without complication ([facility]    Tobacco use    Chest pain    Elevated d-dimer    Organizing pneumonia ([facility]    Severe hypoxemia    Sepsis with acute respiratory failure and septic shock ([facility]    [facility] to [facility]    Bacteremia due to Escherichia coli       No Known Allergies    Medications listed as ordered at the time of discharge from hospital   [facility] Home Medication Instructions AYSE:    Printed on:10/19/21 6128   Medication Information                      albuterol sulfate  (90 Base) MCG/ACT inhaler  INHALE 2 PUFFS INTO THE LUNGS EVERY 6 HOURS AS NEEDED FOR WHEEZING OR SHORTNESS OF BREATH             atorvastatin (LIPITOR) 40 MG tablet  TAKE (1) TABLET DAILY             blood glucose monitor kit and supplies  Test 2 times a day & as needed for symptoms of irregular blood glucose.              blood glucose monitor strips  Test 2 times a day & as needed for symptoms of irregular blood glucose. E11.9             blood glucose monitor strips  Test 3 times a day & as needed for symptoms of irregular blood glucose. Dispense sufficient amount for indicated testing frequency plus additional to accommodate PRN testing needs.              Cholecalciferol (VITAMIN D3) 1.25 MG (12727 UT) CAPS  Take 1 capsule by mouth Twice a Week             Coenzyme Q10 (COQ10) 100 MG CAPS  Take 1 capsule by mouth daily             Cyanocobalamin 1000 MCG CAPS  Take 1 tablet by mouth daily              fenofibric acid (FIBRICOR) 135 MG CPDR capsule  TAKE 1 CAPSULE ONCE DAILY             Fluticasone furoate-vilanterol (BREO ELLIPTA) 200-25 MCG/INH AEPB inhaler  Inhale 1 puff into the lungs daily             furosemide (LASIX) 20 MG tablet  Take 1 tablet by mouth daily             gabapentin (NEURONTIN) 800 MG tablet  TAKE 1 TABLET 4 TIMES DAILY             glipiZIDE (GLUCOTROL) 5 MG tablet  Take 1 tablet by mouth 2 times daily             glucose monitoring (FREESTYLE FREEDOM) kit  1 kit by Does not apply route daily             glucose monitoring kit (FREESTYLE) monitoring kit  1 kit by Does not apply route daily Patient wants brand name Patient tests bid  E11.9             insulin glargine (LANTUS;BASAGLAR) 100 UNIT/ML injection pen  Inject 25 Units into the skin 2 times daily             Insulin Pen Needle ([employer] PEN NEEDLES 29G) 29G X 12MM MISC  1 each by Does not apply route daily             ipratropium-albuterol (DUONEB) 0.5-2.5 (3) MG/3ML SOLN nebulizer solution  Take 3 mLs by nebulization every 4-6 hours as needed for Shortness of Breath             Lancets MISC  1 each by Does not apply route daily             Magnesium 400 MG CAPS  Take 1 tablet by mouth daily             metFORMIN (GLUCOPHAGE) 1000 MG tablet  TAKE 1 TABLET BY MOUTH 2 TIMES DAILY (WITH MEALS)             MULTIPLE VITAMINS PO  Take by mouth             nicotine (NICODERM CQ) 14 MG/24HR  Place 1 patch onto the skin daily             Omega-3 1000 MG CAPS  Take 1 capsule by mouth daily             pantoprazole (PROTONIX) 40 MG tablet  Take 1 tablet by mouth every morning (before breakfast)             pioglitazone (ACTOS) 15 MG tablet  Take 15 mg by mouth daily             sharps container  1 each by Does not apply route as needed (for disposable of lancets and needles)             Umeclidinium Bromide (INCRUSE ELLIPTA) 62.5 MCG/INH AEPB  INHALE 1 PUFF DAILY                   Medications marked \"taking\" at this time  Outpatient Medications Marked as Taking for the 10/19/21 encounter (Office Visit) with Tammie Maloney, APRN - CNP   Medication Sig Dispense Refill    pioglitazone (ACTOS) 15 MG tablet Take 15 mg by mouth daily      Lancets MISC 1 each by Does not apply route daily 100 each 3    sharps container 1 each by Does not apply route as needed (for disposable of lancets and needles) 1 each 3    glucose monitoring (FREESTYLE FREEDOM) kit 1 kit by Does not apply route daily 1 kit 0    blood glucose monitor strips Test 3 times a day & as needed for symptoms of irregular blood glucose. Dispense sufficient amount for indicated testing frequency plus additional to accommodate PRN testing needs.  200 strip 0    insulin glargine (LANTUS;BASAGLAR) 100 UNIT/ML injection pen Inject 25 Units into the skin 2 times daily 5 pen 3    furosemide (LASIX) 20 MG tablet Take 1 tablet by mouth daily 60 tablet 1    gabapentin (NEURONTIN) 800 MG tablet TAKE 1 TABLET 4 TIMES DAILY 120 tablet 0    pantoprazole (PROTONIX) 40 MG tablet Take 1 tablet by mouth every morning (before breakfast) 30 tablet 1    nicotine (NICODERM CQ) 14 MG/24HR Place 1 patch onto the skin daily 30 patch 3    glipiZIDE (GLUCOTROL) 5 MG tablet Take 1 tablet by mouth 2 times daily 60 tablet 3    Insulin Pen Needle ([employer] PEN NEEDLES 29G) 29G X 12MM MISC 1 each by Does not apply route daily 100 each 3    atorvastatin (LIPITOR) 40 MG tablet TAKE (1) TABLET DAILY 90 tablet 0    Umeclidinium Bromide (INCRUSE ELLIPTA) 62.5 MCG/INH AEPB INHALE 1 PUFF DAILY 30 each 3    ipratropium-albuterol (DUONEB) 0.5-2.5 (3) MG/3ML SOLN nebulizer solution Take 3 mLs by nebulization every 4-6 hours as needed for Shortness of Breath 360 mL 1    albuterol sulfate  (90 Base) MCG/ACT inhaler INHALE 2 PUFFS INTO THE LUNGS EVERY 6 HOURS AS NEEDED FOR WHEEZING OR SHORTNESS OF BREATH 8.5 g 5    Fluticasone furoate-vilanterol (BREO ELLIPTA) 200-25 MCG/INH AEPB inhaler Inhale 1 puff into the lungs daily 1 each 0    metFORMIN (GLUCOPHAGE) 1000 MG tablet TAKE 1 TABLET BY MOUTH 2 TIMES DAILY (WITH MEALS) 60 tablet 5    fenofibric acid (FIBRICOR) 135 MG CPDR capsule TAKE 1 CAPSULE ONCE DAILY 30 capsule 5    Cholecalciferol (VITAMIN D3) 1.25 MG (32711 UT) CAPS Take 1 capsule by mouth Twice a Week 2 capsule 1    Magnesium 400 MG CAPS Take 1 tablet by mouth daily 30 capsule 1    blood glucose monitor strips Test 2 times a day & as needed for symptoms of irregular blood glucose. E11.9 300 strip 0    glucose monitoring kit (FREESTYLE) monitoring kit 1 kit by Does not apply route daily Patient wants brand name Patient tests bid  E11.9 1 kit 0    blood glucose monitor kit and supplies Test 2 times a day & as needed for symptoms of irregular blood glucose. 1 kit 0    Cyanocobalamin 1000 MCG CAPS Take 1 tablet by mouth daily       Omega-3 1000 MG CAPS Take 1 capsule by mouth daily      Coenzyme Q10 (COQ10) 100 MG CAPS Take 1 capsule by mouth daily      MULTIPLE VITAMINS PO Take by mouth          Medications patient taking as of now reconciled against medications ordered at time of hospital discharge: Yes    Chief Complaint   Patient presents with    Follow-Up from Hospital       HPI    Inpatient course: Discharge summary reviewed- see chart. Interval history/Current status: Stable. Review of Systems   Constitutional: Negative for activity change, appetite change, chills and fever. HENT: Negative for congestion, rhinorrhea and sore throat. Eyes: Negative for visual disturbance. Respiratory: Positive for cough. Negative for shortness of breath and wheezing. Cardiovascular: Negative for chest pain and leg swelling. Gastrointestinal: Negative for abdominal pain, diarrhea, nausea and vomiting. Genitourinary: Negative for dysuria, frequency, hematuria and urgency. Musculoskeletal: Positive for arthralgias and myalgias. Negative for gait problem. Skin: Negative for rash. Neurological: Positive for light-headedness. Negative for dizziness, weakness, numbness and headaches. Psychiatric/Behavioral: Negative for sleep disturbance. Vitals:    10/19/21 1520 10/19/21 1552   BP: 95/60    Pulse: 72    Temp: 98.3 °F (36.8 °C)    TempSrc: Oral    SpO2: (!) 89% 94%   Weight: 179 lb 6 oz (81.4 kg)      Body mass index is 36.23 kg/m². Wt Readings from Last 3 Encounters:   10/19/21 179 lb 6 oz (81.4 kg)   10/19/21 178 lb (80.7 kg)   10/14/21 180 lb (81.6 kg)     BP Readings from Last 3 Encounters:   10/19/21 95/60   10/19/21 (!) 90/50   10/14/21 (!) 110/51       Physical Exam  Constitutional:       Appearance: Normal appearance. He is not toxic-appearing. HENT:      Head: Normocephalic and atraumatic. Right Ear: Tympanic membrane normal.      Left Ear: Tympanic membrane normal.      Nose: Nose normal.      Mouth/Throat:      Mouth: Mucous membranes are moist.      Pharynx: Oropharynx is clear. Eyes:      Extraocular Movements: Extraocular movements intact. Conjunctiva/sclera: Conjunctivae normal.      Pupils: Pupils are equal, round, and reactive to light. Cardiovascular:      Rate and Rhythm: Normal rate and regular rhythm. Pulses: Normal pulses. Pulmonary:      Effort: Pulmonary effort is normal. No respiratory distress. Breath sounds: No stridor. Wheezing and rhonchi present. No rales. Chest:      Chest wall: No tenderness.    Abdominal:      General: Bowel sounds are normal. There is no distension. Palpations: There is no mass. Tenderness: There is no abdominal tenderness. There is no right CVA tenderness, left CVA tenderness, guarding or rebound. Hernia: No hernia is present. Musculoskeletal:         General: Normal range of motion. Cervical back: Normal range of motion and neck supple. Skin:     General: Skin is warm and dry. Capillary Refill: Capillary refill takes 2 to 3 seconds. Findings: No rash. Neurological:      Mental Status: He is alert. Psychiatric:         Mood and Affect: Mood normal.         Behavior: Behavior normal.         Assessment/Plan:  1. Hospital discharge follow-up  Patient presents today with wife for hospital follow-up. Patient was recently hospitalized from 10/6-10/10 for acute on chronic respiratory failure with hypoxia/hypercapnia, chronic systolic congestive heart failure, acute renal failure, tobacco abuse and diabetes mellitus. Patient was initially evaluated at [facility] he was transferred to [facility].  Patient was intubated on on the ventilator for approximately 2 days. Patient reports that since being home he is doing well. Patient did go back to the ER on 10/14 for shortness of breath and discharged home. Patient reports that he did follow-up with cardiologist today. Bisoprolol was discontinued due to patient systolic blood pressure 75J. Patient reports that he is feeling well since being home. Patient presented to the office without oxygen in place. Patient reports that he has plenty of supplemental oxygen but did not wear it in the office. Initially patient was 89% on room air however after 2 L nasal cannula patient did increase to 92 to 94%. Patient reports occasional lightheadedness when he is up and moving. Patient reports that he has noticed some weakness in his legs. Patient continues to weigh himself daily.   Patient reports finishing all Cipro antibiotic for UTI. Patient reports that he does have a home health nurse, PT and OT coming to his house weekly. Patient has follow-up appointment with nephrologist, pulmonologist, and cardiologist in the next several weeks. Patient encouraged to be more compliant on dietary restrictions, checking blood sugar on a regular basis, smoking cessation and wearing oxygen as recommended. Continue current medications at this time. Patient will follow up in 2 months, sooner for new or worsening symptoms. We did discuss in detail the importance of prophylactic vaccinations at today's visit. Patient declines at this time. - IN DISCHARGE MEDS RECONCILED W/ CURRENT OUTPATIENT MED LIST    2. Acute on chronic respiratory failure with hypoxia and hypercapnia (HCC)  See above #1  - IN DISCHARGE MEDS RECONCILED W/ CURRENT OUTPATIENT MED LIST    3. Chronic systolic congestive heart failure ([facility]  See above #1  - IN DISCHARGE MEDS RECONCILED W/ CURRENT OUTPATIENT MED LIST    4. Acute renal failure, unspecified acute renal failure type ([facility]  See above #1  - IN DISCHARGE MEDS RECONCILED W/ CURRENT OUTPATIENT MED LIST    5.  Tobacco use  See above #1  - IN DISCHARGE MEDS RECONCILED W/ CURRENT OUTPATIENT MED LIST        Medical Decision Making: straightforward

## 2021-11-01 RX ORDER — GABAPENTIN 800 MG/1
TABLET ORAL
Qty: 120 TABLET | Refills: 0 | Status: SHIPPED | OUTPATIENT
Start: 2021-11-01 | End: 2022-11-01

## 2021-11-01 NOTE — PROGRESS NOTES
Bedside report to Becky Chávez RN POC reviewed. Pt placed on tele w/ CSPO2 monitoring, [facility] called to verify. . Pt transported to [facility] in stable condition via wheelchair on O2 accompanied by transport w/ all belongings.  Will transfer care 0

## 2021-11-01 NOTE — TELEPHONE ENCOUNTER
I spoke to wife Patient is on Lantus insulin 28 U. Naveed Dickeykins He is on combivent inhaler, He stopped Amaryl, nicotine patch and lisinopril. Date of last refill of this med was 10-5-21, # of pills given 120 and # of refills given 0. Their next appointment is 12-27-21, the last date patient was seen was 10-19-21  Does patient have medication agreement on file? No  Has drug screen been done in last 12 months if needed?  no

## 2021-11-08 RX ORDER — FENOFIBRIC ACID 135 MG/1
CAPSULE, DELAYED RELEASE ORAL
Qty: 30 CAPSULE | Refills: 5 | Status: SHIPPED | OUTPATIENT
Start: 2021-11-08 | End: 2022-05-19

## 2021-11-30 ENCOUNTER — APPOINTMENT (OUTPATIENT)
Dept: GENERAL RADIOLOGY | Age: 56
End: 2021-11-30
Payer: MEDICARE

## 2021-11-30 ENCOUNTER — HOSPITAL ENCOUNTER (EMERGENCY)
Age: 56
Discharge: HOME | End: 2021-11-30
Payer: MEDICARE

## 2021-11-30 ENCOUNTER — TELEPHONE (OUTPATIENT)
Dept: FAMILY MEDICINE CLINIC | Age: 56
End: 2021-11-30

## 2021-11-30 VITALS
WEIGHT: 175 LBS | HEIGHT: 60 IN | HEART RATE: 98 BPM | BODY MASS INDEX: 34.36 KG/M2 | OXYGEN SATURATION: 92 % | TEMPERATURE: 98.7 F | DIASTOLIC BLOOD PRESSURE: 70 MMHG | RESPIRATION RATE: 18 BRPM | SYSTOLIC BLOOD PRESSURE: 131 MMHG

## 2021-11-30 DIAGNOSIS — R06.02 SHORTNESS OF BREATH: Primary | ICD-10-CM

## 2021-11-30 DIAGNOSIS — Z99.81 SUPPLEMENTAL OXYGEN DEPENDENT: ICD-10-CM

## 2021-11-30 DIAGNOSIS — Z86.79 HISTORY OF CHRONIC CHF: ICD-10-CM

## 2021-11-30 DIAGNOSIS — Z87.09 HISTORY OF COPD: ICD-10-CM

## 2021-11-30 LAB
A/G RATIO: 0.9 (ref 1.1–2.2)
ALBUMIN SERPL-MCNC: 3.4 G/DL (ref 3.4–5)
ALP BLD-CCNC: 58 U/L (ref 40–129)
ALT SERPL-CCNC: 19 U/L (ref 10–40)
ANION GAP SERPL CALCULATED.3IONS-SCNC: 7 MMOL/L (ref 3–16)
AST SERPL-CCNC: 19 U/L (ref 15–37)
BASE EXCESS VENOUS: 6 MMOL/L (ref -3–3)
BASOPHILS ABSOLUTE: 0.1 K/UL (ref 0–0.2)
BASOPHILS RELATIVE PERCENT: 1.1 %
BILIRUB SERPL-MCNC: <0.2 MG/DL (ref 0–1)
BUN BLDV-MCNC: 10 MG/DL (ref 7–20)
CALCIUM SERPL-MCNC: 9.6 MG/DL (ref 8.3–10.6)
CARBOXYHEMOGLOBIN: 10.4 % (ref 0–1.5)
CHLORIDE BLD-SCNC: 100 MMOL/L (ref 99–110)
CO2: 33 MMOL/L (ref 21–32)
CREAT SERPL-MCNC: 0.9 MG/DL (ref 0.9–1.3)
EOSINOPHILS ABSOLUTE: 0.1 K/UL (ref 0–0.6)
EOSINOPHILS RELATIVE PERCENT: 1.1 %
GFR AFRICAN AMERICAN: >60
GFR NON-AFRICAN AMERICAN: >60
GLUCOSE BLD-MCNC: 244 MG/DL (ref 70–99)
HCO3 VENOUS: 33.5 MMOL/L (ref 23–29)
HCT VFR BLD CALC: 33.9 % (ref 40.5–52.5)
HEMOGLOBIN: 11 G/DL (ref 13.5–17.5)
LYMPHOCYTES ABSOLUTE: 1.9 K/UL (ref 1–5.1)
LYMPHOCYTES RELATIVE PERCENT: 24.5 %
MCH RBC QN AUTO: 28.4 PG (ref 26–34)
MCHC RBC AUTO-ENTMCNC: 32.4 G/DL (ref 31–36)
MCV RBC AUTO: 87.5 FL (ref 80–100)
METHEMOGLOBIN VENOUS: 0.3 %
MONOCYTES ABSOLUTE: 0.4 K/UL (ref 0–1.3)
MONOCYTES RELATIVE PERCENT: 5.6 %
NEUTROPHILS ABSOLUTE: 5.3 K/UL (ref 1.7–7.7)
NEUTROPHILS RELATIVE PERCENT: 67.7 %
O2 CONTENT, VEN: 8 VOL %
O2 SAT, VEN: 42 %
O2 THERAPY: ABNORMAL
PCO2, VEN: 63.3 MMHG (ref 40–50)
PDW BLD-RTO: 18.5 % (ref 12.4–15.4)
PH VENOUS: 7.34 (ref 7.35–7.45)
PLATELET # BLD: 412 K/UL (ref 135–450)
PMV BLD AUTO: 7.9 FL (ref 5–10.5)
PO2, VEN: 25.8 MMHG (ref 25–40)
POTASSIUM REFLEX MAGNESIUM: 4.2 MMOL/L (ref 3.5–5.1)
PRO-BNP: 637 PG/ML (ref 0–124)
RBC # BLD: 3.87 M/UL (ref 4.2–5.9)
SODIUM BLD-SCNC: 140 MMOL/L (ref 136–145)
TCO2 CALC VENOUS: 35 MMOL/L
TOTAL PROTEIN: 7.2 G/DL (ref 6.4–8.2)
TROPONIN: <0.01 NG/ML
WBC # BLD: 7.9 K/UL (ref 4–11)

## 2021-11-30 PROCEDURE — 80053 COMPREHEN METABOLIC PANEL: CPT

## 2021-11-30 PROCEDURE — 85025 COMPLETE CBC W/AUTO DIFF WBC: CPT

## 2021-11-30 PROCEDURE — 6370000000 HC RX 637 (ALT 250 FOR IP): Performed by: PHYSICIAN ASSISTANT

## 2021-11-30 PROCEDURE — 36415 COLL VENOUS BLD VENIPUNCTURE: CPT

## 2021-11-30 PROCEDURE — 82803 BLOOD GASES ANY COMBINATION: CPT

## 2021-11-30 PROCEDURE — 73560 X-RAY EXAM OF KNEE 1 OR 2: CPT

## 2021-11-30 PROCEDURE — 83880 ASSAY OF NATRIURETIC PEPTIDE: CPT

## 2021-11-30 PROCEDURE — 84484 ASSAY OF TROPONIN QUANT: CPT

## 2021-11-30 PROCEDURE — 71045 X-RAY EXAM CHEST 1 VIEW: CPT

## 2021-11-30 PROCEDURE — 99285 EMERGENCY DEPT VISIT HI MDM: CPT

## 2021-11-30 PROCEDURE — 6360000002 HC RX W HCPCS: Performed by: PHYSICIAN ASSISTANT

## 2021-11-30 PROCEDURE — 93005 ELECTROCARDIOGRAM TRACING: CPT | Performed by: PHYSICIAN ASSISTANT

## 2021-11-30 PROCEDURE — 96374 THER/PROPH/DIAG INJ IV PUSH: CPT

## 2021-11-30 RX ORDER — IPRATROPIUM BROMIDE AND ALBUTEROL SULFATE 2.5; .5 MG/3ML; MG/3ML
1 SOLUTION RESPIRATORY (INHALATION) ONCE
Status: DISCONTINUED | OUTPATIENT
Start: 2021-11-30 | End: 2021-11-30

## 2021-11-30 RX ORDER — IPRATROPIUM BROMIDE AND ALBUTEROL SULFATE 2.5; .5 MG/3ML; MG/3ML
1 SOLUTION RESPIRATORY (INHALATION) ONCE
Status: COMPLETED | OUTPATIENT
Start: 2021-11-30 | End: 2021-11-30

## 2021-11-30 RX ORDER — METHYLPREDNISOLONE SODIUM SUCCINATE 125 MG/2ML
125 INJECTION, POWDER, LYOPHILIZED, FOR SOLUTION INTRAMUSCULAR; INTRAVENOUS ONCE
Status: COMPLETED | OUTPATIENT
Start: 2021-11-30 | End: 2021-11-30

## 2021-11-30 RX ADMIN — IPRATROPIUM BROMIDE AND ALBUTEROL SULFATE 1 AMPULE: .5; 2.5 SOLUTION RESPIRATORY (INHALATION) at 14:19

## 2021-11-30 RX ADMIN — METHYLPREDNISOLONE SODIUM SUCCINATE 125 MG: 125 INJECTION, POWDER, FOR SOLUTION INTRAMUSCULAR; INTRAVENOUS at 14:20

## 2021-11-30 ASSESSMENT — ENCOUNTER SYMPTOMS
VOMITING: 0
SHORTNESS OF BREATH: 1
SPUTUM PRODUCTION: 0
ABDOMINAL PAIN: 0
COUGH: 0

## 2021-11-30 ASSESSMENT — PAIN DESCRIPTION - PAIN TYPE: TYPE: ACUTE PAIN;CHRONIC PAIN

## 2021-11-30 ASSESSMENT — PAIN SCALES - GENERAL: PAINLEVEL_OUTOF10: 8

## 2021-11-30 ASSESSMENT — PAIN DESCRIPTION - LOCATION: LOCATION: KNEE

## 2021-11-30 NOTE — ED PROVIDER NOTES
[address] [facility]        Pt Name: Ace Sibley  MRN: [id number]  [place] 1965  Date of evaluation: 11/30/2021  Provider: Isaiah Dominguez PA-C  PCP: LINK Short CNP    Shared Visit or Autonomous Visit:  I have seen and evaluated this patient with my supervising physician Dr Radha Moreau   Patient presents with    Shortness of Breath     Pt was walking around without his oxygen at home and his wife checked his O2 and it was in the [de-identified]. Pt put his oxygen back on and it came back up. Pt wife wanted him to get checked out. HISTORY OF PRESENT ILLNESS   (Location/Symptom, Timing/Onset, Context/Setting, Quality, Duration, Modifying Factors, Severity)  Note limiting factors. Ace Sibley is a 49 y.o. male presenting to the emergency department for evaluation of low O2 saturation in the 80s. Has chronic respiratory failure on nasal cannula oxygen typically wears 3 L all the time he forgot to put it on he was up and doing things around the house was active and sat down felt short of breath his wife checked his oxygen saturations and it was 83% he put his oxygen back on and states his oxygen level immediately went back up but his wife was concerned and made him come and get checked out. States he feels fine now. He denies ever having any chest pain. No dizziness or near syncope. Denies any cough or fevers. No calf pain or swelling. History of COPD and CHF. Currently at 4 L and is 94% states usually wears 3 L. Also complains of pain in both of his knees states he fell about a month ago landed right on his knees fell twice and they have been bothering him since then denies hitting his head or any other injuries when he fell 1 month ago. The history is provided by the patient.    Shortness of Breath  Chronicity:  Chronic  Context: not URI    Context comment:  Did not have his oxygen on  Associated symptoms: no abdominal pain, no chest pain, no cough, no fever, no sputum production, no syncope and no vomiting    Risk factors: no hx of PE/DVT          Nursing Notes were reviewed    REVIEW OF SYSTEMS    (2-9 systems for level 4, 10 or more for level 5)     Review of Systems   Constitutional: Negative for fever. Respiratory: Positive for shortness of breath. Negative for cough and sputum production. Cardiovascular: Negative for chest pain, leg swelling and syncope. Gastrointestinal: Negative for abdominal pain and vomiting. Musculoskeletal: Positive for arthralgias. Neurological: Negative for dizziness and syncope. All other systems reviewed and are negative. Positives and Pertinent negatives as per HPI.        PAST MEDICAL HISTORY     Past Medical History:   Diagnosis Date    [facility] 07[facility]11/2021    CH[facility]congestive heart failure) [facility])     combined    COPD [facility]chronic obstructive pulmonary disease) [facility]     Diabetes mellitus ([facility]     Hyperlipidemia     Hypertension     Oxygen dependent          SURGICAL HISTORY     Past Surgical History:   Procedure Laterality Date    HERNIA REPAIR      TONSILLECTOMY           CURRENTMEDICATIONS       Discharge Medication List as of 11/30/2021  3:55 PM      CONTINUE these medications which have NOT CHANGED    Details   fenofibric acid (FIBRICOR) 135 MG CPDR capsule TAKE 1 CAPSULE ONCE DAILY, Disp-30 capsule, R-5Normal      gabapentin (NEURONTIN) 800 MG tablet TAKE 1 TABLET 4 TIMES DAILY, Disp-120 tablet, R-0Normal      pioglitazone (ACTOS) 15 MG tablet Take 15 mg by mouth dailyHistorical Med      Lancets MISC DAILY Starting Mon 10/11/2021, Disp-100 each, R-3, Normal      sharps container PRN Starting Mon 10/11/2021, Disp-1 each, R-3, Normal      glucose monitoring (FREESTYLE FREEDOM) kit DAILY Starting Sun 10/10/2021, Disp-1 kit, R-0, Normal      !! blood glucose monitor strips Test 3 times a day & as needed for symptoms of irregular blood glucose. Dispense sufficient amount for indicated testing frequency plus additional to accommodate PRN testing needs. , Disp-200 strip, R-0, Normal      insulin glargine (LANTUS;BASAGLAR) 100 UNIT/ML injection pen Inject 25 Units into the skin 2 times daily, Disp-5 pen, R-3Normal      furosemide (LASIX) 20 MG tablet Take 1 tablet by mouth daily, Disp-60 tablet, R-1Normal      pantoprazole (PROTONIX) 40 MG tablet Take 1 tablet by mouth every morning (before breakfast), Disp-30 tablet, R-1Normal      glipiZIDE (GLUCOTROL) 5 MG tablet Take 1 tablet by mouth 2 times daily, Disp-60 tablet, R-3Normal      Insulin Pen Needle ([employer] PEN NEEDLES 29G) 29G X 12MM MISC DAILY Starting Thu 9/30/2021, Disp-100 each, R-3, Normal      atorvastatin (LIPITOR) 40 MG tablet TAKE (1) TABLET DAILY, Disp-90 tablet, R-0Normal      Umeclidinium Bromide (INCRUSE ELLIPTA) 62.5 MCG/INH AEPB INHALE 1 PUFF DAILY, Disp-30 each, R-3Normal      ipratropium-albuterol (DUONEB) 0.5-2.5 (3) MG/3ML SOLN nebulizer solution Take 3 mLs by nebulization every 4-6 hours as needed for Shortness of Breath, Disp-360 mL, R-1Normal      albuterol sulfate  (90 Base) MCG/ACT inhaler INHALE 2 PUFFS INTO THE LUNGS EVERY 6 HOURS AS NEEDED FOR WHEEZING OR SHORTNESS OF BREATH, Disp-8.5 g, R-5Normal      Fluticasone furoate-vilanterol (BREO ELLIPTA) 200-25 MCG/INH AEPB inhaler Inhale 1 puff into the lungs daily, Disp-1 each, R-0Adjust Sig      Cholecalciferol (VITAMIN D3) 1.25 MG (93176 UT) CAPS Take 1 capsule by mouth Twice a Week, Disp-2 capsule, R-1Normal      Magnesium 400 MG CAPS Take 1 tablet by mouth daily, Disp-30 capsule, R-1Normal      !! blood glucose monitor strips Test 2 times a day & as needed for symptoms of irregular blood glucose.  E11.9, Disp-300 strip, R-0, Print      glucose monitoring kit (FREESTYLE) monitoring kit DAILY Starting Tue 3/31/2020, Disp-1 kit, R-0, PrintPatient wants brand name Patient tests bid  E11.9      blood Exercise per Week: Not on file    Minutes of Exercise per Session: Not on file   Stress:     Feeling of Stress : Not on file   Social Connections:     Frequency of Communication with Friends and Family: Not on file    Frequency of Social Gatherings with Friends and Family: Not on file    Attends Yazidism Services: Not on file    Active Member of [address] [place] [address] SpecialtyCare or Organizations: Not on file    Attends Club or Organization Meetings: Not on file    Marital Status: Not on file   Intimate Partner Violence:     Fear of Current or Ex-Partner: Not on file    Emotionally Abused: Not on file    Physically Abused: Not on file    Sexually Abused: Not on file   Housing Stability:     Unable to Pay for Housing in the Last Year: Not on file    Number of [place] in the Last Year: Not on file    Unstable Housing in the Last Year: Not on file       SCREENINGS    [place] Coma Scale  Eye Opening: Spontaneous  Best Verbal Response: Oriented  Best Motor Response: Obeys commands  [place] Coma Scale Score: 15        PHYSICAL EXAM    (up to 7 for level 4, 8 or more for level 5)     ED Triage Vitals [11/30/21 1214]   BP Temp Temp Source Pulse Resp SpO2 Height Weight   118/89 98.7 °F (37.1 °C) Oral 94 20 93 % 4' 11\" (1.499 m) 175 lb (79.4 kg)       Physical Exam  Vitals and nursing note reviewed. Constitutional:       Appearance: He is well-developed. He is obese. He is not toxic-appearing. Interventions: Nasal cannula in place. HENT:      Head: Normocephalic and atraumatic. Mouth/Throat:      Mouth: Mucous membranes are moist.      Pharynx: Oropharynx is clear. No pharyngeal swelling or posterior oropharyngeal erythema. Eyes:      Conjunctiva/sclera: Conjunctivae normal.      Pupils: Pupils are equal, round, and reactive to light. Cardiovascular:      Rate and Rhythm: Normal rate and regular rhythm. Pulses:           Posterior tibial pulses are 2+ on the right side and 2+ on the left side.       Heart sounds: Normal heart sounds. Pulmonary:      Effort: Pulmonary effort is normal. No respiratory distress. Breath sounds: No stridor. Wheezing (expiratory scattered ) present. No rales. Abdominal:      General: Bowel sounds are normal.      Palpations: Abdomen is soft. Abdomen is not rigid. Tenderness: There is no abdominal tenderness. There is no guarding or rebound. Musculoskeletal:         General: Normal range of motion. Cervical back: Normal range of motion and neck supple. Right lower leg: No edema. Left lower leg: No edema. Comments: Generalized tenderness medial and anterior knees with mild swelling. No deformity. No crepitus. No open wounds. Intact sensation. Distal pulses 2+. No calf tenderness or leg swelling   Skin:     General: Skin is warm and dry. Neurological:      Mental Status: He is alert and oriented to person, place, and time. GCS: GCS eye subscore is 4. GCS verbal subscore is 5. GCS motor subscore is 6. Cranial Nerves: No cranial nerve deficit. Sensory: Sensation is intact. No sensory deficit. Motor: Motor function is intact. No abnormal muscle tone. Coordination: Coordination normal.   Psychiatric:         Speech: Speech normal.         Behavior: Behavior normal.         Thought Content: Thought content normal.         DIAGNOSTIC RESULTS   LABS:    Labs Reviewed   CBC WITH AUTO DIFFERENTIAL - Abnormal; Notable for the following components:       Result Value    RBC 3.87 (*)     Hemoglobin 11.0 (*)     Hematocrit 33.9 (*)     RDW 18.5 (*)     All other components within normal limits    Narrative:     Performed at:  [facility]. [facility]  [address] [place] [address].  [address]   Phone [phone]   COMPREHENSIVE METABOLIC PANEL W/ REFLEX TO MG FOR LOW K - Abnormal; Notable for the following components:    CO2 33 (*)     Glucose 244 (*)     Albumin/Globulin Ratio 0.9 (*)     All other components within normal limits    Narrative:     Performed at:  [facility]. [facility]  [address] [place] [address]. [place], [address]   Phone [phone] - Abnormal; Notable for the following components:    Pro- (*)     All other components within normal limits    Narrative:     Performed at:  [facility]. [facility]  [address] [place] [address]. [place], [address]   Phone [phone]   BLOOD GAS, VENOUS - Abnormal; Notable for the following components:    pH, Jesus 7.341 (*)     pCO2, Jesus 63.3 (*)     HCO3, Venous 33.5 (*)     Base Excess, Jesus 6.0 (*)     Carboxyhemoglobin 10.4 (*)     All other components within normal limits    Narrative:     Performed at:  [facility]. [facility]  [address] [place] [address]. [employer], [address]   Phone [phone]   TROPONIN    Narrative:     Performed at:  [facility]. [facility]  [address] [place] [address].  [place], [address]   Phone [phone]     Results for orders placed or performed during the hospital encounter of 11/30/21   CBC Auto Differential   Result Value Ref Range    WBC 7.9 4.0 - 11.0 K/uL    RBC 3.87 (L) 4.20 - 5.90 M/uL    Hemoglobin 11.0 (L) 13.5 - 17.5 g/dL    Hematocrit 33.9 (L) 40.5 - 52.5 %    MCV 87.5 80.0 - 100.0 fL    MCH 28.4 26.0 - 34.0 pg    MCHC 32.4 31.0 - 36.0 g/dL    RDW 18.5 (H) 12.4 - 15.4 %    Platelets 028 404 - 260 K/uL    MPV 7.9 5.0 - 10.5 fL    Neutrophils % 67.7 %    Lymphocytes % 24.5 %    Monocytes % 5.6 %    Eosinophils % 1.1 %    Basophils % 1.1 %    Neutrophils Absolute 5.3 1.7 - 7.7 K/uL    Lymphocytes Absolute 1.9 1.0 - 5.1 K/uL    Monocytes Absolute 0.4 0.0 - 1.3 K/uL    Eosinophils Absolute 0.1 0.0 - 0.6 K/uL    Basophils Absolute 0.1 0.0 - 0.2 K/uL   Comprehensive Metabolic Panel w/ Reflex to MG   Result Value Ref Range    Sodium 140 136 - 145 mmol/L    Potassium reflex Magnesium 4.2 3.5 - 5.1 mmol/L    Chloride 100 99 - 110 mmol/L    CO2 33 (H) 21 - 32 mmol/L    Anion Gap 7 3 - 16    Glucose 244 (H) 70 - 99 mg/dL    BUN 10 7 - 20 mg/dL    CREATININE 0.9 0.9 - 1.3 mg/dL    GFR Non-African American >60 >60    GFR African American >60 >60    Calcium 9.6 8.3 - 10.6 mg/dL    Total Protein 7.2 6.4 - 8.2 g/dL    Albumin 3.4 3.4 - 5.0 g/dL    Albumin/Globulin Ratio 0.9 (L) 1.1 - 2.2    Total Bilirubin <0.2 0.0 - 1.0 mg/dL    Alkaline Phosphatase 58 40 - 129 U/L    ALT 19 10 - 40 U/L    AST 19 15 - 37 U/L   Troponin   Result Value Ref Range    Troponin <0.01 <0.01 ng/mL   Brain Natriuretic Peptide   Result Value Ref Range    Pro- (H) 0 - 124 pg/mL   Blood gas, venous   Result Value Ref Range    pH, Jesus 7.341 (L) 7.350 - 7.450    pCO2, Jesus 63.3 (H) 40.0 - 50.0 mmHg    pO2, Jesus 25.8 25.0 - 40.0 mmHg    HCO3, Venous 33.5 (H) 23.0 - 29.0 mmol/L    Base Excess, Jesus 6.0 (H) -3.0 - 3.0 mmol/L    O2 Sat, Jesus 42 Not Established %    Carboxyhemoglobin 10.4 (H) 0.0 - 1.5 %    MetHgb, Jesus 0.3 <1.5 %    TC02 (Calc), Jesus 35 Not Established mmol/L    O2 Content, Jesus 8 Not Established VOL %    O2 Therapy Unknown    EKG 12 Lead   Result Value Ref Range    Ventricular Rate 98 BPM    Atrial Rate 98 BPM    P-R Interval 168 ms    QRS Duration 88 ms    Q-T Interval 360 ms    QTc Calculation (Bazett) 459 ms    P Axis 34 degrees    R Axis 143 degrees    T Axis 30 degrees    Diagnosis       Normal sinus rhythmPossible Right ventricular hypertrophyAbnormal ECGNo previous ECGs available       All other labs were within normal range or not returned as of this dictation. EKG: All EKG's are interpreted by the Emergency Department Physician in the absence of a cardiologist.  Please see their note for interpretation of EKG.       RADIOLOGY:   Non-plain film images such as CT, Ultrasound and MRI are read by the radiologist. Plain radiographic images are visualized andpreliminarily interpreted by the  ED Provider joint spaces are maintained. Vascular calcifications are noted. There is mild bilateral infrapatellar soft tissue swelling. 1. Small left joint effusion. XR CHEST PORTABLE    Result Date: 11/30/2021  EXAMINATION: ONE XRAY VIEW OF THE CHEST 11/30/2021 1:24 pm COMPARISON: 10/14/2021 HISTORY: ORDERING SYSTEM PROVIDED HISTORY: shortness of breath TECHNOLOGIST PROVIDED HISTORY: Reason for exam:->shortness of breath Reason for Exam: O2 dropped when up walking today Acuity: Acute Type of Exam: Initial FINDINGS: There is bibasilar scarring. No change in the mild pulmonary vascular congestion. Cardiomegaly is stable. There is no pneumothorax or pleural effusion. 1. Unchanged cardiomegaly with mild pulmonary vascular congestion. PROCEDURES   Unless otherwise noted below, none     Procedures    CRITICAL CARE TIME   N/A    CONSULTS:  None      EMERGENCY DEPARTMENT COURSE and DIFFERENTIAL DIAGNOSIS/MDM:   Vitals:    Vitals:    11/30/21 1323 11/30/21 1423 11/30/21 1517 11/30/21 1600   BP: 134/80 124/69 123/88 131/70   Pulse: 97 100 98 98   Resp: 18 18 18 18   Temp:       TempSrc:       SpO2: 93% 94% 92% 92%   Weight:       Height:           Patient was given thefollowing medications:  Medications   ipratropium-albuterol (DUONEB) nebulizer solution 1 ampule (1 ampule Inhalation Given 11/30/21 1419)   methylPREDNISolone sodium (SOLU-MEDROL) injection 125 mg (125 mg IntraVENous Given 11/30/21 1420)         ED course  Patient presented to the ER for evaluation low oxygen level. He has chronic respiratory failure and wears 3 L nasal cannula oxygen all the time but he did not have been on he was doing things around the house and felt short of breath and when his wife checked his O2 saturation was 83% on room air he went back on his oxygen and states his oxygen level went right back up. On arrival he is on 4 L nasal cannula. He has had expiratory wheezes. Given Solu-Medrol and breathing treatment here.   Chest x-ray showing unchanged cardiomegaly mild pulmonary vascular congestion. I suspect this is chronic. BNP is 637 much improved from previous. Troponin is normal.  VBG pH 7.34. PCO2 63. Patient will be observed on oxygen here. He is alert and oriented x3. He has no complaints here. 3:17 PM EST  Patient reevaluated. States he feels good. No complaints. He denies feeling short of breath. Currently his O2 saturation is 92% on his normal 3 L. He feels comfortable going home. He will be discharged. He understands importance of wearing his oxygen at home. Continue current medications. Close follow-up with his doctor. Return for any worsening. I estimate there is LOW risk for PULMONARY EMBOLISM, ACUTE PULMONARY EDEMA, PNEUMONIA, PNEUMOTHORAX, STATUS ASTHMATICUS, ACUTE RESPIRATORY FAILURE, OR ACUTE CORONARY SYNDROME, thus I consider the discharge disposition reasonable. FINAL IMPRESSION      1. Shortness of breath    2. Supplemental oxygen dependent    3. History of chronic CHF    4. History of COPD          DISPOSITION/PLAN   DISPOSITION     PATIENT REFERREDTO:  Brenda Atkinson APRN - [facility]  [address] [place]  [phone]    Call in 1 day  Call to arrange follow-up appointment from ER visit    [facility].  [facility]  [address] [place] [address]  [phone]    If symptoms worsen      DISCHARGE MEDICATIONS:  Discharge Medication List as of 11/30/2021  3:55 PM          DISCONTINUED MEDICATIONS:  Discharge Medication List as of 11/30/2021  3:55 PM                 (Please note that portions ofthis note were completed with a voice recognition program.  Efforts were made to edit the dictations but occasionally words are mis-transcribed.)    Joaquín Doran PA-C (electronically signed)            Kimberly Longoria PA-C  11/30/21 9138

## 2021-11-30 NOTE — ED PROVIDER NOTES
The patient was seen by me in conjunction with a mid-level provider (nurse practitioner or physician assistant). I have personally performed and/or participated in the history, exam and medical decision making and agree with all pertinent clinical information. I have also reviewed and agree with the past medical, family and social history unless otherwise noted. All lab results, EKG tracings, and radiographic studies or interpretations were reviewed by me. I have personally performed a face-to-face diagnostic evaluation on the patient. My findings are as follows:    History: This patient presented to the emergency department history of apparently accidentally not going around the house with his oxygen on normally he wears his oxygen 100% of the time he normally wears 3 to 4 L  He does have bad COPD and a history of congestive heart failure  He said that apparently somebody offered to walk him around the house and unfortunately he did it without oxygen thus he did get short of breath and up calling EMS who brought him out here. Patient did not receive a breathing treatment. Exam shows: Patient is short statured with obesity he does have a cushingoid appearance of his face and does have diffuse wheezing we did give him Solu-Medrol and a breathing treatment and he feels 100% better we basically got him back on his oxygen and when we did that he had already felt better he wants to go home. Patient did undergo chest x-ray was normal his white count was normal as mentioned we did give him a breathing treatment and Solu-Medrol he is ready to go home impression is that he got hypoxic and dyspneic secondary to his noncompliance or accidental noncompliance with his oxygen      Lab      Radiology      EKG         Emergency Department Course:              Voice Recognition Dictation disclaimer:  Please note that portions of this chart were generated using Dragon dictation software.  Although every effort was made to ensure the accuracy of this automated transcription, some errors in transcription may have occurred.         Jay Dominguez MD  11/30/21 7935

## 2021-11-30 NOTE — TELEPHONE ENCOUNTER
Wife called and patient is having leg pain. Oxygen is 86% and feels bad all over. Advised to go to the ER for evaluation.

## 2021-11-30 NOTE — ED NOTES
Pt arrived via stretcher with EMS. Pt states that he was walking around at home without his oxygen, states that he wears 3L at home, and his home health nurse checked his oxygen level and it was in the 80s. States that his wife then called the squad and that she wanted him to come and get checked out. Per squad, he walked to the squad with no issues. Pt is currently on 4L, stating at 94%, when he wears 3 at home. Pt states that he feels fine, just came to appease his wife.       Ariadna Roth RN  11/30/21 7250

## 2021-12-01 LAB
EKG ATRIAL RATE: 98 BPM
EKG DIAGNOSIS: NORMAL
EKG P AXIS: 34 DEGREES
EKG P-R INTERVAL: 168 MS
EKG Q-T INTERVAL: 360 MS
EKG QRS DURATION: 88 MS
EKG QTC CALCULATION (BAZETT): 459 MS
EKG R AXIS: 143 DEGREES
EKG T AXIS: 30 DEGREES
EKG VENTRICULAR RATE: 98 BPM

## 2021-12-01 PROCEDURE — 93010 ELECTROCARDIOGRAM REPORT: CPT | Performed by: INTERNAL MEDICINE

## 2021-12-03 RX ORDER — PANTOPRAZOLE SODIUM 40 MG/1
40 TABLET, DELAYED RELEASE ORAL
Qty: 30 TABLET | Refills: 1 | Status: SHIPPED | OUTPATIENT
Start: 2021-12-03 | End: 2022-02-07

## 2021-12-03 NOTE — TELEPHONE ENCOUNTER
Future appt scheduled 12/27/2021            Last appt 10/19/2021      Last Written 10/01/2021    pantoprazole (PROTONIX) 40 MG tablet  #30  1 RF

## 2021-12-09 ENCOUNTER — OFFICE VISIT (OUTPATIENT)
Dept: FAMILY MEDICINE CLINIC | Age: 56
End: 2021-12-09
Payer: MEDICARE

## 2021-12-09 VITALS
OXYGEN SATURATION: 96 % | SYSTOLIC BLOOD PRESSURE: 112 MMHG | HEART RATE: 96 BPM | BODY MASS INDEX: 37.37 KG/M2 | TEMPERATURE: 99 F | DIASTOLIC BLOOD PRESSURE: 72 MMHG | WEIGHT: 185 LBS

## 2021-12-09 DIAGNOSIS — Z09 HOSPITAL DISCHARGE FOLLOW-UP: ICD-10-CM

## 2021-12-09 DIAGNOSIS — I50.22 CHRONIC SYSTOLIC CONGESTIVE HEART FAILURE (HCC): ICD-10-CM

## 2021-12-09 DIAGNOSIS — J96.22 ACUTE ON CHRONIC RESPIRATORY FAILURE WITH HYPOXIA AND HYPERCAPNIA (HCC): ICD-10-CM

## 2021-12-09 DIAGNOSIS — J96.21 ACUTE ON CHRONIC RESPIRATORY FAILURE WITH HYPOXIA AND HYPERCAPNIA (HCC): ICD-10-CM

## 2021-12-09 DIAGNOSIS — Z72.0 TOBACCO USE: Primary | ICD-10-CM

## 2021-12-09 PROCEDURE — 4004F PT TOBACCO SCREEN RCVD TLK: CPT | Performed by: NURSE PRACTITIONER

## 2021-12-09 PROCEDURE — G8427 DOCREV CUR MEDS BY ELIG CLIN: HCPCS | Performed by: NURSE PRACTITIONER

## 2021-12-09 PROCEDURE — G8417 CALC BMI ABV UP PARAM F/U: HCPCS | Performed by: NURSE PRACTITIONER

## 2021-12-09 PROCEDURE — 3017F COLORECTAL CA SCREEN DOC REV: CPT | Performed by: NURSE PRACTITIONER

## 2021-12-09 PROCEDURE — G8484 FLU IMMUNIZE NO ADMIN: HCPCS | Performed by: NURSE PRACTITIONER

## 2021-12-09 PROCEDURE — 99214 OFFICE O/P EST MOD 30 MIN: CPT | Performed by: NURSE PRACTITIONER

## 2021-12-09 ASSESSMENT — ENCOUNTER SYMPTOMS
WHEEZING: 0
RHINORRHEA: 0
VOMITING: 0
SORE THROAT: 0
ABDOMINAL PAIN: 0
SHORTNESS OF BREATH: 1
DIARRHEA: 0
COUGH: 1
NAUSEA: 0

## 2021-12-09 NOTE — PATIENT INSTRUCTIONS
Please read the healthy family handout that you were given and share it with your family. Please compare this printed medication list with your medications at home to be sure they are the same. If you have any medications that are different please contact us immediately at [phone]. Also review your allergies that we have listed, these may also include medications that you have not been able to tolerate, make sure everything listed is correct. If you have any allergies that are different please contact us immediately at [phone].

## 2021-12-09 NOTE — PROGRESS NOTES
CHIEF COMPLAINT  Chief Complaint   Patient presents with    Other     ER follow up        HPI   Jarek Shepard is a 49 y.o. male who presents to the office ER follow-up. Patient reports that he recently had to go to the emergency department because of hypoxia. Patient reports that his home health nurse checks his oxygen and it was low. Patient reports that it was after he had been up and walking around. Patient reports that when he put his oxygen back on readings improved however nurse felt that he still needed to be evaluated. Patient was seen in the emergency department and discharged home. Patient reports doing well since then. No new or worsening shortness of breath upon exertion, cough, congestion, fever or chills. Patient continues to smoke daily. Patient wears continuous oxygen 2 to 3 L. Patient requesting portable oxygen and carrier at today's visit. No other complaints, modifying factors or associated symptoms. Nursing notes reviewed.    Past Medical History:   Diagnosis Date    Acute respiratory failure ([facility] [address] 07/11/2021    CHF (congestive heart failure) ([facility])     combined    COPD (chronic obstructive pulmonary disease) ([facility])     Diabetes mellitus ([facility] [address]     Hyperlipidemia     Hypertension     Oxygen dependent      Past Surgical History:   Procedure Laterality Date    HERNIA REPAIR      TONSILLECTOMY       Family History   Problem Relation Age of Onset    Asthma Mother     Cancer Mother     Hearing Loss Mother     Vision Loss Mother     High Blood Pressure Father     High Cholesterol Father     Vision Loss Father     Asthma Sister     Diabetes Sister     Vision Loss Sister     Asthma Brother     Arthritis Brother     High Blood Pressure Brother      Social History     Socioeconomic History    Marital status:      Spouse name: Not on file    Number of children: Not on file    Years of education: Not on file    Highest education level: Not on file Occupational History    Not on file   Tobacco Use    Smoking status: Current Every Day Smoker     Packs/day: 1.00    Smokeless tobacco: Never Used   Vaping Use    Vaping Use: Never used   Substance and Sexual Activity    Alcohol use: Yes     Comment: on occasion    Drug use: Never    Sexual activity: Not on file   Other Topics Concern    Not on file   Social History Narrative    Not on file     Social Determinants of Health     Financial Resource Strain:     Difficulty of Paying Living Expenses: Not on file   Food Insecurity:     Worried About Running Out of Food in the Last Year: Not on file    Reddy of Food in the Last Year: Not on file   Transportation Needs:     Lack of Transportation (Medical): Not on file    Lack of Transportation (Non-Medical):  Not on file   Physical Activity:     Days of Exercise per Week: Not on file    Minutes of Exercise per Session: Not on file   Stress:     Feeling of Stress : Not on file   Social Connections:     Frequency of Communication with Friends and Family: Not on file    Frequency of Social Gatherings with Friends and Family: Not on file    Attends Hoahaoism Services: Not on file    Active Member of [address] [place] [address] or Organizations: Not on file    Attends Club or Organization Meetings: Not on file    Marital Status: Not on file   Intimate Partner Violence:     Fear of Current or Ex-Partner: Not on file    Emotionally Abused: Not on file    Physically Abused: Not on file    Sexually Abused: Not on file   Housing Stability:     Unable to Pay for Housing in the Last Year: Not on file    Number of [place] in the Last Year: Not on file    Unstable Housing in the Last Year: Not on file     Current Outpatient Medications   Medication Sig Dispense Refill    pantoprazole (PROTONIX) 40 MG tablet TAKE 1 TABLET BY MOUTH EVERY MORNING (BEFORE BREAKFAST) 30 tablet 1    fenofibric acid (FIBRICOR) 135 MG CPDR capsule TAKE 1 CAPSULE ONCE DAILY 30 capsule 5    gabapentin (NEURONTIN) 800 MG tablet TAKE 1 TABLET 4 TIMES DAILY 120 tablet 0    pioglitazone (ACTOS) 15 MG tablet Take 15 mg by mouth daily      Lancets MISC 1 each by Does not apply route daily 100 each 3    sharps container 1 each by Does not apply route as needed (for disposable of lancets and needles) 1 each 3    glucose monitoring (FREESTYLE FREEDOM) kit 1 kit by Does not apply route daily 1 kit 0    blood glucose monitor strips Test 3 times a day & as needed for symptoms of irregular blood glucose. Dispense sufficient amount for indicated testing frequency plus additional to accommodate PRN testing needs. 200 strip 0    insulin glargine (LANTUS;BASAGLAR) 100 UNIT/ML injection pen Inject 25 Units into the skin 2 times daily 5 pen 3    furosemide (LASIX) 20 MG tablet Take 1 tablet by mouth daily 60 tablet 1    glipiZIDE (GLUCOTROL) 5 MG tablet Take 1 tablet by mouth 2 times daily 60 tablet 3    Insulin Pen Needle ([employer] PEN NEEDLES 29G) 29G X 12MM MISC 1 each by Does not apply route daily 100 each 3    atorvastatin (LIPITOR) 40 MG tablet TAKE (1) TABLET DAILY 90 tablet 0    Umeclidinium Bromide (INCRUSE ELLIPTA) 62.5 MCG/INH AEPB INHALE 1 PUFF DAILY 30 each 3    ipratropium-albuterol (DUONEB) 0.5-2.5 (3) MG/3ML SOLN nebulizer solution Take 3 mLs by nebulization every 4-6 hours as needed for Shortness of Breath 360 mL 1    albuterol sulfate  (90 Base) MCG/ACT inhaler INHALE 2 PUFFS INTO THE LUNGS EVERY 6 HOURS AS NEEDED FOR WHEEZING OR SHORTNESS OF BREATH 8.5 g 5    Fluticasone furoate-vilanterol (BREO ELLIPTA) 200-25 MCG/INH AEPB inhaler Inhale 1 puff into the lungs daily 1 each 0    Cholecalciferol (VITAMIN D3) 1.25 MG (23184 UT) CAPS Take 1 capsule by mouth Twice a Week 2 capsule 1    Magnesium 400 MG CAPS Take 1 tablet by mouth daily 30 capsule 1    blood glucose monitor strips Test 2 times a day & as needed for symptoms of irregular blood glucose.  E11.9 300 strip 0    glucose monitoring kit (FREESTYLE) monitoring kit 1 kit by Does not apply route daily Patient wants brand name Patient tests bid  E11.9 1 kit 0    blood glucose monitor kit and supplies Test 2 times a day & as needed for symptoms of irregular blood glucose. 1 kit 0    Cyanocobalamin 1000 MCG CAPS Take 1 tablet by mouth daily       Omega-3 1000 MG CAPS Take 1 capsule by mouth daily      Coenzyme Q10 (COQ10) 100 MG CAPS Take 1 capsule by mouth daily      MULTIPLE VITAMINS PO Take by mouth       No current facility-administered medications for this visit. No Known Allergies    REVIEW OF SYSTEMS  Review of Systems   Constitutional: Negative for activity change, appetite change, chills and fever. HENT: Negative for congestion, rhinorrhea and sore throat. Eyes: Negative for visual disturbance. Respiratory: Positive for cough and shortness of breath. Negative for wheezing. Cardiovascular: Negative for chest pain and leg swelling. Gastrointestinal: Negative for abdominal pain, diarrhea, nausea and vomiting. Genitourinary: Negative for dysuria, frequency, hematuria and urgency. Musculoskeletal: Positive for arthralgias. Negative for gait problem and myalgias. Skin: Negative for rash. Neurological: Negative for dizziness, weakness, light-headedness, numbness and headaches. Psychiatric/Behavioral: Negative for sleep disturbance. PHYSICAL EXAM  /72   Pulse 96   Temp 99 °F (37.2 °C) (Oral)   Wt 185 lb (83.9 kg)   SpO2 96%   BMI 37.37 kg/m²   Physical Exam  Constitutional:       Appearance: Normal appearance. He is not toxic-appearing. HENT:      Head: Normocephalic and atraumatic. Right Ear: External ear normal.      Left Ear: Tympanic membrane and external ear normal.      Nose: Nose normal.      Mouth/Throat:      Mouth: Mucous membranes are moist.      Pharynx: Oropharynx is clear. Eyes:      Extraocular Movements: Extraocular movements intact.       Conjunctiva/sclera: Conjunctivae normal.      Pupils: Pupils are equal, round, and reactive to light. Cardiovascular:      Rate and Rhythm: Normal rate and regular rhythm. Pulses: Normal pulses. Pulmonary:      Effort: Pulmonary effort is normal. No respiratory distress. Breath sounds: No stridor. Wheezing present. No rhonchi or rales. Chest:      Chest wall: No tenderness. Abdominal:      General: Bowel sounds are normal. There is no distension. Palpations: There is no mass. Tenderness: There is no abdominal tenderness. There is no right CVA tenderness, left CVA tenderness, guarding or rebound. Hernia: No hernia is present. Musculoskeletal:         General: Normal range of motion. Cervical back: Normal range of motion and neck supple. Skin:     General: Skin is warm and dry. Capillary Refill: Capillary refill takes 2 to 3 seconds. Findings: No rash. Neurological:      Mental Status: He is alert. Psychiatric:         Mood and Affect: Mood normal.         Behavior: Behavior normal.          RADIOLOGY  XR KNEE LEFT (1-2 VIEWS)    Result Date: 11/30/2021  EXAMINATION: 2 XRAY VIEWS OF THE LEFT KNEE; 2 XRAY VIEWS OF THE RIGHT KNEE 11/30/2021 1:24 pm COMPARISON: None. HISTORY: ORDERING SYSTEM PROVIDED HISTORY: fall r/o fx TECHNOLOGIST PROVIDED HISTORY: Reason for exam:->fall r/o fx Reason for Exam: pain in both knees chronically Acuity: Acute Type of Exam: Initial; ORDERING SYSTEM PROVIDED HISTORY: Trauma, R/O fx TECHNOLOGIST PROVIDED HISTORY: Reason for exam:->Trauma, R/O fx Reason for Exam: pain in both knees chronically Acuity: Acute Type of Exam: Initial FINDINGS: There is no acute fracture or dislocation. There is a small left joint effusion. The bones are normally mineralized. The joint spaces are maintained. Vascular calcifications are noted. There is mild bilateral infrapatellar soft tissue swelling. 1. Small left joint effusion.      XR KNEE RIGHT (1-2 VIEWS)    Result Date: 11/30/2021  EXAMINATION: 2 XRAY VIEWS OF THE LEFT KNEE; 2 XRAY VIEWS OF THE RIGHT KNEE 11/30/2021 1:24 pm COMPARISON: None. HISTORY: ORDERING SYSTEM PROVIDED HISTORY: fall r/o fx TECHNOLOGIST PROVIDED HISTORY: Reason for exam:->fall r/o fx Reason for Exam: pain in both knees chronically Acuity: Acute Type of Exam: Initial; ORDERING SYSTEM PROVIDED HISTORY: Trauma, R/O fx TECHNOLOGIST PROVIDED HISTORY: Reason for exam:->Trauma, R/O fx Reason for Exam: pain in both knees chronically Acuity: Acute Type of Exam: Initial FINDINGS: There is no acute fracture or dislocation. There is a small left joint effusion. The bones are normally mineralized. The joint spaces are maintained. Vascular calcifications are noted. There is mild bilateral infrapatellar soft tissue swelling. 1. Small left joint effusion. XR CHEST PORTABLE    Result Date: 11/30/2021  EXAMINATION: ONE XRAY VIEW OF THE CHEST 11/30/2021 1:24 pm COMPARISON: 10/14/2021 HISTORY: ORDERING SYSTEM PROVIDED HISTORY: shortness of breath TECHNOLOGIST PROVIDED HISTORY: Reason for exam:->shortness of breath Reason for Exam: O2 dropped when up walking today Acuity: Acute Type of Exam: Initial FINDINGS: There is bibasilar scarring. No change in the mild pulmonary vascular congestion. Cardiomegaly is stable. There is no pneumothorax or pleural effusion. 1. Unchanged cardiomegaly with mild pulmonary vascular congestion. ASSESSMENT/PLAN:   1. Hospital discharge follow-up  Patient presents today for ER follow-up. Patient was recently seen in the emergency department evaluated and discharged home. Patient has known history of COPD and CHF. Patient denies any recent changes in medications. No recent cough, congestion, worsening dyspnea upon exertion. Patient reports that home health nurse evaluated patient and noted oxygen to be low. Patient reports that it was after he had been up and walking without his oxygen.   Patient reports that when he sat back down and put oxygen on symptoms improved however nurse felt that he needed to be evaluated. Patient reports he continues to smoke at least 2 packs/day. Patient aware of risk associated with tobacco use. Patient continuously wears 2 to 3 L and requesting portable tank at today's visit. Orders faxed to [facility] for portable oxygen anterior. I did spend 50% of the time counseling the patient on the effects of tobacco use and smoking with oxygen. Patient acknowledges. I was able to review labs, radiological imaging and course of treatment from ER visit. I did recommend that patient continue with current treatment medications following up for any new or worsening symptoms. Patient reports that he does feel well and denies any acute complaints today other than ongoing knee pain. It appears that patient did have imaging performed of his knees which did reveal joint effusion. I did recommend that if pain persist he will need to follow-up with orthopedics. Patient verbalized and acknowledges with plan of care at this time. 2. Tobacco use  See above #1    3. Acute on chronic respiratory failure with hypoxia and hypercapnia (HCC)  See above #1    4. Chronic systolic congestive heart failure ([address]  See above #1         The note was completed using Dragon voice recognition transcription. Every effort was made to ensure accuracy; however, inadvertent  transcription errors may be present despite my best efforts to edit errors.     Jamaal Wagner, LINK - CNP

## 2021-12-14 ENCOUNTER — TELEPHONE (OUTPATIENT)
Dept: FAMILY MEDICINE CLINIC | Age: 56
End: 2021-12-14

## 2022-01-03 RX ORDER — UMECLIDINIUM 62.5 UG/1
AEROSOL, POWDER ORAL
Qty: 1 EACH | Refills: 3 | Status: SHIPPED | OUTPATIENT
Start: 2022-01-03 | End: 2022-05-04 | Stop reason: SDUPTHER

## 2022-01-03 NOTE — TELEPHONE ENCOUNTER
Future appt scheduled 04/15/2022                     Last appt 12/09/2021      Last Written   08/27/2021    Umeclidinium Bromide (INCRUSE ELLIPTA) 62.5 MCG/INH AEPB  #30  3 RF

## 2022-01-21 RX ORDER — ATORVASTATIN CALCIUM 40 MG/1
TABLET, FILM COATED ORAL
Qty: 90 TABLET | Refills: 0 | Status: SHIPPED | OUTPATIENT
Start: 2022-01-21 | End: 2022-05-04 | Stop reason: SDUPTHER

## 2022-01-21 NOTE — TELEPHONE ENCOUNTER
Future appt scheduled 04/15/2022                Last appt 12/09/2021      Last Written 09/29/2021    atorvastatin (LIPITOR) 40 MG tablet  #90  0 RF       Refilled medication per verbal order from provider.

## 2022-01-26 RX ORDER — INSULIN GLARGINE 100 [IU]/ML
INJECTION, SOLUTION SUBCUTANEOUS
Qty: 15 ML | Refills: 3 | Status: SHIPPED | OUTPATIENT
Start: 2022-01-26 | End: 2022-05-13

## 2022-02-07 RX ORDER — PANTOPRAZOLE SODIUM 40 MG/1
40 TABLET, DELAYED RELEASE ORAL
Qty: 30 TABLET | Refills: 5 | Status: SHIPPED | OUTPATIENT
Start: 2022-02-07 | End: 2022-08-01

## 2022-02-18 ENCOUNTER — APPOINTMENT (OUTPATIENT)
Dept: GENERAL RADIOLOGY | Age: 57
End: 2022-02-18
Payer: MEDICARE

## 2022-02-18 ENCOUNTER — HOSPITAL ENCOUNTER (EMERGENCY)
Age: 57
Discharge: HOME | End: 2022-02-18
Attending: EMERGENCY MEDICINE
Payer: MEDICARE

## 2022-02-18 VITALS
WEIGHT: 160 LBS | RESPIRATION RATE: 16 BRPM | OXYGEN SATURATION: 93 % | SYSTOLIC BLOOD PRESSURE: 125 MMHG | DIASTOLIC BLOOD PRESSURE: 88 MMHG | TEMPERATURE: 100.4 F | BODY MASS INDEX: 31.41 KG/M2 | HEART RATE: 103 BPM | HEIGHT: 60 IN

## 2022-02-18 DIAGNOSIS — J06.9 ACUTE UPPER RESPIRATORY INFECTION: Primary | ICD-10-CM

## 2022-02-18 DIAGNOSIS — J41.8 MIXED SIMPLE AND MUCOPURULENT CHRONIC BRONCHITIS (HCC): ICD-10-CM

## 2022-02-18 LAB
A/G RATIO: 1.1 (ref 1.1–2.2)
ALBUMIN SERPL-MCNC: 4 G/DL (ref 3.4–5)
ALP BLD-CCNC: 80 U/L (ref 40–129)
ALT SERPL-CCNC: 22 U/L (ref 10–40)
ANION GAP SERPL CALCULATED.3IONS-SCNC: 12 MMOL/L (ref 3–16)
AST SERPL-CCNC: 18 U/L (ref 15–37)
BASOPHILS ABSOLUTE: 0.2 K/UL (ref 0–0.2)
BASOPHILS RELATIVE PERCENT: 1.5 %
BILIRUB SERPL-MCNC: <0.2 MG/DL (ref 0–1)
BUN BLDV-MCNC: 16 MG/DL (ref 7–20)
CALCIUM SERPL-MCNC: 9.6 MG/DL (ref 8.3–10.6)
CHLORIDE BLD-SCNC: 92 MMOL/L (ref 99–110)
CO2: 27 MMOL/L (ref 21–32)
CREAT SERPL-MCNC: 1.1 MG/DL (ref 0.9–1.3)
EKG ATRIAL RATE: 113 BPM
EKG DIAGNOSIS: NORMAL
EKG P AXIS: 38 DEGREES
EKG P-R INTERVAL: 150 MS
EKG Q-T INTERVAL: 332 MS
EKG QRS DURATION: 82 MS
EKG QTC CALCULATION (BAZETT): 455 MS
EKG R AXIS: 117 DEGREES
EKG T AXIS: 56 DEGREES
EKG VENTRICULAR RATE: 113 BPM
EOSINOPHILS ABSOLUTE: 0 K/UL (ref 0–0.6)
EOSINOPHILS RELATIVE PERCENT: 0.4 %
GFR AFRICAN AMERICAN: >60
GFR NON-AFRICAN AMERICAN: >60
GLUCOSE BLD-MCNC: 347 MG/DL (ref 70–99)
HCT VFR BLD CALC: 41 % (ref 40.5–52.5)
HEMOGLOBIN: 13.4 G/DL (ref 13.5–17.5)
LACTIC ACID: 1.4 MMOL/L (ref 0.4–2)
LYMPHOCYTES ABSOLUTE: 1.5 K/UL (ref 1–5.1)
LYMPHOCYTES RELATIVE PERCENT: 13 %
MCH RBC QN AUTO: 26.6 PG (ref 26–34)
MCHC RBC AUTO-ENTMCNC: 32.8 G/DL (ref 31–36)
MCV RBC AUTO: 80.9 FL (ref 80–100)
MONOCYTES ABSOLUTE: 0.6 K/UL (ref 0–1.3)
MONOCYTES RELATIVE PERCENT: 5.3 %
NEUTROPHILS ABSOLUTE: 9.4 K/UL (ref 1.7–7.7)
NEUTROPHILS RELATIVE PERCENT: 79.8 %
PDW BLD-RTO: 16.7 % (ref 12.4–15.4)
PLATELET # BLD: 285 K/UL (ref 135–450)
PMV BLD AUTO: 8.5 FL (ref 5–10.5)
POTASSIUM SERPL-SCNC: 4.8 MMOL/L (ref 3.5–5.1)
RBC # BLD: 5.06 M/UL (ref 4.2–5.9)
SARS-COV-2, NAAT: NOT DETECTED
SODIUM BLD-SCNC: 131 MMOL/L (ref 136–145)
TOTAL PROTEIN: 7.5 G/DL (ref 6.4–8.2)
WBC # BLD: 11.7 K/UL (ref 4–11)

## 2022-02-18 PROCEDURE — 87040 BLOOD CULTURE FOR BACTERIA: CPT

## 2022-02-18 PROCEDURE — 87635 SARS-COV-2 COVID-19 AMP PRB: CPT

## 2022-02-18 PROCEDURE — 80053 COMPREHEN METABOLIC PANEL: CPT

## 2022-02-18 PROCEDURE — 93005 ELECTROCARDIOGRAM TRACING: CPT | Performed by: EMERGENCY MEDICINE

## 2022-02-18 PROCEDURE — 99285 EMERGENCY DEPT VISIT HI MDM: CPT

## 2022-02-18 PROCEDURE — 83605 ASSAY OF LACTIC ACID: CPT

## 2022-02-18 PROCEDURE — 93010 ELECTROCARDIOGRAM REPORT: CPT | Performed by: INTERNAL MEDICINE

## 2022-02-18 PROCEDURE — 2580000003 HC RX 258: Performed by: EMERGENCY MEDICINE

## 2022-02-18 PROCEDURE — 96360 HYDRATION IV INFUSION INIT: CPT

## 2022-02-18 PROCEDURE — 36415 COLL VENOUS BLD VENIPUNCTURE: CPT

## 2022-02-18 PROCEDURE — 6370000000 HC RX 637 (ALT 250 FOR IP)

## 2022-02-18 PROCEDURE — 85025 COMPLETE CBC W/AUTO DIFF WBC: CPT

## 2022-02-18 PROCEDURE — 71045 X-RAY EXAM CHEST 1 VIEW: CPT

## 2022-02-18 PROCEDURE — 96361 HYDRATE IV INFUSION ADD-ON: CPT

## 2022-02-18 RX ORDER — 0.9 % SODIUM CHLORIDE 0.9 %
1000 INTRAVENOUS SOLUTION INTRAVENOUS ONCE
Status: DISCONTINUED | OUTPATIENT
Start: 2022-02-18 | End: 2022-02-18 | Stop reason: HOSPADM

## 2022-02-18 RX ORDER — DOXYCYCLINE HYCLATE 100 MG/1
100 CAPSULE ORAL 2 TIMES DAILY
Qty: 20 CAPSULE | Refills: 0 | Status: SHIPPED | OUTPATIENT
Start: 2022-02-18 | End: 2022-02-28

## 2022-02-18 RX ORDER — ACETAMINOPHEN 500 MG
1000 TABLET ORAL ONCE
Status: COMPLETED | OUTPATIENT
Start: 2022-02-18 | End: 2022-02-18

## 2022-02-18 RX ORDER — PREDNISONE 10 MG/1
10 TABLET ORAL SEE ADMIN INSTRUCTIONS
Qty: 30 TABLET | Refills: 0 | Status: SHIPPED | OUTPATIENT
Start: 2022-02-19 | End: 2022-05-12 | Stop reason: ALTCHOICE

## 2022-02-18 RX ORDER — ACETAMINOPHEN 500 MG
TABLET ORAL
Status: COMPLETED
Start: 2022-02-18 | End: 2022-02-18

## 2022-02-18 RX ORDER — 0.9 % SODIUM CHLORIDE 0.9 %
1000 INTRAVENOUS SOLUTION INTRAVENOUS ONCE
Status: COMPLETED | OUTPATIENT
Start: 2022-02-18 | End: 2022-02-18

## 2022-02-18 RX ADMIN — Medication 1000 MG: at 14:59

## 2022-02-18 RX ADMIN — ACETAMINOPHEN 1000 MG: 500 TABLET ORAL at 14:59

## 2022-02-18 RX ADMIN — SODIUM CHLORIDE 1000 ML: 9 INJECTION, SOLUTION INTRAVENOUS at 15:00

## 2022-02-18 ASSESSMENT — ENCOUNTER SYMPTOMS
SINUS PRESSURE: 0
CHEST TIGHTNESS: 0
STRIDOR: 0
COUGH: 1
FACIAL SWELLING: 0
SHORTNESS OF BREATH: 0
SINUS PAIN: 0
BACK PAIN: 0
WHEEZING: 1

## 2022-02-18 ASSESSMENT — PAIN SCALES - GENERAL
PAINLEVEL_OUTOF10: 0
PAINLEVEL_OUTOF10: 0

## 2022-02-19 NOTE — ED PROVIDER NOTES
Respiratory: Positive for cough and wheezing. Negative for chest tightness, shortness of breath and stridor. Cardiovascular: Negative for chest pain, palpitations and leg swelling. Genitourinary: Negative for difficulty urinating, testicular pain and urgency. Musculoskeletal: Negative for back pain. Allergic/Immunologic: Positive for immunocompromised state. Neurological: Negative for dizziness, speech difficulty and weakness. All other systems reviewed and are negative. Except as noted above the remainder of the review of systems was reviewed and negative. PAST MEDICAL HISTORY     Past Medical History:   Diagnosis Date    Acute respiratory failure ([facility] [address] 07/11/2021    CHF (congestive heart failure) ([facility])     combined    COPD (chronic obstructive pulmonary disease) (HCC)     Diabetes mellitus (HCC)     Hyperlipidemia     Hypertension     Oxygen dependent          SURGICAL HISTORY       Past Surgical History:   Procedure Laterality Date    HERNIA REPAIR      TONSILLECTOMY           CURRENT MEDICATIONS       Previous Medications    ALBUTEROL SULFATE  (90 BASE) MCG/ACT INHALER    INHALE 2 PUFFS INTO THE LUNGS EVERY 6 HOURS AS NEEDED FOR WHEEZING OR SHORTNESS OF BREATH    ATORVASTATIN (LIPITOR) 40 MG TABLET    TAKE (1) TABLET DAILY    BLOOD GLUCOSE MONITOR KIT AND SUPPLIES    Test 2 times a day & as needed for symptoms of irregular blood glucose. BLOOD GLUCOSE MONITOR STRIPS    Test 2 times a day & as needed for symptoms of irregular blood glucose. E11.9    BLOOD GLUCOSE MONITOR STRIPS    Test 3 times a day & as needed for symptoms of irregular blood glucose. Dispense sufficient amount for indicated testing frequency plus additional to accommodate PRN testing needs.     CHOLECALCIFEROL (VITAMIN D3) 1.25 MG (96290 UT) CAPS    Take 1 capsule by mouth Twice a Week    COENZYME Q10 (COQ10) 100 MG CAPS    Take 1 capsule by mouth daily    CYANOCOBALAMIN 1000 MCG CAPS    Take 1 tablet by mouth daily     FENOFIBRIC ACID (FIBRICOR) 135 MG CPDR CAPSULE    TAKE 1 CAPSULE ONCE DAILY    FLUTICASONE FUROATE-VILANTEROL (BREO ELLIPTA) 200-25 MCG/INH AEPB INHALER    Inhale 1 puff into the lungs daily    FUROSEMIDE (LASIX) 20 MG TABLET    Take 1 tablet by mouth daily    GABAPENTIN (NEURONTIN) 800 MG TABLET    TAKE 1 TABLET 4 TIMES DAILY    GLIPIZIDE (GLUCOTROL) 5 MG TABLET    Take 1 tablet by mouth 2 times daily    GLUCOSE MONITORING (FREESTYLE FREEDOM) KIT    1 kit by Does not apply route daily    GLUCOSE MONITORING KIT (FREESTYLE) MONITORING KIT    1 kit by Does not apply route daily Patient wants brand name Patient tests bid  E11.9    INCRUSE ELLIPTA 62.5 MCG/INH AEPB    INHALE 1 PUFF DAILY    INSULIN PEN NEEDLE ([employer] PEN NEEDLES 29G) 29G X 12MM MISC    1 each by Does not apply route daily    IPRATROPIUM-ALBUTEROL (DUONEB) 0.5-2.5 (3) MG/3ML SOLN NEBULIZER SOLUTION    Take 3 mLs by nebulization every 4-6 hours as needed for Shortness of Breath    LANCETS MISC    1 each by Does not apply route daily    LANTUS SOLOSTAR 100 UNIT/ML INJECTION PEN    INJECT 28 UNITS INTO THE SKIN 2 TIMES DAILY    MAGNESIUM 400 MG CAPS    Take 1 tablet by mouth daily    MULTIPLE VITAMINS PO    Take by mouth    OMEGA-3 1000 MG CAPS    Take 1 capsule by mouth daily    PANTOPRAZOLE (PROTONIX) 40 MG TABLET    TAKE 1 TABLET BY MOUTH EVERY MORNING (BEFORE BREAKFAST)    PIOGLITAZONE (ACTOS) 15 MG TABLET    Take 15 mg by mouth daily    SHARPS CONTAINER    1 each by Does not apply route as needed (for disposable of lancets and needles)       ALLERGIES     Patient has no known allergies.     FAMILY HISTORY       Family History   Problem Relation Age of Onset    Asthma Mother     Cancer Mother     Hearing Loss Mother     Vision Loss Mother     High Blood Pressure Father     High Cholesterol Father     Vision Loss Father     Asthma Sister     Diabetes Sister     Vision Loss Sister     Asthma Brother     Arthritis Brother     High Blood Pressure Brother           SOCIAL HISTORY       Social History     Socioeconomic History    Marital status:      Spouse name: None    Number of children: None    Years of education: None    Highest education level: None   Occupational History    None   Tobacco Use    Smoking status: Current Every Day Smoker     Packs/day: 1.00    Smokeless tobacco: Never Used   Vaping Use    Vaping Use: Never used   Substance and Sexual Activity    Alcohol use: Yes     Comment: on occasion    Drug use: Never    Sexual activity: None   Other Topics Concern    None   Social History Narrative    None     Social Determinants of Health     Financial Resource Strain:     Difficulty of Paying Living Expenses: Not on file   Food Insecurity:     Worried About Running Out of Food in the Last Year: Not on file    Reddy of Food in the Last Year: Not on file   Transportation Needs:     Lack of Transportation (Medical): Not on file    Lack of Transportation (Non-Medical):  Not on file   Physical Activity:     Days of Exercise per Week: Not on file    Minutes of Exercise per Session: Not on file   Stress:     Feeling of Stress : Not on file   Social Connections:     Frequency of Communication with Friends and Family: Not on file    Frequency of Social Gatherings with Friends and Family: Not on file    Attends Islam Services: Not on file    Active Member of [address] [place] [address] [employer] or Organizations: Not on file    Attends Club or Organization Meetings: Not on file    Marital Status: Not on file   Intimate Partner Violence:     Fear of Current or Ex-Partner: Not on file    Emotionally Abused: Not on file    Physically Abused: Not on file    Sexually Abused: Not on file   Housing Stability:     Unable to Pay for Housing in the Last Year: Not on file    Number of [place] in the Last Year: Not on file    Unstable Housing in the Last Year: Not on file       SCREENINGS    [place] Coma Scale  Eye Opening: Spontaneous  Best Verbal Response: Oriented  Best Motor Response: Obeys commands  [place] Coma Scale Score: 15          PHYSICAL EXAM    (up to 7 for level 4, 8 or more for level 5)     ED Triage Vitals [02/18/22 1426]   BP Temp Temp Source Pulse Resp SpO2 Height Weight   122/76 102.8 °F (39.3 °C) Oral 116 20 94 % 4' 11\" (1.499 m) 160 lb (72.6 kg)       Physical Exam  Vitals and nursing note reviewed. HENT:      Head: Normocephalic and atraumatic. Right Ear: Tympanic membrane, ear canal and external ear normal.      Left Ear: Tympanic membrane, ear canal and external ear normal.      Nose: Nose normal.      Mouth/Throat:      Mouth: Mucous membranes are moist.   Eyes:      Extraocular Movements: Extraocular movements intact. Pupils: Pupils are equal, round, and reactive to light. Cardiovascular:      Rate and Rhythm: Normal rate and regular rhythm. Pulses: Normal pulses. Heart sounds: Normal heart sounds. Pulmonary:      Effort: Pulmonary effort is normal.      Breath sounds: Wheezing present. Abdominal:      General: Abdomen is flat. Bowel sounds are normal.      Palpations: Abdomen is soft. Musculoskeletal:         General: Normal range of motion. Cervical back: Normal range of motion. Skin:     Capillary Refill: Capillary refill takes less than 2 seconds. Neurological:      General: No focal deficit present. Mental Status: He is alert. DIAGNOSTIC RESULTS     EKG: All EKG's are interpreted by the Emergency Department Physician who either signs or Co-signs this chart in the absence of a cardiologist.        RADIOLOGY:   Non-plain film images such as CT, Ultrasound and MRI are read by the radiologist. Plain radiographic images are visualized and preliminarily interpreted by the emergency physician with the below findings:        Interpretation per the Radiologist below, if available at the time of this note:    XR CHEST PORTABLE   Final Result   1.  Cardiomegaly with mild pulmonary vascular congestion.                  LABS:  Results for orders placed or performed during the hospital encounter of 02/18/22   COVID-19, Rapid    Specimen: Nasopharyngeal Swab   Result Value Ref Range    SARS-CoV-2, NAAT Not Detected Not Detected   CBC with Auto Differential   Result Value Ref Range    WBC 11.7 (H) 4.0 - 11.0 K/uL    RBC 5.06 4.20 - 5.90 M/uL    Hemoglobin 13.4 (L) 13.5 - 17.5 g/dL    Hematocrit 41.0 40.5 - 52.5 %    MCV 80.9 80.0 - 100.0 fL    MCH 26.6 26.0 - 34.0 pg    MCHC 32.8 31.0 - 36.0 g/dL    RDW 16.7 (H) 12.4 - 15.4 %    Platelets 268 890 - 654 K/uL    MPV 8.5 5.0 - 10.5 fL    Neutrophils % 79.8 %    Lymphocytes % 13.0 %    Monocytes % 5.3 %    Eosinophils % 0.4 %    Basophils % 1.5 %    Neutrophils Absolute 9.4 (H) 1.7 - 7.7 K/uL    Lymphocytes Absolute 1.5 1.0 - 5.1 K/uL    Monocytes Absolute 0.6 0.0 - 1.3 K/uL    Eosinophils Absolute 0.0 0.0 - 0.6 K/uL    Basophils Absolute 0.2 0.0 - 0.2 K/uL   Comprehensive Metabolic Panel   Result Value Ref Range    Sodium 131 (L) 136 - 145 mmol/L    Potassium 4.8 3.5 - 5.1 mmol/L    Chloride 92 (L) 99 - 110 mmol/L    CO2 27 21 - 32 mmol/L    Anion Gap 12 3 - 16    Glucose 347 (H) 70 - 99 mg/dL    BUN 16 7 - 20 mg/dL    CREATININE 1.1 0.9 - 1.3 mg/dL    GFR Non-African American >60 >60    GFR African American >60 >60    Calcium 9.6 8.3 - 10.6 mg/dL    Total Protein 7.5 6.4 - 8.2 g/dL    Albumin 4.0 3.4 - 5.0 g/dL    Albumin/Globulin Ratio 1.1 1.1 - 2.2    Total Bilirubin <0.2 0.0 - 1.0 mg/dL    Alkaline Phosphatase 80 40 - 129 U/L    ALT 22 10 - 40 U/L    AST 18 15 - 37 U/L   Lactic Acid   Result Value Ref Range    Lactic Acid 1.4 0.4 - 2.0 mmol/L   EKG 12 Lead   Result Value Ref Range    Ventricular Rate 113 BPM    Atrial Rate 113 BPM    P-R Interval 150 ms    QRS Duration 82 ms    Q-T Interval 332 ms    QTc Calculation (Bazett) 455 ms    P Axis 38 degrees    R Axis 117 degrees    T Axis 56 degrees    Diagnosis       Sinus tachycardiaRSR' or QR pattern in V1 suggests right ventricular conduction delayPossible Left atrial enlargementRight axis deviationAbnormal ECGWhen compared with ECG of 30-NOV-2021 13:55,No significant change was foundConfirmed by DONNA Mueller MD ([id number]) on 2/18/2022 5:50:02 PM            EMERGENCY DEPARTMENT COURSE and DIFFERENTIAL DIAGNOSIS/MDM:     Vitals:    02/18/22 1619 02/18/22 1712 02/18/22 1815 02/18/22 1930   BP:  111/71 109/75 129/80   Pulse:  100 94 95   Resp:  15 19 16   Temp: 100.8 °F (38.2 °C)      TempSrc: Oral      SpO2:  93% 95% 94%   Weight:       Height:               MDM      REASSESSMENT      Patient at 8 PM feels 100% better he says he is ready to go home his lung sounds are now clear he also underwent chest x-ray which was clear with his blood counts being normal and with him having stable vital signs I really felt okay with him trying at home he does have severe underlying disease but he knows when he needs to come back. I felt comfortable sending him home with the caveat that he will return if he does get worse. Patient was giving him the option of being admitted or not he says he really feels comfortable going home and wants to try back at home I will put him on doxycycline which she has been discharged on before as well as prednisone a tapering dose  CRITICAL CARE TIME     CONSULTS:  None      PROCEDURES:     Procedures    MEDICATIONS GIVEN THIS VISIT:  Medications   0.9 % sodium chloride bolus (has no administration in time range)   acetaminophen (TYLENOL) tablet 1,000 mg (1,000 mg Oral Given 2/18/22 1459)   0.9 % sodium chloride bolus (1,000 mLs IntraVENous New Bag 2/18/22 1500)        FINAL IMPRESSION      1. Acute upper respiratory infection    2.  Mixed simple and mucopurulent chronic bronchitis [facility])            DISPOSITION/PLAN   DISPOSITION Decision To Discharge 02/18/2022 08:18:32 PM      PATIENT REFERRED TO:  LINK Bradford CNP  [address] [facility] [address]  [phone]    In 3 days  As needed, If symptoms worsen    [facility] Emergency Department  [address] [place] [address]  [phone]    If symptoms worsen      DISCHARGE MEDICATIONS:  New Prescriptions    DOXYCYCLINE HYCLATE (VIBRAMYCIN) 100 MG CAPSULE    Take 1 capsule by mouth 2 times daily for 10 days    PREDNISONE (DELTASONE) 10 MG TABLET    Take 1 tablet by mouth See Admin Instructions Take 4 tablets ×4 days  Take 3 tablets ×3 days  Take 2 tablets ×2 days  Take 1 tablet ×1 day       Controlled Substances Monitoring  RX Monitoring 3/26/2021   Periodic Controlled Substance Monitoring No signs of potential drug abuse or diversion identified. (Please note that portions of this note were completed with a voice recognition program.  Efforts were made to edit the dictations but occasionally words are mis-transcribed.)    Patient was advised to return to the Emergency Department if there was any worsening.     Krishan Ayala MD (electronically signed)  Attending Emergency Physician         José Miguel Leigh MD  02/18/22 2027

## 2022-02-23 LAB
BLOOD CULTURE, ROUTINE: NORMAL
CULTURE, BLOOD 2: NORMAL

## 2022-03-01 RX ORDER — PEN NEEDLE, DIABETIC 31 GX5/16"
NEEDLE, DISPOSABLE MISCELLANEOUS
Qty: 100 EACH | Refills: 3 | Status: SHIPPED | OUTPATIENT
Start: 2022-03-01

## 2022-03-01 RX ORDER — GLIPIZIDE 5 MG/1
5 TABLET ORAL 2 TIMES DAILY
Qty: 60 TABLET | Refills: 1 | Status: SHIPPED | OUTPATIENT
Start: 2022-03-01 | End: 2022-05-06

## 2022-03-08 RX ORDER — ALBUTEROL SULFATE 90 UG/1
2 AEROSOL, METERED RESPIRATORY (INHALATION) EVERY 6 HOURS PRN
Qty: 8.5 G | Refills: 5 | Status: SHIPPED | OUTPATIENT
Start: 2022-03-08 | End: 2022-10-10

## 2022-03-08 NOTE — TELEPHONE ENCOUNTER
Future appt scheduled 04/15/2022              Last appt 12/09/2021      Last Written 08/24/2021    albuterol sulfate  (90 Base) MCG/ACT inhaler  8.5 g   5 RF

## 2022-04-05 RX ORDER — FUROSEMIDE 20 MG/1
TABLET ORAL
Qty: 60 TABLET | Refills: 1 | Status: SHIPPED | OUTPATIENT
Start: 2022-04-05 | End: 2022-06-30

## 2022-04-05 NOTE — TELEPHONE ENCOUNTER
Future appt scheduled 04/15/2022           Last appt 12/09/2021      Last Written 10/10/2021    furosemide (LASIX) 20 MG tablet  #60  1 RF

## 2022-04-15 ENCOUNTER — TELEPHONE (OUTPATIENT)
Dept: FAMILY MEDICINE CLINIC | Age: 57
End: 2022-04-15

## 2022-04-15 PROBLEM — A41.9 SEPSIS WITH ACUTE RESPIRATORY FAILURE AND SEPTIC SHOCK (HCC): Status: RESOLVED | Noted: 2021-10-06 | Resolved: 2022-04-15

## 2022-04-15 PROBLEM — J96.00 SEPSIS WITH ACUTE RESPIRATORY FAILURE AND SEPTIC SHOCK (HCC): Status: RESOLVED | Noted: 2021-10-06 | Resolved: 2022-04-15

## 2022-04-15 PROBLEM — R65.21 SEPSIS WITH ACUTE RESPIRATORY FAILURE AND SEPTIC SHOCK (HCC): Status: RESOLVED | Noted: 2021-10-06 | Resolved: 2022-04-15

## 2022-04-15 NOTE — LETTER
[address]  Phone: [phone]  Fax: [phone]        April 18, 2022     [address] [facility] [address]      Dear Dilan Clemente: We missed seeing you for a scheduled appointment at [address] [place] [address] with Vincent Mckeon, on 4/15/22. We're sorry you were unable to keep your appointment and hope that you are doing well. We ask that you please call 24 hours in advance if you are unable to make your appointment, so that we can give that time to another patient in need. We care about you and the management of your healthcare and want to make sure that you follow up as recommended. To provide quality care and timely appointments to all our patients, you may be dismissed from the practice if you do not show for three (3) scheduled appointments within a 12-month period. We would like to continue treating your healthcare needs. Please call the office to reschedule your appointment, if needed.      Sincerely,        Lalitha Morrison

## 2022-04-15 NOTE — TELEPHONE ENCOUNTER
Patient no show for appointment on 4/15/22. No answer when calling patient.  Phone just rings then hangs up

## 2022-04-21 RX ORDER — ATORVASTATIN CALCIUM 40 MG/1
TABLET, FILM COATED ORAL
Qty: 90 TABLET | Refills: 0 | OUTPATIENT
Start: 2022-04-21

## 2022-04-21 RX ORDER — GLIPIZIDE 5 MG/1
5 TABLET ORAL 2 TIMES DAILY
Qty: 60 TABLET | Refills: 1 | OUTPATIENT
Start: 2022-04-21

## 2022-04-28 RX ORDER — ATORVASTATIN CALCIUM 40 MG/1
TABLET, FILM COATED ORAL
Qty: 90 TABLET | Refills: 0 | OUTPATIENT
Start: 2022-04-28

## 2022-05-03 RX ORDER — ATORVASTATIN CALCIUM 40 MG/1
TABLET, FILM COATED ORAL
Qty: 90 TABLET | Refills: 0 | OUTPATIENT
Start: 2022-05-03

## 2022-05-04 RX ORDER — ATORVASTATIN CALCIUM 40 MG/1
TABLET, FILM COATED ORAL
Qty: 90 TABLET | Refills: 0 | Status: SHIPPED | OUTPATIENT
Start: 2022-05-04 | End: 2022-06-09 | Stop reason: ALTCHOICE

## 2022-05-04 RX ORDER — UMECLIDINIUM 62.5 UG/1
1 AEROSOL, POWDER ORAL DAILY
Qty: 1 EACH | Refills: 0 | Status: SHIPPED | OUTPATIENT
Start: 2022-05-04 | End: 2022-05-12 | Stop reason: SDUPTHER

## 2022-05-06 RX ORDER — GLIPIZIDE 5 MG/1
5 TABLET ORAL 2 TIMES DAILY
Qty: 60 TABLET | Refills: 1 | Status: SHIPPED | OUTPATIENT
Start: 2022-05-06 | End: 2022-05-13 | Stop reason: DRUGHIGH

## 2022-05-06 NOTE — TELEPHONE ENCOUNTER
Future appt scheduled 05/12/2022                     Last appt 12/09/2021      Last Written 03/01/2022    glipiZIDE (GLUCOTROL) 5 MG tablet  #60  1 RF     Fluticasone furoate-vilanterol (BREO ELLIPTA) 200-25 MCG/INH AEPB inhaler  06/02/2021  #1 each   0 RF

## 2022-05-12 ENCOUNTER — OFFICE VISIT (OUTPATIENT)
Dept: FAMILY MEDICINE CLINIC | Age: 57
End: 2022-05-12
Payer: MEDICARE

## 2022-05-12 VITALS
BODY MASS INDEX: 31.91 KG/M2 | WEIGHT: 158 LBS | OXYGEN SATURATION: 94 % | HEART RATE: 97 BPM | SYSTOLIC BLOOD PRESSURE: 113 MMHG | TEMPERATURE: 97.9 F | DIASTOLIC BLOOD PRESSURE: 77 MMHG

## 2022-05-12 DIAGNOSIS — J44.9 CHRONIC OBSTRUCTIVE PULMONARY DISEASE, UNSPECIFIED COPD TYPE (HCC): ICD-10-CM

## 2022-05-12 DIAGNOSIS — I25.10 CAD IN NATIVE ARTERY: ICD-10-CM

## 2022-05-12 DIAGNOSIS — Z72.0 TOBACCO USE: ICD-10-CM

## 2022-05-12 DIAGNOSIS — E11.9 TYPE 2 DIABETES MELLITUS WITHOUT COMPLICATION, WITH LONG-TERM CURRENT USE OF INSULIN (HCC): Primary | ICD-10-CM

## 2022-05-12 DIAGNOSIS — Z79.4 TYPE 2 DIABETES MELLITUS WITHOUT COMPLICATION, WITH LONG-TERM CURRENT USE OF INSULIN (HCC): Primary | ICD-10-CM

## 2022-05-12 DIAGNOSIS — E78.2 MIXED HYPERLIPIDEMIA: ICD-10-CM

## 2022-05-12 DIAGNOSIS — I10 PRIMARY HYPERTENSION: ICD-10-CM

## 2022-05-12 DIAGNOSIS — I50.22 CHRONIC SYSTOLIC CONGESTIVE HEART FAILURE (HCC): ICD-10-CM

## 2022-05-12 LAB
A/G RATIO: 1.6 (ref 1.1–2.2)
ALBUMIN SERPL-MCNC: 4.4 G/DL (ref 3.4–5)
ALP BLD-CCNC: 101 U/L (ref 40–129)
ALT SERPL-CCNC: <5 U/L (ref 10–40)
ANION GAP SERPL CALCULATED.3IONS-SCNC: 18 MMOL/L (ref 3–16)
AST SERPL-CCNC: <5 U/L (ref 15–37)
BASOPHILS ABSOLUTE: 0.1 K/UL (ref 0–0.2)
BASOPHILS RELATIVE PERCENT: 0.8 %
BILIRUB SERPL-MCNC: <0.2 MG/DL (ref 0–1)
BUN BLDV-MCNC: 21 MG/DL (ref 7–20)
CALCIUM SERPL-MCNC: 10.6 MG/DL (ref 8.3–10.6)
CHLORIDE BLD-SCNC: 91 MMOL/L (ref 99–110)
CO2: 20 MMOL/L (ref 21–32)
CREAT SERPL-MCNC: 1.1 MG/DL (ref 0.9–1.3)
EOSINOPHILS ABSOLUTE: 0 K/UL (ref 0–0.6)
EOSINOPHILS RELATIVE PERCENT: 0.2 %
GFR AFRICAN AMERICAN: >60
GFR NON-AFRICAN AMERICAN: >60
GLUCOSE BLD-MCNC: 421 MG/DL (ref 70–99)
HCT VFR BLD CALC: 42.9 % (ref 40.5–52.5)
HEMOGLOBIN: 14.4 G/DL (ref 13.5–17.5)
LYMPHOCYTES ABSOLUTE: 1.9 K/UL (ref 1–5.1)
LYMPHOCYTES RELATIVE PERCENT: 15.6 %
MCH RBC QN AUTO: 29.1 PG (ref 26–34)
MCHC RBC AUTO-ENTMCNC: 33.5 G/DL (ref 31–36)
MCV RBC AUTO: 86.8 FL (ref 80–100)
MONOCYTES ABSOLUTE: 0.5 K/UL (ref 0–1.3)
MONOCYTES RELATIVE PERCENT: 4 %
NEUTROPHILS ABSOLUTE: 9.7 K/UL (ref 1.7–7.7)
NEUTROPHILS RELATIVE PERCENT: 79.4 %
PDW BLD-RTO: 18.3 % (ref 12.4–15.4)
PLATELET # BLD: 323 K/UL (ref 135–450)
PMV BLD AUTO: 8.9 FL (ref 5–10.5)
POTASSIUM SERPL-SCNC: 5.4 MMOL/L (ref 3.5–5.1)
RBC # BLD: 4.94 M/UL (ref 4.2–5.9)
SODIUM BLD-SCNC: 129 MMOL/L (ref 136–145)
TOTAL PROTEIN: 7.2 G/DL (ref 6.4–8.2)
WBC # BLD: 12.2 K/UL (ref 4–11)

## 2022-05-12 PROCEDURE — 99214 OFFICE O/P EST MOD 30 MIN: CPT | Performed by: NURSE PRACTITIONER

## 2022-05-12 PROCEDURE — 3046F HEMOGLOBIN A1C LEVEL >9.0%: CPT | Performed by: NURSE PRACTITIONER

## 2022-05-12 PROCEDURE — 36415 COLL VENOUS BLD VENIPUNCTURE: CPT | Performed by: NURSE PRACTITIONER

## 2022-05-12 PROCEDURE — 2022F DILAT RTA XM EVC RTNOPTHY: CPT | Performed by: NURSE PRACTITIONER

## 2022-05-12 PROCEDURE — G8417 CALC BMI ABV UP PARAM F/U: HCPCS | Performed by: NURSE PRACTITIONER

## 2022-05-12 PROCEDURE — 3017F COLORECTAL CA SCREEN DOC REV: CPT | Performed by: NURSE PRACTITIONER

## 2022-05-12 PROCEDURE — 3023F SPIROM DOC REV: CPT | Performed by: NURSE PRACTITIONER

## 2022-05-12 PROCEDURE — G8427 DOCREV CUR MEDS BY ELIG CLIN: HCPCS | Performed by: NURSE PRACTITIONER

## 2022-05-12 PROCEDURE — 4004F PT TOBACCO SCREEN RCVD TLK: CPT | Performed by: NURSE PRACTITIONER

## 2022-05-12 RX ORDER — UMECLIDINIUM 62.5 UG/1
1 AEROSOL, POWDER ORAL DAILY
Qty: 1 EACH | Refills: 5 | Status: SHIPPED | OUTPATIENT
Start: 2022-05-12 | End: 2022-08-02 | Stop reason: SDUPTHER

## 2022-05-12 ASSESSMENT — PATIENT HEALTH QUESTIONNAIRE - PHQ9
SUM OF ALL RESPONSES TO PHQ QUESTIONS 1-9: 0
1. LITTLE INTEREST OR PLEASURE IN DOING THINGS: 0
SUM OF ALL RESPONSES TO PHQ QUESTIONS 1-9: 0
SUM OF ALL RESPONSES TO PHQ9 QUESTIONS 1 & 2: 0
2. FEELING DOWN, DEPRESSED OR HOPELESS: 0

## 2022-05-12 ASSESSMENT — ENCOUNTER SYMPTOMS
SHORTNESS OF BREATH: 1
SORE THROAT: 0
RHINORRHEA: 0
DIARRHEA: 0
VOMITING: 0
ABDOMINAL PAIN: 0
WHEEZING: 0
COUGH: 1
NAUSEA: 0

## 2022-05-12 NOTE — PATIENT INSTRUCTIONS
Please read the healthy family handout that you were given and share it with your family. Please compare this printed medication list with your medications at home to be sure they are the same. If you have any medications that are different please contact us immediately at [phone]. Also review your allergies that we have listed, these may also include medications that you have not been able to tolerate, make sure everything listed is correct. If you have any allergies that are different please contact us immediately at [phone].

## 2022-05-12 NOTE — PROGRESS NOTES
CHIEF COMPLAINT  Chief Complaint   Patient presents with    Check-Up     HTN        HPI   Akila Plaza is a 49 y.o. male who presents to the office for checkup. Patient reports doing well. No recent changes in medications or medical illness. No new unusual fatigue or unexplained weight loss. Patient reports chronic shortness of breath worse upon exertion. Patient continues to follow-up with cardiology and pulmonology on a regular basis. Patient denies any abdominal pain or discomfort. No dark or tarry stools. Patient continues to smoke daily. No other complaints, modifying factors or associated symptoms. Nursing notes reviewed.    Past Medical History:   Diagnosis Date    Acute respiratory failure ([facility] 07/11/2021    [facility]     combined    COPD [facility]chronic obstructive pulmonary disease) ([facility]     Diabetes mellitus ([facility]     Hyperlipidemia     Hypertension     Oxygen dependent     Sepsis with acute respiratory failure and septic shock ([facility] 10/6/2021     Past Surgical History:   Procedure Laterality Date    HERNIA REPAIR      TONSILLECTOMY       Family History   Problem Relation Age of Onset    Asthma Mother     Cancer Mother     Hearing Loss Mother     Vision Loss Mother     High Blood Pressure Father     High Cholesterol Father     Vision Loss Father     Asthma Sister     Diabetes Sister     Vision Loss Sister     Asthma Brother     Arthritis Brother     High Blood Pressure Brother      Social History     Socioeconomic History    Marital status:      Spouse name: Not on file    Number of children: Not on file    Years of education: Not on file    Highest education level: Not on file   Occupational History    Not on file   Tobacco Use    Smoking status: Current Every Day Smoker     Packs/day: 1.00    Smokeless tobacco: Never Used   Vaping Use    Vaping Use: Never used   Substance and Sexual Activity    Alcohol use: Yes     Comment: on occasion    Drug use: Never    Sexual activity: Not on file   Other Topics Concern    Not on file   Social History Narrative    Not on file     Social Determinants of Health     Financial Resource Strain:     Difficulty of Paying Living Expenses: Not on file   Food Insecurity:     Worried About Running Out of Food in the Last Year: Not on file    Reddy of Food in the Last Year: Not on file   Transportation Needs:     Lack of Transportation (Medical): Not on file    Lack of Transportation (Non-Medical):  Not on file   Physical Activity:     Days of Exercise per Week: Not on file    Minutes of Exercise per Session: Not on file   Stress:     Feeling of Stress : Not on file   Social Connections:     Frequency of Communication with Friends and Family: Not on file    Frequency of Social Gatherings with Friends and Family: Not on file    Attends Buddhism Services: Not on file    Active Member of [address] [place] [address] [employer] or Organizations: Not on file    Attends Club or Organization Meetings: Not on file    Marital Status: Not on file   Intimate Partner Violence:     Fear of Current or Ex-Partner: Not on file    Emotionally Abused: Not on file    Physically Abused: Not on file    Sexually Abused: Not on file   Housing Stability:     Unable to Pay for Housing in the Last Year: Not on file    Number of [place] in the Last Year: Not on file    Unstable Housing in the Last Year: Not on file     Current Outpatient Medications   Medication Sig Dispense Refill    Umeclidinium Bromide (INCRUSE ELLIPTA) 62.5 MCG/INH AEPB Inhale 1 puff into the lungs daily 1 each 5    Handicap Placard MISC by Does not apply route Expires 5/2027 1 each 0    BREO ELLIPTA 200-25 MCG/INH AEPB inhaler INHALE 1 PUFF DAILY 60 each 6    glipiZIDE (GLUCOTROL) 5 MG tablet TAKE 1 TABLET BY MOUTH 2 TIMES DAILY 60 tablet 1    atorvastatin (LIPITOR) 40 MG tablet TAKE (1) TABLET DAILY 90 tablet 0    furosemide (LASIX) 20 MG tablet TAKE (1) TABLET DAILY 60 tablet 1    albuterol sulfate  (90 Base) MCG/ACT inhaler INHALE 2 PUFFS INTO THE LUNGS EVERY 6 HOURS AS NEEDED FOR WHEEZING OR SHORTNESS OF BREATH 8.5 g 5    Alcohol Swabs (ALCOHOL PREP) PADS Use twice daily DX: E11.9 100 each 3    pantoprazole (PROTONIX) 40 MG tablet TAKE 1 TABLET BY MOUTH EVERY MORNING (BEFORE BREAKFAST) 30 tablet 5    LANTUS SOLOSTAR 100 UNIT/ML injection pen INJECT 28 UNITS INTO THE SKIN 2 TIMES DAILY 15 mL 3    fenofibric acid (FIBRICOR) 135 MG CPDR capsule TAKE 1 CAPSULE ONCE DAILY 30 capsule 5    pioglitazone (ACTOS) 15 MG tablet Take 15 mg by mouth daily      Lancets MISC 1 each by Does not apply route daily 100 each 3    sharps container 1 each by Does not apply route as needed (for disposable of lancets and needles) 1 each 3    glucose monitoring (FREESTYLE FREEDOM) kit 1 kit by Does not apply route daily 1 kit 0    blood glucose monitor strips Test 3 times a day & as needed for symptoms of irregular blood glucose. Dispense sufficient amount for indicated testing frequency plus additional to accommodate PRN testing needs. 200 strip 0    Insulin Pen Needle ([employer] PEN NEEDLES 29G) 29G X 12MM MISC 1 each by Does not apply route daily 100 each 3    ipratropium-albuterol (DUONEB) 0.5-2.5 (3) MG/3ML SOLN nebulizer solution Take 3 mLs by nebulization every 4-6 hours as needed for Shortness of Breath 360 mL 1    Magnesium 400 MG CAPS Take 1 tablet by mouth daily 30 capsule 1    blood glucose monitor strips Test 2 times a day & as needed for symptoms of irregular blood glucose. E11.9 300 strip 0    glucose monitoring kit (FREESTYLE) monitoring kit 1 kit by Does not apply route daily Patient wants brand name Patient tests bid  E11.9 1 kit 0    blood glucose monitor kit and supplies Test 2 times a day & as needed for symptoms of irregular blood glucose.  1 kit 0    Cyanocobalamin 1000 MCG CAPS Take 1 tablet by mouth daily       Omega-3 1000 MG CAPS Take 1 capsule by mouth daily      Coenzyme Q10 (COQ10) 100 MG CAPS Take 1 capsule by mouth daily      MULTIPLE VITAMINS PO Take by mouth      gabapentin (NEURONTIN) 800 MG tablet TAKE 1 TABLET 4 TIMES DAILY 120 tablet 0     No current facility-administered medications for this visit. No Known Allergies    REVIEW OF SYSTEMS  Review of Systems   Constitutional: Negative for activity change, appetite change, chills and fever. HENT: Negative for congestion, rhinorrhea and sore throat. Eyes: Negative for visual disturbance. Respiratory: Positive for cough and shortness of breath. Negative for wheezing. Cardiovascular: Negative for chest pain and leg swelling. Gastrointestinal: Negative for abdominal pain, diarrhea, nausea and vomiting. Genitourinary: Negative for dysuria, frequency, hematuria and urgency. Musculoskeletal: Positive for arthralgias. Negative for gait problem and myalgias. Skin: Negative for rash. Neurological: Negative for dizziness, weakness, light-headedness, numbness and headaches. Psychiatric/Behavioral: Negative for sleep disturbance. PHYSICAL EXAM  /77   Pulse 97   Temp 97.9 °F (36.6 °C) (Oral)   Wt 158 lb (71.7 kg)   SpO2 94%   BMI 31.91 kg/m²   Physical Exam  Constitutional:       Appearance: Normal appearance. He is not toxic-appearing. HENT:      Head: Normocephalic and atraumatic. Right Ear: External ear normal.      Left Ear: Tympanic membrane and external ear normal.      Nose: Nose normal.      Mouth/Throat:      Mouth: Mucous membranes are moist.      Pharynx: Oropharynx is clear. Eyes:      Extraocular Movements: Extraocular movements intact. Conjunctiva/sclera: Conjunctivae normal.      Pupils: Pupils are equal, round, and reactive to light. Cardiovascular:      Rate and Rhythm: Normal rate and regular rhythm. Pulses: Normal pulses. Pulmonary:      Effort: Pulmonary effort is normal. No respiratory distress.       Breath sounds: No stridor. No wheezing, rhonchi or rales. Chest:      Chest wall: No tenderness. Abdominal:      General: Bowel sounds are normal. There is no distension. Palpations: There is no mass. Tenderness: There is no abdominal tenderness. There is no right CVA tenderness, left CVA tenderness, guarding or rebound. Hernia: No hernia is present. Musculoskeletal:         General: Normal range of motion. Cervical back: Normal range of motion and neck supple. Skin:     General: Skin is warm and dry. Capillary Refill: Capillary refill takes 2 to 3 seconds. Findings: No rash. Neurological:      Mental Status: He is alert. Psychiatric:         Mood and Affect: Mood normal.         Behavior: Behavior normal.        ASSESSMENT/PLAN:   1. Type 2 diabetes mellitus without complication, with long-term current use of insulin (HCC)  Stable versus uncontrolled. Previous A1c 8.9. Patient reports checking blood sugar daily. Patient compliant with glipizide 5 mg twice a day and Actos 15 mg daily. Patient also reports Lantus 28 units twice a day. Patient aware that he is past due for diabetic eye exam.  Continue with current treatment management follow-up in 4 to 6 months, sooner for new or worsening symptoms.  - CBC with Auto Differential  - Comprehensive Metabolic Panel  - Hemoglobin A1C  - Handicap Placard MISC; by Does not apply route Expires 5/2027  Dispense: 1 each; Refill: 0    2. Primary hypertension  Stable. No recent episodes of dizziness, lightheadedness, chest pain. Continue with current treatment management follow-up in 6 months, sooner for new or worsening symptoms. 3. Chronic systolic congestive heart failure (HCC)  Stable. Managed by cardiologist..  Patient encouraged to call and schedule an appointment. Patient denies any episodes of dizziness, lightheadedness, chest pain. Patient compliant with atorvastatin, furosemide, fenofibric acid daily.   Continue current treatment management follow-up in 6 months, sooner for new or worsening symptoms.  - Handicap Placard MISC; by Does not apply route Expires 5/2027  Dispense: 1 each; Refill: 0    4. CAD in native artery  See above #3    5. Mixed hyperlipidemia  Stable. Compliant with atorvastatin and fenofibric acid. Continue with current treatment management follow-up in 6 months, sooner for new or worsening symptoms. 6. Tobacco use  Patient continues to smoke daily. Annual lung CT screening ordered. Patient does follow-up with pulmonologist Dr. Greyson Marsh. Patient encouraged to call and schedule an appointment. We will follow-up with results. Patient declined smoking cessation at this time.  - CT Lung Screen (Initial or Annual); Future    7. Chronic obstructive pulmonary disease, unspecified COPD type ([address]  Stable. No recent exacerbations. - Umeclidinium Bromide (INCRUSE ELLIPTA) 62.5 MCG/INH AEPB; Inhale 1 puff into the lungs daily  Dispense: 1 each; Refill: 5  - Handicap Placard MISC; by Does not apply route Expires 5/2027  Dispense: 1 each; Refill: 0  - CT Lung Screen (Initial or Annual); Future         The note was completed using Dragon voice recognition transcription. Every effort was made to ensure accuracy; however, inadvertent  transcription errors may be present despite my best efforts to edit errors.     Alisa Tan, LINK - CNP

## 2022-05-13 ENCOUNTER — TELEPHONE (OUTPATIENT)
Dept: FAMILY MEDICINE CLINIC | Age: 57
End: 2022-05-13

## 2022-05-13 DIAGNOSIS — E87.5 SERUM POTASSIUM ELEVATED: ICD-10-CM

## 2022-05-13 DIAGNOSIS — G89.29 CHRONIC PAIN OF BOTH KNEES: Primary | ICD-10-CM

## 2022-05-13 DIAGNOSIS — R73.09 ELEVATED GLUCOSE: ICD-10-CM

## 2022-05-13 DIAGNOSIS — M25.562 CHRONIC PAIN OF BOTH KNEES: Primary | ICD-10-CM

## 2022-05-13 DIAGNOSIS — M25.561 CHRONIC PAIN OF BOTH KNEES: Primary | ICD-10-CM

## 2022-05-13 DIAGNOSIS — E11.9 TYPE 2 DIABETES MELLITUS WITHOUT COMPLICATION, UNSPECIFIED WHETHER LONG TERM INSULIN USE (HCC): ICD-10-CM

## 2022-05-13 DIAGNOSIS — E87.1 SERUM SODIUM DECREASED: Primary | ICD-10-CM

## 2022-05-13 LAB
ESTIMATED AVERAGE GLUCOSE: 343.6 MG/DL
HBA1C MFR BLD: 13.6 %

## 2022-05-13 RX ORDER — LANCETS 30 GAUGE
1 EACH MISCELLANEOUS DAILY
Qty: 300 EACH | Refills: 3 | Status: SHIPPED | OUTPATIENT
Start: 2022-05-13

## 2022-05-13 RX ORDER — INSULIN GLARGINE 100 [IU]/ML
INJECTION, SOLUTION SUBCUTANEOUS
Qty: 15 ML | Refills: 3
Start: 2022-05-13 | End: 2022-10-27 | Stop reason: SDUPTHER

## 2022-05-13 RX ORDER — CONTAINER,EMPTY
1 EACH MISCELLANEOUS PRN
Qty: 1 EACH | Refills: 3 | Status: SHIPPED | OUTPATIENT
Start: 2022-05-13

## 2022-05-13 RX ORDER — BLOOD-GLUCOSE METER
1 KIT MISCELLANEOUS DAILY
Qty: 1 KIT | Refills: 0 | Status: SHIPPED | OUTPATIENT
Start: 2022-05-13 | End: 2022-08-02 | Stop reason: SDUPTHER

## 2022-05-13 RX ORDER — GLUCOSAMINE HCL/CHONDROITIN SU 500-400 MG
CAPSULE ORAL
Qty: 300 STRIP | Refills: 3 | Status: SHIPPED | OUTPATIENT
Start: 2022-05-13

## 2022-05-13 RX ORDER — GLIPIZIDE 10 MG/1
10 TABLET ORAL
Qty: 60 TABLET | Refills: 3 | Status: SHIPPED | OUTPATIENT
Start: 2022-05-13 | End: 2022-09-20

## 2022-05-13 NOTE — TELEPHONE ENCOUNTER
Pt would like to have referral to Dr. Avelina Plummer and also needs order for new glucometer and supplies sent to [facility].    Thank you

## 2022-05-16 ENCOUNTER — NURSE ONLY (OUTPATIENT)
Dept: FAMILY MEDICINE CLINIC | Age: 57
End: 2022-05-16
Payer: MEDICARE

## 2022-05-16 DIAGNOSIS — E87.1 SERUM SODIUM DECREASED: ICD-10-CM

## 2022-05-16 DIAGNOSIS — E87.5 SERUM POTASSIUM ELEVATED: ICD-10-CM

## 2022-05-16 DIAGNOSIS — R73.09 ELEVATED GLUCOSE: ICD-10-CM

## 2022-05-16 LAB
A/G RATIO: 1.3 (ref 1.1–2.2)
ALBUMIN SERPL-MCNC: 3.9 G/DL (ref 3.4–5)
ALP BLD-CCNC: 96 U/L (ref 40–129)
ALT SERPL-CCNC: <5 U/L (ref 10–40)
ANION GAP SERPL CALCULATED.3IONS-SCNC: 13 MMOL/L (ref 3–16)
AST SERPL-CCNC: <5 U/L (ref 15–37)
BILIRUB SERPL-MCNC: <0.2 MG/DL (ref 0–1)
BUN BLDV-MCNC: 22 MG/DL (ref 7–20)
CALCIUM SERPL-MCNC: 10.4 MG/DL (ref 8.3–10.6)
CHLORIDE BLD-SCNC: 95 MMOL/L (ref 99–110)
CO2: 23 MMOL/L (ref 21–32)
CREAT SERPL-MCNC: 1 MG/DL (ref 0.9–1.3)
GFR AFRICAN AMERICAN: >60
GFR NON-AFRICAN AMERICAN: >60
GLUCOSE BLD-MCNC: 284 MG/DL (ref 70–99)
POTASSIUM SERPL-SCNC: 4.7 MMOL/L (ref 3.5–5.1)
SODIUM BLD-SCNC: 131 MMOL/L (ref 136–145)
TOTAL PROTEIN: 6.8 G/DL (ref 6.4–8.2)

## 2022-05-16 PROCEDURE — 36415 COLL VENOUS BLD VENIPUNCTURE: CPT | Performed by: NURSE PRACTITIONER

## 2022-05-19 RX ORDER — FENOFIBRIC ACID 135 MG/1
CAPSULE, DELAYED RELEASE ORAL
Qty: 30 CAPSULE | Refills: 1 | Status: SHIPPED | OUTPATIENT
Start: 2022-05-19 | End: 2022-07-12

## 2022-06-07 NOTE — PROGRESS NOTES
[address]   Cardiac Followup    Referring Provider:  LINK Dave CNP     Chief Complaint   Patient presents with    New Patient     est cardiologist    Shortness of Breath    Congestive Heart Failure    Coronary Artery Disease      Salo Hanks  1965    History of Present Illness:   Salo Hanks is a 49 y.o. male who is here today for evaluation of congestive heart failure. Other past medical history includes COPD, diabetes mellitus, hypertension, and hyperlipidemia. He was admitted to the hospital in 2/2021 shortness of breath. Cardiology was consult for treatment of acute CHF. An echocardiogram from 2/2021 and then again 9/2021 showed a normal EF. CAD and PAD noted on CT chest 9/2021. [place] [facility] [place]ors. Patient has peripheral stents for PAD. Today he states he has been feeling well overall. He states he has SOB with activity but this is stable. No chest pains. Generally wears O2 at home. No swelling recently. He is tolerating his medications and is taking them as prescribed. Blood pressure at home runs around 120/70's. Not taking ASA daily due to bleeding easily with small cuts and scratches. Patient currently denies any weight gain, edema, palpitations, chest pain, dizziness, and syncope. Past Medical History:   has a past medical history of Acute respiratory failure ([address] CHF (congestive heart failure) ([address] COPD (chronic obstructive pulmonary disease) ([address] Diabetes mellitus ([address] Hyperlipidemia, Hypertension, Oxygen dependent, and Sepsis with acute respiratory failure and septic shock ([address] Surgical History:   has a past surgical history that includes Tonsillectomy and hernia repair. Social History:   reports that he has been smoking. He has been smoking about 1.00 pack per day. He has never used smokeless tobacco. He reports current alcohol use. He reports that he does not use drugs.      Family History:  family history includes Arthritis in his brother; Asthma in his brother, mother, and sister; Cancer in his mother; Diabetes in his sister; Hearing Loss in his mother; High Blood Pressure in his brother and father; High Cholesterol in his father; Vision Loss in his father, mother, and sister. Home Medications:  Prior to Admission medications    Medication Sig Start Date End Date Taking?  Authorizing Provider   VITAMIN D, CHOLECALCIFEROL, PO Take by mouth daily   Yes Historical Provider, MD   fenofibric acid (TRILIPIX) 135 MG CPDR capsule TAKE 1 CAPSULE ONCE DAILY 5/19/22  Yes LINK Ferrera CNP   glucose monitoring (FREESTYLE FREEDOM) kit 1 kit by Does not apply route daily 5/13/22  Yes LINK Ferrera CNP   blood glucose monitor strips Test 3 times 5/13/22  Yes LINK Ferrera CNP   Lancets [facility] [facility] 1 each by Does not apply route daily - Tests 3 times daily 5/13/22  Yes LINK Ferrera CNP   sharps container 1 each by Does not apply route as needed (for disposable of lancets and needles) 5/13/22  Yes LINK Ferrera CNP   glipiZIDE (GLUCOTROL) 10 MG tablet Take 1 tablet by mouth 2 times daily (before meals) - DOSE INCREASE on 5/13/22 per Kaiser Engle 5/13/22  Yes LINK Ferrear CNP   insulin glargine (LANTUS SOLOSTAR) 100 UNIT/ML injection pen INJECT 30 UNITS INTO THE SKIN 2 TIMES DAILY - Dose Increase on 5/13/22 per Kaiser Engle 5/13/22  Yes LINK Ferrera CNP   Umeclidinium Bromide (INCRUSE ELLIPTA) 62.5 MCG/INH AEPB Inhale 1 puff into the lungs daily 5/12/22  Yes LINK Ferrera CNP   Handicap Placard MISC by Does not apply route Expires 5/2027 5/12/22  Yes LINK Ferrera CNP   BREO ELLIPTA 200-25 MCG/INH AEPB inhaler INHALE 1 PUFF DAILY 5/6/22  Yes LINK Ferrera CNP   atorvastatin (LIPITOR) 40 MG tablet TAKE (1) TABLET DAILY 5/4/22  Yes LINK Ferrera CNP   furosemide (LASIX) 20 MG tablet TAKE (1) TABLET DAILY 4/5/22  Yes Kaiser Engle, APRN - LUCERO   albuterol sulfate  (90 Base) MCG/ACT inhaler INHALE 2 PUFFS INTO THE LUNGS EVERY 6 HOURS AS NEEDED FOR WHEEZING OR SHORTNESS OF BREATH 3/8/22  Yes LINK Myers CNP   Alcohol Swabs (ALCOHOL PREP) PADS Use twice daily DX: E11.9 3/1/22  Yes LINK Myers CNP   pioglitazone (ACTOS) 15 MG tablet Take 15 mg by mouth daily   Yes Historical Provider, MD   Insulin Pen Needle ([employer] PEN NEEDLES 29G) 29G X 12MM MISC 1 each by Does not apply route daily 9/30/21  Yes Talya Mcnamara MD   ipratropium-albuterol (DUONEB) 0.5-2.5 (3) MG/3ML SOLN nebulizer solution Take 3 mLs by nebulization every 4-6 hours as needed for Shortness of Breath 8/27/21  Yes LINK Myers CNP   Magnesium 400 MG CAPS Take 1 tablet by mouth daily 4/6/20  Yes LINK Myers CNP   blood glucose monitor strips Test 2 times a day & as needed for symptoms of irregular blood glucose. E11.9 3/31/20  Yes LINK Myers CNP   glucose monitoring kit (FREESTYLE) monitoring kit 1 kit by Does not apply route daily Patient wants brand name Patient tests bid  E11.9 3/31/20  Yes LINK Myers CNP   blood glucose monitor kit and supplies Test 2 times a day & as needed for symptoms of irregular blood glucose. 3/30/20  Yes LINK Myers CNP   Omega-3 1000 MG CAPS Take 1 capsule by mouth daily   Yes Historical Provider, MD   Coenzyme Q10 (COQ10) 100 MG CAPS Take 1 capsule by mouth daily   Yes Historical Provider, MD   MULTIPLE VITAMINS PO Take by mouth   Yes Historical Provider, MD   pantoprazole (PROTONIX) 40 MG tablet TAKE 1 TABLET BY MOUTH EVERY MORNING (BEFORE BREAKFAST)  Patient not taking: Reported on 6/9/2022 2/7/22   LINK Myers CNP   gabapentin (NEURONTIN) 800 MG tablet TAKE 1 TABLET 4 TIMES DAILY 11/1/21 2/25/22  LINK Myers CNP   Cyanocobalamin 1000 MCG CAPS Take 1 tablet by mouth daily   Patient not taking: Reported on 6/9/2022    Historical Provider, MD        Allergies:  Patient has no known allergies.      Review of Systems:   · Constitutional: there has been no unanticipated weight loss. There's been no change in energy level, sleep pattern, or activity level. · Eyes: No visual changes or diplopia. No scleral icterus. · ENT: No Headaches, hearing loss or vertigo. No mouth sores or sore throat. · Cardiovascular: Reviewed in HPI  · Respiratory: No cough or wheezing, no sputum production. No hematemesis. · Gastrointestinal: No abdominal pain, appetite loss, blood in stools. No change in bowel or bladder habits. · Genitourinary: No dysuria, trouble voiding, or hematuria. · Musculoskeletal:  No gait disturbance, weakness or joint complaints. · Integumentary: No rash or pruritis. · Neurological: No headache, diplopia, change in muscle strength, numbness or tingling. No change in gait, balance, coordination, mood, affect, memory, mentation, behavior. · Psychiatric: No anxiety, no depression. · Endocrine: No malaise, fatigue or temperature intolerance. No excessive thirst, fluid intake, or urination. No tremor. · Hematologic/Lymphatic: No abnormal bruising or bleeding, blood clots or swollen lymph nodes. · Allergic/Immunologic: No nasal congestion or hives. Physical Examination:    Vitals:    06/09/22 0914   BP: 128/82   Pulse: 83   SpO2: 95%        Constitutional and General Appearance: NAD   Respiratory:  · Normal excursion and expansion without use of accessory muscles  · Resp Auscultation: Normal breath sounds without dullness  Cardiovascular:  · The apical impulses not displaced  · Heart tones are crisp and normal  · Cervical veins are not engorged  · The carotid upstroke is normal in amplitude and contour without delay or bruit  · Normal S1S2, No S3, No Murmur  · Peripheral pulses are symmetrical and full  · There is no clubbing, cyanosis of the extremities.   · No edema  · Femoral Arteries: 2+ and equal  · Pedal Pulses: 2+ and equal   Abdomen:  · No masses or tenderness  · Liver/Spleen: No Abnormalities Noted  Neurological/Psychiatric:  · Alert and oriented in all spheres  · Moves all extremities well  · Exhibits normal gait balance and coordination  · No abnormalities of mood, affect, memory, mentation, or behavior are noted    Echocardiogram 2/8/2021  Summary   LV systolic function is normal with EF estimated at 55%. No regional wall motion abnormalities. MIldly asynchronous septal motion. There is mild concentric left ventricular hypertrophy. Grade II diastolic dysfunction with elevated LV filling pressure. Mild mitral annular calcification. The left atrium is mildly dilated. Mild mitral regurgitation. Inadequate tricuspid regurgitation jet to estimate systolic artery pressure     CT chest 9/17/2021  FINDINGS:  Pulmonary arteries:  No definite pulmonary embolus identified. Aorta:  Mild atherosclerotic changes of the aorta. No aortic aneurysm or dissection. Lungs:  Patchy densities in the lower lobes and right middle lobe may represent atelectasis versus infectious/inflammatory process. Dependent atelectasis bilaterally. Atelectasis in the lingula. No mass. Pleural space:  Trace right pleural effusion. No pneumothorax. Heart:  Cardiomegaly. Coronary artery calcifications. No significant pericardial effusion. No evidence of RV dysfunction. Mediastinum:  Nonspecific mildly prominent mediastinal and hilar lymph nodes. Calcified mediastinal and hilar lymph nodes. Bones/joints:  Mild degenerative changes of the spine. No acute fracture. No dislocation. Soft tissues:  Unremarkable. Lymph nodes:  See above. Spleen:  Calcified granulomas in the spleen. Other findings:  Small calcified granulomas bilaterally. Echocardiogram 9/18/2021  Summary   Normal left ventricle size, wall thickness, and systolic function with an   estimated ejection fraction of 55-60%. No regional wall motion abnormalities are seen. Indeterminate diastolic function.    Individual aortic valve leaflets are not clearly visualized. Trivial tricuspid regurgitation. Trace pericardial effusion noted globally. A bubble study was performed and fails to show evidence of shunting. Definity contrast agent was used to help visualize endocardial borders    EKG 2/18/2022  Sinus tachycardia  RSR' or QR pattern in V1 suggests right ventricular conduction delay  Possible Left atrial enlargement  Right axis deviat    [place] [facility] [place] cors       Assessment:   SOB - chronic, stable. Primarily pulmonary   Coronary artery disease - stable. , nonobstructive  PAD- peripheral stents   Abnormal EKG  Home O2  Chronic dCHF - compensated  Hyperlipidemia- suboptimal   COPD  Diabetes mellitus   Smoking     Plan:  Smoking cessation encouraged. Recommend starting enteric coated Aspirin 81 mg daily . R/B/A/E discussed  Stop Lipitor- atorvastatin and start Crestor 40 mg daily   Repeat fasting lipids in 6 months   Cardiac medications reviewed including indications and pertinent side effects. Medication list updated at this visit. Check blood pressure and heart rate at home a few times per week- keep a log with dates and times and bring to office visit   Regular exercise and following a healthy diet encouraged   Follow up with me in 6 months         Scribe's attestation: This note was scribed in the presence of Dr. Lynette Vuong M.D. By Calvin Trivedi RN    The scribes documentation has been prepared under my direction and personally reviewed by me in its entirety. I confirm that the note above accurately reflects all work, treatment, procedures, and medical decision making performed by me. Dr. Lynette Vuong MD      Thank you for allowing me to participate in the care of this individual.      Shae García.  Sim Lester M.D., Britton Whitaker

## 2022-06-09 ENCOUNTER — OFFICE VISIT (OUTPATIENT)
Dept: CARDIOLOGY CLINIC | Age: 57
End: 2022-06-09
Payer: MEDICARE

## 2022-06-09 VITALS
HEART RATE: 83 BPM | OXYGEN SATURATION: 95 % | SYSTOLIC BLOOD PRESSURE: 128 MMHG | DIASTOLIC BLOOD PRESSURE: 82 MMHG | WEIGHT: 161.8 LBS | BODY MASS INDEX: 31.77 KG/M2 | HEIGHT: 60 IN

## 2022-06-09 DIAGNOSIS — Z72.0 TOBACCO USE: ICD-10-CM

## 2022-06-09 DIAGNOSIS — Z76.89 ESTABLISHING CARE WITH NEW DOCTOR, ENCOUNTER FOR: ICD-10-CM

## 2022-06-09 DIAGNOSIS — E78.2 MIXED HYPERLIPIDEMIA: ICD-10-CM

## 2022-06-09 DIAGNOSIS — R06.02 SOB (SHORTNESS OF BREATH): Primary | ICD-10-CM

## 2022-06-09 DIAGNOSIS — I50.22 CHRONIC SYSTOLIC CONGESTIVE HEART FAILURE (HCC): ICD-10-CM

## 2022-06-09 DIAGNOSIS — I10 PRIMARY HYPERTENSION: ICD-10-CM

## 2022-06-09 DIAGNOSIS — I25.10 CAD IN NATIVE ARTERY: ICD-10-CM

## 2022-06-09 PROCEDURE — 4004F PT TOBACCO SCREEN RCVD TLK: CPT | Performed by: INTERNAL MEDICINE

## 2022-06-09 PROCEDURE — G8427 DOCREV CUR MEDS BY ELIG CLIN: HCPCS | Performed by: INTERNAL MEDICINE

## 2022-06-09 PROCEDURE — 3017F COLORECTAL CA SCREEN DOC REV: CPT | Performed by: INTERNAL MEDICINE

## 2022-06-09 PROCEDURE — 99214 OFFICE O/P EST MOD 30 MIN: CPT | Performed by: INTERNAL MEDICINE

## 2022-06-09 PROCEDURE — G8417 CALC BMI ABV UP PARAM F/U: HCPCS | Performed by: INTERNAL MEDICINE

## 2022-06-09 PROCEDURE — 93000 ELECTROCARDIOGRAM COMPLETE: CPT | Performed by: INTERNAL MEDICINE

## 2022-06-09 RX ORDER — ASPIRIN 81 MG/1
81 TABLET ORAL DAILY
Qty: 90 TABLET | Refills: 1 | Status: SHIPPED | OUTPATIENT
Start: 2022-06-09

## 2022-06-09 RX ORDER — ROSUVASTATIN CALCIUM 40 MG/1
40 TABLET, COATED ORAL DAILY
Qty: 90 TABLET | Refills: 3 | Status: SHIPPED | OUTPATIENT
Start: 2022-06-09

## 2022-06-09 NOTE — PATIENT INSTRUCTIONS
Plan:  Smoking cessation encouraged. Recommend starting enteric coated Aspirin 81 mg daily due to coronary artery disease. Stop Lipitor- atorvastatin and start Crestor 40 mg daily   Repeat fasting lipids in 6 months   Cardiac medications reviewed including indications and pertinent side effects. Medication list updated at this visit.    Check blood pressure and heart rate at home a few times per week- keep a log with dates and times and bring to office visit   Regular exercise and following a healthy diet encouraged   Follow up with me in 6 months

## 2022-06-20 ENCOUNTER — HOSPITAL ENCOUNTER (OUTPATIENT)
Dept: CT IMAGING | Age: 57
Discharge: HOME | End: 2022-06-20
Payer: MEDICARE

## 2022-06-20 DIAGNOSIS — Z72.0 TOBACCO USE: ICD-10-CM

## 2022-06-20 DIAGNOSIS — J44.9 CHRONIC OBSTRUCTIVE PULMONARY DISEASE, UNSPECIFIED COPD TYPE (HCC): ICD-10-CM

## 2022-06-20 PROCEDURE — 71271 CT THORAX LUNG CANCER SCR C-: CPT

## 2022-06-30 RX ORDER — FUROSEMIDE 20 MG/1
TABLET ORAL
Qty: 60 TABLET | Refills: 1 | Status: SHIPPED | OUTPATIENT
Start: 2022-06-30

## 2022-06-30 NOTE — TELEPHONE ENCOUNTER
Future appt scheduled 11/15/2022                 Last appt 05/12/2022      Last Written 04/05/2022    furosemide (LASIX) 20 MG tablet  #60  1 RF

## 2022-07-12 RX ORDER — FENOFIBRIC ACID 135 MG/1
CAPSULE, DELAYED RELEASE ORAL
Qty: 30 CAPSULE | Refills: 1 | Status: SHIPPED | OUTPATIENT
Start: 2022-07-12 | End: 2022-09-20

## 2022-07-12 NOTE — TELEPHONE ENCOUNTER
Future appt scheduled 11/15/2022                 Last appt 05/12/2022      Last Written 05/19/2022    fenofibric acid (TRILIPIX) 135 MG CPDR capsule  #30  1 RF

## 2022-08-01 RX ORDER — PANTOPRAZOLE SODIUM 40 MG/1
40 TABLET, DELAYED RELEASE ORAL
Qty: 30 TABLET | Refills: 5 | Status: SHIPPED | OUTPATIENT
Start: 2022-08-01 | End: 2022-08-02

## 2022-08-02 ENCOUNTER — OFFICE VISIT (OUTPATIENT)
Dept: FAMILY MEDICINE CLINIC | Age: 57
End: 2022-08-02
Payer: MEDICARE

## 2022-08-02 VITALS
SYSTOLIC BLOOD PRESSURE: 114 MMHG | HEART RATE: 91 BPM | BODY MASS INDEX: 32.52 KG/M2 | DIASTOLIC BLOOD PRESSURE: 77 MMHG | OXYGEN SATURATION: 100 % | WEIGHT: 161 LBS

## 2022-08-02 DIAGNOSIS — J44.9 CHRONIC OBSTRUCTIVE PULMONARY DISEASE, UNSPECIFIED COPD TYPE (HCC): Primary | ICD-10-CM

## 2022-08-02 DIAGNOSIS — R06.09 DYSPNEA ON EXERTION: ICD-10-CM

## 2022-08-02 PROCEDURE — G8427 DOCREV CUR MEDS BY ELIG CLIN: HCPCS | Performed by: NURSE PRACTITIONER

## 2022-08-02 PROCEDURE — 4004F PT TOBACCO SCREEN RCVD TLK: CPT | Performed by: NURSE PRACTITIONER

## 2022-08-02 PROCEDURE — 3017F COLORECTAL CA SCREEN DOC REV: CPT | Performed by: NURSE PRACTITIONER

## 2022-08-02 PROCEDURE — 99214 OFFICE O/P EST MOD 30 MIN: CPT | Performed by: NURSE PRACTITIONER

## 2022-08-02 PROCEDURE — 3023F SPIROM DOC REV: CPT | Performed by: NURSE PRACTITIONER

## 2022-08-02 PROCEDURE — G8417 CALC BMI ABV UP PARAM F/U: HCPCS | Performed by: NURSE PRACTITIONER

## 2022-08-02 RX ORDER — ASCORBIC ACID 500 MG
500 TABLET ORAL 2 TIMES DAILY
COMMUNITY

## 2022-08-02 RX ORDER — UMECLIDINIUM 62.5 UG/1
1 AEROSOL, POWDER ORAL DAILY
Qty: 1 EACH | Refills: 5 | Status: SHIPPED | OUTPATIENT
Start: 2022-08-02

## 2022-08-02 RX ORDER — IPRATROPIUM BROMIDE AND ALBUTEROL SULFATE 2.5; .5 MG/3ML; MG/3ML
1 SOLUTION RESPIRATORY (INHALATION)
Qty: 360 ML | Refills: 1 | Status: SHIPPED | OUTPATIENT
Start: 2022-08-02

## 2022-08-02 ASSESSMENT — ENCOUNTER SYMPTOMS
DIARRHEA: 0
SORE THROAT: 0
SHORTNESS OF BREATH: 1
COUGH: 0
WHEEZING: 0
ABDOMINAL PAIN: 0
VOMITING: 0
RHINORRHEA: 0
NAUSEA: 0

## 2022-08-02 NOTE — PROGRESS NOTES
CHIEF COMPLAINT  Chief Complaint   Patient presents with    COPD        HPI   Nai Lawrence is a 49 y.o. male who presents to the office for COPD and necessity of continuing home oxygen. Patient reports being on chronic oxygen until 4 days ago when insurance would no longer cover. Patient has known history of COPD, CHF, CAD, hypertension, type 2 diabetes mellitus. Patient reports severe dyspnea upon exertion. Patient denies any cough, congestion, fever or chills. Patient presents today for ambulatory test to continue oxygen requirements. Patient does not follow with pulmonologist.  Patient has been followed by pulmonologist during hospitalizations over the past year however they were in [facility] and patient unable to get transport there. Patient continues to smoke 2 packs/day. No other complaints, modifying factors or associated symptoms. Nursing notes reviewed.    Past Medical History:   Diagnosis Date    Acute respiratory failure ([facility] [address] 07/11/2021    CHF (congestive heart failure) (H[facility])     combined    COPD (chronic obstructive pulmonary disease) ([facility] [address]     Diabetes mellitus ([facility] [address]     Hyperlipidemia     Hypertension     Oxygen dependent     Sepsis with acute respiratory failure and septic shock ([facility] [address] 10/6/2021     Past Surgical History:   Procedure Laterality Date    HERNIA REPAIR      TONSILLECTOMY       Family History   Problem Relation Age of Onset    Asthma Mother     Cancer Mother     Hearing Loss Mother     Vision Loss Mother     High Blood Pressure Father     High Cholesterol Father     Vision Loss Father     Asthma Sister     Diabetes Sister     Vision Loss Sister     Asthma Brother     Arthritis Brother     High Blood Pressure Brother      Social History     Socioeconomic History    Marital status:      Spouse name: Not on file    Number of children: Not on file    Years of education: Not on file    Highest education level: Not on file   Occupational History    Not on file   Tobacco Use Smoking status: Every Day     Packs/day: 1.00     Years: 40.00     Pack years: 40.00     Types: Cigarettes    Smokeless tobacco: Never    Tobacco comments:     per MD order for annual lung screening   Vaping Use    Vaping Use: Never used   Substance and Sexual Activity    Alcohol use: Yes     Comment: on occasion    Drug use: Never    Sexual activity: Not on file   Other Topics Concern    Not on file   Social History Narrative    Not on file     Social Determinants of Health     Financial Resource Strain: Not on file   Food Insecurity: Not on file   Transportation Needs: Not on file   Physical Activity: Not on file   Stress: Not on file   Social Connections: Not on file   Intimate Partner Violence: Not on file   Housing Stability: Not on file     Current Outpatient Medications   Medication Sig Dispense Refill    vitamin C (ASCORBIC ACID) 500 MG tablet Take 500 mg by mouth in the morning and 500 mg before bedtime.       ipratropium-albuterol (DUONEB) 0.5-2.5 (3) MG/3ML SOLN nebulizer solution Take 3 mLs by nebulization every 4-6 hours as needed for Shortness of Breath 360 mL 1    Umeclidinium Bromide (INCRUSE ELLIPTA) 62.5 MCG/INH AEPB Inhale 1 puff into the lungs daily 1 each 5    fenofibric acid (TRILIPIX) 135 MG CPDR capsule TAKE 1 CAPSULE ONCE DAILY 30 capsule 1    furosemide (LASIX) 20 MG tablet TAKE (1) TABLET DAILY 60 tablet 1    VITAMIN D, CHOLECALCIFEROL, PO Take by mouth daily      aspirin EC 81 MG EC tablet Take 1 tablet by mouth daily 90 tablet 1    rosuvastatin (CRESTOR) 40 MG tablet Take 1 tablet by mouth daily 90 tablet 3    blood glucose monitor strips Test 3 times 300 strip 3    Lancets MISC 1 each by Does not apply route daily - Tests 3 times daily 300 each 3    sharps container 1 each by Does not apply route as needed (for disposable of lancets and needles) 1 each 3    glipiZIDE (GLUCOTROL) 10 MG tablet Take 1 tablet by mouth 2 times daily (before meals) - DOSE INCREASE on 5/13/22 per [facility] [place] 60 tablet 3    insulin glargine (LANTUS SOLOSTAR) 100 UNIT/ML injection pen INJECT 30 UNITS INTO THE SKIN 2 TIMES DAILY - Dose Increase on 5/13/22 per Renato Him Wade 15 mL 3    Handicap Placard MISC by Does not apply route Expires 5/2027 1 each 0    BREO ELLIPTA 200-25 MCG/INH AEPB inhaler INHALE 1 PUFF DAILY 60 each 6    albuterol sulfate  (90 Base) MCG/ACT inhaler INHALE 2 PUFFS INTO THE LUNGS EVERY 6 HOURS AS NEEDED FOR WHEEZING OR SHORTNESS OF BREATH 8.5 g 5    Alcohol Swabs (ALCOHOL PREP) PADS Use twice daily DX: E11.9 100 each 3    pioglitazone (ACTOS) 15 MG tablet Take 15 mg by mouth daily      Insulin Pen Needle ([employer] PEN NEEDLES 29G) 29G X 12MM MISC 1 each by Does not apply route daily 100 each 3    Magnesium 400 MG CAPS Take 1 tablet by mouth daily 30 capsule 1    blood glucose monitor strips Test 2 times a day & as needed for symptoms of irregular blood glucose. E11.9 300 strip 0    glucose monitoring kit (FREESTYLE) monitoring kit 1 kit by Does not apply route daily Patient wants brand name Patient tests bid  E11.9 1 kit 0    blood glucose monitor kit and supplies Test 2 times a day & as needed for symptoms of irregular blood glucose. 1 kit 0    Coenzyme Q10 (COQ10) 100 MG CAPS Take 1 capsule by mouth daily      MULTIPLE VITAMINS PO Take by mouth      gabapentin (NEURONTIN) 800 MG tablet TAKE 1 TABLET 4 TIMES DAILY 120 tablet 0     No current facility-administered medications for this visit. No Known Allergies    REVIEW OF SYSTEMS  Review of Systems   Constitutional:  Negative for activity change, appetite change, chills and fever. HENT:  Negative for congestion, rhinorrhea and sore throat. Eyes:  Negative for visual disturbance. Respiratory:  Positive for shortness of breath. Negative for cough and wheezing. Cardiovascular:  Negative for chest pain and leg swelling. Gastrointestinal:  Negative for abdominal pain, diarrhea, nausea and vomiting.    Genitourinary:  Negative for dysuria, frequency, hematuria and urgency. Musculoskeletal:  Negative for arthralgias, gait problem and myalgias. Skin:  Negative for rash. Neurological:  Negative for dizziness, weakness, light-headedness, numbness and headaches. Psychiatric/Behavioral:  Negative for sleep disturbance. PHYSICAL EXAM  /77   Pulse 91   Wt 161 lb (73 kg)   SpO2 100% Comment: at rest  BMI 32.52 kg/m²   Physical Exam  Constitutional:       Appearance: Normal appearance. He is not toxic-appearing. HENT:      Head: Normocephalic and atraumatic. Right Ear: External ear normal.      Left Ear: Tympanic membrane and external ear normal.      Nose: Nose normal.      Mouth/Throat:      Mouth: Mucous membranes are moist.      Pharynx: Oropharynx is clear. Eyes:      Extraocular Movements: Extraocular movements intact. Conjunctiva/sclera: Conjunctivae normal.      Pupils: Pupils are equal, round, and reactive to light. Cardiovascular:      Rate and Rhythm: Normal rate and regular rhythm. Pulses: Normal pulses. Pulmonary:      Effort: Pulmonary effort is normal. No respiratory distress. Breath sounds: No stridor. No wheezing, rhonchi or rales. Chest:      Chest wall: No tenderness. Abdominal:      General: Bowel sounds are normal. There is no distension. Palpations: There is no mass. Tenderness: There is no abdominal tenderness. There is no right CVA tenderness, left CVA tenderness, guarding or rebound. Hernia: No hernia is present. Musculoskeletal:         General: Normal range of motion. Cervical back: Normal range of motion and neck supple. Skin:     General: Skin is warm and dry. Capillary Refill: Capillary refill takes 2 to 3 seconds. Findings: No rash. Neurological:      Mental Status: He is alert. Psychiatric:         Mood and Affect: Mood normal.         Behavior: Behavior normal.        ASSESSMENT/PLAN:   1.  Chronic obstructive pulmonary disease, unspecified COPD type [facility])  Patient presents today for evaluation of oxygen necessity. Patient reports that he has been on continuous oxygen however insurance will no longer cover it. Patient has known history of COPD, CHF, CAD, chronic tobacco use. Patient reports severe dyspnea upon exertion. Patient reports that he has been off his oxygen for approximately 4 days now. Patient suffers from severe lung disease and/or hypoxia upon exertion that is improved with oxygenation therapy. Patient is 100% SPO2 at rest-sitting without oxygen, 91% on room air standing without oxygen, 88% SPO2 at exercise without oxygen, 93% at exercise with 2 L/min of oxygen, 96% at exercise with 3 L/min of oxygen. I do feel that patient would benefit from continuous oxygen 2 to 3 L nasal cannula due to chronic conditions. Patient counseled the importance of smoking cessation and risk associated with tobacco use and oxygen. Patient acknowledges. Orders placed and will be faxed to [facility]. Patient given referral for pulmonology for further follow-up and evaluation as recommended. Patient and wife both verbalized and counseled plan of care at this time. - ipratropium-albuterol (DUONEB) 0.5-2.5 (3) MG/3ML SOLN nebulizer solution; Take 3 mLs by nebulization every 4-6 hours as needed for Shortness of Breath  Dispense: 360 mL; Refill: 1  - Umeclidinium Bromide (INCRUSE ELLIPTA) 62.5 MCG/INH AEPB; Inhale 1 puff into the lungs daily  Dispense: 1 each; Refill: 5  - Alea Padilla MD, Pulmonary [facility]  - DME Order for Home Oxygen as OP    2. Dyspnea on exertion  See above #1  - Alea Padilla MD, Pulmonary, [facility]         The note was completed using Dragon voice recognition transcription. Every effort was made to ensure accuracy; however, inadvertent  transcription errors may be present despite my best efforts to edit errors.     Kelsey Cortez, LINK - CNP

## 2022-09-20 RX ORDER — FENOFIBRIC ACID 135 MG/1
CAPSULE, DELAYED RELEASE ORAL
Qty: 30 CAPSULE | Refills: 1 | Status: SHIPPED | OUTPATIENT
Start: 2022-09-20

## 2022-09-20 RX ORDER — GLIPIZIDE 10 MG/1
TABLET ORAL
Qty: 60 TABLET | Refills: 3 | Status: SHIPPED | OUTPATIENT
Start: 2022-09-20

## 2022-09-20 NOTE — TELEPHONE ENCOUNTER
Future appt scheduled 11/15/2022             Last appt 08/02/2022      Last Written 07/12/2022    fenofibric acid (TRILIPIX) 135 MG CPDR capsule  07/12/2022  #30  1 RF     glipiZIDE (GLUCOTROL) 10 MG tablet  05/13/2022  #60  3 RF

## 2022-10-10 ENCOUNTER — TELEPHONE (OUTPATIENT)
Dept: FAMILY MEDICINE CLINIC | Age: 57
End: 2022-10-10

## 2022-10-10 RX ORDER — ALBUTEROL SULFATE 90 UG/1
2 AEROSOL, METERED RESPIRATORY (INHALATION) EVERY 6 HOURS PRN
Qty: 8.5 G | Refills: 5 | Status: SHIPPED | OUTPATIENT
Start: 2022-10-10

## 2022-10-10 NOTE — TELEPHONE ENCOUNTER
Future appt scheduled 11/15/2022                          Last appt 08/02/2022      Last Written 03/08/2022    albuterol sulfate  (90 Base) MCG/ACT inhaler  8.5 g   5 RF

## 2022-10-10 NOTE — LETTER
[address]  [place]  Phone: [phone]  Fax: [phone]         October 10, 2022     Patient: Emerson Doherty   YOB: 1965   Date of Visit: 10/10/2022       To Whom It May Concern: It is my medical opinion that Emerson Doherty requires a disability parking placard for the following reasons:  He is restricted by lung disease. Duration of need: 5 years    If you have any questions or concerns, please don't hesitate to call.     Sincerely,        Yoana Rojas, CNP

## 2022-10-10 NOTE — TELEPHONE ENCOUNTER
Patient needs handicap placard renewed, they lost rx that was given to them in May. Handicap placard letter redone.

## 2022-10-17 ENCOUNTER — HOSPITAL ENCOUNTER (EMERGENCY)
Age: 57
Discharge: HOME | End: 2022-10-17
Attending: EMERGENCY MEDICINE
Payer: MEDICARE

## 2022-10-17 ENCOUNTER — APPOINTMENT (OUTPATIENT)
Dept: GENERAL RADIOLOGY | Age: 57
End: 2022-10-17
Payer: MEDICARE

## 2022-10-17 VITALS
HEART RATE: 78 BPM | BODY MASS INDEX: 30.63 KG/M2 | DIASTOLIC BLOOD PRESSURE: 78 MMHG | SYSTOLIC BLOOD PRESSURE: 126 MMHG | RESPIRATION RATE: 19 BRPM | WEIGHT: 156 LBS | HEIGHT: 60 IN | TEMPERATURE: 97.9 F | OXYGEN SATURATION: 94 %

## 2022-10-17 DIAGNOSIS — Z87.09 HISTORY OF COPD: ICD-10-CM

## 2022-10-17 DIAGNOSIS — R05.9 COUGH, UNSPECIFIED TYPE: ICD-10-CM

## 2022-10-17 DIAGNOSIS — R07.81 RIB PAIN ON LEFT SIDE: Primary | ICD-10-CM

## 2022-10-17 DIAGNOSIS — R07.9 CHEST PAIN, UNSPECIFIED TYPE: ICD-10-CM

## 2022-10-17 LAB
ANION GAP SERPL CALCULATED.3IONS-SCNC: 13 MMOL/L (ref 3–16)
BASOPHILS ABSOLUTE: 0.1 K/UL (ref 0–0.2)
BASOPHILS RELATIVE PERCENT: 1.1 %
BUN BLDV-MCNC: 18 MG/DL (ref 7–20)
CALCIUM SERPL-MCNC: 9.8 MG/DL (ref 8.3–10.6)
CHLORIDE BLD-SCNC: 96 MMOL/L (ref 99–110)
CO2: 22 MMOL/L (ref 21–32)
CREAT SERPL-MCNC: 0.9 MG/DL (ref 0.9–1.3)
EKG ATRIAL RATE: 82 BPM
EKG DIAGNOSIS: NORMAL
EKG P AXIS: 17 DEGREES
EKG P-R INTERVAL: 154 MS
EKG Q-T INTERVAL: 388 MS
EKG QRS DURATION: 84 MS
EKG QTC CALCULATION (BAZETT): 453 MS
EKG R AXIS: 128 DEGREES
EKG T AXIS: 62 DEGREES
EKG VENTRICULAR RATE: 82 BPM
EOSINOPHILS ABSOLUTE: 0.1 K/UL (ref 0–0.6)
EOSINOPHILS RELATIVE PERCENT: 0.4 %
GFR AFRICAN AMERICAN: >60
GFR NON-AFRICAN AMERICAN: >60
GLUCOSE BLD-MCNC: 464 MG/DL (ref 70–99)
HCT VFR BLD CALC: 43.2 % (ref 40.5–52.5)
HEMOGLOBIN: 14.6 G/DL (ref 13.5–17.5)
LYMPHOCYTES ABSOLUTE: 2.2 K/UL (ref 1–5.1)
LYMPHOCYTES RELATIVE PERCENT: 17.3 %
MCH RBC QN AUTO: 29.2 PG (ref 26–34)
MCHC RBC AUTO-ENTMCNC: 33.7 G/DL (ref 31–36)
MCV RBC AUTO: 86.8 FL (ref 80–100)
MONOCYTES ABSOLUTE: 0.5 K/UL (ref 0–1.3)
MONOCYTES RELATIVE PERCENT: 3.6 %
NEUTROPHILS ABSOLUTE: 10 K/UL (ref 1.7–7.7)
NEUTROPHILS RELATIVE PERCENT: 77.6 %
PDW BLD-RTO: 15.5 % (ref 12.4–15.4)
PLATELET # BLD: 343 K/UL (ref 135–450)
PMV BLD AUTO: 8.5 FL (ref 5–10.5)
POTASSIUM REFLEX MAGNESIUM: 4.7 MMOL/L (ref 3.5–5.1)
RBC # BLD: 4.98 M/UL (ref 4.2–5.9)
SODIUM BLD-SCNC: 131 MMOL/L (ref 136–145)
TROPONIN: <0.01 NG/ML
WBC # BLD: 12.9 K/UL (ref 4–11)

## 2022-10-17 PROCEDURE — 85025 COMPLETE CBC W/AUTO DIFF WBC: CPT

## 2022-10-17 PROCEDURE — 80048 BASIC METABOLIC PNL TOTAL CA: CPT

## 2022-10-17 PROCEDURE — 93010 ELECTROCARDIOGRAM REPORT: CPT | Performed by: INTERNAL MEDICINE

## 2022-10-17 PROCEDURE — 71046 X-RAY EXAM CHEST 2 VIEWS: CPT

## 2022-10-17 PROCEDURE — 99285 EMERGENCY DEPT VISIT HI MDM: CPT

## 2022-10-17 PROCEDURE — 93005 ELECTROCARDIOGRAM TRACING: CPT | Performed by: EMERGENCY MEDICINE

## 2022-10-17 PROCEDURE — 36415 COLL VENOUS BLD VENIPUNCTURE: CPT

## 2022-10-17 PROCEDURE — 6370000000 HC RX 637 (ALT 250 FOR IP): Performed by: EMERGENCY MEDICINE

## 2022-10-17 PROCEDURE — 84484 ASSAY OF TROPONIN QUANT: CPT

## 2022-10-17 RX ORDER — PANTOPRAZOLE SODIUM 40 MG/1
40 TABLET, DELAYED RELEASE ORAL DAILY
COMMUNITY
Start: 2022-10-10

## 2022-10-17 RX ORDER — DOXYCYCLINE HYCLATE 100 MG
100 TABLET ORAL 2 TIMES DAILY
Qty: 14 TABLET | Refills: 0 | Status: SHIPPED | OUTPATIENT
Start: 2022-10-17 | End: 2022-10-24

## 2022-10-17 RX ORDER — HYDROCODONE BITARTRATE AND ACETAMINOPHEN 5; 325 MG/1; MG/1
1 TABLET ORAL EVERY 8 HOURS PRN
COMMUNITY
Start: 2022-09-19

## 2022-10-17 RX ORDER — ATORVASTATIN CALCIUM 40 MG/1
TABLET, FILM COATED ORAL
COMMUNITY
Start: 2022-05-04 | End: 2022-10-26

## 2022-10-17 RX ORDER — HYDROCODONE BITARTRATE AND ACETAMINOPHEN 5; 325 MG/1; MG/1
1 TABLET ORAL EVERY 6 HOURS PRN
Qty: 12 TABLET | Refills: 0 | Status: SHIPPED | OUTPATIENT
Start: 2022-10-17 | End: 2022-10-20

## 2022-10-17 RX ORDER — HYDROCODONE BITARTRATE AND ACETAMINOPHEN 5; 325 MG/1; MG/1
1 TABLET ORAL ONCE
Status: COMPLETED | OUTPATIENT
Start: 2022-10-17 | End: 2022-10-17

## 2022-10-17 RX ORDER — PREDNISONE 20 MG/1
40 TABLET ORAL DAILY
Qty: 20 TABLET | Refills: 0 | Status: ON HOLD | OUTPATIENT
Start: 2022-10-17 | End: 2022-10-26

## 2022-10-17 RX ADMIN — HYDROCODONE BITARTRATE AND ACETAMINOPHEN 1 TABLET: 5; 325 TABLET ORAL at 15:31

## 2022-10-17 ASSESSMENT — PAIN SCALES - GENERAL: PAINLEVEL_OUTOF10: 8

## 2022-10-17 ASSESSMENT — PAIN - FUNCTIONAL ASSESSMENT: PAIN_FUNCTIONAL_ASSESSMENT: 0-10

## 2022-10-17 NOTE — ED PROVIDER NOTES
Emergency Department Provider Note  Location: [facility] EMERGENCY DEPARTMENT  10/17/2022     Patient Identification  Boyd Belle is a 49 y.o. male    Chief Complaint  Chest Pain (Pt c/o cp on the L side that radiates to his back x1 week. Pt reports mild sob. )      HPI    (History provided by patient)    Patient is a 49 y.o. male with a significant PMHx of CHF, COPD, diabetes, and other comorbidities as listed below, that presents emergency department today with concerns of intermittent  left-sided chest pain that radiates to his back intermittently over the past week. Patient says it feels like pleurisy, as he has had pleurisy in the past and is concerned for pneumonia. At rest, he wears 2 L, and says he is not short of breath at all. In the emergency department at rest, he says he is not having much pain. Would like a home dose of Norco, though, as it starting to return. Denies any nausea, vomiting, diarrhea. No recent falls. Has not been given any recent antibiotics or steroids. Otherwise, denies any exertional shortness of breath. Denies any other concerns including jaw pain, arm pain. I have reviewed the following nursing documentation:  Allergies: No Known Allergies    Past medical history:  has a past medical history of Acute respiratory failure ([address] (07/11/2021), CHF (congestive heart failure) ([address] COPD (chronic obstructive pulmonary disease) ([address] Diabetes mellitus ([address] Hyperlipidemia, Hypertension, Oxygen dependent, and Sepsis with acute respiratory failure and septic shock ([address] (10/6/2021). Past surgical history:  has a past surgical history that includes Tonsillectomy and hernia repair. Home medications:   Prior to Admission medications    Medication Sig Start Date End Date Taking?  Authorizing Provider   atorvastatin (LIPITOR) 40 MG tablet  5/4/22  Yes Historical Provider, MD   HYDROcodone-acetaminophen (NORCO) 5-325 MG per tablet Take 1 tablet by mouth every 6 hours as needed for Pain for up to 3 days. Intended supply: 3 days. Take lowest dose possible to manage pain 10/17/22 10/20/22 Yes Leslie Plummer DO   doxycycline hyclate (VIBRA-TABS) 100 MG tablet Take 1 tablet by mouth 2 times daily for 7 days 10/17/22 10/24/22 Yes Leslie Plummer DO   predniSONE (DELTASONE) 20 MG tablet Take 2 tablets by mouth daily for 10 days 10/17/22 10/27/22 Yes Leslie Plummer DO   pantoprazole (PROTONIX) 40 MG tablet  10/10/22   Historical Provider, MD   HYDROcodone-acetaminophen ([facility]) 5-325 MG per tablet  9/19/22   Historical Provider, MD   diclofenac sodium (VOLTAREN) 1 % GEL  8/13/22   Historical Provider, MD   albuterol sulfate HFA (PROVENTIL;VENTOLIN;PROAIR) 108 (90 Base) MCG/ACT inhaler INHALE 2 PUFFS INTO THE LUNGS EVERY 6 HOURS AS NEEDED FOR WHEEZING OR SHORTNESS OF BREATH 10/10/22   LINK Zayas CNP   glipiZIDE (GLUCOTROL) 10 MG tablet TAKE 1 TABLET 2 TIMES DAILY (BEFORE MEALS) 9/20/22   LINK Zayas CNP   fenofibric acid (TRILIPIX) 135 MG CPDR capsule TAKE 1 CAPSULE ONCE DAILY 9/20/22   LINK Zayas CNP   vitamin C (ASCORBIC ACID) 500 MG tablet Take 500 mg by mouth in the morning and 500 mg before bedtime.     Historical Provider, MD   ipratropium-albuterol (DUONEB) 0.5-2.5 (3) MG/3ML SOLN nebulizer solution Take 3 mLs by nebulization every 4-6 hours as needed for Shortness of Breath 8/2/22   LINK Zayas CNP   Umeclidinium Bromide (INCRUSE ELLIPTA) 62.5 MCG/INH AEPB Inhale 1 puff into the lungs daily 8/2/22   LINK Zayas CNP   OXYGEN Portable oxygen and concentrator 8/2/22   LINK Zayas CNP   furosemide (LASIX) 20 MG tablet TAKE (1) TABLET DAILY 6/30/22   LINK Zayas - CNP   VITAMIN D, CHOLECALCIFEROL, PO Take by mouth daily    Historical Provider, MD   aspirin EC 81 MG EC tablet Take 1 tablet by mouth daily 6/9/22   Julieth Wharton MD   rosuvastatin (CRESTOR) 40 MG tablet Take 1 tablet by mouth daily 6/9/22   An Choudhary MD Pierre   blood glucose monitor strips Test 3 times 5/13/22   LINK Singh CNP   Lancets MISC 1 each by Does not apply route daily - Tests 3 times daily 5/13/22   LINK Singh CNP   sharps container 1 each by Does not apply route as needed (for disposable of lancets and needles) 5/13/22   LINK Singh CNP   insulin glargine (LANTUS SOLOSTAR) 100 UNIT/ML injection pen INJECT 30 UNITS INTO THE SKIN 2 TIMES DAILY - Dose Increase on 5/13/22 per Latha Warren 5/13/22   LINK Singh CNP   Handicap Placard [address] [facility] by Does not apply route Expires 5/2027 5/12/22   LINK Singh CNP   BREO ELLIPTA 200-25 MCG/INH AEPB inhaler INHALE 1 PUFF DAILY 5/6/22   LINK Singh CNP   Alcohol Swabs (ALCOHOL PREP) PADS Use twice daily DX: E11.9 3/1/22   LINK Singh CNP   gabapentin (NEURONTIN) 800 MG tablet TAKE 1 TABLET 4 TIMES DAILY 11/1/21 2/25/22  LINK Singh CNP   pioglitazone (ACTOS) 15 MG tablet Take 15 mg by mouth daily    Historical Provider, MD   Insulin Pen Needle ([employer] PEN NEEDLES 29G) 29G X 12MM MISC 1 each by Does not apply route daily 9/30/21   Brooks Bucio MD   Magnesium 400 MG CAPS Take 1 tablet by mouth daily 4/6/20   LINK Singh CNP   blood glucose monitor strips Test 2 times a day & as needed for symptoms of irregular blood glucose. E11.9 3/31/20   LINK Singh CNP   glucose monitoring kit (FREESTYLE) monitoring kit 1 kit by Does not apply route daily Patient wants brand name Patient tests bid  E11.9 3/31/20   LINK Singh CNP   blood glucose monitor kit and supplies Test 2 times a day & as needed for symptoms of irregular blood glucose. 3/30/20   LINK Singh CNP   Coenzyme Q10 (COQ10) 100 MG CAPS Take 1 capsule by mouth daily    Historical Provider, MD   MULTIPLE VITAMINS PO Take by mouth    Historical Provider, MD       Social history:  reports that he has been smoking cigarettes.  He has a 60.00 pack-year smoking history. He has never used smokeless tobacco. He reports current alcohol use. He reports that he does not use drugs. Family history:    Family History   Problem Relation Age of Onset    Asthma Mother     Cancer Mother     Hearing Loss Mother     Vision Loss Mother     High Blood Pressure Father     High Cholesterol Father     Vision Loss Father     Asthma Sister     Diabetes Sister     Vision Loss Sister     Asthma Brother     Arthritis Brother     High Blood Pressure Brother          ROS  Review of Systems   Constitutional:  Negative for activity change, appetite change and fever. HENT:  Negative for congestion and postnasal drip. Respiratory:  Negative for cough, chest tightness and shortness of breath. Cardiovascular:  Negative for chest pain, palpitations and leg swelling. Gastrointestinal:  Negative for abdominal pain, nausea and vomiting. Skin:  Negative for rash and wound. Neurological:  Negative for dizziness and light-headedness. Exam  Vitals:    10/17/22 1451 10/17/22 1506 10/17/22 1521 10/17/22 1536   BP: 117/78 108/74 110/78 126/78   Pulse: 82 83 84 78   Resp: 18 14 17 19   Temp:       TempSrc:       SpO2: 96% 92% 96% 94%   Weight:       Height:             Physical Exam  Constitutional:       Appearance: He is normal weight. He is not ill-appearing or diaphoretic. Comments: Patient is on 2 L, interactive, conversational, pleasant, joking around in the room. In no acute distress. Vital signs are stable. HENT:      Head: Normocephalic and atraumatic. Right Ear: External ear normal.      Left Ear: External ear normal.      Nose: Nose normal.      Mouth/Throat:      Mouth: Mucous membranes are moist.      Pharynx: Oropharynx is clear. Eyes:      General:         Right eye: No discharge. Left eye: No discharge. Conjunctiva/sclera: Conjunctivae normal.   Cardiovascular:      Rate and Rhythm: Normal rate and regular rhythm.       Comments: Tenderness to palpation along the anterior aspect of ribs 8 through 10. No obvious signs of trauma, no rashes. Pulmonary:      Effort: Pulmonary effort is normal. No respiratory distress. Breath sounds: Wheezing (scattered, scant) present. Abdominal:      General: Abdomen is flat. There is no distension. Palpations: Abdomen is soft. Tenderness: There is no abdominal tenderness. Musculoskeletal:         General: No swelling or tenderness. Cervical back: Normal range of motion and neck supple. Right lower leg: No edema. Left lower leg: No edema. Skin:     General: Skin is warm and dry. Findings: No rash. Neurological:      General: No focal deficit present. Mental Status: He is alert and oriented to person, place, and time. Psychiatric:         Mood and Affect: Mood normal.         Thought Content: Thought content normal.       ED Course    ED Medication Orders (From admission, onward)      Start Ordered     Status Ordering Provider    10/17/22 1445 10/17/22 1424  HYDROcodone-acetaminophen (NORCO) 5-325 MG per tablet 1 tablet  ONCE         Last MAR action: Given - by Monica Francis on 10/17/22 at 1531 LATANYA ISAACS            EKG  EKG obtained today is in the emergency department today shows a ventricular rate of 82 bpm.  Normal sinus rhythm, no change in morphology from previous EKG in February 2022. Intervals normal.  No ST segment elevation, ST segment depression, hyperacute T waves. Radiology  No results found.     Labs  Results for orders placed or performed during the hospital encounter of 10/17/22   BMP w/ Reflex to MG   Result Value Ref Range    Sodium 131 (L) 136 - 145 mmol/L    Potassium reflex Magnesium 4.7 3.5 - 5.1 mmol/L    Chloride 96 (L) 99 - 110 mmol/L    CO2 22 21 - 32 mmol/L    Anion Gap 13 3 - 16    Glucose 464 (H) 70 - 99 mg/dL    BUN 18 7 - 20 mg/dL    Creatinine 0.9 0.9 - 1.3 mg/dL    GFR Non-African American >60 >60    GFR  >60 >60 Calcium 9.8 8.3 - 10.6 mg/dL   CBC with Auto Differential   Result Value Ref Range    WBC 12.9 (H) 4.0 - 11.0 K/uL    RBC 4.98 4.20 - 5.90 M/uL    Hemoglobin 14.6 13.5 - 17.5 g/dL    Hematocrit 43.2 40.5 - 52.5 %    MCV 86.8 80.0 - 100.0 fL    MCH 29.2 26.0 - 34.0 pg    MCHC 33.7 31.0 - 36.0 g/dL    RDW 15.5 (H) 12.4 - 15.4 %    Platelets 418 200 - 405 K/uL    MPV 8.5 5.0 - 10.5 fL    Neutrophils % 77.6 %    Lymphocytes % 17.3 %    Monocytes % 3.6 %    Eosinophils % 0.4 %    Basophils % 1.1 %    Neutrophils Absolute 10.0 (H) 1.7 - 7.7 K/uL    Lymphocytes Absolute 2.2 1.0 - 5.1 K/uL    Monocytes Absolute 0.5 0.0 - 1.3 K/uL    Eosinophils Absolute 0.1 0.0 - 0.6 K/uL    Basophils Absolute 0.1 0.0 - 0.2 K/uL   Troponin   Result Value Ref Range    Troponin <0.01 <0.01 ng/mL   EKG 12 Lead   Result Value Ref Range    Ventricular Rate 82 BPM    Atrial Rate 82 BPM    P-R Interval 154 ms    QRS Duration 84 ms    Q-T Interval 388 ms    QTc Calculation (Bazett) 453 ms    P Axis 17 degrees    R Axis 128 degrees    T Axis 62 degrees    Diagnosis       Normal sinus rhythmRSR' or QR pattern in V1 suggests right ventricular conduction delayPossible Left atrial enlargementRight axis deviationAbnormal ECGWhen compared with ECG of 18-FEB-2022 14:28,No significant change was foundConfirmed by DONNA Reddy MD ([id number]) on 10/17/2022 6:04:31 PM         Procedures  Procedures          MDM  Patient seen and evaluated. Relevant records reviewed. - Patient is a 49 y.o. male with what patient describes as rib pain on the left that radiates around to his back. Tender to palpation on physical exam.  Vital signs are stable on his 2 L nasal cannula. No chest pain at rest.  Patient has some scattered wheezing on physical exam, consistent with history, however does not feel like he needs a breathing treatment at this time. Pain control provided.     Laboratory evaluation today reveals negative troponin, slight elevation in white blood cell count to 12.9, and hyperglycemia, otherwise unremarkable lab evaluation. Chest x-ray with clear lungs. With laboratory evaluation, and very atypical chest pain for ACS, patient feels comfortable being discharged home. We will treat for COPD exacerbation as he does have some wheezing, and I suspect that he has felt pleural seen in the past, and he says it feels very similar. Discussed return precautions provided at length. Follow-up with PCP, cardiology, and discussed with him about talking to PCP about obtaining a pulmonologist.    HEART SCORE:    History: +0 for low suspicion  EKG: +1 for nonspecific changes   Age: +1 for age 44-72 years  Risk factors (includes HLD, HTN, DM, tobacco use, obesity, and +FHx): +1 for 1-2 risk factors  Initial troponin: +0 for negative troponin    Heart score: 3. This falls under the following category: Score of 0-3, which indicates a very low risk for major adverse cardiac event and supports early discharge     Medications   HYDROcodone-acetaminophen (NORCO) 5-325 MG per tablet 1 tablet (1 tablet Oral Given 10/17/22 4693)     - I have a low concern for other emergent process, and do not see indication for further work-up in the ER, as it is unlikely  and poses more risk than benefit. - I discussed the results, including any incidental findings, with patient and spouse/SO. Questions answered. Patient/family agreeable to plan and express understanding of plan. Disposition:  Discharge to home in fair condition. Is this patient to be included in the SEP-1 Core Measure due to severe sepsis or septic shock? No   Exclusion criteria - the patient is NOT to be included for SEP-1 Core Measure due to:  2+ SIRS criteria are not met      Clinical Impression:  1. Rib pain on left side    2. Chest pain, unspecified type    3. History of COPD    4.  Cough, unspecified type        Blood pressure 126/78, pulse 78, temperature 97.9 °F (36.6 °C), temperature source Oral, resp. rate 19, height 4' 11\" (1.499 m), weight 156 lb (70.8 kg), SpO2 94 %. Patient was given prescriptions for the following medications. I counseled patient how to take these medications. Discharge Medication List as of 10/17/2022  3:45 PM        START taking these medications    Details   !! HYDROcodone-acetaminophen (NORCO) 5-325 MG per tablet Take 1 tablet by mouth every 6 hours as needed for Pain for up to 3 days. Intended supply: 3 days. Take lowest dose possible to manage pain, Disp-12 tablet, R-0Normal      doxycycline hyclate (VIBRA-TABS) 100 MG tablet Take 1 tablet by mouth 2 times daily for 7 days, Disp-14 tablet, R-0Normal      predniSONE (DELTASONE) 20 MG tablet Take 2 tablets by mouth daily for 10 days, Disp-20 tablet, R-0Normal       !! - Potential duplicate medications found. Please discuss with provider. Disposition referral (if applicable):  [address] [facility] Emergency Department  [facility] [address] [address] [phone]  Schedule an appointment as soon as possible for a visit       [address] [id number] [phone]    Schedule an appointment as soon as possible for a visit       I, Leslie Plummer DO, am the primary attending of record and contributed the majority of evaluation and treatment of emergent care for this encounter. This chart was generated in part by using Dragon Dictation system and may contain errors related to that system including errors in grammar, punctuation, and spelling, as well as words and phrases that may be inappropriate. If there are any questions or concerns please feel free to contact the dictating provider for clarification.      Shivam MORENO [address], DO  10/20/22 0194

## 2022-10-20 ASSESSMENT — ENCOUNTER SYMPTOMS
NAUSEA: 0
CHEST TIGHTNESS: 0
VOMITING: 0
ABDOMINAL PAIN: 0
COUGH: 0
SHORTNESS OF BREATH: 0

## 2022-10-25 ENCOUNTER — HOSPITAL ENCOUNTER (INPATIENT)
Age: 57
LOS: 1 days | Discharge: HOME | DRG: 438 | End: 2022-10-26
Attending: EMERGENCY MEDICINE | Admitting: INTERNAL MEDICINE
Payer: MEDICARE

## 2022-10-25 ENCOUNTER — APPOINTMENT (OUTPATIENT)
Dept: GENERAL RADIOLOGY | Age: 57
DRG: 438 | End: 2022-10-25
Payer: MEDICARE

## 2022-10-25 ENCOUNTER — APPOINTMENT (OUTPATIENT)
Dept: CT IMAGING | Age: 57
DRG: 438 | End: 2022-10-25
Payer: MEDICARE

## 2022-10-25 DIAGNOSIS — R73.9 HYPERGLYCEMIA: ICD-10-CM

## 2022-10-25 DIAGNOSIS — E86.0 DEHYDRATION: ICD-10-CM

## 2022-10-25 DIAGNOSIS — K85.90 ACUTE PANCREATITIS WITHOUT INFECTION OR NECROSIS, UNSPECIFIED PANCREATITIS TYPE: Primary | ICD-10-CM

## 2022-10-25 DIAGNOSIS — J96.01 ACUTE RESPIRATORY FAILURE WITH HYPOXIA (HCC): ICD-10-CM

## 2022-10-25 DIAGNOSIS — E11.10 DIABETIC KETOACIDOSIS WITHOUT COMA ASSOCIATED WITH TYPE 2 DIABETES MELLITUS (HCC): ICD-10-CM

## 2022-10-25 LAB
A/G RATIO: 1.2 (ref 1.1–2.2)
ALBUMIN SERPL-MCNC: 3.7 G/DL (ref 3.4–5)
ALP BLD-CCNC: 86 U/L (ref 40–129)
ALT SERPL-CCNC: 17 U/L (ref 10–40)
ANION GAP SERPL CALCULATED.3IONS-SCNC: 25 MMOL/L (ref 3–16)
AST SERPL-CCNC: 10 U/L (ref 15–37)
BASE EXCESS VENOUS: -6.6 MMOL/L (ref -3–3)
BASOPHILS ABSOLUTE: 0.4 K/UL (ref 0–0.2)
BASOPHILS RELATIVE PERCENT: 1.8 %
BILIRUB SERPL-MCNC: 0.4 MG/DL (ref 0–1)
BUN BLDV-MCNC: 33 MG/DL (ref 7–20)
CALCIUM SERPL-MCNC: 10 MG/DL (ref 8.3–10.6)
CARBOXYHEMOGLOBIN: 9.5 % (ref 0–1.5)
CHLORIDE BLD-SCNC: 86 MMOL/L (ref 99–110)
CO2: 15 MMOL/L (ref 21–32)
CREAT SERPL-MCNC: 1.2 MG/DL (ref 0.9–1.3)
D DIMER: <0.27 UG/ML FEU (ref 0–0.6)
EOSINOPHILS ABSOLUTE: 0 K/UL (ref 0–0.6)
EOSINOPHILS RELATIVE PERCENT: 0.1 %
GFR SERPL CREATININE-BSD FRML MDRD: >60 ML/MIN/{1.73_M2}
GLUCOSE BLD-MCNC: 468 MG/DL (ref 70–99)
HCO3 VENOUS: 16.9 MMOL/L (ref 23–29)
HCT VFR BLD CALC: 48.5 % (ref 40.5–52.5)
HEMOGLOBIN: 16.2 G/DL (ref 13.5–17.5)
LACTIC ACID: 0.9 MMOL/L (ref 0.4–2)
LIPASE: 180 U/L (ref 13–60)
LYMPHOCYTES ABSOLUTE: 2.6 K/UL (ref 1–5.1)
LYMPHOCYTES RELATIVE PERCENT: 11 %
MAGNESIUM: 2 MG/DL (ref 1.8–2.4)
MCH RBC QN AUTO: 28.6 PG (ref 26–34)
MCHC RBC AUTO-ENTMCNC: 33.3 G/DL (ref 31–36)
MCV RBC AUTO: 85.7 FL (ref 80–100)
METHEMOGLOBIN VENOUS: 0.1 %
MONOCYTES ABSOLUTE: 0.9 K/UL (ref 0–1.3)
MONOCYTES RELATIVE PERCENT: 3.8 %
NEUTROPHILS ABSOLUTE: 19.8 K/UL (ref 1.7–7.7)
NEUTROPHILS RELATIVE PERCENT: 83.3 %
O2 SAT, VEN: 92 %
O2 THERAPY: ABNORMAL
PCO2, VEN: 29.4 MMHG (ref 40–50)
PDW BLD-RTO: 15.8 % (ref 12.4–15.4)
PH VENOUS: 7.38 (ref 7.35–7.45)
PLATELET # BLD: 273 K/UL (ref 135–450)
PMV BLD AUTO: 9.6 FL (ref 5–10.5)
PO2, VEN: 63 MMHG (ref 25–40)
POTASSIUM SERPL-SCNC: 4.7 MMOL/L (ref 3.5–5.1)
RAPID INFLUENZA  B AGN: NEGATIVE
RAPID INFLUENZA A AGN: NEGATIVE
RBC # BLD: 5.66 M/UL (ref 4.2–5.9)
SARS-COV-2, NAAT: NOT DETECTED
SODIUM BLD-SCNC: 126 MMOL/L (ref 136–145)
TCO2 CALC VENOUS: 18 MMOL/L
TOTAL PROTEIN: 6.7 G/DL (ref 6.4–8.2)
TROPONIN: <0.01 NG/ML
WBC # BLD: 23.8 K/UL (ref 4–11)

## 2022-10-25 PROCEDURE — 96361 HYDRATE IV INFUSION ADD-ON: CPT

## 2022-10-25 PROCEDURE — 85025 COMPLETE CBC W/AUTO DIFF WBC: CPT

## 2022-10-25 PROCEDURE — 96375 TX/PRO/DX INJ NEW DRUG ADDON: CPT

## 2022-10-25 PROCEDURE — 84145 PROCALCITONIN (PCT): CPT

## 2022-10-25 PROCEDURE — 74177 CT ABD & PELVIS W/CONTRAST: CPT

## 2022-10-25 PROCEDURE — 2580000003 HC RX 258: Performed by: EMERGENCY MEDICINE

## 2022-10-25 PROCEDURE — 82010 KETONE BODYS QUAN: CPT

## 2022-10-25 PROCEDURE — 85379 FIBRIN DEGRADATION QUANT: CPT

## 2022-10-25 PROCEDURE — 87635 SARS-COV-2 COVID-19 AMP PRB: CPT

## 2022-10-25 PROCEDURE — 71045 X-RAY EXAM CHEST 1 VIEW: CPT

## 2022-10-25 PROCEDURE — 82803 BLOOD GASES ANY COMBINATION: CPT

## 2022-10-25 PROCEDURE — 84484 ASSAY OF TROPONIN QUANT: CPT

## 2022-10-25 PROCEDURE — 83605 ASSAY OF LACTIC ACID: CPT

## 2022-10-25 PROCEDURE — 36415 COLL VENOUS BLD VENIPUNCTURE: CPT

## 2022-10-25 PROCEDURE — 6370000000 HC RX 637 (ALT 250 FOR IP): Performed by: EMERGENCY MEDICINE

## 2022-10-25 PROCEDURE — 6360000002 HC RX W HCPCS: Performed by: EMERGENCY MEDICINE

## 2022-10-25 PROCEDURE — 6360000004 HC RX CONTRAST MEDICATION: Performed by: EMERGENCY MEDICINE

## 2022-10-25 PROCEDURE — 96374 THER/PROPH/DIAG INJ IV PUSH: CPT

## 2022-10-25 PROCEDURE — 83690 ASSAY OF LIPASE: CPT

## 2022-10-25 PROCEDURE — 71260 CT THORAX DX C+: CPT | Performed by: EMERGENCY MEDICINE

## 2022-10-25 PROCEDURE — 80053 COMPREHEN METABOLIC PANEL: CPT

## 2022-10-25 PROCEDURE — 93005 ELECTROCARDIOGRAM TRACING: CPT | Performed by: EMERGENCY MEDICINE

## 2022-10-25 PROCEDURE — 87804 INFLUENZA ASSAY W/OPTIC: CPT

## 2022-10-25 PROCEDURE — 83735 ASSAY OF MAGNESIUM: CPT

## 2022-10-25 PROCEDURE — 99285 EMERGENCY DEPT VISIT HI MDM: CPT

## 2022-10-25 RX ORDER — NICOTINE 21 MG/24HR
1 PATCH, TRANSDERMAL 24 HOURS TRANSDERMAL ONCE
Status: DISCONTINUED | OUTPATIENT
Start: 2022-10-25 | End: 2022-10-25

## 2022-10-25 RX ORDER — NICOTINE 21 MG/24HR
1 PATCH, TRANSDERMAL 24 HOURS TRANSDERMAL ONCE
Status: DISCONTINUED | OUTPATIENT
Start: 2022-10-25 | End: 2022-10-26 | Stop reason: DRUGHIGH

## 2022-10-25 RX ORDER — ONDANSETRON 2 MG/ML
4 INJECTION INTRAMUSCULAR; INTRAVENOUS ONCE
Status: COMPLETED | OUTPATIENT
Start: 2022-10-25 | End: 2022-10-25

## 2022-10-25 RX ORDER — MORPHINE SULFATE 4 MG/ML
4 INJECTION, SOLUTION INTRAMUSCULAR; INTRAVENOUS ONCE
Status: COMPLETED | OUTPATIENT
Start: 2022-10-25 | End: 2022-10-25

## 2022-10-25 RX ORDER — 0.9 % SODIUM CHLORIDE 0.9 %
1000 INTRAVENOUS SOLUTION INTRAVENOUS ONCE
Status: COMPLETED | OUTPATIENT
Start: 2022-10-25 | End: 2022-10-26

## 2022-10-25 RX ADMIN — MORPHINE SULFATE 4 MG: 4 INJECTION, SOLUTION INTRAMUSCULAR; INTRAVENOUS at 23:07

## 2022-10-25 RX ADMIN — ONDANSETRON HYDROCHLORIDE 4 MG: 2 INJECTION, SOLUTION INTRAMUSCULAR; INTRAVENOUS at 23:06

## 2022-10-25 RX ADMIN — IOPAMIDOL 100 ML: 755 INJECTION, SOLUTION INTRAVENOUS at 23:54

## 2022-10-25 RX ADMIN — SODIUM CHLORIDE 1000 ML: 9 INJECTION, SOLUTION INTRAVENOUS at 23:10

## 2022-10-25 ASSESSMENT — ENCOUNTER SYMPTOMS
SORE THROAT: 0
ABDOMINAL PAIN: 1
CONSTIPATION: 1
SHORTNESS OF BREATH: 1
VOMITING: 1
NAUSEA: 1
BACK PAIN: 0
COUGH: 1
ABDOMINAL DISTENTION: 1
DIARRHEA: 0

## 2022-10-26 ENCOUNTER — APPOINTMENT (OUTPATIENT)
Dept: ULTRASOUND IMAGING | Age: 57
DRG: 438 | End: 2022-10-26
Payer: MEDICARE

## 2022-10-26 VITALS
TEMPERATURE: 98.7 F | WEIGHT: 148.6 LBS | HEART RATE: 88 BPM | RESPIRATION RATE: 24 BRPM | DIASTOLIC BLOOD PRESSURE: 71 MMHG | OXYGEN SATURATION: 92 % | BODY MASS INDEX: 30.01 KG/M2 | SYSTOLIC BLOOD PRESSURE: 99 MMHG

## 2022-10-26 PROBLEM — K85.90 ACUTE PANCREATITIS WITHOUT INFECTION OR NECROSIS: Status: ACTIVE | Noted: 2022-10-26

## 2022-10-26 PROBLEM — J96.11 CHRONIC RESPIRATORY FAILURE WITH HYPOXIA (HCC): Status: ACTIVE | Noted: 2022-10-26

## 2022-10-26 PROBLEM — E87.20 METABOLIC ACIDOSIS: Status: ACTIVE | Noted: 2022-10-26

## 2022-10-26 PROBLEM — E11.10 DKA, TYPE 2, NOT AT GOAL (HCC): Status: ACTIVE | Noted: 2022-10-26

## 2022-10-26 PROBLEM — E87.8 ELECTROLYTE DISORDER: Status: ACTIVE | Noted: 2022-10-26

## 2022-10-26 LAB
AMYLASE: 97 U/L (ref 25–115)
ANION GAP SERPL CALCULATED.3IONS-SCNC: 12 MMOL/L (ref 3–16)
ANION GAP SERPL CALCULATED.3IONS-SCNC: 12 MMOL/L (ref 3–16)
ANION GAP SERPL CALCULATED.3IONS-SCNC: 14 MMOL/L (ref 3–16)
ANION GAP SERPL CALCULATED.3IONS-SCNC: 19 MMOL/L (ref 3–16)
ANION GAP SERPL CALCULATED.3IONS-SCNC: 9 MMOL/L (ref 3–16)
BACTERIA: ABNORMAL /HPF
BASE EXCESS VENOUS: -4.1 MMOL/L (ref -3–3)
BASE EXCESS VENOUS: -6 MMOL/L (ref -3–3)
BETA-HYDROXYBUTYRATE: 5.9 MMOL/L (ref 0–0.27)
BILIRUBIN URINE: NEGATIVE
BLOOD, URINE: NEGATIVE
BUN BLDV-MCNC: 18 MG/DL (ref 7–20)
BUN BLDV-MCNC: 21 MG/DL (ref 7–20)
BUN BLDV-MCNC: 26 MG/DL (ref 7–20)
BUN BLDV-MCNC: 29 MG/DL (ref 7–20)
BUN BLDV-MCNC: 30 MG/DL (ref 7–20)
CALCIUM SERPL-MCNC: 8.1 MG/DL (ref 8.3–10.6)
CALCIUM SERPL-MCNC: 8.5 MG/DL (ref 8.3–10.6)
CALCIUM SERPL-MCNC: 8.6 MG/DL (ref 8.3–10.6)
CALCIUM SERPL-MCNC: 8.9 MG/DL (ref 8.3–10.6)
CALCIUM SERPL-MCNC: 9 MG/DL (ref 8.3–10.6)
CARBOXYHEMOGLOBIN: 3.6 % (ref 0–1.5)
CARBOXYHEMOGLOBIN: 6 % (ref 0–1.5)
CHLORIDE BLD-SCNC: 100 MMOL/L (ref 99–110)
CHLORIDE BLD-SCNC: 100 MMOL/L (ref 99–110)
CHLORIDE BLD-SCNC: 102 MMOL/L (ref 99–110)
CHLORIDE BLD-SCNC: 93 MMOL/L (ref 99–110)
CHLORIDE BLD-SCNC: 99 MMOL/L (ref 99–110)
CLARITY: CLEAR
CO2: 18 MMOL/L (ref 21–32)
CO2: 20 MMOL/L (ref 21–32)
CO2: 21 MMOL/L (ref 21–32)
CO2: 23 MMOL/L (ref 21–32)
CO2: 23 MMOL/L (ref 21–32)
COLOR: YELLOW
CREAT SERPL-MCNC: 0.8 MG/DL (ref 0.9–1.3)
CREAT SERPL-MCNC: 0.9 MG/DL (ref 0.9–1.3)
CREAT SERPL-MCNC: 1 MG/DL (ref 0.9–1.3)
CREAT SERPL-MCNC: 1.1 MG/DL (ref 0.9–1.3)
CREAT SERPL-MCNC: 1.1 MG/DL (ref 0.9–1.3)
EKG ATRIAL RATE: 105 BPM
EKG DIAGNOSIS: NORMAL
EKG P AXIS: 37 DEGREES
EKG P-R INTERVAL: 146 MS
EKG Q-T INTERVAL: 354 MS
EKG QRS DURATION: 80 MS
EKG QTC CALCULATION (BAZETT): 467 MS
EKG R AXIS: 139 DEGREES
EKG T AXIS: 21 DEGREES
EKG VENTRICULAR RATE: 105 BPM
EPITHELIAL CELLS, UA: ABNORMAL /HPF (ref 0–5)
GFR SERPL CREATININE-BSD FRML MDRD: >60 ML/MIN/{1.73_M2}
GLUCOSE BLD-MCNC: 106 MG/DL (ref 70–99)
GLUCOSE BLD-MCNC: 118 MG/DL (ref 70–99)
GLUCOSE BLD-MCNC: 205 MG/DL (ref 70–99)
GLUCOSE BLD-MCNC: 235 MG/DL (ref 70–99)
GLUCOSE BLD-MCNC: 249 MG/DL (ref 70–99)
GLUCOSE BLD-MCNC: 260 MG/DL (ref 70–99)
GLUCOSE BLD-MCNC: 275 MG/DL (ref 70–99)
GLUCOSE BLD-MCNC: 278 MG/DL (ref 70–99)
GLUCOSE BLD-MCNC: 328 MG/DL (ref 70–99)
GLUCOSE BLD-MCNC: 355 MG/DL (ref 70–99)
GLUCOSE BLD-MCNC: 379 MG/DL (ref 70–99)
GLUCOSE BLD-MCNC: 382 MG/DL (ref 70–99)
GLUCOSE BLD-MCNC: 414 MG/DL (ref 70–99)
GLUCOSE BLD-MCNC: 84 MG/DL (ref 70–99)
GLUCOSE BLD-MCNC: 91 MG/DL (ref 70–99)
GLUCOSE URINE: >=1000 MG/DL
HCO3 VENOUS: 19.3 MMOL/L (ref 23–29)
HCO3 VENOUS: 23.1 MMOL/L (ref 23–29)
KETONES, URINE: 40 MG/DL
LEUKOCYTE ESTERASE, URINE: NEGATIVE
LIPASE: 187 U/L (ref 13–60)
LIPASE: 677 U/L (ref 13–60)
MAGNESIUM: 1.8 MG/DL (ref 1.8–2.4)
MAGNESIUM: 1.9 MG/DL (ref 1.8–2.4)
MAGNESIUM: 1.9 MG/DL (ref 1.8–2.4)
METHEMOGLOBIN VENOUS: 0.1 %
METHEMOGLOBIN VENOUS: 0.1 %
MICROSCOPIC EXAMINATION: YES
NITRITE, URINE: NEGATIVE
O2 SAT, VEN: 93 %
O2 SAT, VEN: 96 %
O2 THERAPY: ABNORMAL
O2 THERAPY: ABNORMAL
PCO2, VEN: 37.4 MMHG (ref 40–50)
PCO2, VEN: 50.2 MMHG (ref 40–50)
PERFORMED ON: ABNORMAL
PERFORMED ON: NORMAL
PH UA: 6 (ref 5–8)
PH VENOUS: 7.28 (ref 7.35–7.45)
PH VENOUS: 7.33 (ref 7.35–7.45)
PHOSPHORUS: 1.7 MG/DL (ref 2.5–4.9)
PHOSPHORUS: 2 MG/DL (ref 2.5–4.9)
PO2, VEN: 74.6 MMHG (ref 25–40)
PO2, VEN: 89.6 MMHG (ref 25–40)
POTASSIUM SERPL-SCNC: 3.9 MMOL/L (ref 3.5–5.1)
POTASSIUM SERPL-SCNC: 4.1 MMOL/L (ref 3.5–5.1)
POTASSIUM SERPL-SCNC: 4.1 MMOL/L (ref 3.5–5.1)
POTASSIUM SERPL-SCNC: 4.4 MMOL/L (ref 3.5–5.1)
POTASSIUM SERPL-SCNC: 4.8 MMOL/L (ref 3.5–5.1)
PROCALCITONIN: 0.13 NG/ML (ref 0–0.15)
PROTEIN UA: ABNORMAL MG/DL
RBC UA: ABNORMAL /HPF (ref 0–4)
SODIUM BLD-SCNC: 130 MMOL/L (ref 136–145)
SODIUM BLD-SCNC: 132 MMOL/L (ref 136–145)
SODIUM BLD-SCNC: 134 MMOL/L (ref 136–145)
SODIUM BLD-SCNC: 134 MMOL/L (ref 136–145)
SODIUM BLD-SCNC: 135 MMOL/L (ref 136–145)
SPECIFIC GRAVITY UA: 1.01 (ref 1–1.03)
TCO2 CALC VENOUS: 20 MMOL/L
TCO2 CALC VENOUS: 25 MMOL/L
TRIGL SERPL-MCNC: 1062 MG/DL (ref 0–150)
URINE TYPE: ABNORMAL
UROBILINOGEN, URINE: 0.2 E.U./DL
WBC UA: ABNORMAL /HPF (ref 0–5)

## 2022-10-26 PROCEDURE — 6370000000 HC RX 637 (ALT 250 FOR IP): Performed by: INTERNAL MEDICINE

## 2022-10-26 PROCEDURE — 2580000003 HC RX 258: Performed by: EMERGENCY MEDICINE

## 2022-10-26 PROCEDURE — 6370000000 HC RX 637 (ALT 250 FOR IP): Performed by: EMERGENCY MEDICINE

## 2022-10-26 PROCEDURE — 2000000000 HC ICU R&B

## 2022-10-26 PROCEDURE — 84100 ASSAY OF PHOSPHORUS: CPT

## 2022-10-26 PROCEDURE — 80048 BASIC METABOLIC PNL TOTAL CA: CPT

## 2022-10-26 PROCEDURE — 99223 1ST HOSP IP/OBS HIGH 75: CPT | Performed by: INTERNAL MEDICINE

## 2022-10-26 PROCEDURE — 81001 URINALYSIS AUTO W/SCOPE: CPT

## 2022-10-26 PROCEDURE — 76705 ECHO EXAM OF ABDOMEN: CPT

## 2022-10-26 PROCEDURE — 83735 ASSAY OF MAGNESIUM: CPT

## 2022-10-26 PROCEDURE — 36415 COLL VENOUS BLD VENIPUNCTURE: CPT

## 2022-10-26 PROCEDURE — 82150 ASSAY OF AMYLASE: CPT

## 2022-10-26 PROCEDURE — 83690 ASSAY OF LIPASE: CPT

## 2022-10-26 PROCEDURE — 87040 BLOOD CULTURE FOR BACTERIA: CPT

## 2022-10-26 PROCEDURE — 2500000003 HC RX 250 WO HCPCS: Performed by: EMERGENCY MEDICINE

## 2022-10-26 PROCEDURE — 83036 HEMOGLOBIN GLYCOSYLATED A1C: CPT

## 2022-10-26 PROCEDURE — 84478 ASSAY OF TRIGLYCERIDES: CPT

## 2022-10-26 PROCEDURE — 82803 BLOOD GASES ANY COMBINATION: CPT

## 2022-10-26 PROCEDURE — 93010 ELECTROCARDIOGRAM REPORT: CPT | Performed by: INTERNAL MEDICINE

## 2022-10-26 RX ORDER — IPRATROPIUM BROMIDE AND ALBUTEROL SULFATE 2.5; .5 MG/3ML; MG/3ML
1 SOLUTION RESPIRATORY (INHALATION)
Status: DISCONTINUED | OUTPATIENT
Start: 2022-10-26 | End: 2022-10-26

## 2022-10-26 RX ORDER — IPRATROPIUM BROMIDE AND ALBUTEROL SULFATE 2.5; .5 MG/3ML; MG/3ML
1 SOLUTION RESPIRATORY (INHALATION) EVERY 4 HOURS PRN
Status: DISCONTINUED | OUTPATIENT
Start: 2022-10-26 | End: 2022-10-26 | Stop reason: HOSPADM

## 2022-10-26 RX ORDER — NICOTINE 21 MG/24HR
1 PATCH, TRANSDERMAL 24 HOURS TRANSDERMAL DAILY
Status: DISCONTINUED | OUTPATIENT
Start: 2022-10-26 | End: 2022-10-26 | Stop reason: HOSPADM

## 2022-10-26 RX ORDER — LORAZEPAM 1 MG/1
1 TABLET ORAL ONCE
Status: COMPLETED | OUTPATIENT
Start: 2022-10-26 | End: 2022-10-26

## 2022-10-26 RX ORDER — POTASSIUM CHLORIDE 7.45 MG/ML
10 INJECTION INTRAVENOUS PRN
Status: DISCONTINUED | OUTPATIENT
Start: 2022-10-26 | End: 2022-10-26

## 2022-10-26 RX ORDER — HYDROCODONE BITARTRATE AND ACETAMINOPHEN 5; 325 MG/1; MG/1
1 TABLET ORAL EVERY 8 HOURS PRN
Status: DISCONTINUED | OUTPATIENT
Start: 2022-10-26 | End: 2022-10-26 | Stop reason: HOSPADM

## 2022-10-26 RX ORDER — MORPHINE SULFATE 2 MG/ML
2 INJECTION, SOLUTION INTRAMUSCULAR; INTRAVENOUS
Status: DISCONTINUED | OUTPATIENT
Start: 2022-10-26 | End: 2022-10-26

## 2022-10-26 RX ORDER — PANTOPRAZOLE SODIUM 40 MG/1
40 TABLET, DELAYED RELEASE ORAL
Status: DISCONTINUED | OUTPATIENT
Start: 2022-10-26 | End: 2022-10-26 | Stop reason: HOSPADM

## 2022-10-26 RX ORDER — ALBUTEROL SULFATE 90 UG/1
2 AEROSOL, METERED RESPIRATORY (INHALATION) EVERY 6 HOURS PRN
Status: DISCONTINUED | OUTPATIENT
Start: 2022-10-26 | End: 2022-10-26 | Stop reason: HOSPADM

## 2022-10-26 RX ORDER — ENOXAPARIN SODIUM 100 MG/ML
40 INJECTION SUBCUTANEOUS DAILY
Status: DISCONTINUED | OUTPATIENT
Start: 2022-10-26 | End: 2022-10-26 | Stop reason: HOSPADM

## 2022-10-26 RX ORDER — INSULIN LISPRO 100 [IU]/ML
10 INJECTION, SOLUTION INTRAVENOUS; SUBCUTANEOUS ONCE
Status: COMPLETED | OUTPATIENT
Start: 2022-10-26 | End: 2022-10-26

## 2022-10-26 RX ORDER — DEXTROSE AND SODIUM CHLORIDE 5; .45 G/100ML; G/100ML
INJECTION, SOLUTION INTRAVENOUS CONTINUOUS PRN
Status: DISCONTINUED | OUTPATIENT
Start: 2022-10-26 | End: 2022-10-26

## 2022-10-26 RX ORDER — ASPIRIN 81 MG/1
81 TABLET ORAL DAILY
Status: DISCONTINUED | OUTPATIENT
Start: 2022-10-26 | End: 2022-10-26 | Stop reason: HOSPADM

## 2022-10-26 RX ORDER — INSULIN LISPRO 100 [IU]/ML
0-8 INJECTION, SOLUTION INTRAVENOUS; SUBCUTANEOUS
Status: DISCONTINUED | OUTPATIENT
Start: 2022-10-26 | End: 2022-10-26 | Stop reason: HOSPADM

## 2022-10-26 RX ORDER — GABAPENTIN 400 MG/1
800 CAPSULE ORAL 4 TIMES DAILY
Status: DISCONTINUED | OUTPATIENT
Start: 2022-10-26 | End: 2022-10-26 | Stop reason: HOSPADM

## 2022-10-26 RX ORDER — M-VIT,TX,IRON,MINS/CALC/FOLIC 27MG-0.4MG
1 TABLET ORAL DAILY
COMMUNITY

## 2022-10-26 RX ORDER — SODIUM CHLORIDE 450 MG/100ML
INJECTION, SOLUTION INTRAVENOUS CONTINUOUS
Status: DISCONTINUED | OUTPATIENT
Start: 2022-10-26 | End: 2022-10-26

## 2022-10-26 RX ORDER — BUDESONIDE AND FORMOTEROL FUMARATE DIHYDRATE 160; 4.5 UG/1; UG/1
2 AEROSOL RESPIRATORY (INHALATION) 2 TIMES DAILY
Status: DISCONTINUED | OUTPATIENT
Start: 2022-10-26 | End: 2022-10-26 | Stop reason: HOSPADM

## 2022-10-26 RX ORDER — 0.9 % SODIUM CHLORIDE 0.9 %
1000 INTRAVENOUS SOLUTION INTRAVENOUS ONCE
Status: COMPLETED | OUTPATIENT
Start: 2022-10-26 | End: 2022-10-26

## 2022-10-26 RX ORDER — ROSUVASTATIN CALCIUM 10 MG/1
40 TABLET, COATED ORAL DAILY
Status: DISCONTINUED | OUTPATIENT
Start: 2022-10-26 | End: 2022-10-26 | Stop reason: HOSPADM

## 2022-10-26 RX ORDER — MAGNESIUM SULFATE 1 G/100ML
1000 INJECTION INTRAVENOUS PRN
Status: DISCONTINUED | OUTPATIENT
Start: 2022-10-26 | End: 2022-10-26 | Stop reason: HOSPADM

## 2022-10-26 RX ORDER — POLYETHYLENE GLYCOL 3350 17 G/17G
17 POWDER, FOR SOLUTION ORAL DAILY PRN
Status: DISCONTINUED | OUTPATIENT
Start: 2022-10-26 | End: 2022-10-26 | Stop reason: HOSPADM

## 2022-10-26 RX ORDER — INSULIN LISPRO 100 [IU]/ML
0-4 INJECTION, SOLUTION INTRAVENOUS; SUBCUTANEOUS NIGHTLY
Status: DISCONTINUED | OUTPATIENT
Start: 2022-10-26 | End: 2022-10-26 | Stop reason: HOSPADM

## 2022-10-26 RX ORDER — DEXTROSE, SODIUM CHLORIDE, AND POTASSIUM CHLORIDE 5; .45; .15 G/100ML; G/100ML; G/100ML
INJECTION INTRAVENOUS CONTINUOUS PRN
Status: DISCONTINUED | OUTPATIENT
Start: 2022-10-26 | End: 2022-10-26

## 2022-10-26 RX ORDER — INSULIN GLARGINE 100 [IU]/ML
30 INJECTION, SOLUTION SUBCUTANEOUS 2 TIMES DAILY
Status: DISCONTINUED | OUTPATIENT
Start: 2022-10-26 | End: 2022-10-26 | Stop reason: HOSPADM

## 2022-10-26 RX ORDER — MAGNESIUM SULFATE 1 G/100ML
1000 INJECTION INTRAVENOUS PRN
Status: DISCONTINUED | OUTPATIENT
Start: 2022-10-26 | End: 2022-10-26

## 2022-10-26 RX ADMIN — SODIUM CHLORIDE 1000 ML: 4.5 INJECTION, SOLUTION INTRAVENOUS at 04:42

## 2022-10-26 RX ADMIN — GABAPENTIN 800 MG: 400 CAPSULE ORAL at 12:44

## 2022-10-26 RX ADMIN — ASPIRIN 81 MG: 81 TABLET, COATED ORAL at 12:44

## 2022-10-26 RX ADMIN — SODIUM CHLORIDE 0.07 UNITS/KG/HR: 900 INJECTION INTRAVENOUS at 07:03

## 2022-10-26 RX ADMIN — POTASSIUM CHLORIDE, DEXTROSE MONOHYDRATE AND SODIUM CHLORIDE 1000 ML: 150; 5; 450 INJECTION, SOLUTION INTRAVENOUS at 06:18

## 2022-10-26 RX ADMIN — ROSUVASTATIN CALCIUM 40 MG: 10 TABLET, FILM COATED ORAL at 12:44

## 2022-10-26 RX ADMIN — LORAZEPAM 1 MG: 1 TABLET ORAL at 01:41

## 2022-10-26 RX ADMIN — SODIUM CHLORIDE 1000 ML: 9 INJECTION, SOLUTION INTRAVENOUS at 02:55

## 2022-10-26 RX ADMIN — SODIUM CHLORIDE 0.11 UNITS/KG/HR: 900 INJECTION INTRAVENOUS at 08:11

## 2022-10-26 RX ADMIN — INSULIN LISPRO 5 UNITS: 100 INJECTION, SOLUTION INTRAVENOUS; SUBCUTANEOUS at 02:57

## 2022-10-26 RX ADMIN — INSULIN GLARGINE 30 UNITS: 100 INJECTION, SOLUTION SUBCUTANEOUS at 11:44

## 2022-10-26 RX ADMIN — SODIUM CHLORIDE 0.01 UNITS/KG/HR: 900 INJECTION INTRAVENOUS at 04:17

## 2022-10-26 RX ADMIN — SODIUM CHLORIDE 0.05 UNITS/KG/HR: 900 INJECTION INTRAVENOUS at 06:14

## 2022-10-26 ASSESSMENT — PAIN - FUNCTIONAL ASSESSMENT
PAIN_FUNCTIONAL_ASSESSMENT: NONE - DENIES PAIN
PAIN_FUNCTIONAL_ASSESSMENT: NONE - DENIES PAIN

## 2022-10-26 NOTE — PROGRESS NOTES
Pt eating over an hour ago, states he has slight abd pain at baseline at home but is feeling better now than he does normally. Pt states he does not feel any discomfort after eating.

## 2022-10-26 NOTE — H&P
Combined History and Physical and d/c summary          HISTORY OF PRESENT ILLNESS: A 80-year-old male with a history of diabetes, COPD and chronic respiratory failure presented to the emergency room with epigastric discomfort nausea of 2 days duration. Denies any vomiting or diarrhea. No fever. Was found to be in mild DKA. Admitted to the ICU and started on DKA protocol    Patient has No Known Allergies. Past Medical History:   Diagnosis Date    Acute respiratory failure ([facility] [address] 07/11/2021    CHF (congestive heart failure) ([facility])     combined    COPD (chronic obstructive pulmonary disease) ([facility])     Diabetes mellitus ([facility])     Hyperlipidemia     Hypertension     Oxygen dependent     Sepsis with acute respiratory failure and septic shock ([facility] [address] 10/6/2021       Past Surgical History:   Procedure Laterality Date    HERNIA REPAIR      TONSILLECTOMY         Scheduled Meds:   aspirin EC  81 mg Oral Daily    budesonide-formoterol  2 puff Inhalation BID    gabapentin  800 mg Oral 4x daily    pantoprazole  40 mg Oral QAM AC    rosuvastatin  40 mg Oral Daily    enoxaparin  40 mg SubCUTAneous Daily    nicotine  1 patch TransDERmal Daily    mupirocin   Nasal BID    insulin glargine  30 Units SubCUTAneous BID    insulin lispro  0-8 Units SubCUTAneous TID WC    insulin lispro  0-4 Units SubCUTAneous Nightly       Continuous Infusions:   dextrose 5 % and 0.45 % NaCl         PRN Meds:  albuterol sulfate HFA, morphine, HYDROcodone-acetaminophen, dextrose bolus **OR** dextrose bolus, potassium chloride, magnesium sulfate, sodium phosphate IVPB **OR** sodium phosphate IVPB **OR** sodium phosphate IVPB, polyethylene glycol, dextrose 5 % and 0.45 % NaCl, ipratropium-albuterol       reports that he has been smoking cigarettes. He has a 60.00 pack-year smoking history.  He has never used smokeless tobacco.    Family History   Problem Relation Age of Onset    Asthma Mother     Cancer Mother     Hearing Loss Mother     Vision Loss Mother     High Blood Pressure Father     High Cholesterol Father     Vision Loss Father     Asthma Sister     Diabetes Sister     Vision Loss Sister     Asthma Brother     Arthritis Brother     High Blood Pressure Brother        Social History     Socioeconomic History    Marital status:    Tobacco Use    Smoking status: Every Day     Packs/day: 1.50     Years: 40.00     Pack years: 60.00     Types: Cigarettes    Smokeless tobacco: Never    Tobacco comments:     per MD order for annual lung screening   Vaping Use    Vaping Use: Never used   Substance and Sexual Activity    Alcohol use: Yes     Comment: on occasion    Drug use: Never     REVIEW OF SYSTEMS:   Constitutional: Negative for fever   HENT: Negative for sore throat   Eyes: Negative for redness   Respiratory: Negative for dyspnea, cough   Cardiovascular: Negative for chest pain   Gastrointestinal: Negative for vomiting, diarrhea   Genitourinary: Negative for hematuria   Musculoskeletal: Negative for arthralgias   Skin: Negative for rash   Neurological: Negative for syncope   Hematological: Negative for adenopathy       Vitals:    10/26/22 1000   BP: 109/78   Pulse: 97   Resp: 16   Temp:    SpO2: 97%     Gen: No distress. Alert. Eyes: PERRL. No sclera icterus. No conjunctival injection. ENT: No discharge. Pharynx clear. Neck: Trachea midline. Normal thyroid. Resp: No accessory muscle use. No crackles. No wheezes. No rhonchi. No dullness on percussion. CV: Regular rate. Regular rhythm. No murmur or rub. No edema. GI: Non-tender. Non-distended. No masses. No organomegaly. Normal bowel sounds. No hernia. Skin: Warm and dry. No nodule on exposed extremities. No rash on exposed extremities. Lymph: No cervical LAD. No supraclavicular LAD. M/S: No cyanosis. No joint deformity. No clubbing. Neuro: Awake. Psych: Oriented x 3. No anxiety or agitation.      CBC:   Recent Labs     10/25/22  2300   WBC 23.8*   HGB 16.2   HCT 48.5   MCV 85.7      BMP:   Recent Labs     10/26/22  0340 10/26/22  0700 10/26/22  1002   * 135* 134*   K 4.1 4.1 3.9   CL 99 100 102   CO2 21 23 23   PHOS  --  2.0* 1.7*   BUN 26* 21* 18   CREATININE 1.0 0.9 0.8*     LIVER PROFILE:   Recent Labs     10/25/22  2300 10/26/22  0047 10/26/22  1002   AST 10*  --   --    ALT 17  --   --    LIPASE 180.0* 677.0* 187.0*   BILITOT 0.4  --   --    ALKPHOS 86  --   --      PT/INR: No results for input(s): PROTIME, INR in the last 72 hours. APTT: No results for input(s): APTT in the last 72 hours. UA:  Recent Labs     10/26/22  0047   COLORU Yellow   PHUR 6.0   WBCUA 0-2   RBCUA 0-2   BACTERIA Rare*   CLARITYU Clear   SPECGRAV 1.010   LEUKOCYTESUR Negative   UROBILINOGEN 0.2   BILIRUBINUR Negative   BLOODU Negative   GLUCOSEU >=1000*     US GALLBLADDER RUQ   Final Result   1. Unremarkable sonographic appearance of the gallbladder, without evidence   of gallstones or cholecystitis. 2. 1.4 cm cyst within the right kidney upper pole. 3. Unremarkable sonographic appearance of the liver, common bile duct, and   visualized pancreas. CT CHEST PULMONARY EMBOLISM W CONTRAST   Final Result   No evidence of a pulmonary embolus. Mildly dilated atherosclerotic thoracic aorta with no aneurysm or dissection      Mild subsegmental atelectasis or scarring along the right lower lobe   posteriorly and left lung base anteriorly. Questionable mild acute pancreatitis involving the tail of the pancreas. CT ABDOMEN PELVIS W IV CONTRAST Additional Contrast? None   Final Result   Mild acute pancreatitis involving the tail the pancreas. Recommend follow-up   to assure resolution. Mild chronic liver changes with fatty replacement throughout      Moderate diverticulosis throughout the left colon with no pericolonic   inflammation      Mild prostatic enlargement with no pelvic mass and a normal appendix      Small right renal cyst with no hydronephrosis or renal stones. XR CHEST PORTABLE   Final Result   Hyperinflated lungs which may relate to underlying COPD with a linear band of   atelectasis/scarring at the right base. No pneumonia or CHF. ASSESSMENT:  Principal Problem:    DKA, type 2, not at goal [facility])  Resolved Problems:    * No resolved hospital problems. *      PLAN:    Diabetic ketoacidosis. Admitted to ICU. DKA protocol initiated. Kept NPO. Placed on IV insulin. Aggressive IV fluids. His anion gap is closed x2. Discontinue DKA protocol and start Lantus and sliding scale insulin    Acute pancreatitis  Presented with abdominal pain elevated lipase and CT evidence of mild acute pancreatitis  He has not had any abdominal pain since his arrival to the emergency room  Diet started he is tolerated well  Right upper quad ultrasound does not show any gallstones  Triglyceride levels are pending  He denies any recent alcohol intake    COPD and chronic hypoxic respiratory failure  Stable.   Continue oxygen inhaled bronchodilators      Dc home in stable condition      Phillip Barboza MD 10/26/2022 10:53 AM

## 2022-10-26 NOTE — PROGRESS NOTES
GI consult called to office of on call, [facility], as patient has no history noted, spoke with Scarlett 10/26/22 @ [facility]

## 2022-10-26 NOTE — ED NOTES
Pt to ambulance via cot without incident or c/os IVF continued per order and without s/s infiltration      Etta Salcido RN  10/26/22 4814

## 2022-10-26 NOTE — ED NOTES
[facility] here to transport pt.  Pt remains alert and without c/o's      Justice Manley RN  10/26/22 5316

## 2022-10-26 NOTE — ED PROVIDER NOTES
[address] [facility]        Pt Name: Jaquan Hankins  MRN: [id number]  [place] 1965  Date of evaluation: 10/25/2022  Provider: Sammi Gonsalves MD  PCP: Baldemar Lockwood, APRN - [id number]       Chief Complaint   Patient presents with    Abdominal Pain     Left sided abdominal pain that wraps around and is causing cramping. Was recently seen for this and discharged, now is vomiting and states pain is worse. Patient also states that he has lost 10lbs in 2 weeks. HISTORY OFPRESENT ILLNESS   (Location/Symptom, Timing/Onset, Context/Setting, Quality, Duration, Modifying Factors,Severity)  Note limiting factors. Jaquan Hankins is a 49 y.o. male presenting today due to concern for worsening abdominal pain near his prior umbilical hernia repair with abdominal distention over the last 3 days along with multiple episodes of vomiting. He has not had a bowel movement for 2 days and denies passing any gas. He also was seen 1 week ago for left-sided chest pain although denies any significant chest pain at this time but does feel very short of breath. He does have a prior history of blood clots but is not on any blood thinners. He does smoke 2 packs/day. He denies any known fever but does have subjective hot and cold flashes over the last day. He does have a chronic headache but no new changes today. He denies any sore throat. He does have a history of diabetes. No reported falls or trauma. Due to worsening abdominal pain with vomiting, he came to the ED for further evaluation. REVIEW OF SYSTEMS    (2-9 systems for level 4, 10 or more for level 5)     Review of Systems   Constitutional:  Positive for activity change, appetite change, chills, fatigue and fever (subjective). Negative for diaphoresis. HENT:  Negative for sore throat. Respiratory:  Positive for cough and shortness of breath. Cardiovascular:  Negative for chest pain. Gastrointestinal:  Positive for abdominal distention, abdominal pain, constipation, nausea and vomiting. Negative for diarrhea. Genitourinary:  Negative for flank pain. Musculoskeletal:  Negative for back pain and neck pain. Skin:  Negative for wound (denies any falls or trauma). Neurological:  Positive for weakness (generalized) and headaches (chronic, no new changes today). Negative for syncope. Psychiatric/Behavioral:  The patient is nervous/anxious. Positives and Pertinent negatives as per HPI. PASTMEDICAL HISTORY     Past Medical History:   Diagnosis Date    Acute respiratory failure ([facility] [address] 07/11/2021    CHF (congestive heart failure) ([facility])     combined    COPD (chronic obstructive pulmonary disease) ([facility])     Diabetes mellitus ([facility])     Hyperlipidemia     Hypertension     Oxygen dependent     Sepsis with acute respiratory failure and septic shock ([facility] [address] 10/6/2021         SURGICAL HISTORY       Past Surgical History:   Procedure Laterality Date    HERNIA REPAIR      TONSILLECTOMY           CURRENT MEDICATIONS       Previous Medications    ALBUTEROL SULFATE HFA (PROVENTIL;VENTOLIN;PROAIR) 108 (90 BASE) MCG/ACT INHALER    INHALE 2 PUFFS INTO THE LUNGS EVERY 6 HOURS AS NEEDED FOR WHEEZING OR SHORTNESS OF BREATH    ALCOHOL SWABS (ALCOHOL PREP) PADS    Use twice daily DX: E11.9    ASPIRIN EC 81 MG EC TABLET    Take 1 tablet by mouth daily    BLOOD GLUCOSE MONITOR KIT AND SUPPLIES    Test 2 times a day & as needed for symptoms of irregular blood glucose. BLOOD GLUCOSE MONITOR STRIPS    Test 2 times a day & as needed for symptoms of irregular blood glucose.  E11.9    BLOOD GLUCOSE MONITOR STRIPS    Test 3 times    BREO ELLIPTA 200-25 MCG/INH AEPB INHALER    INHALE 1 PUFF DAILY    COENZYME Q10 (COQ10) 100 MG CAPS    Take 1 capsule by mouth daily    DICLOFENAC SODIUM (VOLTAREN) 1 % GEL        FENOFIBRIC ACID (TRILIPIX) 135 MG CPDR CAPSULE    TAKE 1 CAPSULE ONCE DAILY    FUROSEMIDE (LASIX) 20 MG TABLET    TAKE (1) TABLET DAILY    GABAPENTIN (NEURONTIN) 800 MG TABLET    TAKE 1 TABLET 4 TIMES DAILY    GLIPIZIDE (GLUCOTROL) 10 MG TABLET    TAKE 1 TABLET 2 TIMES DAILY (BEFORE MEALS)    GLUCOSE MONITORING KIT (FREESTYLE) MONITORING KIT    1 kit by Does not apply route daily Patient wants brand name Patient tests bid  E11.9    HANDICAP PLACARD MISC    by Does not apply route Expires 5/2027    HYDROCODONE-ACETAMINOPHEN (NORCO) 5-325 MG PER TABLET        INSULIN GLARGINE (LANTUS SOLOSTAR) 100 UNIT/ML INJECTION PEN    INJECT 30 UNITS INTO THE SKIN 2 TIMES DAILY - Dose Increase on 5/13/22 per [facility]    INSULIN PEN NEEDLE (VisConProOGER PEN NEEDLES 29G) 29G X 12MM MISC    1 each by Does not apply route daily    IPRATROPIUM-ALBUTEROL (DUONEB) 0.5-2.5 (3) MG/3ML SOLN NEBULIZER SOLUTION    Take 3 mLs by nebulization every 4-6 hours as needed for Shortness of Breath    LANCETS MISC    1 each by Does not apply route daily - Tests 3 times daily    MAGNESIUM 400 MG CAPS    Take 1 tablet by mouth daily    MULTIPLE VITAMINS PO    Take by mouth    OXYGEN    Portable oxygen and concentrator    PANTOPRAZOLE (PROTONIX) 40 MG TABLET        PIOGLITAZONE (ACTOS) 15 MG TABLET    Take 15 mg by mouth daily    PREDNISONE (DELTASONE) 20 MG TABLET    Take 2 tablets by mouth daily for 10 days    ROSUVASTATIN (CRESTOR) 40 MG TABLET    Take 1 tablet by mouth daily    SHARPS CONTAINER    1 each by Does not apply route as needed (for disposable of lancets and needles)    UMECLIDINIUM BROMIDE (INCRUSE ELLIPTA) 62.5 MCG/INH AEPB    Inhale 1 puff into the lungs daily    VITAMIN C (ASCORBIC ACID) 500 MG TABLET    Take 500 mg by mouth in the morning and 500 mg before bedtime. VITAMIN D, CHOLECALCIFEROL, PO    Take by mouth daily       ALLERGIES     Patient has no known allergies.     FAMILY HISTORY       Family History   Problem Relation Age of Onset    Asthma Mother     Cancer Mother     Hearing Loss Mother     Vision Loss Mother     High Blood Pressure Father     High Cholesterol Father     Vision Loss Father     Asthma Sister     Diabetes Sister     Vision Loss Sister     Asthma Brother     Arthritis Brother     High Blood Pressure Brother           SOCIAL HISTORY       Social History     Socioeconomic History    Marital status:    Tobacco Use    Smoking status: Every Day     Packs/day: 1.50     Years: 40.00     Pack years: 60.00     Types: Cigarettes    Smokeless tobacco: Never    Tobacco comments:     per MD order for annual lung screening   Vaping Use    Vaping Use: Never used   Substance and Sexual Activity    Alcohol use: Yes     Comment: on occasion    Drug use: Never       SCREENINGS                PHYSICAL EXAM    (up to 7 for level 4, 8 or more for level 5)     ED Triage Vitals   BP Temp Temp src Pulse Resp SpO2 Height Weight   -- -- -- -- -- -- -- --       Physical Exam  Vitals and nursing note reviewed. Constitutional:       General: He is awake. He is in acute distress (mild). Appearance: He is well-developed. He is obese. He is ill-appearing. He is not toxic-appearing or diaphoretic. Interventions: He is not intubated. HENT:      Head: Normocephalic and atraumatic. Right Ear: External ear normal.      Left Ear: External ear normal.      Nose: Nose normal.      Mouth/Throat:      Mouth: Mucous membranes are dry. Eyes:      General: No scleral icterus. Right eye: No discharge. Left eye: No discharge. Conjunctiva/sclera: Conjunctivae normal.   Neck:      Trachea: Trachea and phonation normal. No tracheal deviation. Cardiovascular:      Rate and Rhythm: Regular rhythm. Tachycardia present. Pulses: Normal pulses. Radial pulses are 2+ on the right side and 2+ on the left side. Heart sounds: No friction rub. No gallop.    Pulmonary:      Effort: Pulmonary effort is normal. No tachypnea, bradypnea, accessory muscle usage, prolonged expiration, respiratory distress or retractions. He is not intubated. Breath sounds: Normal breath sounds and air entry. No stridor. No decreased breath sounds, wheezing, rhonchi or rales. Chest:      Chest wall: No tenderness. Abdominal:      General: Abdomen is protuberant. Bowel sounds are decreased. There is distension (mild). There are no signs of injury. Tenderness: There is abdominal tenderness (mild) in the periumbilical area. There is no guarding or rebound. Negative signs include Montano's sign and McBurney's sign. Hernia: A hernia is present. Hernia is present in the umbilical area. Musculoskeletal:         General: No tenderness, deformity or signs of injury. Normal range of motion. Cervical back: Full passive range of motion without pain, normal range of motion and neck supple. No edema, erythema, signs of trauma, rigidity, torticollis, tenderness or crepitus. No pain with movement, spinous process tenderness or muscular tenderness. Normal range of motion. Right lower leg: No edema. Left lower leg: No edema. Skin:     General: Skin is warm and dry. Coloration: Skin is not jaundiced or pale. Findings: No erythema or rash. Neurological:      General: No focal deficit present. Mental Status: He is alert and oriented to person, place, and time. Mental status is at baseline. GCS: GCS eye subscore is 4. GCS verbal subscore is 5. GCS motor subscore is 6. Cranial Nerves: No dysarthria. Sensory: Sensation is intact. No sensory deficit. Motor: Motor function is intact. No weakness, tremor, atrophy, abnormal muscle tone or seizure activity. Psychiatric:         Attention and Perception: Attention normal. He is attentive. Mood and Affect: Mood and affect normal. Mood is not anxious or depressed. Speech: Speech normal. Speech is not delayed or slurred. Behavior: Behavior normal. Behavior is cooperative.            DIAGNOSTIC RESULTS   :    Labs Reviewed CBC WITH AUTO DIFFERENTIAL - Abnormal; Notable for the following components:       Result Value    WBC 23.8 (*)     RDW 15.8 (*)     Neutrophils Absolute 19.8 (*)     Basophils Absolute 0.4 (*)     All other components within normal limits   COMPREHENSIVE METABOLIC PANEL - Abnormal; Notable for the following components:    Sodium 126 (*)     Chloride 86 (*)     CO2 15 (*)     Anion Gap 25 (*)     Glucose 468 (*)     BUN 33 (*)     AST 10 (*)     All other components within normal limits   LIPASE - Abnormal; Notable for the following components:    Lipase 180.0 (*)     All other components within normal limits   URINALYSIS WITH MICROSCOPIC - Abnormal; Notable for the following components:    Glucose, Ur >=1000 (*)     Ketones, Urine 40 (*)     Protein, UA TRACE (*)     Bacteria, UA Rare (*)     All other components within normal limits   BLOOD GAS, VENOUS - Abnormal; Notable for the following components:    pCO2, Jesus 29.4 (*)     pO2, Jesus 63.0 (*)     HCO3, Venous 16.9 (*)     Base Excess, Jesus -6.6 (*)     Carboxyhemoglobin 9.5 (*)     All other components within normal limits   BLOOD GAS, VENOUS - Abnormal; Notable for the following components:    pH, Jesus 7.330 (*)     pCO2, Jesus 37.4 (*)     pO2, Jesus 89.6 (*)     HCO3, Venous 19.3 (*)     Base Excess, Jesus -6.0 (*)     Carboxyhemoglobin 6.0 (*)     All other components within normal limits   LIPASE - Abnormal; Notable for the following components:    Lipase 677.0 (*)     All other components within normal limits   BASIC METABOLIC PANEL - Abnormal; Notable for the following components:    Sodium 132 (*)     CO2 20 (*)     Glucose 414 (*)     BUN 30 (*)     All other components within normal limits   BASIC METABOLIC PANEL - Abnormal; Notable for the following components:    Sodium 130 (*)     Chloride 93 (*)     CO2 18 (*)     Anion Gap 19 (*)     Glucose 379 (*)     BUN 29 (*)     All other components within normal limits   BLOOD GAS, VENOUS - Abnormal; Notable for the following components:    pH, Jesus 7.280 (*)     pCO2, Jesus 50.2 (*)     pO2, Jesus 74.6 (*)     Base Excess, Jesus -4.1 (*)     Carboxyhemoglobin 3.6 (*)     All other components within normal limits   POCT GLUCOSE - Abnormal; Notable for the following components:    POC Glucose 382 (*)     All other components within normal limits   POCT GLUCOSE - Abnormal; Notable for the following components:    POC Glucose 355 (*)     All other components within normal limits   COVID-19, RAPID   RAPID INFLUENZA A/B ANTIGENS   TROPONIN   MAGNESIUM   LACTIC ACID   D-DIMER, QUANTITATIVE   MAGNESIUM   PROCALCITONIN   BASIC METABOLIC PANEL   BASIC METABOLIC PANEL   MAGNESIUM   MAGNESIUM   PHOSPHORUS   PHOSPHORUS   BASIC METABOLIC PANEL   BASIC METABOLIC PANEL   MAGNESIUM   PHOSPHORUS   POCT GLUCOSE   POCT GLUCOSE   POCT GLUCOSE   POCT GLUCOSE   POCT GLUCOSE   POCT GLUCOSE   POCT GLUCOSE   POCT GLUCOSE   POCT GLUCOSE   POCT GLUCOSE   POCT GLUCOSE   POCT GLUCOSE       All other labs were within normal range or not returned asof this dictation. EKG: All EKG's are interpreted by the Emergency Department Physician who either signs or Co-signs this chart in the absence of a cardiologist.    The Ekg interpreted by me shows  sinus tachycardia, pogx=385    Axis is   Right axis deviation  QTc is  within an acceptable range  Intervals and Durations are unremarkable.       ST Segments: no acute change and nonspecific changes  No significant change from prior EKG dated - 10/17/22  No STEMI, RSR' present today similar to old EKG, no signs of Brugada syndrome         RADIOLOGY:   Non-plain film images such as CT, Ultrasound and MRI are read by the radiologist. Radha Dee images are visualized and preliminarily interpreted by the  ED Provider with the belowfindings:        Interpretation per the Radiologist below, if available at the time of this note:    CT CHEST PULMONARY EMBOLISM W CONTRAST   Final Result   No evidence of a pulmonary embolus. Mildly dilated atherosclerotic thoracic aorta with no aneurysm or dissection      Mild subsegmental atelectasis or scarring along the right lower lobe   posteriorly and left lung base anteriorly. Questionable mild acute pancreatitis involving the tail of the pancreas. CT ABDOMEN PELVIS W IV CONTRAST Additional Contrast? None   Final Result   Mild acute pancreatitis involving the tail the pancreas. Recommend follow-up   to assure resolution. Mild chronic liver changes with fatty replacement throughout      Moderate diverticulosis throughout the left colon with no pericolonic   inflammation      Mild prostatic enlargement with no pelvic mass and a normal appendix      Small right renal cyst with no hydronephrosis or renal stones. XR CHEST PORTABLE   Final Result   Hyperinflated lungs which may relate to underlying COPD with a linear band of   atelectasis/scarring at the right base. No pneumonia or CHF. PROCEDURES   Unless otherwise noted below, none     Procedures    CRITICAL CARE TIME   Critical Care Time:    I personally saw the patient and independently provided 40 minutes of non-concurrent critical care out of the total shared critical care time provided. Includes repeat examinations, speaking with consultants, lab interpretation, charting, treating for sinus tachycardia and dehydration needing IV fluids and treating for acute pancreatitis    Excludes separate billable procedures. Patient at risk for serious decompensation if not treated for this life-threatening condition.             CONSULTS: Spoke with Dr. Kateryna Sommers at 1571 for admission  IP CONSULT TO HOSPITALIST  IP CONSULT TO GI  IP CONSULT TO CRITICAL CARE    EMERGENCY DEPARTMENT COURSE and DIFFERENTIAL DIAGNOSIS/MDM:   Vitals:    Vitals:    10/26/22 0047 10/26/22 0125 10/26/22 0201 10/26/22 0301   BP: 114/81 124/86 (!) 126/91 123/84   Pulse: (!) 102 98 96 98   Resp: 21 19 18   Temp:       TempSrc: SpO2: 95% 94% 96% 97%   Weight:           Patient was given the following medications:  Medications   nicotine (NICODERM CQ) 14 MG/24HR 1 patch (1 patch TransDERmal Patch Applied 10/25/22 2322)   dextrose bolus 10% 125 mL (has no administration in time range)     Or   dextrose bolus 10% 250 mL (has no administration in time range)   potassium chloride 10 mEq/100 mL IVPB (Peripheral Line) (has no administration in time range)   magnesium sulfate 1000 mg in dextrose 5% 100 mL IVPB (has no administration in time range)   sodium phosphate 10 mmol in sodium chloride 0.9 % 250 mL IVPB (has no administration in time range)     Or   sodium phosphate 15 mmol in dextrose 5 % 250 mL IVPB (has no administration in time range)     Or   sodium phosphate 20 mmol in dextrose 5 % 500 mL IVPB (has no administration in time range)   0.45 % sodium chloride infusion (has no administration in time range)   dextrose 5 % and 0.45 % NaCl with KCl 20 mEq infusion (has no administration in time range)   insulin regular (HUMULIN R;NOVOLIN R) 100 Units in sodium chloride 0.9 % 100 mL infusion (has no administration in time range)   0.9 % sodium chloride bolus (0 mLs IntraVENous Stopped 10/26/22 0010)   morphine sulfate (PF) injection 4 mg (4 mg IntraVENous Given 10/25/22 2307)   ondansetron (ZOFRAN) injection 4 mg (4 mg IntraVENous Given 10/25/22 2306)   iopamidol (ISOVUE-370) 76 % injection 100 mL (100 mLs IntraVENous Given 10/25/22 2354)   LORazepam (ATIVAN) tablet 1 mg (1 mg Oral Given 10/26/22 0141)   insulin lispro (HUMALOG) injection vial 10 Units (5 Units SubCUTAneous Given 10/26/22 0257)   0.9 % sodium chloride bolus (0 mLs IntraVENous Stopped 10/26/22 0341)     Patient was evaluated due to worsening abdominal pain for the last 3 days with multiple episodes of vomiting and decreased bowel gas production along with worsening shortness of breath. He was briefly hypoxic at 89% and therefore I did order nasal cannula.   He does have a prior history of blood clots and therefore CT of the chest was obtained along with CT of the abdomen due to concern for possible bowel obstruction. He received morphine for the pain along with Zofran for the nausea and IV fluids. Initial lab work was concerning for anion gap increased although his pH was normal but this may be a mixed acid-base presentation for possibility of DKA. He will need to be admitted to the hospital for further evaluation. Repeat BMP did show improvement of anion gap after IV fluids and therefore I do feel that the initial gap was related to dehydration and not due to DKA. He did report improvement of pain on repeat evaluation. He will need further assessment in the hospital due to concern for pancreatitis. He was placed on 2 L nasal cannula due to hypoxia although CT was not showing any concern for pneumonia or pulmonary embolism. He was stable for admission and agreed with this plan. I do believe his abnormal labs were related to pancreatitis and not due to sepsis. He ultimately did start to become acidotic and did require to be placed on insulin drip due to concern for DKA. He was admitted to the ICU. Exclusion criteria - the patient is NOT to be included for SEP-1 Core Measure due to: Alternative explanation for abnormal labs/vitals that do not relate to sepsis, see MDM for further explanation         I was the primary provider for the patient. The patient tolerated their visit well. The patient and / or the family were informed of the results of any tests, a time was given to answer questions. FINAL IMPRESSION      1. Acute pancreatitis without infection or necrosis, unspecified pancreatitis type    2. Dehydration    3. Acute respiratory failure with hypoxia (HCC)    4. Hyperglycemia    5.  Diabetic ketoacidosis without coma associated with type 2 diabetes mellitus [facility])          DISPOSITION/PLAN   DISPOSITION Admitted 10/26/2022 02:52:01 AM-Decision to admit      PATIENT REFERRED TO:  No follow-up provider specified.     DISCHARGEMEDICATIONS:  New Prescriptions    No medications on file       DISCONTINUED MEDICATIONS:  Discontinued Medications    ATORVASTATIN (LIPITOR) 40 MG TABLET                  (Please note that portions of this note were completed with a voicerecognition program.  Efforts were made to edit the dictations but occasionally words are mis-transcribed.)    Samina Win MD (electronically signed)            Samina Win MD  10/26/22 Patricio Pascual MD  10/26/22 0777

## 2022-10-26 NOTE — CARE COORDINATION
Case Management Assessment  Initial Evaluation and Discharge Note      Patient Name: Karina Fu  YOB: 1965  Diagnosis: Dehydration [E86.0]  Hyperglycemia [R73.9]  DKA, type 2, not at goal [facility]) [E11.10]  Acute respiratory failure with hypoxia ([facility] [address] [J96.01]  Diabetic ketoacidosis without coma associated with type 2 diabetes mellitus ([facility] [address] [E11.10]  Acute pancreatitis without infection or necrosis, unspecified pancreatitis type [K85.90]  Date / Time: 10/25/2022 10:06 PM    Admission status/Date:10/26/2022  Chart Reviewed: Yes      Patient Teodora Ritter: Yes at bedside  Family Interviewed:  No      Hospitalization in the last 30 days:  No    Health Care Decision Maker :   Primary Decision Maker: Merary Cantu - Spouse - [phone]    (CM - must 1st enter selection under Navigator - emergency contact- [place] Relationship and pick relationship)   Who do you trust or have selected to make healthcare decisions for you    Met with: pt at bedside    Current PCP: Luis Nunez, [address] Medicare  Precert required for SNF : Y  3 night stay required - N    ADLS  Support Systems/Care Needs:    Transportation: family  , wife  Meal Preparation: Yes, he cooks \"sometimes\"    Housing  Living Arrangements: pt lives at home with wife. She is with him 24/7  Steps: 1  Intent for return to present living arrangements: Yes  Identified Issues: [id number] [address] [facility] with [address] : No Agency:(Services)     Passport/Waiver : No  :                      Phone Number:    Passport/Waiver Services: n/a          Durable Medical Equiptment   DME Provider: Braeden Diez at [facility] confirmed 4L cont.   Equipment:   Walker___Cane___RTS___ BSC___Shower Chair___Hospital Bed___W/C____Other________  02 at __2_(pt reports)_Liter(s)---wears(frequency)__cont_____ HHN ___ CPAP___ BiPap___   N/A____    Home O2 Use :  Yes    If No for home O2---if presently on O2 during hospitalization:  Yes  if yes CM to follow for potential DC O2 need    Community Service Affiliation  Dialysis:  No      Other Community Services: n/a    DISCHARGE PLAN: Explained Case Management role/services. CM met with pt at bedside. Pt lives at home with wife who is with him 24/7. He can complete his ADLs. Plan return home with wife. Berto Choudhury at [place] confirmed O2, 4L cont. CM will follow if needs arise. Addendum 12:04pm: CM spoke with Dr. Alina Schneider and pt is ok to discharge to home. RN aware and confirmed no needs from CM and aware pt's transportation will need to bring O2.      Donny Goodwin, MSW Student

## 2022-10-26 NOTE — PROGRESS NOTES
RT Inhaler-Nebulizer Bronchodilator Protocol Note    There is a bronchodilator order in the chart from a provider indicating to follow the RT Bronchodilator Protocol and there is an Initiate RT Inhaler-Nebulizer Bronchodilator Protocol order as well (see protocol at bottom of note). CXR Findings:  XR CHEST PORTABLE    Result Date: 10/25/2022  Hyperinflated lungs which may relate to underlying COPD with a linear band of atelectasis/scarring at the right base. No pneumonia or CHF. The findings from the last RT Protocol Assessment were as follows:   History Pulmonary Disease: (P) Chronic pulmonary disease  Respiratory Pattern: (P) Regular pattern and RR 12-20 bpm  Breath Sounds: (P) Clear breath sounds  Cough: (P) Strong, spontaneous, non-productive  Indication for Bronchodilator Therapy: (P) On home bronchodilators  Bronchodilator Assessment Score: (P) 2    Aerosolized bronchodilator medication orders have been revised according to the RT Inhaler-Nebulizer Bronchodilator Protocol below. Respiratory Therapist to perform RT Therapy Protocol Assessment initially then follow the protocol. Repeat RT Therapy Protocol Assessment PRN for score 0-3 or on second treatment, BID, and PRN for scores above 3. No Indications - adjust the frequency to every 6 hours PRN wheezing or bronchospasm, if no treatments needed after 48 hours then discontinue using Per Protocol order mode. If indication present, adjust the RT bronchodilator orders based on the Bronchodilator Assessment Score as indicated below. Use Inhaler orders unless patient has one or more of the following: on home nebulizer, not able to hold breath for 10 seconds, is not alert and oriented, cannot activate and use MDI correctly, or respiratory rate 25 breaths per minute or more, then use the equivalent nebulizer order(s) with same Frequency and PRN reasons based on the score.   If a patient is on this medication at home then do not decrease Frequency below that used at home. 0-3 - enter or revise RT bronchodilator order(s) to equivalent RT Bronchodilator order with Frequency of every 4 hours PRN for wheezing or increased work of breathing using Per Protocol order mode. 4-6 - enter or revise RT Bronchodilator order(s) to two equivalent RT bronchodilator orders with one order with BID Frequency and one order with Frequency of every 4 hours PRN wheezing or increased work of breathing using Per Protocol order mode. 7-10 - enter or revise RT Bronchodilator order(s) to two equivalent RT bronchodilator orders with one order with TID Frequency and one order with Frequency of every 4 hours PRN wheezing or increased work of breathing using Per Protocol order mode. 11-13 - enter or revise RT Bronchodilator order(s) to one equivalent RT bronchodilator order with QID Frequency and an Albuterol order with Frequency of every 4 hours PRN wheezing or increased work of breathing using Per Protocol order mode. Greater than 13 - enter or revise RT Bronchodilator order(s) to one equivalent RT bronchodilator order with every 4 hours Frequency and an Albuterol order with Frequency of every 2 hours PRN wheezing or increased work of breathing using Per Protocol order mode. RT to enter RT Home Evaluation for COPD & MDI Assessment order using Per Protocol order mode.     Electronically signed by Annie Felipe RCP on 10/26/2022 at 10:49 AM

## 2022-10-26 NOTE — PROGRESS NOTES
Discharge was reviewed with pt. Nicotine patch was removed. IV x2 removed. Pt wife bringing oxygen and pt discharged in stable condition with no pain.

## 2022-10-26 NOTE — PLAN OF CARE
49 yoM who presented to [facility] 906 w/ abdominal pain, nausea and vomiting x3 days. He was found to meet DKA criteria on initial work-up. However an 2042 [facility] metabolic panel was subsequently performed which showed his gap is closed had closed however his chloride and sodium had gone up significantly without much of a change in his glucose. Suspected that this result was possibly contaminated with normal saline. Requested repeat metabolic panel which shows that his gap is still open. Admitted by DKA protocol. Also suspicion of acute pancreatitis in this patient. He has been requiring 2 L O2 since being given pain medication at [facility] 90. DKA  Acute pancreatitis  Acute respiratory failure, possibly secondary to opiates versus COPD exacerbation.   Abd pain  N/V  Smoker

## 2022-10-26 NOTE — ED NOTES
Report given to [facility] ICU RN. Awaiting ALS transport ETA. Patient BG 50 mg/dl lower than previous, no change to insulin drip.      Alicia Dangelo RN  10/26/22 1352

## 2022-10-26 NOTE — CONSULTS
Patient is being seen at the request of Joe Luna MD for a consultation for DKA    HISTORY OF PRESENT ILLNESS:   49years old with history of COPD, hypertension presented with nausea and vomiting for 3 days. Mild. Not sure what makes better or worse. Associated with abdominal pain. Labs revealed DKA with anion gap metabolic acidosis. Compliant with his DM meds. Smoker 1.5 packs/day since age of 10. Uses IBD at home. Uses O2 at home 2L. Completed recently steroid taper started 10/17. Drinks 6 pack of beer every month     PAST MEDICAL HISTORY:  Past Medical History:   Diagnosis Date    Acute respiratory failure ([facility] 07/11/2021    CHF (congestive heart failure) ([facility])     combined    COPD (chronic obstructive pulmonary disease) ([facility])     Diabetes mellitus ([facility]     Hyperlipidemia     Hypertension     Oxygen dependent     Sepsis with acute respiratory failure and septic shock ([facility] 10/6/2021     PAST SURGICAL HISTORY:  Past Surgical History:   Procedure Laterality Date    HERNIA REPAIR      TONSILLECTOMY         FAMILY HISTORY:  family history includes Arthritis in his brother; Asthma in his brother, mother, and sister; Cancer in his mother; Diabetes in his sister; Hearing Loss in his mother; High Blood Pressure in his brother and father; High Cholesterol in his father; Vision Loss in his father, mother, and sister. SOCIAL HISTORY:   reports that he has been smoking cigarettes. He has a 60.00 pack-year smoking history.  He has never used smokeless tobacco.    Scheduled Meds:   nicotine  1 patch TransDERmal Once     Continuous Infusions:   sodium chloride Stopped (10/26/22 0616)    dextrose 5% and 0.45% NaCl with KCl 20 mEq 1,000 mL (10/26/22 0618)    insulin 0.075 Units/kg/hr (10/26/22 0703)     PRN Meds:  dextrose bolus **OR** dextrose bolus, potassium chloride, magnesium sulfate, sodium phosphate IVPB **OR** sodium phosphate IVPB **OR** sodium phosphate IVPB, dextrose 5% and 0.45% NaCl with KCl 20 mEq    ALLERGIES:  Patient has No Known Allergies. REVIEW OF SYSTEMS:  Constitutional: Negative for fever  HENT: Negative for sore throat  Eyes: Negative for redness   Respiratory: Negative for dyspnea, cough  Cardiovascular: Negative for chest pain  Gastrointestinal:+ N/V  Genitourinary: Negative for hematuria   Musculoskeletal: Negative for arthralgias   Skin: Negative for rash  Neurological: Negative for syncope  Hematological: Negative for adenopathy  Psychiatric/Behavorial: Negative for anxiety    PHYSICAL EXAM:  Blood pressure 113/79, pulse 100, temperature 98.8 °F (37.1 °C), temperature source Oral, resp. rate 18, weight 148 lb 9.6 oz (67.4 kg), SpO2 93 %.' on RA  Gen: No distress. Eyes: PERRL. No sclera icterus. No conjunctival injection. ENT: No discharge. Pharynx clear. Neck: Trachea midline. No obvious mass. Resp: No accessory muscle use. No crackles. No wheezes. No rhonchi. No dullness on percussion. CV: Regular rate. Regular rhythm. No murmur or rub. No edema. GI: Non-tender. Non-distended. No hernia. Skin: Warm and dry. No nodule on exposed extremities. Lymph: No cervical LAD. No supraclavicular LAD. M/S: No cyanosis. No joint deformity. No clubbing. Neuro: Awake. Alert. Moves all four extremities. Psych: Oriented x 3. No anxiety. LABS:  CBC:   Recent Labs     10/25/22  2300   WBC 23.8*   HGB 16.2   HCT 48.5   MCV 85.7        BMP:   Recent Labs     10/26/22  0047 10/26/22  0146 10/26/22  0340   * 130* 134*   K 4.4 4.8 4.1    93* 99   CO2 20* 18* 21   BUN 30* 29* 26*   CREATININE 1.1 1.1 1.0     LIVER PROFILE:   Recent Labs     10/25/22  2300 10/26/22  0047   AST 10*  --    ALT 17  --    LIPASE 180.0* 677.0*   BILITOT 0.4  --    ALKPHOS 86  --      PT/INR: No results for input(s): PROTIME, INR in the last 72 hours. APTT: No results for input(s): APTT in the last 72 hours.   UA:  Recent Labs     10/26/22  0047   COLORU Yellow   PHUR 6.0   WBCUA 0-2   RBCUA 0-2   BACTERIA Rare*   CLARITYU Clear   SPECGRAV 1.010   LEUKOCYTESUR Negative   UROBILINOGEN 0.2   BILIRUBINUR Negative   BLOODU Negative   GLUCOSEU >=1000*     No results for input(s): PHART, [id number], PO2ART in the last 72 hours. CT chest 10/26 imaging was reviewed by me and showed   No evidence of a pulmonary embolus. Mildly dilated atherosclerotic thoracic aorta with no aneurysm or dissection   Mild subsegmental atelectasis or scarring along the right lower lobe   posteriorly and left lung base anteriorly. Questionable mild acute pancreatitis involving the tail of the pancreas    CT abdomen 10/26  Mild acute pancreatitis involving the tail the pancreas. Mild chronic liver changes with fatty replacement throughout   Moderate diverticulosis throughout the left colon with no pericolonic   inflammation   Mild prostatic enlargement with no pelvic mass and a normal appendix   Small right renal cyst with no hydronephrosis or renal stones      ASSESSMENT:  Diabetic ketoacidosis  Electrolytes disorder   Anion elena metabolic acidosis  Leukocytosis- favor reactive   Acute renal failure   Acute pancreatitis    PLAN:  Supplemental oxygen to maintain SaO2 >92%; wean as tolerated  Closely monitory airways, clinical status, cardiac rhythm, vital signs, and urine output   IVF resuscitation, electrolytes repletion, electrolytes and glucose monitoring and insulin drip are per DKA protocol   IV hydration with NS  Cardiac monitor and pulse oximtery  No indication for Bicarbonate  Inhaled bronchodilators  Monitor off Abx   Diabetic education.    TG   RUQ US   Follow amylase lipase  DVT prophylaxis: Lovenox  MRSA prophylaxis: Bactroban

## 2022-10-26 NOTE — ED NOTES
Pt's oxygen saturation dropped a little after receiving pain medication. RN placed patient on home o2 of 2L NC.      Davida Grande RN  10/25/22 7532

## 2022-10-26 NOTE — PROGRESS NOTES
Pharmacy Medication Reconciliation Note     List of medications Marisol Garg is currently taking is complete. Source of information:   1. EMR and dispense reports    Notes regarding home medications:   1.  Updated home meds based on EMR and dispense report  - fill history appears to show adherence     Guille De La Garza, Pharmacy Intern  10/26/2022  10:50 AM

## 2022-10-27 ENCOUNTER — CARE COORDINATION (OUTPATIENT)
Dept: CASE MANAGEMENT | Age: 57
End: 2022-10-27

## 2022-10-27 DIAGNOSIS — J96.11 CHRONIC RESPIRATORY FAILURE WITH HYPOXIA (HCC): Primary | ICD-10-CM

## 2022-10-27 LAB
ESTIMATED AVERAGE GLUCOSE: 343.6 MG/DL
HBA1C MFR BLD: 13.6 %

## 2022-10-27 PROCEDURE — 1111F DSCHRG MED/CURRENT MED MERGE: CPT | Performed by: NURSE PRACTITIONER

## 2022-10-27 RX ORDER — INSULIN GLARGINE 100 [IU]/ML
INJECTION, SOLUTION SUBCUTANEOUS
Qty: 15 ML | Refills: 3 | OUTPATIENT
Start: 2022-10-27

## 2022-10-27 RX ORDER — INSULIN GLARGINE 100 [IU]/ML
INJECTION, SOLUTION SUBCUTANEOUS
Qty: 15 ML | Refills: 3 | Status: SHIPPED | OUTPATIENT
Start: 2022-10-27

## 2022-10-27 NOTE — CARE COORDINATION
[facility] Care Transitions Initial Follow Up Call    Call within 2 business days of discharge: Yes    Patient: Emerson Doherty Patient : 1965   MRN: [id number]  Reason for Admission: diabetic ketoacidosis, acute pancreatitis, abd pain, COPD and chronic hypoxic resp failure - stable -> home no services   Discharge Date: 10/26/22 RARS: Readmission Risk Score: 13.7    Last Discharge  Street       Date Complaint Diagnosis Description Type Department Provider    10/25/22 Abdominal Pain Acute pancreatitis without infection or necrosis, unspecified pancreatitis type . .. ED to Hosp-Admission (Discharged) ([facility]) Yanni Villa MD; Br... Was this an external facility discharge? No Discharge Facility: NA    Challenges to be reviewed by the provider   Additional needs identified to be addressed with provider: Yes  medications-Need refill of Lantus to [facility] - he only has 2 doses left before out         Method of communication with provider: chart routing. Care Transition Nurse contacted the patient and spouse  by telephone to perform post hospital discharge assessment. Verified name and  with patient as identifiers. Provided introduction to self, and explanation of the Care Transition Nurse role. Spoke with patient and spouse on speaker phone together. -140 since home. Reviewed A1c 13.% with education. Declined referral to dietician - wife states she knows what to do to change his diet. Tolerating PO - still with some abd pain but it is improving. Scale is broken so doesn't weigh daily. Uses nebulizer prn only and it works well. Needs refill on Lantus and has requested PCP and pharmacy without response - has about 2 doses left then will be out. [facility] is preferred pharmacy. Wears O2 at 3lpm continuous (through [facility]). Wants POC but could not afford [facility] and [facility] no longer carries POC. Provided them with info for [facility]. His pulse ox is broken. Has glucometer and all supplies to test blood sugars. Declines RPM billing codes to see if this is a covered service. Overall he feels better, but states he continues to have excessive thirst. Reviewed this could be both from diuretic and hyperglycemia. Stressed importance of close follow up with PCP to get DM under control. Reviewed risk factors of uncontrolled DM including but not limited to stroke, kidney failure, vision loss, heart attack. Again declines referral to dietician. States only needs Lantus refilled and they have appt with PCP as noted below but is >14 days from DC. Declines referrals for home care or other needs. CTN will contact PCP office about need for Lantus rx to [address] and follow up. Care Transition Nurse reviewed discharge instructions, medical action plan, and red flags with patient and spouse  who verbalized understanding. The patient and spouse  was given an opportunity to ask questions and does not have any further questions or concerns at this time. Were discharge instructions available to patient? Yes. Reviewed appropriate site of care based on symptoms and resources available to patient including: PCP  Specialist. The patient and and spouse  agrees to contact the PCP office for questions related to their healthcare. Advance Care Planning:   Does patient have an Advance Directive: decision maker updated. Medication reconciliation was performed with  spouse , who verbalizes understanding of administration of home medications.  Medications reviewed, 1111F entered: yes    Was patient discharged with a pulse oximeter? no    Non-face-to-face services provided:  Obtained and reviewed discharge summary and/or continuity of care documents  Communication with specialists who will assume or re-assume care of the patient's system-specific problems-PCP  Education of patient/family/caregiver/guardian to support self-management-DM education  Assessment and support for treatment adherence and medication management-1111F done    Offered patient enrollment in the Remote Patient Monitoring (RPM) program for in-home monitoring: Patient declined. Care Transitions 24 Hour Call    Schedule Follow Up Appointment with PCP: Completed  Do you have a copy of your discharge instructions?: Yes  Do you have all of your prescriptions and are they filled?: No  Have you been contacted by a [address]?: No  Have you scheduled your follow up appointment?: Yes  Do you have support at home?: Partner/Spouse/SO  Do you feel like you have everything you need to keep you well at home?: Yes  Are you an active caregiver in your home?: No  Care Transitions Interventions  No Identified Needs         Follow Up  Future Appointments   Date Time Provider Soumya Renteria   11/1/2022  1:15 PM MD CHERELLE Daugherty   11/15/2022  9:20 AM [facility] [address] [facility] Transition Nurse provided contact information. Plan for follow-up call in 1-2 days based on severity of symptoms and risk factors. Plan for next call: medication management-Quinn?     Juana Glover RN  Care Transition Nurse  [phone] mobile

## 2022-10-28 ENCOUNTER — CARE COORDINATION (OUTPATIENT)
Dept: CASE MANAGEMENT | Age: 57
End: 2022-10-28

## 2022-10-28 NOTE — CARE COORDINATION
CTN updated patient's spouse that insulin rx sent to [facility] per request. She will  this afternoon. Denies needs going into weekend. CTN will follow up next week after pulm visit on 11/1.      Darlene Acuna, RN  Care Transition Nurse  [phone] mobile    Future Appointments   Date Time Provider Soumya Myra   11/1/2022  1:15 PM Domenic Ornelas MD [facility] PULM [facility]   11/15/2022  9:20 AM LINK [facility] - CNP [place] [facility] [place] - [place]

## 2022-10-30 LAB
BLOOD CULTURE, ROUTINE: NORMAL
CULTURE, BLOOD 2: NORMAL

## 2022-11-01 ENCOUNTER — TELEMEDICINE (OUTPATIENT)
Dept: PULMONOLOGY | Age: 57
End: 2022-11-01
Payer: MEDICARE

## 2022-11-01 DIAGNOSIS — J44.9 CHRONIC OBSTRUCTIVE PULMONARY DISEASE, UNSPECIFIED COPD TYPE (HCC): Primary | ICD-10-CM

## 2022-11-01 PROCEDURE — 99443 PR PHYS/QHP TELEPHONE EVALUATION 21-30 MIN: CPT | Performed by: INTERNAL MEDICINE

## 2022-11-01 NOTE — PROGRESS NOTES
Pulmonary Follow-up Note  Chief Complaint: COPD   Referring Provider: hospital f/u    Interval history: was hospitalized with pancreatitis and DKA, still feels bloated with fluid, has associated shortness of breath. Feels a little better. Uses oxygen predominantly at night. On incruse and breo. Presenting history: seen in hospital with DKA      reports that he has been smoking cigarettes. He has a 60.00 pack-year smoking history. He has never used smokeless tobacco.     Review of Systems:    Physical Exam:  There were no vitals taken for this visit. Phone      Data:   CTPA 10/26/22  Impression   No evidence of a pulmonary embolus. Mildly dilated atherosclerotic thoracic aorta with no aneurysm or dissection       Mild subsegmental atelectasis or scarring along the right lower lobe   posteriorly and left lung base anteriorly. Questionable mild acute pancreatitis involving the tail of the pancreas. [facility] @ [address] [place] [id number] in 2020 with normal coronaries    Assessment:  COPD  Chronic hypoxemic respiratory failure on 3 L home oxygen, says he uses predominantly at night   DM  PAD s/p stenting    Plan:   LDCT for LCS should be considered in October 2023, schedule at future visit  PFT with 6 MWT in next 2-6 weeks, can see CMT after for f/u   Continue breo/incruse for now   Has nebulizer    Jorje Duong is a 49 y.o. male evaluated via telephone on 11/1/2022. Consent: He and/or health care decision maker is aware that that he may receive a bill for this telephone service, which includes applicable co-pays, depending on his insurance coverage, and has provided verbal consent to proceed: YES. I communicated with the patient and/or health care decision maker about: see interval history above. Details of this discussion including any medical advice provided: see plan above. Total Time: minutes: 21-30 minutes.  I affirm this is a Patient Initiated Episode with a Patient who has not had a related appointment within my department in the past 7 days or scheduled within the next 24 hours. Patient identification was verified at the start of the visit: YES. The visit was conducted pursuant to the emergency declaration under the [address] [place] [address] authority and the [employer] General Act. This Telephone Visit was conducted with patient's (and/or legal guardian's) consent, to reduce the patient's risk of exposure to COVID-19 and provide necessary medical care. The patient was located in a state where the provider was licensed to provide care. The patient (and/or legal guardian) his advised to contact this office for worsening conditions or problems, and seek emergency medical treatment and/or call 911 if urgent care needed.

## 2022-11-02 ENCOUNTER — CARE COORDINATION (OUTPATIENT)
Dept: CASE MANAGEMENT | Age: 57
End: 2022-11-02

## 2022-11-02 NOTE — CARE COORDINATION
[address] [facility] Care Transitions Follow Up Call      Patient: Mel Plata  Patient : 1965   MRN: [id number]  Reason for Admission: diabetic ketoacidosis, acute pancreatitis, abd pain, COPD and chronic hypoxic resp failure - stable -> home no services   Discharge Date: 10/26/22 RARS: Readmission Risk Score: 13.7      Needs to be reviewed by the provider   Additional needs identified to be addressed with provider: No         Method of communication with provider: none. Care Transition Nurse contacted the patient and spouse on speakerphone to follow up after recent admission. .    Tolerating PO. Denies abd pain. -310. Taking Lantus 30 units BID. States he has all of his medications. Laughs when asked about diabetic diet, \"I pretty much eat what I want. \" Just ate 3 coneys for lunch carry out. Does not eat fruit and vegetables much. Trying to cut back on burgers. Protein of choice is steak, hot dogs. He is agreeable to referral to dietician. He loves to cook at home. They like to use their air fryer a lot to cut down on fat. Referral will be made to dietician at close of note. Addressed changes since last contact:  none  Discussed follow-up appointments. If no appointment was previously scheduled, appointment scheduling offered: no.   Is follow up appointment scheduled within 7 days of discharge? Completed vv with pulm - sees PCP in 13 days. Follow Up  Future Appointments   Date Time Provider Soumya Renteria   11/15/2022  9:20 AM [facility] - CNP [place] [facility] - MARY   12/15/2022  3:00 PM [facility] PULMONARY FUNCTION TESTING [facility] PFT Diego ORTIZ   12/15/2022  3:30 PM Payton Kerns PA-C [place] PULM [facility]     Non-[facility] follow up appointment(s): STAR    Care Transition Nurse reviewed medical action plan and red flags with patient and spouse  and discussed any barriers to care and/or understanding of plan of care after discharge.  Discussed appropriate site of care based on symptoms and resources available to patient including: PCP  Specialist. The patient and spouse  agrees to contact the PCP office for questions related to their healthcare. Patients top risk factors for readmission: lack of knowledge about disease and medical condition-uncontrolled DM  Interventions to address risk factors: Establishment or re-establishment of referrals-referral to dieticin    Care Transition Nurse provided contact information for future needs. Plan for follow-up call in 5-7 days based on severity of symptoms and risk factors.   Plan for next call: referrals-dietician    Azra Zheng RN  Care Transition Nurse  [phone] mobile

## 2022-11-10 ENCOUNTER — CARE COORDINATION (OUTPATIENT)
Dept: CASE MANAGEMENT | Age: 57
End: 2022-11-10

## 2022-11-10 NOTE — CARE COORDINATION
[facility] Care Transitions Follow Up Call      Patient: Orville Sotelo  Patient : 1965   MRN: [id number]  Reason for Admission: diabetic ketoacidosis, acute pancreatitis, abd pain, COPD and chronic hypoxic resp failure - stable -> home no services   Discharge Date: 10/26/22 RARS: Readmission Risk Score: 13.7      Needs to be reviewed by the provider   Additional needs identified to be addressed with provider: No         Method of communication with provider: none. Care Transition Nurse contacted the patient and cg by telephone to follow up after recent admission. He hasn't heard from dietician. Will refer again at close of call. BS ranging 160-190s. Denies n/v. Still with some abd pain but improved. Sees PCP in 5 days. Instructed him to take glucometer and/or log to appt with him. States he is still weak. Cough is baseline for his COPD. Denies fever, chills, SOB. Denies needs. Will look for call from dietician. CTN will follow up after PCP OV. Addressed changes since last contact:  none  Discussed follow-up appointments. If no appointment was previously scheduled, appointment scheduling offered: no.   Is follow up appointment scheduled within 7 days of discharge? Has appt . Follow Up  Future Appointments   Date Time Provider Soumya Renteria   11/15/2022  9:20 AM Winda Chin, APRN - CNP [facility] [place] [facility]   12/15/2022  3:00 PM [facility] PULMONARY FUNCTION TESTING [facility]   12/15/2022  3:30 PM ANDREW Johnson CLERM PULM [facility]     Non-[facility] follow up appointment(s): STAR    Care Transition Nurse reviewed medical action plan and red flags with patient and caregiver and discussed any barriers to care and/or understanding of plan of care after discharge. Discussed appropriate site of care based on symptoms and resources available to patient including: PCP. The patient and caregiver agrees to contact the PCP office for questions related to their healthcare.      Patients top risk factors for readmission: medical condition-uncontrolled DM, pancreatitis  Interventions to address risk factors: Education of patient/family/caregiver/guardian to support self-management-education DM, referral to dietician    Offered patient enrollment in the Remote Patient Monitoring (RPM) program for in-home monitoring: Patient declined. Care Transition Nurse provided contact information for future needs. Plan for follow-up call in 7-10 days based on severity of symptoms and risk factors.   Plan for next call: follow-up appointment-11/15 pcp    Bre Rodriguez RN  Care Transition Nurse  [phone] mobile

## 2022-11-11 ENCOUNTER — CARE COORDINATION (OUTPATIENT)
Dept: CARE COORDINATION | Age: 57
End: 2022-11-11

## 2022-11-11 NOTE — CARE COORDINATION
[address] regarding Dietitian referral. Pt's spouse, Radha Barth, answered the phone- RD explained reason for call and role in care. Radha Barth states patient is not available at time of call and requested RD call back on a different day. Requested a call on Monday 11/14/22. Will contact pt as requested and follow up as appropriate.      [address]

## 2022-11-14 ENCOUNTER — CARE COORDINATION (OUTPATIENT)
Dept: CARE COORDINATION | Age: 57
End: 2022-11-14

## 2022-11-14 NOTE — CARE COORDINATION
Contacted Dean Marmolejo regarding Dietitian referral. Pt answered, RD explained reason for call and role in care. Pt prefers handouts in the mail with a follow up call. RD verified address- new address starting next week will be [address] RD received referral for Diabetes. RD will mail Meal Planner Diabetes, Checking Blood Sugar, Carbohydrates versus Noncarbohydrates, Sample Menu. RD will outreach in 2-3 weeks to follow up and answer any nutrition related questions at this time.     [address]

## 2022-11-16 RX ORDER — FENOFIBRIC ACID 135 MG/1
CAPSULE, DELAYED RELEASE ORAL
Qty: 30 CAPSULE | Refills: 1 | OUTPATIENT
Start: 2022-11-16

## 2022-11-16 RX ORDER — ASPIRIN 81 MG/1
TABLET, COATED ORAL
Qty: 90 TABLET | Refills: 3 | Status: SHIPPED | OUTPATIENT
Start: 2022-11-16

## 2022-11-17 ENCOUNTER — CARE COORDINATION (OUTPATIENT)
Dept: CASE MANAGEMENT | Age: 57
End: 2022-11-17

## 2022-11-21 RX ORDER — FENOFIBRIC ACID 135 MG/1
CAPSULE, DELAYED RELEASE ORAL
Qty: 30 CAPSULE | Refills: 1 | Status: SHIPPED | OUTPATIENT
Start: 2022-11-21

## 2022-11-30 ENCOUNTER — CARE COORDINATION (OUTPATIENT)
Dept: CARE COORDINATION | Age: 57
End: 2022-11-30

## 2022-12-06 ENCOUNTER — CARE COORDINATION (OUTPATIENT)
Dept: CARE COORDINATION | Age: 57
End: 2022-12-06

## 2022-12-06 NOTE — CARE COORDINATION
Contacted Barbi Ortega and left voicemail regarding Dietitian follow up. Left call back number. RD outreached 11/11/22, 11/14/22, 11/30/22 and today 12/6/22. RD will continue to follow/assist with patient return call.        [address]01 [facility], [address] [place] [address]

## 2022-12-14 ENCOUNTER — TELEPHONE (OUTPATIENT)
Dept: PULMONOLOGY | Age: 57
End: 2022-12-14

## 2022-12-14 NOTE — TELEPHONE ENCOUNTER
Patient cancelled appointment on 12/15/22 with [facility] PA for pft 6 min walk. Reason: pt stated too far to drive, has appt w local provider Dr. Brett Garcia to [facility]    Patient did not reschedule appointment. Appointment rescheduled for na.      Last OV  11/01/22   Assessment:  COPD  Chronic hypoxemic respiratory failure on 3 L home oxygen, says he uses predominantly at night   DM  PAD s/p stenting     Plan:   LDCT for LCS should be considered in October 2023, schedule at future visit  PFT with 6 [facility] in next 2-6 weeks, can see CMT after for f/u   Continue breo/incruse for now   Has nebulizer

## 2022-12-29 RX ORDER — FLUTICASONE FUROATE AND VILANTEROL 200; 25 UG/1; UG/1
1 POWDER RESPIRATORY (INHALATION) DAILY
Qty: 60 EACH | Refills: 5 | Status: SHIPPED | OUTPATIENT
Start: 2022-12-29

## 2022-12-29 RX ORDER — UMECLIDINIUM 62.5 UG/1
1 AEROSOL, POWDER ORAL DAILY
Qty: 1 EACH | Refills: 5 | Status: SHIPPED | OUTPATIENT
Start: 2022-12-29

## 2022-12-29 RX ORDER — FUROSEMIDE 20 MG/1
TABLET ORAL
Qty: 60 TABLET | Refills: 1 | Status: SHIPPED | OUTPATIENT
Start: 2022-12-29

## 2022-12-29 RX ORDER — PEN NEEDLE, DIABETIC 29 GAUGE
1 NEEDLE, DISPOSABLE MISCELLANEOUS DAILY
Qty: 100 EACH | Refills: 3 | Status: SHIPPED | OUTPATIENT
Start: 2022-12-29

## 2022-12-29 NOTE — TELEPHONE ENCOUNTER
PA submitted VIA CMM for  Fluticasone Furoate-Vilanterol 200-25MCG/ACT aerosol powder   Key: [id number] - PA Case ID: [id number] - Rx #: [id number] PENDING

## 2023-01-13 ENCOUNTER — TELEPHONE (OUTPATIENT)
Dept: FAMILY MEDICINE CLINIC | Age: 58
End: 2023-01-13

## 2023-01-23 ENCOUNTER — TELEPHONE (OUTPATIENT)
Dept: FAMILY MEDICINE CLINIC | Age: 58
End: 2023-01-23

## 2023-01-31 ENCOUNTER — OFFICE VISIT (OUTPATIENT)
Dept: FAMILY MEDICINE CLINIC | Age: 58
End: 2023-01-31
Payer: MEDICARE

## 2023-01-31 VITALS
TEMPERATURE: 97.9 F | OXYGEN SATURATION: 90 % | WEIGHT: 153.25 LBS | SYSTOLIC BLOOD PRESSURE: 125 MMHG | BODY MASS INDEX: 30.95 KG/M2 | HEART RATE: 84 BPM | DIASTOLIC BLOOD PRESSURE: 73 MMHG

## 2023-01-31 DIAGNOSIS — J44.9 CHRONIC OBSTRUCTIVE PULMONARY DISEASE, UNSPECIFIED COPD TYPE (HCC): ICD-10-CM

## 2023-01-31 DIAGNOSIS — E78.2 MIXED HYPERLIPIDEMIA: ICD-10-CM

## 2023-01-31 DIAGNOSIS — Z72.0 TOBACCO USE: ICD-10-CM

## 2023-01-31 DIAGNOSIS — I10 PRIMARY HYPERTENSION: Primary | ICD-10-CM

## 2023-01-31 DIAGNOSIS — I50.22 CHRONIC SYSTOLIC CONGESTIVE HEART FAILURE (HCC): ICD-10-CM

## 2023-01-31 DIAGNOSIS — Z12.11 COLON CANCER SCREENING: ICD-10-CM

## 2023-01-31 DIAGNOSIS — Z79.4 TYPE 2 DIABETES MELLITUS WITHOUT COMPLICATION, WITH LONG-TERM CURRENT USE OF INSULIN (HCC): ICD-10-CM

## 2023-01-31 DIAGNOSIS — E11.69 TYPE 2 DIABETES MELLITUS WITH OTHER SPECIFIED COMPLICATION, UNSPECIFIED WHETHER LONG TERM INSULIN USE (HCC): ICD-10-CM

## 2023-01-31 DIAGNOSIS — E11.9 TYPE 2 DIABETES MELLITUS WITHOUT COMPLICATION, WITH LONG-TERM CURRENT USE OF INSULIN (HCC): ICD-10-CM

## 2023-01-31 DIAGNOSIS — K21.9 GASTROESOPHAGEAL REFLUX DISEASE WITHOUT ESOPHAGITIS: ICD-10-CM

## 2023-01-31 PROBLEM — J84.89 ORGANIZING PNEUMONIA (HCC): Status: RESOLVED | Noted: 2021-09-27 | Resolved: 2023-01-31

## 2023-01-31 LAB
CREATININE URINE POCT: 50
MICROALBUMIN/CREAT 24H UR: 30 MG/G{CREAT}
MICROALBUMIN/CREAT UR-RTO: ABNORMAL

## 2023-01-31 PROCEDURE — 3017F COLORECTAL CA SCREEN DOC REV: CPT | Performed by: NURSE PRACTITIONER

## 2023-01-31 PROCEDURE — 36415 COLL VENOUS BLD VENIPUNCTURE: CPT | Performed by: NURSE PRACTITIONER

## 2023-01-31 PROCEDURE — 4004F PT TOBACCO SCREEN RCVD TLK: CPT | Performed by: NURSE PRACTITIONER

## 2023-01-31 PROCEDURE — G8484 FLU IMMUNIZE NO ADMIN: HCPCS | Performed by: NURSE PRACTITIONER

## 2023-01-31 PROCEDURE — 3023F SPIROM DOC REV: CPT | Performed by: NURSE PRACTITIONER

## 2023-01-31 PROCEDURE — 99214 OFFICE O/P EST MOD 30 MIN: CPT | Performed by: NURSE PRACTITIONER

## 2023-01-31 PROCEDURE — 3078F DIAST BP <80 MM HG: CPT | Performed by: NURSE PRACTITIONER

## 2023-01-31 PROCEDURE — 3046F HEMOGLOBIN A1C LEVEL >9.0%: CPT | Performed by: NURSE PRACTITIONER

## 2023-01-31 PROCEDURE — 82044 UR ALBUMIN SEMIQUANTITATIVE: CPT | Performed by: NURSE PRACTITIONER

## 2023-01-31 PROCEDURE — G8417 CALC BMI ABV UP PARAM F/U: HCPCS | Performed by: NURSE PRACTITIONER

## 2023-01-31 PROCEDURE — G8427 DOCREV CUR MEDS BY ELIG CLIN: HCPCS | Performed by: NURSE PRACTITIONER

## 2023-01-31 PROCEDURE — 2022F DILAT RTA XM EVC RTNOPTHY: CPT | Performed by: NURSE PRACTITIONER

## 2023-01-31 PROCEDURE — 3074F SYST BP LT 130 MM HG: CPT | Performed by: NURSE PRACTITIONER

## 2023-01-31 RX ORDER — PEN NEEDLE, DIABETIC 31 GX5/16"
NEEDLE, DISPOSABLE MISCELLANEOUS
Qty: 100 EACH | Refills: 3 | Status: SHIPPED | OUTPATIENT
Start: 2023-01-31

## 2023-01-31 RX ORDER — FENOFIBRIC ACID 135 MG/1
CAPSULE, DELAYED RELEASE ORAL
Qty: 30 CAPSULE | Refills: 5 | Status: SHIPPED | OUTPATIENT
Start: 2023-01-31

## 2023-01-31 RX ORDER — INSULIN GLARGINE 100 [IU]/ML
INJECTION, SOLUTION SUBCUTANEOUS
Qty: 15 ML | Refills: 3 | Status: SHIPPED | OUTPATIENT
Start: 2023-01-31

## 2023-01-31 RX ORDER — GLUCOSAMINE HCL/CHONDROITIN SU 500-400 MG
CAPSULE ORAL
Qty: 300 STRIP | Refills: 0 | Status: SHIPPED | OUTPATIENT
Start: 2023-01-31

## 2023-01-31 RX ORDER — FLUTICASONE FUROATE AND VILANTEROL 200; 25 UG/1; UG/1
1 POWDER RESPIRATORY (INHALATION) DAILY
Qty: 60 EACH | Refills: 5 | Status: SHIPPED | OUTPATIENT
Start: 2023-01-31

## 2023-01-31 RX ORDER — ALBUTEROL SULFATE 90 UG/1
2 AEROSOL, METERED RESPIRATORY (INHALATION) EVERY 6 HOURS PRN
Qty: 8.5 G | Refills: 5 | Status: SHIPPED | OUTPATIENT
Start: 2023-01-31

## 2023-01-31 RX ORDER — UMECLIDINIUM 62.5 UG/1
1 AEROSOL, POWDER ORAL DAILY
Qty: 1 EACH | Refills: 5 | Status: SHIPPED | OUTPATIENT
Start: 2023-01-31

## 2023-01-31 RX ORDER — PANTOPRAZOLE SODIUM 40 MG/1
40 TABLET, DELAYED RELEASE ORAL DAILY
Qty: 30 TABLET | Refills: 5 | Status: SHIPPED | OUTPATIENT
Start: 2023-01-31

## 2023-01-31 ASSESSMENT — PATIENT HEALTH QUESTIONNAIRE - PHQ9
SUM OF ALL RESPONSES TO PHQ9 QUESTIONS 1 & 2: 2
SUM OF ALL RESPONSES TO PHQ QUESTIONS 1-9: 2
2. FEELING DOWN, DEPRESSED OR HOPELESS: 1
1. LITTLE INTEREST OR PLEASURE IN DOING THINGS: 1
SUM OF ALL RESPONSES TO PHQ QUESTIONS 1-9: 2

## 2023-01-31 ASSESSMENT — ENCOUNTER SYMPTOMS
DIARRHEA: 0
NAUSEA: 0
RHINORRHEA: 0
ABDOMINAL PAIN: 0
COUGH: 0
WHEEZING: 0
SHORTNESS OF BREATH: 1
SORE THROAT: 0
VOMITING: 0

## 2023-01-31 NOTE — PROGRESS NOTES
CHIEF COMPLAINT  Chief Complaint   Patient presents with    Check-Up     DM2          HPI   Melvin Boy is a 56 y.o. male who presents to the office for check up. Patient reports doing well. No recent changes in medications or medical illness. No new unusual fatigue or unexplained weight loss. Patient reports chronic shortness of breath worse upon exertion. Patient continues to follow-up with cardiology and pulmonology on a regular basis seen them recently due to difficulties with rides. .  Patient denies any abdominal pain or discomfort. No dark or tarry stools. Patient continues to smoke daily. Is accompanied by his sister today. Patient reports that he recently moved in with her and has had difficulty with his medications. Patient reports that he has been out of his insulin for couple months now. No other complaints, modifying factors or associated symptoms. Nursing notes reviewed.    Past Medical History:   Diagnosis Date    Acute respiratory failure ([address] 07/11/2021    CHF (congestive heart failure) (HCC)     combined    COPD (chronic obstructive pulmonary disease) (HCC)     Diabetes mellitus ([facility]     Hyperlipidemia     Hypertension     Organizing pneumonia ([facility] 9/27/2021    Oxygen dependent     Sepsis with acute respiratory failure and septic shock ([address] 10/6/2021     Past Surgical History:   Procedure Laterality Date    HERNIA REPAIR      TONSILLECTOMY       Family History   Problem Relation Age of Onset    Asthma Mother     Cancer Mother     Hearing Loss Mother     Vision Loss Mother     High Blood Pressure Father     High Cholesterol Father     Vision Loss Father     Asthma Sister     Diabetes Sister     Vision Loss Sister     Asthma Brother     Arthritis Brother     High Blood Pressure Brother      Social History     Socioeconomic History    Marital status: Legally      Spouse name: Not on file    Number of children: Not on file    Years of education: Not on file    Highest education level: Not on file   Occupational History    Not on file   Tobacco Use    Smoking status: Every Day     Packs/day: 1.50     Years: 40.00     Pack years: 60.00     Types: Cigarettes    Smokeless tobacco: Never    Tobacco comments:     per MD order for annual lung screening   Vaping Use    Vaping Use: Never used   Substance and Sexual Activity    Alcohol use: Yes     Comment: on occasion    Drug use: Never    Sexual activity: Not on file   Other Topics Concern    Not on file   Social History Narrative    Not on file     Social Determinants of Health     Financial Resource Strain: Not on file   Food Insecurity: Not on file   Transportation Needs: Not on file   Physical Activity: Not on file   Stress: Not on file   Social Connections: Not on file   Intimate Partner Violence: Not on file   Housing Stability: Not on file     Current Outpatient Medications   Medication Sig Dispense Refill    fluticasone furoate-vilanterol (BREO ELLIPTA) 200-25 MCG/ACT AEPB inhaler Inhale 1 puff into the lungs daily 60 each 5    albuterol sulfate HFA (PROVENTIL;VENTOLIN;PROAIR) 108 (90 Base) MCG/ACT inhaler Inhale 2 puffs into the lungs every 6 hours as needed for Wheezing or Shortness of Breath 8.5 g 5    Umeclidinium Bromide (INCRUSE ELLIPTA) 62.5 MCG/ACT AEPB Inhale 1 puff into the lungs daily 1 each 5    pantoprazole (PROTONIX) 40 MG tablet Take 1 tablet by mouth daily 30 tablet 5    fenofibric acid (TRILIPIX) 135 MG CPDR capsule TAKE 1 CAPSULE ONCE DAILY 30 capsule 5    blood glucose monitor kit and supplies Test 2 times a day & as needed for symptoms of irregular blood glucose. 1 kit 0    blood glucose monitor strips Test 2 times a day & as needed for symptoms of irregular blood glucose.  E11.9 300 strip 0    Alcohol Swabs (ALCOHOL PREP) PADS Use twice daily DX: E11.9 100 each 3    insulin glargine (LANTUS SOLOSTAR) 100 UNIT/ML injection pen INJECT 30 UNITS INTO THE SKIN 2 TIMES DAILY - Dose Increase on 5/13/22 per Nadeen Dinning Wade 15 mL 3    furosemide (LASIX) 20 MG tablet TAKE (1) TABLET DAILY 60 tablet 1    Insulin Pen Needle ([employer] PEN NEEDLES 29G) 29G X 12MM MISC 1 each by Does not apply route daily 100 each 3    ASPIRIN LOW DOSE 81 MG EC tablet TAKE 1 TABLET BY MOUTH DAILY 90 tablet 3    vitamin E 90 MG (200 UNIT) CAPS capsule Take 200 Units by mouth daily      Multiple Vitamins-Minerals (THERAPEUTIC MULTIVITAMIN-MINERALS) tablet Take 1 tablet by mouth daily      diclofenac sodium (VOLTAREN) 1 % GEL       glipiZIDE (GLUCOTROL) 10 MG tablet TAKE 1 TABLET 2 TIMES DAILY (BEFORE MEALS) 60 tablet 3    vitamin C (ASCORBIC ACID) 500 MG tablet Take 500 mg by mouth in the morning and 500 mg before bedtime. ipratropium-albuterol (DUONEB) 0.5-2.5 (3) MG/3ML SOLN nebulizer solution Take 3 mLs by nebulization every 4-6 hours as needed for Shortness of Breath 360 mL 1    OXYGEN Portable oxygen and concentrator (Patient taking differently: 3 L/min continuous) 1 each 13    VITAMIN D, CHOLECALCIFEROL, PO Take by mouth daily      rosuvastatin (CRESTOR) 40 MG tablet Take 1 tablet by mouth daily 90 tablet 3    sharps container 1 each by Does not apply route as needed (for disposable of lancets and needles) 1 each 3    Handicap Placard MISC by Does not apply route Expires 5/2027 1 each 0    BREO ELLIPTA 200-25 MCG/INH AEPB inhaler INHALE 1 PUFF DAILY 60 each 6    Magnesium 400 MG CAPS Take 1 tablet by mouth daily 30 capsule 1    Coenzyme Q10 (COQ10) 100 MG CAPS Take 1 capsule by mouth daily      gabapentin (NEURONTIN) 800 MG tablet TAKE 1 TABLET 4 TIMES DAILY 120 tablet 0     No current facility-administered medications for this visit. No Known Allergies    REVIEW OF SYSTEMS  Review of Systems   Constitutional:  Negative for activity change, appetite change, chills and fever. HENT:  Negative for congestion, rhinorrhea and sore throat. Eyes:  Negative for visual disturbance. Respiratory:  Positive for shortness of breath.  Negative for cough and wheezing. Cardiovascular:  Negative for chest pain and leg swelling. Gastrointestinal:  Negative for abdominal pain, diarrhea, nausea and vomiting. Genitourinary:  Negative for dysuria, frequency, hematuria and urgency. Musculoskeletal:  Negative for arthralgias, gait problem and myalgias. Skin:  Negative for rash. Neurological:  Negative for dizziness, weakness, light-headedness, numbness and headaches. Psychiatric/Behavioral:  Negative for sleep disturbance. PHYSICAL EXAM  /73   Pulse 84   Temp 97.9 °F (36.6 °C) (Oral)   Wt 153 lb 4 oz (69.5 kg)   SpO2 90%   BMI 30.95 kg/m²   Physical Exam  Constitutional:       Appearance: Normal appearance. He is not toxic-appearing. HENT:      Head: Normocephalic and atraumatic. Right Ear: External ear normal.      Left Ear: Tympanic membrane and external ear normal.      Nose: Nose normal.      Mouth/Throat:      Mouth: Mucous membranes are moist.      Pharynx: Oropharynx is clear. Eyes:      Extraocular Movements: Extraocular movements intact. Conjunctiva/sclera: Conjunctivae normal.      Pupils: Pupils are equal, round, and reactive to light. Cardiovascular:      Rate and Rhythm: Normal rate and regular rhythm. Pulses: Normal pulses. Pulmonary:      Effort: Pulmonary effort is normal. No respiratory distress. Breath sounds: No stridor. No wheezing, rhonchi or rales. Chest:      Chest wall: No tenderness. Abdominal:      General: Bowel sounds are normal. There is no distension. Palpations: There is no mass. Tenderness: There is no abdominal tenderness. There is no right CVA tenderness, left CVA tenderness, guarding or rebound. Hernia: No hernia is present. Musculoskeletal:         General: Normal range of motion. Cervical back: Normal range of motion and neck supple.    Feet:      Comments: Diabetic foot exam:   Left Foot:   Visual Exam: normal   Pulse DP: 2+ (normal)   Filament test: normal sensation  Right Foot:   Visual Exam: normal   Pulse DP: 2+ (normal)   Filament test: normal sensation    Skin:     General: Skin is warm and dry.      Capillary Refill: Capillary refill takes 2 to 3 seconds.      Findings: No rash.   Neurological:      Mental Status: He is alert.   Psychiatric:         Mood and Affect: Mood normal.         Behavior: Behavior normal.        ASSESSMENT/PLAN:   1. Primary hypertension  Stable.  No current use of medications at this time.  Patient denies any episodes of dizziness, lightheadedness, chest pain or palpitations.  Patient has chronic dyspnea upon exertion.  Blood pressure stable.  Follow-up in 6 months, sooner for new or worsening symptoms.  - CBC with Auto Differential  - Comprehensive Metabolic Panel    2. Chronic obstructive pulmonary disease, unspecified COPD type (HCC)  Stable.  Managed by pulmonologist.  Patient continues to be on albuterol, Breo Ellipta, incruse and duo nebs as directed.  Patient also reports wearing oxygen 3L nasal canula at night.  Patient last office visit with pulmonologist 11/2022 via virtual visit.  Patient had recommendations to have repeat pulmonary function studies test as well as walk test performed however patient has not scheduled it due to recent changes in home conditions.  Patient's sister aware of pending tests and encouraged to call and schedule these testing for further evaluation and management.    3. Chronic systolic congestive heart failure (HCC)  Stable.  Managed by cardiology.  Patient compliant currently with furosemide 20 mg daily.  No worsening dyspnea, weight gain or swelling.  Continue with current treatment management follow-up in 6 months, sooner for new or worsening symptoms.    4. Type 2 diabetes mellitus without complication, with long-term current use of insulin (HCC)  Uncontrolled. Previous A1C 13.6. patient has not been on insulin in 2 months due to leaving previous house/wife.  Patient aware that he  is past due for diabetic eye exam.  Referral given to patient patient encouraged to call and schedule testing. Labs ordered today and pending. Prescription for testing kit and strips sent to pharmacy as well as refill on Lantus. Patient currently prescribed glipizide 10 mg twice a day however he is unsure of how he has been taking it. We did give an updated list of his medications to his sister to verify home medications and call us with correct dosages and how patient is taking them. Diabetic teaching given to patient's sister. Encouraged to check blood glucose daily and restart Lantus 30 units twice a day. Follow-up in 3 months, sooner for new or worsening symptoms.  - Hemoglobin A1C  - POCT microalbumin  - HM DIABETES FOOT EXAM  - AFL - Dustin Alvarez MD, Ophthalmology, [facility]    5. Mixed hyperlipidemia  Stable. Managed by cardiology. Patient currently on Trilipix 135 mg daily Crestor 40 mg daily. Ordered today and pending. Continue with current treatment management follow-up in 6 months, sooner for new or worsening symptoms. 6. Tobacco use  Patient counseled the importance of tobacco cessation. Patient declines. Patient aware of risks associated with tobacco abuse and chronic illnesses. To screening discussed in detail with patient. 7. Colon cancer screening  Screening recommended. Previous fit test was positive and patient was given a referral to GI. Patient has not followed up at this time. Continued recommendations of seeing GI for colonoscopy. The note was completed using Dragon voice recognition transcription. Every effort was made to ensure accuracy; however, inadvertent  transcription errors may be present despite my best efforts to edit errors.     Shaquille Mantilla, LINK - CNP

## 2023-01-31 NOTE — PATIENT INSTRUCTIONS
Please read the healthy family handout that you were given and share it with your family. Please compare this printed medication list with your medications at home to be sure they are the same. If you have any medications that are different please contact us immediately at [phone]. Also review your allergies that we have listed, these may also include medications that you have not been able to tolerate, make sure everything listed is correct. If you have any allergies that are different please contact us immediately at [phone]. You may receive a survey in the mail or by email asking about your experience during your visit today. Please complete and return to us so we know how we are serving you.

## 2023-02-01 LAB
A/G RATIO: 1.7 (ref 1.1–2.2)
ALBUMIN SERPL-MCNC: 4.3 G/DL (ref 3.4–5)
ALP BLD-CCNC: 92 U/L (ref 40–129)
ALT SERPL-CCNC: 14 U/L (ref 10–40)
ANION GAP SERPL CALCULATED.3IONS-SCNC: 16 MMOL/L (ref 3–16)
AST SERPL-CCNC: 14 U/L (ref 15–37)
BASOPHILS ABSOLUTE: 0.1 K/UL (ref 0–0.2)
BASOPHILS RELATIVE PERCENT: 0.5 %
BILIRUB SERPL-MCNC: <0.2 MG/DL (ref 0–1)
BUN BLDV-MCNC: 18 MG/DL (ref 7–20)
CALCIUM SERPL-MCNC: 10.2 MG/DL (ref 8.3–10.6)
CHLORIDE BLD-SCNC: 104 MMOL/L (ref 99–110)
CHOLESTEROL, TOTAL: 179 MG/DL (ref 0–199)
CO2: 21 MMOL/L (ref 21–32)
CREAT SERPL-MCNC: 0.8 MG/DL (ref 0.9–1.3)
EOSINOPHILS ABSOLUTE: 0 K/UL (ref 0–0.6)
EOSINOPHILS RELATIVE PERCENT: 0.5 %
ESTIMATED AVERAGE GLUCOSE: 280.5 MG/DL
GFR SERPL CREATININE-BSD FRML MDRD: >60 ML/MIN/{1.73_M2}
GLUCOSE BLD-MCNC: 196 MG/DL (ref 70–99)
HBA1C MFR BLD: 11.4 %
HCT VFR BLD CALC: 45.5 % (ref 40.5–52.5)
HDLC SERPL-MCNC: 33 MG/DL (ref 40–60)
HEMOGLOBIN: 14.9 G/DL (ref 13.5–17.5)
LDL CHOLESTEROL CALCULATED: ABNORMAL MG/DL
LDL CHOLESTEROL DIRECT: 83 MG/DL
LYMPHOCYTES ABSOLUTE: 2.5 K/UL (ref 1–5.1)
LYMPHOCYTES RELATIVE PERCENT: 24.3 %
MCH RBC QN AUTO: 29.1 PG (ref 26–34)
MCHC RBC AUTO-ENTMCNC: 32.7 G/DL (ref 31–36)
MCV RBC AUTO: 89.2 FL (ref 80–100)
MONOCYTES ABSOLUTE: 0.5 K/UL (ref 0–1.3)
MONOCYTES RELATIVE PERCENT: 5.3 %
NEUTROPHILS ABSOLUTE: 7.1 K/UL (ref 1.7–7.7)
NEUTROPHILS RELATIVE PERCENT: 69.4 %
PDW BLD-RTO: 14.9 % (ref 12.4–15.4)
PLATELET # BLD: 335 K/UL (ref 135–450)
PMV BLD AUTO: 8.9 FL (ref 5–10.5)
POTASSIUM SERPL-SCNC: 5.1 MMOL/L (ref 3.5–5.1)
RBC # BLD: 5.1 M/UL (ref 4.2–5.9)
SODIUM BLD-SCNC: 141 MMOL/L (ref 136–145)
TOTAL PROTEIN: 6.8 G/DL (ref 6.4–8.2)
TRIGL SERPL-MCNC: 350 MG/DL (ref 0–150)
VLDLC SERPL CALC-MCNC: ABNORMAL MG/DL
WBC # BLD: 10.2 K/UL (ref 4–11)

## 2023-02-07 RX ORDER — GLIPIZIDE 10 MG/1
TABLET ORAL
Qty: 60 TABLET | Refills: 3 | Status: SHIPPED | OUTPATIENT
Start: 2023-02-07

## 2023-02-13 ENCOUNTER — TELEMEDICINE (OUTPATIENT)
Dept: FAMILY MEDICINE CLINIC | Age: 58
End: 2023-02-13
Payer: MEDICARE

## 2023-02-13 DIAGNOSIS — Z00.00 INITIAL MEDICARE ANNUAL WELLNESS VISIT: Primary | ICD-10-CM

## 2023-02-13 PROCEDURE — 3017F COLORECTAL CA SCREEN DOC REV: CPT | Performed by: NURSE PRACTITIONER

## 2023-02-13 PROCEDURE — G8484 FLU IMMUNIZE NO ADMIN: HCPCS | Performed by: NURSE PRACTITIONER

## 2023-02-13 PROCEDURE — G0438 PPPS, INITIAL VISIT: HCPCS | Performed by: NURSE PRACTITIONER

## 2023-02-13 ASSESSMENT — PATIENT HEALTH QUESTIONNAIRE - PHQ9
SUM OF ALL RESPONSES TO PHQ QUESTIONS 1-9: 0
SUM OF ALL RESPONSES TO PHQ9 QUESTIONS 1 & 2: 0
SUM OF ALL RESPONSES TO PHQ QUESTIONS 1-9: 0
SUM OF ALL RESPONSES TO PHQ QUESTIONS 1-9: 0
2. FEELING DOWN, DEPRESSED OR HOPELESS: 0
1. LITTLE INTEREST OR PLEASURE IN DOING THINGS: 0
SUM OF ALL RESPONSES TO PHQ QUESTIONS 1-9: 0

## 2023-02-13 ASSESSMENT — LIFESTYLE VARIABLES
HOW OFTEN DO YOU HAVE A DRINK CONTAINING ALCOHOL: MONTHLY OR LESS
HOW MANY STANDARD DRINKS CONTAINING ALCOHOL DO YOU HAVE ON A TYPICAL DAY: 1 OR 2

## 2023-02-13 NOTE — PATIENT INSTRUCTIONS
Learning About Stress  What is stress? Stress is what you feel when you have to handle more than you are used to. Stress is a fact of life for most people, and it affects everyone differently. What causes stress for you may not be stressful for someone else. A lot of things can cause stress. You may feel stress when you go on a job interview, take a test, or run a race. This kind of short-term stress is normal and even useful. It can help you if you need to work hard or react quickly. For example, stress can help you finish an important job on time. Stress also can last a long time. Long-term stress is caused by stressful situations or events. Examples of long-term stress include long-term health problems, ongoing problems at work, or conflicts in your family. Long-term stress can harm your health. How does stress affect your health? When you are stressed, your body responds as though you are in danger. It makes hormones that speed up your heart, make you breathe faster, and give you a burst of energy. This is called the fight-or-flight stress response. If the stress is over quickly, your body goes back to normal and no harm is done. But if stress happens too often or lasts too long, it can have bad effects. Long-term stress can make you more likely to get sick, and it can make symptoms of some diseases worse. If you tense up when you are stressed, you may develop neck, shoulder, or low back pain. Stress is linked to high blood pressure and heart disease. Stress also harms your emotional health. It can make you cain, tense, or depressed. Your relationships may suffer, and you may not do well at work or school. What can you do to manage stress? How to relax your mind   Write. It may help to write about things that are bothering you. This helps you find out how much stress you feel and what is causing it. When you know this, you can find better ways to cope. Let your feelings out.  Talk, laugh, cry, and express anger when you need to. Talking with friends, family, a counselor, or a member of the clergy about your feelings is a healthy way to relieve stress. Do something you enjoy. For example, listen to music or go to a movie. Practice your hobby or do volunteer work. Meditate. This can help you relax, because you are not worrying about what happened before or what may happen in the future. Do guided imagery. Imagine yourself in any setting that helps you feel calm. You can use audiotapes, books, or a teacher to guide you. How to relax your body   Do something active. Exercise or activity can help reduce stress. Walking is a great way to get started. Even everyday activities such as housecleaning or yard work can help. Do breathing exercises. For example:  From a standing position, bend forward from the waist with your knees slightly bent. Let your arms dangle close to the floor. Breathe in slowly and deeply as you return to a standing position. Roll up slowly and lift your head last.  Hold your breath for just a few seconds in the standing position. Breathe out slowly and bend forward from the waist.  Try yoga or bere chi. These techniques combine exercise and meditation. You may need some training at first to learn them. What can you do to prevent stress? Manage your time. This helps you find time to do the things you want and need to do. Get enough sleep. Your body recovers from the stresses of the day while you are sleeping. Get support. Your family, friends, and community can make a difference in how you experience stress. Where can you learn more? Go to http://www.najera.com/ and enter N032 to learn more about \"Learning About Stress. \"  Current as of: October 6, 2021               Content Version: 13.5  © 0311-3490 [employer]. Care instructions adapted under license by [address]  If you have questions about a medical condition or this instruction, always ask your healthcare professional. [address] any warranty or liability for your use of this information. Learning About Activities of Daily Living  What are activities of daily living? Activities of daily living (ADLs) are the basic self-care tasks you do every day. As you age, and if you have health problems, you may find that it's harder to do these things for yourself. That's when you may need some help. Your doctor uses ADLs to measure how much help you need. Knowing what you can and can't do for yourself is an important first step to getting help. And when you have the help you need, you can stay as independent as possible. Your doctor will want to know if you are able to do tasks such as: Take a bath or shower without help. Go to the bathroom by yourself. Dress and undress without help. Shave, comb your hair, and brush teeth on your own. Get in and out of bed or a chair without help. Feed yourself without help. If you are having trouble doing basic self-care tasks, talk with your doctor. You may want to bring a caregiver or family member who can help the doctor understand your needs and abilities. How will a doctor assess your ADLs? Asking about ADLs is part of a routine health checkup your doctor will likely do as you age. Your health check might be done in a doctor's office, in your home, or at a hospital. The goal is to find out if you are having any problems that could make your health problems worse or that make it unsafe for you to be on your own. To measure your ADLs, your doctor will ask how hard it is for you to do routine tasks. He or she may also want to know if you have changed the way you do a task because of a health problem. He or she may watch how you:  Walk back and forth. Keep your balance while you stand or walk. Move from sitting to standing or from a bed to a chair. Button or unbutton a shirt or sweater. Remove and put on your shoes.   It's normal to feel a little worried or anxious if you find you can't do all the things you used to be able to do. Talking with your doctor about ADLs isn't a test that you either pass or fail. It's just a way to get more information about your health and safety. Follow-up care is a key part of your treatment and safety. Be sure to make and go to all appointments, and call your doctor if you are having problems. It's also a good idea to know your test results and keep a list of the medicines you take. Current as of: October 6, 2021               Content Version: 13.5  © 2006-2022 [employer]. Care instructions adapted under license by [address] If you have questions about a medical condition or this instruction, always ask your healthcare professional. [place] [address] any warranty or liability for your use of this information. Advance Directives: Care Instructions  Overview  An advance directive is a legal way to state your wishes at the end of your life. It tells your family and your doctor what to do if you can't say what you want. There are two main types of advance directives. You can change them any time your wishes change. Living will. This form tells your family and your doctor your wishes about life support and other treatment. The form is also called a declaration. Medical power of . This form lets you name a person to make treatment decisions for you when you can't speak for yourself. This person is called a health care agent (health care proxy, health care surrogate). The form is also called a durable power of  for health care. If you do not have an advance directive, decisions about your medical care may be made by a family member, or by a doctor or a  who doesn't know you. It may help to think of an advance directive as a gift to the people who care for you.  If you have one, they won't have to make tough decisions by themselves. For more information, including forms for your state, see the [address] website (www.caringinfo.org/planning/advance-directives/). Follow-up care is a key part of your treatment and safety. Be sure to make and go to all appointments, and call your doctor if you are having problems. It's also a good idea to know your test results and keep a list of the medicines you take. What should you include in an advance directive? Many states have a unique advance directive form. (It may ask you to address specific issues.) Or you might use a universal form that's approved by many states. If your form doesn't tell you what to address, it may be hard to know what to include in your advance directive. Use the questions below to help you get started. Who do you want to make decisions about your medical care if you are not able to? What life-support measures do you want if you have a serious illness that gets worse over time or can't be cured? What are you most afraid of that might happen? (Maybe you're afraid of having pain, losing your independence, or being kept alive by machines.)  Where would you prefer to die? (Your home? A hospital? A nursing home?)  Do you want to donate your organs when you die? Do you want certain Lutheran practices performed before you die? When should you call for help? Be sure to contact your doctor if you have any questions. Where can you learn more? Go to http://www.najera.com/ and enter R264 to learn more about \"Advance Directives: Care Instructions. \"  Current as of: June 16, 2022               Content Version: 13.5  © 1344-1497 [employer]. Care instructions adapted under license by [facility] ([facility]). If you have questions about a medical condition or this instruction, always ask your healthcare professional. [address] any warranty or liability for your use of this information.            Starting a Weight Loss Plan: Care Instructions  Overview     If you're thinking about losing weight, it can be hard to know where to start. Your doctor can help you set up a weight loss plan that best meets your needs. You may want to take a class on nutrition or exercise, or you could join a weight loss support group. If you have questions about how to make changes to your eating or exercise habits, ask your doctor about seeing a registered dietitian or an exercise specialist.  It can be a big challenge to lose weight. But you don't have to make huge changes at once. Make small changes, and stick with them. When those changes become habit, add a few more changes. If you don't think you're ready to make changes right now, try to pick a date in the future. Make an appointment to see your doctor to discuss whether the time is right for you to start a plan. Follow-up care is a key part of your treatment and safety. Be sure to make and go to all appointments, and call your doctor if you are having problems. It's also a good idea to know your test results and keep a list of the medicines you take. How can you care for yourself at home? Set realistic goals. Many people expect to lose much more weight than is likely. A weight loss of 5% to 10% of your body weight may be enough to improve your health. Get family and friends involved to provide support. Talk to them about why you are trying to lose weight, and ask them to help. They can help by participating in exercise and having meals with you, even if they may be eating something different. Find what works best for you. If you do not have time or do not like to cook, a program that offers meal replacement bars or shakes may be better for you. Or if you like to prepare meals, finding a plan that includes daily menus and recipes may be best.  Ask your doctor about other health professionals who can help you achieve your weight loss goals.   A dietitian can help you make healthy changes in your diet.  An exercise specialist or  can help you develop a safe and effective exercise program.  A counselor or psychiatrist can help you cope with issues such as depression, anxiety, or family problems that can make it hard to focus on weight loss. Consider joining a support group for people who are trying to lose weight. Your doctor can suggest groups in your area. Where can you learn more? Go to http://www.woods.com/ and enter U357 to learn more about \"Starting a Weight Loss Plan: Care Instructions. \"  Current as of: August 25, 2022               Content Version: 13.5  © 5049-5852 [employer]. Care instructions adapted under license by [employer] [facility] ([facility]). If you have questions about a medical condition or this instruction, always ask your healthcare professional. [address] any warranty or liability for your use of this information. A Healthy Heart: Care Instructions  Your Care Instructions     Coronary artery disease, also called heart disease, occurs when a substance called plaque builds up in the vessels that supply oxygen-rich blood to your heart muscle. This can narrow the blood vessels and reduce blood flow. A heart attack happens when blood flow is completely blocked. A high-fat diet, smoking, and other factors increase the risk of heart disease. Your doctor has found that you have a chance of having heart disease. You can do lots of things to keep your heart healthy. It may not be easy, but you can change your diet, exercise more, and quit smoking. These steps really work to lower your chance of heart disease. Follow-up care is a key part of your treatment and safety. Be sure to make and go to all appointments, and call your doctor if you are having problems. It's also a good idea to know your test results and keep a list of the medicines you take. How can you care for yourself at home?   Diet    Use less salt when you cook and eat. This helps lower your blood pressure. Taste food before salting. Add only a little salt when you think you need it. With time, your taste buds will adjust to less salt.     Eat fewer snack items, fast foods, canned soups, and other high-salt, high-fat, processed foods.     Read food labels and try to avoid saturated and trans fats. They increase your risk of heart disease by raising cholesterol levels.     Limit the amount of solid fat-butter, margarine, and shortening-you eat. Use olive, peanut, or canola oil when you cook. Bake, broil, and steam foods instead of frying them.     Eat a variety of fruit and vegetables every day. Dark green, deep orange, red, or yellow fruits and vegetables are especially good for you. Examples include spinach, carrots, peaches, and berries.     Foods high in fiber can reduce your cholesterol and provide important vitamins and minerals. High-fiber foods include whole-grain cereals and breads, oatmeal, beans, brown rice, citrus fruits, and apples.     Eat lean proteins. Heart-healthy proteins include seafood, lean meats and poultry, eggs, beans, peas, nuts, seeds, and soy products.     Limit drinks and foods with added sugar. These include candy, desserts, and soda pop. Lifestyle changes    If your doctor recommends it, get more exercise. Walking is a good choice. Bit by bit, increase the amount you walk every day. Try for at least 30 minutes on most days of the week. You also may want to swim, bike, or do other activities.     Do not smoke. If you need help quitting, talk to your doctor about stop-smoking programs and medicines. These can increase your chances of quitting for good. Quitting smoking may be the most important step you can take to protect your heart. It is never too late to quit.     Limit alcohol to 2 drinks a day for men and 1 drink a day for women.  Too much alcohol can cause health problems.     Manage other health problems such as diabetes, high blood pressure, and high cholesterol. If you think you may have a problem with alcohol or drug use, talk to your doctor. Medicines    Take your medicines exactly as prescribed. Call your doctor if you think you are having a problem with your medicine.     If your doctor recommends aspirin, take the amount directed each day. Make sure you take aspirin and not another kind of pain reliever, such as acetaminophen (Tylenol). When should you call for help? Call 911 if you have symptoms of a heart attack. These may include:    Chest pain or pressure, or a strange feeling in the chest.     Sweating.     Shortness of breath.     Pain, pressure, or a strange feeling in the back, neck, jaw, or upper belly or in one or both shoulders or arms.     Lightheadedness or sudden weakness.     A fast or irregular heartbeat. After you call 911, the  may tell you to chew 1 adult-strength or 2 to 4 low-dose aspirin. Wait for an ambulance. Do not try to drive yourself. Watch closely for changes in your health, and be sure to contact your doctor if you have any problems. Where can you learn more? Go to http://AetherPal.najera.com/ and enter F075 to learn more about \"A Healthy Heart: Care Instructions. \"  Current as of: September 7, 2022               Content Version: 13.5  © 2006-2022 Healthwise, Incorporated. Care instructions adapted under license by [facility] ([facility]). If you have questions about a medical condition or this instruction, always ask your healthcare professional. [place] [address] any warranty or liability for your use of this information. Personalized Preventive Plan for Sidonie Wilber - 2/13/2023  Medicare offers a range of preventive health benefits. Some of the tests and screenings are paid in full while other may be subject to a deductible, co-insurance, and/or copay.     Some of these benefits include a comprehensive review of your medical history including lifestyle, illnesses that may run in your family, and various assessments and screenings as appropriate. After reviewing your medical record and screening and assessments performed today your provider may have ordered immunizations, labs, imaging, and/or referrals for you. A list of these orders (if applicable) as well as your Preventive Care list are included within your After Visit Summary for your review. Other Preventive Recommendations:    A preventive eye exam performed by an eye specialist is recommended every 1-2 years to screen for glaucoma; cataracts, macular degeneration, and other eye disorders. A preventive dental visit is recommended every 6 months. Try to get at least 150 minutes of exercise per week or 10,000 steps per day on a pedometer . Order or download the FREE \"Exercise & Physical Activity: Your Everyday Guide\" from [employer]. Call 7-384.528.1329 or search [employer] online. You need 2343-9419 mg of calcium and 0530-2577 IU of vitamin D per day. It is possible to meet your calcium requirement with diet alone, but a vitamin D supplement is usually necessary to meet this goal.  When exposed to the sun, use a sunscreen that protects against both UVA and UVB radiation with an SPF of 30 or greater. Reapply every 2 to 3 hours or after sweating, drying off with a towel, or swimming. Always wear a seat belt when traveling in a car. Always wear a helmet when riding a bicycle or motorcycle.

## 2023-02-13 NOTE — PROGRESS NOTES
Medicare Annual Wellness Visit    Yamilet Silver is here for Medicare AWV    Assessment & Plan   Initial Medicare annual wellness visit      Recommendations for Preventive Services Due: see orders and patient instructions/AVS.  Recommended screening schedule for the next 5-10 years is provided to the patient in written form: see Patient Instructions/AVS.     Return for Medicare Annual Wellness Visit in 1 year. Subjective   Presents today via telephone encounter for Medicare wellness visit. Patient's complete Health Risk Assessment and screening values have been reviewed and are found in Flowsheets. The following problems were reviewed today and where indicated follow up appointments were made and/or referrals ordered.     Positive Risk Factor Screenings with Interventions:               General HRA Questions:  Select all that apply: (!) Stress    Stress Interventions:  Patient declined any further interventions or treatment       Weight and Activity:  Physical Activity: Inactive    Days of Exercise per Week: 0 days    Minutes of Exercise per Session: 0 min     On average, how many days per week do you engage in moderate to strenuous exercise (like a brisk walk)?: 0 days  Have you lost any weight without trying in the past 3 months?: No       Inactivity Interventions:  Patient declined any further interventions or treatment  Obesity Interventions:  Patient declines any further evaluation or treatment         Hearing Screen:  Do you or your family notice any trouble with your hearing that hasn't been managed with hearing aids?: (!) Yes    Interventions:  Patient declines any further evaluation or treatment     Safety:  Do you have either shower bars, grab bars, non-slip mats or non-slip surfaces in your shower or bathtub?: (!) No  Interventions:  Patient declined any further interventions or treatment    ADL's:   Patient reports needing help with:  Select all that apply: (!) Transportation  Interventions:  Patient comments: Reports that his sister does assist him with his transportation. I did recommend that he talk to his insurance to see if there is any availability as well. Advanced Directives:  Do you have a Living Will?: (!) No    Intervention:  has NO advanced directive - not interested in additional information      Tobacco Use:  Tobacco Use: High Risk    Smoking Tobacco Use: Every Day    Smokeless Tobacco Use: Never    Passive Exposure: Not on file     E-cigarette/Vaping       Questions Responses    E-cigarette/Vaping Use Never User    Start Date     Passive Exposure No    Quit Date     Counseling Given No    Comments           Interventions:  Patient declined any further intervention or treatment            Objective      Patient-Reported Vitals  No data recorded     Unable to perform physical exam due to Medicare wellness visit performed via telephone encounter. No Known Allergies  Prior to Visit Medications    Medication Sig Taking? Authorizing Provider   glipiZIDE (GLUCOTROL) 10 MG tablet TAKE  ONE (1) TABLET BY MOUTH TWICE DAILY BEFORE MEALS Yes LINK Sabillon CNP   fluticasone furoate-vilanterol (BREO ELLIPTA) 200-25 MCG/ACT AEPB inhaler Inhale 1 puff into the lungs daily Yes LINK Sabillon CNP   albuterol sulfate HFA (PROVENTIL;VENTOLIN;PROAIR) 108 (90 Base) MCG/ACT inhaler Inhale 2 puffs into the lungs every 6 hours as needed for Wheezing or Shortness of Breath Yes LINK Sabillon CNP   Umeclidinium Bromide (INCRUSE ELLIPTA) 62.5 MCG/ACT AEPB Inhale 1 puff into the lungs daily Yes LINK Sabillon CNP   pantoprazole (PROTONIX) 40 MG tablet Take 1 tablet by mouth daily Yes LINK Sabillon CNP   fenofibric acid (TRILIPIX) 135 MG CPDR capsule TAKE 1 CAPSULE ONCE DAILY Yes LINK Sabillon CNP   blood glucose monitor kit and supplies Test 2 times a day & as needed for symptoms of irregular blood glucose.  Yes LINK Sabillon CNP   blood glucose monitor strips Test 2 times a day & as needed for symptoms of irregular blood glucose. E11.9 Yes LINK Mcneal CNP   Alcohol Swabs (ALCOHOL PREP) PADS Use twice daily DX: E11.9 Yes LINK Mcneal CNP   insulin glargine (LANTUS SOLOSTAR) 100 UNIT/ML injection pen INJECT 30 UNITS INTO THE SKIN 2 TIMES DAILY - Dose Increase on 5/13/22 per Karissa Haque Yes LINK Mcneal CNP   furosemide (LASIX) 20 MG tablet TAKE (1) TABLET DAILY Yes LINK Mcneal CNP   Insulin Pen Needle ([employer] PEN NEEDLES 29G) 29G X 12MM MISC 1 each by Does not apply route daily Yes LINK Mcneal CNP   ASPIRIN LOW DOSE 81 MG EC tablet TAKE 1 TABLET BY MOUTH DAILY Yes Vin Lucero MD   vitamin E 90 MG (200 UNIT) CAPS capsule Take 200 Units by mouth daily Yes Historical Provider, MD   Multiple Vitamins-Minerals (THERAPEUTIC MULTIVITAMIN-MINERALS) tablet Take 1 tablet by mouth daily Yes Historical Provider, MD   diclofenac sodium (VOLTAREN) 1 % GEL  Yes Historical Provider, MD   vitamin C (ASCORBIC ACID) 500 MG tablet Take 500 mg by mouth in the morning and 500 mg before bedtime.  Yes Historical Provider, MD   ipratropium-albuterol (DUONEB) 0.5-2.5 (3) MG/3ML SOLN nebulizer solution Take 3 mLs by nebulization every 4-6 hours as needed for Shortness of Breath Yes LINK Mcneal CNP   OXYGEN Portable oxygen and concentrator  Patient taking differently: 3 L/min continuous Yes LINK Mcneal CNP   VITAMIN D, CHOLECALCIFEROL, PO Take by mouth daily Yes Historical Provider, MD   rosuvastatin (CRESTOR) 40 MG tablet Take 1 tablet by mouth daily Yes Vin Lucero MD   sharps container 1 each by Does not apply route as needed (for disposable of lancets and needles) Yes LINK Mcneal CNP   Handicap Placard MISC by Does not apply route Expires 5/2027 Yes LINK Mcneal CNP   BREO ELLIPTA 200-25 MCG/INH AEPB inhaler INHALE 1 PUFF DAILY Yes LINK Mcneal CNP   Magnesium 400 MG CAPS Take 1 tablet by mouth daily Yes LINK Castillo CNP   Coenzyme Q10 (COQ10) 100 MG CAPS Take 1 capsule by mouth daily Yes Historical Provider, MD   gabapentin (NEURONTIN) 800 MG tablet TAKE 1 TABLET 4 TIMES DAILY  LINK Castillo CNP       CareTeam (Including outside providers/suppliers regularly involved in providing care):   Patient Care Team:  LINK Castillo CNP as PCP - General (Nurse Practitioner)  LINK Castillo CNP as PCP - Empaneled Provider  Peter Torres MD as Consulting Physician (Pulmonology)     Reviewed and updated this visit:  Allergies  Meds          Cooperradha Fong, was evaluated through a synchronous (real-time) audio-video encounter. The patient (or guardian if applicable) is aware that this is a billable service, which includes applicable co-pays. This Virtual Visit was conducted with patient's (and/or legal guardian's) consent. The visit was conducted pursuant to the emergency declaration under the [address] [place] [address], [address] [place] [address] waiver authority and the [employer] General Act. Patient identification was verified, and a caregiver was present when appropriate.    The patient was located at Home: [address] [facility] [address]   Provider was located at [facility] ([place] [address]): Spring Salguero [address] [place] [address] 13,  [address] [place] [address] LINK White CNP

## 2023-02-21 NOTE — TELEPHONE ENCOUNTER
Patient is wanting a refill on Magnesium Oxide 400mg, [facility] is on his med list is this considered the same? We haven't filled for sharon, [facility]'t have it on his profile but he is new to them.

## 2023-02-22 RX ORDER — CALCIUM CARBONATE/VITAMIN D3 500-10/5ML
1 LIQUID (ML) ORAL DAILY
Qty: 30 CAPSULE | Refills: 5 | Status: SHIPPED | OUTPATIENT
Start: 2023-02-22

## 2023-05-09 DIAGNOSIS — E78.2 MIXED HYPERLIPIDEMIA: ICD-10-CM

## 2023-05-09 DIAGNOSIS — K21.9 GASTROESOPHAGEAL REFLUX DISEASE WITHOUT ESOPHAGITIS: ICD-10-CM

## 2023-05-09 RX ORDER — FENOFIBRIC ACID 135 MG/1
CAPSULE, DELAYED RELEASE ORAL
Qty: 30 CAPSULE | Refills: 1 | Status: SHIPPED | OUTPATIENT
Start: 2023-05-09

## 2023-05-09 RX ORDER — PANTOPRAZOLE SODIUM 40 MG/1
TABLET, DELAYED RELEASE ORAL
Qty: 30 TABLET | Refills: 1 | Status: SHIPPED | OUTPATIENT
Start: 2023-05-09

## 2023-05-09 RX ORDER — FUROSEMIDE 20 MG/1
TABLET ORAL
Qty: 30 TABLET | Refills: 1 | Status: SHIPPED | OUTPATIENT
Start: 2023-05-09

## 2023-05-19 DIAGNOSIS — E11.69 TYPE 2 DIABETES MELLITUS WITH OTHER SPECIFIED COMPLICATION, UNSPECIFIED WHETHER LONG TERM INSULIN USE (HCC): ICD-10-CM

## 2023-05-19 RX ORDER — PEN NEEDLE, DIABETIC 29 GAUGE
1 NEEDLE, DISPOSABLE MISCELLANEOUS DAILY
Qty: 100 EACH | Refills: 3 | Status: SHIPPED | OUTPATIENT
Start: 2023-05-19

## 2023-05-19 RX ORDER — PEN NEEDLE, DIABETIC 31 GX5/16"
NEEDLE, DISPOSABLE MISCELLANEOUS
Qty: 100 EACH | Refills: 3 | Status: SHIPPED | OUTPATIENT
Start: 2023-05-19

## 2023-05-24 ENCOUNTER — COMMUNITY OUTREACH (OUTPATIENT)
Dept: FAMILY MEDICINE CLINIC | Age: 58
End: 2023-05-24

## 2023-07-12 RX ORDER — LANOLIN ALCOHOL/MO/W.PET/CERES
CREAM (GRAM) TOPICAL
Qty: 30 TABLET | Refills: 5 | Status: SHIPPED | OUTPATIENT
Start: 2023-07-12

## 2023-07-12 RX ORDER — FUROSEMIDE 20 MG/1
TABLET ORAL
Qty: 30 TABLET | Refills: 5 | Status: SHIPPED | OUTPATIENT
Start: 2023-07-12

## 2023-07-17 RX ORDER — ROSUVASTATIN CALCIUM 40 MG/1
TABLET, COATED ORAL
Qty: 180 TABLET | Refills: 0 | Status: SHIPPED | OUTPATIENT
Start: 2023-07-17

## 2023-07-17 NOTE — TELEPHONE ENCOUNTER
Spoke to Pt.   Scheduled to see [facility] in [facility] on 8/31/23 at 10:45am.  Please send medication refill

## 2023-07-22 DIAGNOSIS — E11.69 TYPE 2 DIABETES MELLITUS WITH OTHER SPECIFIED COMPLICATION, UNSPECIFIED WHETHER LONG TERM INSULIN USE (HCC): ICD-10-CM

## 2023-07-24 ENCOUNTER — TELEPHONE (OUTPATIENT)
Dept: TELEMETRY | Age: 58
End: 2023-07-24

## 2023-07-24 RX ORDER — INSULIN GLARGINE 100 [IU]/ML
INJECTION, SOLUTION SUBCUTANEOUS
Qty: 15 ML | Refills: 0 | Status: SHIPPED | OUTPATIENT
Start: 2023-07-24

## 2023-07-24 NOTE — TELEPHONE ENCOUNTER
Patient due for annual CT Lung Screening. Reminder letter mailed. Order pended to chart.     Pranay Demarco RN

## 2023-08-10 DIAGNOSIS — K21.9 GASTROESOPHAGEAL REFLUX DISEASE WITHOUT ESOPHAGITIS: ICD-10-CM

## 2023-08-10 RX ORDER — FENOFIBRIC ACID 135 MG/1
CAPSULE, DELAYED RELEASE ORAL
Qty: 30 CAPSULE | Refills: 5 | OUTPATIENT
Start: 2023-08-10

## 2023-08-19 NOTE — PROGRESS NOTES
Physical Therapy    Facility/Department: [place] [address] [facility]  Initial Assessment/Treatment Note/Discharge from Acute PT    NAME: Simeon Licea  : 1965  MRN: [id number]    Date of Service: 2021    Discharge Recommendations:    Simeon Licea scored a 21/24 on the AM-PAC short mobility form. At this time, no further PT is recommended upon discharge due to pt performing all functional mobility at [facility] level. Pt reports that he is functioning at his baseline and will have 24 hr supervision/assist from his wife at d/c. Nadine Oropeza Recommend patient returns to prior setting with prior services. PT Equipment Recommendations  Equipment Needed: No    Assessment   Assessment: Pt is a 50 y.o. male with diagnosis of COPD exacerbation. No acute goal identified due to pt performing all functional mobility at [facility] level. Pt reports that he is functioning at his baseline and will have 24 hr supervision/assist from his wife at d/c. Treatment Diagnosis: COPD exacerbation  Prognosis: Good  Decision Making: Low Complexity  Patient Education: Role of PT, goals, d/c recommendations, pt verbalized understanding  REQUIRES PT FOLLOW UP: No  Activity Tolerance  Activity Tolerance: Patient Tolerated treatment well       Patient Diagnosis(es): There were no encounter diagnoses. has a past medical history of Acute respiratory failure ([address] CHF (congestive heart failure) ([address] COPD (chronic obstructive pulmonary disease) ([address] Diabetes mellitus ([address] Hyperlipidemia, Hypertension, and Oxygen dependent. has a past surgical history that includes Tonsillectomy and hernia repair.     Restrictions  Position Activity Restriction  Other position/activity restrictions: up with assist  Vision/Hearing  Vision: Impaired  Vision Exceptions: Wears glasses at all times  Hearing: Within functional limits     Subjective  General  Chart Reviewed: Yes  Patient assessed for rehabilitation services?: Yes  Additional Pertinent Hx: Pt is a 50 y.o. male admitted to [place] [address] on 9/16/21 from [place] [place] with complaints of SOB x2 weeks. XR chest: stable cardiomegaly, hazy bibasilar atelectasis. CT chest: no definite PE.  CT head: negative for acute abnormality. MRI brain: negative for acute abnormality. CT chest: concern for pneumonia. CT chest: almost complete resolution of airspace disease. Rapid Response and Stroke Alert called 9/17. Transfer to [place] [address]. PMH: CHF, COPD, DM, HLD, HTN. Family / Caregiver Present: No  Referring Practitioner: Grace Romo MD  Diagnosis: COPD exacerbation  Follows Commands: Within Functional Limits  General Comment  Comments: Pt found supine in bed upon PT arrival.  Pt agreeable to therapy session. Pt on 1L of O2 per nasal cannula throughout session. Subjective  Subjective: \"I feel much better. \"  Pain Screening  Patient Currently in Pain: Denies  Vital Signs  Patient Currently in Pain: No       Orientation  Orientation  Overall Orientation Status: Within Functional Limits  Social/Functional History  Social/Functional History  Lives With: Spouse  Type of Home: Apartment  Home Layout: One level (1st floor)  Home Access: Stairs to enter with rails  Entrance Stairs - Number of Steps: 2 ASHLYN with unilateral HR  Bathroom Shower/Tub: Tub/Shower unit, Shower chair with back  [employer]: Standard (sink nearby)  [employer]: Grab bars in shower, Shower chair, Hand-held shower  Home Equipment:  (none)  ADL Assistance: Independent  Homemaking Assistance: Independent (shares with wife, laundry in apartment)  Ambulation Assistance: Independent (without AD)  Transfer Assistance: Independent  Active : No  Patient's  Info: wife drives  Occupation: Retired  Leisure & Hobbies: fishing, being outdoors  Additional Comments: Pt reports that his wife will provide 24 hr supervision/assist at d/c.   Cognition        Objective          AROM RLE (degrees)  RLE AROM: WFL  AROM LLE (degrees)  LLE AROM : WFL  Strength RLE  R Hip Flexion: 5/5  R Knee Flexion: 5/5  R Knee Extension: 5/5  R Ankle Dorsiflexion: 5/5  Strength LLE  L Hip Flexion: 5/5  L Knee Flexion: 5/5  L Knee Extension: 5/5  L Ankle Dorsiflexion: 5/5        Bed mobility  Supine to Sit: Modified independent (HOB elevated)  Scooting: Independent (to scoot to EOB and back in recliner in sitting)  Transfers  Sit to Stand: Modified independent (from EOB and toilet to no AD)  Stand to sit: Modified independent  Ambulation  Ambulation?: Yes  Ambulation 1  Surface: level tile  Device: No Device  Other Apparatus: O2 (1L)  Assistance: Supervision;Modified Independent (supervision progressing to Carmella)  Quality of Gait: decreased anson, decreased B step height, no LOB  Distance: 200' (with stairs in middle of bout) + small distances in room  Comments: Pt reporting that he is ambulating at his baseline. SpO2 93% on 1L of O2 after bout. Stairs/Curb  Stairs?: Yes  Stairs  # Steps : 2  Stairs Height: 6\"  Rails: Right ascending  Assistance: Supervision  Comment: alternating pattern              Plan   Plan  Plan Comment: No acute goal identified due to pt performing all functional mobility at [facility]. Pt reports that he is functioning at his baseline and will have 24 hr supervision/assist from his wife at d/c. Safety Devices  Type of devices: All fall risk precautions in place, Call light within reach, Chair alarm in place, Gait belt, Left in chair, Nurse notified    G-Code       OutComes Score                                                  AM-PAC Score  AM-PAC Inpatient Mobility Raw Score : 21 (09/30/21 1233)  AM-PAC Inpatient T-Scale Score : 50.25 (09/30/21 1233)  Mobility Inpatient CMS 0-100% Score: 28.97 (09/30/21 1233)  Mobility Inpatient CMS G-Code Modifier : Pushpa Gaytan (09/30/21 1233)          Goals  Short term goals  Time Frame for Short term goals: No acute goal identified due to pt performing all functional mobility at [facility]. <-- Click to add NO pertinent Past Medical History

## 2023-08-22 ENCOUNTER — TELEPHONE (OUTPATIENT)
Dept: TELEMETRY | Age: 58
End: 2023-08-22

## 2023-08-30 NOTE — TELEPHONE ENCOUNTER
Future appt scheduled 09/07/2023                   Last appt 01/31/2023      Last Written 05/19/2023    Insulin Pen Needle ([employer] PEN NEEDLES 29G) 29G X 12MM MISC  #100  3 RF

## 2023-08-31 ENCOUNTER — OFFICE VISIT (OUTPATIENT)
Dept: CARDIOLOGY CLINIC | Age: 58
End: 2023-08-31
Payer: MEDICARE

## 2023-08-31 VITALS
SYSTOLIC BLOOD PRESSURE: 131 MMHG | HEART RATE: 95 BPM | BODY MASS INDEX: 31.02 KG/M2 | OXYGEN SATURATION: 91 % | HEIGHT: 60 IN | WEIGHT: 158 LBS | DIASTOLIC BLOOD PRESSURE: 84 MMHG

## 2023-08-31 DIAGNOSIS — I25.10 CAD IN NATIVE ARTERY: Primary | ICD-10-CM

## 2023-08-31 PROCEDURE — G8427 DOCREV CUR MEDS BY ELIG CLIN: HCPCS | Performed by: INTERNAL MEDICINE

## 2023-08-31 PROCEDURE — G8417 CALC BMI ABV UP PARAM F/U: HCPCS | Performed by: INTERNAL MEDICINE

## 2023-08-31 PROCEDURE — 4004F PT TOBACCO SCREEN RCVD TLK: CPT | Performed by: INTERNAL MEDICINE

## 2023-08-31 PROCEDURE — 99214 OFFICE O/P EST MOD 30 MIN: CPT | Performed by: INTERNAL MEDICINE

## 2023-08-31 PROCEDURE — 3079F DIAST BP 80-89 MM HG: CPT | Performed by: INTERNAL MEDICINE

## 2023-08-31 PROCEDURE — 3017F COLORECTAL CA SCREEN DOC REV: CPT | Performed by: INTERNAL MEDICINE

## 2023-08-31 PROCEDURE — 3075F SYST BP GE 130 - 139MM HG: CPT | Performed by: INTERNAL MEDICINE

## 2023-08-31 RX ORDER — LANCING DEVICE
EACH MISCELLANEOUS
Qty: 100 EACH | Refills: 3 | Status: SHIPPED | OUTPATIENT
Start: 2023-08-31

## 2023-08-31 RX ORDER — ROSUVASTATIN CALCIUM 40 MG/1
40 TABLET, COATED ORAL DAILY
Qty: 90 TABLET | Refills: 3 | Status: SHIPPED | OUTPATIENT
Start: 2023-08-31

## 2023-08-31 NOTE — PATIENT INSTRUCTIONS
Plan:  Smoking cessation encouraged. Cardiac medications reviewed including indications and pertinent side effects. Medication list updated at this visit.    Check blood pressure and heart rate at home a few times per week- keep a log with dates and times and bring to office visit   Regular exercise and following a healthy diet encouraged- low carb diet   Follow up with me in 1 year   Medication refilled

## 2023-08-31 NOTE — PROGRESS NOTES
dullness  Cardiovascular: The apical impulses not displaced  Heart tones are crisp and normal  Cervical veins are not engorged  The carotid upstroke is normal in amplitude and contour without delay or bruit  Normal S1S2, No S3, No Murmur  Peripheral pulses are symmetrical and full  There is no clubbing, cyanosis of the extremities. No edema  Femoral Arteries: 2+ and equal  Pedal Pulses: 2+ and equal   Abdomen:  No masses or tenderness  Liver/Spleen: No Abnormalities Noted  Neurological/Psychiatric:  Alert and oriented in all spheres  Moves all extremities well  Exhibits normal gait balance and coordination  No abnormalities of mood, affect, memory, mentation, or behavior are noted    Echocardiogram 2/8/2021  Summary   LV systolic function is normal with EF estimated at 55%. No regional wall motion abnormalities. MIldly asynchronous septal motion. There is mild concentric left ventricular hypertrophy. Grade II diastolic dysfunction with elevated LV filling pressure. Mild mitral annular calcification. The left atrium is mildly dilated. Mild mitral regurgitation. Inadequate tricuspid regurgitation jet to estimate systolic artery pressure     CT chest 9/17/2021  FINDINGS:  Pulmonary arteries:  No definite pulmonary embolus identified. Aorta:  Mild atherosclerotic changes of the aorta. No aortic aneurysm or dissection. Lungs:  Patchy densities in the lower lobes and right middle lobe may represent atelectasis versus infectious/inflammatory process. Dependent atelectasis bilaterally. Atelectasis in the lingula. No mass. Pleural space:  Trace right pleural effusion. No pneumothorax. Heart:  Cardiomegaly. Coronary artery calcifications. No significant pericardial effusion. No evidence of RV dysfunction. Mediastinum:  Nonspecific mildly prominent mediastinal and hilar lymph nodes. Calcified mediastinal and hilar lymph nodes. Bones/joints:  Mild degenerative changes of the spine.   No acute

## 2023-09-01 ENCOUNTER — TELEPHONE (OUTPATIENT)
Dept: ADMINISTRATIVE | Age: 58
End: 2023-09-01

## 2023-09-01 NOTE — TELEPHONE ENCOUNTER
Submitted PA for Magnesium Oxide -  Via CM Key: [id number] STATUS: NOT SENT. I NEED DIAGNOSES FOR THE USE OF THIS MEDICATION BEFORE I CAN BEGIN A PA FOR THIS MEDICATION. Follow up done daily; if no response in three days we will refax for status check. If another three days goes by with no response we will call the insurance for status. If this requires a response please respond to the pool. [facility]). Please advise patient thank you.

## 2023-09-06 NOTE — TELEPHONE ENCOUNTER
Questions . Have to create new key. Submitted PA for Magnesium Oxide -Mg Supplement 400 (240 Mg)MG tablets  Via [facility] Key: [id number] STATUS: PENDING. Follow up done daily; if no response in three days we will refax for status check. If another three days goes by with no response we will call the insurance for status.

## 2023-09-07 ENCOUNTER — OFFICE VISIT (OUTPATIENT)
Dept: FAMILY MEDICINE CLINIC | Age: 58
End: 2023-09-07
Payer: MEDICARE

## 2023-09-07 VITALS
TEMPERATURE: 97.9 F | BODY MASS INDEX: 31.02 KG/M2 | WEIGHT: 158 LBS | HEART RATE: 80 BPM | DIASTOLIC BLOOD PRESSURE: 78 MMHG | SYSTOLIC BLOOD PRESSURE: 121 MMHG | HEIGHT: 60 IN | OXYGEN SATURATION: 92 %

## 2023-09-07 DIAGNOSIS — Z12.2 ENCOUNTER FOR SCREENING FOR LUNG CANCER: ICD-10-CM

## 2023-09-07 DIAGNOSIS — J44.9 CHRONIC OBSTRUCTIVE PULMONARY DISEASE, UNSPECIFIED COPD TYPE (HCC): ICD-10-CM

## 2023-09-07 DIAGNOSIS — Z12.11 COLON CANCER SCREENING: ICD-10-CM

## 2023-09-07 DIAGNOSIS — Z87.891 PERSONAL HISTORY OF TOBACCO USE: ICD-10-CM

## 2023-09-07 DIAGNOSIS — E11.69 TYPE 2 DIABETES MELLITUS WITH OTHER SPECIFIED COMPLICATION, UNSPECIFIED WHETHER LONG TERM INSULIN USE (HCC): ICD-10-CM

## 2023-09-07 DIAGNOSIS — I50.22 CHRONIC SYSTOLIC CONGESTIVE HEART FAILURE (HCC): ICD-10-CM

## 2023-09-07 DIAGNOSIS — Z01.818 PRE-OP EXAM: Primary | ICD-10-CM

## 2023-09-07 DIAGNOSIS — I10 PRIMARY HYPERTENSION: ICD-10-CM

## 2023-09-07 DIAGNOSIS — E78.2 MIXED HYPERLIPIDEMIA: ICD-10-CM

## 2023-09-07 DIAGNOSIS — G62.9 NEUROPATHY: ICD-10-CM

## 2023-09-07 DIAGNOSIS — Z72.0 TOBACCO USE: ICD-10-CM

## 2023-09-07 LAB
ALBUMIN SERPL-MCNC: 4.5 G/DL (ref 3.4–5)
ALBUMIN/GLOB SERPL: 1.8 {RATIO} (ref 1.1–2.2)
ALP SERPL-CCNC: 86 U/L (ref 40–129)
ALT SERPL-CCNC: 52 U/L (ref 10–40)
ANION GAP SERPL CALCULATED.3IONS-SCNC: 12 MMOL/L (ref 3–16)
AST SERPL-CCNC: 33 U/L (ref 15–37)
BASOPHILS # BLD: 0.1 K/UL (ref 0–0.2)
BASOPHILS NFR BLD: 0.5 %
BILIRUB SERPL-MCNC: <0.2 MG/DL (ref 0–1)
BUN SERPL-MCNC: 19 MG/DL (ref 7–20)
CALCIUM SERPL-MCNC: 10 MG/DL (ref 8.3–10.6)
CHLORIDE SERPL-SCNC: 104 MMOL/L (ref 99–110)
CO2 SERPL-SCNC: 24 MMOL/L (ref 21–32)
CREAT SERPL-MCNC: 0.9 MG/DL (ref 0.9–1.3)
DEPRECATED RDW RBC AUTO: 15.1 % (ref 12.4–15.4)
EOSINOPHIL # BLD: 0.1 K/UL (ref 0–0.6)
EOSINOPHIL NFR BLD: 0.7 %
GFR SERPLBLD CREATININE-BSD FMLA CKD-EPI: >60 ML/MIN/{1.73_M2}
GLUCOSE SERPL-MCNC: 112 MG/DL (ref 70–99)
HBA1C MFR BLD: 8.5 %
HCT VFR BLD AUTO: 45.4 % (ref 40.5–52.5)
HGB BLD-MCNC: 15.2 G/DL (ref 13.5–17.5)
LYMPHOCYTES # BLD: 2.5 K/UL (ref 1–5.1)
LYMPHOCYTES NFR BLD: 22.8 %
MCH RBC QN AUTO: 29.4 PG (ref 26–34)
MCHC RBC AUTO-ENTMCNC: 33.4 G/DL (ref 31–36)
MCV RBC AUTO: 87.8 FL (ref 80–100)
MONOCYTES # BLD: 0.8 K/UL (ref 0–1.3)
MONOCYTES NFR BLD: 7 %
NEUTROPHILS # BLD: 7.6 K/UL (ref 1.7–7.7)
NEUTROPHILS NFR BLD: 69 %
PLATELET # BLD AUTO: 236 K/UL (ref 135–450)
PMV BLD AUTO: 8.9 FL (ref 5–10.5)
POTASSIUM SERPL-SCNC: 4.5 MMOL/L (ref 3.5–5.1)
PROT SERPL-MCNC: 7 G/DL (ref 6.4–8.2)
RBC # BLD AUTO: 5.17 M/UL (ref 4.2–5.9)
SODIUM SERPL-SCNC: 140 MMOL/L (ref 136–145)
WBC # BLD AUTO: 11 K/UL (ref 4–11)

## 2023-09-07 PROCEDURE — 3023F SPIROM DOC REV: CPT | Performed by: NURSE PRACTITIONER

## 2023-09-07 PROCEDURE — G8417 CALC BMI ABV UP PARAM F/U: HCPCS | Performed by: NURSE PRACTITIONER

## 2023-09-07 PROCEDURE — 3017F COLORECTAL CA SCREEN DOC REV: CPT | Performed by: NURSE PRACTITIONER

## 2023-09-07 PROCEDURE — G8427 DOCREV CUR MEDS BY ELIG CLIN: HCPCS | Performed by: NURSE PRACTITIONER

## 2023-09-07 PROCEDURE — G0296 VISIT TO DETERM LDCT ELIG: HCPCS | Performed by: NURSE PRACTITIONER

## 2023-09-07 PROCEDURE — 3074F SYST BP LT 130 MM HG: CPT | Performed by: NURSE PRACTITIONER

## 2023-09-07 PROCEDURE — 3046F HEMOGLOBIN A1C LEVEL >9.0%: CPT | Performed by: NURSE PRACTITIONER

## 2023-09-07 PROCEDURE — 36415 COLL VENOUS BLD VENIPUNCTURE: CPT | Performed by: NURSE PRACTITIONER

## 2023-09-07 PROCEDURE — 99214 OFFICE O/P EST MOD 30 MIN: CPT | Performed by: NURSE PRACTITIONER

## 2023-09-07 PROCEDURE — 83036 HEMOGLOBIN GLYCOSYLATED A1C: CPT | Performed by: NURSE PRACTITIONER

## 2023-09-07 PROCEDURE — 3078F DIAST BP <80 MM HG: CPT | Performed by: NURSE PRACTITIONER

## 2023-09-07 PROCEDURE — 2022F DILAT RTA XM EVC RTNOPTHY: CPT | Performed by: NURSE PRACTITIONER

## 2023-09-07 PROCEDURE — 4004F PT TOBACCO SCREEN RCVD TLK: CPT | Performed by: NURSE PRACTITIONER

## 2023-09-07 RX ORDER — FLUTICASONE FUROATE AND VILANTEROL 200; 25 UG/1; UG/1
1 POWDER RESPIRATORY (INHALATION) DAILY
Qty: 60 EACH | Refills: 5 | Status: SHIPPED | OUTPATIENT
Start: 2023-09-07

## 2023-09-07 RX ORDER — CALCIUM CARBONATE/VITAMIN D3 500-10/5ML
1 LIQUID (ML) ORAL DAILY
Qty: 30 CAPSULE | Refills: 5 | Status: SHIPPED | OUTPATIENT
Start: 2023-09-07

## 2023-09-07 RX ORDER — GABAPENTIN 800 MG/1
TABLET ORAL
Qty: 120 TABLET | Refills: 0 | Status: SHIPPED | OUTPATIENT
Start: 2023-09-07 | End: 2024-09-06

## 2023-09-07 RX ORDER — ALBUTEROL SULFATE 90 UG/1
2 AEROSOL, METERED RESPIRATORY (INHALATION) EVERY 6 HOURS PRN
Qty: 8.5 G | Refills: 5 | Status: SHIPPED | OUTPATIENT
Start: 2023-09-07

## 2023-09-07 SDOH — ECONOMIC STABILITY: INCOME INSECURITY: HOW HARD IS IT FOR YOU TO PAY FOR THE VERY BASICS LIKE FOOD, HOUSING, MEDICAL CARE, AND HEATING?: NOT HARD AT ALL

## 2023-09-07 SDOH — ECONOMIC STABILITY: HOUSING INSECURITY
IN THE LAST 12 MONTHS, WAS THERE A TIME WHEN YOU DID NOT HAVE A STEADY PLACE TO SLEEP OR SLEPT IN A SHELTER (INCLUDING NOW)?: NO

## 2023-09-07 SDOH — ECONOMIC STABILITY: FOOD INSECURITY: WITHIN THE PAST 12 MONTHS, THE FOOD YOU BOUGHT JUST DIDN'T LAST AND YOU DIDN'T HAVE MONEY TO GET MORE.: NEVER TRUE

## 2023-09-07 SDOH — ECONOMIC STABILITY: FOOD INSECURITY: WITHIN THE PAST 12 MONTHS, YOU WORRIED THAT YOUR FOOD WOULD RUN OUT BEFORE YOU GOT MONEY TO BUY MORE.: NEVER TRUE

## 2023-09-07 NOTE — PROGRESS NOTES
Pre Op Med History  Cold/Chronic Cough/Tuberculosis  --  no  Bronchitis/COPD  --  yes - COPD  Asthma/SOB  --  no  Rheumatic Fever/Heart Murmur  --  no  High Blood Pressure/Low Blood Pressure  --  yes - high  Swelling of Feet/Fluid In Lungs  --  no  Heart Attack/Chest Pain  --  no  Irregular, Fast Heartbeats  --  no  Do you bruise easily?   --  yes - sometimes   Anemia  --  no  Diabetes/Low Blood Sugar  -- diabetes   Are you Pregnant  --  N/A  Last Menstrual Period  --  N/A  Serious Illness During Pregnancy  --  N/A  Breast Disease  --  no  Kidney Disease  --  no  Jaundice/Hepatitis  --  no  Hiatal Hernia/Peptic Ulcer Disease  --  no  Convulsions/Epilepsy/Stroke  -- \"light stroke\"  Polio/Meningitis Paralysis  --  no  Back Pain/ Slipped Disc  --  no  Psychological Disease  --  no  Thyroid Disease  --  no  Glaucoma/Visual Impairment  --  yes - cataracts, glasses   Skeletal Deformities/Defects/Arthritis --  no  Loose Teeth/Caps on Front Teeth  --  no -- Dentures   Have you had any Surgery  --  yes - hernia repair   Any History of anesthesia complication in self or family  -- no  Prostate Disease  --  no  Cancer  --  no  DVT/PE/PVD --  no  Weight Loss/Gain  --  no
Patient has chronic dyspnea upon exertion. Blood pressure stable. Follow-up in 6 months, sooner for new or worsening symptoms. 4. Tobacco use/ Encounter for screening for lung cancer/personal history of tobacco use  Patient continues to smoke daily. Patient aware risk associated with tobacco abuse and comorbidities. orders placed for lung screening. Patient counseled the importance of calling and scheduling testing and following up with pulmonology. Smoking cessation advised and declined. 5. Colon cancer screening  Preventative screening recommended. referrals given. Patient encouraged to call and schedule appointment. Patient verbalized and acknowledges. 6.  COPD  Stable. No recent exacerbations. Patient reports that he is doing much better. Patient has cut back on tobacco abuse to half pack per day. Patient reports that he typically only wears his oxygen for few hours at night. Patient does continue to be on albuterol, Breo Ellipta and DuoNebs as directed. Continue with current treatment and management. Follow-up in 6 months, sooner for new or worsening symptoms. 7.  CHF  Stable. Managed by cardiology. Patient compliant currently with furosemide 20 mg daily. No worsening dyspnea, weight gain or swelling. Continue with current treatment management follow-up in 6 months, sooner for new or worsening symptoms. 8.  Mixed hyperlipidemia  Stable. Managed by cardiology. Patient currently on Trilipix 135 mg daily Crestor 40 mg daily. Ordered today and pending. Continue with current treatment management follow-up in 6 months, sooner for new or worsening symptoms. 9.  Neuropathy  Stable. Patient compliant with gabapentin daily. Patient denies any new or worsening symptoms. Patient counseled importance of glucose control and worsening neuropathy. Patient acknowledges. Continue with current treatment follow-up in 6 months, sooner for new or worsening symptoms.         The note was

## 2023-09-08 RX ORDER — INSULIN GLARGINE 100 [IU]/ML
30 INJECTION, SOLUTION SUBCUTANEOUS 2 TIMES DAILY
Qty: 15 ML | Refills: 5 | Status: SHIPPED | OUTPATIENT
Start: 2023-09-08

## 2023-09-08 NOTE — TELEPHONE ENCOUNTER
The medication was DENIED; DENIAL letter uploaded to MEDIA. If you want an APPEAL; please note in this encounter what new information you would like to APPEAL with. Once complete route back to PA POOL. If this requires a response please respond to the pool ( [phone]). Thank you please advise patient.

## 2023-09-11 DIAGNOSIS — E78.2 MIXED HYPERLIPIDEMIA: ICD-10-CM

## 2023-09-11 RX ORDER — PANTOPRAZOLE SODIUM 40 MG/1
TABLET, DELAYED RELEASE ORAL
Qty: 30 TABLET | Refills: 2 | Status: SHIPPED | OUTPATIENT
Start: 2023-09-11

## 2023-12-08 ENCOUNTER — OFFICE VISIT (OUTPATIENT)
Dept: FAMILY MEDICINE CLINIC | Age: 58
End: 2023-12-08
Payer: MEDICARE

## 2023-12-08 VITALS
WEIGHT: 167.25 LBS | HEART RATE: 90 BPM | HEIGHT: 60 IN | OXYGEN SATURATION: 90 % | SYSTOLIC BLOOD PRESSURE: 139 MMHG | TEMPERATURE: 97.2 F | BODY MASS INDEX: 32.83 KG/M2 | DIASTOLIC BLOOD PRESSURE: 79 MMHG

## 2023-12-08 DIAGNOSIS — Z01.818 PRE-OP EXAM: Primary | ICD-10-CM

## 2023-12-08 DIAGNOSIS — E11.69 TYPE 2 DIABETES MELLITUS WITH OTHER SPECIFIED COMPLICATION, UNSPECIFIED WHETHER LONG TERM INSULIN USE (HCC): ICD-10-CM

## 2023-12-08 LAB
ALBUMIN SERPL-MCNC: 4.1 G/DL (ref 3.4–5)
ALBUMIN/GLOB SERPL: 1.6 {RATIO} (ref 1.1–2.2)
ALP SERPL-CCNC: 106 U/L (ref 40–129)
ALT SERPL-CCNC: 19 U/L (ref 10–40)
ANION GAP SERPL CALCULATED.3IONS-SCNC: 12 MMOL/L (ref 3–16)
AST SERPL-CCNC: 19 U/L (ref 15–37)
BASOPHILS # BLD: 0.1 K/UL (ref 0–0.2)
BASOPHILS NFR BLD: 0.7 %
BILIRUB SERPL-MCNC: <0.2 MG/DL (ref 0–1)
BUN SERPL-MCNC: 15 MG/DL (ref 7–20)
CALCIUM SERPL-MCNC: 9.2 MG/DL (ref 8.3–10.6)
CHLORIDE SERPL-SCNC: 99 MMOL/L (ref 99–110)
CO2 SERPL-SCNC: 22 MMOL/L (ref 21–32)
CREAT SERPL-MCNC: 0.8 MG/DL (ref 0.9–1.3)
DEPRECATED RDW RBC AUTO: 15.4 % (ref 12.4–15.4)
EOSINOPHIL # BLD: 0.1 K/UL (ref 0–0.6)
EOSINOPHIL NFR BLD: 0.9 %
GFR SERPLBLD CREATININE-BSD FMLA CKD-EPI: >60 ML/MIN/{1.73_M2}
GLUCOSE SERPL-MCNC: 136 MG/DL (ref 70–99)
HCT VFR BLD AUTO: 47 % (ref 40.5–52.5)
HGB BLD-MCNC: 15.3 G/DL (ref 13.5–17.5)
LYMPHOCYTES # BLD: 2.6 K/UL (ref 1–5.1)
LYMPHOCYTES NFR BLD: 24.8 %
MCH RBC QN AUTO: 29.2 PG (ref 26–34)
MCHC RBC AUTO-ENTMCNC: 32.6 G/DL (ref 31–36)
MCV RBC AUTO: 89.4 FL (ref 80–100)
MONOCYTES # BLD: 0.6 K/UL (ref 0–1.3)
MONOCYTES NFR BLD: 5.9 %
NEUTROPHILS # BLD: 7.1 K/UL (ref 1.7–7.7)
NEUTROPHILS NFR BLD: 67.7 %
PLATELET # BLD AUTO: 283 K/UL (ref 135–450)
PMV BLD AUTO: 9.1 FL (ref 5–10.5)
POTASSIUM SERPL-SCNC: 4.4 MMOL/L (ref 3.5–5.1)
PROT SERPL-MCNC: 6.7 G/DL (ref 6.4–8.2)
RBC # BLD AUTO: 5.25 M/UL (ref 4.2–5.9)
SODIUM SERPL-SCNC: 133 MMOL/L (ref 136–145)
WBC # BLD AUTO: 10.5 K/UL (ref 4–11)

## 2023-12-08 PROCEDURE — 3075F SYST BP GE 130 - 139MM HG: CPT | Performed by: NURSE PRACTITIONER

## 2023-12-08 PROCEDURE — 3078F DIAST BP <80 MM HG: CPT | Performed by: NURSE PRACTITIONER

## 2023-12-08 PROCEDURE — 2022F DILAT RTA XM EVC RTNOPTHY: CPT | Performed by: NURSE PRACTITIONER

## 2023-12-08 PROCEDURE — G8484 FLU IMMUNIZE NO ADMIN: HCPCS | Performed by: NURSE PRACTITIONER

## 2023-12-08 PROCEDURE — G8417 CALC BMI ABV UP PARAM F/U: HCPCS | Performed by: NURSE PRACTITIONER

## 2023-12-08 PROCEDURE — 4004F PT TOBACCO SCREEN RCVD TLK: CPT | Performed by: NURSE PRACTITIONER

## 2023-12-08 PROCEDURE — 36415 COLL VENOUS BLD VENIPUNCTURE: CPT | Performed by: NURSE PRACTITIONER

## 2023-12-08 PROCEDURE — 99213 OFFICE O/P EST LOW 20 MIN: CPT | Performed by: NURSE PRACTITIONER

## 2023-12-08 PROCEDURE — 3017F COLORECTAL CA SCREEN DOC REV: CPT | Performed by: NURSE PRACTITIONER

## 2023-12-08 PROCEDURE — G8427 DOCREV CUR MEDS BY ELIG CLIN: HCPCS | Performed by: NURSE PRACTITIONER

## 2023-12-08 PROCEDURE — 3052F HG A1C>EQUAL 8.0%<EQUAL 9.0%: CPT | Performed by: NURSE PRACTITIONER

## 2023-12-08 NOTE — PROGRESS NOTES
Preop history and physical    Patient:  Yamilet Felton   YOB: 1965       Subjective:  Patient presents for evaluation of  his preop examination. He is scheduled for phacoemulsification with Intraocular lens implant of the left eye on 12/12/2023 with Dr Vita Brantley. He denies any recent illness or infections. No episodes of dizziness, lightheadedness, worsening dyspnea upon exertion or chest pain. No vomiting, diarrhea or dark stools. Patient reports taking medications as directed. Patient continues to smoke daily. No current use of anticoagulation therapy or NSAIDS. Patient reports use of Aspirin but aware of to hold prior to procedure. There is no significant history of abnormal bleeding or anesthesia complications.     Patient Active Problem List    Diagnosis Date Noted    Diabetic ketoacidosis without coma associated with type 2 diabetes mellitus ([address]) 10/26/2022     Priority: Medium    Acute pancreatitis without infection or necrosis 10/26/2022     Priority: Medium    Chronic respiratory failure with hypoxia ([address]) 10/26/2022     Priority: Medium    Electrolyte disorder 10/26/2022     Priority: Medium    Metabolic acidosis 58/65/4328     Priority: Medium    Septic shock due to Escherichia coli ([address]) 10/07/2021    Bacteremia due to Escherichia coli 10/07/2021    Severe hypoxemia 09/27/2021    Chest pain     Elevated d-dimer     Tobacco use     Type 2 diabetes mellitus without complication (HCC)     CAD in native artery     Acute on chronic respiratory failure with hypoxia and hypercapnia ([address]) 02/07/2021    Hypertension 03/30/2020    Mixed hyperlipidemia 03/30/2020    CHF (congestive heart failure) ([address]) 03/30/2020    Chronic obstructive pulmonary disease ([address]) 03/30/2020       Past Medical History:   Diagnosis Date    Acute respiratory failure ([address]) 07/11/2021    CHF (congestive heart failure) (HCC)     combined    COPD (chronic obstructive pulmonary disease) ([address])     Diabetes mellitus

## 2023-12-09 LAB
EST. AVERAGE GLUCOSE BLD GHB EST-MCNC: 174.3 MG/DL
HBA1C MFR BLD: 7.7 %

## 2023-12-11 DIAGNOSIS — E78.2 MIXED HYPERLIPIDEMIA: ICD-10-CM

## 2023-12-11 DIAGNOSIS — E11.69 TYPE 2 DIABETES MELLITUS WITH OTHER SPECIFIED COMPLICATION, UNSPECIFIED WHETHER LONG TERM INSULIN USE (HCC): ICD-10-CM

## 2023-12-11 RX ORDER — PANTOPRAZOLE SODIUM 40 MG/1
40 TABLET, DELAYED RELEASE ORAL
Qty: 90 TABLET | Refills: 1 | Status: SHIPPED | OUTPATIENT
Start: 2023-12-11

## 2023-12-11 RX ORDER — PEN NEEDLE, DIABETIC 31 GX5/16"
NEEDLE, DISPOSABLE MISCELLANEOUS
Qty: 100 EACH | Refills: 2 | Status: SHIPPED | OUTPATIENT
Start: 2023-12-11

## 2024-01-11 RX ORDER — FUROSEMIDE 20 MG/1
TABLET ORAL
Qty: 30 TABLET | Refills: 5 | Status: SHIPPED | OUTPATIENT
Start: 2024-01-11

## 2024-02-06 DIAGNOSIS — J44.9 CHRONIC OBSTRUCTIVE PULMONARY DISEASE, UNSPECIFIED COPD TYPE (HCC): ICD-10-CM

## 2024-02-06 RX ORDER — UMECLIDINIUM 62.5 UG/1
AEROSOL, POWDER ORAL
Qty: 30 EACH | Refills: 1 | Status: SHIPPED | OUTPATIENT
Start: 2024-02-06

## 2024-02-10 RX ORDER — GLIPIZIDE 5 MG/1
5 TABLET ORAL 2 TIMES DAILY
Qty: 60 TABLET | Refills: 3 | OUTPATIENT
Start: 2024-02-10

## 2024-03-06 DIAGNOSIS — J44.9 CHRONIC OBSTRUCTIVE PULMONARY DISEASE, UNSPECIFIED COPD TYPE (HCC): ICD-10-CM

## 2024-03-07 PROBLEM — R09.02 SEVERE HYPOXEMIA: Status: RESOLVED | Noted: 2021-09-27 | Resolved: 2024-03-07

## 2024-03-07 PROBLEM — E87.8 ELECTROLYTE DISORDER: Status: RESOLVED | Noted: 2022-10-26 | Resolved: 2024-03-07

## 2024-03-07 PROBLEM — B96.20 BACTEREMIA DUE TO ESCHERICHIA COLI: Status: RESOLVED | Noted: 2021-10-07 | Resolved: 2024-03-07

## 2024-03-07 PROBLEM — A41.51 SEPTIC SHOCK DUE TO ESCHERICHIA COLI (HCC): Status: RESOLVED | Noted: 2021-10-07 | Resolved: 2024-03-07

## 2024-03-07 PROBLEM — R65.21 SEPTIC SHOCK DUE TO ESCHERICHIA COLI (HCC): Status: RESOLVED | Noted: 2021-10-07 | Resolved: 2024-03-07

## 2024-03-07 PROBLEM — R78.81 BACTEREMIA DUE TO ESCHERICHIA COLI: Status: RESOLVED | Noted: 2021-10-07 | Resolved: 2024-03-07

## 2024-03-07 PROBLEM — J96.11 CHRONIC RESPIRATORY FAILURE WITH HYPOXIA (HCC): Status: RESOLVED | Noted: 2022-10-26 | Resolved: 2024-03-07

## 2024-03-07 PROBLEM — J96.21 ACUTE ON CHRONIC RESPIRATORY FAILURE WITH HYPOXIA AND HYPERCAPNIA (HCC): Status: RESOLVED | Noted: 2021-02-07 | Resolved: 2024-03-07

## 2024-03-07 PROBLEM — J96.22 ACUTE ON CHRONIC RESPIRATORY FAILURE WITH HYPOXIA AND HYPERCAPNIA (HCC): Status: RESOLVED | Noted: 2021-02-07 | Resolved: 2024-03-07

## 2024-03-07 RX ORDER — LANCING DEVICE
EACH MISCELLANEOUS
Qty: 100 EACH | Refills: 2 | Status: SHIPPED | OUTPATIENT
Start: 2024-03-07

## 2024-03-07 RX ORDER — FLUTICASONE FUROATE AND VILANTEROL TRIFENATATE 200; 25 UG/1; UG/1
POWDER RESPIRATORY (INHALATION)
Qty: 60 EACH | Refills: 2 | Status: SHIPPED | OUTPATIENT
Start: 2024-03-07

## 2024-03-08 ENCOUNTER — APPOINTMENT (OUTPATIENT)
Dept: GENERAL RADIOLOGY | Age: 59
End: 2024-03-08
Payer: MEDICARE

## 2024-03-08 ENCOUNTER — HOSPITAL ENCOUNTER (EMERGENCY)
Age: 59
Discharge: HOME | End: 2024-03-09
Attending: EMERGENCY MEDICINE
Payer: MEDICARE

## 2024-03-08 DIAGNOSIS — Z87.09 HISTORY OF COPD: ICD-10-CM

## 2024-03-08 DIAGNOSIS — E83.42 HYPOMAGNESEMIA: ICD-10-CM

## 2024-03-08 DIAGNOSIS — Z91.199 NONCOMPLIANCE WITH TREATMENT: ICD-10-CM

## 2024-03-08 DIAGNOSIS — J96.11 CHRONIC RESPIRATORY FAILURE WITH HYPOXIA (HCC): Primary | ICD-10-CM

## 2024-03-08 LAB
ALBUMIN SERPL-MCNC: 3.3 G/DL (ref 3.4–5)
ALBUMIN/GLOB SERPL: 1.1 {RATIO} (ref 1.1–2.2)
ALP SERPL-CCNC: 79 U/L (ref 40–129)
ALT SERPL-CCNC: 10 U/L (ref 10–40)
ANION GAP SERPL CALCULATED.3IONS-SCNC: 12 MMOL/L (ref 3–16)
AST SERPL-CCNC: 12 U/L (ref 15–37)
BASOPHILS # BLD: 0.1 K/UL (ref 0–0.2)
BASOPHILS NFR BLD: 0.7 %
BILIRUB SERPL-MCNC: <0.2 MG/DL (ref 0–1)
BUN SERPL-MCNC: 13 MG/DL (ref 7–20)
CALCIUM SERPL-MCNC: 8.9 MG/DL (ref 8.3–10.6)
CHLORIDE SERPL-SCNC: 99 MMOL/L (ref 99–110)
CO2 SERPL-SCNC: 22 MMOL/L (ref 21–32)
CREAT SERPL-MCNC: 0.6 MG/DL (ref 0.9–1.3)
DEPRECATED RDW RBC AUTO: 14.1 % (ref 12.4–15.4)
EOSINOPHIL # BLD: 0.1 K/UL (ref 0–0.6)
EOSINOPHIL NFR BLD: 0.8 %
FLUAV RNA RESP QL NAA+PROBE: NOT DETECTED
FLUBV RNA RESP QL NAA+PROBE: NOT DETECTED
GFR SERPLBLD CREATININE-BSD FMLA CKD-EPI: >60 ML/MIN/{1.73_M2}
GLUCOSE SERPL-MCNC: 148 MG/DL (ref 70–99)
HCT VFR BLD AUTO: 41.2 % (ref 40.5–52.5)
HGB BLD-MCNC: 13.7 G/DL (ref 13.5–17.5)
LACTATE BLDV-SCNC: 0.7 MMOL/L (ref 0.4–1.9)
LYMPHOCYTES # BLD: 2.6 K/UL (ref 1–5.1)
LYMPHOCYTES NFR BLD: 26.3 %
MAGNESIUM SERPL-MCNC: 1.6 MG/DL (ref 1.8–2.4)
MCH RBC QN AUTO: 29.2 PG (ref 26–34)
MCHC RBC AUTO-ENTMCNC: 33.2 G/DL (ref 31–36)
MCV RBC AUTO: 87.9 FL (ref 80–100)
MONOCYTES # BLD: 0.5 K/UL (ref 0–1.3)
MONOCYTES NFR BLD: 5.3 %
NEUTROPHILS # BLD: 6.7 K/UL (ref 1.7–7.7)
NEUTROPHILS NFR BLD: 66.9 %
NT-PROBNP SERPL-MCNC: 123 PG/ML (ref 0–124)
PLATELET # BLD AUTO: 247 K/UL (ref 135–450)
PMV BLD AUTO: 9 FL (ref 5–10.5)
POTASSIUM SERPL-SCNC: 3.4 MMOL/L (ref 3.5–5.1)
PROT SERPL-MCNC: 6.3 G/DL (ref 6.4–8.2)
RBC # BLD AUTO: 4.69 M/UL (ref 4.2–5.9)
SARS-COV-2 RNA RESP QL NAA+PROBE: NOT DETECTED
SODIUM SERPL-SCNC: 133 MMOL/L (ref 136–145)
TROPONIN, HIGH SENSITIVITY: 8 NG/L (ref 0–22)
TROPONIN, HIGH SENSITIVITY: 8 NG/L (ref 0–22)
WBC # BLD AUTO: 10 K/UL (ref 4–11)

## 2024-03-08 PROCEDURE — 84484 ASSAY OF TROPONIN QUANT: CPT

## 2024-03-08 PROCEDURE — 71046 X-RAY EXAM CHEST 2 VIEWS: CPT

## 2024-03-08 PROCEDURE — 83735 ASSAY OF MAGNESIUM: CPT

## 2024-03-08 PROCEDURE — 93005 ELECTROCARDIOGRAM TRACING: CPT | Performed by: EMERGENCY MEDICINE

## 2024-03-08 PROCEDURE — 85025 COMPLETE CBC W/AUTO DIFF WBC: CPT

## 2024-03-08 PROCEDURE — 80053 COMPREHEN METABOLIC PANEL: CPT

## 2024-03-08 PROCEDURE — 87636 SARSCOV2 & INF A&B AMP PRB: CPT

## 2024-03-08 PROCEDURE — 83880 ASSAY OF NATRIURETIC PEPTIDE: CPT

## 2024-03-08 PROCEDURE — 83605 ASSAY OF LACTIC ACID: CPT

## 2024-03-08 PROCEDURE — 99285 EMERGENCY DEPT VISIT HI MDM: CPT

## 2024-03-08 PROCEDURE — 2580000003 HC RX 258: Performed by: PHYSICIAN ASSISTANT

## 2024-03-08 PROCEDURE — 96365 THER/PROPH/DIAG IV INF INIT: CPT

## 2024-03-08 PROCEDURE — 96375 TX/PRO/DX INJ NEW DRUG ADDON: CPT

## 2024-03-08 PROCEDURE — 6360000002 HC RX W HCPCS: Performed by: PHYSICIAN ASSISTANT

## 2024-03-08 RX ORDER — MAGNESIUM SULFATE IN WATER 40 MG/ML
2000 INJECTION, SOLUTION INTRAVENOUS ONCE
Status: COMPLETED | OUTPATIENT
Start: 2024-03-08 | End: 2024-03-09

## 2024-03-08 RX ADMIN — MAGNESIUM SULFATE HEPTAHYDRATE 2000 MG: 40 INJECTION, SOLUTION INTRAVENOUS at 22:58

## 2024-03-08 RX ADMIN — METHYLPREDNISOLONE SODIUM SUCCINATE 125 MG: 125 INJECTION INTRAMUSCULAR; INTRAVENOUS at 22:54

## 2024-03-08 ASSESSMENT — PAIN - FUNCTIONAL ASSESSMENT: PAIN_FUNCTIONAL_ASSESSMENT: 0-10

## 2024-03-08 ASSESSMENT — PAIN SCALES - GENERAL: PAINLEVEL_OUTOF10: 2

## 2024-03-08 ASSESSMENT — PAIN DESCRIPTION - LOCATION: LOCATION: HEAD

## 2024-03-08 ASSESSMENT — LIFESTYLE VARIABLES
HOW MANY STANDARD DRINKS CONTAINING ALCOHOL DO YOU HAVE ON A TYPICAL DAY: PATIENT DOES NOT DRINK
HOW OFTEN DO YOU HAVE A DRINK CONTAINING ALCOHOL: NEVER

## 2024-03-09 VITALS
HEIGHT: 60 IN | DIASTOLIC BLOOD PRESSURE: 88 MMHG | TEMPERATURE: 98.2 F | OXYGEN SATURATION: 93 % | WEIGHT: 143 LBS | RESPIRATION RATE: 20 BRPM | SYSTOLIC BLOOD PRESSURE: 146 MMHG | BODY MASS INDEX: 28.07 KG/M2 | HEART RATE: 83 BPM

## 2024-03-09 LAB
EKG ATRIAL RATE: 88 BPM
EKG DIAGNOSIS: NORMAL
EKG P AXIS: 41 DEGREES
EKG P-R INTERVAL: 174 MS
EKG Q-T INTERVAL: 380 MS
EKG QRS DURATION: 86 MS
EKG QTC CALCULATION (BAZETT): 459 MS
EKG R AXIS: 105 DEGREES
EKG T AXIS: 56 DEGREES
EKG VENTRICULAR RATE: 88 BPM

## 2024-03-09 PROCEDURE — 93010 ELECTROCARDIOGRAM REPORT: CPT | Performed by: INTERNAL MEDICINE

## 2024-03-09 NOTE — DISCHARGE INSTRUCTIONS
Please follow-up with your primary care physician for further evaluation and treatment.  Continue to wear your oxygen at home.  If you develop any new or worsening symptoms return to the emergency department.

## 2024-03-09 NOTE — ED PROVIDER NOTES
Emergency Department Provider Note     Location: [facility]  ED  3/8/2024    I independently performed a history and physical on Bradford Pulliam.   All diagnostic, treatment, and disposition decisions were made by myself in conjunction with the mid-level provider.     Bradford Pulliam is a 58 y.o. male here for SOB.  Patient has a history of COPD and has a baseline O2 requirement but he decided to take his oxygen off because it was too inconvenient to be tied to an oxygen.  Today, he developed shortness of breath, chest pain, and dizziness.  He was found to be hypoxic when EMS arrived.  He was given DuoNeb and then placed on 3.5 L of oxygen via nasal cannula.  He reported feeling's much better and by the time I saw the patient he said he was symptom-free.      ED Triage Vitals   BP Temp Temp Source Pulse Respirations SpO2 Height Weight - Scale   03/08/24 2047 03/08/24 2047 03/09/24 0017 03/08/24 2047 03/08/24 2047 03/08/24 2047 03/08/24 2047 03/08/24 2047   107/73 98.2 °F (36.8 °C) Oral 84 18 92 % 1.499 m (4' 11\") 64.9 kg (143 lb)        Exam showed WDWN male awake, alert, no facial droop, no slurred speech, heart RRR, lungs clear, motor grossly intact with movement of all 4 extremities.  No pitting edema of the lower extremities.      ED course/MDM  Patient seen and examined in room 18    EKG  The Ekg interpreted by me in the absence of a cardiologist shows.  normal sinus rhythm with a rate of 88  Axis is   Right axis deviation  QTc is  normal  Intervals and Durations are unremarkable.      No specific ST-T wave changes appreciated.  No evidence of acute ischemia.   Compared to prior EKG dated October 25, 2022, patient is no longer tachycardic today.  Previous EKG shows sinus tachycardia with a heart rate of 105.      Radiology  XR CHEST (2 VW)    Result Date: 3/8/2024  EXAMINATION: TWO XRAY VIEWS OF THE CHEST 3/8/2024 8:59 pm COMPARISON: 10/25/2022 HISTORY: ORDERING SYSTEM PROVIDED HISTORY: SOB TECHNOLOGIST  PROVIDED HISTORY: Reason for exam:->SOB Reason for Exam: shortness of breath, chest pain FINDINGS: The heart is normal.  The pulmonary vessels are normal.  The lungs are mildly hyperinflated and emphysematous.  There mild linear densities scattered along the lung bases which is less prominent.  No effusion or consolidation is seen.  The bones are intact.     Chronic obstructive lung changes with mild bibasilar discoid atelectasis or scarring which is less prominent.        Labs  Results for orders placed or performed during the hospital encounter of 03/08/24   COVID-19 & Influenza Combo    Specimen: Nasopharyngeal Swab   Result Value Ref Range    SARS-CoV-2 RNA, RT PCR NOT DETECTED NOT DETECTED    INFLUENZA A NOT DETECTED NOT DETECTED    INFLUENZA B NOT DETECTED NOT DETECTED   CBC with Auto Differential   Result Value Ref Range    WBC 10.0 4.0 - 11.0 K/uL    RBC 4.69 4.20 - 5.90 M/uL    Hemoglobin 13.7 13.5 - 17.5 g/dL    Hematocrit 41.2 40.5 - 52.5 %    MCV 87.9 80.0 - 100.0 fL    MCH 29.2 26.0 - 34.0 pg    MCHC 33.2 31.0 - 36.0 g/dL    RDW 14.1 12.4 - 15.4 %    Platelets 247 135 - 450 K/uL    MPV 9.0 5.0 - 10.5 fL    Neutrophils % 66.9 %    Lymphocytes % 26.3 %    Monocytes % 5.3 %    Eosinophils % 0.8 %    Basophils % 0.7 %    Neutrophils Absolute 6.7 1.7 - 7.7 K/uL    Lymphocytes Absolute 2.6 1.0 - 5.1 K/uL    Monocytes Absolute 0.5 0.0 - 1.3 K/uL    Eosinophils Absolute 0.1 0.0 - 0.6 K/uL    Basophils Absolute 0.1 0.0 - 0.2 K/uL   CMP w/ Reflex to MG   Result Value Ref Range    Sodium 133 (L) 136 - 145 mmol/L    Potassium reflex Magnesium 3.4 (L) 3.5 - 5.1 mmol/L    Chloride 99 99 - 110 mmol/L    CO2 22 21 - 32 mmol/L    Anion Gap 12 3 - 16    Glucose 148 (H) 70 - 99 mg/dL    BUN 13 7 - 20 mg/dL    Creatinine 0.6 (L) 0.9 - 1.3 mg/dL    Est, Glom Filt Rate >60 >60    Calcium 8.9 8.3 - 10.6 mg/dL    Total Protein 6.3 (L) 6.4 - 8.2 g/dL    Albumin 3.3 (L) 3.4 - 5.0 g/dL    Albumin/Globulin Ratio 1.1 1.1 - 2.2

## 2024-03-09 NOTE — ED PROVIDER NOTES
[facility]  ED  EMERGENCY DEPARTMENT ENCOUNTER        Pt Name: Bradford Pulliam  MRN: [id number]  Birthdate 1965  Date of evaluation: 3/8/2024  Provider: MANUEL Soler  PCP: Zoila Olvera APRN - CNP  Note Started: 8:56 PM EST 3/8/24       I have seen and evaluated this patient with my supervising physician Lencho Em MD.      CHIEF COMPLAINT       Chief Complaint   Patient presents with    Shortness of Breath     86% RA, states he normally only wears o2 at night. Given one 1 duoneb in route.    Chest Pain     Chest pressure since this morning, recent MI at [facility]. Given 324 ASA and 50mcg fentanyl in route.     Dizziness     X 2 days, states it is better now, ems reports family was concerned for slurred speech. Also complaining of severe headache.        HISTORY OF PRESENT ILLNESS: 1 or more Elements     History from : Patient    Limitations to history : None    Bradford Pulliam is a 58 y.o. male who presents to the emergency department complaining of shortness of breath, chest pain and dizziness.  Patient states symptoms have been ongoing for 2 days.  He arrived via EMS.  He does have a history of COPD and has oxygen at home but admits that he does not wear his oxygen.  He was given a DuoNeb and route and was placed on 3.5 L nasal cannula, now denies any symptoms.  He reports that he had chest pain this morning was given aspirin and fentanyl and route by EMS.  He also reports that he had some dizziness with a severe headache.  However after he was placed on the oxygen here in the emergency department he is reporting to me that he feels great and feels like all of his symptoms have completely subsided.  Denies any fevers or chills.  He states the chest pain felt like chest pressure did not radiate anywhere and has since resolved.  No nausea or vomiting.  He is a current smoker.,  This likely is why he is not wearing his oxygen at home as he is supposed to.    Nursing Notes were all

## 2024-03-11 ENCOUNTER — OFFICE VISIT (OUTPATIENT)
Dept: FAMILY MEDICINE CLINIC | Age: 59
End: 2024-03-11
Payer: MEDICARE

## 2024-03-11 VITALS
OXYGEN SATURATION: 90 % | DIASTOLIC BLOOD PRESSURE: 73 MMHG | SYSTOLIC BLOOD PRESSURE: 111 MMHG | HEIGHT: 60 IN | HEART RATE: 89 BPM | TEMPERATURE: 97.7 F | WEIGHT: 162 LBS | BODY MASS INDEX: 31.8 KG/M2

## 2024-03-11 DIAGNOSIS — Z12.11 COLON CANCER SCREENING: ICD-10-CM

## 2024-03-11 DIAGNOSIS — Z12.2 SCREENING FOR LUNG CANCER: ICD-10-CM

## 2024-03-11 DIAGNOSIS — J44.9 CHRONIC OBSTRUCTIVE PULMONARY DISEASE, UNSPECIFIED COPD TYPE (HCC): ICD-10-CM

## 2024-03-11 DIAGNOSIS — E78.2 MIXED HYPERLIPIDEMIA: ICD-10-CM

## 2024-03-11 DIAGNOSIS — I50.22 CHRONIC SYSTOLIC CONGESTIVE HEART FAILURE (HCC): ICD-10-CM

## 2024-03-11 DIAGNOSIS — E11.69 TYPE 2 DIABETES MELLITUS WITH OTHER SPECIFIED COMPLICATION, UNSPECIFIED WHETHER LONG TERM INSULIN USE (HCC): Primary | ICD-10-CM

## 2024-03-11 DIAGNOSIS — G62.9 NEUROPATHY: ICD-10-CM

## 2024-03-11 DIAGNOSIS — Z87.891 PERSONAL HISTORY OF TOBACCO USE: ICD-10-CM

## 2024-03-11 DIAGNOSIS — I10 PRIMARY HYPERTENSION: ICD-10-CM

## 2024-03-11 LAB
CREATININE URINE POCT: ABNORMAL
MICROALBUMIN/CREAT 24H UR: ABNORMAL MG/G{CREAT}
MICROALBUMIN/CREAT UR-RTO: ABNORMAL

## 2024-03-11 PROCEDURE — 2022F DILAT RTA XM EVC RTNOPTHY: CPT | Performed by: NURSE PRACTITIONER

## 2024-03-11 PROCEDURE — G8484 FLU IMMUNIZE NO ADMIN: HCPCS | Performed by: NURSE PRACTITIONER

## 2024-03-11 PROCEDURE — 36415 COLL VENOUS BLD VENIPUNCTURE: CPT | Performed by: NURSE PRACTITIONER

## 2024-03-11 PROCEDURE — 3017F COLORECTAL CA SCREEN DOC REV: CPT | Performed by: NURSE PRACTITIONER

## 2024-03-11 PROCEDURE — 3078F DIAST BP <80 MM HG: CPT | Performed by: NURSE PRACTITIONER

## 2024-03-11 PROCEDURE — G8427 DOCREV CUR MEDS BY ELIG CLIN: HCPCS | Performed by: NURSE PRACTITIONER

## 2024-03-11 PROCEDURE — G8417 CALC BMI ABV UP PARAM F/U: HCPCS | Performed by: NURSE PRACTITIONER

## 2024-03-11 PROCEDURE — 4004F PT TOBACCO SCREEN RCVD TLK: CPT | Performed by: NURSE PRACTITIONER

## 2024-03-11 PROCEDURE — 82044 UR ALBUMIN SEMIQUANTITATIVE: CPT | Performed by: NURSE PRACTITIONER

## 2024-03-11 PROCEDURE — 99214 OFFICE O/P EST MOD 30 MIN: CPT | Performed by: NURSE PRACTITIONER

## 2024-03-11 PROCEDURE — 3046F HEMOGLOBIN A1C LEVEL >9.0%: CPT | Performed by: NURSE PRACTITIONER

## 2024-03-11 PROCEDURE — 3074F SYST BP LT 130 MM HG: CPT | Performed by: NURSE PRACTITIONER

## 2024-03-11 PROCEDURE — 3023F SPIROM DOC REV: CPT | Performed by: NURSE PRACTITIONER

## 2024-03-11 ASSESSMENT — ENCOUNTER SYMPTOMS
SORE THROAT: 0
SHORTNESS OF BREATH: 1
VOMITING: 0
NAUSEA: 0
WHEEZING: 0
ABDOMINAL PAIN: 0
DIARRHEA: 0
RHINORRHEA: 0
COUGH: 0

## 2024-03-11 ASSESSMENT — PATIENT HEALTH QUESTIONNAIRE - PHQ9
SUM OF ALL RESPONSES TO PHQ QUESTIONS 1-9: 0
SUM OF ALL RESPONSES TO PHQ9 QUESTIONS 1 & 2: 0
2. FEELING DOWN, DEPRESSED OR HOPELESS: 0
1. LITTLE INTEREST OR PLEASURE IN DOING THINGS: 0
SUM OF ALL RESPONSES TO PHQ QUESTIONS 1-9: 0

## 2024-03-11 NOTE — PROGRESS NOTES
Patient to continue to follow-up with pulmonology.  Patient verbalized and acknowledges.    4. Mixed hyperlipidemia  Stable.  Patient compliant with rosuvastatin 40 mg daily.  Continue current treatment management follow-up in 6 months, sooner for new or worsening symptoms.  - Lipid, Fasting    5. Primary hypertension  Stable.  No current use of medications.  Continue current treatment and management follow-up in 6 months, sooner for new or worsening symptoms.    6. Personal history of tobacco use  Smoking cessation advised.  Patient continues to smoke.  Patient aware of risks.  Orders previously placed for CT lung screening.  Patient aware.    7. Screening for lung cancer  See above #1    8. Colon cancer screening  Preventative screening recommended. Previous fit test 2021 positive, patient declines colonoscopy.  Patient educated on risk associated with declining testing and previous positive testing results.  Additional FIT kit given to patient with instructions on use.  - POCT Fecal Immunochemical Test (FIT); Future     9. Neuropathy  Stable.  Managed by pain management.  Patient compliant with Neurontin for treatment.  Patient reports that with worsening neuropathy has noted more difficult when he is out shopping.  Patient would like a motorized scooter to help with ambulatory assistance.  Patient also reports that because of COPD he becomes very short of breath upon minimal exertion.  We did discuss concerns with motorized scooter with ambulatory assistance.  Patient aware that he must go have physical therapy evaluation in order for his insurance to cover device.  Once evaluation has been obtained we will place appropriate orders.  Patient verbalized and acknowledges.    The note was completed using Dragon voice recognition transcription. Every effort was made to ensure accuracy; however, inadvertent  transcription errors may be present despite my best efforts to edit errors.    Zoila Olvera, APRN - CNP

## 2024-03-12 ENCOUNTER — TELEMEDICINE (OUTPATIENT)
Dept: FAMILY MEDICINE CLINIC | Age: 59
End: 2024-03-12
Payer: MEDICARE

## 2024-03-12 DIAGNOSIS — Z00.00 MEDICARE ANNUAL WELLNESS VISIT, SUBSEQUENT: Primary | ICD-10-CM

## 2024-03-12 DIAGNOSIS — Z87.891 PERSONAL HISTORY OF TOBACCO USE: ICD-10-CM

## 2024-03-12 LAB
ALBUMIN SERPL-MCNC: 3.7 G/DL (ref 3.4–5)
ALBUMIN/GLOB SERPL: 1.2 {RATIO} (ref 1.1–2.2)
ALP SERPL-CCNC: 90 U/L (ref 40–129)
ALT SERPL-CCNC: 17 U/L (ref 10–40)
ANION GAP SERPL CALCULATED.3IONS-SCNC: 11 MMOL/L (ref 3–16)
AST SERPL-CCNC: 17 U/L (ref 15–37)
BILIRUB SERPL-MCNC: <0.2 MG/DL (ref 0–1)
BUN SERPL-MCNC: 19 MG/DL (ref 7–20)
CALCIUM SERPL-MCNC: 9.2 MG/DL (ref 8.3–10.6)
CHLORIDE SERPL-SCNC: 96 MMOL/L (ref 99–110)
CHOLEST SERPL-MCNC: 287 MG/DL (ref 0–199)
CO2 SERPL-SCNC: 27 MMOL/L (ref 21–32)
CREAT SERPL-MCNC: 1 MG/DL (ref 0.9–1.3)
EST. AVERAGE GLUCOSE BLD GHB EST-MCNC: 352.2 MG/DL
GFR SERPLBLD CREATININE-BSD FMLA CKD-EPI: >60 ML/MIN/{1.73_M2}
GLUCOSE SERPL-MCNC: 277 MG/DL (ref 70–99)
HBA1C MFR BLD: 13.9 %
HDLC SERPL-MCNC: 24 MG/DL (ref 40–60)
LDL CHOLESTEROL CALCULATED: ABNORMAL MG/DL
LDLC SERPL-MCNC: 112 MG/DL
POTASSIUM SERPL-SCNC: 4.1 MMOL/L (ref 3.5–5.1)
PROT SERPL-MCNC: 6.7 G/DL (ref 6.4–8.2)
SODIUM SERPL-SCNC: 134 MMOL/L (ref 136–145)
TRIGL SERPL-MCNC: 629 MG/DL (ref 0–150)
VLDLC SERPL CALC-MCNC: ABNORMAL MG/DL

## 2024-03-12 PROCEDURE — G8484 FLU IMMUNIZE NO ADMIN: HCPCS | Performed by: NURSE PRACTITIONER

## 2024-03-12 PROCEDURE — G0439 PPPS, SUBSEQ VISIT: HCPCS | Performed by: NURSE PRACTITIONER

## 2024-03-12 PROCEDURE — 3017F COLORECTAL CA SCREEN DOC REV: CPT | Performed by: NURSE PRACTITIONER

## 2024-03-12 PROCEDURE — G0296 VISIT TO DETERM LDCT ELIG: HCPCS | Performed by: NURSE PRACTITIONER

## 2024-03-12 ASSESSMENT — PATIENT HEALTH QUESTIONNAIRE - PHQ9
SUM OF ALL RESPONSES TO PHQ QUESTIONS 1-9: 0
SUM OF ALL RESPONSES TO PHQ QUESTIONS 1-9: 0
SUM OF ALL RESPONSES TO PHQ9 QUESTIONS 1 & 2: 0
2. FEELING DOWN, DEPRESSED OR HOPELESS: 0
SUM OF ALL RESPONSES TO PHQ QUESTIONS 1-9: 0
SUM OF ALL RESPONSES TO PHQ QUESTIONS 1-9: 0
1. LITTLE INTEREST OR PLEASURE IN DOING THINGS: 0

## 2024-03-12 ASSESSMENT — LIFESTYLE VARIABLES
HOW OFTEN DO YOU HAVE A DRINK CONTAINING ALCOHOL: NEVER
HOW MANY STANDARD DRINKS CONTAINING ALCOHOL DO YOU HAVE ON A TYPICAL DAY: PATIENT DOES NOT DRINK

## 2024-03-12 NOTE — PATIENT INSTRUCTIONS
necessary to meet this goal.  When exposed to the sun, use a sunscreen that protects against both UVA and UVB radiation with an SPF of 30 or greater. Reapply every 2 to 3 hours or after sweating, drying off with a towel, or swimming.  Always wear a seat belt when traveling in a car. Always wear a helmet when riding a bicycle or motorcycle.

## 2024-03-12 NOTE — PROGRESS NOTES
this is a billable service, which includes applicable co-pays. This Virtual Visit was conducted with patient's (and/or legal guardian's) consent. Patient identification was verified, and a caregiver was present when appropriate.   The patient was located at Home: [address] [place] [address]  Provider was located at Other: [place]

## 2024-03-14 ENCOUNTER — HOSPITAL ENCOUNTER (EMERGENCY)
Age: 59
Discharge: LEFT BEFORE DISCHARGE | End: 2024-03-14
Attending: EMERGENCY MEDICINE
Payer: MEDICARE

## 2024-03-14 ENCOUNTER — APPOINTMENT (OUTPATIENT)
Dept: MRI IMAGING | Age: 59
End: 2024-03-14
Payer: MEDICARE

## 2024-03-14 ENCOUNTER — APPOINTMENT (OUTPATIENT)
Dept: CT IMAGING | Age: 59
End: 2024-03-14
Payer: MEDICARE

## 2024-03-14 VITALS
BODY MASS INDEX: 31.8 KG/M2 | HEART RATE: 72 BPM | HEIGHT: 60 IN | SYSTOLIC BLOOD PRESSURE: 123 MMHG | WEIGHT: 162 LBS | DIASTOLIC BLOOD PRESSURE: 72 MMHG | TEMPERATURE: 98.7 F | RESPIRATION RATE: 18 BRPM | OXYGEN SATURATION: 93 %

## 2024-03-14 DIAGNOSIS — R51.9 ACUTE NONINTRACTABLE HEADACHE, UNSPECIFIED HEADACHE TYPE: ICD-10-CM

## 2024-03-14 DIAGNOSIS — I63.9 CEREBROVASCULAR ACCIDENT (CVA), UNSPECIFIED MECHANISM (HCC): Primary | ICD-10-CM

## 2024-03-14 DIAGNOSIS — E11.65 HYPERGLYCEMIA DUE TO DIABETES MELLITUS (HCC): ICD-10-CM

## 2024-03-14 DIAGNOSIS — G45.9 TIA (TRANSIENT ISCHEMIC ATTACK): ICD-10-CM

## 2024-03-14 LAB
ALBUMIN SERPL-MCNC: 3.8 G/DL (ref 3.4–5)
ALBUMIN/GLOB SERPL: 1.3 {RATIO} (ref 1.1–2.2)
ALP SERPL-CCNC: 90 U/L (ref 40–129)
ALT SERPL-CCNC: 27 U/L (ref 10–40)
ANION GAP SERPL CALCULATED.3IONS-SCNC: 10 MMOL/L (ref 3–16)
AST SERPL-CCNC: 18 U/L (ref 15–37)
BASOPHILS # BLD: 0 K/UL (ref 0–0.2)
BASOPHILS NFR BLD: 0.4 %
BILIRUB SERPL-MCNC: <0.2 MG/DL (ref 0–1)
BILIRUB UR QL STRIP.AUTO: NEGATIVE
BUN SERPL-MCNC: 15 MG/DL (ref 7–20)
CALCIUM SERPL-MCNC: 9.3 MG/DL (ref 8.3–10.6)
CHLORIDE SERPL-SCNC: 97 MMOL/L (ref 99–110)
CLARITY UR: CLEAR
CO2 SERPL-SCNC: 27 MMOL/L (ref 21–32)
COLOR UR: YELLOW
CREAT SERPL-MCNC: 0.7 MG/DL (ref 0.9–1.3)
DEPRECATED RDW RBC AUTO: 14 % (ref 12.4–15.4)
EOSINOPHIL # BLD: 0 K/UL (ref 0–0.6)
EOSINOPHIL NFR BLD: 0.4 %
GFR SERPLBLD CREATININE-BSD FMLA CKD-EPI: >60 ML/MIN/{1.73_M2}
GLUCOSE SERPL-MCNC: 414 MG/DL (ref 70–99)
GLUCOSE UR STRIP.AUTO-MCNC: >=1000 MG/DL
HCT VFR BLD AUTO: 47.1 % (ref 40.5–52.5)
HGB BLD-MCNC: 15.8 G/DL (ref 13.5–17.5)
HGB UR QL STRIP.AUTO: NEGATIVE
KETONES UR STRIP.AUTO-MCNC: NEGATIVE MG/DL
LEUKOCYTE ESTERASE UR QL STRIP.AUTO: NEGATIVE
LYMPHOCYTES # BLD: 2.1 K/UL (ref 1–5.1)
LYMPHOCYTES NFR BLD: 20.2 %
MCH RBC QN AUTO: 29.2 PG (ref 26–34)
MCHC RBC AUTO-ENTMCNC: 33.5 G/DL (ref 31–36)
MCV RBC AUTO: 87.1 FL (ref 80–100)
MONOCYTES # BLD: 0.6 K/UL (ref 0–1.3)
MONOCYTES NFR BLD: 6 %
NEUTROPHILS # BLD: 7.4 K/UL (ref 1.7–7.7)
NEUTROPHILS NFR BLD: 73 %
NITRITE UR QL STRIP.AUTO: NEGATIVE
PH UR STRIP.AUTO: 6.5 [PH] (ref 5–8)
PLATELET # BLD AUTO: 310 K/UL (ref 135–450)
PMV BLD AUTO: 9.1 FL (ref 5–10.5)
POTASSIUM SERPL-SCNC: 4.9 MMOL/L (ref 3.5–5.1)
PROT SERPL-MCNC: 6.8 G/DL (ref 6.4–8.2)
PROT UR STRIP.AUTO-MCNC: NEGATIVE MG/DL
RBC # BLD AUTO: 5.41 M/UL (ref 4.2–5.9)
SODIUM SERPL-SCNC: 134 MMOL/L (ref 136–145)
SP GR UR STRIP.AUTO: 1.01 (ref 1–1.03)
TROPONIN, HIGH SENSITIVITY: 10 NG/L (ref 0–22)
TROPONIN, HIGH SENSITIVITY: 8 NG/L (ref 0–22)
UA COMPLETE W REFLEX CULTURE PNL UR: ABNORMAL
UA DIPSTICK W REFLEX MICRO PNL UR: ABNORMAL
URN SPEC COLLECT METH UR: ABNORMAL
UROBILINOGEN UR STRIP-ACNC: 0.2 E.U./DL
WBC # BLD AUTO: 10.1 K/UL (ref 4–11)

## 2024-03-14 PROCEDURE — 93005 ELECTROCARDIOGRAM TRACING: CPT | Performed by: PHYSICIAN ASSISTANT

## 2024-03-14 PROCEDURE — 80053 COMPREHEN METABOLIC PANEL: CPT

## 2024-03-14 PROCEDURE — 70551 MRI BRAIN STEM W/O DYE: CPT

## 2024-03-14 PROCEDURE — 70450 CT HEAD/BRAIN W/O DYE: CPT

## 2024-03-14 PROCEDURE — 99284 EMERGENCY DEPT VISIT MOD MDM: CPT

## 2024-03-14 PROCEDURE — 81003 URINALYSIS AUTO W/O SCOPE: CPT

## 2024-03-14 PROCEDURE — 2580000003 HC RX 258: Performed by: PHYSICIAN ASSISTANT

## 2024-03-14 PROCEDURE — 84484 ASSAY OF TROPONIN QUANT: CPT

## 2024-03-14 PROCEDURE — 36415 COLL VENOUS BLD VENIPUNCTURE: CPT

## 2024-03-14 PROCEDURE — 85025 COMPLETE CBC W/AUTO DIFF WBC: CPT

## 2024-03-14 RX ORDER — 0.9 % SODIUM CHLORIDE 0.9 %
1000 INTRAVENOUS SOLUTION INTRAVENOUS ONCE
Status: COMPLETED | OUTPATIENT
Start: 2024-03-14 | End: 2024-03-14

## 2024-03-14 RX ADMIN — SODIUM CHLORIDE 1000 ML: 9 INJECTION, SOLUTION INTRAVENOUS at 13:29

## 2024-03-14 ASSESSMENT — PAIN - FUNCTIONAL ASSESSMENT: PAIN_FUNCTIONAL_ASSESSMENT: NONE - DENIES PAIN

## 2024-03-14 NOTE — ED PROVIDER NOTES
Emergency Department Attending Provider Note  Location: [facility] ***  ED      Bradford Pulliam was evaluated in the Emergency Department. Although initial history and physical exam information was obtained by *** (who also dictated a record of this visit), I personally saw the patient and made/approved the management plan and take responsibility for the patient management.     Patient seen and evaluated.  Relevant records reviewed. Chronic medical conditions reviewed.    HPI: ***     Physical Exam: ***     MDM: ***         I independently interpreted the following diagnostic test and studies *** which show ***       I personally discussed the patient's management with *** who stated ***       I, Dr. Cerna am the primary clinician of record.     I personally saw the patient and independently provided *** minutes of non-concurrent critical care out of the total shared critical care time provided.     This chart was generated in part by using Dragon Dictation system and may contain errors related to that system including errors in grammar, punctuation, and spelling, as well as words and phrases that may be inappropriate. If there are any questions or concerns please feel free to contact the dictating provider for clarification.                  
I independently reviewed the ECG as follows:    Sinus rhythm at a rate of 91 without ectopy.  Rightward Axis I 1 8.  Normal intervals.  No evidence of acute ischemia.  Overall unchanged from March 8, 2024.    Please reference my attending note if I had further involvement in the care of this patient otherwise I did not participate in the care of this patient beyond evaluation of this ECG.  Please reference the MATT's documentation for further details.        Pankaj Long MD  03/14/24 3307    
     General: He is not in acute distress.     Appearance: Normal appearance. He is not ill-appearing.   HENT:      Head: Normocephalic and atraumatic.      Mouth/Throat:      Mouth: Mucous membranes are moist.      Pharynx: Oropharynx is clear.   Eyes:      General: No scleral icterus.     Extraocular Movements: Extraocular movements intact.      Conjunctiva/sclera: Conjunctivae normal.      Pupils: Pupils are equal, round, and reactive to light.   Cardiovascular:      Rate and Rhythm: Regular rhythm. Tachycardia present.      Heart sounds: Normal heart sounds.   Pulmonary:      Effort: Pulmonary effort is normal. No respiratory distress.      Breath sounds: Normal breath sounds.   Abdominal:      General: Abdomen is flat. There is no distension.      Tenderness: There is no abdominal tenderness. There is no right CVA tenderness, left CVA tenderness or guarding.   Musculoskeletal:      Cervical back: Normal range of motion and neck supple.      Right lower leg: No edema.      Left lower leg: No edema.      Comments: DP PT pulses 2+ equal bilateral   Skin:     General: Skin is warm and dry.      Findings: No rash.   Neurological:      General: No focal deficit present.      Mental Status: He is alert and oriented to person, place, and time. Mental status is at baseline.      Comments: No speech deficit or aphasia.  Gait is normal without ataxia.  Normal rapid alternating movements.  No pronator drift.  No facial droop.  Normal finger-nose.  Normal heel-to-shin.  Motor strength 5 out of 5 upper and lower extremities with sensation intact throughout.   Psychiatric:         Mood and Affect: Mood normal.         Behavior: Behavior normal.         Thought Content: Thought content normal.         Judgment: Judgment normal.           DIAGNOSTIC RESULTS   LABS:    Labs Reviewed   URINALYSIS WITH REFLEX TO CULTURE - Abnormal; Notable for the following components:       Result Value    Glucose, Ur >=1000 (*)     All other

## 2024-03-14 NOTE — DISCHARGE INSTR - COC
Continuity of Care Form    Patient Name: Bradford Pulliam   :  1965  MRN:  [id number]    Admit date:  3/14/2024  Discharge date:  ***    Code Status Order: Prior   Advance Directives:     Admitting Physician:  No admitting provider for patient encounter.  PCP: Zoila Olvera APRN - CNP    Discharging Nurse: ***  Discharging Hospital Unit/Room#:   Discharging Unit Phone Number: ***    Emergency Contact:   Extended Emergency Contact Information  Primary Emergency Contact: Janet Pulliam  Address: [address] [place] [address]  Home Phone: [phone]  Mobile Phone: [phone]  Relation: Brother/Sister   needed? No    Past Surgical History:  Past Surgical History:   Procedure Laterality Date    CARDIAC CATHETERIZATION      HERNIA REPAIR      TONSILLECTOMY         Immunization History:     There is no immunization history on file for this patient.    Active Problems:  Patient Active Problem List   Diagnosis Code    Hypertension I10    Mixed hyperlipidemia E78.2    CHF (congestive heart failure) (formerly [facility]) I50.9    Chronic obstructive pulmonary disease (formerly [facility]) J44.9    CAD in native artery I25.10    Type 2 diabetes mellitus with other specified complication, unspecified whether long term insulin use (formerly [facility]) E11.69    Tobacco use Z72.0    Diabetic ketoacidosis without coma associated with type 2 diabetes mellitus (formerly [facility]) E11.10    Acute pancreatitis without infection or necrosis K85.90    Metabolic acidosis E87.20       Isolation/Infection:   Isolation            No Isolation          Patient Infection Status       None to display                     Nurse Assessment:  Last Vital Signs: /72   Pulse 72   Temp 98.7 °F (37.1 °C)   Resp 18   Ht 1.499 m (4' 11\")   Wt 73.5 kg (162 lb)   SpO2 93%   BMI 32.72 kg/m²     Last documented pain score (0-10 scale):    Last Weight:   Wt Readings from Last 1 Encounters:   24 73.5 kg (162 lb)     Mental Status:  {IP PT

## 2024-03-15 ENCOUNTER — HOSPITAL ENCOUNTER (EMERGENCY)
Age: 59
Discharge: ANOTHER HOSPITAL | End: 2024-03-16
Attending: STUDENT IN AN ORGANIZED HEALTH CARE EDUCATION/TRAINING PROGRAM
Payer: MEDICARE

## 2024-03-15 ENCOUNTER — APPOINTMENT (OUTPATIENT)
Dept: GENERAL RADIOLOGY | Age: 59
End: 2024-03-15
Payer: MEDICARE

## 2024-03-15 ENCOUNTER — CARE COORDINATION (OUTPATIENT)
Dept: CARE COORDINATION | Age: 59
End: 2024-03-15

## 2024-03-15 DIAGNOSIS — J18.9 PNEUMONIA OF RIGHT LOWER LOBE DUE TO INFECTIOUS ORGANISM: ICD-10-CM

## 2024-03-15 DIAGNOSIS — I63.9 CEREBROVASCULAR ACCIDENT (CVA), UNSPECIFIED MECHANISM (HCC): Primary | ICD-10-CM

## 2024-03-15 LAB
ALBUMIN SERPL-MCNC: 3.4 G/DL (ref 3.4–5)
ALBUMIN/GLOB SERPL: 1.1 {RATIO} (ref 1.1–2.2)
ALP SERPL-CCNC: 86 U/L (ref 40–129)
ALT SERPL-CCNC: 20 U/L (ref 10–40)
ANION GAP SERPL CALCULATED.3IONS-SCNC: 14 MMOL/L (ref 3–16)
AST SERPL-CCNC: 15 U/L (ref 15–37)
BASOPHILS # BLD: 0.2 K/UL (ref 0–0.2)
BASOPHILS NFR BLD: 1.2 %
BILIRUB SERPL-MCNC: <0.2 MG/DL (ref 0–1)
BILIRUB UR QL STRIP.AUTO: NEGATIVE
BUN SERPL-MCNC: 16 MG/DL (ref 7–20)
CALCIUM SERPL-MCNC: 9.1 MG/DL (ref 8.3–10.6)
CHLORIDE SERPL-SCNC: 95 MMOL/L (ref 99–110)
CLARITY UR: CLEAR
CO2 SERPL-SCNC: 22 MMOL/L (ref 21–32)
COLOR UR: YELLOW
CREAT SERPL-MCNC: 0.9 MG/DL (ref 0.9–1.3)
DEPRECATED RDW RBC AUTO: 14.1 % (ref 12.4–15.4)
EKG ATRIAL RATE: 90 BPM
EKG ATRIAL RATE: 91 BPM
EKG DIAGNOSIS: NORMAL
EKG DIAGNOSIS: NORMAL
EKG P AXIS: 26 DEGREES
EKG P AXIS: 36 DEGREES
EKG P-R INTERVAL: 156 MS
EKG P-R INTERVAL: 160 MS
EKG Q-T INTERVAL: 360 MS
EKG Q-T INTERVAL: 364 MS
EKG QRS DURATION: 76 MS
EKG QRS DURATION: 82 MS
EKG QTC CALCULATION (BAZETT): 442 MS
EKG QTC CALCULATION (BAZETT): 445 MS
EKG R AXIS: 109 DEGREES
EKG R AXIS: 118 DEGREES
EKG T AXIS: 57 DEGREES
EKG T AXIS: 61 DEGREES
EKG VENTRICULAR RATE: 90 BPM
EKG VENTRICULAR RATE: 91 BPM
EOSINOPHIL # BLD: 0 K/UL (ref 0–0.6)
EOSINOPHIL NFR BLD: 0.3 %
FLUAV RNA RESP QL NAA+PROBE: NOT DETECTED
FLUBV RNA RESP QL NAA+PROBE: NOT DETECTED
GFR SERPLBLD CREATININE-BSD FMLA CKD-EPI: >60 ML/MIN/{1.73_M2}
GLUCOSE SERPL-MCNC: 336 MG/DL (ref 70–99)
GLUCOSE UR STRIP.AUTO-MCNC: >=1000 MG/DL
HCT VFR BLD AUTO: 46.2 % (ref 40.5–52.5)
HGB BLD-MCNC: 15.4 G/DL (ref 13.5–17.5)
HGB UR QL STRIP.AUTO: NEGATIVE
KETONES UR STRIP.AUTO-MCNC: ABNORMAL MG/DL
LACTATE BLDV-SCNC: 0.9 MMOL/L (ref 0.4–1.9)
LEUKOCYTE ESTERASE UR QL STRIP.AUTO: NEGATIVE
LYMPHOCYTES # BLD: 2.2 K/UL (ref 1–5.1)
LYMPHOCYTES NFR BLD: 17 %
MCH RBC QN AUTO: 29.4 PG (ref 26–34)
MCHC RBC AUTO-ENTMCNC: 33.4 G/DL (ref 31–36)
MCV RBC AUTO: 88.2 FL (ref 80–100)
MONOCYTES # BLD: 0.6 K/UL (ref 0–1.3)
MONOCYTES NFR BLD: 4.5 %
NEUTROPHILS # BLD: 10 K/UL (ref 1.7–7.7)
NEUTROPHILS NFR BLD: 77 %
NITRITE UR QL STRIP.AUTO: NEGATIVE
PH UR STRIP.AUTO: 7 [PH] (ref 5–8)
PLATELET # BLD AUTO: 301 K/UL (ref 135–450)
PMV BLD AUTO: 9.1 FL (ref 5–10.5)
POTASSIUM SERPL-SCNC: 5.2 MMOL/L (ref 3.5–5.1)
PROT SERPL-MCNC: 6.5 G/DL (ref 6.4–8.2)
PROT UR STRIP.AUTO-MCNC: NEGATIVE MG/DL
RBC # BLD AUTO: 5.25 M/UL (ref 4.2–5.9)
SARS-COV-2 RNA RESP QL NAA+PROBE: NOT DETECTED
SODIUM SERPL-SCNC: 131 MMOL/L (ref 136–145)
SP GR UR STRIP.AUTO: 1.02 (ref 1–1.03)
TROPONIN, HIGH SENSITIVITY: 9 NG/L (ref 0–22)
UA DIPSTICK W REFLEX MICRO PNL UR: ABNORMAL
URN SPEC COLLECT METH UR: ABNORMAL
UROBILINOGEN UR STRIP-ACNC: 0.2 E.U./DL
WBC # BLD AUTO: 13 K/UL (ref 4–11)

## 2024-03-15 PROCEDURE — 6360000002 HC RX W HCPCS: Performed by: STUDENT IN AN ORGANIZED HEALTH CARE EDUCATION/TRAINING PROGRAM

## 2024-03-15 PROCEDURE — 96365 THER/PROPH/DIAG IV INF INIT: CPT

## 2024-03-15 PROCEDURE — 81003 URINALYSIS AUTO W/O SCOPE: CPT

## 2024-03-15 PROCEDURE — 99285 EMERGENCY DEPT VISIT HI MDM: CPT

## 2024-03-15 PROCEDURE — 84484 ASSAY OF TROPONIN QUANT: CPT

## 2024-03-15 PROCEDURE — 96367 TX/PROPH/DG ADDL SEQ IV INF: CPT

## 2024-03-15 PROCEDURE — 71045 X-RAY EXAM CHEST 1 VIEW: CPT

## 2024-03-15 PROCEDURE — 85025 COMPLETE CBC W/AUTO DIFF WBC: CPT

## 2024-03-15 PROCEDURE — 87040 BLOOD CULTURE FOR BACTERIA: CPT

## 2024-03-15 PROCEDURE — 6370000000 HC RX 637 (ALT 250 FOR IP): Performed by: STUDENT IN AN ORGANIZED HEALTH CARE EDUCATION/TRAINING PROGRAM

## 2024-03-15 PROCEDURE — 93005 ELECTROCARDIOGRAM TRACING: CPT | Performed by: STUDENT IN AN ORGANIZED HEALTH CARE EDUCATION/TRAINING PROGRAM

## 2024-03-15 PROCEDURE — 36415 COLL VENOUS BLD VENIPUNCTURE: CPT

## 2024-03-15 PROCEDURE — 93010 ELECTROCARDIOGRAM REPORT: CPT | Performed by: INTERNAL MEDICINE

## 2024-03-15 PROCEDURE — 2580000003 HC RX 258: Performed by: STUDENT IN AN ORGANIZED HEALTH CARE EDUCATION/TRAINING PROGRAM

## 2024-03-15 PROCEDURE — 87636 SARSCOV2 & INF A&B AMP PRB: CPT

## 2024-03-15 PROCEDURE — 83605 ASSAY OF LACTIC ACID: CPT

## 2024-03-15 PROCEDURE — 80053 COMPREHEN METABOLIC PANEL: CPT

## 2024-03-15 RX ORDER — ACETAMINOPHEN 325 MG/1
650 TABLET ORAL ONCE
Status: COMPLETED | OUTPATIENT
Start: 2024-03-15 | End: 2024-03-15

## 2024-03-15 RX ADMIN — ACETAMINOPHEN 650 MG: 325 TABLET ORAL at 22:49

## 2024-03-15 RX ADMIN — CEFTRIAXONE SODIUM 1000 MG: 1 INJECTION, POWDER, FOR SOLUTION INTRAMUSCULAR; INTRAVENOUS at 18:02

## 2024-03-15 RX ADMIN — AZITHROMYCIN MONOHYDRATE 500 MG: 500 INJECTION, POWDER, LYOPHILIZED, FOR SOLUTION INTRAVENOUS at 18:33

## 2024-03-15 ASSESSMENT — PAIN - FUNCTIONAL ASSESSMENT: PAIN_FUNCTIONAL_ASSESSMENT: NONE - DENIES PAIN

## 2024-03-15 NOTE — ED NOTES
Completed NIH, pts left hand is slightly weaker in grasp but is able to hold up against gravity and it does not fall to the bed. Dr nick arreola.

## 2024-03-15 NOTE — ED PROVIDER NOTES
[facility] ED     EMERGENCY DEPARTMENT ENCOUNTER            Pt Name: Bradford Pulliam   MRN: [id number]   Birthdate 1965   Date of evaluation: 3/15/2024   Provider: Crista Noriega MD   PCP: Zoila Olvera APRN - CNP   Note Started: 3:41 PM EDT 3/15/24          CHIEF COMPLAINT     Chief Complaint   Patient presents with    Shaking     Pt was here yesterday was supposed to stay but signed out ama. Per ems report today pt went to get dressed and had his clothes on backwards, states he feels the same as yesterday nothing new or has changed.       HISTORY OF PRESENT ILLNESS:   History from : Patient   Limitations to history : None     Bradford Pulliam is a 58 y.o. male who presents complaining of increased confusion, increased left hand weakness.  Patient states that his left hand is not really doing what he wanted to do.  Was here yesterday and was diagnosed with a stroke and left AGAINST MEDICAL ADVICE.  He states that he has been having a right-sided headache since his symptoms began yesterday which is unchanged.  States that he put his clothes on today and he had his clothes on backwards.  States that he has had some tremors today as well.  Denies fevers.  Denies any other complaints or concerns.  Normally wears 2 to 3 L of oxygen at baseline as needed.    Nursing Notes were all reviewed and agreed with, or any disagreements were addressed in the HPI.     REVIEW OF SYSTEMS :    Positives and Pertinent negatives as per HPI.      MEDICAL HISTORY   has a past medical history of Acute respiratory failure ([facility]) (07/11/2021), Bacteremia due to Escherichia coli (10/07/2021), Chest pain, CHF (congestive heart failure) ([facility]), COPD (chronic obstructive pulmonary disease) ([facility]), Diabetes mellitus ([facility]), Electrolyte disorder (10/26/2022), Elevated d-dimer, Hyperlipidemia, Hypertension, Organizing pneumonia ([facility]) (09/27/2021), Oxygen dependent, Sepsis with acute respiratory failure and septic shock ([facility])

## 2024-03-15 NOTE — CARE COORDINATION
Ambulatory Care Coordination Note  3/15/2024    Patient Current Location:  Home: [address] [place] [id number]    ACM contacted the patient and exwife  by telephone. Verified name and  with patient and exwife  as identifiers. Provided introduction to self, and explanation of the ACM role.     ACM: Chelle Alegre RN    Challenges to be reviewed by the provider   Additional needs identified to be addressed with provider: Yes  Patient has an ED f/u appointment with you on Monday, .  Currently the patient does not have portable O2 tanks.  I called [facility] and they cannot deliver tanks to him today.  The earliest they can get them to him is next Friday.  He can pick tanks up today, but does not drive.  I have instructed both the pt and his exwife who is staying with him how to get the tanks.   Additionally, he is noncompliant with his medications, including his diabetic meds and checking his BG levels.  He is not checking BG levels, nor is he taking his Lantus, per his ex.  I advised him to reach out to the office to discuss options should he not be able to get the tanks.                Method of communication with provider: chart routing.    ACM spoke to pt who was agreeable to care management, but during the call pt went out to smoke and handed the phone to his exwife who is staying with him temporarily.  Pt is on 3L continuous O2.  Pt is active with [facility] and currently does not have portable tanks.  ACM educated pt and ex on the importance of not smoking, especially while wearing O2.  ACM called [facility] pt O2 provider, and they are not able to deliver tanks until next Friday.  However, pt can pick tanks up today at office.  Pt does not drive, utilizes public transportation.  Pt does have family, but is unsure if they will be able to assist him in getting tanks.  PCP informed and pt educated again on concerns of not wearing O2 when leaving the house and advised to discuss alternative options,

## 2024-03-15 NOTE — ED NOTES
Dr. Noriega placed an adt20 on this patient. Dr. Luna  called back and spoke to Dr. Noriega at 1745. Dr. Luna  accepted this patient to [facility]. Waiting on a bed.

## 2024-03-16 ENCOUNTER — APPOINTMENT (OUTPATIENT)
Dept: CT IMAGING | Age: 59
DRG: 720 | End: 2024-03-16
Attending: INTERNAL MEDICINE
Payer: MEDICAID

## 2024-03-16 ENCOUNTER — HOSPITAL ENCOUNTER (INPATIENT)
Age: 59
LOS: 3 days | Discharge: HOME | DRG: 720 | End: 2024-03-19
Attending: INTERNAL MEDICINE | Admitting: INTERNAL MEDICINE
Payer: MEDICAID

## 2024-03-16 VITALS
WEIGHT: 162 LBS | TEMPERATURE: 98.8 F | BODY MASS INDEX: 31.8 KG/M2 | OXYGEN SATURATION: 93 % | HEIGHT: 60 IN | SYSTOLIC BLOOD PRESSURE: 110 MMHG | DIASTOLIC BLOOD PRESSURE: 80 MMHG | HEART RATE: 100 BPM | RESPIRATION RATE: 19 BRPM

## 2024-03-16 PROBLEM — I63.9 STROKE DETERMINED BY CLINICAL ASSESSMENT (HCC): Status: ACTIVE | Noted: 2024-03-16

## 2024-03-16 LAB
ANION GAP SERPL CALCULATED.3IONS-SCNC: 12 MMOL/L (ref 3–16)
BACTERIA BLD CULT ORG #2: NORMAL
BACTERIA BLD CULT: NORMAL
BASE EXCESS BLDV CALC-SCNC: -9.4 MMOL/L (ref -3–3)
BUN SERPL-MCNC: 18 MG/DL (ref 7–20)
CALCIUM SERPL-MCNC: 9.3 MG/DL (ref 8.3–10.6)
CHLORIDE SERPL-SCNC: 98 MMOL/L (ref 99–110)
CHOLEST SERPL-MCNC: 235 MG/DL (ref 0–199)
CO2 BLDV-SCNC: 17 MMOL/L
CO2 SERPL-SCNC: 23 MMOL/L (ref 21–32)
COHGB MFR BLDV: 2.6 % (ref 0–1.5)
CREAT SERPL-MCNC: 0.7 MG/DL (ref 0.9–1.3)
EKG ATRIAL RATE: 106 BPM
EKG DIAGNOSIS: NORMAL
EKG P AXIS: 43 DEGREES
EKG P-R INTERVAL: 154 MS
EKG Q-T INTERVAL: 354 MS
EKG QRS DURATION: 78 MS
EKG QTC CALCULATION (BAZETT): 470 MS
EKG R AXIS: 98 DEGREES
EKG T AXIS: 73 DEGREES
EKG VENTRICULAR RATE: 106 BPM
GFR SERPLBLD CREATININE-BSD FMLA CKD-EPI: >60 ML/MIN/{1.73_M2}
GLUCOSE BLD-MCNC: 200 MG/DL (ref 70–99)
GLUCOSE BLD-MCNC: 282 MG/DL (ref 70–99)
GLUCOSE BLD-MCNC: 315 MG/DL (ref 70–99)
GLUCOSE BLD-MCNC: 320 MG/DL (ref 70–99)
GLUCOSE BLD-MCNC: 357 MG/DL (ref 70–99)
GLUCOSE SERPL-MCNC: 361 MG/DL (ref 70–99)
HCO3 BLDV-SCNC: 15.7 MMOL/L (ref 23–29)
HDLC SERPL-MCNC: 25 MG/DL (ref 40–60)
LACTATE BLDV-SCNC: 1.2 MMOL/L (ref 0.4–2)
LACTATE BLDV-SCNC: 9.9 MMOL/L (ref 0.4–2)
LDLC SERPL CALC-MCNC: ABNORMAL MG/DL
LDLC SERPL-MCNC: 80 MG/DL
MAGNESIUM SERPL-MCNC: 1.7 MG/DL (ref 1.8–2.4)
MAGNESIUM SERPL-MCNC: 2 MG/DL (ref 1.8–2.4)
METHGB MFR BLDV: 0.2 %
O2 THERAPY: ABNORMAL
PCO2 BLDV: 32.5 MMHG (ref 40–50)
PERFORMED ON: ABNORMAL
PH BLDV: 7.3 [PH] (ref 7.35–7.45)
PO2 BLDV: 71.1 MMHG (ref 25–40)
POTASSIUM SERPL-SCNC: 4.7 MMOL/L (ref 3.5–5.1)
SAO2 % BLDV: 91 %
SODIUM SERPL-SCNC: 133 MMOL/L (ref 136–145)
TRIGL SERPL-MCNC: 556 MG/DL (ref 0–150)
TROPONIN, HIGH SENSITIVITY: 12 NG/L (ref 0–22)
TROPONIN, HIGH SENSITIVITY: 16 NG/L (ref 0–22)
VLDLC SERPL CALC-MCNC: ABNORMAL MG/DL

## 2024-03-16 PROCEDURE — 94640 AIRWAY INHALATION TREATMENT: CPT

## 2024-03-16 PROCEDURE — 83735 ASSAY OF MAGNESIUM: CPT

## 2024-03-16 PROCEDURE — 94761 N-INVAS EAR/PLS OXIMETRY MLT: CPT

## 2024-03-16 PROCEDURE — 93005 ELECTROCARDIOGRAM TRACING: CPT | Performed by: INTERNAL MEDICINE

## 2024-03-16 PROCEDURE — 80061 LIPID PANEL: CPT

## 2024-03-16 PROCEDURE — 6360000002 HC RX W HCPCS: Performed by: NURSE PRACTITIONER

## 2024-03-16 PROCEDURE — 97535 SELF CARE MNGMENT TRAINING: CPT

## 2024-03-16 PROCEDURE — 36415 COLL VENOUS BLD VENIPUNCTURE: CPT

## 2024-03-16 PROCEDURE — 80048 BASIC METABOLIC PNL TOTAL CA: CPT

## 2024-03-16 PROCEDURE — 6370000000 HC RX 637 (ALT 250 FOR IP)

## 2024-03-16 PROCEDURE — 83721 ASSAY OF BLOOD LIPOPROTEIN: CPT

## 2024-03-16 PROCEDURE — 82803 BLOOD GASES ANY COMBINATION: CPT

## 2024-03-16 PROCEDURE — 97165 OT EVAL LOW COMPLEX 30 MIN: CPT

## 2024-03-16 PROCEDURE — 2060000000 HC ICU INTERMEDIATE R&B

## 2024-03-16 PROCEDURE — 6370000000 HC RX 637 (ALT 250 FOR IP): Performed by: INTERNAL MEDICINE

## 2024-03-16 PROCEDURE — 84484 ASSAY OF TROPONIN QUANT: CPT

## 2024-03-16 PROCEDURE — 97530 THERAPEUTIC ACTIVITIES: CPT

## 2024-03-16 PROCEDURE — 2700000000 HC OXYGEN THERAPY PER DAY

## 2024-03-16 PROCEDURE — 83605 ASSAY OF LACTIC ACID: CPT

## 2024-03-16 PROCEDURE — 2580000003 HC RX 258

## 2024-03-16 PROCEDURE — 2580000003 HC RX 258: Performed by: NURSE PRACTITIONER

## 2024-03-16 PROCEDURE — 6370000000 HC RX 637 (ALT 250 FOR IP): Performed by: NURSE PRACTITIONER

## 2024-03-16 PROCEDURE — 70450 CT HEAD/BRAIN W/O DYE: CPT

## 2024-03-16 PROCEDURE — 97161 PT EVAL LOW COMPLEX 20 MIN: CPT

## 2024-03-16 RX ORDER — DEXTROSE MONOHYDRATE 100 MG/ML
INJECTION, SOLUTION INTRAVENOUS CONTINUOUS PRN
Status: DISCONTINUED | OUTPATIENT
Start: 2024-03-16 | End: 2024-03-19 | Stop reason: HOSPADM

## 2024-03-16 RX ORDER — FUROSEMIDE 20 MG/1
20 TABLET ORAL DAILY
Status: DISCONTINUED | OUTPATIENT
Start: 2024-03-16 | End: 2024-03-19 | Stop reason: HOSPADM

## 2024-03-16 RX ORDER — CLOPIDOGREL BISULFATE 75 MG/1
75 TABLET ORAL DAILY
Status: DISCONTINUED | OUTPATIENT
Start: 2024-03-16 | End: 2024-03-17

## 2024-03-16 RX ORDER — LABETALOL HYDROCHLORIDE 5 MG/ML
10 INJECTION, SOLUTION INTRAVENOUS EVERY 4 HOURS PRN
Status: DISCONTINUED | OUTPATIENT
Start: 2024-03-16 | End: 2024-03-19 | Stop reason: HOSPADM

## 2024-03-16 RX ORDER — PANTOPRAZOLE SODIUM 40 MG/1
40 TABLET, DELAYED RELEASE ORAL
Status: DISCONTINUED | OUTPATIENT
Start: 2024-03-16 | End: 2024-03-19 | Stop reason: HOSPADM

## 2024-03-16 RX ORDER — SODIUM CHLORIDE 9 MG/ML
INJECTION, SOLUTION INTRAVENOUS PRN
Status: DISCONTINUED | OUTPATIENT
Start: 2024-03-16 | End: 2024-03-19 | Stop reason: HOSPADM

## 2024-03-16 RX ORDER — GABAPENTIN 400 MG/1
400 CAPSULE ORAL 3 TIMES DAILY
Status: DISCONTINUED | OUTPATIENT
Start: 2024-03-16 | End: 2024-03-19 | Stop reason: HOSPADM

## 2024-03-16 RX ORDER — ASPIRIN 81 MG/1
81 TABLET ORAL DAILY
Status: DISCONTINUED | OUTPATIENT
Start: 2024-03-16 | End: 2024-03-19 | Stop reason: HOSPADM

## 2024-03-16 RX ORDER — ENOXAPARIN SODIUM 100 MG/ML
40 INJECTION SUBCUTANEOUS DAILY
Status: DISCONTINUED | OUTPATIENT
Start: 2024-03-16 | End: 2024-03-19 | Stop reason: HOSPADM

## 2024-03-16 RX ORDER — LANOLIN ALCOHOL/MO/W.PET/CERES
400 CREAM (GRAM) TOPICAL DAILY
Status: DISCONTINUED | OUTPATIENT
Start: 2024-03-16 | End: 2024-03-19 | Stop reason: HOSPADM

## 2024-03-16 RX ORDER — ONDANSETRON 2 MG/ML
4 INJECTION INTRAMUSCULAR; INTRAVENOUS EVERY 6 HOURS PRN
Status: DISCONTINUED | OUTPATIENT
Start: 2024-03-16 | End: 2024-03-19 | Stop reason: HOSPADM

## 2024-03-16 RX ORDER — ALBUTEROL SULFATE 90 UG/1
2 AEROSOL, METERED RESPIRATORY (INHALATION) EVERY 6 HOURS PRN
Status: DISCONTINUED | OUTPATIENT
Start: 2024-03-16 | End: 2024-03-19 | Stop reason: HOSPADM

## 2024-03-16 RX ORDER — ROSUVASTATIN CALCIUM 10 MG/1
40 TABLET, COATED ORAL DAILY
Status: DISCONTINUED | OUTPATIENT
Start: 2024-03-16 | End: 2024-03-19 | Stop reason: HOSPADM

## 2024-03-16 RX ORDER — POLYETHYLENE GLYCOL 3350 17 G/17G
17 POWDER, FOR SOLUTION ORAL DAILY PRN
Status: DISCONTINUED | OUTPATIENT
Start: 2024-03-16 | End: 2024-03-19 | Stop reason: HOSPADM

## 2024-03-16 RX ORDER — INSULIN LISPRO 100 [IU]/ML
0-8 INJECTION, SOLUTION INTRAVENOUS; SUBCUTANEOUS
Status: DISCONTINUED | OUTPATIENT
Start: 2024-03-16 | End: 2024-03-19 | Stop reason: HOSPADM

## 2024-03-16 RX ORDER — ALBUTEROL SULFATE 90 UG/1
2 AEROSOL, METERED RESPIRATORY (INHALATION) EVERY 6 HOURS PRN
Status: DISCONTINUED | OUTPATIENT
Start: 2024-03-16 | End: 2024-03-16

## 2024-03-16 RX ORDER — INSULIN LISPRO 100 [IU]/ML
0-4 INJECTION, SOLUTION INTRAVENOUS; SUBCUTANEOUS NIGHTLY
Status: DISCONTINUED | OUTPATIENT
Start: 2024-03-16 | End: 2024-03-19 | Stop reason: HOSPADM

## 2024-03-16 RX ORDER — SODIUM CHLORIDE 0.9 % (FLUSH) 0.9 %
5-40 SYRINGE (ML) INJECTION EVERY 12 HOURS SCHEDULED
Status: DISCONTINUED | OUTPATIENT
Start: 2024-03-16 | End: 2024-03-19 | Stop reason: HOSPADM

## 2024-03-16 RX ORDER — SODIUM CHLORIDE 0.9 % (FLUSH) 0.9 %
5-40 SYRINGE (ML) INJECTION PRN
Status: DISCONTINUED | OUTPATIENT
Start: 2024-03-16 | End: 2024-03-19 | Stop reason: HOSPADM

## 2024-03-16 RX ORDER — ONDANSETRON 4 MG/1
4 TABLET, ORALLY DISINTEGRATING ORAL EVERY 8 HOURS PRN
Status: DISCONTINUED | OUTPATIENT
Start: 2024-03-16 | End: 2024-03-19 | Stop reason: HOSPADM

## 2024-03-16 RX ORDER — FENOFIBRATE 160 MG/1
160 TABLET ORAL DAILY
Status: DISCONTINUED | OUTPATIENT
Start: 2024-03-16 | End: 2024-03-19 | Stop reason: HOSPADM

## 2024-03-16 RX ORDER — 0.9 % SODIUM CHLORIDE 0.9 %
500 INTRAVENOUS SOLUTION INTRAVENOUS ONCE
Status: COMPLETED | OUTPATIENT
Start: 2024-03-16 | End: 2024-03-16

## 2024-03-16 RX ORDER — INSULIN GLARGINE 100 [IU]/ML
30 INJECTION, SOLUTION SUBCUTANEOUS 2 TIMES DAILY
Status: DISCONTINUED | OUTPATIENT
Start: 2024-03-16 | End: 2024-03-17

## 2024-03-16 RX ADMIN — GABAPENTIN 400 MG: 400 CAPSULE ORAL at 20:57

## 2024-03-16 RX ADMIN — INSULIN GLARGINE 30 UNITS: 100 INJECTION, SOLUTION SUBCUTANEOUS at 09:13

## 2024-03-16 RX ADMIN — Medication 2 PUFF: at 07:43

## 2024-03-16 RX ADMIN — ENOXAPARIN SODIUM 40 MG: 100 INJECTION SUBCUTANEOUS at 09:12

## 2024-03-16 RX ADMIN — SODIUM CHLORIDE 500 ML: 9 INJECTION, SOLUTION INTRAVENOUS at 15:16

## 2024-03-16 RX ADMIN — ASPIRIN 81 MG: 81 TABLET, COATED ORAL at 09:13

## 2024-03-16 RX ADMIN — FENOFIBRATE 160 MG: 160 TABLET ORAL at 09:12

## 2024-03-16 RX ADMIN — INSULIN LISPRO 2 UNITS: 100 INJECTION, SOLUTION INTRAVENOUS; SUBCUTANEOUS at 17:55

## 2024-03-16 RX ADMIN — ROSUVASTATIN 40 MG: 10 TABLET, FILM COATED ORAL at 09:13

## 2024-03-16 RX ADMIN — INSULIN GLARGINE 30 UNITS: 100 INJECTION, SOLUTION SUBCUTANEOUS at 20:57

## 2024-03-16 RX ADMIN — CEFTRIAXONE SODIUM 1000 MG: 1 INJECTION, POWDER, FOR SOLUTION INTRAMUSCULAR; INTRAVENOUS at 17:52

## 2024-03-16 RX ADMIN — Medication 400 MG: at 09:13

## 2024-03-16 RX ADMIN — INSULIN LISPRO 8 UNITS: 100 INJECTION, SOLUTION INTRAVENOUS; SUBCUTANEOUS at 09:35

## 2024-03-16 RX ADMIN — Medication 10 ML: at 09:13

## 2024-03-16 RX ADMIN — AZITHROMYCIN MONOHYDRATE 500 MG: 500 INJECTION, POWDER, LYOPHILIZED, FOR SOLUTION INTRAVENOUS at 18:37

## 2024-03-16 RX ADMIN — CLOPIDOGREL BISULFATE 75 MG: 75 TABLET ORAL at 13:35

## 2024-03-16 RX ADMIN — Medication 2 PUFF: at 19:45

## 2024-03-16 RX ADMIN — PANTOPRAZOLE SODIUM 40 MG: 40 TABLET, DELAYED RELEASE ORAL at 06:33

## 2024-03-16 RX ADMIN — FUROSEMIDE 20 MG: 20 TABLET ORAL at 13:35

## 2024-03-16 RX ADMIN — GABAPENTIN 400 MG: 400 CAPSULE ORAL at 13:35

## 2024-03-16 RX ADMIN — INSULIN LISPRO 6 UNITS: 100 INJECTION, SOLUTION INTRAVENOUS; SUBCUTANEOUS at 11:57

## 2024-03-16 RX ADMIN — GABAPENTIN 400 MG: 400 CAPSULE ORAL at 09:13

## 2024-03-16 ASSESSMENT — PAIN - FUNCTIONAL ASSESSMENT: PAIN_FUNCTIONAL_ASSESSMENT: NONE - DENIES PAIN

## 2024-03-16 NOTE — PLAN OF CARE
TODAY'S DATE:  3/16/2024      Current NIHSS 0        Discussed personal risk factors for Stroke/TIA with patient/family, and ways to reduce the risk for a recurrent stroke.     Patient's personal risk factors which were identified are:   []   Alcohol Abuse: check with your physician before any alcohol consumption.   []   Atrial fibrillation: may cause blood clots  []   Drug Abuse: Seek help, talk with your doctor  []   Clotting Disorder  [x]   Diabetes  []   Family history of stroke or heart disease  [x]   High Blood Pressure/Hypertension: work with your physician  [x]   High cholesterol: monitor cholesterol levels with your physician  [x]   Overweight/Obesity: work with your physician for your ideal body weight  []   Physical Inactivity: get regular exercise as directed by your physician  [x]   Personal history of previous TIA or stroke  []   Poor Diet: decrease salt (sodium) in your diet, follow diet directed by physician  [x]   Smoking: stop smoking      Reviewed the Following Education with Patient and/or Family:   - Signs and Symptoms of Stroke  - Personal risk factors   - How to activate EMS (911)   - Importance of Follow Up Appointments at Discharge   - Importance of Compliance with Medications Prescribed at Discharge  - Available community resources and stroke advocacy groups if needed    Patient and/or family member: verbalized understanding.     Stroke Education booklet given to patient/family (or verified, if given already), which reviews above information. yes         Electronically signed by ESTEFANI BRADFORD RN on 3/16/2024 at 12:16 PM

## 2024-03-16 NOTE — RT PROTOCOL NOTE
RT Inhaler-Nebulizer Bronchodilator Protocol Note    There is a bronchodilator order in the chart from a provider indicating to follow the RT Bronchodilator Protocol and there is an “Initiate RT Inhaler-Nebulizer Bronchodilator Protocol” order as well (see protocol at bottom of note).    CXR Findings:  XR CHEST PORTABLE    Result Date: 3/15/2024  Focal airspace opacity in the right infrahilar region suspicious for pneumonia.  Recommend radiographic follow-up to ensure complete resolution.       The findings from the last RT Protocol Assessment were as follows:   History Pulmonary Disease: Chronic pulmonary disease  Respiratory Pattern: Regular pattern and RR 12-20 bpm  Breath Sounds: Clear breath sounds  Cough: Strong, spontaneous, non-productive  Indication for Bronchodilator Therapy: On home bronchodilators  Bronchodilator Assessment Score: 2    Aerosolized bronchodilator medication orders have been revised according to the RT Inhaler-Nebulizer Bronchodilator Protocol below.    Respiratory Therapist to perform RT Therapy Protocol Assessment initially then follow the protocol.  Repeat RT Therapy Protocol Assessment PRN for score 0-3 or on second treatment, BID, and PRN for scores above 3.    No Indications - adjust the frequency to every 6 hours PRN wheezing or bronchospasm, if no treatments needed after 48 hours then discontinue using Per Protocol order mode.     If indication present, adjust the RT bronchodilator orders based on the Bronchodilator Assessment Score as indicated below.  Use Inhaler orders unless patient has one or more of the following: on home nebulizer, not able to hold breath for 10 seconds, is not alert and oriented, cannot activate and use MDI correctly, or respiratory rate 25 breaths per minute or more, then use the equivalent nebulizer order(s) with same Frequency and PRN reasons based on the score.  If a patient is on this medication at home then do not decrease Frequency below that used at

## 2024-03-16 NOTE — H&P
Zoila Olvera APRN - CNP   Alcohol Swabs (ALCOHOL PREP) PADS USE TWICE DAILY  Patient not taking: Reported on 3/15/2024 12/11/23   Zoila Olvera APRN - CNP   pantoprazole (PROTONIX) 40 MG tablet TAKE 1 TABLET BY MOUTH EVERY MORNING BEFORE BREAKFAST 12/11/23   Zoila Olvera APRN - CNP   insulin glargine (LANTUS SOLOSTAR) 100 UNIT/ML injection pen Inject 30 Units into the skin 2 times daily INJECT 30 UNITS SUB-Q TWICE DAILY  Patient not taking: Reported on 3/15/2024 9/8/23   Zoila Olvera APRN - CNP   blood glucose monitor kit and supplies Test 2 times a day & as needed for symptoms of irregular blood glucose.  Patient not taking: Reported on 3/15/2024 9/7/23   Zoila Olvera APRN - CNP   albuterol sulfate HFA (PROVENTIL;VENTOLIN;PROAIR) 108 (90 Base) MCG/ACT inhaler Inhale 2 puffs into the lungs every 6 hours as needed for Wheezing or Shortness of Breath 9/7/23   Zoila Olvera APRN - CNP   Magnesium Oxide 400 MG CAPS Take 1 capsule by mouth daily 9/7/23   Zoila Olvera APRN - CNP   gabapentin (NEURONTIN) 800 MG tablet TAKE 1 TABLET 4 TIMES DAILY 9/7/23 9/6/24  Zoila Olvera APRN - CNP   rosuvastatin (CRESTOR) 40 MG tablet Take 1 tablet by mouth daily 8/31/23   Reyes Jay MD   fenofibric acid (TRILIPIX) 135 MG CPDR capsule TAKE 1 CAPSULE ONCE DAILY 5/9/23   Zoila Olvera APRN - CNP   glipiZIDE (GLUCOTROL) 10 MG tablet TAKE  ONE (1) TABLET BY MOUTH TWICE DAILY BEFORE MEALS  Patient not taking: Reported on 3/15/2024 2/7/23   Zoila Olvera APRN - CNP   blood glucose monitor strips Test 2 times a day & as needed for symptoms of irregular blood glucose. E11.9  Patient not taking: Reported on 3/15/2024 1/31/23   Zoila Olvera APRN - CNP   ASPIRIN LOW DOSE 81 MG EC tablet TAKE 1 TABLET BY MOUTH DAILY  Patient not taking: Reported on 3/15/2024 11/16/22   Reyes Jay MD   diclofenac sodium (VOLTAREN) 1 % GEL  8/13/22   Provider, MD Olga   ipratropium-albuterol (DUONEB) 0.5-2.5 (3) MG/3ML

## 2024-03-16 NOTE — SIGNIFICANT EVENT
Rapid response called on patient.  Patient was sitting up and developed shortness of breath and lightheadedness.  Patient was moved to the bed.  Patient did have sinus pause and bradycardia and then tachycardia.  Blood sugars are in the 300s.  Patient now feeling better.  No chest pain no shortness of breath.  Patient did have his oxygen off while sitting up.  Patient states he uses his oxygen intermittently during the day and continuously at night.  EKG and labs pending.    JUNIOR PULIDO MD  3/16/2024  2:14 PM

## 2024-03-17 LAB
ANION GAP SERPL CALCULATED.3IONS-SCNC: 11 MMOL/L (ref 3–16)
BUN SERPL-MCNC: 15 MG/DL (ref 7–20)
CALCIUM SERPL-MCNC: 9.1 MG/DL (ref 8.3–10.6)
CHLORIDE SERPL-SCNC: 100 MMOL/L (ref 99–110)
CO2 SERPL-SCNC: 23 MMOL/L (ref 21–32)
CREAT SERPL-MCNC: 0.8 MG/DL (ref 0.9–1.3)
DEPRECATED RDW RBC AUTO: 14 % (ref 12.4–15.4)
GFR SERPLBLD CREATININE-BSD FMLA CKD-EPI: >60 ML/MIN/{1.73_M2}
GLUCOSE BLD-MCNC: 209 MG/DL (ref 70–99)
GLUCOSE BLD-MCNC: 211 MG/DL (ref 70–99)
GLUCOSE BLD-MCNC: 211 MG/DL (ref 70–99)
GLUCOSE BLD-MCNC: 398 MG/DL (ref 70–99)
GLUCOSE SERPL-MCNC: 218 MG/DL (ref 70–99)
HCT VFR BLD AUTO: 44.4 % (ref 40.5–52.5)
HGB BLD-MCNC: 14.8 G/DL (ref 13.5–17.5)
MAGNESIUM SERPL-MCNC: 2 MG/DL (ref 1.8–2.4)
MCH RBC QN AUTO: 29.2 PG (ref 26–34)
MCHC RBC AUTO-ENTMCNC: 33.4 G/DL (ref 31–36)
MCV RBC AUTO: 87.6 FL (ref 80–100)
PERFORMED ON: ABNORMAL
PLATELET # BLD AUTO: 304 K/UL (ref 135–450)
PMV BLD AUTO: 9.1 FL (ref 5–10.5)
POTASSIUM SERPL-SCNC: 4.1 MMOL/L (ref 3.5–5.1)
RBC # BLD AUTO: 5.06 M/UL (ref 4.2–5.9)
SODIUM SERPL-SCNC: 134 MMOL/L (ref 136–145)
WBC # BLD AUTO: 9.1 K/UL (ref 4–11)

## 2024-03-17 PROCEDURE — 92523 SPEECH SOUND LANG COMPREHEN: CPT

## 2024-03-17 PROCEDURE — 36415 COLL VENOUS BLD VENIPUNCTURE: CPT

## 2024-03-17 PROCEDURE — 92610 EVALUATE SWALLOWING FUNCTION: CPT

## 2024-03-17 PROCEDURE — 83735 ASSAY OF MAGNESIUM: CPT

## 2024-03-17 PROCEDURE — 6370000000 HC RX 637 (ALT 250 FOR IP): Performed by: NURSE PRACTITIONER

## 2024-03-17 PROCEDURE — 94640 AIRWAY INHALATION TREATMENT: CPT

## 2024-03-17 PROCEDURE — 85027 COMPLETE CBC AUTOMATED: CPT

## 2024-03-17 PROCEDURE — 2580000003 HC RX 258: Performed by: NURSE PRACTITIONER

## 2024-03-17 PROCEDURE — 94761 N-INVAS EAR/PLS OXIMETRY MLT: CPT

## 2024-03-17 PROCEDURE — 80048 BASIC METABOLIC PNL TOTAL CA: CPT

## 2024-03-17 PROCEDURE — 6360000002 HC RX W HCPCS: Performed by: NURSE PRACTITIONER

## 2024-03-17 PROCEDURE — 2060000000 HC ICU INTERMEDIATE R&B

## 2024-03-17 PROCEDURE — 2700000000 HC OXYGEN THERAPY PER DAY

## 2024-03-17 PROCEDURE — 6370000000 HC RX 637 (ALT 250 FOR IP)

## 2024-03-17 PROCEDURE — 6370000000 HC RX 637 (ALT 250 FOR IP): Performed by: INTERNAL MEDICINE

## 2024-03-17 RX ORDER — INSULIN GLARGINE 100 [IU]/ML
31 INJECTION, SOLUTION SUBCUTANEOUS 2 TIMES DAILY
Status: DISCONTINUED | OUTPATIENT
Start: 2024-03-17 | End: 2024-03-17

## 2024-03-17 RX ORDER — INSULIN GLARGINE 100 [IU]/ML
32 INJECTION, SOLUTION SUBCUTANEOUS 2 TIMES DAILY
Status: DISCONTINUED | OUTPATIENT
Start: 2024-03-17 | End: 2024-03-19 | Stop reason: HOSPADM

## 2024-03-17 RX ADMIN — INSULIN LISPRO 2 UNITS: 100 INJECTION, SOLUTION INTRAVENOUS; SUBCUTANEOUS at 17:45

## 2024-03-17 RX ADMIN — PANTOPRAZOLE SODIUM 40 MG: 40 TABLET, DELAYED RELEASE ORAL at 06:23

## 2024-03-17 RX ADMIN — ENOXAPARIN SODIUM 40 MG: 100 INJECTION SUBCUTANEOUS at 08:05

## 2024-03-17 RX ADMIN — FENOFIBRATE 160 MG: 160 TABLET ORAL at 08:06

## 2024-03-17 RX ADMIN — ASPIRIN 81 MG: 81 TABLET, COATED ORAL at 08:06

## 2024-03-17 RX ADMIN — FUROSEMIDE 20 MG: 20 TABLET ORAL at 08:06

## 2024-03-17 RX ADMIN — Medication 2 PUFF: at 08:17

## 2024-03-17 RX ADMIN — INSULIN GLARGINE 30 UNITS: 100 INJECTION, SOLUTION SUBCUTANEOUS at 08:05

## 2024-03-17 RX ADMIN — Medication 400 MG: at 08:06

## 2024-03-17 RX ADMIN — GABAPENTIN 400 MG: 400 CAPSULE ORAL at 14:46

## 2024-03-17 RX ADMIN — Medication 2 PUFF: at 20:44

## 2024-03-17 RX ADMIN — INSULIN LISPRO 8 UNITS: 100 INJECTION, SOLUTION INTRAVENOUS; SUBCUTANEOUS at 11:51

## 2024-03-17 RX ADMIN — INSULIN GLARGINE 32 UNITS: 100 INJECTION, SOLUTION SUBCUTANEOUS at 21:05

## 2024-03-17 RX ADMIN — AZITHROMYCIN MONOHYDRATE 500 MG: 500 INJECTION, POWDER, LYOPHILIZED, FOR SOLUTION INTRAVENOUS at 18:24

## 2024-03-17 RX ADMIN — INSULIN LISPRO 2 UNITS: 100 INJECTION, SOLUTION INTRAVENOUS; SUBCUTANEOUS at 08:05

## 2024-03-17 RX ADMIN — CLOPIDOGREL BISULFATE 75 MG: 75 TABLET ORAL at 08:05

## 2024-03-17 RX ADMIN — GABAPENTIN 400 MG: 400 CAPSULE ORAL at 08:05

## 2024-03-17 RX ADMIN — GABAPENTIN 400 MG: 400 CAPSULE ORAL at 20:42

## 2024-03-17 RX ADMIN — Medication 10 ML: at 08:07

## 2024-03-17 RX ADMIN — ROSUVASTATIN 40 MG: 10 TABLET, FILM COATED ORAL at 08:06

## 2024-03-17 RX ADMIN — CEFTRIAXONE SODIUM 1000 MG: 1 INJECTION, POWDER, FOR SOLUTION INTRAMUSCULAR; INTRAVENOUS at 17:47

## 2024-03-17 NOTE — PLAN OF CARE
TODAY'S DATE:  3/17/2024      Current NIHSS 0      Discussed personal risk factors for Stroke/TIA with patient/family, and ways to reduce the risk for a recurrent stroke.     Patient's personal risk factors which were identified are:   []   Alcohol Abuse: check with your physician before any alcohol consumption.   []   Atrial fibrillation: may cause blood clots  []   Drug Abuse: Seek help, talk with your doctor  []   Clotting Disorder  [x]   Diabetes  []   Family history of stroke or heart disease  [x]   High Blood Pressure/Hypertension: work with your physician  [x]   High cholesterol: monitor cholesterol levels with your physician  [x]   Overweight/Obesity: work with your physician for your ideal body weight  [x]   Physical Inactivity: get regular exercise as directed by your physician  [x]   Personal history of previous TIA or stroke  []   Poor Diet: decrease salt (sodium) in your diet, follow diet directed by physician  [x]   Smoking: stop smoking      Reviewed the Following Education with Patient and/or Family:   - Signs and Symptoms of Stroke  - Personal risk factors   - How to activate EMS (911)   - Importance of Follow Up Appointments at Discharge   - Importance of Compliance with Medications Prescribed at Discharge  - Available community resources and stroke advocacy groups if needed    Patient and/or family member: verbalized understanding.     Stroke Education booklet given to patient/family (or verified, if given already), which reviews above information. yes         Electronically signed by ESTEFANI BRADFORD RN on 3/17/2024 at 4:37 PM

## 2024-03-17 NOTE — PLAN OF CARE
Problem: Chronic Conditions and Co-morbidities  Goal: Patient's chronic conditions and co-morbidity symptoms are monitored and maintained or improved  3/16/2024 2148 by Royce Mosley RN  Outcome: Progressing  Flowsheets (Taken 3/16/2024 2148)  Care Plan - Patient's Chronic Conditions and Co-Morbidity Symptoms are Monitored and Maintained or Improved:   Monitor and assess patient's chronic conditions and comorbid symptoms for stability, deterioration, or improvement   Collaborate with multidisciplinary team to address chronic and comorbid conditions and prevent exacerbation or deterioration  3/16/2024 2147 by Royce Mosley RN  Flowsheets (Taken 3/16/2024 2147)  Care Plan - Patient's Chronic Conditions and Co-Morbidity Symptoms are Monitored and Maintained or Improved: Monitor and assess patient's chronic conditions and comorbid symptoms for stability, deterioration, or improvement     Problem: Discharge Planning  Goal: Discharge to home or other facility with appropriate resources  Outcome: Progressing  Flowsheets (Taken 3/16/2024 2148)  Discharge to home or other facility with appropriate resources: Arrange for needed discharge resources and transportation as appropriate     Problem: Safety - Adult  Goal: Free from fall injury  Outcome: Progressing     Problem: Neurosensory - Adult  Goal: Achieves stable or improved neurological status  Outcome: Progressing  Flowsheets (Taken 3/16/2024 2148)  Achieves stable or improved neurological status:   Assess for and report changes in neurological status   Monitor temperature, glucose, and sodium. Initiate appropriate interventions as ordered  Goal: Achieves maximal functionality and self care  Outcome: Progressing  Flowsheets (Taken 3/16/2024 2148)  Achieves maximal functionality and self care: Monitor swallowing and airway patency with patient fatigue and changes in neurological status

## 2024-03-17 NOTE — PLAN OF CARE
SLP completed evaluation. Please refer to notes in EMR.     Jazmyne Coon MA, CCC-SLP  Speech Language Pathologist

## 2024-03-18 ENCOUNTER — APPOINTMENT (OUTPATIENT)
Dept: VASCULAR LAB | Age: 59
DRG: 720 | End: 2024-03-18
Attending: INTERNAL MEDICINE
Payer: MEDICAID

## 2024-03-18 LAB
ANION GAP SERPL CALCULATED.3IONS-SCNC: 11 MMOL/L (ref 3–16)
BUN SERPL-MCNC: 17 MG/DL (ref 7–20)
CALCIUM SERPL-MCNC: 9.3 MG/DL (ref 8.3–10.6)
CHLORIDE SERPL-SCNC: 101 MMOL/L (ref 99–110)
CO2 SERPL-SCNC: 22 MMOL/L (ref 21–32)
CREAT SERPL-MCNC: 1.1 MG/DL (ref 0.9–1.3)
DEPRECATED RDW RBC AUTO: 14.4 % (ref 12.4–15.4)
GFR SERPLBLD CREATININE-BSD FMLA CKD-EPI: >60 ML/MIN/{1.73_M2}
GLUCOSE BLD-MCNC: 126 MG/DL (ref 70–99)
GLUCOSE BLD-MCNC: 132 MG/DL (ref 70–99)
GLUCOSE BLD-MCNC: 200 MG/DL (ref 70–99)
GLUCOSE BLD-MCNC: 272 MG/DL (ref 70–99)
GLUCOSE SERPL-MCNC: 125 MG/DL (ref 70–99)
HCT VFR BLD AUTO: 45.6 % (ref 40.5–52.5)
HGB BLD-MCNC: 15.1 G/DL (ref 13.5–17.5)
MAGNESIUM SERPL-MCNC: 2 MG/DL (ref 1.8–2.4)
MCH RBC QN AUTO: 29.1 PG (ref 26–34)
MCHC RBC AUTO-ENTMCNC: 33.2 G/DL (ref 31–36)
MCV RBC AUTO: 87.7 FL (ref 80–100)
PERFORMED ON: ABNORMAL
PLATELET # BLD AUTO: 298 K/UL (ref 135–450)
PMV BLD AUTO: 9.1 FL (ref 5–10.5)
POTASSIUM SERPL-SCNC: 4.5 MMOL/L (ref 3.5–5.1)
RBC # BLD AUTO: 5.2 M/UL (ref 4.2–5.9)
SODIUM SERPL-SCNC: 134 MMOL/L (ref 136–145)
WBC # BLD AUTO: 10 K/UL (ref 4–11)

## 2024-03-18 PROCEDURE — 85027 COMPLETE CBC AUTOMATED: CPT

## 2024-03-18 PROCEDURE — 36415 COLL VENOUS BLD VENIPUNCTURE: CPT

## 2024-03-18 PROCEDURE — 94761 N-INVAS EAR/PLS OXIMETRY MLT: CPT

## 2024-03-18 PROCEDURE — 2580000003 HC RX 258: Performed by: NURSE PRACTITIONER

## 2024-03-18 PROCEDURE — 95816 EEG AWAKE AND DROWSY: CPT | Performed by: PSYCHIATRY & NEUROLOGY

## 2024-03-18 PROCEDURE — 6370000000 HC RX 637 (ALT 250 FOR IP): Performed by: NURSE PRACTITIONER

## 2024-03-18 PROCEDURE — 6370000000 HC RX 637 (ALT 250 FOR IP)

## 2024-03-18 PROCEDURE — 80048 BASIC METABOLIC PNL TOTAL CA: CPT

## 2024-03-18 PROCEDURE — 2700000000 HC OXYGEN THERAPY PER DAY

## 2024-03-18 PROCEDURE — 95816 EEG AWAKE AND DROWSY: CPT

## 2024-03-18 PROCEDURE — 94640 AIRWAY INHALATION TREATMENT: CPT

## 2024-03-18 PROCEDURE — 2060000000 HC ICU INTERMEDIATE R&B

## 2024-03-18 PROCEDURE — 6370000000 HC RX 637 (ALT 250 FOR IP): Performed by: INTERNAL MEDICINE

## 2024-03-18 PROCEDURE — 93880 EXTRACRANIAL BILAT STUDY: CPT

## 2024-03-18 PROCEDURE — 93306 TTE W/DOPPLER COMPLETE: CPT

## 2024-03-18 PROCEDURE — 6360000002 HC RX W HCPCS: Performed by: NURSE PRACTITIONER

## 2024-03-18 PROCEDURE — 83735 ASSAY OF MAGNESIUM: CPT

## 2024-03-18 RX ADMIN — Medication 2 PUFF: at 20:39

## 2024-03-18 RX ADMIN — INSULIN LISPRO 2 UNITS: 100 INJECTION, SOLUTION INTRAVENOUS; SUBCUTANEOUS at 18:12

## 2024-03-18 RX ADMIN — INSULIN GLARGINE 32 UNITS: 100 INJECTION, SOLUTION SUBCUTANEOUS at 21:24

## 2024-03-18 RX ADMIN — Medication 400 MG: at 09:14

## 2024-03-18 RX ADMIN — GABAPENTIN 400 MG: 400 CAPSULE ORAL at 14:53

## 2024-03-18 RX ADMIN — Medication 2 PUFF: at 08:10

## 2024-03-18 RX ADMIN — ROSUVASTATIN 40 MG: 10 TABLET, FILM COATED ORAL at 09:14

## 2024-03-18 RX ADMIN — GABAPENTIN 400 MG: 400 CAPSULE ORAL at 21:23

## 2024-03-18 RX ADMIN — GABAPENTIN 400 MG: 400 CAPSULE ORAL at 09:14

## 2024-03-18 RX ADMIN — PANTOPRAZOLE SODIUM 40 MG: 40 TABLET, DELAYED RELEASE ORAL at 05:57

## 2024-03-18 RX ADMIN — INSULIN GLARGINE 32 UNITS: 100 INJECTION, SOLUTION SUBCUTANEOUS at 09:15

## 2024-03-18 RX ADMIN — FUROSEMIDE 20 MG: 20 TABLET ORAL at 09:14

## 2024-03-18 RX ADMIN — FENOFIBRATE 160 MG: 160 TABLET ORAL at 09:14

## 2024-03-18 RX ADMIN — CEFTRIAXONE SODIUM 1000 MG: 1 INJECTION, POWDER, FOR SOLUTION INTRAMUSCULAR; INTRAVENOUS at 18:11

## 2024-03-18 RX ADMIN — ASPIRIN 81 MG: 81 TABLET, COATED ORAL at 09:14

## 2024-03-18 RX ADMIN — Medication 10 ML: at 21:25

## 2024-03-18 RX ADMIN — Medication 10 ML: at 09:19

## 2024-03-18 RX ADMIN — AZITHROMYCIN MONOHYDRATE 500 MG: 500 INJECTION, POWDER, LYOPHILIZED, FOR SOLUTION INTRAVENOUS at 21:28

## 2024-03-18 RX ADMIN — ENOXAPARIN SODIUM 40 MG: 100 INJECTION SUBCUTANEOUS at 09:14

## 2024-03-18 NOTE — PLAN OF CARE
Problem: Chronic Conditions and Co-morbidities  Goal: Patient's chronic conditions and co-morbidity symptoms are monitored and maintained or improved  Outcome: Progressing     Problem: Safety - Adult  Goal: Free from fall injury  Outcome: Progressing     Problem: Neurosensory - Adult  Goal: Achieves stable or improved neurological status  Outcome: Progressing  Goal: Achieves maximal functionality and self care  Outcome: Progressing

## 2024-03-18 NOTE — PROCEDURES
INTERPRETATION:  This 25-minute, computer-assisted video EEG recording is normal.  No potentially epileptiform activity was present during the recording.      CLASSIFICATION:  Normal (awake and drowsy).  Sleep - unsuccessful.  EKG channel.    DESCRIPTION:    BACKGROUND:  The awake recording revealed 9 Hz alpha activity over the posterior head region.  Given the extensive study, the patient still did not sleep.  The EEG showed normal V waves during drowsiness.  There was no significant change on the EEG background with photic stimulation.  Hyperventilation was omitted due to intracranial lesion.    INTERICTAL DISCHARGES: None    CLINICAL EVENTS:  None    The EKG channel was unremarkable.

## 2024-03-18 NOTE — FLOWSHEET NOTE
03/18/24 0826   Assessment   Charting Type Shift assessment   Neurological   Neuro (WDL) WDL   Level of Consciousness 0   Orientation Level Oriented to person;Oriented to place;Oriented to situation;Oriented X4;Oriented to time   Cognition Follows commands;Appropriate attention/concentration;Appropriate for developmental age   Speech Clear   R Pupil Size (mm) 3   R Pupil Shape Round   R Pupil Reaction Brisk   L Pupil Size (mm) 3   L Pupil Shape Round   L Pupil Reaction Brisk   R Hand  Moderate   L Hand  Moderate   R Foot Dorsiflexion Moderate   L Foot Dorsiflexion Moderate   R Foot Plantar Flexion Moderate   L Foot Plantar Flexion Moderate   RUE Motor Response Responds to command   RUE Sensation  Full sensation   LUE Motor Response Responds to command   LUE Sensation  Full sensation   RLE Motor Response Responds to command   RLE Sensation  Full sensation   LLE Motor Response Responds to command   LLE Sensation  Full sensation   Tongue Deviation None   Gag Present   Cough Reflex Present   Nelda Coma Scale   Eye Opening 4   Best Verbal Response 5   Best Motor Response 6   Nelda Coma Scale Score 15   HEENT (Head, Ears, Eyes, Nose, & Throat)   HEENT (WDL) X   Right Eye Glasses;Impaired vision   Left Eye Glasses;Impaired vision   Teeth Dentures upper   Respiratory   Respiratory (WDL) WDL   Breath Sounds   Breath Sounds Bilateral Clear   Cardiac   Cardiac (WDL) WDL   Gastrointestinal   Abdominal (WDL) WDL   RUQ Bowel Sounds Active   LUQ Bowel Sounds Active   RLQ Bowel Sounds Active   LLQ Bowel Sounds Active   Genitourinary   Genitourinary (WDL) WDL   Suprapubic Tenderness No   Dysuria (Pain/Burning w/Urination) No   Difficulty Urinating/Starting Stream No   Urine Assessment   Urinary Status Voiding   Peripheral Vascular   Peripheral Vascular (WDL) WDL   RUE Neurovascular Assessment   Capillary Refill Less than/Equal to 3 seconds   Color Appropriate for Ethnicity   Temperature Warm   R Radial Pulse +2

## 2024-03-18 NOTE — PLAN OF CARE
Problem: Chronic Conditions and Co-morbidities  Goal: Patient's chronic conditions and co-morbidity symptoms are monitored and maintained or improved  Outcome: Progressing  Flowsheets (Taken 3/17/2024 2157)  Care Plan - Patient's Chronic Conditions and Co-Morbidity Symptoms are Monitored and Maintained or Improved:   Monitor and assess patient's chronic conditions and comorbid symptoms for stability, deterioration, or improvement   Collaborate with multidisciplinary team to address chronic and comorbid conditions and prevent exacerbation or deterioration     Problem: Discharge Planning  Goal: Discharge to home or other facility with appropriate resources  Outcome: Progressing  Flowsheets (Taken 3/16/2024 2148)  Discharge to home or other facility with appropriate resources: Arrange for needed discharge resources and transportation as appropriate     Problem: Safety - Adult  Goal: Free from fall injury  Outcome: Progressing     Problem: Neurosensory - Adult  Goal: Achieves stable or improved neurological status  Outcome: Progressing  Flowsheets (Taken 3/17/2024 2157)  Achieves stable or improved neurological status:   Assess for and report changes in neurological status   Monitor temperature, glucose, and sodium. Initiate appropriate interventions as ordered  Goal: Achieves maximal functionality and self care  Outcome: Progressing

## 2024-03-18 NOTE — CARE COORDINATION
Case Management Assessment  Initial Evaluation    Date/Time of Evaluation: 3/18/2024 12:08 PM  Assessment Completed by: Grace Roy RN    If patient is discharged prior to next notation, then this note serves as note for discharge by case management.    Patient Name: Bradford Pulliam                   YOB: 1965  Diagnosis: Stroke determined by clinical assessment ([facility]) [I63.9]                   Date / Time: 3/16/2024  3:09 AM    Patient Admission Status: Inpatient   Readmission Risk (Low < 19, Mod (19-27), High > 27): Readmission Risk Score: 14.8    Current PCP: Zoila Olvera APRN - CNP  PCP verified by CM? Yes    Chart Reviewed: Yes      History Provided by: Patient  Patient Orientation: Alert and Oriented, Person, Place, Situation, Self    Patient Cognition: Alert    Hospitalization in the last 30 days (Readmission):  No    If yes, Readmission Assessment in  Navigator will be completed.    Advance Directives:      Code Status: Full Code   Patient's Primary Decision Maker is: Legal Next of Kin    Primary Decision Maker: Janet Pulliam - Brother/Sister - [phone]    Discharge Planning:    Patient lives with: Family Members Type of Home: House  Primary Care Giver: Self  Patient Support Systems include: Family Members   Current Financial resources: Medicare  Current community resources: None  Current services prior to admission: Oxygen Therapy ([facility] 3L at night only)            Current DME:              Type of Home Care services:  None    ADLS  Prior functional level: Independent in ADLs/IADLs  Current functional level: Independent in ADLs/IADLs    PT AM-PAC: 24 /24  OT AM-PAC: 23 /24    Family can provide assistance at DC: Yes  Would you like Case Management to discuss the discharge plan with any other family members/significant others, and if so, who? Yes  Plans to Return to Present Housing: Yes  Other Identified Issues/Barriers to RETURNING to current housing: none  Potential Assistance

## 2024-03-19 ENCOUNTER — TELEPHONE (OUTPATIENT)
Dept: FAMILY MEDICINE CLINIC | Age: 59
End: 2024-03-19

## 2024-03-19 VITALS
HEART RATE: 84 BPM | TEMPERATURE: 97.6 F | DIASTOLIC BLOOD PRESSURE: 70 MMHG | BODY MASS INDEX: 30.12 KG/M2 | OXYGEN SATURATION: 93 % | HEIGHT: 60 IN | SYSTOLIC BLOOD PRESSURE: 131 MMHG | RESPIRATION RATE: 20 BRPM | WEIGHT: 153.4 LBS

## 2024-03-19 PROBLEM — I65.21 CAROTID OCCLUSION, RIGHT: Status: ACTIVE | Noted: 2024-03-19

## 2024-03-19 LAB
ANION GAP SERPL CALCULATED.3IONS-SCNC: 13 MMOL/L (ref 3–16)
BACTERIA BLD CULT ORG #2: NORMAL
BACTERIA BLD CULT: NORMAL
BUN SERPL-MCNC: 22 MG/DL (ref 7–20)
CALCIUM SERPL-MCNC: 9.2 MG/DL (ref 8.3–10.6)
CHLORIDE SERPL-SCNC: 100 MMOL/L (ref 99–110)
CO2 SERPL-SCNC: 21 MMOL/L (ref 21–32)
CREAT SERPL-MCNC: 1.1 MG/DL (ref 0.9–1.3)
DEPRECATED RDW RBC AUTO: 14.1 % (ref 12.4–15.4)
GFR SERPLBLD CREATININE-BSD FMLA CKD-EPI: >60 ML/MIN/{1.73_M2}
GLUCOSE BLD-MCNC: 168 MG/DL (ref 70–99)
GLUCOSE SERPL-MCNC: 146 MG/DL (ref 70–99)
HCT VFR BLD AUTO: 45.6 % (ref 40.5–52.5)
HGB BLD-MCNC: 15.2 G/DL (ref 13.5–17.5)
MAGNESIUM SERPL-MCNC: 2.1 MG/DL (ref 1.8–2.4)
MCH RBC QN AUTO: 29 PG (ref 26–34)
MCHC RBC AUTO-ENTMCNC: 33.3 G/DL (ref 31–36)
MCV RBC AUTO: 87.1 FL (ref 80–100)
NT-PROBNP SERPL-MCNC: <36 PG/ML (ref 0–124)
PERFORMED ON: ABNORMAL
PLATELET # BLD AUTO: 279 K/UL (ref 135–450)
PMV BLD AUTO: 9.3 FL (ref 5–10.5)
POTASSIUM SERPL-SCNC: 4.8 MMOL/L (ref 3.5–5.1)
RBC # BLD AUTO: 5.23 M/UL (ref 4.2–5.9)
SODIUM SERPL-SCNC: 134 MMOL/L (ref 136–145)
WBC # BLD AUTO: 9.5 K/UL (ref 4–11)

## 2024-03-19 PROCEDURE — 94760 N-INVAS EAR/PLS OXIMETRY 1: CPT

## 2024-03-19 PROCEDURE — 94761 N-INVAS EAR/PLS OXIMETRY MLT: CPT

## 2024-03-19 PROCEDURE — 85027 COMPLETE CBC AUTOMATED: CPT

## 2024-03-19 PROCEDURE — 2700000000 HC OXYGEN THERAPY PER DAY

## 2024-03-19 PROCEDURE — 6370000000 HC RX 637 (ALT 250 FOR IP)

## 2024-03-19 PROCEDURE — 6370000000 HC RX 637 (ALT 250 FOR IP): Performed by: NURSE PRACTITIONER

## 2024-03-19 PROCEDURE — 80048 BASIC METABOLIC PNL TOTAL CA: CPT

## 2024-03-19 PROCEDURE — 83880 ASSAY OF NATRIURETIC PEPTIDE: CPT

## 2024-03-19 PROCEDURE — 6370000000 HC RX 637 (ALT 250 FOR IP): Performed by: INTERNAL MEDICINE

## 2024-03-19 PROCEDURE — 6360000002 HC RX W HCPCS: Performed by: NURSE PRACTITIONER

## 2024-03-19 PROCEDURE — 83735 ASSAY OF MAGNESIUM: CPT

## 2024-03-19 PROCEDURE — 94640 AIRWAY INHALATION TREATMENT: CPT

## 2024-03-19 RX ORDER — FENOFIBRATE 160 MG/1
160 TABLET ORAL DAILY
Qty: 30 TABLET | Refills: 3 | Status: SHIPPED | OUTPATIENT
Start: 2024-03-19

## 2024-03-19 RX ORDER — CLOPIDOGREL BISULFATE 75 MG/1
75 TABLET ORAL DAILY
Qty: 30 TABLET | Refills: 3 | Status: CANCELLED | OUTPATIENT
Start: 2024-03-19

## 2024-03-19 RX ORDER — AMOXICILLIN AND CLAVULANATE POTASSIUM 875; 125 MG/1; MG/1
1 TABLET, FILM COATED ORAL 2 TIMES DAILY
Qty: 8 TABLET | Refills: 0 | Status: SHIPPED | OUTPATIENT
Start: 2024-03-19 | End: 2024-03-23

## 2024-03-19 RX ORDER — FUROSEMIDE 20 MG/1
20 TABLET ORAL DAILY
Qty: 60 TABLET | Refills: 3 | Status: SHIPPED | OUTPATIENT
Start: 2024-03-19

## 2024-03-19 RX ORDER — INSULIN GLARGINE 100 [IU]/ML
32 INJECTION, SOLUTION SUBCUTANEOUS 2 TIMES DAILY
Qty: 10 ML | Refills: 3 | Status: SHIPPED | OUTPATIENT
Start: 2024-03-19

## 2024-03-19 RX ORDER — GABAPENTIN 400 MG/1
400 CAPSULE ORAL 4 TIMES DAILY
Qty: 120 CAPSULE | Refills: 0 | Status: SHIPPED | OUTPATIENT
Start: 2024-03-19 | End: 2024-04-18

## 2024-03-19 RX ORDER — CLOPIDOGREL BISULFATE 75 MG/1
75 TABLET ORAL DAILY
Qty: 30 TABLET | Refills: 3 | Status: SHIPPED | OUTPATIENT
Start: 2024-03-19

## 2024-03-19 RX ORDER — CLOPIDOGREL BISULFATE 75 MG/1
75 TABLET ORAL DAILY
Status: DISCONTINUED | OUTPATIENT
Start: 2024-03-19 | End: 2024-03-19 | Stop reason: HOSPADM

## 2024-03-19 RX ORDER — ASPIRIN 81 MG/1
81 TABLET ORAL DAILY
Qty: 21 TABLET | Refills: 0 | Status: SHIPPED | OUTPATIENT
Start: 2024-03-19 | End: 2024-04-09

## 2024-03-19 RX ADMIN — INSULIN GLARGINE 32 UNITS: 100 INJECTION, SOLUTION SUBCUTANEOUS at 09:53

## 2024-03-19 RX ADMIN — FUROSEMIDE 20 MG: 20 TABLET ORAL at 09:54

## 2024-03-19 RX ADMIN — Medication 400 MG: at 09:54

## 2024-03-19 RX ADMIN — ROSUVASTATIN 40 MG: 10 TABLET, FILM COATED ORAL at 09:54

## 2024-03-19 RX ADMIN — GABAPENTIN 400 MG: 400 CAPSULE ORAL at 09:54

## 2024-03-19 RX ADMIN — Medication 2 PUFF: at 08:33

## 2024-03-19 RX ADMIN — FENOFIBRATE 160 MG: 160 TABLET ORAL at 09:53

## 2024-03-19 RX ADMIN — PANTOPRAZOLE SODIUM 40 MG: 40 TABLET, DELAYED RELEASE ORAL at 05:39

## 2024-03-19 RX ADMIN — ENOXAPARIN SODIUM 40 MG: 100 INJECTION SUBCUTANEOUS at 09:53

## 2024-03-19 RX ADMIN — CLOPIDOGREL BISULFATE 75 MG: 75 TABLET ORAL at 09:56

## 2024-03-19 RX ADMIN — ASPIRIN 81 MG: 81 TABLET, COATED ORAL at 09:54

## 2024-03-19 NOTE — TELEPHONE ENCOUNTER
Insulin syringes    [facility]    Pharmacist calling, states received script for lantus vials from what she is assuming a provider at [facility] Vicki Billy DO, but they did not send syringes and patient will need those.     Last OV- VV 3/12/24    Next OV- VV 3/18/25

## 2024-03-19 NOTE — DISCHARGE INSTR - DIET

## 2024-03-19 NOTE — DISCHARGE SUMMARY
05:51 PM    GLUCOSEU >=1000 03/15/2024 05:51 PM    KETUA TRACE 03/15/2024 05:51 PM     Urine Cultures:   Lab Results   Component Value Date/Time    LABURIN 100 mg/dL 03/11/2024 04:03 PM     Blood Cultures:   Lab Results   Component Value Date/Time    BC  03/15/2024 05:45 PM     No Growth to date.  Any change in status will be called.     Lab Results   Component Value Date/Time    BLOODCULT2  03/15/2024 05:51 PM     No Growth to date.  Any change in status will be called.     Organism:   Lab Results   Component Value Date/Time    ORG Escherichia coli DNA Detected 10/06/2021 09:24 AM    ORG Escherichia coli 10/06/2021 09:24 AM    ORG Escherichia coli 10/06/2021 09:24 AM       Time Spent Discharging patient 50 minutes    Electronically signed by Vicki Billy DO on 3/19/2024 at 9:55 AM

## 2024-03-19 NOTE — DISCHARGE INSTRUCTIONS
Heart Failure Resources:  Heart Failure Interactive Workbook:  Go to [email or web address]://[email or web address].RetailMLS/publication/?x=911636 for a Free Heart Failure Interactive Workbook provided by The American [employer] Association. This interactive workbook will provide information on Healthier Living with Heart Failure. Please copy and paste link into search bar. Use your mouse to scroll through the pages.    HF [place] apollo:   Heart Failure Free smart phone apollo available for iPhone and Android download. Use your phone to track sodium intake, fluid intake, symptoms, and weight.     Low Sodium Diet / Recipes:  Go to [email or web address].ArtSetters website for “renal” diet which is Low Sodium! [employer] is a dialysis company, but this website offers free seasonal cookbooks. Each quarter, they will release 25-30 new recipes with a breakdown of calories, sodium, and glucose. You can also go to [email or web address]/recipes website for free recipes.     Home Exercise Program:   Identification of Green/Yellow/Red zones:  You should be able to identify when you feel good (green zone), if you have 1-2 symptoms of HF (yellow zone), or if you are in need of medical attention (red zone).  In your CHF education folder you were provided a “stop light tool” to outline this information.     We want to you to rate your exertion levels:    Our therapy team has discussed means of identification with you such as the \"Ousmane scale.\"  The Ousmane rating scale ranges from 6 to 20, where 6 means \"no exertion at all\" and 20 means \"maximal exertion.\" The goal is to use this to gauge how much effort it is taking for you to do your normal daily tasks.   You should be able to recognize when too much exertion is being expended.    Elements of Energy Conservation:   Prioritize/Plan: Decide what needs to be done today, and what can wait for a later date, write to do lists, plan ahead to avoid extra trips, and gather supplies and equipment needed before starting an activity.   Position:

## 2024-03-19 NOTE — PROGRESS NOTES
V2.0    [facility] [place]      Name:  Bradford Pulliam /Age/Sex: 1965  (58 y.o. male)   MRN & CSN:  [id number] & [id number] Encounter Date/Time: 3/17/2024 10:19 AM EDT   Location:  0432/0432-01 PCP: Zoila Olvera APRN - CNP     Attending:Richy Cevallos MD       Hospital Day: 2    Assessment and Recommendations   Bradford Pulliam is a 58 y.o. male with pmh of CHF, COPD on 2 L, uncontrolled diabetes type 1, hyperlipidemia, and hypertension who presents with Stroke determined by clinical assessment (HCC)      Plan:     Right parietal lobe stroke, subacute  -Altered mental status and increased left-sided weakness  -MRI brain showed subacute right parietal lobe infarct minimal hemosiderin staining  -CT head without contrast showed no acute abnormalities  -CTA head canceled due to radiation  -Neurology consulted and recommend DAPT   -clopidogrel continue  -continue aspirin  -DC aspirin at 21 days  -NIH 1-2 deficits on the left side  -SLP no changes to diet    Sepsis secondary to community-acquired pneumonia  Checks x-ray showed focal airspace opacity in the right infrahilar region  Patient is on Rocephin and Zithromax    Possible seizure  Patient had acute event previous day.  Patient developed shortness of breath lightheadedness loss of bladder function  CT head without contrast showed no acute abnormalities  Patient is a lactate afterwards was 9 point 9 repeat was 1.2  Troponin was negative EKG showed sinus tachycardia no ischemic changes    Uncontrolled type 1 diabetes  A1c of 13.9 in   Patient on medium dose sliding scale  Patient has basal insulin 31 units twice daily  Goal blood sugar less than 180    Chronic diastolic heart failure  Echo showed  showed EF of 35 to 60%  Continue home Lasix 20 mg once per day  CHF order set in place    COPD  Patient is normally on 2 to 3 L at home  Continue oxygen goal of 88 to 90%    Hyperlipidemia  Continue home Crestor and fenofibrate      Diet ADULT DIET; 
    V2.0    [facility] Progress Note      Name:  Bradford Pulliam /Age/Sex: 1965  (58 y.o. male)   MRN & CSN:  [id number] & [id number] Encounter Date/Time: 3/18/2024 10:19 AM EDT   Location:  0432/0432-01 PCP: Zoila Olvera APRN - CNP     Attending:Richy Cevallos MD       Hospital Day: 3    Assessment and Recommendations   Bradford Pulliam is a 58 y.o. male with pmh of CHF, COPD on 2 L, uncontrolled diabetes type 1, hyperlipidemia, and hypertension who presents with Stroke determined by clinical assessment ([facility])      Plan:     Right parietal lobe stroke, subacute,   Right internal carotid artery occlusion  -MRI brain showed subacute right parietal lobe infarct minimal hemosiderin staining  -CT head without contrast showed no acute abnormalities  -Neurology consulted and recommend DAPT   -clopidogrel continue  -continue aspirin  -DC aspirin at 21 days  -Echo normal  -Carotid artery ultrasound showed right internal carotid artery demonstrating total occlusion  -Vascular surgery consulted      Sepsis secondary to community-acquired pneumonia  Checks x-ray showed focal airspace opacity in the right infrahilar region  Patient is on Rocephin and Zithromax    Possible seizure  Patient had acute event previous day.  Patient developed shortness of breath lightheadedness loss of bladder function  CT head without contrast showed no acute abnormalities  Patient is a lactate afterwards was 9 point 9 repeat was 1.2  Troponin was negative EKG showed sinus tachycardia no ischemic changes  -EEG within normal limits  -Neurology not indicating any changes in management    Uncontrolled type 1 diabetes  A1c of 13.9 in   Patient on medium dose sliding scale  Patient has basal insulin 31 units twice daily  Goal blood sugar less than 180    Chronic diastolic heart failure  Echo showed  showed EF of 35 to 60%  Continue home Lasix 20 mg once per day  CHF order set in place    COPD  Patient is normally on 2 to 3 L at home  Continue 
  Speech Language Pathology  Facility/Department: [facility]  Initial Speech/Language/Cognitive Assessment       NAME:Bradford Pulliam  : 1965 (58 y.o.)   MRN: [id number]  ROOM: [facility]-01  ADMISSION DATE: 3/16/2024  PATIENT DIAGNOSIS(ES): Stroke determined by clinical assessment ([facility]) [I63.9]    Impressions:   Pt presents with mild cognitive communication impairments, speech and language abilities WFL, motor speech and voice WFL. Pt's impairments are characterized by difficulties with thought organization, executive functioning, attention to detail, and problem solving. Pt's working memory and short term memory appear to be intact. Pt with prior level of independence with distant support from family with independent management of all household tasks. Given pt's mild cog-comm difficulties and prior level of independence, would recommend ST treatment at this time.     Patient Active Problem List    Diagnosis Date Noted    Diabetic ketoacidosis without coma associated with type 2 diabetes mellitus ([facility]) 10/26/2022    Acute pancreatitis without infection or necrosis 10/26/2022    Metabolic acidosis 10/26/2022    Stroke determined by clinical assessment ([facility]) 2024    Tobacco use     Type 2 diabetes mellitus with other specified complication, unspecified whether long term insulin use ([facility])     CAD in native artery     Hypertension 2020    Mixed hyperlipidemia 2020    CHF (congestive heart failure) ([facility]) 2020    Chronic obstructive pulmonary disease ([facility]) 2020     Past Medical History:   Diagnosis Date    Acute respiratory failure ([facility]) 2021    Bacteremia due to Escherichia coli 10/07/2021    Chest pain     CHF (congestive heart failure) ([facility])     combined    COPD (chronic obstructive pulmonary disease) ([facility])     Diabetes mellitus ([facility])     Electrolyte disorder 10/26/2022    Elevated d-dimer     Hyperlipidemia     Hypertension     Organizing pneumonia ([facility]) 2021    Oxygen 
CHF Care Plan      Patient's EF (Ejection Fraction) is greater than 40%    Heart Failure Medications:  Diuretics:: Furosemide    (One of the following REQUIRED for EF </= 40%/SYSTOLIC FAILURE but MAY be used in EF% >40%/DIASTOLIC FAILURE)        ACE:: None        ARB:: None         ARNI:: None    (Beta Blockers)  NON- Evidenced Based Beta Blocker (for EF% >40%/DIASTOLIC FAILURE): None    Evidenced Based Beta Blocker::(REQUIRED for EF% <40%/SYSTOLIC FAILURE) None  ...................................................................................................................................................    Failed to redirect to the Timeline version of the [employer] Smart[employer].      Patient's weights and intake/output reviewed    Daily Weight log at bedside, patient/family participation in use of log: \"yes    Patient's current weight today shows a difference of 0 lbs  than last documented weight.      Intake/Output Summary (Last 24 hours) at 3/19/2024 1341  Last data filed at 3/19/2024 0755  Gross per 24 hour   Intake 660 ml   Output 1950 ml   Net -1290 ml       Education Booklet Provided: yes    Comorbidities Reviewed Yes    Patient has a past medical history of Acute respiratory failure (HCC), Bacteremia due to Escherichia coli, Chest pain, CHF (congestive heart failure) (HCC), COPD (chronic obstructive pulmonary disease) (HCC), Diabetes mellitus (HCC), Electrolyte disorder, Elevated d-dimer, Hyperlipidemia, Hypertension, Organizing pneumonia (HCC), Oxygen dependent, Sepsis with acute respiratory failure and septic shock (HCC), Septic shock due to Escherichia coli (HCC), and Severe hypoxemia.     >>For CHF and Comorbidity documentation on Education Time and Topics, please see Education Tab      Pt  at this time on room air. Pt denies shortness of breath. Pt without lower extremity edema.     Patient and/or Family's stated Goal of Care this Admission: reduce shortness of breath, increase activity tolerance, better 
CHF Care Plan      Patient's EF (Ejection Fraction) is greater than 40%    Heart Failure Medications:  Diuretics:: None    (One of the following REQUIRED for EF </= 40%/SYSTOLIC FAILURE but MAY be used in EF% >40%/DIASTOLIC FAILURE)        ACE:: None        ARB:: None         ARNI:: None    (Beta Blockers)  NON- Evidenced Based Beta Blocker (for EF% >40%/DIASTOLIC FAILURE): None    Evidenced Based Beta Blocker::(REQUIRED for EF% <40%/SYSTOLIC FAILURE) None  ...................................................................................................................................................    Failed to redirect to the Timeline version of the [employer] SmartLink.      Patient's weights and intake/output reviewed    Daily Weight log at bedside, patient/family participation in use of log: \"yes    Patient's current weight today shows a difference of 1 lbs less than last documented weight.      Intake/Output Summary (Last 24 hours) at 3/18/2024 1831  Last data filed at 3/18/2024 1530  Gross per 24 hour   Intake 1320 ml   Output 2475 ml   Net -1155 ml       Education Booklet Provided: yes    Comorbidities Reviewed Yes    Patient has a past medical history of Acute respiratory failure (HCC), Bacteremia due to Escherichia coli, Chest pain, CHF (congestive heart failure) (HCC), COPD (chronic obstructive pulmonary disease) (HCC), Diabetes mellitus (HCC), Electrolyte disorder, Elevated d-dimer, Hyperlipidemia, Hypertension, Organizing pneumonia (HCC), Oxygen dependent, Sepsis with acute respiratory failure and septic shock (HCC), Septic shock due to Escherichia coli (HCC), and Severe hypoxemia.     >>For CHF and Comorbidity documentation on Education Time and Topics, please see Education Tab      Pt sitting in bed at this time on  1 L O2. Pt denies shortness of breath. Pt with nonpitting lower extremity edema.     Patient and/or Family's stated Goal of Care this Admission: reduce shortness of breath, increase activity 
Neurology Progress Note    ID: Bradford Pulliam is a 58 y.o. male    : 1965     LOS: 3 days     ASSESSMENT    1.  Acute right MCA infarction.  2.  Chronic smoking.  3.  Uncontrolled diabetes.  4.  Hypertension.  5.  Right ICA occlusion.    The patient has no focal neurological deficit during my assessment.  However, the patient is noted for baseline right esotropia.  MRI brain showed acute infarction over the right MCA.  Carotid ultrasound showed no right ICA occlusion.    From neurology perspective, the etiology of the stroke is likely cardioembolic phenomenon or artery to artery embolism from right ICA occlusion.  The pattern of stroke is not typical for watershed infarction from right ICA occlusion.    There is no clear indication for surgical intervention at this time.    PLAN    1.  Continue dual antiplatelet therapy with previous recommendation.  2.  The target blood pressure below 140/90.  3.  Continue statin.  4.  Cardiac telemetry.  5.  Outpatient cardiac monitoring.  6.  Smoking cessation.  7.  Outpatient follow-up for visual field test with ophthalmologist.    The patient may be discharged if there is no other concern.    Medications:  Scheduled Meds:    clopidogrel  75 mg Oral Daily    insulin glargine  32 Units SubCUTAneous BID    aspirin  81 mg Oral Daily    mometasone-formoterol  2 puff Inhalation BID RT    fenofibrate  160 mg Oral Daily    gabapentin  400 mg Oral TID    magnesium oxide  400 mg Oral Daily    pantoprazole  40 mg Oral QAM AC    rosuvastatin  40 mg Oral Daily    sodium chloride flush  5-40 mL IntraVENous 2 times per day    enoxaparin  40 mg SubCUTAneous Daily    insulin lispro  0-8 Units SubCUTAneous TID WC    insulin lispro  0-4 Units SubCUTAneous Nightly    cefTRIAXone (ROCEPHIN) IV  1,000 mg IntraVENous Q24H    azithromycin  500 mg IntraVENous Q24H    furosemide  20 mg Oral Daily     Continuous Infusions:    dextrose      sodium chloride       PRN Meds: glucose, dextrose bolus 
Occupational Therapy  [address]  Occupational Therapy Initial/discharge Assessment    Name: Bradford Pulliam  : 1965  MRN: [id number]  Date of Service: 3/16/2024    Discharge Recommendations:  Home with assist PRN          Patient Diagnosis(es): There were no encounter diagnoses.  Past Medical History:  has a past medical history of Acute respiratory failure (HCC), Bacteremia due to Escherichia coli, Chest pain, CHF (congestive heart failure) (HCC), COPD (chronic obstructive pulmonary disease) (HCC), Diabetes mellitus (HCC), Electrolyte disorder, Elevated d-dimer, Hyperlipidemia, Hypertension, Organizing pneumonia (HCC), Oxygen dependent, Sepsis with acute respiratory failure and septic shock (HCC), Septic shock due to Escherichia coli (HCC), and Severe hypoxemia.  Past Surgical History:  has a past surgical history that includes Tonsillectomy; hernia repair; and Cardiac catheterization.           Assessment      No Skilled OT: Safe to return home  REQUIRES OT FOLLOW-UP: No  Activity Tolerance  Activity Tolerance: Patient Tolerated treatment well  Activity Tolerance Comments: vitals stable on 2 L O 2        Plan   Occupational Therapy Plan  Times Per Week: OT eval only     Restrictions  Restrictions/Precautions  Restrictions/Precautions: Fall Risk, General Precautions  Position Activity Restriction  Other position/activity restrictions: 2 L O 2, Telemetry    Subjective   General  Chart Reviewed: Yes  Patient assessed for rehabilitation services?: Yes  Family / Caregiver Present: No  Referring Practitioner: Nawaf Moon APRN - CNP  Diagnosis: CVA with Left hand numbness and tingling, pneumonia  General Comment  Comments: RN cleared pt for OT eval; pt up in room independently without AD  0/10 pain at rest  Social/Functional History  Social/Functional History  Lives With: Alone  Type of Home: Apartment  Home Layout: One level  Bathroom Shower/Tub: Tub/Shower unit  Home Equipment: 
Patient discharged home, IV removed, tele box returned and cmu aware. Patient picked up by family. Patient verbalized understanding of dc and follow up instructions.  Escorted to car by staff.   
Physical Therapy  [address]  Physical Therapy Initial Assessment/ Discharge Summary    Name: Bradford Pulliam  : 1965  MRN: [id number]  Date of Service: 3/16/2024    Discharge Recommendations:  Home with assist PRN, No therapy recommended at discharge   PT Equipment Recommendations  Equipment Needed: No      Patient Diagnosis(es): There were no encounter diagnoses.  Past Medical History:  has a past medical history of Acute respiratory failure (HCC), Bacteremia due to Escherichia coli, Chest pain, CHF (congestive heart failure) (HCC), COPD (chronic obstructive pulmonary disease) (HCC), Diabetes mellitus (HCC), Electrolyte disorder, Elevated d-dimer, Hyperlipidemia, Hypertension, Organizing pneumonia (HCC), Oxygen dependent, Sepsis with acute respiratory failure and septic shock (HCC), Septic shock due to Escherichia coli (HCC), and Severe hypoxemia.  Past Surgical History:  has a past surgical history that includes Tonsillectomy; hernia repair; and Cardiac catheterization.    Assessment   Assessment: Pt awake and agreeable to therapy session. Pt is limited by decreased activity tolerance but is overall presenting at functional baseline. He is independent with all mobility including 250' ambulation and 4 stairs. Due to no acute needs, he is discharged from acute PT at this time.  Therapy Prognosis: Excellent  Decision Making: Low Complexity  Requires PT Follow-Up: No  Activity Tolerance  Activity Tolerance: Patient tolerated evaluation without incident       Plan   Physical Therapy Plan  General Plan: Discharge with evaluation only  Safety Devices  Type of Devices: Call light within reach, Chair alarm in place, Nurse notified, Left in chair, Patient at risk for falls, Gait belt  Restraints  Restraints Initially in Place: No     Restrictions  Restrictions/Precautions  Restrictions/Precautions: Fall Risk, General Precautions  Position Activity Restriction  Other position/activity 
SLP Attempt Note    SLP eval and treat orders were received. The pt was lethargic unable to awaken to participate despite verbal and tactile prompts. Transport then arrived to take the pt down to CT. Will re-attempt to evaluate the pt at a later time.    Quinton Pederson MA, CCC-SLP  SP#[id number]  Speech-Language Pathologist  Phone: [phone]  Speech Desk: [phone]   
TODAY'S DATE:  3/16/2024      Current NIHSS 1      Discussed personal risk factors for Stroke/TIA with patient/family, and ways to reduce the risk for a recurrent stroke.     Patient's personal risk factors which were identified are:   []   Alcohol Abuse: check with your physician before any alcohol consumption.   []   Atrial fibrillation: may cause blood clots  []   Drug Abuse: Seek help, talk with your doctor  []   Clotting Disorder  [x]   Diabetes  []   Family history of stroke or heart disease  []   High Blood Pressure/Hypertension: work with your physician  [x]   High cholesterol: monitor cholesterol levels with your physician  [x]   Overweight/Obesity: work with your physician for your ideal body weight  [x]   Physical Inactivity: get regular exercise as directed by your physician  [x]   Personal history of previous TIA or stroke  []   Poor Diet: decrease salt (sodium) in your diet, follow diet directed by physician  [x]   Smoking: stop smoking      Reviewed the Following Education with Patient and/or Family:   - Signs and Symptoms of Stroke  - Personal risk factors   - How to activate EMS (911)   - Importance of Follow Up Appointments at Discharge   - Importance of Compliance with Medications Prescribed at Discharge  - Available community resources and stroke advocacy groups if needed    Patient and/or family member: verbalized understanding.     Stroke Education booklet given to patient/family (or verified, if given already), which reviews above information. yes         Electronically signed by Royce Mosley RN on 3/16/2024 at 00:30 AM       
TODAY'S DATE:  3/18/2024      Current NIHSS 0      Discussed personal risk factors for Stroke/TIA with patient/family, and ways to reduce the risk for a recurrent stroke.     Patient's personal risk factors which were identified are:   []   Alcohol Abuse: check with your physician before any alcohol consumption.   []   Atrial fibrillation: may cause blood clots  []   Drug Abuse: Seek help, talk with your doctor  []   Clotting Disorder  [x]   Diabetes  [x]   Family history of stroke or heart disease  [x]   High Blood Pressure/Hypertension: work with your physician  [x]   High cholesterol: monitor cholesterol levels with your physician  []   Overweight/Obesity: work with your physician for your ideal body weight  []   Physical Inactivity: get regular exercise as directed by your physician  [x]   Personal history of previous TIA or stroke  []   Poor Diet: decrease salt (sodium) in your diet, follow diet directed by physician  [x]   Smoking: stop smoking      Reviewed the Following Education with Patient and/or Family:   - Signs and Symptoms of Stroke  - Personal risk factors   - How to activate EMS (911)   - Importance of Follow Up Appointments at Discharge   - Importance of Compliance with Medications Prescribed at Discharge  - Available community resources and stroke advocacy groups if needed    Patient and/or family member: verbalized understanding.     Stroke Education booklet given to patient/family (or verified, if given already), which reviews above information. yes         Electronically signed by Lakeshia Fischer RN on 3/18/2024 at 6:33 PM       
Vascular    Imaging reviewed.    R ICA is 100% occluded.   No intervention possible or needed.   Full consult note to follow.   
Thin Liquids; Meds whole with thin liquids  Instrumentation: Not clinically indicated at this time  Risk management: general aspiration precautions    Prognosis: Good    Recommended Intervention:  N/A    Pt Education: SLP educated the patient re: Role of SLP, rationale for completion of assessment, results of assessment, recommendations, and POC  Pt Education Response: verbalized understanding      Individuals Consulted:   [x]  Patient     []  NP         []  RN   []  RD                   []  MD      []  Family Member                        []  PA    []  Other:      Safety Devices / Report:   [x]  All fall risk precautions in place []  Safety handoff completed with RN  []  Bed alarm in place  []  Left in bed     []  Chair alarm in place  []  Left in chair   []  Call light in reach   []  Other:                       Total Treatment Time / Charges       Time in Time out Total Time / units   Swallow Eval/Tx Time  0856 0906 10 min/1 unit     Signature:  Jazmyne Coon MA, CCC-SLP  Speech Language Pathologist      
10/26/2022 12:47 AM    RBCUA 0-2 10/26/2022 12:47 AM    MUCUS Rare 10/06/2021 09:20 AM    BACTERIA Rare 10/26/2022 12:47 AM    CLARITYU Clear 03/15/2024 05:51 PM    SPECGRAV 1.020 03/15/2024 05:51 PM    LEUKOCYTESUR Negative 03/15/2024 05:51 PM    UROBILINOGEN 0.2 03/15/2024 05:51 PM    BILIRUBINUR Negative 03/15/2024 05:51 PM    BLOODU Negative 03/15/2024 05:51 PM    GLUCOSEU >=1000 03/15/2024 05:51 PM    KETUA TRACE 03/15/2024 05:51 PM     Urine Cultures:   Lab Results   Component Value Date/Time    LABURIN 100 mg/dL 03/11/2024 04:03 PM     Blood Cultures:   Lab Results   Component Value Date/Time    BC No Growth after 4 days of incubation. 10/26/2022 04:08 AM     Lab Results   Component Value Date/Time    BLOODCULT2 No Growth after 4 days of incubation. 10/26/2022 04:10 AM     Organism:   Lab Results   Component Value Date/Time    ORG Escherichia coli DNA Detected 10/06/2021 09:24 AM    ORG Escherichia coli 10/06/2021 09:24 AM    ORG Escherichia coli 10/06/2021 09:24 AM         Electronically signed by Vicki Billy DO on 3/16/2024 at 10:19 AM

## 2024-03-19 NOTE — CONSULTS
+ right-sided CVA on MRI, confusion, weakness  Consult called on 03/16 @ 10:37am  Vargas MEZA    
Comprehensive Nutrition Assessment    Type and Reason for Visit:  Initial, Consult    Nutrition Recommendations/Plan:   Continue carb controlled, cardiac diet  Encourage po intakes  Monitor po intakes, nutrition adequacy, weights, pertinent labs, BMs, education needs     Malnutrition Assessment:  Malnutrition Status:  At risk for malnutrition (Comment) (03/16/24 1301)     Nutrition Assessment:    Consult for CHF diet education. Pt with PMHx of CHF, COPD, DM, HLD, HTN presents with c/o altered mental status. Currently on a cardiac, carb controlled diet. No po intakes recorded yet. Noted pt ate % of lunch at BS. Pt endorses good appetite. No significant weight changes per EMR.  Pt had no nutrition questions. Provided handout on CHF nutrition therapy. Pt reports that he tries to limit his sodium intakes. Encouraged pt to contact RD with any questions.    Nutrition Related Findings:    A&O x 4 per flowsheets. -361 x 24 hr. Wound Type: None       Current Nutrition Intake & Therapies:    Average Meal Intake: Unable to assess  Average Supplements Intake: None Ordered  ADULT DIET; Regular; 4 carb choices (60 gm/meal); Low Fat/Low Chol/High Fiber/2 gm Na    Anthropometric Measures:  Height:    Ideal Body Weight (IBW):   ( )       Current Body Weight: 72 kg (158 lb 11.7 oz),   IBW. Weight Source: Standing Scale  Current BMI (kg/m2): 32                          BMI Categories: Obese Class 1 (BMI 30.0-34.9)    Estimated Daily Nutrient Needs:  Energy Requirements Based On: Kcal/kg  Weight Used for Energy Requirements: Current  Energy (kcal/day): 0229-4308  Weight Used for Protein Requirements: Current  Protein (g/day): 58-72  Method Used for Fluid Requirements: 1 ml/kcal  Fluid (ml/day): 1750-4228    Nutrition Diagnosis:   No nutrition diagnosis at this time     Nutrition Interventions:   Food and/or Nutrient Delivery: Continue Current Diet  Nutrition Education/Counseling: Education initiated  Coordination of 
Consult Placed     Who: Dr.Todd Dexter/ Vascular Surgery   Date:  Time:     Electronically signed by Marla Aparicio on 3/18/2024 at 2:47 PM    
Inpatient neurology consultation        Pt Name: Bradford Pulliam  MRN: [id number]  Birthdate 1965  Date of evaluation 3/16/2024   Provider: Bryn Gill MD        Reason for consultation: + right-sided CVA on MRI, confusion, weakness  Requesting provider: Nawaf Moon APRN - LUCERO      Chief complaint: Visual problems     History of present illness:      Patient had been seen in the emergency room  on 3/14/2024 complaining of headache, difficulty in using left hand , CT scan of the emergency room showed subacute ischemia in the right MCA distribution this was confirmed on MRI scan as acute right MCA stroke restricted to the right parietal lobe     The patient is a heavy smoker, with poorly controlled diabetes and hypertension  He has had a stroke 3 years ago without any deficits. He is a type I diabetic     The patient this morning complaining of no residual symptoms feeling fine denying any numbness or weakness of upper extremities or legs no visual field defect speech or swallowing problem and no headache  Blood pressure has been controlled 122/72 overnight with a sinus rhythm on cardiac monitor      Review of systems:  No change of appetite, recent weight gain.  Reports normal sleep  Minimal numbness and tingling of her extremities and feet and some unsteadiness   Clumsiness of left hand use was noted transiently yesterday  No chest pain or palpitation  no frequent headaches dizziness vertigo or history of migraine  No recent abdominal pain or  or GI symptoms.  smoker with occasional episodes of shortness of breath, he has emphysema.  On oxygen as needed and continues to smoke.     Past Medical history:    CHF,   , DM, type I diabetic  HLD,  Advanced COPD on oxygen as needed.   Hypertension.   pneumonia.   He smokes tobacco.   stroke 3 years ago without any deficits   Bilateral knee pain   Obesity    Social history:   Lives with his ex-wife   Does not drive  Smokes 1 pack daily no excessive alcohol 
Neurology Progress Note    ID: Bradford Pulliam is a 58 y.o. male    : 1965     LOS: 2 days     ASSESSMENT    1.  Acute right MCA infarction.  2.  Chronic smoking.  3.  Uncontrolled diabetes.  4.  Hypertension.    The patient has no focal neurological deficit during my assessment.  However, the patient is noted for baseline right esotropia.  MRI brain showed acute infarction over the right MCA.  Carotid ultrasound is pending.    From neurology perspective, the etiology of the stroke is likely cardioembolic phenomenon.    PLAN    1.  Continue dual antiplatelet therapy with previous recommendation.  2.  The target blood pressure below 140/90.  3.  Continue statin.  4.  Cardiac telemetry.  5.  Outpatient cardiac monitoring.  6.  Smoking cessation.  7.  Outpatient follow-up for visual field test with ophthalmologist.    If carotid ultrasound shows no significant hemodynamic stenosis, the patient may be discharged if there is no other concern.  The patient should not return to drive due to visual field defect from CVA.    Medications:  Scheduled Meds:    insulin glargine  32 Units SubCUTAneous BID    aspirin  81 mg Oral Daily    mometasone-formoterol  2 puff Inhalation BID RT    fenofibrate  160 mg Oral Daily    gabapentin  400 mg Oral TID    magnesium oxide  400 mg Oral Daily    pantoprazole  40 mg Oral QAM AC    rosuvastatin  40 mg Oral Daily    sodium chloride flush  5-40 mL IntraVENous 2 times per day    enoxaparin  40 mg SubCUTAneous Daily    insulin lispro  0-8 Units SubCUTAneous TID WC    insulin lispro  0-4 Units SubCUTAneous Nightly    cefTRIAXone (ROCEPHIN) IV  1,000 mg IntraVENous Q24H    azithromycin  500 mg IntraVENous Q24H    furosemide  20 mg Oral Daily     Continuous Infusions:    dextrose      sodium chloride       PRN Meds: glucose, dextrose bolus **OR** dextrose bolus, glucagon (rDNA), dextrose, sodium chloride flush, sodium chloride, ondansetron **OR** ondansetron, polyethylene glycol, labetalol, 
Zoila Olvera APRN - CNP   Alcohol Swabs (ALCOHOL PREP) PADS USE TWICE DAILY  Patient not taking: Reported on 3/15/2024 12/11/23   Zoila Olvera APRN - CNP   pantoprazole (PROTONIX) 40 MG tablet TAKE 1 TABLET BY MOUTH EVERY MORNING BEFORE BREAKFAST 12/11/23   Zoila Olvera APRN - CNP   insulin glargine (LANTUS SOLOSTAR) 100 UNIT/ML injection pen Inject 30 Units into the skin 2 times daily INJECT 30 UNITS SUB-Q TWICE DAILY  Patient not taking: Reported on 3/15/2024 9/8/23   Zoila Olvera APRN - CNP   blood glucose monitor kit and supplies Test 2 times a day & as needed for symptoms of irregular blood glucose.  Patient not taking: Reported on 3/15/2024 9/7/23   Zoila Olvera APRN - CNP   albuterol sulfate HFA (PROVENTIL;VENTOLIN;PROAIR) 108 (90 Base) MCG/ACT inhaler Inhale 2 puffs into the lungs every 6 hours as needed for Wheezing or Shortness of Breath 9/7/23   Zoila Olvera APRN - CNP   Magnesium Oxide 400 MG CAPS Take 1 capsule by mouth daily 9/7/23   Zoila Olvera APRN - CNP   gabapentin (NEURONTIN) 800 MG tablet TAKE 1 TABLET 4 TIMES DAILY 9/7/23 9/6/24  Zoila Olvera APRN - CNP   rosuvastatin (CRESTOR) 40 MG tablet Take 1 tablet by mouth daily 8/31/23   Reyes Jay MD   fenofibric acid (TRILIPIX) 135 MG CPDR capsule TAKE 1 CAPSULE ONCE DAILY 5/9/23   Zoila Olvera APRN - CNP   glipiZIDE (GLUCOTROL) 10 MG tablet TAKE  ONE (1) TABLET BY MOUTH TWICE DAILY BEFORE MEALS  Patient not taking: Reported on 3/15/2024 2/7/23   Zoila Olvera APRN - CNP   blood glucose monitor strips Test 2 times a day & as needed for symptoms of irregular blood glucose. E11.9  Patient not taking: Reported on 3/15/2024 1/31/23   Zoila Olvera APRN - CNP   ASPIRIN LOW DOSE 81 MG EC tablet TAKE 1 TABLET BY MOUTH DAILY  Patient not taking: Reported on 3/15/2024 11/16/22   Reyes Jay MD   diclofenac sodium (VOLTAREN) 1 % GEL  8/13/22   Provider, MD Olga   ipratropium-albuterol (DUONEB) 0.5-2.5 (3) MG/3ML

## 2024-03-19 NOTE — PLAN OF CARE
Problem: Chronic Conditions and Co-morbidities  Goal: Patient's chronic conditions and co-morbidity symptoms are monitored and maintained or improved  Outcome: Progressing     Problem: Discharge Planning  Goal: Discharge to home or other facility with appropriate resources  Outcome: Progressing     Problem: Safety - Adult  Goal: Free from fall injury  Outcome: Progressing     Problem: Neurosensory - Adult  Goal: Achieves stable or improved neurological status  Outcome: Progressing  Goal: Achieves maximal functionality and self care  Outcome: Progressing  Goal: Absence of seizures  Outcome: Progressing  Goal: Remains free of injury related to seizures activity  Outcome: Progressing

## 2024-03-20 ENCOUNTER — CARE COORDINATION (OUTPATIENT)
Dept: CASE MANAGEMENT | Age: 59
End: 2024-03-20

## 2024-03-20 DIAGNOSIS — I63.9 STROKE DETERMINED BY CLINICAL ASSESSMENT (HCC): Primary | ICD-10-CM

## 2024-03-20 PROCEDURE — 1111F DSCHRG MED/CURRENT MED MERGE: CPT | Performed by: NURSE PRACTITIONER

## 2024-03-20 NOTE — TELEPHONE ENCOUNTER
Spoke to patient they are unable to come the 1st week April until 4/5, appt made. Zoila please review yellow box from CTN nurse for orders for home care.

## 2024-03-20 NOTE — CARE COORDINATION
reported they do not cover the patient's zip code and to try [facility].    CTN contacted [facility] and spoke with Kayleen who reported they do not accept dual coverage plans.     CTN contacted [facility] and spoke with Leslie who reported they should be able to accept patient and requested CTN send demo fax to [phone] to verify coverage. CTN should receive a call back from [place]. CTN sent demo via fax as requested.     CTN received a call back from [place] with [facility] that reported they are able to accept patient for SN only and order can be placed.     Care Transition Nurse reviewed discharge instructions, medical action plan, and red flags with patient who verbalized understanding. The patient was given an opportunity to ask questions and does not have any further questions or concerns at this time. Were discharge instructions available to patient? Yes. Reviewed appropriate site of care based on symptoms and resources available to patient including: PCP  Specialist  Home health  When to call 911. The patient agrees to contact the PCP office for questions related to their healthcare.     Advance Care Planning:   Does patient have an Advance Directive: not on file; education provided and declined referral .    Medication reconciliation was performed with patient, who verbalizes understanding of administration of home medications. Medications reviewed, 1111F entered: yes    Was patient discharged with a pulse oximeter? no    Non-face-to-face services provided:  Obtained and reviewed discharge summary and/or continuity of care documents  Education of patient/family/caregiver/guardian to support self-management-.    Offered patient enrollment in the Remote Patient Monitoring (RPM) program for in-home monitoring: Patient declined.    Care Transitions 24 Hour Call    Do you have a copy of your discharge instructions?: Yes  Do you have all of your prescriptions and are they filled?: Yes  Have you been contacted by a [facility]

## 2024-03-22 ENCOUNTER — CARE COORDINATION (OUTPATIENT)
Dept: CASE MANAGEMENT | Age: 59
End: 2024-03-22

## 2024-03-22 DIAGNOSIS — I63.9 CEREBROVASCULAR ACCIDENT (CVA), UNSPECIFIED MECHANISM (HCC): Primary | ICD-10-CM

## 2024-03-22 LAB
GLUCOSE BLD-MCNC: 440 MG/DL (ref 70–99)
PERFORMED ON: ABNORMAL

## 2024-03-22 NOTE — TELEPHONE ENCOUNTER
Yes, please order home health for skilled nursing services for management of medications and vital signs.  [phone].

## 2024-03-22 NOTE — CARE COORDINATION
Declined referral  .     Patients top risk factors for readmission: medical condition-.  Interventions to address risk factors: Education of patient/family/caregiver/guardian to support self-management-.    Offered patient enrollment in the Remote Patient Monitoring (RPM) program for in-home monitoring: Patient declined.     Care Transitions Subsequent and Final Call    Subsequent and Final Calls  Do you have any ongoing symptoms?: No  Have your medications changed?: No  Do you have any questions related to your medications?: No  Do you currently have any active services?: No  Do you have any needs or concerns that I can assist you with?: No  Identified Barriers: None  Care Transitions Interventions    Registered Dietician: Completed    Other Interventions:             Care Transition Nurse provided contact information for future needs. Plan for follow-up call in 5-7 days based on severity of symptoms and risk factors.  Plan for next call: self management-Home Care scale vitalmary DICKERSONN, RN, [facility]  Care Transition Nurse  [phone] mobile

## 2024-03-22 NOTE — CARE COORDINATION
Per chart review Brittni Blair placed order for HC and MA faxed to [facility]. CTN contacted [facility] and spoke with Kate who verified HC order was received and she will pull most recent office visit from PCP and they should be able to get patient scheduled for soc next week.     Yahaira DICKERSONN, RN, [facility]  Care Transition Nurse  [phone] mobile

## 2024-03-25 ENCOUNTER — CARE COORDINATION (OUTPATIENT)
Dept: CASE MANAGEMENT | Age: 59
End: 2024-03-25

## 2024-03-25 NOTE — CARE COORDINATION
available to patient including: PCP. The patient agrees to contact the PCP office for questions related to their healthcare.     Advance Care Planning:   Declined referral  .     Patients top risk factors for readmission: medical condition-.  Interventions to address risk factors: Education of patient/family/caregiver/guardian to support self-management-.    Offered patient enrollment in the Remote Patient Monitoring (RPM) program for in-home monitoring: Patient declined.     Care Transitions Subsequent and Final Call    Subsequent and Final Calls  Do you have any ongoing symptoms?: No  Have your medications changed?: No  Do you have any questions related to your medications?: No  Do you currently have any active services?: Yes  Are you currently active with any services?: Home Health  Do you have any needs or concerns that I can assist you with?: No  Care Transitions Interventions    Registered Dietician: Completed    Other Interventions:             Care Transition Nurse provided contact information for future needs. Plan for follow-up call in 5-7 days based on severity of symptoms and risk factors.  Plan for next call:  alin MEHTA, RN, [facility]  Care Transition Nurse  [phone] mobile

## 2024-04-01 ENCOUNTER — CARE COORDINATION (OUTPATIENT)
Dept: CASE MANAGEMENT | Age: 59
End: 2024-04-01

## 2024-04-01 NOTE — CARE COORDINATION
Care Transitions Follow Up Call    Patient Current Location:  [place]    Care Transition Nurse contacted the patient by telephone. Verified name and  with patient as identifiers.    Patient: Bradford Pulliam  Patient : 1965   MRN: [id number]  Reason for Admission: stroke  Discharge Date: 3/19/24 RARS: Readmission Risk Score: 14.6      Needs to be reviewed by the provider   Additional needs identified to be addressed with provider: No  none             Method of communication with provider: none.    CTN spoke with patient who reported he is doing alright. Patient then reported the connection was poor and unable to hear CTN. CTN attempted to call back but again connection poor. CTN will attempt back at another time.     Addressed changes since last contact:  none  Discussed follow-up appointments. If no appointment was previously scheduled, appointment scheduling offered: Yes.   Is follow up appointment scheduled within 7 days of discharge? No and 4/5 per pcp office as patient reports cannot attend sooner. .   .    Follow Up  Future Appointments   Date Time Provider Department [place]   2024 10:15 AM Reyes Jay MD [facility]   2024  2:00 PM Wade, Zoila, APRN - CNP [facility] [place] [facility]   3/18/2025 11:00 AM Wade, Zoila, APRN - CNP [facility] [place] [facility]     External follow up appointment(s): .    Care Transition Nurse reviewed medical action plan with patient and discussed any barriers to care and/or understanding of plan of care after discharge. Discussed appropriate site of care based on symptoms and resources available to patient including: PCP  Specialist  When to call 911. The patient agrees to contact the PCP office for questions related to their healthcare.     Advance Care Planning:   declined .     Patients top risk factors for readmission: medical condition-.  Interventions to address risk factors: Education of patient/family/caregiver/guardian to support

## 2024-04-03 NOTE — PROGRESS NOTES
allowing me to participate in the care of this individual.      CECILE Evangelista., [facility], [facility]

## 2024-04-04 ENCOUNTER — OFFICE VISIT (OUTPATIENT)
Age: 59
End: 2024-04-04
Payer: MEDICARE

## 2024-04-04 VITALS
HEART RATE: 100 BPM | SYSTOLIC BLOOD PRESSURE: 102 MMHG | WEIGHT: 159 LBS | DIASTOLIC BLOOD PRESSURE: 60 MMHG | HEIGHT: 60 IN | BODY MASS INDEX: 31.22 KG/M2 | OXYGEN SATURATION: 92 %

## 2024-04-04 DIAGNOSIS — I25.83 CORONARY ARTERY DISEASE DUE TO LIPID RICH PLAQUE: Primary | ICD-10-CM

## 2024-04-04 DIAGNOSIS — I25.10 CORONARY ARTERY DISEASE DUE TO LIPID RICH PLAQUE: Primary | ICD-10-CM

## 2024-04-04 PROCEDURE — 3074F SYST BP LT 130 MM HG: CPT | Performed by: INTERNAL MEDICINE

## 2024-04-04 PROCEDURE — 4004F PT TOBACCO SCREEN RCVD TLK: CPT | Performed by: INTERNAL MEDICINE

## 2024-04-04 PROCEDURE — 3017F COLORECTAL CA SCREEN DOC REV: CPT | Performed by: INTERNAL MEDICINE

## 2024-04-04 PROCEDURE — 99214 OFFICE O/P EST MOD 30 MIN: CPT | Performed by: INTERNAL MEDICINE

## 2024-04-04 PROCEDURE — 1111F DSCHRG MED/CURRENT MED MERGE: CPT | Performed by: INTERNAL MEDICINE

## 2024-04-04 PROCEDURE — G8417 CALC BMI ABV UP PARAM F/U: HCPCS | Performed by: INTERNAL MEDICINE

## 2024-04-04 PROCEDURE — G8427 DOCREV CUR MEDS BY ELIG CLIN: HCPCS | Performed by: INTERNAL MEDICINE

## 2024-04-04 PROCEDURE — 3078F DIAST BP <80 MM HG: CPT | Performed by: INTERNAL MEDICINE

## 2024-04-04 RX ORDER — ROSUVASTATIN CALCIUM 40 MG/1
40 TABLET, COATED ORAL DAILY
Qty: 90 TABLET | Refills: 3 | Status: SHIPPED | OUTPATIENT
Start: 2024-04-04

## 2024-04-04 NOTE — PATIENT INSTRUCTIONS
Plan:  Smoking cessation encouraged. Call [phone]   ~Follow up with Zoila Olvera APRN - CNP for management of uncontrolled diabetes   Cardiac medications reviewed including indications and pertinent side effects. Medication list updated at this visit.   Patient verbalizes understanding of the need for treatment and education has been provided at today's visit. Additional education material will be provided in after visit summary.    Check blood pressure and heart rate at home a few times per week- keep a log with dates and times and bring to office visit   Regular exercise and following a healthy diet encouraged   Follow up with me in 6 months

## 2024-04-05 ENCOUNTER — CARE COORDINATION (OUTPATIENT)
Dept: CASE MANAGEMENT | Age: 59
End: 2024-04-05

## 2024-04-05 ENCOUNTER — OFFICE VISIT (OUTPATIENT)
Dept: FAMILY MEDICINE CLINIC | Age: 59
End: 2024-04-05
Payer: MEDICARE

## 2024-04-05 VITALS
SYSTOLIC BLOOD PRESSURE: 120 MMHG | HEART RATE: 94 BPM | BODY MASS INDEX: 31.22 KG/M2 | DIASTOLIC BLOOD PRESSURE: 81 MMHG | OXYGEN SATURATION: 90 % | TEMPERATURE: 98.2 F | HEIGHT: 60 IN | WEIGHT: 159 LBS

## 2024-04-05 DIAGNOSIS — J44.9 CHRONIC OBSTRUCTIVE PULMONARY DISEASE, UNSPECIFIED COPD TYPE (HCC): ICD-10-CM

## 2024-04-05 DIAGNOSIS — Z09 HOSPITAL DISCHARGE FOLLOW-UP: Primary | ICD-10-CM

## 2024-04-05 DIAGNOSIS — J18.9 PNEUMONIA DUE TO INFECTIOUS ORGANISM, UNSPECIFIED LATERALITY, UNSPECIFIED PART OF LUNG: ICD-10-CM

## 2024-04-05 DIAGNOSIS — I65.21 OCCLUSION OF RIGHT INTERNAL CAROTID ARTERY: ICD-10-CM

## 2024-04-05 DIAGNOSIS — Z86.73 HISTORY OF STROKE: ICD-10-CM

## 2024-04-05 DIAGNOSIS — E11.69 TYPE 2 DIABETES MELLITUS WITH OTHER SPECIFIED COMPLICATION, UNSPECIFIED WHETHER LONG TERM INSULIN USE (HCC): ICD-10-CM

## 2024-04-05 DIAGNOSIS — Z72.0 TOBACCO USE: ICD-10-CM

## 2024-04-05 PROCEDURE — G8417 CALC BMI ABV UP PARAM F/U: HCPCS | Performed by: NURSE PRACTITIONER

## 2024-04-05 PROCEDURE — 3023F SPIROM DOC REV: CPT | Performed by: NURSE PRACTITIONER

## 2024-04-05 PROCEDURE — 1111F DSCHRG MED/CURRENT MED MERGE: CPT | Performed by: NURSE PRACTITIONER

## 2024-04-05 PROCEDURE — 3017F COLORECTAL CA SCREEN DOC REV: CPT | Performed by: NURSE PRACTITIONER

## 2024-04-05 PROCEDURE — 3046F HEMOGLOBIN A1C LEVEL >9.0%: CPT | Performed by: NURSE PRACTITIONER

## 2024-04-05 PROCEDURE — 4004F PT TOBACCO SCREEN RCVD TLK: CPT | Performed by: NURSE PRACTITIONER

## 2024-04-05 PROCEDURE — 99214 OFFICE O/P EST MOD 30 MIN: CPT | Performed by: NURSE PRACTITIONER

## 2024-04-05 PROCEDURE — 3079F DIAST BP 80-89 MM HG: CPT | Performed by: NURSE PRACTITIONER

## 2024-04-05 PROCEDURE — 3074F SYST BP LT 130 MM HG: CPT | Performed by: NURSE PRACTITIONER

## 2024-04-05 PROCEDURE — G8427 DOCREV CUR MEDS BY ELIG CLIN: HCPCS | Performed by: NURSE PRACTITIONER

## 2024-04-05 PROCEDURE — 2022F DILAT RTA XM EVC RTNOPTHY: CPT | Performed by: NURSE PRACTITIONER

## 2024-04-05 RX ORDER — ATORVASTATIN CALCIUM 40 MG/1
TABLET, FILM COATED ORAL
COMMUNITY

## 2024-04-05 RX ORDER — ACYCLOVIR 400 MG/1
1 TABLET ORAL
Qty: 12 EACH | Refills: 1 | Status: SHIPPED | OUTPATIENT
Start: 2024-04-05

## 2024-04-05 RX ORDER — UMECLIDINIUM 62.5 UG/1
AEROSOL, POWDER ORAL
Qty: 30 EACH | Refills: 1 | Status: SHIPPED | OUTPATIENT
Start: 2024-04-05

## 2024-04-05 RX ORDER — ACYCLOVIR 400 MG/1
1 TABLET ORAL CONTINUOUS
Qty: 1 EACH | Refills: 0 | Status: SHIPPED | OUTPATIENT
Start: 2024-04-05

## 2024-04-05 RX ORDER — ALBUTEROL SULFATE 90 UG/1
AEROSOL, METERED RESPIRATORY (INHALATION)
Qty: 8.5 G | Refills: 5 | Status: SHIPPED | OUTPATIENT
Start: 2024-04-05

## 2024-04-05 NOTE — PROGRESS NOTES
Post-Discharge Transitional Care  Follow Up      Bradford Pulliam   YOB: 1965    Date of Office Visit:  4/5/2024  Date of Hospital Admission: 3/16/24  Date of Hospital Discharge: 3/19/24  Risk of hospital readmission (high >=14%. Medium >=10%) :Readmission Risk Score: 14.6      Care management risk score Rising risk (score 2-5) and Complex Care (Scores >=6): No Risk Score On File     Non face to face  following discharge, date last encounter closed (first attempt may have been earlier): *No documented post hospital discharge outreach found in the last 14 days    Call initiated 2 business days of discharge: *No response recorded in the last 14 days    ASSESSMENT/PLAN:   Hospital discharge follow-up  -     [place] DISCHARGE MEDS RECONCILED W/ CURRENT OUTPATIENT MED LIST  -     External Referral To Vascular Surgery  Type 2 diabetes mellitus with other specified complication, unspecified whether long term insulin use (HCC)  -     empagliflozin (JARDIANCE) 25 MG tablet; Take 1 tablet by mouth daily, Disp-30 tablet, R-3Normal  -     Continuous Blood Gluc Sensor (DEXCOM G7 SENSOR) MISC; 1 each by Does not apply route every 7 days, Disp-12 each, R-1Normal  -     Continuous Blood Gluc  (DEXCOM G7 ) SHABBIR; 1 each by Does not apply route continuous, Disp-1 each, R-0Normal  History of stroke  Tobacco use  Occlusion of right internal carotid artery  -     External Referral To Vascular Surgery  Chronic obstructive pulmonary disease, unspecified COPD type (HCC)  Pneumonia due to infectious organism, unspecified laterality, unspecified part of lung  Patient presents today for hospital follow-up.  Patient was seen on 3/14 for strokelike symptoms and left AGAINST MEDICAL ADVICE.  Patient went back 24 hours later with worsening symptoms and was admitted until 3/19.  Patient was noted to have a right parietal lobe stroke.  Patient had reported left-sided weakness and right-sided headache.  Patient asymptomatic

## 2024-04-05 NOTE — CARE COORDINATION
Care Transitions Outreach Attempt    Attempted to reach patient for transitions of care follow up. Unable to reach patient. LVM.     Patient: Bradford Pulliam Patient : 1965 MRN: [id number]    Last Discharge Facility       Date Complaint Diagnosis Description Type Department Provider    3/16/24   Admission (Discharged) Richy Cain MD              Noted following upcoming appointments from discharge chart review:   [facility] follow up appointment(s):   Future Appointments   Date Time Provider Department Center   2024  2:00 PM Steuben, Zoila, APRN - CNP [facility] [place] [facility]   3/18/2025 11:00 AM Steuben, Zoila, APRN - CNP [facility] [place] [facility]     Non-BS  follow up appointment(s): .    Yahaira MEHTA, RN, [facility]  Care Transition Nurse  [phone] mobile

## 2024-04-10 RX ORDER — ASPIRIN 81 MG/1
TABLET, COATED ORAL
Qty: 21 TABLET | Refills: 0 | OUTPATIENT
Start: 2024-04-10

## 2024-04-12 ENCOUNTER — CARE COORDINATION (OUTPATIENT)
Dept: CASE MANAGEMENT | Age: 59
End: 2024-04-12

## 2024-04-12 NOTE — CARE COORDINATION
Care Transitions Note    Final Call      Attempted to reach patient for transitions of care follow up.  Unable to reach patient.      Outreach Attempts:   Multiple attempts to contact patient at phone numbers on file.     Patient closed (unable to reach patient) from the Care Transitions program on 4/12/24.      Handoff:   Patient referred back to IVAN Alegre for continued management.         Assessments:  Care Transitions Subsequent and Final Call    Subsequent and Final Calls  Are you currently active with any services?: Home Health  Care Transitions Interventions    Registered Dietician: Completed    Other Interventions:              Goals:    Goals Addressed    None         Upcoming Appointments:    Future Appointments         Provider Specialty Dept Phone    7/5/2024 1:40 PM Zoila Olvera APRN - CNP Family Medicine [phone]    3/18/2025 11:00 AM Zoila Olvera APRN - CNP Family Medicine [phone]            Yahaira DICKERSONN, RN, [facility]  Care Transition Nurse  [phone] mobile

## 2024-04-23 ENCOUNTER — CARE COORDINATION (OUTPATIENT)
Dept: CARE COORDINATION | Age: 59
End: 2024-04-23

## 2024-04-23 NOTE — CARE COORDINATION
Contacted patient and introduced role of ACM/CC  Patient declined Care Coordination  Provided ACM contact information

## 2024-04-30 ENCOUNTER — TELEPHONE (OUTPATIENT)
Dept: FAMILY MEDICINE CLINIC | Age: 59
End: 2024-04-30

## 2024-05-06 DIAGNOSIS — E11.69 TYPE 2 DIABETES MELLITUS WITH OTHER SPECIFIED COMPLICATION, UNSPECIFIED WHETHER LONG TERM INSULIN USE (HCC): ICD-10-CM

## 2024-05-06 RX ORDER — INSULIN GLARGINE 100 [IU]/ML
INJECTION, SOLUTION SUBCUTANEOUS
Qty: 15 ML | Refills: 5 | OUTPATIENT
Start: 2024-05-06

## 2024-05-31 DIAGNOSIS — E78.2 MIXED HYPERLIPIDEMIA: ICD-10-CM

## 2024-05-31 RX ORDER — PANTOPRAZOLE SODIUM 40 MG/1
TABLET, DELAYED RELEASE ORAL
Qty: 90 TABLET | Refills: 1 | Status: SHIPPED | OUTPATIENT
Start: 2024-05-31

## 2024-06-10 DIAGNOSIS — J44.9 CHRONIC OBSTRUCTIVE PULMONARY DISEASE, UNSPECIFIED COPD TYPE (HCC): ICD-10-CM

## 2024-06-10 RX ORDER — UMECLIDINIUM 62.5 UG/1
AEROSOL, POWDER ORAL
Qty: 30 EACH | Refills: 1 | Status: SHIPPED | OUTPATIENT
Start: 2024-06-10

## 2024-06-10 NOTE — TELEPHONE ENCOUNTER
Refill Request     CONFIRM preferrred pharmacy with the patient.    If Mail Order Rx - Pend for 90 day refill.      Last Seen: Last Seen Department: 4/5/2024  Last Seen by PCP: 4/5/2024    Last Written: 5/20/2024    If no future appointment scheduled, route STAFF MESSAGE with patient name to the  Pool for scheduling.      Next Appointment:   Future Appointments   Date Time Provider Department [place]   6/14/2024 12:00 PM Suleiman Dexter MD [facility]   7/5/2024  1:40 PM Sturtevant, Zoila, APRN - CNP [facility] [place] [facility]   3/18/2025 11:00 AM Wade, Zoila, APRN - CNP [facility] [place] [facility]       Message sent to  to schedule appt with patient?  NO      Requested Prescriptions     Pending Prescriptions Disp Refills    INCRUSE ELLIPTA 62.5 MCG/ACT inhaler [Pharmacy Med Name: INCRUSE ELLIPTA 62.5MCG AERO POW BR ACT] 30 each 1     Sig: INHALE ONE (1) PUFF INTO THE LUNGS ONCE DAILY

## 2024-06-14 ENCOUNTER — OFFICE VISIT (OUTPATIENT)
Dept: VASCULAR SURGERY | Age: 59
End: 2024-06-14
Payer: MEDICARE

## 2024-06-14 VITALS
WEIGHT: 161 LBS | BODY MASS INDEX: 31.61 KG/M2 | SYSTOLIC BLOOD PRESSURE: 100 MMHG | DIASTOLIC BLOOD PRESSURE: 62 MMHG | HEIGHT: 60 IN

## 2024-06-14 DIAGNOSIS — I65.21 CAROTID OCCLUSION, RIGHT: Primary | ICD-10-CM

## 2024-06-14 PROCEDURE — 3074F SYST BP LT 130 MM HG: CPT | Performed by: SURGERY

## 2024-06-14 PROCEDURE — 4004F PT TOBACCO SCREEN RCVD TLK: CPT | Performed by: SURGERY

## 2024-06-14 PROCEDURE — G8417 CALC BMI ABV UP PARAM F/U: HCPCS | Performed by: SURGERY

## 2024-06-14 PROCEDURE — 3078F DIAST BP <80 MM HG: CPT | Performed by: SURGERY

## 2024-06-14 PROCEDURE — 3017F COLORECTAL CA SCREEN DOC REV: CPT | Performed by: SURGERY

## 2024-06-14 PROCEDURE — 99204 OFFICE O/P NEW MOD 45 MIN: CPT | Performed by: SURGERY

## 2024-06-14 PROCEDURE — G8427 DOCREV CUR MEDS BY ELIG CLIN: HCPCS | Performed by: SURGERY

## 2024-06-14 NOTE — PROGRESS NOTES
tablet 1 tablet; Once a day; 30 day(s)   Yes ProviderOlga MD   empagliflozin (JARDIANCE) 25 MG tablet Take 1 tablet by mouth daily 4/5/24  Yes Zoila Olvera APRN - CNP   Continuous Blood Gluc Sensor (DEXCOM G7 SENSOR) MISC 1 each by Does not apply route every 7 days 4/5/24  Yes Zoila Olvera APRN - CNP   Continuous Blood Gluc  (DEXCOM G7 ) SHABBIR 1 each by Does not apply route continuous 4/5/24  Yes Zoila Olvera APRN - CNP   albuterol sulfate HFA (PROVENTIL;VENTOLIN;PROAIR) 108 (90 Base) MCG/ACT inhaler INHALE TWO (2) PUFFS INTO THE LUNGS EVERY SIX (6) HOURS AS NEEDED FOR WHEEZING OR SHORTNESS OF BREATH 4/5/24  Yes Zoila Olvera APRN - CNP   rosuvastatin (CRESTOR) 40 MG tablet Take 1 tablet by mouth daily 4/4/24  Yes Reyes Jay MD   aspirin (ASPIRIN LOW DOSE) 81 MG EC tablet Take 1 tablet by mouth daily for 21 days Discontinue after 21 days 3/19/24 6/14/24 Yes Vicki Billy DO   fenofibrate (TRIGLIDE) 160 MG tablet Take 1 tablet by mouth daily 3/19/24  Yes Vicki Billy DO   furosemide (LASIX) 20 MG tablet Take 1 tablet by mouth daily 3/19/24  Yes Vicki Billy DO   gabapentin (NEURONTIN) 400 MG capsule Take 1 capsule by mouth 4 times daily for 30 days. 3/19/24 6/14/24 Yes Vicki Billy DO   insulin glargine (LANTUS) 100 UNIT/ML injection vial Inject 32 Units into the skin 2 times daily 3/19/24  Yes Vicki Billy DO   clopidogrel (PLAVIX) 75 MG tablet Take 1 tablet by mouth daily 3/19/24  Yes Vicki Billy DO   Insulin Syringes, Disposable, U-100 1 ML MISC 1 each by Does not apply route 2 times daily 3/19/24 6/17/24 Yes Zoila Olvera APRN - CNP   BREO ELLIPTA 200-25 MCG/ACT AEPB inhaler INHALE ONE (1) PUFF INTO THE LUNGS DAILY 3/7/24  Yes Zoila Olvera APRN - CNP   GLOBAL EASE INJECT PEN NEEDLES 29G X 12MM MISC USE TWICE DAILY 3/7/24  Yes Zoila Olvera APRN - CNP   Alcohol Swabs (ALCOHOL PREP) PADS USE TWICE DAILY 12/11/23  Yes Zoila Olvera APRN - CNP   blood glucose monitor kit and supplies Test

## 2024-06-17 DIAGNOSIS — J44.9 CHRONIC OBSTRUCTIVE PULMONARY DISEASE, UNSPECIFIED COPD TYPE (HCC): ICD-10-CM

## 2024-06-17 RX ORDER — FLUTICASONE FUROATE AND VILANTEROL TRIFENATATE 200; 25 UG/1; UG/1
POWDER RESPIRATORY (INHALATION)
Qty: 60 EACH | Refills: 2 | Status: SHIPPED | OUTPATIENT
Start: 2024-06-17

## 2024-07-05 ENCOUNTER — OFFICE VISIT (OUTPATIENT)
Dept: FAMILY MEDICINE CLINIC | Age: 59
End: 2024-07-05
Payer: MEDICARE

## 2024-07-05 VITALS
SYSTOLIC BLOOD PRESSURE: 122 MMHG | OXYGEN SATURATION: 93 % | DIASTOLIC BLOOD PRESSURE: 80 MMHG | TEMPERATURE: 97.5 F | WEIGHT: 163.6 LBS | BODY MASS INDEX: 33.03 KG/M2 | HEART RATE: 97 BPM

## 2024-07-05 DIAGNOSIS — E11.69 TYPE 2 DIABETES MELLITUS WITH OTHER SPECIFIED COMPLICATION, UNSPECIFIED WHETHER LONG TERM INSULIN USE (HCC): Primary | ICD-10-CM

## 2024-07-05 DIAGNOSIS — E78.2 MIXED HYPERLIPIDEMIA: ICD-10-CM

## 2024-07-05 DIAGNOSIS — Z12.11 COLON CANCER SCREENING: ICD-10-CM

## 2024-07-05 DIAGNOSIS — Z12.2 ENCOUNTER FOR SCREENING FOR LUNG CANCER: ICD-10-CM

## 2024-07-05 DIAGNOSIS — J44.9 CHRONIC OBSTRUCTIVE PULMONARY DISEASE, UNSPECIFIED COPD TYPE (HCC): ICD-10-CM

## 2024-07-05 DIAGNOSIS — K21.9 GASTROESOPHAGEAL REFLUX DISEASE WITHOUT ESOPHAGITIS: ICD-10-CM

## 2024-07-05 DIAGNOSIS — Z87.891 PERSONAL HISTORY OF TOBACCO USE: ICD-10-CM

## 2024-07-05 DIAGNOSIS — I10 PRIMARY HYPERTENSION: ICD-10-CM

## 2024-07-05 LAB — HBA1C MFR BLD: 9.4 %

## 2024-07-05 PROCEDURE — 2022F DILAT RTA XM EVC RTNOPTHY: CPT | Performed by: NURSE PRACTITIONER

## 2024-07-05 PROCEDURE — 3079F DIAST BP 80-89 MM HG: CPT | Performed by: NURSE PRACTITIONER

## 2024-07-05 PROCEDURE — 3074F SYST BP LT 130 MM HG: CPT | Performed by: NURSE PRACTITIONER

## 2024-07-05 PROCEDURE — 4004F PT TOBACCO SCREEN RCVD TLK: CPT | Performed by: NURSE PRACTITIONER

## 2024-07-05 PROCEDURE — 3017F COLORECTAL CA SCREEN DOC REV: CPT | Performed by: NURSE PRACTITIONER

## 2024-07-05 PROCEDURE — 99214 OFFICE O/P EST MOD 30 MIN: CPT | Performed by: NURSE PRACTITIONER

## 2024-07-05 PROCEDURE — G8427 DOCREV CUR MEDS BY ELIG CLIN: HCPCS | Performed by: NURSE PRACTITIONER

## 2024-07-05 PROCEDURE — 3046F HEMOGLOBIN A1C LEVEL >9.0%: CPT | Performed by: NURSE PRACTITIONER

## 2024-07-05 PROCEDURE — 83036 HEMOGLOBIN GLYCOSYLATED A1C: CPT | Performed by: NURSE PRACTITIONER

## 2024-07-05 PROCEDURE — 3023F SPIROM DOC REV: CPT | Performed by: NURSE PRACTITIONER

## 2024-07-05 PROCEDURE — G8417 CALC BMI ABV UP PARAM F/U: HCPCS | Performed by: NURSE PRACTITIONER

## 2024-07-05 ASSESSMENT — PATIENT HEALTH QUESTIONNAIRE - PHQ9
SUM OF ALL RESPONSES TO PHQ9 QUESTIONS 1 & 2: 0
SUM OF ALL RESPONSES TO PHQ QUESTIONS 1-9: 0
2. FEELING DOWN, DEPRESSED OR HOPELESS: NOT AT ALL
SUM OF ALL RESPONSES TO PHQ QUESTIONS 1-9: 0
1. LITTLE INTEREST OR PLEASURE IN DOING THINGS: NOT AT ALL
SUM OF ALL RESPONSES TO PHQ QUESTIONS 1-9: 0
SUM OF ALL RESPONSES TO PHQ QUESTIONS 1-9: 0

## 2024-07-05 ASSESSMENT — ENCOUNTER SYMPTOMS
SHORTNESS OF BREATH: 1
DIARRHEA: 0
RHINORRHEA: 0
NAUSEA: 0
COUGH: 0
VOMITING: 0
WHEEZING: 0
ABDOMINAL PAIN: 0
SORE THROAT: 0

## 2024-07-05 NOTE — PROGRESS NOTES
CHIEF COMPLAINT  Chief Complaint   Patient presents with    Follow-up     3 mo fu for A1C        HPI   Bradford Pulliam is a 58 y.o. male who presents to the office for check up.  Patient reports doing well.  Patient Nu[place] any episodes of dizziness lightheadedness, chest pain or shortness of breath.  Patient reports mild shortness of breath worse upon exertion.  Patient continues to smoke daily but reports that he has cut back significantly to half pack per day from 1-1/2 packs/day.  No nausea, vomiting, diarrhea.  No dark or tarry stools.  Patient reports checking blood sugar on a regular basis.  Patient unsure of what his blood sugar was yesterday but he did check it.  Patient takes medications as directed.  No other complaints, modifying factors or associated symptoms.     Nursing notes reviewed.   Past Medical History:   Diagnosis Date    Acute respiratory failure (Former[facility]) 07/11/2021    Bacteremia due to Escherichia coli 10/07/2021    Chest pain     CHF (congestive heart failure) (Formerly [facility])     combined    COPD (chronic obstructive pulmonary disease) (Formerly [facility])     Diabetes mellitus ([facility])     Electrolyte disorder 10/26/2022    Elevated d-dimer     Hyperlipidemia     Hypertension     Organizing pneumonia ([facility]) 09/27/2021    Oxygen dependent     Sepsis with acute respiratory failure and septic shock ([facility]) 10/06/2021    Septic shock due to Escherichia coli ([facility]) 10/07/2021    Severe hypoxemia 09/27/2021     Past Surgical History:   Procedure Laterality Date    CARDIAC CATHETERIZATION      HERNIA REPAIR      TONSILLECTOMY       Family History   Problem Relation Age of Onset    Asthma Mother     Cancer Mother     Hearing Loss Mother     Vision Loss Mother     High Blood Pressure Father     High Cholesterol Father     Vision Loss Father     Asthma Sister     Diabetes Sister     Vision Loss Sister     Asthma Brother     Arthritis Brother     High Blood Pressure Brother      Social History     Socioeconomic History    Marital

## 2024-07-09 RX ORDER — FENOFIBRIC ACID 135 MG/1
1 CAPSULE, DELAYED RELEASE ORAL DAILY
Qty: 90 CAPSULE | Refills: 1 | Status: SHIPPED | OUTPATIENT
Start: 2024-07-09

## 2024-07-16 RX ORDER — LANOLIN ALCOHOL/MO/W.PET/CERES
400 CREAM (GRAM) TOPICAL DAILY
Qty: 30 TABLET | Refills: 5 | Status: SHIPPED | OUTPATIENT
Start: 2024-07-16

## 2024-08-08 DIAGNOSIS — J44.9 CHRONIC OBSTRUCTIVE PULMONARY DISEASE, UNSPECIFIED COPD TYPE (HCC): ICD-10-CM

## 2024-08-08 RX ORDER — UMECLIDINIUM 62.5 UG/1
AEROSOL, POWDER ORAL
Qty: 30 EACH | Refills: 1 | Status: SHIPPED | OUTPATIENT
Start: 2024-08-08

## 2024-08-08 RX ORDER — INSULIN GLARGINE 100 [IU]/ML
INJECTION, SOLUTION SUBCUTANEOUS
Qty: 10 ML | Refills: 3 | Status: SHIPPED | OUTPATIENT
Start: 2024-08-08

## 2024-08-08 RX ORDER — CLOPIDOGREL BISULFATE 75 MG/1
75 TABLET ORAL DAILY
Qty: 30 TABLET | Refills: 3 | Status: SHIPPED | OUTPATIENT
Start: 2024-08-08

## 2024-08-08 RX ORDER — FENOFIBRATE 160 MG/1
160 TABLET ORAL DAILY
Qty: 30 TABLET | Refills: 3 | Status: SHIPPED | OUTPATIENT
Start: 2024-08-08

## 2024-08-26 DIAGNOSIS — E11.69 TYPE 2 DIABETES MELLITUS WITH OTHER SPECIFIED COMPLICATION, UNSPECIFIED WHETHER LONG TERM INSULIN USE (HCC): ICD-10-CM

## 2024-08-26 RX ORDER — EMPAGLIFLOZIN 25 MG/1
25 TABLET, FILM COATED ORAL DAILY
Qty: 30 TABLET | Refills: 3 | Status: SHIPPED | OUTPATIENT
Start: 2024-08-26

## 2024-08-28 ENCOUNTER — TELEPHONE (OUTPATIENT)
Dept: FAMILY MEDICINE CLINIC | Age: 59
End: 2024-08-28

## 2024-08-28 NOTE — TELEPHONE ENCOUNTER
SUBMITTED PA FOR DEXCOM G7 SENSOR  VIA CMM  STATUS  NOT SENT TO PLAN. PLEASE VERIFY WHICH INSURANCE IS THIS PATIENTS PHARMACY COVERAGE      If this requires a response please respond to the pool ( P [facility] PSC MEDICATION PRE-AUTH).      Thank you please advise patient.     FOLLOW UP DONE DAILY: IF NO RESPONSE IN 3 DAYS WE WILL REFAX FOR STATUS CHECK. IF ANOTHER 3 DAYS GOES BY WITH NO RESPONSE WILL CALL INSURANCE FOR STATUS.

## 2024-08-30 NOTE — TELEPHONE ENCOUNTER
Submitted PA for Dexcom G7 Sensor   Via [employer] Key: [id number]  STATUS: PA Case: [id number], Status: Approved, Coverage Starts on: 8/1/2024 12:00:00 AM, Coverage Ends on: 8/30/2025 12:00:00 AM.     Please notify patient. Thank you.

## 2024-08-30 NOTE — TELEPHONE ENCOUNTER
As of last month, pt has [place] Dual Coverage.   ID# [id number].     PCN# [id number]     Grp# [id number]    RXBIN: [id number]

## 2024-09-10 ENCOUNTER — TELEPHONE (OUTPATIENT)
Dept: ADMINISTRATIVE | Age: 59
End: 2024-09-10

## 2024-10-07 DIAGNOSIS — J44.9 CHRONIC OBSTRUCTIVE PULMONARY DISEASE, UNSPECIFIED COPD TYPE (HCC): ICD-10-CM

## 2024-10-07 RX ORDER — UMECLIDINIUM 62.5 UG/1
AEROSOL, POWDER ORAL
Qty: 30 EACH | Refills: 1 | Status: SHIPPED | OUTPATIENT
Start: 2024-10-07

## 2024-10-11 NOTE — TELEPHONE ENCOUNTER
1210 pt very lethargic and forgetful. Pt denies difficulty swallowing or sleep apnea. No dentures or loose teeth. NPO x 0001 hrs.     1315 MD Sedation Assessment completed at this time.    1319 Time Out done at this time.    1320 procedure started     MARZENA: Pt tolerated well. VSS. Total sedation given - 2 mg Versed & 50 mcg Fentanyl. Dr Benson spoke with pt post procedure.See MARZENA Flowsheet.     1335 procedure completed     ~1415 Pt transferred to [facility] per cart. Detailed report called to Bernice COLON.  Resp even & unlabored upon transfer. Pt  A/O but forgetful. Pt to be NPO until 1500. Both pt & nurse informed.        Refilled medication per verbal order from provider.   Last OV was 8/10/20

## 2024-10-27 RX ORDER — LANCING DEVICE
EACH MISCELLANEOUS
Refills: 3 | OUTPATIENT
Start: 2024-10-27

## 2024-11-25 RX ORDER — HYDROCODONE BITARTRATE AND ACETAMINOPHEN 5; 325 MG/1; MG/1
TABLET ORAL
COMMUNITY
Start: 2024-11-07

## 2024-11-26 ENCOUNTER — OFFICE VISIT (OUTPATIENT)
Dept: FAMILY MEDICINE CLINIC | Age: 59
End: 2024-11-26

## 2024-11-26 VITALS
TEMPERATURE: 97.9 F | HEART RATE: 104 BPM | SYSTOLIC BLOOD PRESSURE: 115 MMHG | WEIGHT: 169 LBS | DIASTOLIC BLOOD PRESSURE: 79 MMHG | OXYGEN SATURATION: 98 % | BODY MASS INDEX: 34.12 KG/M2

## 2024-11-26 DIAGNOSIS — E78.2 MIXED HYPERLIPIDEMIA: ICD-10-CM

## 2024-11-26 DIAGNOSIS — I10 PRIMARY HYPERTENSION: ICD-10-CM

## 2024-11-26 DIAGNOSIS — Z87.891 PERSONAL HISTORY OF TOBACCO USE: ICD-10-CM

## 2024-11-26 DIAGNOSIS — E11.69 TYPE 2 DIABETES MELLITUS WITH OTHER SPECIFIED COMPLICATION, UNSPECIFIED WHETHER LONG TERM INSULIN USE (HCC): ICD-10-CM

## 2024-11-26 DIAGNOSIS — J44.9 CHRONIC OBSTRUCTIVE PULMONARY DISEASE, UNSPECIFIED COPD TYPE (HCC): ICD-10-CM

## 2024-11-26 DIAGNOSIS — Z12.5 SCREENING PSA (PROSTATE SPECIFIC ANTIGEN): ICD-10-CM

## 2024-11-26 DIAGNOSIS — I50.22 CHRONIC SYSTOLIC CONGESTIVE HEART FAILURE (HCC): ICD-10-CM

## 2024-11-26 DIAGNOSIS — Z72.0 TOBACCO USE: ICD-10-CM

## 2024-11-26 DIAGNOSIS — I25.10 CAD IN NATIVE ARTERY: Primary | ICD-10-CM

## 2024-11-26 PROBLEM — E11.10 DIABETIC KETOACIDOSIS WITHOUT COMA ASSOCIATED WITH TYPE 2 DIABETES MELLITUS (HCC): Status: RESOLVED | Noted: 2022-10-26 | Resolved: 2024-11-26

## 2024-11-26 PROBLEM — I63.9 STROKE DETERMINED BY CLINICAL ASSESSMENT (HCC): Status: RESOLVED | Noted: 2024-03-16 | Resolved: 2024-11-26

## 2024-11-26 SDOH — ECONOMIC STABILITY: FOOD INSECURITY: WITHIN THE PAST 12 MONTHS, YOU WORRIED THAT YOUR FOOD WOULD RUN OUT BEFORE YOU GOT MONEY TO BUY MORE.: NEVER TRUE

## 2024-11-26 SDOH — ECONOMIC STABILITY: FOOD INSECURITY: WITHIN THE PAST 12 MONTHS, THE FOOD YOU BOUGHT JUST DIDN'T LAST AND YOU DIDN'T HAVE MONEY TO GET MORE.: NEVER TRUE

## 2024-11-26 SDOH — ECONOMIC STABILITY: INCOME INSECURITY: HOW HARD IS IT FOR YOU TO PAY FOR THE VERY BASICS LIKE FOOD, HOUSING, MEDICAL CARE, AND HEATING?: NOT HARD AT ALL

## 2024-11-26 ASSESSMENT — ENCOUNTER SYMPTOMS
NAUSEA: 0
WHEEZING: 0
SHORTNESS OF BREATH: 0
VOMITING: 0
SORE THROAT: 0
DIARRHEA: 0
RHINORRHEA: 0
COUGH: 0
ABDOMINAL PAIN: 0

## 2024-11-26 NOTE — PATIENT INSTRUCTIONS
(assuming 20 cigarettes per pack) you have smoked by how many years you have smoked. For example:  If you smoked 1 pack a day for 20 years, that's 1 times 20. So you have a smoking history of 20 pack years.  If you smoked 2 packs a day for 10 years, that's 2 times 10. So you have a smoking history of 20 pack years.  Experts agree that screening is for people who have a high risk of lung cancer. But experts don't agree on what high risk means. Some say people age 50 or older with at least a 20-pack-year smoking history are high risk. Others say it's people age 55 or older with a 30-pack-year history.  To see if you could benefit from screening, first find out if you are at high risk for lung cancer. Your doctor can help you decide your lung cancer risk.  What are the risks of screening?  CT screening for lung cancer isn't perfect. It can show an abnormal result when it turns out there wasn't any cancer. This is called a false-positive result. This means you may need more tests to make sure you don't have cancer. These tests can be harmful and cause a lot of worry.  These tests may include more CT scans and invasive testing like a lung biopsy. In a biopsy, the doctor takes a sample of tissue from inside your lung so it can be looked at under a microscope. A biopsy is the only way to tell if you have lung cancer. If the biopsy finds cancer, you and your doctor will have to decide how or whether to treat it.  Some lung cancers found on CT scans are harmless and would not have caused a problem if they had not been found through screening. But because doctors can't tell which ones will turn out to be harmless, most will be treated. This means that you may get treatment--including surgery, radiation, or chemotherapy--that you don't need.  There is a risk of damage to cells or tissue from being exposed to radiation, including the small amounts used in CTs, X-rays, and other medical tests. Over time, exposure to radiation

## 2024-11-26 NOTE — PROGRESS NOTES
Eyes:      Extraocular Movements: Extraocular movements intact.      Conjunctiva/sclera: Conjunctivae normal.      Pupils: Pupils are equal, round, and reactive to light.   Cardiovascular:      Rate and Rhythm: Normal rate and regular rhythm.      Pulses: Normal pulses.   Pulmonary:      Effort: Pulmonary effort is normal.      Breath sounds: Normal breath sounds.   Abdominal:      General: Bowel sounds are normal.      Tenderness: There is no right CVA tenderness or left CVA tenderness.   Musculoskeletal:         General: Normal range of motion.      Cervical back: Normal range of motion and neck supple.   Skin:     General: Skin is warm and dry.      Capillary Refill: Capillary refill takes 2 to 3 seconds.      Findings: No rash.   Neurological:      Mental Status: He is alert.   Psychiatric:         Mood and Affect: Mood normal.         Behavior: Behavior normal.        ASSESSMENT/PLAN:   1. CAD in native artery  Stable.  Patient compliant with fenofibrate, Plavix, rosuvastatin, Lasix, aspirin.  Patient denies any episodes of dizziness lightheadedness, chest pain or shortness of breath.  Patient previously seen by cardiology.  Continue with current treatment and management follow-up in 6 months, sooner for new or worsening symptoms.  - CBC with Auto Differential  - Comprehensive Metabolic Panel    2. Primary hypertension  Stable. No current use of medications. Blood pressure stable. Continue with monitoring. Follow-up in 6 months, sooner for new or worsening symptoms.     3. Chronic systolic congestive heart failure (HCC)  See above #1    4. Type 2 diabetes mellitus with other specified complication, unspecified whether long term insulin use (HCC)  Stable vs uncontrolled. Previous A1C 9.4.  Patient compliant with Jardiance 25 mg daily and Lantus 32 units twice a day.  Patient denies any episodes of hyperglycemia or hypoglycemia.  Continue with current treatment follow-up in 3-6 months, sooner for new or

## 2024-11-27 DIAGNOSIS — E11.69 DIABETES MELLITUS ASSOCIATED WITH HORMONAL ETIOLOGY (HCC): Primary | ICD-10-CM

## 2024-11-27 LAB
ALBUMIN SERPL-MCNC: 4.2 G/DL (ref 3.4–5)
ALBUMIN/GLOB SERPL: 1.5 {RATIO} (ref 1.1–2.2)
ALP SERPL-CCNC: 82 U/L (ref 40–129)
ALT SERPL-CCNC: 21 U/L (ref 10–40)
ANION GAP SERPL CALCULATED.3IONS-SCNC: 13 MMOL/L (ref 3–16)
AST SERPL-CCNC: 19 U/L (ref 15–37)
BASOPHILS # BLD: 0.1 K/UL (ref 0–0.2)
BASOPHILS NFR BLD: 1.4 %
BILIRUB SERPL-MCNC: <0.2 MG/DL (ref 0–1)
BUN SERPL-MCNC: 33 MG/DL (ref 7–20)
CALCIUM SERPL-MCNC: 10 MG/DL (ref 8.3–10.6)
CHLORIDE SERPL-SCNC: 93 MMOL/L (ref 99–110)
CHOLEST SERPL-MCNC: 275 MG/DL (ref 0–199)
CO2 SERPL-SCNC: 23 MMOL/L (ref 21–32)
CREAT SERPL-MCNC: 1.5 MG/DL (ref 0.9–1.3)
DEPRECATED RDW RBC AUTO: 15.5 % (ref 12.4–15.4)
EOSINOPHIL # BLD: 0.1 K/UL (ref 0–0.6)
EOSINOPHIL NFR BLD: 0.8 %
EST. AVERAGE GLUCOSE BLD GHB EST-MCNC: 246 MG/DL
GFR SERPLBLD CREATININE-BSD FMLA CKD-EPI: 54 ML/MIN/{1.73_M2}
GLUCOSE SERPL-MCNC: 473 MG/DL (ref 70–99)
HBA1C MFR BLD: 10.2 %
HCT VFR BLD AUTO: 48.7 % (ref 40.5–52.5)
HDLC SERPL-MCNC: 23 MG/DL (ref 40–60)
HGB BLD-MCNC: 16.4 G/DL (ref 13.5–17.5)
LDLC SERPL CALC-MCNC: ABNORMAL MG/DL
LDLC SERPL-MCNC: 53 MG/DL
LYMPHOCYTES # BLD: 2.6 K/UL (ref 1–5.1)
LYMPHOCYTES NFR BLD: 24.7 %
MCH RBC QN AUTO: 29.8 PG (ref 26–34)
MCHC RBC AUTO-ENTMCNC: 33.6 G/DL (ref 31–36)
MCV RBC AUTO: 88.5 FL (ref 80–100)
MONOCYTES # BLD: 0.5 K/UL (ref 0–1.3)
MONOCYTES NFR BLD: 5.2 %
NEUTROPHILS # BLD: 7 K/UL (ref 1.7–7.7)
NEUTROPHILS NFR BLD: 67.9 %
PLATELET # BLD AUTO: 321 K/UL (ref 135–450)
PMV BLD AUTO: 10.7 FL (ref 5–10.5)
POTASSIUM SERPL-SCNC: 5.2 MMOL/L (ref 3.5–5.1)
PROT SERPL-MCNC: 7 G/DL (ref 6.4–8.2)
PSA SERPL DL<=0.01 NG/ML-MCNC: 0.89 NG/ML (ref 0–4)
RBC # BLD AUTO: 5.5 M/UL (ref 4.2–5.9)
SODIUM SERPL-SCNC: 129 MMOL/L (ref 136–145)
TRIGL SERPL-MCNC: 1515 MG/DL (ref 0–150)
VLDLC SERPL CALC-MCNC: ABNORMAL MG/DL
WBC # BLD AUTO: 10.4 K/UL (ref 4–11)

## 2024-11-27 RX ORDER — BLOOD SUGAR DIAGNOSTIC
1 STRIP MISCELLANEOUS 2 TIMES DAILY
Qty: 100 EACH | Refills: 5 | Status: SHIPPED | OUTPATIENT
Start: 2024-11-27

## 2024-11-27 RX ORDER — LANCETS
1 EACH MISCELLANEOUS 2 TIMES DAILY
Qty: 100 EACH | Refills: 5 | Status: SHIPPED | OUTPATIENT
Start: 2024-11-27

## 2024-11-27 RX ORDER — BLOOD-GLUCOSE METER
1 EACH MISCELLANEOUS 2 TIMES DAILY
Qty: 1 KIT | Refills: 0 | Status: SHIPPED | OUTPATIENT
Start: 2024-11-27

## 2024-11-27 NOTE — TELEPHONE ENCOUNTER
Informed the patient of the test results rc   He has a old meter can you send this new one over please rc

## 2024-12-01 NOTE — TELEPHONE ENCOUNTER
Refill Request     Last Seen: Visit date not found    Last Written: 8/8/24    Next Appointment:   Future Appointments   Date Time Provider Department Center   12/3/2024 10:20 AM Wade, Zoila, APRN - CNP [place] [facility]   3/18/2025 11:00 AM Wade, Zoila, APRN - CNP [place] [facility]   5/29/2025  4:00 PM Wade, Zoila, APRN - CNP [place] [facility]             Requested Prescriptions     Pending Prescriptions Disp Refills    LANTUS 100 UNIT/ML injection vial [Pharmacy Med Name: LANTUS 100/ML SOLUTION] 10 mL 3     Sig: INJECT 32 UNITS UNDER THE SKIN TWO TIMES A DAY

## 2024-12-02 RX ORDER — INSULIN GLARGINE 100 [IU]/ML
INJECTION, SOLUTION SUBCUTANEOUS
Qty: 10 ML | Refills: 3 | Status: SHIPPED | OUTPATIENT
Start: 2024-12-02

## 2024-12-03 ENCOUNTER — OFFICE VISIT (OUTPATIENT)
Dept: FAMILY MEDICINE CLINIC | Age: 59
End: 2024-12-03
Payer: MEDICARE

## 2024-12-03 VITALS
TEMPERATURE: 97.5 F | WEIGHT: 170 LBS | BODY MASS INDEX: 34.32 KG/M2 | OXYGEN SATURATION: 92 % | SYSTOLIC BLOOD PRESSURE: 127 MMHG | DIASTOLIC BLOOD PRESSURE: 77 MMHG | HEART RATE: 90 BPM

## 2024-12-03 DIAGNOSIS — E78.2 MIXED HYPERLIPIDEMIA: ICD-10-CM

## 2024-12-03 DIAGNOSIS — J44.9 CHRONIC OBSTRUCTIVE PULMONARY DISEASE, UNSPECIFIED COPD TYPE (HCC): ICD-10-CM

## 2024-12-03 DIAGNOSIS — E11.69 TYPE 2 DIABETES MELLITUS WITH OTHER SPECIFIED COMPLICATION, UNSPECIFIED WHETHER LONG TERM INSULIN USE (HCC): Primary | ICD-10-CM

## 2024-12-03 PROCEDURE — 99214 OFFICE O/P EST MOD 30 MIN: CPT | Performed by: NURSE PRACTITIONER

## 2024-12-03 PROCEDURE — 3074F SYST BP LT 130 MM HG: CPT | Performed by: NURSE PRACTITIONER

## 2024-12-03 PROCEDURE — 3078F DIAST BP <80 MM HG: CPT | Performed by: NURSE PRACTITIONER

## 2024-12-03 PROCEDURE — 3046F HEMOGLOBIN A1C LEVEL >9.0%: CPT | Performed by: NURSE PRACTITIONER

## 2024-12-03 PROCEDURE — 36415 COLL VENOUS BLD VENIPUNCTURE: CPT | Performed by: NURSE PRACTITIONER

## 2024-12-03 RX ORDER — CLOPIDOGREL BISULFATE 75 MG/1
75 TABLET ORAL DAILY
Qty: 30 TABLET | Refills: 3 | Status: SHIPPED | OUTPATIENT
Start: 2024-12-03

## 2024-12-03 RX ORDER — PANTOPRAZOLE SODIUM 40 MG/1
TABLET, DELAYED RELEASE ORAL
Qty: 90 TABLET | Refills: 1 | Status: SHIPPED | OUTPATIENT
Start: 2024-12-03

## 2024-12-03 RX ORDER — UMECLIDINIUM 62.5 UG/1
AEROSOL, POWDER ORAL
Qty: 30 EACH | Refills: 1 | Status: SHIPPED | OUTPATIENT
Start: 2024-12-03

## 2024-12-03 RX ORDER — FENOFIBRATE 160 MG/1
160 TABLET ORAL DAILY
Qty: 30 TABLET | Refills: 3 | Status: SHIPPED | OUTPATIENT
Start: 2024-12-03

## 2024-12-03 RX ORDER — PRAVASTATIN SODIUM 80 MG/1
80 TABLET ORAL DAILY
Qty: 90 TABLET | Refills: 1 | Status: SHIPPED | OUTPATIENT
Start: 2024-12-03

## 2024-12-03 RX ORDER — FUROSEMIDE 20 MG/1
20 TABLET ORAL DAILY
Qty: 60 TABLET | Refills: 3 | Status: SHIPPED | OUTPATIENT
Start: 2024-12-03

## 2024-12-03 ASSESSMENT — ENCOUNTER SYMPTOMS
GASTROINTESTINAL NEGATIVE: 1
EYES NEGATIVE: 1
RESPIRATORY NEGATIVE: 1

## 2024-12-03 NOTE — PATIENT INSTRUCTIONS
Please read the healthy family handout that you were given and share it with your family.       Please compare this printed medication list with your medications at home to be sure they are the same.  If you have any medications that are different please contact us immediately at [phone].     Also review your allergies that we have listed, these may also include medications that you have not been able to tolerate, make sure everything listed is correct. If you have any allergies that are different please contact us immediately at [phone].     You may receive a survey in the mail or by email asking about your experience during your visit today. Please complete and return to us so we know how we are serving you.

## 2024-12-03 NOTE — TELEPHONE ENCOUNTER
Refill Request       Last Seen: Last Seen Department: Visit date not found  Last Seen by PCP: Visit date not found    Last Written: 3/19/24    Next Appointment: 3/3/25  Future Appointments   Date Time Provider Department Center   3/3/2025  1:40 PM Wade, Zoila, APRN - CNP [place] [facility]   3/18/2025 11:00 AM Wade, Zoila, APRN - CNP [place] [facility]   5/29/2025  4:00 PM Wade, Zoila, APRN - CNP [place] [facility]       Future appointment scheduled      Requested Prescriptions     Pending Prescriptions Disp Refills    furosemide (LASIX) 20 MG tablet [Pharmacy Med Name: FUROSEMIDE 20MG TABLET] 60 tablet 3     Sig: TAKE 1 TABLET BY MOUTH DAILY

## 2024-12-03 NOTE — PROGRESS NOTES
elevation in triglycerides.  Patient reports that he has been taking the rosuvastatin 40 mg daily and fenofibrate 160 mg daily.  We discussed in detail the importance of monitoring his diet more closely and avoiding foods that are high in saturated fats.  Patient will continue with fenofibrate 160 mg however we will discontinue rosuvastatin and start patient on pravastatin 80 mg daily.  Patient aware risk associated with uncontrolled hyperlipidemia and chronic conditions/comorbidities and risks.  Patient will follow-up in 3 months, sooner for new or worsening symptoms.  - pravastatin (PRAVACHOL) 80 MG tablet; Take 1 tablet by mouth daily  Dispense: 90 tablet; Refill: 1         The note was completed using Dragon voice recognition transcription. Every effort was made to ensure accuracy; however, inadvertent  transcription errors may be present despite my best efforts to edit errors.    Zoila Olvera, LINK - CNP

## 2024-12-04 LAB
ALBUMIN SERPL-MCNC: 3.9 G/DL (ref 3.4–5)
ALBUMIN/GLOB SERPL: 1.3 {RATIO} (ref 1.1–2.2)
ALP SERPL-CCNC: 79 U/L (ref 40–129)
ANION GAP SERPL CALCULATED.3IONS-SCNC: 19 MMOL/L (ref 3–16)
AST SERPL-CCNC: 45 U/L (ref 15–37)
BILIRUB SERPL-MCNC: 0.3 MG/DL (ref 0–1)
BUN SERPL-MCNC: 17 MG/DL (ref 7–20)
CALCIUM SERPL-MCNC: 9.7 MG/DL (ref 8.3–10.6)
CHLORIDE SERPL-SCNC: 98 MMOL/L (ref 99–110)
CO2 SERPL-SCNC: 18 MMOL/L (ref 21–32)
CREAT SERPL-MCNC: 1.1 MG/DL (ref 0.9–1.3)
GFR SERPLBLD CREATININE-BSD FMLA CKD-EPI: 78 ML/MIN/{1.73_M2}
GLUCOSE SERPL-MCNC: 209 MG/DL (ref 70–99)
PROT SERPL-MCNC: 6.8 G/DL (ref 6.4–8.2)
SODIUM SERPL-SCNC: 135 MMOL/L (ref 136–145)

## 2024-12-06 DIAGNOSIS — J44.9 CHRONIC OBSTRUCTIVE PULMONARY DISEASE, UNSPECIFIED COPD TYPE (HCC): ICD-10-CM

## 2024-12-06 RX ORDER — ALBUTEROL SULFATE 90 UG/1
INHALANT RESPIRATORY (INHALATION)
Qty: 6.7 EACH | Refills: 5 | Status: SHIPPED | OUTPATIENT
Start: 2024-12-06

## 2024-12-06 RX ORDER — FLUTICASONE FUROATE AND VILANTEROL TRIFENATATE 200; 25 UG/1; UG/1
POWDER RESPIRATORY (INHALATION)
Qty: 60 EACH | Refills: 2 | Status: SHIPPED | OUTPATIENT
Start: 2024-12-06

## 2024-12-06 NOTE — TELEPHONE ENCOUNTER
Future appt scheduled 03/03/2025                          Last appt 12/03/2024        Last Written 04/05/2024        albuterol sulfate HFA (PROVENTIL;VENTOLIN;PROAIR) 108 (90 Base) MCG/ACT inhaler   8.5 g   5 RF

## 2024-12-06 NOTE — TELEPHONE ENCOUNTER
Future appt scheduled 03/03/2025                          Last appt 12/03/2024      Last Written 06/17/2024    BREO ELLIPTA 200-25 MCG/ACT AEPB inhaler   60 each   2 RF

## 2025-01-20 DIAGNOSIS — E11.69 TYPE 2 DIABETES MELLITUS WITH OTHER SPECIFIED COMPLICATION, UNSPECIFIED WHETHER LONG TERM INSULIN USE (HCC): ICD-10-CM

## 2025-01-21 RX ORDER — EMPAGLIFLOZIN 25 MG/1
TABLET, FILM COATED ORAL
Qty: 30 TABLET | Refills: 3 | Status: SHIPPED | OUTPATIENT
Start: 2025-01-21

## 2025-01-21 RX ORDER — ACYCLOVIR 400 MG/1
TABLET ORAL
Qty: 12 EACH | Refills: 1 | Status: SHIPPED | OUTPATIENT
Start: 2025-01-21

## 2025-02-18 DIAGNOSIS — J44.9 CHRONIC OBSTRUCTIVE PULMONARY DISEASE, UNSPECIFIED COPD TYPE (HCC): ICD-10-CM

## 2025-02-18 RX ORDER — UMECLIDINIUM 62.5 UG/1
AEROSOL, POWDER ORAL
Qty: 30 EACH | Refills: 1 | Status: SHIPPED | OUTPATIENT
Start: 2025-02-18

## 2025-03-02 PROBLEM — K85.90 ACUTE PANCREATITIS WITHOUT INFECTION OR NECROSIS: Status: RESOLVED | Noted: 2022-10-26 | Resolved: 2025-03-02

## 2025-03-02 PROBLEM — E87.20 METABOLIC ACIDOSIS: Status: RESOLVED | Noted: 2022-10-26 | Resolved: 2025-03-02

## 2025-03-03 ENCOUNTER — TELEPHONE (OUTPATIENT)
Dept: FAMILY MEDICINE CLINIC | Age: 60
End: 2025-03-03

## 2025-03-13 ENCOUNTER — TELEPHONE (OUTPATIENT)
Dept: FAMILY MEDICINE CLINIC | Age: 60
End: 2025-03-13

## 2025-03-13 NOTE — TELEPHONE ENCOUNTER
Pt called in this afternoon asking if you would prescribe generic Viagra for him    [facility] in [place]

## 2025-03-14 DIAGNOSIS — J44.9 CHRONIC OBSTRUCTIVE PULMONARY DISEASE, UNSPECIFIED COPD TYPE (HCC): ICD-10-CM

## 2025-03-14 RX ORDER — LANOLIN ALCOHOL/MO/W.PET/CERES
400 CREAM (GRAM) TOPICAL DAILY
Qty: 30 TABLET | Refills: 5 | Status: SHIPPED | OUTPATIENT
Start: 2025-03-14

## 2025-03-14 RX ORDER — INSULIN GLARGINE 100 [IU]/ML
45 INJECTION, SOLUTION SUBCUTANEOUS 2 TIMES DAILY
Qty: 81 ML | Refills: 0 | Status: SHIPPED | OUTPATIENT
Start: 2025-03-14 | End: 2025-06-12

## 2025-03-14 RX ORDER — FLUTICASONE FUROATE AND VILANTEROL TRIFENATATE 200; 25 UG/1; UG/1
POWDER RESPIRATORY (INHALATION)
Qty: 60 EACH | Refills: 2 | Status: SHIPPED | OUTPATIENT
Start: 2025-03-14

## 2025-04-21 DIAGNOSIS — J44.9 CHRONIC OBSTRUCTIVE PULMONARY DISEASE, UNSPECIFIED COPD TYPE (HCC): ICD-10-CM

## 2025-04-21 RX ORDER — CLOPIDOGREL BISULFATE 75 MG/1
75 TABLET ORAL DAILY
Qty: 30 TABLET | Refills: 3 | Status: SHIPPED | OUTPATIENT
Start: 2025-04-21

## 2025-04-21 RX ORDER — FENOFIBRATE 160 MG/1
160 TABLET ORAL DAILY
Qty: 30 TABLET | Refills: 3 | Status: SHIPPED | OUTPATIENT
Start: 2025-04-21

## 2025-04-21 RX ORDER — UMECLIDINIUM 62.5 UG/1
AEROSOL, POWDER ORAL
Qty: 30 EACH | Refills: 1 | Status: SHIPPED | OUTPATIENT
Start: 2025-04-21

## 2025-05-07 DIAGNOSIS — E78.2 MIXED HYPERLIPIDEMIA: ICD-10-CM

## 2025-05-08 RX ORDER — PRAVASTATIN SODIUM 80 MG/1
80 TABLET ORAL DAILY
Qty: 90 TABLET | Refills: 1 | Status: SHIPPED | OUTPATIENT
Start: 2025-05-08

## 2025-05-28 DIAGNOSIS — E11.69 TYPE 2 DIABETES MELLITUS WITH OTHER SPECIFIED COMPLICATION, UNSPECIFIED WHETHER LONG TERM INSULIN USE (HCC): ICD-10-CM

## 2025-05-28 NOTE — TELEPHONE ENCOUNTER
Future appt scheduled 05/29/2025    Refill Request     CONFIRM preferrred pharmacy with the patient.    If Mail Order Rx - Pend for 90 day refill.      Last Seen: Last Seen Department: 12/3/2024  Last Seen by PCP: 12/3/2024    Last Written: 01/21/2025    If no future appointment scheduled, route STAFF MESSAGE with patient name to the  Pool for scheduling.      Next Appointment:   Future Appointments   Date Time Provider Department Center   5/29/2025  4:00 PM Zoila Olvera APRN - CNP [place] [facility]       Message sent to  to schedule appt with patient?  NO      Requested Prescriptions     Pending Prescriptions Disp Refills    empagliflozin (JARDIANCE) 25 MG tablet [Pharmacy Med Name: JARDIANCE 25MG TABLET] 30 tablet 0     Sig: TAKE ONE (1) TABLET ONCE DAILY

## 2025-05-29 RX ORDER — EMPAGLIFLOZIN 25 MG/1
TABLET, FILM COATED ORAL
Qty: 30 TABLET | Refills: 0 | Status: SHIPPED | OUTPATIENT
Start: 2025-05-29

## 2025-07-02 ENCOUNTER — TELEPHONE (OUTPATIENT)
Dept: FAMILY MEDICINE CLINIC | Age: 60
End: 2025-07-02

## 2025-07-02 DIAGNOSIS — E11.69 TYPE 2 DIABETES MELLITUS WITH OTHER SPECIFIED COMPLICATION, UNSPECIFIED WHETHER LONG TERM INSULIN USE (HCC): ICD-10-CM

## 2025-07-02 RX ORDER — EMPAGLIFLOZIN 25 MG/1
TABLET, FILM COATED ORAL
Qty: 30 TABLET | Refills: 0 | OUTPATIENT
Start: 2025-07-02

## 2025-07-02 NOTE — TELEPHONE ENCOUNTER
LOV 12/3/24  FOV Return in about 3 months (around 3/3/2025), pt has had multiple no shows.    Sent message to Marcela.

## 2025-07-14 RX ORDER — INSULIN GLARGINE 100 [IU]/ML
INJECTION, SOLUTION SUBCUTANEOUS
Qty: 80 ML | Refills: 0 | OUTPATIENT
Start: 2025-07-14

## 2025-07-24 NOTE — PROGRESS NOTES
visit.  Patient reports that he has been taking pravastatin 80 mg daily and fenofibrate 160 mg daily.  Continue with current treatment follow-up in 6 months.  Patient reports that he has been watching what he has been eating and trying to exercise more.  Continue with current treatment follow-up in 6 months, sooner for new or worsening symptoms.  - Lipid, Fasting  - pravastatin (PRAVACHOL) 80 MG tablet; Take 1 tablet by mouth daily  Dispense: 90 tablet; Refill: 1  - pantoprazole (PROTONIX) 40 MG tablet; Take 1 tablet by mouth daily  Dispense: 30 tablet; Refill: 3  - Vascular duplex lower extremity venous bilateral; Future    7. Tobacco use  Patient continues to smoke daily.  Patient aware risks.  Smoking cessation advised.  Patient declines.  Patient advised on routine lung CT screening.  Patient acknowledges.    8. Gastroesophageal reflux disease without esophagitis  Stable.  Asymptomatic.  Patient compliant with Protonix 40 mg daily.  Continue current treatment and management follow-up in 6 months, sooner for new or worsening symptoms.    9. Colon cancer screening  Preventative screening recommended.  Orders previously placed.  Patient encouraged to call and schedule testing.  Patient aware risks.  Patient acknowledges.    10. Encounter for screening for lung cancer  See above #7    11. Occlusion of right internal carotid artery  Stable.  Patient has a history of right sided MCA stroke.  Previous Doppler performed 2024.  No previous intervention.  Recommendations repeat studies for further evaluation.  Patient verbalized and acknowledges a plan of care at this time.  - Vascular duplex carotid bilateral; Future    12. Neuropathy  Patient reports ongoing neuropathy and pain in his feet and legs.  Patient reports symptoms over the past several months but has had it in the years past as well.  We did discuss concerns with uncontrolled chronic conditions and comorbidities worsening symptoms for neuropathy.  A1c has

## 2025-07-25 ENCOUNTER — OFFICE VISIT (OUTPATIENT)
Dept: FAMILY MEDICINE CLINIC | Age: 60
End: 2025-07-25

## 2025-07-25 VITALS
DIASTOLIC BLOOD PRESSURE: 75 MMHG | HEIGHT: 60 IN | SYSTOLIC BLOOD PRESSURE: 133 MMHG | HEART RATE: 90 BPM | BODY MASS INDEX: 31.41 KG/M2 | TEMPERATURE: 98.1 F | OXYGEN SATURATION: 92 % | WEIGHT: 160 LBS

## 2025-07-25 DIAGNOSIS — I50.22 CHRONIC SYSTOLIC CONGESTIVE HEART FAILURE (HCC): ICD-10-CM

## 2025-07-25 DIAGNOSIS — Z12.11 COLON CANCER SCREENING: ICD-10-CM

## 2025-07-25 DIAGNOSIS — Z12.2 ENCOUNTER FOR SCREENING FOR LUNG CANCER: ICD-10-CM

## 2025-07-25 DIAGNOSIS — K21.9 GASTROESOPHAGEAL REFLUX DISEASE WITHOUT ESOPHAGITIS: ICD-10-CM

## 2025-07-25 DIAGNOSIS — I10 PRIMARY HYPERTENSION: Primary | ICD-10-CM

## 2025-07-25 DIAGNOSIS — E78.2 MIXED HYPERLIPIDEMIA: ICD-10-CM

## 2025-07-25 DIAGNOSIS — J96.11 CHRONIC RESPIRATORY FAILURE WITH HYPOXIA (HCC): ICD-10-CM

## 2025-07-25 DIAGNOSIS — Z72.0 TOBACCO USE: ICD-10-CM

## 2025-07-25 DIAGNOSIS — R06.02 SHORTNESS OF BREATH: ICD-10-CM

## 2025-07-25 DIAGNOSIS — J44.9 CHRONIC OBSTRUCTIVE PULMONARY DISEASE, UNSPECIFIED COPD TYPE (HCC): ICD-10-CM

## 2025-07-25 DIAGNOSIS — I65.21 OCCLUSION OF RIGHT INTERNAL CAROTID ARTERY: ICD-10-CM

## 2025-07-25 DIAGNOSIS — E11.69 TYPE 2 DIABETES MELLITUS WITH OTHER SPECIFIED COMPLICATION, UNSPECIFIED WHETHER LONG TERM INSULIN USE (HCC): ICD-10-CM

## 2025-07-25 DIAGNOSIS — G62.9 NEUROPATHY: ICD-10-CM

## 2025-07-25 LAB — HBA1C MFR BLD: 8.3 %

## 2025-07-25 RX ORDER — UMECLIDINIUM 62.5 UG/1
1 AEROSOL, POWDER ORAL DAILY
Qty: 30 EACH | Refills: 3 | Status: SHIPPED | OUTPATIENT
Start: 2025-07-25

## 2025-07-25 RX ORDER — PANTOPRAZOLE SODIUM 40 MG/1
40 TABLET, DELAYED RELEASE ORAL DAILY
Qty: 30 TABLET | Refills: 3 | Status: SHIPPED | OUTPATIENT
Start: 2025-07-25

## 2025-07-25 RX ORDER — PRAVASTATIN SODIUM 80 MG/1
80 TABLET ORAL DAILY
Qty: 90 TABLET | Refills: 1 | Status: SHIPPED | OUTPATIENT
Start: 2025-07-25

## 2025-07-25 RX ORDER — FLUTICASONE FUROATE AND VILANTEROL 200; 25 UG/1; UG/1
1 POWDER RESPIRATORY (INHALATION)
Qty: 60 EACH | Refills: 3 | Status: SHIPPED | OUTPATIENT
Start: 2025-07-25

## 2025-07-25 RX ORDER — INSULIN GLARGINE 100 [IU]/ML
45 INJECTION, SOLUTION SUBCUTANEOUS 2 TIMES DAILY
Qty: 81 ML | Refills: 0 | Status: SHIPPED | OUTPATIENT
Start: 2025-07-25 | End: 2025-10-23

## 2025-07-25 RX ORDER — FUROSEMIDE 20 MG/1
20 TABLET ORAL DAILY
Qty: 60 TABLET | Refills: 3 | Status: SHIPPED | OUTPATIENT
Start: 2025-07-25

## 2025-07-25 RX ORDER — LANOLIN ALCOHOL/MO/W.PET/CERES
400 CREAM (GRAM) TOPICAL DAILY
Qty: 30 TABLET | Refills: 5 | Status: SHIPPED | OUTPATIENT
Start: 2025-07-25

## 2025-07-25 RX ORDER — CLOPIDOGREL BISULFATE 75 MG/1
75 TABLET ORAL DAILY
Qty: 30 TABLET | Refills: 3 | Status: SHIPPED | OUTPATIENT
Start: 2025-07-25

## 2025-07-25 RX ORDER — GABAPENTIN 400 MG/1
400 CAPSULE ORAL 4 TIMES DAILY
Qty: 120 CAPSULE | Refills: 0 | Status: SHIPPED | OUTPATIENT
Start: 2025-07-25 | End: 2025-08-24

## 2025-07-25 ASSESSMENT — PATIENT HEALTH QUESTIONNAIRE - PHQ9
SUM OF ALL RESPONSES TO PHQ QUESTIONS 1-9: 0
2. FEELING DOWN, DEPRESSED OR HOPELESS: NOT AT ALL
SUM OF ALL RESPONSES TO PHQ QUESTIONS 1-9: 0
SUM OF ALL RESPONSES TO PHQ QUESTIONS 1-9: 0
1. LITTLE INTEREST OR PLEASURE IN DOING THINGS: NOT AT ALL
SUM OF ALL RESPONSES TO PHQ QUESTIONS 1-9: 0

## 2025-07-25 ASSESSMENT — ENCOUNTER SYMPTOMS
RESPIRATORY NEGATIVE: 1
GASTROINTESTINAL NEGATIVE: 1
EYES NEGATIVE: 1

## 2025-07-25 NOTE — PATIENT INSTRUCTIONS
Please read the healthy family handout that you were given and share it with your family.       Please compare this printed medication list with your medications at home to be sure they are the same.  If you have any medications that are different please contact us immediately at [phone].     Also review your allergies that we have listed, these may also include medications that you have not been able to tolerate, make sure everything listed is correct. If you have any allergies that are different please contact us immediately at [phone].     You may receive a survey in the mail or by email asking about your experience during your visit today. Please complete and return to us so we know how we are serving you.    
Universal Safety Interventions

## 2025-07-26 LAB
ALBUMIN SERPL-MCNC: 4.1 G/DL (ref 3.4–5)
ALBUMIN/GLOB SERPL: 1.5 {RATIO} (ref 1.1–2.2)
ALP SERPL-CCNC: 64 U/L (ref 40–129)
ALT SERPL-CCNC: 27 U/L (ref 10–40)
ANION GAP SERPL CALCULATED.3IONS-SCNC: 12 MMOL/L (ref 3–16)
AST SERPL-CCNC: 29 U/L (ref 15–37)
BASOPHILS # BLD: 0 K/UL (ref 0–0.2)
BASOPHILS NFR BLD: 0 %
BILIRUB SERPL-MCNC: 0.4 MG/DL (ref 0–1)
BUN SERPL-MCNC: 17 MG/DL (ref 7–20)
CALCIUM SERPL-MCNC: 10 MG/DL (ref 8.3–10.6)
CHLORIDE SERPL-SCNC: 98 MMOL/L (ref 99–110)
CHOLEST SERPL-MCNC: 187 MG/DL (ref 0–199)
CO2 SERPL-SCNC: 26 MMOL/L (ref 21–32)
CREAT SERPL-MCNC: 1 MG/DL (ref 0.9–1.3)
CREAT UR-MCNC: 103 MG/DL (ref 39–259)
DEPRECATED RDW RBC AUTO: 16.3 % (ref 12.4–15.4)
EOSINOPHIL # BLD: 0 K/UL (ref 0–0.6)
EOSINOPHIL NFR BLD: 0 %
GFR SERPLBLD CREATININE-BSD FMLA CKD-EPI: 87 ML/MIN/{1.73_M2}
GLUCOSE SERPL-MCNC: 237 MG/DL (ref 70–99)
HCT VFR BLD AUTO: 49.1 % (ref 40.5–52.5)
HDLC SERPL-MCNC: 37 MG/DL (ref 40–60)
HGB BLD-MCNC: 16.1 G/DL (ref 13.5–17.5)
LDL CHOLESTEROL: 105 MG/DL
LYMPHOCYTES # BLD: 2.8 K/UL (ref 1–5.1)
LYMPHOCYTES NFR BLD: 20 %
MCH RBC QN AUTO: 28.5 PG (ref 26–34)
MCHC RBC AUTO-ENTMCNC: 32.8 G/DL (ref 31–36)
MCV RBC AUTO: 86.9 FL (ref 80–100)
MICROALBUMIN UR DL<=1MG/L-MCNC: 8.74 MG/DL
MICROALBUMIN/CREAT UR: 84.9 MG/G (ref 0–30)
MONOCYTES # BLD: 0.8 K/UL (ref 0–1.3)
MONOCYTES NFR BLD: 6 %
NEUTROPHILS # BLD: 9.2 K/UL (ref 1.7–7.7)
NEUTROPHILS NFR BLD: 66 %
NEUTS BAND NFR BLD MANUAL: 6 % (ref 0–7)
NEUTS VAC BLD QL SMEAR: PRESENT
PLATELET # BLD AUTO: 294 K/UL (ref 135–450)
PLATELET BLD QL SMEAR: ADEQUATE
PMV BLD AUTO: 9.4 FL (ref 5–10.5)
POTASSIUM SERPL-SCNC: 4.5 MMOL/L (ref 3.5–5.1)
PROT SERPL-MCNC: 6.9 G/DL (ref 6.4–8.2)
RBC # BLD AUTO: 5.65 M/UL (ref 4.2–5.9)
RBC MORPH BLD: NORMAL
SLIDE REVIEW: ABNORMAL
SODIUM SERPL-SCNC: 136 MMOL/L (ref 136–145)
TRIGL SERPL-MCNC: 227 MG/DL (ref 0–150)
VARIANT LYMPHS NFR BLD MANUAL: 2 % (ref 0–6)
VLDLC SERPL CALC-MCNC: 45 MG/DL
WBC # BLD AUTO: 12.8 K/UL (ref 4–11)

## 2025-07-28 ENCOUNTER — TELEPHONE (OUTPATIENT)
Dept: FAMILY MEDICINE CLINIC | Age: 60
End: 2025-07-28

## 2025-07-31 DIAGNOSIS — E78.2 MIXED HYPERLIPIDEMIA: ICD-10-CM

## 2025-07-31 DIAGNOSIS — R20.0 NUMBNESS IN BOTH LEGS: ICD-10-CM

## 2025-07-31 DIAGNOSIS — R06.02 SHORTNESS OF BREATH: ICD-10-CM

## 2025-07-31 DIAGNOSIS — E11.69 TYPE 2 DIABETES MELLITUS WITH OTHER SPECIFIED COMPLICATION, UNSPECIFIED WHETHER LONG TERM INSULIN USE (HCC): Primary | ICD-10-CM

## 2025-07-31 DIAGNOSIS — G62.9 NEUROPATHY: ICD-10-CM

## 2025-08-09 DIAGNOSIS — J44.9 CHRONIC OBSTRUCTIVE PULMONARY DISEASE, UNSPECIFIED COPD TYPE (HCC): ICD-10-CM

## 2025-08-11 RX ORDER — ALBUTEROL SULFATE 90 UG/1
INHALANT RESPIRATORY (INHALATION)
Qty: 6.7 EACH | Refills: 5 | Status: SHIPPED | OUTPATIENT
Start: 2025-08-11

## 2025-08-18 ENCOUNTER — TELEPHONE (OUTPATIENT)
Dept: ADMINISTRATIVE | Age: 60
End: 2025-08-18

## 2025-08-28 ENCOUNTER — HOSPITAL ENCOUNTER (OUTPATIENT)
Dept: VASCULAR LAB | Age: 60
Discharge: HOME | End: 2025-08-30
Payer: MEDICARE

## 2025-08-28 DIAGNOSIS — G62.9 NEUROPATHY: ICD-10-CM

## 2025-08-28 DIAGNOSIS — R06.02 SHORTNESS OF BREATH: ICD-10-CM

## 2025-08-28 DIAGNOSIS — R20.0 NUMBNESS IN BOTH LEGS: ICD-10-CM

## 2025-08-28 DIAGNOSIS — E11.69 TYPE 2 DIABETES MELLITUS WITH OTHER SPECIFIED COMPLICATION, UNSPECIFIED WHETHER LONG TERM INSULIN USE (HCC): ICD-10-CM

## 2025-08-28 DIAGNOSIS — E78.2 MIXED HYPERLIPIDEMIA: ICD-10-CM

## 2025-08-28 LAB
VAS LEFT ARM BP DIA: 85 MMHG
VAS LEFT ARM BP: 131 MMHG
VAS LEFT CCA DIST EDV: 31.1 CM/S
VAS LEFT CCA DIST PSV: 76.4 CM/S
VAS LEFT CCA MID EDV: 26.7 CM/S
VAS LEFT CCA MID PSV: 80.8 CM/S
VAS LEFT CCA PROX EDV: 19.9 CM/S
VAS LEFT CCA PROX PSV: 73.7 CM/S
VAS LEFT ECA EDV: 50 CM/S
VAS LEFT ECA PSV: 243 CM/S
VAS LEFT ICA DIST EDV: 36.4 CM/S
VAS LEFT ICA DIST PSV: 95.3 CM/S
VAS LEFT ICA MID EDV: 64.3 CM/S
VAS LEFT ICA MID PSV: 155 CM/S
VAS LEFT ICA PROX EDV: 73.7 CM/S
VAS LEFT ICA PROX PSV: 185 CM/S
VAS LEFT ICA/CCA PSV: 2.29
VAS LEFT SUBCLAVIAN PROX EDV: 0 CM/S
VAS LEFT SUBCLAVIAN PROX PSV: 125 CM/S
VAS LEFT VERTEBRAL EDV: 15.6 CM/S
VAS LEFT VERTEBRAL PSV: 43.3 CM/S
VAS RIGHT ARM BP DIA: 88 MMHG
VAS RIGHT ARM BP: 135 MMHG
VAS RIGHT CCA DIST EDV: 9 CM/S
VAS RIGHT CCA DIST PSV: 41.5 CM/S
VAS RIGHT CCA MID EDV: 9.94 CM/S
VAS RIGHT CCA MID PSV: 52.2 CM/S
VAS RIGHT CCA PROX EDV: 7.5 CM/S
VAS RIGHT CCA PROX PSV: 78.3 CM/S
VAS RIGHT ECA EDV: 19.9 CM/S
VAS RIGHT ECA PSV: 140 CM/S
VAS RIGHT ICA DIST EDV: 0 CM/S
VAS RIGHT ICA DIST PSV: 0 CM/S
VAS RIGHT ICA MID EDV: 0 CM/S
VAS RIGHT ICA MID PSV: 0 CM/S
VAS RIGHT ICA PROX EDV: 0 CM/S
VAS RIGHT ICA PROX PSV: 0 CM/S
VAS RIGHT ICA/CCA PSV: 0
VAS RIGHT SUBCLAVIAN PROX EDV: 0 CM/S
VAS RIGHT SUBCLAVIAN PROX PSV: 119 CM/S
VAS RIGHT VERTEBRAL EDV: 18.2 CM/S
VAS RIGHT VERTEBRAL PSV: 46.8 CM/S

## 2025-08-28 PROCEDURE — 93970 EXTREMITY STUDY: CPT

## 2025-08-28 PROCEDURE — 93880 EXTRACRANIAL BILAT STUDY: CPT

## 2025-08-28 PROCEDURE — 93880 EXTRACRANIAL BILAT STUDY: CPT | Performed by: SURGERY

## 2025-09-02 RX ORDER — GABAPENTIN 400 MG/1
CAPSULE ORAL
Qty: 120 CAPSULE | Refills: 0 | Status: SHIPPED | OUTPATIENT
Start: 2025-09-02 | End: 2025-10-02

## 2025-09-02 RX ORDER — FENOFIBRATE 160 MG/1
160 TABLET ORAL DAILY
Qty: 30 TABLET | Refills: 3 | Status: SHIPPED | OUTPATIENT
Start: 2025-09-02